# Patient Record
Sex: FEMALE | Race: WHITE | Employment: OTHER | URBAN - METROPOLITAN AREA
[De-identification: names, ages, dates, MRNs, and addresses within clinical notes are randomized per-mention and may not be internally consistent; named-entity substitution may affect disease eponyms.]

---

## 2017-02-11 ENCOUNTER — HOSPITAL ENCOUNTER (EMERGENCY)
Age: 52
Discharge: HOME | End: 2017-02-11
Attending: EMERGENCY MEDICINE
Payer: COMMERCIAL

## 2017-02-11 VITALS
BODY MASS INDEX: 23.49 KG/M2 | SYSTOLIC BLOOD PRESSURE: 122 MMHG | RESPIRATION RATE: 16 BRPM | OXYGEN SATURATION: 100 % | HEART RATE: 80 BPM | DIASTOLIC BLOOD PRESSURE: 91 MMHG | WEIGHT: 150 LBS | TEMPERATURE: 97 F

## 2017-02-11 DIAGNOSIS — M62.830 BACK MUSCLE SPASM: Primary | ICD-10-CM

## 2017-02-11 PROCEDURE — 96372 THER/PROPH/DIAG INJ SC/IM: CPT

## 2017-02-11 PROCEDURE — 99283 EMERGENCY DEPT VISIT LOW MDM: CPT

## 2017-02-11 PROCEDURE — 6360000002 HC RX W HCPCS: Performed by: EMERGENCY MEDICINE

## 2017-02-11 PROCEDURE — 6370000000 HC RX 637 (ALT 250 FOR IP): Performed by: EMERGENCY MEDICINE

## 2017-02-11 RX ORDER — KETOROLAC TROMETHAMINE 30 MG/ML
30 INJECTION, SOLUTION INTRAMUSCULAR; INTRAVENOUS ONCE
Status: COMPLETED | OUTPATIENT
Start: 2017-02-11 | End: 2017-02-11

## 2017-02-11 RX ORDER — IBUPROFEN 800 MG/1
800 TABLET ORAL EVERY 8 HOURS PRN
Qty: 30 TABLET | Refills: 0 | Status: SHIPPED | OUTPATIENT
Start: 2017-02-11 | End: 2017-09-03

## 2017-02-11 RX ORDER — CYCLOBENZAPRINE HCL 10 MG
10 TABLET ORAL 3 TIMES DAILY PRN
Qty: 30 TABLET | Refills: 0 | Status: SHIPPED | OUTPATIENT
Start: 2017-02-11 | End: 2017-02-21

## 2017-02-11 RX ORDER — OXYCODONE HYDROCHLORIDE AND ACETAMINOPHEN 5; 325 MG/1; MG/1
1 TABLET ORAL ONCE
Status: COMPLETED | OUTPATIENT
Start: 2017-02-11 | End: 2017-02-11

## 2017-02-11 RX ORDER — ORPHENADRINE CITRATE 30 MG/ML
60 INJECTION INTRAMUSCULAR; INTRAVENOUS ONCE
Status: COMPLETED | OUTPATIENT
Start: 2017-02-11 | End: 2017-02-11

## 2017-02-11 RX ADMIN — OXYCODONE HYDROCHLORIDE AND ACETAMINOPHEN 1 TABLET: 5; 325 TABLET ORAL at 12:12

## 2017-02-11 RX ADMIN — ORPHENADRINE CITRATE 60 MG: 30 INJECTION INTRAMUSCULAR; INTRAVENOUS at 12:12

## 2017-02-11 RX ADMIN — KETOROLAC TROMETHAMINE 30 MG: 30 INJECTION, SOLUTION INTRAMUSCULAR at 12:13

## 2017-02-11 ASSESSMENT — ENCOUNTER SYMPTOMS
SHORTNESS OF BREATH: 0
ABDOMINAL PAIN: 0
BACK PAIN: 1
CONSTIPATION: 0
COUGH: 0
DIARRHEA: 0
NAUSEA: 0
VOMITING: 0
RHINORRHEA: 0

## 2017-02-11 ASSESSMENT — PAIN SCALES - GENERAL
PAINLEVEL_OUTOF10: 8
PAINLEVEL_OUTOF10: 10

## 2017-02-11 ASSESSMENT — PAIN DESCRIPTION - PAIN TYPE: TYPE: CHRONIC PAIN

## 2017-02-11 ASSESSMENT — PAIN DESCRIPTION - ORIENTATION: ORIENTATION: RIGHT;LOWER

## 2017-02-11 ASSESSMENT — PAIN DESCRIPTION - LOCATION: LOCATION: BACK

## 2017-09-03 ENCOUNTER — HOSPITAL ENCOUNTER (EMERGENCY)
Age: 52
Discharge: HOME | End: 2017-09-03
Attending: EMERGENCY MEDICINE
Payer: COMMERCIAL

## 2017-09-03 VITALS
OXYGEN SATURATION: 100 % | SYSTOLIC BLOOD PRESSURE: 127 MMHG | RESPIRATION RATE: 16 BRPM | TEMPERATURE: 98.8 F | DIASTOLIC BLOOD PRESSURE: 83 MMHG | HEART RATE: 93 BPM

## 2017-09-03 DIAGNOSIS — L03.111 CELLULITIS OF RIGHT AXILLA: Primary | ICD-10-CM

## 2017-09-03 PROCEDURE — 99282 EMERGENCY DEPT VISIT SF MDM: CPT

## 2017-09-03 RX ORDER — DOXYCYCLINE 100 MG/1
100 TABLET ORAL 2 TIMES DAILY
Qty: 20 TABLET | Refills: 0 | Status: SHIPPED | OUTPATIENT
Start: 2017-09-03 | End: 2017-09-13

## 2017-09-03 RX ORDER — IBUPROFEN 800 MG/1
800 TABLET ORAL EVERY 8 HOURS PRN
Qty: 20 TABLET | Refills: 0 | Status: ON HOLD | OUTPATIENT
Start: 2017-09-03 | End: 2017-11-08 | Stop reason: CLARIF

## 2017-09-03 ASSESSMENT — PAIN SCALES - GENERAL: PAINLEVEL_OUTOF10: 9

## 2017-09-03 ASSESSMENT — PAIN DESCRIPTION - DESCRIPTORS: DESCRIPTORS: ACHING

## 2017-09-03 ASSESSMENT — ENCOUNTER SYMPTOMS
COUGH: 0
COLOR CHANGE: 0
SHORTNESS OF BREATH: 0
ABDOMINAL PAIN: 0
VOMITING: 0
NAUSEA: 0
DIARRHEA: 0
BACK PAIN: 0

## 2017-09-03 ASSESSMENT — PAIN DESCRIPTION - ONSET: ONSET: ON-GOING

## 2017-09-03 ASSESSMENT — PAIN DESCRIPTION - FREQUENCY: FREQUENCY: CONTINUOUS

## 2017-09-03 ASSESSMENT — PAIN DESCRIPTION - PAIN TYPE: TYPE: ACUTE PAIN

## 2017-09-05 ENCOUNTER — HOSPITAL ENCOUNTER (EMERGENCY)
Age: 52
Discharge: HOME | End: 2017-09-05
Attending: EMERGENCY MEDICINE
Payer: COMMERCIAL

## 2017-09-05 VITALS
HEART RATE: 90 BPM | OXYGEN SATURATION: 99 % | TEMPERATURE: 98.1 F | HEIGHT: 67 IN | BODY MASS INDEX: 25.11 KG/M2 | WEIGHT: 160 LBS | RESPIRATION RATE: 16 BRPM | SYSTOLIC BLOOD PRESSURE: 137 MMHG | DIASTOLIC BLOOD PRESSURE: 98 MMHG

## 2017-09-05 DIAGNOSIS — L02.91 ABSCESS: Primary | ICD-10-CM

## 2017-09-05 PROCEDURE — 86403 PARTICLE AGGLUT ANTBDY SCRN: CPT

## 2017-09-05 PROCEDURE — 10060 I&D ABSCESS SIMPLE/SINGLE: CPT

## 2017-09-05 PROCEDURE — 87070 CULTURE OTHR SPECIMN AEROBIC: CPT

## 2017-09-05 PROCEDURE — 6370000000 HC RX 637 (ALT 250 FOR IP): Performed by: PHYSICIAN ASSISTANT

## 2017-09-05 PROCEDURE — 87205 SMEAR GRAM STAIN: CPT

## 2017-09-05 PROCEDURE — 99284 EMERGENCY DEPT VISIT MOD MDM: CPT

## 2017-09-05 PROCEDURE — 87186 SC STD MICRODIL/AGAR DIL: CPT

## 2017-09-05 PROCEDURE — 87077 CULTURE AEROBIC IDENTIFY: CPT

## 2017-09-05 RX ORDER — OXYCODONE HYDROCHLORIDE AND ACETAMINOPHEN 5; 325 MG/1; MG/1
1 TABLET ORAL ONCE
Status: COMPLETED | OUTPATIENT
Start: 2017-09-05 | End: 2017-09-05

## 2017-09-05 RX ORDER — OXYCODONE HYDROCHLORIDE AND ACETAMINOPHEN 5; 325 MG/1; MG/1
1-2 TABLET ORAL EVERY 8 HOURS PRN
Qty: 12 TABLET | Refills: 0 | Status: SHIPPED | OUTPATIENT
Start: 2017-09-05 | End: 2017-09-20

## 2017-09-05 RX ADMIN — OXYCODONE HYDROCHLORIDE AND ACETAMINOPHEN 1 TABLET: 5; 325 TABLET ORAL at 14:04

## 2017-09-05 ASSESSMENT — ENCOUNTER SYMPTOMS
ABDOMINAL PAIN: 0
WHEEZING: 0
VOMITING: 0
NAUSEA: 0
COUGH: 0
COLOR CHANGE: 1

## 2017-09-05 ASSESSMENT — PAIN SCALES - GENERAL: PAINLEVEL_OUTOF10: 6

## 2017-09-07 LAB
CULTURE: ABNORMAL
CULTURE: ABNORMAL
DIRECT EXAM: ABNORMAL
DIRECT EXAM: ABNORMAL
Lab: ABNORMAL
ORGANISM: ABNORMAL
SPECIMEN DESCRIPTION: ABNORMAL
STATUS: ABNORMAL

## 2017-09-20 ENCOUNTER — HOSPITAL ENCOUNTER (EMERGENCY)
Age: 52
Discharge: HOME | End: 2017-09-20
Attending: EMERGENCY MEDICINE
Payer: COMMERCIAL

## 2017-09-20 VITALS
OXYGEN SATURATION: 99 % | HEART RATE: 95 BPM | SYSTOLIC BLOOD PRESSURE: 130 MMHG | DIASTOLIC BLOOD PRESSURE: 85 MMHG | TEMPERATURE: 98 F | WEIGHT: 160 LBS | HEIGHT: 67 IN | RESPIRATION RATE: 16 BRPM | BODY MASS INDEX: 25.11 KG/M2

## 2017-09-20 DIAGNOSIS — L73.2 HIDRADENITIS SUPPURATIVA OF LEFT AXILLA: Primary | ICD-10-CM

## 2017-09-20 PROCEDURE — 2500000003 HC RX 250 WO HCPCS: Performed by: EMERGENCY MEDICINE

## 2017-09-20 PROCEDURE — 6370000000 HC RX 637 (ALT 250 FOR IP): Performed by: EMERGENCY MEDICINE

## 2017-09-20 PROCEDURE — 96372 THER/PROPH/DIAG INJ SC/IM: CPT

## 2017-09-20 PROCEDURE — 90715 TDAP VACCINE 7 YRS/> IM: CPT | Performed by: EMERGENCY MEDICINE

## 2017-09-20 PROCEDURE — 10060 I&D ABSCESS SIMPLE/SINGLE: CPT

## 2017-09-20 PROCEDURE — 6360000002 HC RX W HCPCS: Performed by: EMERGENCY MEDICINE

## 2017-09-20 PROCEDURE — 90471 IMMUNIZATION ADMIN: CPT | Performed by: EMERGENCY MEDICINE

## 2017-09-20 PROCEDURE — 99282 EMERGENCY DEPT VISIT SF MDM: CPT

## 2017-09-20 RX ORDER — CHLORHEXIDINE GLUCONATE 4 G/100ML
SOLUTION TOPICAL
Qty: 473 ML | Refills: 1 | Status: SHIPPED | OUTPATIENT
Start: 2017-09-20 | End: 2017-10-04

## 2017-09-20 RX ORDER — MINOCYCLINE HYDROCHLORIDE 50 MG/1
100 CAPSULE ORAL 2 TIMES DAILY
Status: DISCONTINUED | OUTPATIENT
Start: 2017-09-20 | End: 2017-09-20 | Stop reason: HOSPADM

## 2017-09-20 RX ORDER — HYDROCODONE BITARTRATE AND ACETAMINOPHEN 5; 325 MG/1; MG/1
1 TABLET ORAL EVERY 6 HOURS PRN
Qty: 9 TABLET | Refills: 0 | Status: SHIPPED | OUTPATIENT
Start: 2017-09-20 | End: 2017-09-27

## 2017-09-20 RX ORDER — MINOCYCLINE HYDROCHLORIDE 100 MG/1
100 CAPSULE ORAL 2 TIMES DAILY
Qty: 14 CAPSULE | Refills: 0 | Status: SHIPPED | OUTPATIENT
Start: 2017-09-20 | End: 2017-09-27

## 2017-09-20 RX ORDER — LIDOCAINE HYDROCHLORIDE 10 MG/ML
20 INJECTION, SOLUTION INFILTRATION; PERINEURAL ONCE
Status: COMPLETED | OUTPATIENT
Start: 2017-09-20 | End: 2017-09-20

## 2017-09-20 RX ORDER — MORPHINE SULFATE 4 MG/ML
4 INJECTION, SOLUTION INTRAMUSCULAR; INTRAVENOUS ONCE
Status: COMPLETED | OUTPATIENT
Start: 2017-09-20 | End: 2017-09-20

## 2017-09-20 RX ADMIN — LIDOCAINE HYDROCHLORIDE 20 ML: 10 INJECTION, SOLUTION INFILTRATION; PERINEURAL at 10:27

## 2017-09-20 RX ADMIN — MINOCYCLINE HYDROCHLORIDE 100 MG: 50 CAPSULE ORAL at 10:33

## 2017-09-20 RX ADMIN — TETANUS TOXOID, REDUCED DIPHTHERIA TOXOID AND ACELLULAR PERTUSSIS VACCINE, ADSORBED 0.5 ML: 5; 2.5; 8; 8; 2.5 SUSPENSION INTRAMUSCULAR at 10:27

## 2017-09-20 RX ADMIN — MORPHINE SULFATE 4 MG: 4 INJECTION, SOLUTION INTRAMUSCULAR; INTRAVENOUS at 10:28

## 2017-09-20 ASSESSMENT — PAIN DESCRIPTION - ORIENTATION: ORIENTATION: LEFT

## 2017-09-20 ASSESSMENT — PAIN DESCRIPTION - DESCRIPTORS: DESCRIPTORS: ACHING;BURNING

## 2017-09-20 ASSESSMENT — ENCOUNTER SYMPTOMS: ROS SKIN COMMENTS: ABSCESS

## 2017-09-20 ASSESSMENT — PAIN SCALES - GENERAL
PAINLEVEL_OUTOF10: 10
PAINLEVEL_OUTOF10: 5

## 2017-09-20 ASSESSMENT — PAIN DESCRIPTION - PAIN TYPE: TYPE: ACUTE PAIN

## 2017-09-23 ENCOUNTER — HOSPITAL ENCOUNTER (EMERGENCY)
Age: 52
Discharge: HOME | End: 2017-09-23
Attending: EMERGENCY MEDICINE
Payer: COMMERCIAL

## 2017-09-23 VITALS
TEMPERATURE: 97.7 F | WEIGHT: 160 LBS | HEART RATE: 83 BPM | DIASTOLIC BLOOD PRESSURE: 81 MMHG | RESPIRATION RATE: 15 BRPM | BODY MASS INDEX: 25.06 KG/M2 | SYSTOLIC BLOOD PRESSURE: 131 MMHG | OXYGEN SATURATION: 100 %

## 2017-09-23 DIAGNOSIS — Z51.89 ENCOUNTER FOR WOUND RE-CHECK: Primary | ICD-10-CM

## 2017-09-23 PROCEDURE — 99282 EMERGENCY DEPT VISIT SF MDM: CPT

## 2017-09-23 ASSESSMENT — PAIN SCALES - GENERAL: PAINLEVEL_OUTOF10: 5

## 2017-09-23 ASSESSMENT — ENCOUNTER SYMPTOMS
VOMITING: 0
ABDOMINAL PAIN: 0
NAUSEA: 0
SHORTNESS OF BREATH: 0

## 2017-09-23 ASSESSMENT — PAIN DESCRIPTION - ORIENTATION: ORIENTATION: LEFT

## 2017-11-08 ENCOUNTER — APPOINTMENT (OUTPATIENT)
Dept: GENERAL RADIOLOGY | Age: 52
DRG: 263 | End: 2017-11-08
Payer: COMMERCIAL

## 2017-11-08 ENCOUNTER — APPOINTMENT (OUTPATIENT)
Dept: NUCLEAR MEDICINE | Age: 52
DRG: 263 | End: 2017-11-08
Payer: COMMERCIAL

## 2017-11-08 ENCOUNTER — HOSPITAL ENCOUNTER (INPATIENT)
Age: 52
LOS: 3 days | Discharge: HOME | DRG: 263 | End: 2017-11-11
Attending: EMERGENCY MEDICINE | Admitting: INTERNAL MEDICINE
Payer: COMMERCIAL

## 2017-11-08 ENCOUNTER — APPOINTMENT (OUTPATIENT)
Dept: CT IMAGING | Age: 52
DRG: 263 | End: 2017-11-08
Payer: COMMERCIAL

## 2017-11-08 ENCOUNTER — APPOINTMENT (OUTPATIENT)
Dept: ULTRASOUND IMAGING | Age: 52
DRG: 263 | End: 2017-11-08
Payer: COMMERCIAL

## 2017-11-08 DIAGNOSIS — R07.9 CHEST PAIN, UNSPECIFIED TYPE: Primary | ICD-10-CM

## 2017-11-08 DIAGNOSIS — K80.20 CALCULUS OF GALLBLADDER WITHOUT CHOLECYSTITIS WITHOUT OBSTRUCTION: ICD-10-CM

## 2017-11-08 PROBLEM — R07.89 ATYPICAL CHEST PAIN: Status: ACTIVE | Noted: 2017-11-08

## 2017-11-08 PROBLEM — K81.1 CHRONIC CHOLECYSTITIS: Status: ACTIVE | Noted: 2017-11-08

## 2017-11-08 LAB
-: NORMAL
ABSOLUTE EOS #: 0.22 K/UL (ref 0–0.44)
ABSOLUTE IMMATURE GRANULOCYTE: <0.03 K/UL (ref 0–0.3)
ABSOLUTE LYMPH #: 2.58 K/UL (ref 1.1–3.7)
ABSOLUTE MONO #: 0.6 K/UL (ref 0.1–1.2)
ALBUMIN SERPL-MCNC: 4 G/DL (ref 3.5–5.2)
ALBUMIN/GLOBULIN RATIO: 1.4 (ref 1–2.5)
ALP BLD-CCNC: 88 U/L (ref 35–104)
ALT SERPL-CCNC: 15 U/L (ref 5–33)
AMORPHOUS: NORMAL
AMPHETAMINE SCREEN URINE: NEGATIVE
ANION GAP SERPL CALCULATED.3IONS-SCNC: 14 MMOL/L (ref 9–17)
AST SERPL-CCNC: 17 U/L
BACTERIA: NORMAL
BARBITURATE SCREEN URINE: NEGATIVE
BASOPHILS # BLD: 1 % (ref 0–2)
BASOPHILS ABSOLUTE: 0.05 K/UL (ref 0–0.2)
BENZODIAZEPINE SCREEN, URINE: NEGATIVE
BILIRUB SERPL-MCNC: <0.1 MG/DL (ref 0.3–1.2)
BILIRUBIN DIRECT: <0.08 MG/DL
BILIRUBIN URINE: NEGATIVE
BILIRUBIN, INDIRECT: ABNORMAL MG/DL (ref 0–1)
BUN BLDV-MCNC: 9 MG/DL (ref 6–20)
BUN/CREAT BLD: ABNORMAL (ref 9–20)
BUPRENORPHINE URINE: ABNORMAL
CALCIUM SERPL-MCNC: 9.1 MG/DL (ref 8.6–10.4)
CANNABINOID SCREEN URINE: POSITIVE
CASTS UA: NORMAL /LPF (ref 0–8)
CHLORIDE BLD-SCNC: 103 MMOL/L (ref 98–107)
CHOLESTEROL/HDL RATIO: 6
CHOLESTEROL: 265 MG/DL
CO2: 26 MMOL/L (ref 20–31)
COCAINE METABOLITE, URINE: NEGATIVE
COLOR: YELLOW
COMMENT UA: ABNORMAL
CREAT SERPL-MCNC: 0.6 MG/DL (ref 0.5–0.9)
CRYSTALS, UA: NORMAL /HPF
D-DIMER QUANTITATIVE: 1.17 MG/L FEU
DIFFERENTIAL TYPE: ABNORMAL
EOSINOPHILS RELATIVE PERCENT: 3 % (ref 1–4)
EPITHELIAL CELLS UA: NORMAL /HPF (ref 0–5)
GFR AFRICAN AMERICAN: >60 ML/MIN
GFR NON-AFRICAN AMERICAN: >60 ML/MIN
GFR SERPL CREATININE-BSD FRML MDRD: ABNORMAL ML/MIN/{1.73_M2}
GFR SERPL CREATININE-BSD FRML MDRD: ABNORMAL ML/MIN/{1.73_M2}
GLOBULIN: ABNORMAL G/DL (ref 1.5–3.8)
GLUCOSE BLD-MCNC: 90 MG/DL (ref 70–99)
GLUCOSE URINE: NEGATIVE
HCT VFR BLD CALC: 42.1 % (ref 36.3–47.1)
HDLC SERPL-MCNC: 44 MG/DL
HEMOGLOBIN: 13.4 G/DL (ref 11.9–15.1)
IMMATURE GRANULOCYTES: 0 %
KETONES, URINE: NEGATIVE
LDL CHOLESTEROL: 198 MG/DL (ref 0–130)
LEUKOCYTE ESTERASE, URINE: NEGATIVE
LIPASE: 18 U/L (ref 13–60)
LYMPHOCYTES # BLD: 32 % (ref 24–43)
MCH RBC QN AUTO: 28.7 PG (ref 25.2–33.5)
MCHC RBC AUTO-ENTMCNC: 31.8 G/DL (ref 28.4–34.8)
MCV RBC AUTO: 90.1 FL (ref 82.6–102.9)
MDMA URINE: ABNORMAL
METHADONE SCREEN, URINE: NEGATIVE
METHAMPHETAMINE, URINE: ABNORMAL
MONOCYTES # BLD: 8 % (ref 3–12)
MUCUS: NORMAL
NITRITE, URINE: NEGATIVE
OPIATES, URINE: POSITIVE
OTHER OBSERVATIONS UA: NORMAL
OXYCODONE SCREEN URINE: NEGATIVE
PDW BLD-RTO: 15.9 % (ref 11.8–14.4)
PH UA: 6 (ref 5–8)
PHENCYCLIDINE, URINE: NEGATIVE
PLATELET # BLD: 299 K/UL (ref 138–453)
PLATELET ESTIMATE: ABNORMAL
PMV BLD AUTO: 8.8 FL (ref 8.1–13.5)
POC TROPONIN I: 0 NG/ML (ref 0–0.1)
POC TROPONIN I: 0.01 NG/ML (ref 0–0.1)
POC TROPONIN I: 0.01 NG/ML (ref 0–0.1)
POC TROPONIN INTERP: NORMAL
POTASSIUM SERPL-SCNC: 3.5 MMOL/L (ref 3.7–5.3)
PROPOXYPHENE, URINE: ABNORMAL
PROTEIN UA: NEGATIVE
RBC # BLD: 4.67 M/UL (ref 3.95–5.11)
RBC # BLD: ABNORMAL 10*6/UL
RBC UA: NORMAL /HPF (ref 0–4)
RENAL EPITHELIAL, UA: NORMAL /HPF
SEG NEUTROPHILS: 57 % (ref 36–65)
SEGMENTED NEUTROPHILS ABSOLUTE COUNT: 4.54 K/UL (ref 1.5–8.1)
SODIUM BLD-SCNC: 143 MMOL/L (ref 135–144)
SPECIFIC GRAVITY UA: 1.02 (ref 1–1.03)
TEST INFORMATION: ABNORMAL
TOTAL PROTEIN: 6.8 G/DL (ref 6.4–8.3)
TRICHOMONAS: NORMAL
TRICYCLIC ANTIDEPRESSANTS, UR: ABNORMAL
TRIGL SERPL-MCNC: 115 MG/DL
TROPONIN INTERP: NORMAL
TROPONIN INTERP: NORMAL
TROPONIN T: <0.03 NG/ML
TROPONIN T: <0.03 NG/ML
TURBIDITY: CLEAR
URINE HGB: ABNORMAL
UROBILINOGEN, URINE: NORMAL
VLDLC SERPL CALC-MCNC: ABNORMAL MG/DL (ref 1–30)
WBC # BLD: 8 K/UL (ref 3.5–11.3)
WBC # BLD: ABNORMAL 10*3/UL
WBC UA: NORMAL /HPF (ref 0–5)
YEAST: NORMAL

## 2017-11-08 PROCEDURE — 71020 XR CHEST STANDARD TWO VW: CPT

## 2017-11-08 PROCEDURE — A9537 TC99M MEBROFENIN: HCPCS | Performed by: SURGERY

## 2017-11-08 PROCEDURE — 83690 ASSAY OF LIPASE: CPT

## 2017-11-08 PROCEDURE — 85025 COMPLETE CBC W/AUTO DIFF WBC: CPT

## 2017-11-08 PROCEDURE — 80061 LIPID PANEL: CPT

## 2017-11-08 PROCEDURE — 36415 COLL VENOUS BLD VENIPUNCTURE: CPT

## 2017-11-08 PROCEDURE — 76705 ECHO EXAM OF ABDOMEN: CPT

## 2017-11-08 PROCEDURE — 99223 1ST HOSP IP/OBS HIGH 75: CPT | Performed by: INTERNAL MEDICINE

## 2017-11-08 PROCEDURE — 81001 URINALYSIS AUTO W/SCOPE: CPT

## 2017-11-08 PROCEDURE — 6360000002 HC RX W HCPCS: Performed by: INTERNAL MEDICINE

## 2017-11-08 PROCEDURE — 80076 HEPATIC FUNCTION PANEL: CPT

## 2017-11-08 PROCEDURE — 6360000004 HC RX CONTRAST MEDICATION: Performed by: EMERGENCY MEDICINE

## 2017-11-08 PROCEDURE — 6370000000 HC RX 637 (ALT 250 FOR IP): Performed by: INTERNAL MEDICINE

## 2017-11-08 PROCEDURE — 85379 FIBRIN DEGRADATION QUANT: CPT

## 2017-11-08 PROCEDURE — 71260 CT THORAX DX C+: CPT

## 2017-11-08 PROCEDURE — 78227 HEPATOBIL SYST IMAGE W/DRUG: CPT

## 2017-11-08 PROCEDURE — 80048 BASIC METABOLIC PNL TOTAL CA: CPT

## 2017-11-08 PROCEDURE — 6370000000 HC RX 637 (ALT 250 FOR IP): Performed by: EMERGENCY MEDICINE

## 2017-11-08 PROCEDURE — 6360000002 HC RX W HCPCS: Performed by: SURGERY

## 2017-11-08 PROCEDURE — 2580000003 HC RX 258: Performed by: INTERNAL MEDICINE

## 2017-11-08 PROCEDURE — 3430000000 HC RX DIAGNOSTIC RADIOPHARMACEUTICAL: Performed by: SURGERY

## 2017-11-08 PROCEDURE — 2580000003 HC RX 258: Performed by: SURGERY

## 2017-11-08 PROCEDURE — 84484 ASSAY OF TROPONIN QUANT: CPT

## 2017-11-08 PROCEDURE — 80307 DRUG TEST PRSMV CHEM ANLYZR: CPT

## 2017-11-08 PROCEDURE — 93005 ELECTROCARDIOGRAM TRACING: CPT

## 2017-11-08 PROCEDURE — 99285 EMERGENCY DEPT VISIT HI MDM: CPT

## 2017-11-08 PROCEDURE — 99222 1ST HOSP IP/OBS MODERATE 55: CPT | Performed by: SURGERY

## 2017-11-08 PROCEDURE — 1200000000 HC SEMI PRIVATE

## 2017-11-08 PROCEDURE — 94640 AIRWAY INHALATION TREATMENT: CPT

## 2017-11-08 PROCEDURE — 6370000000 HC RX 637 (ALT 250 FOR IP): Performed by: STUDENT IN AN ORGANIZED HEALTH CARE EDUCATION/TRAINING PROGRAM

## 2017-11-08 RX ORDER — ALBUTEROL SULFATE 90 UG/1
2 AEROSOL, METERED RESPIRATORY (INHALATION) 4 TIMES DAILY
Status: DISCONTINUED | OUTPATIENT
Start: 2017-11-08 | End: 2017-11-08

## 2017-11-08 RX ORDER — CHOLECALCIFEROL (VITAMIN D3) 125 MCG
1000 CAPSULE ORAL DAILY
Status: DISCONTINUED | OUTPATIENT
Start: 2017-11-08 | End: 2017-11-11 | Stop reason: HOSPADM

## 2017-11-08 RX ORDER — ATORVASTATIN CALCIUM 40 MG/1
40 TABLET, FILM COATED ORAL NIGHTLY
Status: DISCONTINUED | OUTPATIENT
Start: 2017-11-08 | End: 2017-11-11 | Stop reason: HOSPADM

## 2017-11-08 RX ORDER — PAROXETINE 30 MG/1
30 TABLET, FILM COATED ORAL EVERY MORNING
COMMUNITY
End: 2018-01-30 | Stop reason: SINTOL

## 2017-11-08 RX ORDER — SODIUM CHLORIDE 0.9 % (FLUSH) 0.9 %
10 SYRINGE (ML) INJECTION EVERY 12 HOURS SCHEDULED
Status: DISCONTINUED | OUTPATIENT
Start: 2017-11-08 | End: 2017-11-11 | Stop reason: HOSPADM

## 2017-11-08 RX ORDER — ASPIRIN 81 MG/1
81 TABLET ORAL DAILY
Status: DISCONTINUED | OUTPATIENT
Start: 2017-11-08 | End: 2017-11-11 | Stop reason: HOSPADM

## 2017-11-08 RX ORDER — ACETAMINOPHEN 325 MG/1
650 TABLET ORAL EVERY 4 HOURS PRN
Status: DISCONTINUED | OUTPATIENT
Start: 2017-11-08 | End: 2017-11-11 | Stop reason: HOSPADM

## 2017-11-08 RX ORDER — M-VIT,TX,IRON,MINS/CALC/FOLIC 27MG-0.4MG
1 TABLET ORAL DAILY
Status: DISCONTINUED | OUTPATIENT
Start: 2017-11-08 | End: 2017-11-11 | Stop reason: HOSPADM

## 2017-11-08 RX ORDER — ALBUTEROL SULFATE 2.5 MG/3ML
2.5 SOLUTION RESPIRATORY (INHALATION)
Status: DISCONTINUED | OUTPATIENT
Start: 2017-11-08 | End: 2017-11-09

## 2017-11-08 RX ORDER — ONDANSETRON 2 MG/ML
4 INJECTION INTRAMUSCULAR; INTRAVENOUS EVERY 6 HOURS PRN
Status: DISCONTINUED | OUTPATIENT
Start: 2017-11-08 | End: 2017-11-08

## 2017-11-08 RX ORDER — SODIUM CHLORIDE 0.9 % (FLUSH) 0.9 %
10 SYRINGE (ML) INJECTION PRN
Status: DISCONTINUED | OUTPATIENT
Start: 2017-11-08 | End: 2017-11-08

## 2017-11-08 RX ORDER — SODIUM CHLORIDE 0.9 % (FLUSH) 0.9 %
10 SYRINGE (ML) INJECTION PRN
Status: DISCONTINUED | OUTPATIENT
Start: 2017-11-08 | End: 2017-11-11 | Stop reason: HOSPADM

## 2017-11-08 RX ORDER — NICOTINE 21 MG/24HR
1 PATCH, TRANSDERMAL 24 HOURS TRANSDERMAL DAILY
Status: DISCONTINUED | OUTPATIENT
Start: 2017-11-08 | End: 2017-11-11 | Stop reason: HOSPADM

## 2017-11-08 RX ORDER — SODIUM CHLORIDE 9 MG/ML
INJECTION, SOLUTION INTRAVENOUS CONTINUOUS
Status: DISCONTINUED | OUTPATIENT
Start: 2017-11-08 | End: 2017-11-11 | Stop reason: HOSPADM

## 2017-11-08 RX ORDER — CYCLOBENZAPRINE HCL 10 MG
10 TABLET ORAL 3 TIMES DAILY PRN
Status: DISCONTINUED | OUTPATIENT
Start: 2017-11-08 | End: 2017-11-11 | Stop reason: HOSPADM

## 2017-11-08 RX ORDER — MORPHINE SULFATE 4 MG/ML
3 INJECTION, SOLUTION INTRAMUSCULAR; INTRAVENOUS ONCE
Status: COMPLETED | OUTPATIENT
Start: 2017-11-08 | End: 2017-11-08

## 2017-11-08 RX ORDER — SODIUM CHLORIDE 0.9 % (FLUSH) 0.9 %
10 SYRINGE (ML) INJECTION 2 TIMES DAILY
Status: DISCONTINUED | OUTPATIENT
Start: 2017-11-08 | End: 2017-11-08

## 2017-11-08 RX ORDER — SODIUM CHLORIDE 0.9 % (FLUSH) 0.9 %
10 SYRINGE (ML) INJECTION EVERY 12 HOURS SCHEDULED
Status: DISCONTINUED | OUTPATIENT
Start: 2017-11-08 | End: 2017-11-08

## 2017-11-08 RX ORDER — ACETAMINOPHEN 325 MG/1
650 TABLET ORAL EVERY 6 HOURS PRN
Status: DISCONTINUED | OUTPATIENT
Start: 2017-11-08 | End: 2017-11-08

## 2017-11-08 RX ORDER — MORPHINE SULFATE 10 MG/ML
3 INJECTION, SOLUTION INTRAMUSCULAR; INTRAVENOUS ONCE
Status: DISCONTINUED | OUTPATIENT
Start: 2017-11-08 | End: 2017-11-08

## 2017-11-08 RX ORDER — CYCLOBENZAPRINE HCL 10 MG
10 TABLET ORAL DAILY
COMMUNITY
End: 2018-01-30

## 2017-11-08 RX ORDER — IBUPROFEN 800 MG/1
800 TABLET ORAL EVERY 8 HOURS PRN
Status: DISCONTINUED | OUTPATIENT
Start: 2017-11-08 | End: 2017-11-08

## 2017-11-08 RX ORDER — POTASSIUM CHLORIDE 20MEQ/15ML
40 LIQUID (ML) ORAL PRN
Status: DISCONTINUED | OUTPATIENT
Start: 2017-11-08 | End: 2017-11-11 | Stop reason: HOSPADM

## 2017-11-08 RX ORDER — GABAPENTIN 100 MG/1
100 CAPSULE ORAL 2 TIMES DAILY
Status: DISCONTINUED | OUTPATIENT
Start: 2017-11-08 | End: 2017-11-11 | Stop reason: HOSPADM

## 2017-11-08 RX ORDER — MORPHINE SULFATE 2 MG/ML
1 INJECTION, SOLUTION INTRAMUSCULAR; INTRAVENOUS
Status: DISCONTINUED | OUTPATIENT
Start: 2017-11-08 | End: 2017-11-10

## 2017-11-08 RX ORDER — POTASSIUM CHLORIDE 7.45 MG/ML
10 INJECTION INTRAVENOUS PRN
Status: DISCONTINUED | OUTPATIENT
Start: 2017-11-08 | End: 2017-11-11 | Stop reason: HOSPADM

## 2017-11-08 RX ORDER — FENTANYL CITRATE 50 UG/ML
50 INJECTION, SOLUTION INTRAMUSCULAR; INTRAVENOUS
Status: DISCONTINUED | OUTPATIENT
Start: 2017-11-08 | End: 2017-11-08

## 2017-11-08 RX ORDER — LEVETIRACETAM 500 MG/1
500 TABLET ORAL 2 TIMES DAILY
Status: DISCONTINUED | OUTPATIENT
Start: 2017-11-08 | End: 2017-11-11 | Stop reason: HOSPADM

## 2017-11-08 RX ORDER — ONDANSETRON 2 MG/ML
4 INJECTION INTRAMUSCULAR; INTRAVENOUS EVERY 6 HOURS PRN
Status: DISCONTINUED | OUTPATIENT
Start: 2017-11-08 | End: 2017-11-11 | Stop reason: HOSPADM

## 2017-11-08 RX ORDER — ACETAMINOPHEN 325 MG/1
650 TABLET ORAL ONCE
Status: COMPLETED | OUTPATIENT
Start: 2017-11-08 | End: 2017-11-08

## 2017-11-08 RX ORDER — FLUTICASONE FUROATE AND VILANTEROL 100; 25 UG/1; UG/1
POWDER RESPIRATORY (INHALATION) 2 TIMES DAILY
COMMUNITY
End: 2018-04-25 | Stop reason: SDUPTHER

## 2017-11-08 RX ORDER — LEVETIRACETAM 500 MG/1
500 TABLET ORAL DAILY
COMMUNITY
End: 2018-06-26 | Stop reason: SDUPTHER

## 2017-11-08 RX ORDER — PANTOPRAZOLE SODIUM 40 MG/1
40 TABLET, DELAYED RELEASE ORAL
Status: DISCONTINUED | OUTPATIENT
Start: 2017-11-09 | End: 2017-11-11 | Stop reason: HOSPADM

## 2017-11-08 RX ORDER — PAROXETINE HYDROCHLORIDE 20 MG/1
20 TABLET, FILM COATED ORAL DAILY
Status: DISCONTINUED | OUTPATIENT
Start: 2017-11-08 | End: 2017-11-08

## 2017-11-08 RX ORDER — POTASSIUM CHLORIDE 20 MEQ/1
40 TABLET, EXTENDED RELEASE ORAL PRN
Status: DISCONTINUED | OUTPATIENT
Start: 2017-11-08 | End: 2017-11-11 | Stop reason: HOSPADM

## 2017-11-08 RX ORDER — ACETAMINOPHEN 325 MG/1
650 TABLET ORAL EVERY 4 HOURS PRN
Status: DISCONTINUED | OUTPATIENT
Start: 2017-11-08 | End: 2017-11-08 | Stop reason: SDUPTHER

## 2017-11-08 RX ADMIN — ATORVASTATIN CALCIUM 40 MG: 40 TABLET, FILM COATED ORAL at 21:19

## 2017-11-08 RX ADMIN — CYCLOBENZAPRINE HYDROCHLORIDE 10 MG: 10 TABLET, FILM COATED ORAL at 20:46

## 2017-11-08 RX ADMIN — MULTIPLE VITAMINS W/ MINERALS TAB 1 TABLET: TAB at 16:11

## 2017-11-08 RX ADMIN — ENOXAPARIN SODIUM 40 MG: 40 INJECTION SUBCUTANEOUS at 16:12

## 2017-11-08 RX ADMIN — Medication 3 MILLICURIE: at 12:15

## 2017-11-08 RX ADMIN — ASPIRIN 81 MG: 81 TABLET, COATED ORAL at 16:11

## 2017-11-08 RX ADMIN — SODIUM CHLORIDE: 9 INJECTION, SOLUTION INTRAVENOUS at 16:00

## 2017-11-08 RX ADMIN — Medication 1000 MCG: at 16:11

## 2017-11-08 RX ADMIN — LEVETIRACETAM 500 MG: 500 TABLET, FILM COATED ORAL at 20:49

## 2017-11-08 RX ADMIN — VITAMIN D, TAB 1000IU (100/BT) 1000 UNITS: 25 TAB at 16:12

## 2017-11-08 RX ADMIN — METOPROLOL TARTRATE 25 MG: 25 TABLET ORAL at 20:49

## 2017-11-08 RX ADMIN — ACETAMINOPHEN 650 MG: 325 TABLET ORAL at 07:06

## 2017-11-08 RX ADMIN — PAROXETINE HYDROCHLORIDE HEMIHYDRATE 30 MG: 20 TABLET, FILM COATED ORAL at 21:19

## 2017-11-08 RX ADMIN — IOPAMIDOL 75 ML: 755 INJECTION, SOLUTION INTRAVENOUS at 07:36

## 2017-11-08 RX ADMIN — GABAPENTIN 100 MG: 100 CAPSULE ORAL at 20:46

## 2017-11-08 RX ADMIN — SODIUM CHLORIDE, PRESERVATIVE FREE 10 ML: 5 INJECTION INTRAVENOUS at 12:15

## 2017-11-08 RX ADMIN — MOMETASONE FUROATE AND FORMOTEROL FUMARATE DIHYDRATE 2 PUFF: 200; 5 AEROSOL RESPIRATORY (INHALATION) at 21:14

## 2017-11-08 RX ADMIN — ACETAMINOPHEN 650 MG: 325 TABLET ORAL at 20:02

## 2017-11-08 RX ADMIN — MORPHINE SULFATE 3 MG: 4 INJECTION INTRAVENOUS at 13:58

## 2017-11-08 ASSESSMENT — ENCOUNTER SYMPTOMS
DIARRHEA: 0
WHEEZING: 0
STRIDOR: 0
CHEST TIGHTNESS: 0
BLOOD IN STOOL: 0
CHOKING: 0
FACIAL SWELLING: 0
VOMITING: 1
TROUBLE SWALLOWING: 0
SHORTNESS OF BREATH: 1
ABDOMINAL PAIN: 0
PHOTOPHOBIA: 0
NAUSEA: 1
COLOR CHANGE: 0

## 2017-11-08 ASSESSMENT — PAIN DESCRIPTION - LOCATION
LOCATION: BACK
LOCATION: CHEST
LOCATION: CHEST

## 2017-11-08 ASSESSMENT — PAIN DESCRIPTION - FREQUENCY
FREQUENCY: CONTINUOUS

## 2017-11-08 ASSESSMENT — PAIN SCALES - GENERAL
PAINLEVEL_OUTOF10: 6
PAINLEVEL_OUTOF10: 5
PAINLEVEL_OUTOF10: 0
PAINLEVEL_OUTOF10: 6
PAINLEVEL_OUTOF10: 3
PAINLEVEL_OUTOF10: 1

## 2017-11-08 ASSESSMENT — PAIN DESCRIPTION - ORIENTATION
ORIENTATION: MID
ORIENTATION: RIGHT

## 2017-11-08 ASSESSMENT — PAIN DESCRIPTION - DESCRIPTORS
DESCRIPTORS: PRESSURE
DESCRIPTORS: PRESSURE
DESCRIPTORS: ACHING

## 2017-11-08 ASSESSMENT — PAIN DESCRIPTION - PAIN TYPE
TYPE: ACUTE PAIN
TYPE: CHRONIC PAIN
TYPE: ACUTE PAIN

## 2017-11-08 ASSESSMENT — PAIN DESCRIPTION - ONSET: ONSET: ON-GOING

## 2017-11-08 ASSESSMENT — PAIN DESCRIPTION - PROGRESSION: CLINICAL_PROGRESSION: NOT CHANGED

## 2017-11-08 ASSESSMENT — HEART SCORE: ECG: 0

## 2017-11-08 NOTE — ED NOTES
Report received from Deni Langley, [facility], [facility] / [facility].      Ziyad Muñiz RN  11/08/17 8365

## 2017-11-08 NOTE — H&P
Internal Medicine - History and Physical Examination    Patient's name:  Bebeto Queen  Medical Record Number: [id number]  Patient's account/billing number: [de-identified]  Patient's YOB: 1965  Age: 46 y.o. Date of Admission: 11/8/2017  6:19 AM  Primary Care Physician: No primary care provider on file. Code Status: No Order    Chief complaint: Chest pain, RUQ pain    HISTORY OF PRESENT ILLNESS:   History was obtained from chart review and the patient. Bebeto Queen is a 46 y.o. with PMH of reported CAD but no stenting, anxiety and depression. The first episode was 2 nights ago when she noticed a sudden pain in her RUQ that radiated to her chest, mid-sternal region, non-radiating, lasted half an hour and was associated with diaphoresis. There were no relieving or exacerbating factors. Resolved spontaneously in half an hour and patient went back to sleep. She felt okay the next day, she denies association with meals. She was able to tolerate regular diet yesterday. Upon waking this AM she had strong, mid-sternal chest pain, again non radiating and cramping in nature. She claims it was again associated with diaphoresis. CTA chest in ED was negative for PE but showed both marked distension of the gallbladder and CAD with atherosclerotic calcification of the aorta. US GB further showed     She claims she had a cath done a couple years ago which showed coronary artery disease and that she was put on ASA and BB at that time. S    She currently smokes 1PPD, denies alcohol or recreational drug use. She takes inhalers for COPD.     Past Medical History:        Diagnosis Date    Acute MI     Anxiety     Chronic back pain     COPD (chronic obstructive pulmonary disease) (HCC)     Depression     Heart disease     Hyperlipidemia     Hypertension     MRSA (methicillin resistant staph aureus) culture positive 09/05/2017    abscess       Past Surgical History:        Procedure Laterality Date    BACK file.  ETOH:   reports that she does not drink alcohol. DRUGS:  reports that she uses drugs, including Marijuana. OCCUPATION:      Family History:       Problem Relation Age of Onset    Other Mother     Heart Disease Father     High Blood Pressure Father     High Cholesterol Father          REVIEW OF SYSTEMS (ROS):    General ROS: negative for - chills, fatigue or fever  ENT ROS: negative  Respiratory ROS: no cough, shortness of breath, + intermittent wheezing  Cardiovascular ROS: + chest pain  Gastrointestinal ROS: + RUQ  abdominal pain, denies change in bowel habits, or black or bloody stools  Neurological ROS: no TIA or stroke symptoms  Endocrine ROS: negative  Genito-Urinary ROS: no dysuria, trouble voiding, or hematuria  Allergy and Immunology ROS: negative  Hematological and Lymphatic ROS: negative  Musculoskeletal ROS: negative  Dermatological ROS: negative      Physical Exam:    Vitals: /88   Pulse 79   Temp 98.1 °F (36.7 °C) (Oral)   Resp 19   Ht 5' 7\" (1.702 m)   Wt 160 lb (72.6 kg)   SpO2 99%   BMI 25.06 kg/m²     Last Body weight:   Wt Readings from Last 3 Encounters:   11/08/17 160 lb (72.6 kg)   09/23/17 160 lb (72.6 kg)   09/20/17 160 lb (72.6 kg)       Body Mass Index : Body mass index is 25.06 kg/m². PHYSICAL EXAMINATION :    General appearance - alert, well appearing, and in no distress and oriented to person, place, and time  Mental status - alert, oriented to person, place, and time  Head- atraumatic, normocephalic  Eyes - pupils equal, extraocular eye movements intact, sclera anicteric  Ears - hearing grossly normal bilaterally  Chest - clear to auscultation, no wheezes, rales or rhonchi, symmetric air entry. Chest pain not reproducible.   Heart - normal rate, regular rhythm, normal S1, S2, no murmurs  Abdomen - soft, +BS, tender to deep palpation RUQ/epigastric region, nondistended, no masses or organomegaly   Neurological - alert, oriented, normal speech, no focal findings or movement disorder noted, cranial nerves II-XII grossly intact  Extremities - no pedal edema, no clubbing or cyanosis,  Skin - normal coloration and turgor, no rashes, no suspicious skin lesions noted       Laboratory findings:-    CBC:   Recent Labs      11/08/17   0645   WBC  8.0   HGB  13.4   PLT  299     BMP:    Recent Labs      11/08/17   0645   NA  143   K  3.5*   CL  103   CO2  26   BUN  9   CREATININE  0.60   GLUCOSE  90     S. Calcium:  Recent Labs      11/08/17   0645   CALCIUM  9.1     S. Ionized Calcium:No results for input(s): IONCA in the last 72 hours. S. Magnesium:No results for input(s): MG in the last 72 hours. S. Phosphorus:No results for input(s): PHOS in the last 72 hours. S. Glucose:No results for input(s): POCGLU in the last 72 hours. Glycosylated hemoglobin A1C: No results for input(s): LABA1C in the last 72 hours. INR: No results for input(s): INR in the last 72 hours. Hepatic functions:   Recent Labs      11/08/17   0645   ALKPHOS  88   ALT  15   AST  17   PROT  6.8   BILITOT  <0.10*   BILIDIR  <0.08   LABALBU  4.0     Pancreatic functions:No results for input(s): LACTA, AMYLASE in the last 72 hours. S. Lactic Acid: No results for input(s): LACTA in the last 72 hours. Cardiac enzymes:  Recent Labs      11/08/17   0647  11/08/17   0703  11/08/17   0835   TROPONINI  0.01  0.00  0.01     BNP:No results for input(s): BNP in the last 72 hours. Lipid profile: No results for input(s): CHOL, TRIG, HDL, LDLCALC in the last 72 hours. Invalid input(s): LDL  Blood Gases: No results found for: PH, PCO2, PO2, HCO3, O2SAT  Thyroid functions:   Lab Results   Component Value Date    TSH 0.43 08/02/2013      CXR 11/8/2017:  Impression   No focal consolidation.  No appreciable change. CTA chest 11/8/2017:  Impression   1. Marked distention of the visualized portions of the gallbladder.  Further   evaluation with prompt gallbladder ultrasound recommended.    2. No clear evidence for

## 2017-11-08 NOTE — ED NOTES
Pt resting on cart, eyes closed. RR even and NL. NAD noted.  Will continue to monitor     Blake Rubin RN  11/08/17 7734

## 2017-11-08 NOTE — ED PROVIDER NOTES
[facility]     Emergency Department     Faculty Attestation    I performed a history and physical examination of the patient and discussed management with the resident. I reviewed the residents note and agree with the documented findings including all diagnostic interpretations and plan of care. Any areas of disagreement are noted on the chart. I was personally present for the key portions of any procedures. I have documented in the chart those procedures where I was not present during the key portions. I have reviewed the emergency nurses triage note. I agree with the chief complaint, past medical history, past surgical history, allergies, medications, social and family history as documented unless otherwise noted below. Documentation of the HPI, Physical Exam and Medical Decision Making performed by scribnichelle is based on my personal performance of the HPI, PE and MDM. For Physician Assistant/ Nurse Practitioner cases/documentation I have personally evaluated this patient and have completed at least one if not all key elements of the E/M (history, physical exam, and MDM). Additional findings are as noted. Primary Care Physician: No primary care provider on file. History: This is a 46 y.o. female who presents to the Emergency Department with complaint of right-sided chest pain. Woke her up from sleep. Some nausea and vomiting associated. No shortness of breath or diaphoresis. There is a history of coronary artery disease in 2013. She does not know the cardiologist who she saw and has not followed up with them. No obvious review of chart shows a cardiologist.    Physical:     height is 5' 7\" (1.702 m) and weight is 160 lb (72.6 kg). Her oral temperature is 98.1 °F (36.7 °C). Her blood pressure is 138/87 and her pulse is 73.  Her respiration is 15 and oxygen saturation is 99%.    46 y.o. female no acute distress, oropharynx clear and moist, cardiac time evaluating the patient. He reports that while would be useful given patient's symptomatology and appearance of her ultrasound he will lean towards removing gallbladder on this admission, likely plan for Friday all low given her coronary artery calcifications he would recommend a cardiology evaluation prior to surgery. 10:58 spoke with Dr. Anay Turk, cardiology fellow. He will review CT scan and follow with the patient. After discussion with internal medicine resident again given the CT findings as well as surgeries plan to remove gallbladder during this admission they accepted patient for admission. Orders changed to admission under Dr. Fredy Keen. Patient remains resting comfortably no acute distress.      Jessica Simeon MD  11/08/17 2478

## 2017-11-08 NOTE — CONSULTS
[facility] ED  [address] [address]  Dept: [phone]  Loc: [phone]    MINIMALLY INVASIVE SURGERY  CONSULT    11/8/2017    CC: Epigastric Pain    History:  46year old female who presents with epigastric and chest pain. She states this started two nights prior. She initially ignored the pain, however, when it worsened last night she presented to the ER. She admits to nausea. She states she has been having nausea with meals over the last few weeks. She denies melena, hematochezia, hematemesis. She states she has been constipated recently. She took aspirin for her abdominal pain, however, this did not improve her pain. She underwent CT chest here in the ER and this showed a dilated gallbladder. She then underwent ultrasound of her abdomen showing a distended gallbladder and mild wall thickening at 4 mm. She denies history of prior upper or lower endoscopy. She states she had a myocardial infarction in 2013 and underwent cardiac cath at that time. They did not place a stent and she did not follow up with cardiology afterwards.      Past Medical History:   Diagnosis Date    Acute MI     Anxiety     Chronic back pain     COPD (chronic obstructive pulmonary disease) (HCC)     Depression     Heart disease     Hyperlipidemia     Hypertension     MRSA (methicillin resistant staph aureus) culture positive 09/05/2017    abscess     Past Surgical History:   Procedure Laterality Date    BACK SURGERY      x2  fusion    TONSILLECTOMY      TUBAL LIGATION       Social History     Social History    Marital status:      Spouse name: N/A    Number of children: N/A    Years of education: N/A     Social History Main Topics    Smoking status: Current Some Day Smoker     Packs/day: 1.00     Types: Cigarettes    Smokeless tobacco: None    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 Current Outpatient Prescriptions on File Prior to Encounter   Medication Sig Dispense Refill    metoprolol tartrate (LOPRESSOR) 25 MG tablet TAKE ONE-HALF TABLET BY MOUTH TWICE A DAY 30 tablet 2    VENTOLIN  (90 BASE) MCG/ACT inhaler INHALE TWO PUFFS BY MOUTH EVERY 6 HOURS AS NEEDED 18 g 2    PARoxetine (PAXIL) 20 MG tablet TAKE ONE TABLET BY MOUTH EVERY MORNING 30 tablet 2    gabapentin (NEURONTIN) 100 MG capsule Take 1 capsule by mouth daily. (Patient taking differently:   Take 100 mg by mouth 2 times daily ) 90 capsule 3    Multiple Vitamins-Minerals (THERAPEUTIC MULTIVITAMIN-MINERALS) tablet Take 1 tablet by mouth daily.  vitamin B-12 (CYANOCOBALAMIN) 1000 MCG tablet Take 1,000 mcg by mouth daily.  ibuprofen (ADVIL;MOTRIN) 800 MG tablet Take 1 tablet by mouth every 8 hours as needed for Pain 20 tablet 0    Heating Pads (HEAT PAD MOIST/DRY MASSAGING) PADS 1 Device by Does not apply route every 6 hours as needed (for muscle spasm/pain, use for 20 min at a time max) 1 each 1    SYMBICORT 160-4.5 MCG/ACT AERO INHALE TWO PUFFS BY MOUTH TWICE A DAY 10.2 g 2    acetaminophen (APAP EXTRA STRENGTH) 500 MG tablet Take 2 tablets by mouth every 6 hours as needed for Pain. 120 tablet 3    aspirin EC 81 MG EC tablet Take 1 tablet by mouth daily. 30 tablet 3       REVIEW OF SYSTEMS  Do you or have you had any of the following?   Cardiovascular YES NO Respiratory YES NO   High Blood Pressure   []   [x] COPD   []   [x]   Heart Attack   []   [x]      Congestive Heart Failure   []   [x] Obstructive Sleep Apnea   []   [x]   Coronary Artery Disease   [x]   [] Asthma   []   [x]              Gastrointestinal YES NO      Gastric Problems   [x]   []        Colorectal problems   []   [x]   Hematological YES NO Ulcer disease   []   [x]   Bleeding Tendencies   []   [x] Liver disease   []   [x]      Gallstones   [x]   []   Anemia   []   [x] Refulx or Heartburn   []   [x]   Blood Clots   []   [x]      High Cholesterol   []   [x] Muscoloskeletal YES NO   High Triglycerides   []   [x] Joint Limitations   [x]   []      Muscle Weakness   []   [x]   Eyes, Ears, Nose, Throat YES NO      Cataracts   []   [x] Arthritis   [x]   []   Glasses   []   [x]      Blurred Vision   []   [x] Cancer   []   [x]   Hearing Aids   []   [x] Type:     Ringing in Ears   []   [x]      Difficulty Swallowing   []   [x] Encodrine YES NO      Diabetes   []   [x]   Neurological YES NO Thyroid   []   [x]   Stroke   []   [x]      Seizure   [x]   [] Psychiatric Disorder YES NO      Depression   [x]   [x]              Anxiety disorder   []   [x]           Genitourinary/Gyn YES NO Skin Intact   [x]   []   Urinary Infection   []   [x]      Stones   []   [x]      Kidney Disease   []   [x]      Incontinent   []   [x]                                  PREVIOUS ANESTHESIA  Has any family member had a problem with anesthesia in the past?  [x] No   [] Yes  If yes, describe:  Have you had a problem with anesthesia in the past?                 [x] No   [] Yes  If yes, describe:    Physical Exam:  BP (!) 126/102   Pulse 75   Temp 98.1 °F (36.7 °C) (Oral)   Resp 12   Ht 5' 7\" (1.702 m)   Wt 160 lb (72.6 kg)   SpO2 97%   BMI 25.06 kg/m²     Constitutional:  Vital signs are normal. The patient appears well-developed and well-nourished. HEENT:   Head: Normocephalic. Atraumatic  Eyes: pupils are equal and reactive. No scleral icterus is present. Neck: No mass and no thyromegaly present.    Cardiovascular: Normal rate, regular rhythm, S1 normal and S2 normal.  Radial pulses present   Pulmonary/Chest: Effort normal and breath sounds normal. No retractions  Abdominal: Soft. Normal appearance. There is no organomegaly. Mild right upper quadrant tenderness. There is no rigidity, no rebound, no guarding and no Apodaca's sign. Musculoskeletal:        Right lower leg: Normal. No tenderness and no edema. Left lower leg: Normal. No tenderness and no edema. Lymphadenopathy:     No cervical adenopathy, No Exrtemity Adenopathy. Neurological: The patient is alert and oriented. Moving all 4 extremities, sensation grossly intact bilateral  Skin: Skin is warm, dry and intact. No rash  Psychiatric: The patient has a normal mood and affect. Speech is normal and behavior is normal. Judgment and thought content normal. Cognition and memory are normal.     Radiology:    [address] [[id number]] Collected: 11/08/17 0900   Updated: 11/08/17 0911    Narrative:     EXAMINATION:  RIGHT UPPER QUADRANT ULTRASOUND    11/8/2017 8:42 am    COMPARISON:  Pulmonary embolism CT from 11/08/2017    HISTORY:  ORDERING SYSTEM PROVIDED HISTORY: distension seen on PE study. 70-year-old female with gallbladder distension noted on pulmonary embolus  study    FINDINGS:  LIVER:  The liver demonstrates normal echogenicity without evidence of  intrahepatic biliary ductal dilatation. BILIARY SYSTEM:  Sludge is seen within the gallbladder.  Gallbladder is  distended.  No definite shadowing gallstones identified in the gallbladder. Gallbladder wall thickness is mildly enlarged measuring up to 4 mm.  The  sonographic Apodaca sign is negative. Common bile duct is within normal limits measuring up to 5 mm. RIGHT KIDNEY: Partial visualization of the right kidney demonstrates no gross  right-sided hydronephrosis. PANCREAS:  Visualized portions of the pancreas are unremarkable. OTHER: No evidence of right upper quadrant ascites.    Impression:     1. Gallbladder sludge, mild gallbladder wall thickening, with gallbladder  distension.  If there is any clinical concern regarding acute cholecystitis,  a nuclear medicine hepatobiliary scan would be a more sensitive exam.  2. No definite gallstones identified. 3. Common bile duct normal in caliber measuring up to 5 mm.      LABS:  Hepatic Function Panel [[id number]] (Abnormal) Collected: 11/08/17 0645   Updated: 11/08/17 0847     Alb 4.0 g/dL    Alkaline Phosphatase 88 U/L    ALT 15 U/L    AST 17 U/L    Total Bilirubin <0.10 (L) mg/dL    Bilirubin, Direct <0.08 mg/dL    Bilirubin, Indirect CANNOT BE CALCULATED mg/dL    Total Protein 6.8 g/dL    Globulin NOT REPORTED g/dL    Albumin/Globulin Ratio 1.4     CBC Auto Differential [[id number]] (Abnormal) Collected: 11/08/17 0645   Updated: 11/08/17 0707    Specimen Type: Blood    Specimen Source: Blood     WBC 8.0 k/uL    RBC 4.67 m/uL    Hemoglobin 13.4 g/dL    Hematocrit 42.1 %    MCV 90.1 fL    MCH 28.7 pg    MCHC 31.8 g/dL    RDW 15.9 (H) %    Platelets 582 k/uL    MPV 8.8 fL    Differential Type NOT REPORTED    WBC Morphology NOT REPORTED    RBC Morphology ANISOCYTOSIS PRESENT    Platelet Estimate NOT REPORTED    Seg Neutrophils 57 %    Lymphocytes 32 %    Monocytes 8 %    Eosinophils % 3 %    Basophils 1 %    Immature Granulocytes 0 %    Segs Absolute 4.54 k/uL    Absolute Lymph # 2.58 k/uL    Absolute Mono # 0.60 k/uL    Absolute Eos # 0.22 k/uL    Basophils # 0.05 k/uL    Absolute Immature Granulocyte <0.03 k/uL       ASSESSMENT:  Patient Active Problem List   Diagnosis    Heart disease    Chronic back pain    Depression    Hyperlipidemia    CAD, multiple vessel    Prediabetes    Asthma    Smoking    Need for vaccination    Syncope    Leg pain    Left hip pain     Epigastric pain with gallbladder sludge and mild gallbladder wall thickening  History of Cardiac catheterization and long standing nicotine abuse    PLAN:  Check HIDA scan to rule out acute cholecystitis  Recommend

## 2017-11-08 NOTE — ED NOTES
Dr. Jesusita Cole at bedside to update pt. Pt resting on cart, NAD noted. RR even and NL.  Will continue to monitor     Jose Miguel Avalos RN  11/08/17 0844

## 2017-11-08 NOTE — ED NOTES
Pt transported to [address] [facility] [facility] by [address], RN  11/08/17 9560

## 2017-11-08 NOTE — ED NOTES
Pt returned from CT. Pt placed back on cardiac monitor, bp cuff, and pulse ox.      Silvia Logan RN  11/08/17 0482

## 2017-11-08 NOTE — PROGRESS NOTES
SHORT CALL NOTE    Patient seen and examined at bedside. Patient came in with RUQ and substernal chest pain, patient in no acute distress as of now. Gallbladder US showed gallbladder sludge and gallbladder wall thickening. Surgery on board for possible cholecystectomy, will need cardio clearance. Cardio following. BP (!) 140/90   Pulse 71   Temp 97.6 °F (36.4 °C) (Oral)   Resp 16   Ht 5' 7\" (1.702 m)   Wt 160 lb (72.6 kg)   SpO2 99%   BMI 25.06 kg/m²     Physical Exam   Constitutional: She is oriented to person, place, and time. She appears well-developed and well-nourished. No distress. HENT:   Head: Normocephalic and atraumatic. Eyes: EOM are normal. Pupils are equal, round, and reactive to light. Cardiovascular: Normal rate, regular rhythm and normal heart sounds. Exam reveals no gallop and no friction rub. No murmur heard. Pulmonary/Chest: Effort normal and breath sounds normal. No respiratory distress. She has no wheezes. She has no rales. Abdominal: Soft. Bowel sounds are normal. She exhibits no distension and no mass. There is tenderness. There is no rebound and no guarding. RUQ tender to palpation. Musculoskeletal: She exhibits no edema. Neurological: She is alert and oriented to person, place, and time.      ASSESSMENT/PLAN    Chronic cholecystitis   General surgery consulted from ED  HIDA completed  Possible surgery later this week per Dr. Maggie Rizzo  Cardiology clearance requested by Gen Surg  Diet per gen surg plans    Atypical chest pain  Cardiology consulted from ED for clearance in light of hx CAD  Cardio review CT and old records prior to plan  For now will continue BB, ASA and start statin  Lipid panel  EKG no acute changes in ED  POC trops negative, follow trop I  Review previous records     COPD, stable  Continue bronchodialtors  Albuterol PRN  Supplemental O2 as needed  Smoking cessation  Nicotine patch     Seizure history  Continue keppra home dose--request to RN to be confirmed with pharmacy prior to addition to home med list     Depression & Anxiety  Continue home medications: paxil 30mg daily     DVT prophylaxis with lovenox daily     DC planning--PT/OT/JO Loya MD, PGY-1  Internal Medicine  [address]  11/8/17  4:53 PM

## 2017-11-08 NOTE — Clinical Note
Patient Class: Observation [104]   REQUIRED: Diagnosis: Chest pain [891348]   Estimated Length of Stay: Estimated stay of more than 2 midnights   Future Attending Provider: Kt Denise [[id number]]   Telemetry Bed Required?: Yes

## 2017-11-08 NOTE — ED PROVIDER NOTES
acetaminophen (APAP EXTRA STRENGTH) 500 MG tablet Take 2 tablets by mouth every 6 hours as needed for Pain. 3/15/13   Regi Duran MD   aspirin EC 81 MG EC tablet Take 1 tablet by mouth daily. 3/15/13   Regi Duran MD       REVIEW OF SYSTEMS    (2-9 systems for level 4, 10 or more for level 5)      Review of Systems   Constitutional: Negative for activity change, appetite change, chills, diaphoresis and fever. HENT: Negative for facial swelling, nosebleeds and trouble swallowing. Eyes: Negative for photophobia and visual disturbance. Respiratory: Positive for shortness of breath. Negative for choking, chest tightness, wheezing and stridor. Cardiovascular: Positive for chest pain. Negative for leg swelling. Gastrointestinal: Positive for nausea and vomiting. Negative for abdominal pain, blood in stool and diarrhea. Genitourinary: Negative for decreased urine volume, difficulty urinating, dysuria, enuresis and hematuria. Musculoskeletal: Negative for joint swelling and neck pain. Skin: Negative for color change, pallor and rash. Neurological: Negative for dizziness, syncope, facial asymmetry, speech difficulty, light-headedness and numbness. Psychiatric/Behavioral: Negative for agitation, behavioral problems, confusion and decreased concentration. PHYSICAL EXAM   (up to 7 for level 4, 8 or more for level 5)      INITIAL VITALS:   /87   Pulse 73   Temp 98.1 °F (36.7 °C) (Oral)   Resp 15   Ht 5' 7\" (1.702 m)   Wt 160 lb (72.6 kg)   SpO2 99%   BMI 25.06 kg/m²     Physical Exam   Constitutional: She is oriented to person, place, and time. She appears well-developed and well-nourished. HENT:   Head: Normocephalic and atraumatic. Eyes: Pupils are equal, round, and reactive to light. Right eye exhibits no discharge. Left eye exhibits no discharge. Neck: Normal range of motion. Cardiovascular: Normal rate, regular rhythm and normal heart sounds.   Exam reveals no gallop and no friction rub. No murmur heard. Pulmonary/Chest: Effort normal and breath sounds normal. No respiratory distress. She has no wheezes. She has no rales. She exhibits no tenderness. Abdominal: Soft. Bowel sounds are normal. She exhibits no distension and no mass. There is tenderness (patient has some mild right upper quadrant tenderness). There is no rebound and no guarding. Musculoskeletal: Normal range of motion. She exhibits no edema, tenderness or deformity. Neurological: She is alert and oriented to person, place, and time. Skin: Skin is warm, dry and intact. No rash noted. She is not diaphoretic. No erythema. No pallor. Psychiatric: She has a normal mood and affect.  Her speech is normal and behavior is normal. Judgment and thought content normal. Cognition and memory are normal.       DIFFERENTIAL  DIAGNOSIS     PLAN (LABS / IMAGING / EKG):  Orders Placed This Encounter   Procedures    XR CHEST STANDARD (2 VW)    CT Chest Pulmonary Embolism W Contrast    US GALLBLADDER RUQ    CBC Auto Differential    Basic Metabolic Panel    D-Dimer, Quantitative    HEPATIC FUNCTION PANEL    POCT troponin    POCT troponin    POCT troponin    EKG 12 Lead       MEDICATIONS ORDERED:  Orders Placed This Encounter   Medications    acetaminophen (TYLENOL) tablet 650 mg    iopamidol (ISOVUE-370) 76 % injection 75 mL       DDX: Emergent: ACS/NSTEMI/STEMI/angina, arrhythmia, trauma, aortic dissection,  PE, PNA, pneumothroax, esophageal rupture, tamponade, Cocaine use  Nonemergent: pneumonia, pericarditis, GERD, MSK, Endocarditis, anxiety     Evaluate for: diaphoresis, present chest pain, tachypnea, BP both arms, heart sounds, JVD, tender chest wall, wheezing      DIAGNOSTIC RESULTS / EMERGENCY DEPARTMENT COURSE / MDM     LABS:  Results for orders placed or performed during the hospital encounter of 11/08/17   CBC Auto Differential   Result Value Ref Range    WBC 8.0 3.5 - 11.3 k/uL    RBC 4.67 3.95 - 5.11 but occasionally words are mis-transcribed.)        Khalida Tristan DO  11/08/17 8764

## 2017-11-08 NOTE — CARE COORDINATION
Case Management Initial Discharge Plan  Lucita Pintosteve,         Readmission Risk              Readmission Risk:        22.5       Age 72 or Greater:  0    Admitted from SNF or Requires Paid or Family Care:  0    Currently has CHF,COPD,ARF,CRI,or is on dialysis:  4    Takes more than 5 Prescription Medications:  4    Takes Digoxin,Insulin,Anticoagulants,Narcotics or ASA/Plavix:  [address] in Past 12 Months:  10    On Disability:  0    Patient Considers own Health:  2.5            Met with:patient to discuss discharge plans. Information verified: address, contacts, phone number, , insurance Yes  PCP: No primary care provider on file. Date of last visit: Dr Efra Valdes at [facility], last appointment was in April. Insurance Provider: [facility]    Discharge Planning  Current Residence:  Private Residence (15 steps to enter 2 story home, bath and bedroom on the 2nd floor.)  Living Arrangements:  Spouse/Significant Other, Children   Home has 2 stories/15 steps to enter the home, bath and bedroom on the 2nd floor. Support Systems:  Spouse/Significant Other, Children  Current Services PTA:  Durable Medical Equipment Supplier: has a nebulizer, but medications. Patient able to perform ADL's:Independent  DME used to aid ambulation prior to admission: independent    Potential Assistance Needed:  N/A    Pharmacy: [facility]. Potential Assistance Purchasing Medications:  No  Does patient want to participate in local refill/ meds to beds program?  N/A    Patient agreeable to home care: No  [place] of choice provided:  n/a      Type of Home Care Services:  None  Patient expects to be discharged to:  return to home, no skilled needs identified.     Prior SNF/Rehab Placement and Facility:   Agreeable to SNF/Rehab: No  [place] of choice provided: no   Evaluation: no    Expected Discharge date:  11/10/17  Follow Up Appointment: Best Day/ Time:      Transportation provider: self/medical cab  Transportation arrangements needed for discharge: unsure of transportation needs at present time. Discharge Plan: return to home, no skilled needs identified.         Electronically signed by Clau Carmen RN on 11/8/17 at 10:24 AM

## 2017-11-08 NOTE — ED NOTES
Pt states she suffers from anxiety and states that she has been under a lot of stress lately as she has just moved. Pt has hx of an MI several years ago.         Divya Navarro RN  11/08/17 1311

## 2017-11-08 NOTE — PLAN OF CARE
use for 20 min at a time max) 2/11/17   Evalina Face, DO   .   Recent Surgical History: None = 0     Assessment patient alert and cooperative denies any respiratory distress, in room air    RR 14        · Bronchodilator assessment at level  1  · Hyperinflation assessment at level   · Secretion Management assessment at level    ·   · []    Bronchodilator Assessment  BRONCHODILATOR ASSESSMENT SCORE  Score 0 1 2 3 4 5   Breath Sounds   []  Patient Baseline [x]  No Wheeze good aeration []  Faint, scattered wheezing, good aeration []  Expiratory Wheezing and or moderately diminished []  Insp/Exp wheeze and/or very diminished []  Insp/Exp and/ or marked distress   Respiratory Rate   []  Patient Baseline [x]  Less than 20 [x]  Less than 20 []  20-25 []  Greater than 25 []  Greater than 25   Peak flow % of Pred or PB [x]  NA   []  Greater than 90%  []  81-90% []  71-80% []  Less than or equal to 70%  or unable to perform []  Unable due to Respiratory Distress   Dyspnea re []  Patient Baseline [x]  No SOB [x]  No SOB []  SOB on exertion []  SOB min activity []  At rest/acute   e FEV% Predicted       [x]  NA []  Above 69%  []  Unable []  Above 60-69%  []  Unable []  Above 50-59%  []  Unable []  Above 35-49%  []  Unable []  Less than 35%  []  Unable                 []  Hyperinflation Assessment  Score 1 2 3   CXR and Breath Sounds   []  Clear []  No atelectasis  Basilar aeration []  Atelectasis or absent basilar breath sounds   Incentive Spirometry Volume  (Per IBW)   []  Greater than or equal to 15ml/Kg []  less than 15ml/Kg []  less than 15ml/Kg   Surgery within last 2 weeks []  None or general   []  Abdominal or thoracic surgery  []  Abdominal or thoracic   Chronic Pulmonary Historyre []  No []  Yes []  Yes     []  Secretion Management Assessment  Score 1 2 3   Bilateral Breath Sounds   []  Occasional Rhonchi []  Scattered Rhonchi []  Course Rhonchi and/or poor aeration   Sputum    []  Small amount of thin secretions []  Moderate amount of viscous secretions []  Copius, Viscious Yellow/ Secretions   CXR as reported by physician []  clear  []  Unavailable []  Infiltrates and/or consolidation  []  Unavailable []  Mucus Plugging and or lobar consolidation  []  Unavailable   Cough []  Strong, productive cough []  Weak productive cough []  No cough or weak non-productive cough   Yelena Music  4:36 PM                            FEMALE                                  MALE                            FEV1 Predicted Normal Values                        FEV1 Predicted Normal Values          Age                                     Height in Feet and Inches       Age                                     Height in Feet and Inches       4' 11\" 5' 1\" 5' 3\" 5' 5\" 5' 7\" 5' 9\" 5' 11\" 6' 1\"  4' 11\" 5' 1\" 5' 3\" 5' 5\" 5' 7\" 5' 9\" 5' 11\" 6' 1\"   42 - 45 2.49 2.66 2.84 3.03 3.22 3.42 3.62 3.83 42 - 45 2.82 3.03 3.26 3.49 3.72 3.96 4.22 4.47   46 - 49 2.40 2.57 2.76 2.94 3.14 3.33 3.54 3.75 46 - 49 2.70 2.92 3.14 3.37 3.61 3.85 4.10 4.36   50 - 53 2.31 2.48 2.66 2.85 3.04 3.24 3.45 3.66 50 - 53 2.58 2.80 3.02 3.25 3.49 3.73 3.98 4.24   54 - 57 2.21 2.38 2.57 2.75 2.95 3.14 3.35 3.56 54 - 57 2.46 2.67 2.89 3.12 3.36 3.60 3.85 4.11   58 - 61 2.10 2.28 2.46 2.65 2.84 3.04 3.24 3.45 58 - 61 2.32 2.54 2.76 2.99 3.23 3.47 3.72 3.98   62 - 65 1.99 2.17 2.35 2.54 2.73 2.93 3.13 3.34 62 - 65 2.19 2.40 2.62 2.85 3.09 3.33 3.58 3.84   66 - 69 1.88 2.05 2.23 2.42 2.61 2.81 3.02 3.23 66 - 69 2.04 2.26 2.48 2.71 2.95 3.19 3.44 3.70   70+ 1.82 1.99 2.17 2.36 2.55 2.75 2.95 3.16 70+ 1.97 2.19 2.41 2.64 2.87 3.12 3.37 3.62             Predicted Peak Expiratory Flow Rate                                       Height (in)  Female       Height (in) Male           Age 64 62 64 63 59 77 76 79 Age            91 [phone] [phone]  47 79 44 58 63 70 72 74 76   25 337 352 366 381 396 411 426 441 25 447 476 505 [phone]   30 329 344 [phone]

## 2017-11-09 ENCOUNTER — ANESTHESIA EVENT (OUTPATIENT)
Dept: OPERATING ROOM | Age: 52
DRG: 263 | End: 2017-11-09
Payer: COMMERCIAL

## 2017-11-09 LAB
ALBUMIN SERPL-MCNC: 3.8 G/DL (ref 3.5–5.2)
ALBUMIN/GLOBULIN RATIO: 1.6 (ref 1–2.5)
ALP BLD-CCNC: 80 U/L (ref 35–104)
ALT SERPL-CCNC: 15 U/L (ref 5–33)
ANION GAP SERPL CALCULATED.3IONS-SCNC: 10 MMOL/L (ref 9–17)
AST SERPL-CCNC: 17 U/L
BILIRUB SERPL-MCNC: 0.21 MG/DL (ref 0.3–1.2)
BILIRUBIN DIRECT: <0.08 MG/DL
BILIRUBIN, INDIRECT: ABNORMAL MG/DL (ref 0–1)
BUN BLDV-MCNC: 7 MG/DL (ref 6–20)
BUN/CREAT BLD: ABNORMAL (ref 9–20)
CALCIUM SERPL-MCNC: 8.9 MG/DL (ref 8.6–10.4)
CHLORIDE BLD-SCNC: 102 MMOL/L (ref 98–107)
CO2: 25 MMOL/L (ref 20–31)
CREAT SERPL-MCNC: 0.48 MG/DL (ref 0.5–0.9)
EKG ATRIAL RATE: 62 BPM
EKG ATRIAL RATE: 76 BPM
EKG P AXIS: 55 DEGREES
EKG P AXIS: 64 DEGREES
EKG P-R INTERVAL: 136 MS
EKG P-R INTERVAL: 144 MS
EKG Q-T INTERVAL: 370 MS
EKG Q-T INTERVAL: 398 MS
EKG QRS DURATION: 84 MS
EKG QRS DURATION: 92 MS
EKG QTC CALCULATION (BAZETT): 403 MS
EKG QTC CALCULATION (BAZETT): 416 MS
EKG R AXIS: 34 DEGREES
EKG R AXIS: 47 DEGREES
EKG T AXIS: 34 DEGREES
EKG T AXIS: 38 DEGREES
EKG VENTRICULAR RATE: 62 BPM
EKG VENTRICULAR RATE: 76 BPM
GFR AFRICAN AMERICAN: >60 ML/MIN
GFR NON-AFRICAN AMERICAN: >60 ML/MIN
GFR SERPL CREATININE-BSD FRML MDRD: ABNORMAL ML/MIN/{1.73_M2}
GFR SERPL CREATININE-BSD FRML MDRD: ABNORMAL ML/MIN/{1.73_M2}
GLOBULIN: ABNORMAL G/DL (ref 1.5–3.8)
GLUCOSE BLD-MCNC: 84 MG/DL (ref 70–99)
HCT VFR BLD CALC: 40.4 % (ref 36.3–47.1)
HEMOGLOBIN: 12.9 G/DL (ref 11.9–15.1)
MCH RBC QN AUTO: 28.5 PG (ref 25.2–33.5)
MCHC RBC AUTO-ENTMCNC: 31.9 G/DL (ref 28.4–34.8)
MCV RBC AUTO: 89.4 FL (ref 82.6–102.9)
PDW BLD-RTO: 15.7 % (ref 11.8–14.4)
PLATELET # BLD: 276 K/UL (ref 138–453)
PMV BLD AUTO: 8.6 FL (ref 8.1–13.5)
POTASSIUM SERPL-SCNC: 3.9 MMOL/L (ref 3.7–5.3)
RBC # BLD: 4.52 M/UL (ref 3.95–5.11)
SODIUM BLD-SCNC: 137 MMOL/L (ref 135–144)
TOTAL PROTEIN: 6.2 G/DL (ref 6.4–8.3)
WBC # BLD: 6.4 K/UL (ref 3.5–11.3)

## 2017-11-09 PROCEDURE — G8987 SELF CARE CURRENT STATUS: HCPCS

## 2017-11-09 PROCEDURE — 6370000000 HC RX 637 (ALT 250 FOR IP): Performed by: INTERNAL MEDICINE

## 2017-11-09 PROCEDURE — 94640 AIRWAY INHALATION TREATMENT: CPT

## 2017-11-09 PROCEDURE — G8988 SELF CARE GOAL STATUS: HCPCS

## 2017-11-09 PROCEDURE — 1200000000 HC SEMI PRIVATE

## 2017-11-09 PROCEDURE — 80048 BASIC METABOLIC PNL TOTAL CA: CPT

## 2017-11-09 PROCEDURE — 97535 SELF CARE MNGMENT TRAINING: CPT

## 2017-11-09 PROCEDURE — 97162 PT EVAL MOD COMPLEX 30 MIN: CPT

## 2017-11-09 PROCEDURE — 85027 COMPLETE CBC AUTOMATED: CPT

## 2017-11-09 PROCEDURE — 94762 N-INVAS EAR/PLS OXIMTRY CONT: CPT

## 2017-11-09 PROCEDURE — 6360000002 HC RX W HCPCS: Performed by: INTERNAL MEDICINE

## 2017-11-09 PROCEDURE — G8979 MOBILITY GOAL STATUS: HCPCS

## 2017-11-09 PROCEDURE — 80076 HEPATIC FUNCTION PANEL: CPT

## 2017-11-09 PROCEDURE — 6370000000 HC RX 637 (ALT 250 FOR IP): Performed by: EMERGENCY MEDICINE

## 2017-11-09 PROCEDURE — 36415 COLL VENOUS BLD VENIPUNCTURE: CPT

## 2017-11-09 PROCEDURE — 97530 THERAPEUTIC ACTIVITIES: CPT

## 2017-11-09 PROCEDURE — 2580000003 HC RX 258: Performed by: INTERNAL MEDICINE

## 2017-11-09 PROCEDURE — G8989 SELF CARE D/C STATUS: HCPCS

## 2017-11-09 PROCEDURE — G8980 MOBILITY D/C STATUS: HCPCS

## 2017-11-09 PROCEDURE — 6370000000 HC RX 637 (ALT 250 FOR IP): Performed by: SURGERY

## 2017-11-09 PROCEDURE — G8978 MOBILITY CURRENT STATUS: HCPCS

## 2017-11-09 PROCEDURE — 99233 SBSQ HOSP IP/OBS HIGH 50: CPT | Performed by: INTERNAL MEDICINE

## 2017-11-09 PROCEDURE — 93005 ELECTROCARDIOGRAM TRACING: CPT

## 2017-11-09 PROCEDURE — 97165 OT EVAL LOW COMPLEX 30 MIN: CPT

## 2017-11-09 PROCEDURE — 6370000000 HC RX 637 (ALT 250 FOR IP): Performed by: STUDENT IN AN ORGANIZED HEALTH CARE EDUCATION/TRAINING PROGRAM

## 2017-11-09 RX ORDER — HYDROCODONE BITARTRATE AND ACETAMINOPHEN 5; 325 MG/1; MG/1
2 TABLET ORAL EVERY 4 HOURS PRN
Status: DISCONTINUED | OUTPATIENT
Start: 2017-11-09 | End: 2017-11-11 | Stop reason: HOSPADM

## 2017-11-09 RX ORDER — HEPARIN SODIUM 5000 [USP'U]/ML
5000 INJECTION, SOLUTION INTRAVENOUS; SUBCUTANEOUS EVERY 8 HOURS SCHEDULED
Status: DISCONTINUED | OUTPATIENT
Start: 2017-11-10 | End: 2017-11-11 | Stop reason: HOSPADM

## 2017-11-09 RX ORDER — HYDROCODONE BITARTRATE AND ACETAMINOPHEN 5; 325 MG/1; MG/1
1 TABLET ORAL EVERY 4 HOURS PRN
Status: DISCONTINUED | OUTPATIENT
Start: 2017-11-09 | End: 2017-11-11 | Stop reason: HOSPADM

## 2017-11-09 RX ORDER — ALBUTEROL SULFATE 2.5 MG/3ML
2.5 SOLUTION RESPIRATORY (INHALATION) EVERY 6 HOURS PRN
Status: DISCONTINUED | OUTPATIENT
Start: 2017-11-09 | End: 2017-11-11 | Stop reason: HOSPADM

## 2017-11-09 RX ADMIN — CYCLOBENZAPRINE HYDROCHLORIDE 10 MG: 10 TABLET, FILM COATED ORAL at 05:14

## 2017-11-09 RX ADMIN — Medication 10 ML: at 20:32

## 2017-11-09 RX ADMIN — LEVETIRACETAM 500 MG: 500 TABLET, FILM COATED ORAL at 20:31

## 2017-11-09 RX ADMIN — MULTIPLE VITAMINS W/ MINERALS TAB 1 TABLET: TAB at 09:26

## 2017-11-09 RX ADMIN — LEVETIRACETAM 500 MG: 500 TABLET, FILM COATED ORAL at 09:22

## 2017-11-09 RX ADMIN — VITAMIN D, TAB 1000IU (100/BT) 1000 UNITS: 25 TAB at 09:26

## 2017-11-09 RX ADMIN — ASPIRIN 81 MG: 81 TABLET, COATED ORAL at 09:20

## 2017-11-09 RX ADMIN — PAROXETINE HYDROCHLORIDE HEMIHYDRATE 30 MG: 20 TABLET, FILM COATED ORAL at 20:31

## 2017-11-09 RX ADMIN — METOPROLOL TARTRATE 25 MG: 25 TABLET ORAL at 20:32

## 2017-11-09 RX ADMIN — MOMETASONE FUROATE AND FORMOTEROL FUMARATE DIHYDRATE 2 PUFF: 200; 5 AEROSOL RESPIRATORY (INHALATION) at 08:06

## 2017-11-09 RX ADMIN — Medication 1000 MCG: at 09:26

## 2017-11-09 RX ADMIN — MORPHINE SULFATE 1 MG: 2 INJECTION, SOLUTION INTRAMUSCULAR; INTRAVENOUS at 12:58

## 2017-11-09 RX ADMIN — ACETAMINOPHEN 650 MG: 325 TABLET ORAL at 05:14

## 2017-11-09 RX ADMIN — SODIUM CHLORIDE: 9 INJECTION, SOLUTION INTRAVENOUS at 20:32

## 2017-11-09 RX ADMIN — MORPHINE SULFATE 1 MG: 2 INJECTION, SOLUTION INTRAMUSCULAR; INTRAVENOUS at 18:54

## 2017-11-09 RX ADMIN — METOPROLOL TARTRATE 25 MG: 25 TABLET ORAL at 09:23

## 2017-11-09 RX ADMIN — MORPHINE SULFATE 1 MG: 2 INJECTION, SOLUTION INTRAMUSCULAR; INTRAVENOUS at 22:49

## 2017-11-09 RX ADMIN — MOMETASONE FUROATE AND FORMOTEROL FUMARATE DIHYDRATE 2 PUFF: 200; 5 AEROSOL RESPIRATORY (INHALATION) at 21:10

## 2017-11-09 RX ADMIN — ATORVASTATIN CALCIUM 40 MG: 40 TABLET, FILM COATED ORAL at 20:31

## 2017-11-09 RX ADMIN — ACETAMINOPHEN 650 MG: 325 TABLET ORAL at 09:22

## 2017-11-09 RX ADMIN — GABAPENTIN 100 MG: 100 CAPSULE ORAL at 20:31

## 2017-11-09 RX ADMIN — HYDROCODONE BITARTRATE AND ACETAMINOPHEN 2 TABLET: 5; 325 TABLET ORAL at 15:11

## 2017-11-09 RX ADMIN — GABAPENTIN 100 MG: 100 CAPSULE ORAL at 09:22

## 2017-11-09 RX ADMIN — HYDROCODONE BITARTRATE AND ACETAMINOPHEN 2 TABLET: 5; 325 TABLET ORAL at 20:32

## 2017-11-09 RX ADMIN — CYCLOBENZAPRINE HYDROCHLORIDE 10 MG: 10 TABLET, FILM COATED ORAL at 15:12

## 2017-11-09 RX ADMIN — ENOXAPARIN SODIUM 40 MG: 40 INJECTION SUBCUTANEOUS at 09:20

## 2017-11-09 RX ADMIN — MORPHINE SULFATE 1 MG: 2 INJECTION, SOLUTION INTRAMUSCULAR; INTRAVENOUS at 08:19

## 2017-11-09 RX ADMIN — Medication 10 ML: at 22:50

## 2017-11-09 ASSESSMENT — PAIN DESCRIPTION - FREQUENCY: FREQUENCY: CONTINUOUS

## 2017-11-09 ASSESSMENT — PAIN SCALES - GENERAL
PAINLEVEL_OUTOF10: 7
PAINLEVEL_OUTOF10: 9
PAINLEVEL_OUTOF10: 1
PAINLEVEL_OUTOF10: 8
PAINLEVEL_OUTOF10: 9
PAINLEVEL_OUTOF10: 10
PAINLEVEL_OUTOF10: 3
PAINLEVEL_OUTOF10: 3
PAINLEVEL_OUTOF10: 8
PAINLEVEL_OUTOF10: 9
PAINLEVEL_OUTOF10: 8
PAINLEVEL_OUTOF10: 3
PAINLEVEL_OUTOF10: 8
PAINLEVEL_OUTOF10: 7
PAINLEVEL_OUTOF10: 10

## 2017-11-09 ASSESSMENT — PAIN DESCRIPTION - ORIENTATION
ORIENTATION: MID;LOWER
ORIENTATION: MID;LOWER

## 2017-11-09 ASSESSMENT — PAIN DESCRIPTION - PAIN TYPE
TYPE: ACUTE PAIN
TYPE: CHRONIC PAIN
TYPE: CHRONIC PAIN

## 2017-11-09 ASSESSMENT — LIFESTYLE VARIABLES: SMOKING_STATUS: 1

## 2017-11-09 ASSESSMENT — PAIN DESCRIPTION - LOCATION
LOCATION: BACK
LOCATION: ABDOMEN
LOCATION: BACK
LOCATION: BACK

## 2017-11-09 ASSESSMENT — PAIN DESCRIPTION - DESCRIPTORS
DESCRIPTORS: ACHING;DISCOMFORT
DESCRIPTORS: CONSTANT;DISCOMFORT

## 2017-11-09 ASSESSMENT — PAIN DESCRIPTION - PROGRESSION: CLINICAL_PROGRESSION: NOT CHANGED

## 2017-11-09 ASSESSMENT — PAIN DESCRIPTION - ONSET: ONSET: ON-GOING

## 2017-11-09 NOTE — CONSULTS
patient prior to surgery. Past Medical History:   has a past medical history of Acute MI; Anxiety; Chronic back pain; COPD (chronic obstructive pulmonary disease) ([address] Depression; Heart disease; Hyperlipidemia; Hypertension; and MRSA (methicillin resistant staph aureus) culture positive. Past Surgical History:   has a past surgical history that includes Tubal ligation; Tonsillectomy; and back surgery. Home Medications:    Prior to Admission medications    Medication Sig Start Date End Date Taking? Authorizing Provider   Cholecalciferol (VITAMIN D PO) Take by mouth   Yes Historical Provider, MD   fluticasone-vilanterol (BREO ELLIPTA) 100-25 MCG/INH AEPB inhaler Inhale into the lungs 2 times daily   Yes Historical Provider, MD   PARoxetine (PAXIL) 30 MG tablet Take 30 mg by mouth every morning   Yes Historical Provider, MD   cyclobenzaprine (FLEXERIL) 10 MG tablet Take 10 mg by mouth 3 times daily as needed    Yes Historical Provider, MD   levETIRAcetam (KEPPRA) 500 MG tablet Take 500 mg by mouth 2 times daily   Yes Historical Provider, MD   metoprolol tartrate (LOPRESSOR) 25 MG tablet TAKE ONE-HALF TABLET BY MOUTH TWICE A DAY 11/7/17  Yes Franco Armstrong MD   VENTOLIN  (90 BASE) MCG/ACT inhaler INHALE TWO PUFFS BY MOUTH EVERY 6 HOURS AS NEEDED 8/11/16  Yes Kaitlin Mosquera MD   gabapentin (NEURONTIN) 100 MG capsule Take 1 capsule by mouth daily. Patient taking differently:   Take 100 mg by mouth 2 times daily  12/26/14  Yes Abdelrahman Anaya MD   Multiple Vitamins-Minerals (THERAPEUTIC MULTIVITAMIN-MINERALS) tablet Take 1 tablet by mouth daily. Yes Historical Provider, MD   vitamin B-12 (CYANOCOBALAMIN) 1000 MCG tablet Take 1,000 mcg by mouth daily. Yes Historical Provider, MD   acetaminophen (APAP EXTRA STRENGTH) 500 MG tablet Take 2 tablets by mouth every 6 hours as needed for Pain. 3/15/13  Yes Lilian Gonzalez MD   aspirin EC 81 MG EC tablet Take 1 tablet by mouth daily.  3/15/13 (PAXIL) tablet 30 mg, 30 mg, Oral, Nightly    Allergies:  Sulfa antibiotics    Social History:   reports that she has been smoking Cigarettes. She has been smoking about 1.00 pack per day. She does not have any smokeless tobacco history on file. She reports that she uses drugs, including Marijuana. She reports that she does not drink alcohol. Family History: family history includes Heart Disease in her father; High Blood Pressure in her father; High Cholesterol in her father; Other in her mother. No h/o sudden cardiac death. No for premature CAD    REVIEW OF SYSTEMS:    · Constitutional: there has been no unanticipated weight loss. There's been No change in energy level, No change in activity level. · Eyes: No visual changes or diplopia. No scleral icterus. · ENT: No Headaches  · Cardiovascular: Abdominal pain that radiates to the chest during admission, no complaints at this time  · Respiratory: No previous pulmonary problems, No cough  · Gastrointestinal: No abdominal pain. No change in bowel or bladder habits. · Genitourinary: No dysuria, trouble voiding, or hematuria. · Musculoskeletal:  No gait disturbance, No weakness or joint complaints. · Integumentary: No rash or pruritis. · Neurological: No headache, diplopia, change in muscle strength, numbness or tingling. No change in gait, balance, coordination, mood, affect, memory, mentation, behavior. · Psychiatric: No anxiety, or depression. · Endocrine: No temperature intolerance. No excessive thirst, fluid intake, or urination. No tremor. · Hematologic/Lymphatic: No abnormal bruising or bleeding, blood clots or swollen lymph nodes. · Allergic/Immunologic: No nasal congestion or hives.       PHYSICAL EXAM:      /82   Pulse 95   Temp 98.5 °F (36.9 °C) (Oral)   Resp 14   Ht 5' 7\" (1.702 m)   Wt 160 lb (72.6 kg)   SpO2 99%   BMI 25.06 kg/m²    Constitutional and General Appearance: alert, cooperative, no distress and appears stated IMPRESSION:    Patient Active Problem List   Diagnosis    Heart disease    Chronic back pain    Depression    Hyperlipidemia    CAD, multiple vessel    Prediabetes    Asthma    Smoking    Need for vaccination    Syncope    Leg pain    Left hip pain    Chronic cholecystitis    Chest pain       RECOMMENDATIONS:  1. Medical management and supportive care per primary  2. Continue ASA, statin, BB  3. Given risk factors, will schedule for stress testing outpatient, clear for surgery from cardiac perspective    Will discuss with rounding attending  for final recommendations.     [address]hand[address] Student

## 2017-11-09 NOTE — PROGRESS NOTES
Accessibility: Accessible  Receives Help From: Family, Friend(s)  ADL Assistance: Independent  Homemaking Assistance: Independent  Homemaking Responsibilities: Yes  Ambulation Assistance: Independent  Transfer Assistance: Independent  Active : Yes  Mode of Transportation: Car  Occupation: Full time employment  Type of occupation: Was working full time at [employer] as a  prior to back sx; pt plans to return to working full time as able  Leisure & Hobbies: Spending time with her dog  Objective          AROM RLE (degrees)  RLE AROM: WNL  AROM LLE (degrees)  LLE AROM : WNL  AROM RUE (degrees)  RUE AROM : WNL  AROM LUE (degrees)  LUE AROM : WNL  Strength RLE  Strength RLE: WFL  Strength LLE  Strength LLE: WFL  Strength RUE  Strength RUE: WFL  Strength LUE  Strength LUE: WFL     Sensation  Overall Sensation Status: WFL  Bed mobility  Rolling to Left: Independent;Stand by assistance  Rolling to Right: Independent;Stand by assistance  Supine to Sit: Independent;Stand by assistance  Sit to Supine: Independent;Stand by assistance  Scooting: Stand by assistance  Transfers  Sit to Stand: Stand by assistance  Stand to sit: Stand by assistance  Ambulation  Ambulation?: Yes  Ambulation 1  Surface: level tile  Device: No Device  Assistance: Stand by assistance  Distance: amb 350 ft without a device x SBA     Balance  Posture: Good  Sitting - Static: Good  Sitting - Dynamic: Good  Standing - Static: Fair  Standing - Dynamic: Fair        Assessment   Assessment: The pt ambulated safely without a device   Prognosis: Good  Decision Making: Medium Complexity  Patient Education: PT POC, Goals  REQUIRES PT FOLLOW UP: No  Activity Tolerance  Activity Tolerance: Patient Tolerated treatment well     Discharge Recommendations:  Home independently      Plan   Plan  Times per week: SUHAS  PT    G-Code  PT G-Codes  Functional Assessment Tool Used: Laminesas Tool  Score: 28=0.00%  Functional Limitation: Mobility: Walking and moving

## 2017-11-09 NOTE — FLOWSHEET NOTE
Visited and prayed with patient at her bedside. Patient's significant other was present and participated in the prayer. Patient said she would under surgery the next day. Patient was a little bit nervious but seemed to remain positive.  offered support and reassured patient that she was in good hands. Both patient and her significant other were appreciative of the pastoral support they received. Patient said she was raised Oriental orthodox and a member of [address] [place] [address] of [address] [place] [address]. Patient received sacrament of anointing of the sick. Follow up visits recommended for more spiritual and emotional support. 11/09/17 1421   Encounter Summary   Services provided to: Patient;Significant other   Support System Family members;Significant other   Place of [address] [place] [address]   Continue Visiting (11/09/2017)   Complexity of Encounter Moderate   Length of Encounter 30 minutes   Spiritual Assessment Completed Yes   Routine   Type Initial   Spiritual/Muslim   Type Spiritual support   Assessment Calm; Approachable; Hopeful   Intervention Active listening;Nurtured hope;Prayer; Anointing   Outcome Expressed gratitude   Sacraments   Sacrament of Sick-Anointing Anointed

## 2017-11-09 NOTE — ANESTHESIA PRE PROCEDURE
Claribel Wallace MD        enoxaparin (LOVENOX) injection 40 mg  40 mg Subcutaneous Daily Claribel Wallace MD   40 mg at 11/09/17 0920    0.9 % sodium chloride infusion   Intravenous Continuous Colon Richardamy Mejias  mL/hr at 11/08/17 1600      morphine injection 1 mg  1 mg Intravenous Q3H PRN Claribel Wallace MD   1 mg at 11/09/17 1258    pantoprazole (PROTONIX) tablet 40 mg  40 mg Oral QAM AC Romana Dragojevic, MD        albuterol (PROVENTIL) nebulizer solution 2.5 mg  2.5 mg Nebulization As Directed RT PRN Claribel Wallace MD        atorvastatin (LIPITOR) tablet 40 mg  40 mg Oral Nightly Claribel Wallace MD   40 mg at 11/08/17 2119    potassium chloride (KLOR-CON M) extended release tablet 40 mEq  40 mEq Oral PRN Claribel Wallace MD        Or    potassium chloride 20 MEQ/15ML (10%) oral solution 40 mEq  40 mEq Oral PRN Claribel Wallace MD        Or    potassium chloride 10 mEq/100 mL IVPB (Peripheral Line)  10 mEq Intravenous PRN Claribel Wallace MD        PARoxetine (PAXIL) tablet 30 mg  30 mg Oral Nightly Bijan Neth, DPM   30 mg at 11/08/17 2119       Allergies:     Allergies   Allergen Reactions    Sulfa Antibiotics        Problem List:    Patient Active Problem List   Diagnosis Code    Heart disease I51.9    Chronic back pain M54.9, G89.29    Depression F32.9    Hyperlipidemia E78.5    CAD, multiple vessel I25.10    Prediabetes R73.03    Asthma J45.909    Smoking F17.200    Need for vaccination Z23    Syncope R55    Leg pain M79.606    Left hip pain M25.552    Chronic cholecystitis K81.1    Chest pain R07.9       Past Medical History:        Diagnosis Date    Acute MI     Anxiety     Chronic back pain     [address] (chronic obstructive pulmonary disease) [address] [address] [facility]     Depression     Heart disease     Hyperlipidemia     Hypertension     MRSA (methicillin resistant staph aureus) culture positive 09/05/2017    abscess       Past Surgical History:        Procedure Laterality Date    BACK SURGERY      x2  fusion    TONSILLECTOMY      TUBAL LIGATION         Social History:    Social History   Substance Use Topics    Smoking status: Current Some Day Smoker     Packs/day: 1.00     Types: Cigarettes    Smokeless tobacco: Not on file    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                                Ready to quit: Not Answered  Counseling given: Not Answered      Vital Signs (Current):   Vitals:    11/08/17 2000 11/09/17 0645 11/09/17 0800 11/09/17 0806   BP: (!) 158/90 133/82     Pulse: 93 95 72    Resp: 17 14     Temp: 36.6 °C (97.9 °F) 36.9 °C (98.5 °F)     TempSrc: Oral Oral     SpO2: 98% 99%  99%   Weight:       Height:                                                  BP Readings from Last 3 Encounters:   11/09/17 133/82   09/23/17 131/81   09/20/17 130/85       NPO Status:                                                                                 BMI:   Wt Readings from Last 3 Encounters:   11/08/17 160 lb (72.6 kg)   09/23/17 160 lb (72.6 kg)   09/20/17 160 lb (72.6 kg)     Body mass index is 25.06 kg/m². CBC:   Lab Results   Component Value Date    WBC 6.4 11/09/2017    RBC 4.52 11/09/2017    HGB 12.9 11/09/2017    HCT 40.4 11/09/2017    MCV 89.4 11/09/2017    RDW 15.7 11/09/2017     11/09/2017       CMP:   Lab Results   Component Value Date     11/09/2017    K 3.9 11/09/2017     11/09/2017    CO2 25 11/09/2017    BUN 7 11/09/2017    CREATININE 0.48 11/09/2017    GFRAA >60 11/09/2017    LABGLOM >60 11/09/2017    GLUCOSE 84 11/09/2017    PROT 6.2 11/09/2017    CALCIUM 8.9 11/09/2017    BILITOT 0.21 11/09/2017    ALKPHOS 80 11/09/2017    AST 17 11/09/2017    ALT 15 11/09/2017       POC Tests: No results for input(s): POCGLU, POCNA, POCK, POCCL, POCBUN, POCHEMO, POCHCT in the last 72 hours.     Coags:   Lab Results   Component Value Date    PROTIME 10.4 08/02/2013    INR 1.0 08/02/2013    APTT 30.1 08/02/2013       HCG (If Applicable):   Lab Results   Component Value Date    PREGTESTUR NEGATIVE 07/03/2012        ABGs: No results found for: PHART, PO2ART, TPY5CCJ, XHK5LCL, BEART, P9PFXFPS     Type & Screen (If Applicable):  No results found for: LABABO, [address]    Anesthesia Evaluation  Patient summary reviewed and Nursing notes reviewed  Airway: Mallampati: I        Dental:      Comment: Multiple loose teeth    Pulmonary: breath sounds clear to auscultation  (+) COPD:  asthma:                            Cardiovascular:    (+) hypertension:, past MI: > 6 months, CAD (Cath in 2013, showed CAD. No stents placed):,         Rhythm: regular  Rate: normal                    Neuro/Psych:   {neg ROS     (-) seizures and CVA           GI/Hepatic/Renal: neg ROS            Endo/Other: negative ROS                    Abdominal:         (-) obese     Vascular:                                    Anesthesia Plan      general     ASA 3       Induction: intravenous. MIPS: Postoperative opioids intended and Prophylactic antiemetics administered. Anesthetic plan and risks discussed with patient.                     Luh Sher CRNA   11/9/2017

## 2017-11-09 NOTE — CONSULTS
20 MEQ/15ML (10%) oral solution 40 mEq, 40 mEq, Oral, PRN **OR** potassium chloride 10 mEq/100 mL IVPB (Peripheral Line), 10 mEq, Intravenous, PRN  PARoxetine (PAXIL) tablet 30 mg, 30 mg, Oral, Nightly    Allergies:  Sulfa antibiotics    Social History:   reports that she has been smoking Cigarettes. She has been smoking about 1.00 pack per day. She does not have any smokeless tobacco history on file. She reports that she uses drugs, including Marijuana. She reports that she does not drink alcohol. Family History: family history includes Heart Disease in her father; High Blood Pressure in her father; High Cholesterol in her father; Other in her mother. No h/o sudden cardiac death for premature CAD    REVIEW OF SYSTEMS:    · Constitutional: there has been no unanticipated weight loss. There's been No change in energy level, No change in activity level. · Eyes: No visual changes or diplopia. No scleral icterus. · ENT: No Headaches  · Cardiovascular: No cardiac history  · Respiratory: No previous pulmonary problems, No cough  · Gastrointestinal: No abdominal pain. No change in bowel or bladder habits. · Genitourinary: No dysuria, trouble voiding, or hematuria. · Musculoskeletal:  No gait disturbance, No weakness or joint complaints. · Integumentary: No rash or pruritis. · Neurological: No headache, diplopia, change in muscle strength, numbness or tingling. No change in gait, balance, coordination, mood, affect, memory, mentation, behavior. · Psychiatric: No anxiety, or depression. · Endocrine: No temperature intolerance. No excessive thirst, fluid intake, or urination. No tremor. · Hematologic/Lymphatic: No abnormal bruising or bleeding, blood clots or swollen lymph nodes. · Allergic/Immunologic: No nasal congestion or hives.       PHYSICAL EXAM:      /82   Pulse 72   Temp 98.5 °F (36.9 °C) (Oral)   Resp 14   Ht 5' 7\" (1.702 m)   Wt 160 lb (72.6 kg)   SpO2 99%   BMI 25.06 kg/m² Constitutional and General Appearance: alert, cooperative, no distress and appears stated age  [de-identified]: PERRL, no cervical lymphadenopathy. No masses palpable. Normal oral mucosa  Respiratory:  · Normal excursion and expansion without use of accessory muscles  · Resp Auscultation: Good respiratory effort. No for increased work of breathing. On auscultation: clear to auscultation bilaterally  Cardiovascular:  · The apical impulse is not displaced  · Heart tones are crisp and normal. regular S1 and S2.  · Jugular venous pulsation Normal  · The carotid upstroke is normal in amplitude and contour without delay or bruit  · Peripheral pulses are symmetrical and full   Abdomen:   · No masses or tenderness  · Bowel sounds present  Extremities:  ·  No Cyanosis or Clubbing  ·  Lower extremity edema: No  ·  Skin: Warm and dry  Neurological:  · Alert and oriented. · Moves all extremities well  · No abnormalities of mood, affect, memory, mentation, or behavior are noted    DATA:    Diagnostics:    EKG: normal EKG, normal sinus rhythm, unchanged from previous tracings. ECHO: not obtained. Stress Test: not obtained. Cardiac Angiography: not obtained. Labs:     CBC:   Recent Labs      11/08/17   0645  11/09/17 0433   WBC  8.0  6.4   HGB  13.4  12.9   HCT  42.1  40.4   PLT  299  276     BMP:   Recent Labs      11/08/17   0645  11/09/17   0433   NA  143  137   K  3.5*  3.9   CO2  26  25   BUN  9  7   CREATININE  0.60  0.48*   LABGLOM  >60  >60   GLUCOSE  90  84     BNP: No results for input(s): BNP in the last 72 hours. PT/INR: No results for input(s): PROTIME, INR in the last 72 hours. APTT:No results for input(s): APTT in the last 72 hours.   CARDIAC ENZYMES:  Recent Labs      11/08/17   0647  11/08/17   0703  11/08/17   0835   TROPONINI  0.01  0.00  0.01     FASTING LIPID PANEL:  Lab Results   Component Value Date    HDL 44 11/08/2017    TRIG 115 11/08/2017     LIVER PROFILE:  Recent Labs      11/08/17   0645 11/09/17   0433   AST  17  17   ALT  15  15   LABALBU  4.0  3.8       IMPRESSION:    Cholecystitis  Atypical chest pain  Anxiety  Depression  COPD      RECOMMENDATIONS:  1. RCRI score 0.9%  2. Continue on Asa, Lopressor, and Lipitor  3. Will discuss with attending for surgical clearance      Discussed with patient and Nurse.     Electronically signed by Kian Pompa MD on 11/9/2017 at 10:01 [address] [place] [address] [facility]      [phone]

## 2017-11-09 NOTE — PROGRESS NOTES
Cardiology's note reviewed. Patient's scheduled for robotic assisted laparoscopic cholecystectomy, possible open at 4:45 PM on 11/10. This procedure, including risks, benefits, alternatives and complications have been fully reviewed with patient and informed consent has been obtained. All questions were answered appropriately. CLD. NPO at MN  Please hold lovenox on 11/10 for surgery. Ok for heparin TID.     Elio Tello DO, PGY-4  Pager#: [phone]  11:18 AM 11/9/2017

## 2017-11-09 NOTE — PROGRESS NOTES
In preparation for surgery, patient instructed on use of incentive spirometer. Patient verbalizes understanding of indication for incentive spirometry and is able to demonstrate use. Patient displaced 2000 ml of air. DANIELLE Hooper.

## 2017-11-09 NOTE — PROGRESS NOTES
Occupational Therapy   Occupational Therapy Initial Assessment  Date: 2017   Patient Name: Edinson Jose  MRN: [id number]     : 1965    Patient Diagnosis(es): The primary encounter diagnosis was Chest pain, unspecified type. A diagnosis of Calculus of gallbladder without cholecystitis without obstruction was also pertinent to this visit. has a past medical history of Acute MI; Anxiety; Chronic back pain; COPD (chronic obstructive pulmonary disease) ([address] Depression; Heart disease; Hyperlipidemia; Hypertension; and MRSA (methicillin resistant staph aureus) culture positive. has a past surgical history that includes Tubal ligation; Tonsillectomy; and back surgery. Restrictions  Restrictions/Precautions  Restrictions/Precautions: Contact Precautions  Required Braces or Orthoses?: No  Position Activity Restriction  Other position/activity restrictions: Up as tolerated. Pt reports recent back sx, states use of lumbar corset for comfort as needed. Subjective   General  Chart Reviewed: No  Patient assessed for rehabilitation services?: Yes  Family / Caregiver Present: No  General Comment  Comments: RN ok'd for therapy this AM. Pt emotional upon therapist arrival with complaint of pain, hunger from NPO status, and frustration of waiting on MD's to round. RN notified and administerd pain meds and paged MD's. Pt provided emotional support and agreeable to participate in session and pleasant/cooperative throughout.   Pain Assessment  Patient Currently in Pain: Yes  Pain Assessment: 0-10  Pain Level: 3  Pain Type: Chronic pain  Pain Location: Back  Pain Orientation: Mid, Lower  Pain Descriptors: Aching, Discomfort  Pain Intervention(s): Medication (see eMar)  Response to Pain Intervention: Patient Satisfied     Social/Functional History  Social/Functional History  Lives With: Significant other (and adult son)  Type of Home: House (second level duplex)  Home Layout: One level  Home Access: Stairs to

## 2017-11-09 NOTE — PLAN OF CARE
Problem: Pain:  Goal: Pain level will decrease  Pain level will decrease to a 1 on a scale of 0-10 per pt. goal   Outcome: Met This Shift    Goal: Control of acute pain  Control of acute pain   Outcome: Met This Shift    Goal: Control of chronic pain  Control of chronic pain   Outcome: Met This Shift

## 2017-11-09 NOTE — PROGRESS NOTES
[address]    PROGRESS NOTE            Date:   11/9/2017  Patient name:  Diane Ferris  Date of admission:  11/8/2017  6:19 AM  MRN:   [id number]  YOB: 1965    CHIEF COMPLAINT     History Obtained From:  Patient and chart review. HISTORY OF PRESENT ILLNESS      46 y.o. with PMH of reported CAD with previous cath, but no stenting, anxiety, depression, COPD. The first episode was 2 nights ago when she noticed a sudden pain in her RUQ that radiated to her chest, mid-sternal region, non-radiating, lasted half an hour and was associated with diaphoresis. There were no relieving or exacerbating factors. Resolved spontaneously in half an hour and patient went back to sleep. She felt okay the next day, she denies association with meals. She was able to tolerate regular diet yesterday. Upon waking this AM she had strong, mid-sternal chest pain, again non radiating and cramping in nature. She claims it was again associated with diaphoresis.     CTA chest in ED was negative for PE but showed both marked distension of the gallbladder and CAD with atherosclerotic calcification of the aorta. US GB further showed Gallbladder sludge, mild gallbladder wall thickening, with gallbladder     She currently smokes 1PPD, denies alcohol or recreational drug use. INTERVAL HISTORY  -patient seen and examined at bedside  -no acute events overnight  -patient has no new complaints this morning, denies abdominal pain, chest pain  -vitals stable  -cardio cleared for cholecystectomy on Friday 11/10    PAST MEDICAL HISTORY       has a past medical history of Acute MI; Anxiety; Chronic back pain; COPD (chronic obstructive pulmonary disease) ([address] Depression; Heart disease; Hyperlipidemia; Hypertension; and MRSA (methicillin resistant staph aureus) culture positive.     PAST SURGICAL HISTORY       has a past surgical history that includes Tubal ligation; Tonsillectomy; and back surgery. HOME MEDICATIONS        Prior to Admission medications    Medication Sig Start Date End Date Taking? Authorizing Provider   Cholecalciferol (VITAMIN D PO) Take by mouth   Yes Historical Provider, MD   fluticasone-vilanterol (BREO ELLIPTA) 100-25 MCG/INH AEPB inhaler Inhale into the lungs 2 times daily   Yes Historical Provider, MD   PARoxetine (PAXIL) 30 MG tablet Take 30 mg by mouth every morning   Yes Historical Provider, MD   cyclobenzaprine (FLEXERIL) 10 MG tablet Take 10 mg by mouth 3 times daily as needed    Yes Historical Provider, MD   levETIRAcetam (KEPPRA) 500 MG tablet Take 500 mg by mouth 2 times daily   Yes Historical Provider, MD   metoprolol tartrate (LOPRESSOR) 25 MG tablet TAKE ONE-HALF TABLET BY MOUTH TWICE A DAY 11/7/17  Yes Roxie Michaels MD   VENTOLIN  (90 BASE) MCG/ACT inhaler INHALE TWO PUFFS BY MOUTH EVERY 6 HOURS AS NEEDED 8/11/16  Yes Perez Mittal MD   gabapentin (NEURONTIN) 100 MG capsule Take 1 capsule by mouth daily. Patient taking differently:   Take 100 mg by mouth 2 times daily  12/26/14  Yes Belén Dubon MD   Multiple Vitamins-Minerals (THERAPEUTIC MULTIVITAMIN-MINERALS) tablet Take 1 tablet by mouth daily. Yes Historical Provider, MD   vitamin B-12 (CYANOCOBALAMIN) 1000 MCG tablet Take 1,000 mcg by mouth daily. Yes Historical Provider, MD   acetaminophen (APAP EXTRA STRENGTH) 500 MG tablet Take 2 tablets by mouth every 6 hours as needed for Pain. 3/15/13  Yes Marlene Thompson MD   aspirin EC 81 MG EC tablet Take 1 tablet by mouth daily. 3/15/13  Yes Marlene Thompson MD   Heating Pads (HEAT PAD MOIST/DRY MASSAGING) PADS 1 Device by Does not apply route every 6 hours as needed (for muscle spasm/pain, use for 20 min at a time max) 2/11/17   Hannah Hines DO       ALLERGIES      Sulfa antibiotics    SOCIAL HISTORY       reports that she has been smoking Cigarettes.   She has been smoking about 1.00 pack per day. She does not have any smokeless tobacco history on file. She reports that she uses drugs, including Marijuana. She reports that she does not drink alcohol. FAMILY HISTORY      family history includes Heart Disease in her father; High Blood Pressure in her father; High Cholesterol in her father; Other in her mother. REVIEW OF SYSTEMS      · Constitutional: Negative for weight loss  · Eyes: Negative for visual changes, diplopia, scleral icterus. · ENT: Negative for Headaches, hearing loss, vertigo, mouth sores, sore throat. · Cardiovascular: Negative for lightheadedness/orthostatic symptoms ,chest pain, dyspnea on exertion, palpitations or loss of consciousness. · Respiratory: Negative for cough or wheezing, sputum production, hemoptysis, pleuritic pain. · Gastrointestinal: Negative for nausea/vomiting, change in bowel habits, abdominal pain, dysphagia/appetite loss, hematemesis, blood in stools. · Genitourinary:Negative for change in bladder habits, dysuria, trouble voiding, hematuria. · Musculoskeletal: Negative for gait disturbance, weakness, joint complaints. · Integumentary: Negative for rash, pruritis. · Neurological: Negative for headache, dizziness, change in muscle strength, numbness/tingling, change in gait, balance, coordination,   · Psychiatric: negative for change in mood, affect, memory, mentation, behavior. · Endocrine: negative for temperature intolerance, excessive thirst, fluid intake, or urination, tremor. · Hematologic/Lymphatic: negative for abnormal bruising or bleeding, blood clots, swollen lymph nodes. · Allergic/Immunologic: negative for nasal congestion, pruritis, hives. PHYSICAL EXAM      /82   Pulse 95   Temp 98.5 °F (36.9 °C) (Oral)   Resp 14   Ht 5' 7\" (1.702 m)   Wt 160 lb (72.6 kg)   SpO2 99%   BMI 25.06 kg/m²      · General appearance: well nourished  · HEENT: Head: Normocephalic, no lesions, without obvious abnormality.  Eye: no icterus, no redness, pupils equal, round and reactive to light. · Lungs: clear to auscultation bilaterally  · Heart: regular rate and rhythm, S1, S2 normal, no murmur, click, rub or gallop  · Abdomen: soft, non-tender; bowel sounds normal; no masses,  no organomegaly  · Extremities: extremities normal, atraumatic, no cyanosis or edema  · Neurological: Gait normal. Reflexes normal and symmetric. Sensation grossly normal  · Skin - no rash, no lump   · Psych-normal affect   · Lymphatic system-no lymphadenopathy no splenomegaly     DIAGNOSTICS      Laboratory Testing:  CBC:   Recent Labs      11/09/17   0433   WBC  6.4   HGB  12.9   PLT  276     BMP:    Recent Labs      11/08/17   0645  11/09/17   0433   NA  143  137   K  3.5*  3.9   CL  103  102   CO2  26  25   BUN  9  7   CREATININE  0.60  0.48*   GLUCOSE  90  84     S. Calcium:  Recent Labs      11/09/17 0433   CALCIUM  8.9     S. Ionized Calcium:No results for input(s): IONCA in the last 72 hours. S. Magnesium:No results for input(s): MG in the last 72 hours. S. Phosphorus:No results for input(s): PHOS in the last 72 hours. S. Glucose:No results for input(s): POCGLU in the last 72 hours. Glycosylated hemoglobin A1C: No results for input(s): LABA1C in the last 72 hours. INR: No results for input(s): INR in the last 72 hours. Hepatic functions:   Recent Labs      11/08/17   0645   ALKPHOS  88   ALT  15   AST  17   PROT  6.8   BILITOT  <0.10*   BILIDIR  <0.08   LABALBU  4.0     Pancreatic functions:No results for input(s): LACTA, AMYLASE in the last 72 hours. S. Lactic Acid: No results for input(s): LACTA in the last 72 hours. Cardiac enzymes:  Recent Labs      11/08/17   0647  11/08/17   0703  11/08/17   0835   TROPONINI  0.01  0.00  0.01     BNP:No results for input(s): BNP in the last 72 hours.   Lipid profile:   Recent Labs      11/08/17   1620   CHOL  265*   TRIG  115   HDL  44     Blood Gases: No results found for: PH, PCO2, PO2, HCO3, O2SAT  Thyroid functions:

## 2017-11-09 NOTE — PROGRESS NOTES
vaccination    Syncope    Leg pain    Left hip pain    Chronic cholecystitis    Chest pain       1. 46 y.o. Female with epigastric pain. HIDA scan suggestive of chronic cholecystitis      Plan  1. CLD as tolerated. NPO at [facility]. 2. Cardiology to see patient today for risk stratification. 3. LFT this AM.  4. Once risk stratification done per Cardiology, will proceed with robotic assisted laparoscopic cholecystectomy, possible open.       Electronically signed by Jennyfer Hylton DO  on 11/9/2017 at 6:12 AM

## 2017-11-10 ENCOUNTER — ANESTHESIA (OUTPATIENT)
Dept: OPERATING ROOM | Age: 52
DRG: 263 | End: 2017-11-10
Payer: COMMERCIAL

## 2017-11-10 VITALS — TEMPERATURE: 72.8 F | SYSTOLIC BLOOD PRESSURE: 147 MMHG | DIASTOLIC BLOOD PRESSURE: 78 MMHG | OXYGEN SATURATION: 100 %

## 2017-11-10 LAB
ABSOLUTE EOS #: 0.16 K/UL (ref 0–0.44)
ABSOLUTE IMMATURE GRANULOCYTE: 0.03 K/UL (ref 0–0.3)
ABSOLUTE LYMPH #: 2.65 K/UL (ref 1.1–3.7)
ABSOLUTE MONO #: 0.53 K/UL (ref 0.1–1.2)
ALBUMIN SERPL-MCNC: 3.7 G/DL (ref 3.5–5.2)
ALBUMIN/GLOBULIN RATIO: 1.5 (ref 1–2.5)
ALP BLD-CCNC: 81 U/L (ref 35–104)
ALT SERPL-CCNC: 14 U/L (ref 5–33)
ANION GAP SERPL CALCULATED.3IONS-SCNC: 12 MMOL/L (ref 9–17)
AST SERPL-CCNC: 16 U/L
BASOPHILS # BLD: 1 % (ref 0–2)
BASOPHILS ABSOLUTE: 0.05 K/UL (ref 0–0.2)
BILIRUB SERPL-MCNC: 0.15 MG/DL (ref 0.3–1.2)
BILIRUBIN DIRECT: <0.08 MG/DL
BILIRUBIN, INDIRECT: ABNORMAL MG/DL (ref 0–1)
BUN BLDV-MCNC: 11 MG/DL (ref 6–20)
BUN/CREAT BLD: ABNORMAL (ref 9–20)
CALCIUM SERPL-MCNC: 8.8 MG/DL (ref 8.6–10.4)
CHLORIDE BLD-SCNC: 104 MMOL/L (ref 98–107)
CO2: 23 MMOL/L (ref 20–31)
CREAT SERPL-MCNC: 0.42 MG/DL (ref 0.5–0.9)
DIFFERENTIAL TYPE: ABNORMAL
EKG ATRIAL RATE: 77 BPM
EKG P AXIS: 41 DEGREES
EKG P-R INTERVAL: 132 MS
EKG Q-T INTERVAL: 376 MS
EKG QRS DURATION: 90 MS
EKG QTC CALCULATION (BAZETT): 425 MS
EKG R AXIS: 38 DEGREES
EKG T AXIS: 33 DEGREES
EKG VENTRICULAR RATE: 77 BPM
EOSINOPHILS RELATIVE PERCENT: 2 % (ref 1–4)
GFR AFRICAN AMERICAN: >60 ML/MIN
GFR NON-AFRICAN AMERICAN: >60 ML/MIN
GFR SERPL CREATININE-BSD FRML MDRD: ABNORMAL ML/MIN/{1.73_M2}
GFR SERPL CREATININE-BSD FRML MDRD: ABNORMAL ML/MIN/{1.73_M2}
GLOBULIN: ABNORMAL G/DL (ref 1.5–3.8)
GLUCOSE BLD-MCNC: 101 MG/DL (ref 70–99)
HCT VFR BLD CALC: 39.4 % (ref 36.3–47.1)
HEMOGLOBIN: 12.5 G/DL (ref 11.9–15.1)
IMMATURE GRANULOCYTES: 0 %
LYMPHOCYTES # BLD: 31 % (ref 24–43)
MCH RBC QN AUTO: 28.4 PG (ref 25.2–33.5)
MCHC RBC AUTO-ENTMCNC: 31.7 G/DL (ref 28.4–34.8)
MCV RBC AUTO: 89.5 FL (ref 82.6–102.9)
MONOCYTES # BLD: 6 % (ref 3–12)
PDW BLD-RTO: 15.8 % (ref 11.8–14.4)
PLATELET # BLD: 272 K/UL (ref 138–453)
PLATELET ESTIMATE: ABNORMAL
PMV BLD AUTO: 8.4 FL (ref 8.1–13.5)
POTASSIUM SERPL-SCNC: 4.1 MMOL/L (ref 3.7–5.3)
RBC # BLD: 4.4 M/UL (ref 3.95–5.11)
RBC # BLD: ABNORMAL 10*6/UL
SEG NEUTROPHILS: 60 % (ref 36–65)
SEGMENTED NEUTROPHILS ABSOLUTE COUNT: 5.14 K/UL (ref 1.5–8.1)
SODIUM BLD-SCNC: 139 MMOL/L (ref 135–144)
TOTAL PROTEIN: 6.1 G/DL (ref 6.4–8.3)
WBC # BLD: 8.6 K/UL (ref 3.5–11.3)
WBC # BLD: ABNORMAL 10*3/UL

## 2017-11-10 PROCEDURE — 8E0W4CZ ROBOTIC ASSISTED PROCEDURE OF TRUNK REGION, PERCUTANEOUS ENDOSCOPIC APPROACH: ICD-10-PCS | Performed by: SURGERY

## 2017-11-10 PROCEDURE — 6370000000 HC RX 637 (ALT 250 FOR IP): Performed by: EMERGENCY MEDICINE

## 2017-11-10 PROCEDURE — S2900 ROBOTIC SURGICAL SYSTEM: HCPCS | Performed by: SURGERY

## 2017-11-10 PROCEDURE — 2500000003 HC RX 250 WO HCPCS: Performed by: NURSE ANESTHETIST, CERTIFIED REGISTERED

## 2017-11-10 PROCEDURE — 6360000002 HC RX W HCPCS: Performed by: ANESTHESIOLOGY

## 2017-11-10 PROCEDURE — 6360000002 HC RX W HCPCS: Performed by: NURSE ANESTHETIST, CERTIFIED REGISTERED

## 2017-11-10 PROCEDURE — 85025 COMPLETE CBC W/AUTO DIFF WBC: CPT

## 2017-11-10 PROCEDURE — 36415 COLL VENOUS BLD VENIPUNCTURE: CPT

## 2017-11-10 PROCEDURE — 7100000000 HC PACU RECOVERY - FIRST 15 MIN: Performed by: SURGERY

## 2017-11-10 PROCEDURE — 80076 HEPATIC FUNCTION PANEL: CPT

## 2017-11-10 PROCEDURE — 7100000001 HC PACU RECOVERY - ADDTL 15 MIN: Performed by: SURGERY

## 2017-11-10 PROCEDURE — 6360000002 HC RX W HCPCS: Performed by: SURGERY

## 2017-11-10 PROCEDURE — 94762 N-INVAS EAR/PLS OXIMTRY CONT: CPT

## 2017-11-10 PROCEDURE — 1200000000 HC SEMI PRIVATE

## 2017-11-10 PROCEDURE — 6370000000 HC RX 637 (ALT 250 FOR IP): Performed by: SURGERY

## 2017-11-10 PROCEDURE — 3700000000 HC ANESTHESIA ATTENDED CARE: Performed by: SURGERY

## 2017-11-10 PROCEDURE — 3700000001 HC ADD 15 MINUTES (ANESTHESIA): Performed by: SURGERY

## 2017-11-10 PROCEDURE — 47562 LAPAROSCOPIC CHOLECYSTECTOMY: CPT | Performed by: SURGERY

## 2017-11-10 PROCEDURE — 2780000010 HC IMPLANT OTHER: Performed by: SURGERY

## 2017-11-10 PROCEDURE — 80048 BASIC METABOLIC PNL TOTAL CA: CPT

## 2017-11-10 PROCEDURE — C1729 CATH, DRAINAGE: HCPCS | Performed by: SURGERY

## 2017-11-10 PROCEDURE — 2580000003 HC RX 258: Performed by: SURGERY

## 2017-11-10 PROCEDURE — 2580000003 HC RX 258: Performed by: INTERNAL MEDICINE

## 2017-11-10 PROCEDURE — 6360000002 HC RX W HCPCS: Performed by: INTERNAL MEDICINE

## 2017-11-10 PROCEDURE — 2500000003 HC RX 250 WO HCPCS: Performed by: SURGERY

## 2017-11-10 PROCEDURE — 3600000009 HC SURGERY ROBOT BASE: Performed by: SURGERY

## 2017-11-10 PROCEDURE — 3600000019 HC SURGERY ROBOT ADDTL 15MIN: Performed by: SURGERY

## 2017-11-10 PROCEDURE — 99233 SBSQ HOSP IP/OBS HIGH 50: CPT | Performed by: INTERNAL MEDICINE

## 2017-11-10 PROCEDURE — 88304 TISSUE EXAM BY PATHOLOGIST: CPT

## 2017-11-10 PROCEDURE — 0FT44ZZ RESECTION OF GALLBLADDER, PERCUTANEOUS ENDOSCOPIC APPROACH: ICD-10-PCS | Performed by: SURGERY

## 2017-11-10 PROCEDURE — 94640 AIRWAY INHALATION TREATMENT: CPT

## 2017-11-10 PROCEDURE — 6360000002 HC RX W HCPCS: Performed by: STUDENT IN AN ORGANIZED HEALTH CARE EDUCATION/TRAINING PROGRAM

## 2017-11-10 PROCEDURE — 6360000002 HC RX W HCPCS

## 2017-11-10 PROCEDURE — 6370000000 HC RX 637 (ALT 250 FOR IP): Performed by: INTERNAL MEDICINE

## 2017-11-10 PROCEDURE — 6370000000 HC RX 637 (ALT 250 FOR IP): Performed by: STUDENT IN AN ORGANIZED HEALTH CARE EDUCATION/TRAINING PROGRAM

## 2017-11-10 PROCEDURE — 2580000003 HC RX 258: Performed by: NURSE ANESTHETIST, CERTIFIED REGISTERED

## 2017-11-10 PROCEDURE — A6403 STERILE GAUZE>16 <= 48 SQ IN: HCPCS | Performed by: SURGERY

## 2017-11-10 PROCEDURE — C1760 CLOSURE DEV, VASC: HCPCS | Performed by: SURGERY

## 2017-11-10 RX ORDER — LIDOCAINE HYDROCHLORIDE 10 MG/ML
INJECTION, SOLUTION EPIDURAL; INFILTRATION; INTRACAUDAL; PERINEURAL PRN
Status: DISCONTINUED | OUTPATIENT
Start: 2017-11-10 | End: 2017-11-10 | Stop reason: SDUPTHER

## 2017-11-10 RX ORDER — MAGNESIUM HYDROXIDE 1200 MG/15ML
LIQUID ORAL CONTINUOUS PRN
Status: DISCONTINUED | OUTPATIENT
Start: 2017-11-10 | End: 2017-11-10 | Stop reason: HOSPADM

## 2017-11-10 RX ORDER — INDOCYANINE GREEN AND WATER 25 MG
5 KIT INJECTION
Status: COMPLETED | OUTPATIENT
Start: 2017-11-10 | End: 2017-11-10

## 2017-11-10 RX ORDER — FENTANYL CITRATE 50 UG/ML
25 INJECTION, SOLUTION INTRAMUSCULAR; INTRAVENOUS EVERY 5 MIN PRN
Status: DISCONTINUED | OUTPATIENT
Start: 2017-11-10 | End: 2017-11-10 | Stop reason: HOSPADM

## 2017-11-10 RX ORDER — FENTANYL CITRATE 50 UG/ML
INJECTION, SOLUTION INTRAMUSCULAR; INTRAVENOUS PRN
Status: DISCONTINUED | OUTPATIENT
Start: 2017-11-10 | End: 2017-11-10 | Stop reason: SDUPTHER

## 2017-11-10 RX ORDER — SODIUM CHLORIDE 0.9 % (FLUSH) 0.9 %
10 SYRINGE (ML) INJECTION PRN
Status: DISCONTINUED | OUTPATIENT
Start: 2017-11-10 | End: 2017-11-10 | Stop reason: HOSPADM

## 2017-11-10 RX ORDER — MORPHINE SULFATE 2 MG/ML
2 INJECTION, SOLUTION INTRAMUSCULAR; INTRAVENOUS
Status: DISCONTINUED | OUTPATIENT
Start: 2017-11-10 | End: 2017-11-11 | Stop reason: HOSPADM

## 2017-11-10 RX ORDER — MORPHINE SULFATE 4 MG/ML
4 INJECTION, SOLUTION INTRAMUSCULAR; INTRAVENOUS
Status: DISCONTINUED | OUTPATIENT
Start: 2017-11-10 | End: 2017-11-11 | Stop reason: HOSPADM

## 2017-11-10 RX ORDER — PROPOFOL 10 MG/ML
INJECTION, EMULSION INTRAVENOUS PRN
Status: DISCONTINUED | OUTPATIENT
Start: 2017-11-10 | End: 2017-11-10 | Stop reason: SDUPTHER

## 2017-11-10 RX ORDER — BUPIVACAINE HYDROCHLORIDE 5 MG/ML
INJECTION, SOLUTION EPIDURAL; INTRACAUDAL PRN
Status: DISCONTINUED | OUTPATIENT
Start: 2017-11-10 | End: 2017-11-10 | Stop reason: HOSPADM

## 2017-11-10 RX ORDER — ONDANSETRON 4 MG/1
4 TABLET, FILM COATED ORAL EVERY 8 HOURS PRN
Qty: 20 TABLET | Refills: 0 | Status: SHIPPED | OUTPATIENT
Start: 2017-11-10 | End: 2018-01-30 | Stop reason: ALTCHOICE

## 2017-11-10 RX ORDER — HYDROCODONE BITARTRATE AND ACETAMINOPHEN 5; 325 MG/1; MG/1
1 TABLET ORAL EVERY 6 HOURS PRN
Qty: 40 TABLET | Refills: 0 | Status: SHIPPED | OUTPATIENT
Start: 2017-11-10 | End: 2017-11-17

## 2017-11-10 RX ORDER — MORPHINE SULFATE 2 MG/ML
2 INJECTION, SOLUTION INTRAMUSCULAR; INTRAVENOUS EVERY 5 MIN PRN
Status: DISCONTINUED | OUTPATIENT
Start: 2017-11-10 | End: 2017-11-10 | Stop reason: HOSPADM

## 2017-11-10 RX ORDER — GLYCOPYRROLATE 0.2 MG/ML
INJECTION INTRAMUSCULAR; INTRAVENOUS PRN
Status: DISCONTINUED | OUTPATIENT
Start: 2017-11-10 | End: 2017-11-10 | Stop reason: SDUPTHER

## 2017-11-10 RX ORDER — SODIUM CHLORIDE, SODIUM LACTATE, POTASSIUM CHLORIDE, CALCIUM CHLORIDE 600; 310; 30; 20 MG/100ML; MG/100ML; MG/100ML; MG/100ML
INJECTION, SOLUTION INTRAVENOUS CONTINUOUS
Status: DISCONTINUED | OUTPATIENT
Start: 2017-11-10 | End: 2017-11-10

## 2017-11-10 RX ORDER — ROCURONIUM BROMIDE 10 MG/ML
INJECTION, SOLUTION INTRAVENOUS PRN
Status: DISCONTINUED | OUTPATIENT
Start: 2017-11-10 | End: 2017-11-10 | Stop reason: SDUPTHER

## 2017-11-10 RX ORDER — SODIUM CHLORIDE 0.9 % (FLUSH) 0.9 %
10 SYRINGE (ML) INJECTION EVERY 12 HOURS SCHEDULED
Status: DISCONTINUED | OUTPATIENT
Start: 2017-11-10 | End: 2017-11-10 | Stop reason: HOSPADM

## 2017-11-10 RX ORDER — DOCUSATE SODIUM 100 MG/1
100 CAPSULE, LIQUID FILLED ORAL 2 TIMES DAILY PRN
Qty: 20 CAPSULE | Refills: 0 | Status: SHIPPED | OUTPATIENT
Start: 2017-11-10 | End: 2018-01-30

## 2017-11-10 RX ORDER — SODIUM CHLORIDE, SODIUM LACTATE, POTASSIUM CHLORIDE, CALCIUM CHLORIDE 600; 310; 30; 20 MG/100ML; MG/100ML; MG/100ML; MG/100ML
INJECTION, SOLUTION INTRAVENOUS CONTINUOUS PRN
Status: DISCONTINUED | OUTPATIENT
Start: 2017-11-10 | End: 2017-11-10 | Stop reason: SDUPTHER

## 2017-11-10 RX ORDER — DEXAMETHASONE SODIUM PHOSPHATE 10 MG/ML
INJECTION INTRAMUSCULAR; INTRAVENOUS PRN
Status: DISCONTINUED | OUTPATIENT
Start: 2017-11-10 | End: 2017-11-10 | Stop reason: SDUPTHER

## 2017-11-10 RX ORDER — ONDANSETRON 2 MG/ML
INJECTION INTRAMUSCULAR; INTRAVENOUS PRN
Status: DISCONTINUED | OUTPATIENT
Start: 2017-11-10 | End: 2017-11-10 | Stop reason: SDUPTHER

## 2017-11-10 RX ORDER — MIDAZOLAM HYDROCHLORIDE 1 MG/ML
INJECTION INTRAMUSCULAR; INTRAVENOUS PRN
Status: DISCONTINUED | OUTPATIENT
Start: 2017-11-10 | End: 2017-11-10 | Stop reason: SDUPTHER

## 2017-11-10 RX ORDER — DIPHENHYDRAMINE HYDROCHLORIDE 50 MG/ML
INJECTION INTRAMUSCULAR; INTRAVENOUS PRN
Status: DISCONTINUED | OUTPATIENT
Start: 2017-11-10 | End: 2017-11-10 | Stop reason: SDUPTHER

## 2017-11-10 RX ADMIN — ONDANSETRON 4 MG: 2 INJECTION, SOLUTION INTRAMUSCULAR; INTRAVENOUS at 19:09

## 2017-11-10 RX ADMIN — MOMETASONE FUROATE AND FORMOTEROL FUMARATE DIHYDRATE 2 PUFF: 200; 5 AEROSOL RESPIRATORY (INHALATION) at 08:18

## 2017-11-10 RX ADMIN — MORPHINE SULFATE 2 MG: 2 INJECTION, SOLUTION INTRAMUSCULAR; INTRAVENOUS at 19:30

## 2017-11-10 RX ADMIN — ATORVASTATIN CALCIUM 40 MG: 40 TABLET, FILM COATED ORAL at 21:16

## 2017-11-10 RX ADMIN — NEOSTIGMINE METHYLSULFATE 3 MG: 1 INJECTION, SOLUTION INTRAMUSCULAR; INTRAVENOUS; SUBCUTANEOUS at 19:11

## 2017-11-10 RX ADMIN — MORPHINE SULFATE 4 MG: 4 INJECTION INTRAVENOUS at 20:43

## 2017-11-10 RX ADMIN — Medication 2 G: at 23:14

## 2017-11-10 RX ADMIN — ROCURONIUM BROMIDE 50 MG: 10 INJECTION INTRAVENOUS at 17:36

## 2017-11-10 RX ADMIN — HEPARIN SODIUM 5000 UNITS: 5000 INJECTION, SOLUTION INTRAVENOUS; SUBCUTANEOUS at 21:17

## 2017-11-10 RX ADMIN — HYDROCODONE BITARTRATE AND ACETAMINOPHEN 2 TABLET: 5; 325 TABLET ORAL at 05:43

## 2017-11-10 RX ADMIN — HYDROCODONE BITARTRATE AND ACETAMINOPHEN 2 TABLET: 5; 325 TABLET ORAL at 09:50

## 2017-11-10 RX ADMIN — INDOCYANINE GREEN AND WATER 5 MG: KIT at 16:58

## 2017-11-10 RX ADMIN — PROPOFOL 150 MG: 10 INJECTION, EMULSION INTRAVENOUS at 17:36

## 2017-11-10 RX ADMIN — LEVETIRACETAM 500 MG: 500 TABLET, FILM COATED ORAL at 09:49

## 2017-11-10 RX ADMIN — MORPHINE SULFATE 2 MG: 2 INJECTION, SOLUTION INTRAMUSCULAR; INTRAVENOUS at 19:35

## 2017-11-10 RX ADMIN — SODIUM CHLORIDE, POTASSIUM CHLORIDE, SODIUM LACTATE AND CALCIUM CHLORIDE: 600; 310; 30; 20 INJECTION, SOLUTION INTRAVENOUS at 17:26

## 2017-11-10 RX ADMIN — SODIUM CHLORIDE: 9 INJECTION, SOLUTION INTRAVENOUS at 05:44

## 2017-11-10 RX ADMIN — MIDAZOLAM HYDROCHLORIDE 2 MG: 1 INJECTION, SOLUTION INTRAMUSCULAR; INTRAVENOUS at 17:31

## 2017-11-10 RX ADMIN — DIPHENHYDRAMINE HYDROCHLORIDE 12.5 MG: 50 INJECTION INTRAMUSCULAR; INTRAVENOUS at 17:43

## 2017-11-10 RX ADMIN — FENTANYL CITRATE 50 MCG: 50 INJECTION INTRAMUSCULAR; INTRAVENOUS at 17:55

## 2017-11-10 RX ADMIN — MULTIPLE VITAMINS W/ MINERALS TAB 1 TABLET: TAB at 09:49

## 2017-11-10 RX ADMIN — Medication 1000 MCG: at 09:49

## 2017-11-10 RX ADMIN — METOPROLOL TARTRATE 25 MG: 25 TABLET ORAL at 09:49

## 2017-11-10 RX ADMIN — HYDROMORPHONE HYDROCHLORIDE 1 MG: 1 INJECTION, SOLUTION INTRAMUSCULAR; INTRAVENOUS; SUBCUTANEOUS at 23:07

## 2017-11-10 RX ADMIN — LEVETIRACETAM 500 MG: 500 TABLET, FILM COATED ORAL at 21:16

## 2017-11-10 RX ADMIN — CYCLOBENZAPRINE HYDROCHLORIDE 10 MG: 10 TABLET, FILM COATED ORAL at 23:07

## 2017-11-10 RX ADMIN — GLYCOPYRROLATE 0.4 MG: 0.2 INJECTION, SOLUTION INTRAMUSCULAR; INTRAVENOUS at 19:11

## 2017-11-10 RX ADMIN — GABAPENTIN 100 MG: 100 CAPSULE ORAL at 21:16

## 2017-11-10 RX ADMIN — MORPHINE SULFATE 1 MG: 2 INJECTION, SOLUTION INTRAMUSCULAR; INTRAVENOUS at 14:59

## 2017-11-10 RX ADMIN — LIDOCAINE HYDROCHLORIDE 50 MG: 10 INJECTION, SOLUTION EPIDURAL; INFILTRATION; INTRACAUDAL; PERINEURAL at 17:36

## 2017-11-10 RX ADMIN — GABAPENTIN 100 MG: 100 CAPSULE ORAL at 09:49

## 2017-11-10 RX ADMIN — VITAMIN D, TAB 1000IU (100/BT) 1000 UNITS: 25 TAB at 09:49

## 2017-11-10 RX ADMIN — DEXAMETHASONE SODIUM PHOSPHATE 10 MG: 10 INJECTION INTRAMUSCULAR; INTRAVENOUS at 17:43

## 2017-11-10 RX ADMIN — METOPROLOL TARTRATE 25 MG: 25 TABLET ORAL at 21:16

## 2017-11-10 RX ADMIN — MORPHINE SULFATE 1 MG: 2 INJECTION, SOLUTION INTRAMUSCULAR; INTRAVENOUS at 12:12

## 2017-11-10 RX ADMIN — PAROXETINE HYDROCHLORIDE HEMIHYDRATE 30 MG: 20 TABLET, FILM COATED ORAL at 21:16

## 2017-11-10 RX ADMIN — HYDROCODONE BITARTRATE AND ACETAMINOPHEN 2 TABLET: 5; 325 TABLET ORAL at 21:17

## 2017-11-10 RX ADMIN — FENTANYL CITRATE 100 MCG: 50 INJECTION INTRAMUSCULAR; INTRAVENOUS at 17:36

## 2017-11-10 ASSESSMENT — PAIN - FUNCTIONAL ASSESSMENT: PAIN_FUNCTIONAL_ASSESSMENT: 0-10

## 2017-11-10 ASSESSMENT — PAIN DESCRIPTION - PROGRESSION
CLINICAL_PROGRESSION: NOT CHANGED
CLINICAL_PROGRESSION: GRADUALLY IMPROVING
CLINICAL_PROGRESSION: NOT CHANGED

## 2017-11-10 ASSESSMENT — PAIN SCALES - GENERAL
PAINLEVEL_OUTOF10: 8
PAINLEVEL_OUTOF10: 8
PAINLEVEL_OUTOF10: 9
PAINLEVEL_OUTOF10: 9
PAINLEVEL_OUTOF10: 7
PAINLEVEL_OUTOF10: 7
PAINLEVEL_OUTOF10: 10
PAINLEVEL_OUTOF10: 7
PAINLEVEL_OUTOF10: 10
PAINLEVEL_OUTOF10: 10
PAINLEVEL_OUTOF10: 0
PAINLEVEL_OUTOF10: 2
PAINLEVEL_OUTOF10: 6
PAINLEVEL_OUTOF10: 9
PAINLEVEL_OUTOF10: 4

## 2017-11-10 ASSESSMENT — PAIN DESCRIPTION - LOCATION
LOCATION: ABDOMEN
LOCATION: BACK

## 2017-11-10 ASSESSMENT — PAIN DESCRIPTION - ORIENTATION: ORIENTATION: LOWER

## 2017-11-10 ASSESSMENT — PAIN DESCRIPTION - DESCRIPTORS
DESCRIPTORS: DISCOMFORT
DESCRIPTORS: CRAMPING

## 2017-11-10 ASSESSMENT — PAIN DESCRIPTION - PAIN TYPE: TYPE: ACUTE PAIN

## 2017-11-10 ASSESSMENT — PAIN DESCRIPTION - ONSET
ONSET: ON-GOING
ONSET: AWAKENED FROM SLEEP

## 2017-11-10 ASSESSMENT — PAIN DESCRIPTION - FREQUENCY: FREQUENCY: INTERMITTENT

## 2017-11-10 NOTE — PROGRESS NOTES
Tonsillectomy; and back surgery. HOME MEDICATIONS        Prior to Admission medications    Medication Sig Start Date End Date Taking? Authorizing Provider   Cholecalciferol (VITAMIN D PO) Take by mouth   Yes Historical Provider, MD   fluticasone-vilanterol (BREO ELLIPTA) 100-25 MCG/INH AEPB inhaler Inhale into the lungs 2 times daily   Yes Historical Provider, MD   PARoxetine (PAXIL) 30 MG tablet Take 30 mg by mouth every morning   Yes Historical Provider, MD   cyclobenzaprine (FLEXERIL) 10 MG tablet Take 10 mg by mouth 3 times daily as needed    Yes Historical Provider, MD   levETIRAcetam (KEPPRA) 500 MG tablet Take 500 mg by mouth 2 times daily   Yes Historical Provider, MD   metoprolol tartrate (LOPRESSOR) 25 MG tablet TAKE ONE-HALF TABLET BY MOUTH TWICE A DAY 11/7/17  Yes Delilah Benz MD   VENTOLIN  (90 BASE) MCG/ACT inhaler INHALE TWO PUFFS BY MOUTH EVERY 6 HOURS AS NEEDED 8/11/16  Yes Derek Argueta MD   gabapentin (NEURONTIN) 100 MG capsule Take 1 capsule by mouth daily. Patient taking differently:   Take 100 mg by mouth 2 times daily  12/26/14  Yes Fernando Amin MD   Multiple Vitamins-Minerals (THERAPEUTIC MULTIVITAMIN-MINERALS) tablet Take 1 tablet by mouth daily. Yes Historical Provider, MD   vitamin B-12 (CYANOCOBALAMIN) 1000 MCG tablet Take 1,000 mcg by mouth daily. Yes Historical Provider, MD   acetaminophen (APAP EXTRA STRENGTH) 500 MG tablet Take 2 tablets by mouth every 6 hours as needed for Pain. 3/15/13  Yes Ryan Barbour MD   aspirin EC 81 MG EC tablet Take 1 tablet by mouth daily. 3/15/13  Yes Ryan Barbour MD   Heating Pads (HEAT PAD MOIST/DRY MASSAGING) PADS 1 Device by Does not apply route every 6 hours as needed (for muscle spasm/pain, use for 20 min at a time max) 2/11/17   Jose Leonardo DO       ALLERGIES      Sulfa antibiotics    SOCIAL HISTORY       reports that she has been smoking Cigarettes. She has been smoking about 1.00 pack per day.  She does equal, round and reactive to light. · Lungs: clear to auscultation bilaterally  · Heart: regular rate and rhythm, S1, S2 normal, no murmur, click, rub or gallop  · Abdomen: soft, non-tender; bowel sounds normal; no masses,  no organomegaly  · Extremities: extremities normal, atraumatic, no cyanosis or edema  · Neurological: Gait normal. Reflexes normal and symmetric. Sensation grossly normal  · Skin - no rash, no lump   · Psych-normal affect   · Lymphatic system-no lymphadenopathy no splenomegaly     DIAGNOSTICS      Laboratory Testing:  CBC:   Recent Labs      11/10/17   0526   WBC  8.6   HGB  12.5   PLT  272     BMP:    Recent Labs      11/08/17   0645  11/09/17   0433  11/10/17   0526   NA  143  137  139   K  3.5*  3.9  4.1   CL  103  102  104   CO2  26  25  23   BUN  9  7  11   CREATININE  0.60  0.48*  0.42*   GLUCOSE  90  84  101*     S. Calcium:  Recent Labs      11/10/17   0526   CALCIUM  8.8     S. Ionized Calcium:No results for input(s): IONCA in the last 72 hours. S. Magnesium:No results for input(s): MG in the last 72 hours. S. Phosphorus:No results for input(s): PHOS in the last 72 hours. S. Glucose:No results for input(s): POCGLU in the last 72 hours. Glycosylated hemoglobin A1C: No results for input(s): LABA1C in the last 72 hours. INR: No results for input(s): INR in the last 72 hours. Hepatic functions:   Recent Labs      11/10/17   0526   ALKPHOS  81   ALT  14   AST  16   PROT  6.1*   BILITOT  0.15*   BILIDIR  <0.08   LABALBU  3.7     Pancreatic functions:No results for input(s): LACTA, AMYLASE in the last 72 hours. S. Lactic Acid: No results for input(s): LACTA in the last 72 hours. Cardiac enzymes:  Recent Labs      11/08/17   0647  11/08/17   0703  11/08/17   0835   TROPONINI  0.01  0.00  0.01     BNP:No results for input(s): BNP in the last 72 hours.   Lipid profile:   Recent Labs      11/08/17   1620   CHOL  265*   TRIG  115   HDL  44     Blood Gases: No results found for: PH, PCO2, PO2, HCO3, O2SAT  Thyroid functions:   Lab Results   Component Value Date    TSH 0.43 08/02/2013        Imaging/Diagonstics:      CXR 11/8/2017:  Impression   No focal consolidation.  No appreciable change.      CTA chest 11/8/2017:  Impression   1. Marked distention of the visualized portions of the gallbladder.  Further   evaluation with prompt gallbladder ultrasound recommended. 2. No clear evidence for pulmonary embolus. 3. Coronary artery disease.  Atherosclerotic calcification of the aorta. 4. 3 mm pulmonary nodules in the left upper lobe and right major fissure,   stable dating back to 09/21/2010 09/21/2012, likely benign given long-term   stability.  Fleischner society follow-up guidelines provided below. 5. Mild dependent atelectasis throughout both lungs.       RECOMMENDATIONS:   Fleischner Society guidelines for follow-up and management of incidentally   detected pulmonary nodules:      US RUQ 11/8/2017:  Impression   1. Gallbladder sludge, mild gallbladder wall thickening, with gallbladder   distension.  If there is any clinical concern regarding acute cholecystitis,   a nuclear medicine hepatobiliary scan would be a more sensitive exam.   2. No definite gallstones identified. 3. Common bile duct normal in caliber measuring up to 5 mm.      TTE 2/14/13: EF 55-60%.      CATH 2/14/13: Min CAD. EF 60%.        ASSESSMENT     Patient Active Problem List   Diagnosis    Heart disease    Chronic back pain    Depression    Hyperlipidemia    CAD, multiple vessel    Prediabetes    Asthma    Smoking    Need for vaccination    Syncope    Leg pain    Left hip pain    Chronic cholecystitis    Chest pain       PLAN      Chronic cholecystitis   Cardiology cleared pt  Robotic assisted laparoscopic cholecystectomy, possible open today   NPO   IVF    Atypical chest pain  Cardiology consulted   For now will continue BB, ASA and start statin  Lipid panel shows elevated cholesterol and LDL  EKG no acute changes in ED  Trops neg x 2     COPD, stable  Continue bronchodialtors  Albuterol PRN  Supplemental O2 as needed  Smoking cessation  Nicotine patch     Seizure history  Continue keppra home dose     Depression & Anxiety  Continue home medications: paxil 30mg nightly      DVT prophylaxis with lovenox daily, hold today for surgery     DC planning--PT/OT/SW      Ernesto Pelayo MD, PGY-1  Internal Medicine  [address]  11/10/17  7:58 AM    I have discussed the care of Mendy Marsh , including pertinent history and exam findings,    today with the resident. I have seen and examined the patient and the key elements of all parts of the encounter have been performed by me . I agree with the assessment, plan and orders as documented by the resident.           Electronically signed by Shabbir Harmon MD

## 2017-11-10 NOTE — PROGRESS NOTES
Surgery Progress Note            PATIENT NAME: Addie Zuñiga     TODAY'S DATE: 11/10/2017, 6:20 AM      SUBJECTIVE:    Pt seen and examined at bedside. Denies any abdominal pain. Tolerated CLD yesterday. NPO since MN. Denies N/V/F/C. No other complaints noted. OBJECTIVE:   VITALS:  BP (!) 155/88   Pulse 68   Temp 98.6 °F (37 °C) (Oral)   Resp 16   Ht 5' 7\" (1.702 m)   Wt 160 lb (72.6 kg)   SpO2 98%   BMI 25.06 kg/m²      INTAKE/OUTPUT:      Intake/Output Summary (Last 24 hours) at 11/10/17 0620  Last data filed at 11/10/17 0546   Gross per 24 hour   Intake          3911.91 ml   Output              850 ml   Net          3061.91 ml                     CONSTITUTIONAL:  NAD, A&O x 3  LUNGS:  CTA b/l  CARDIOVASCULAR:  S1S2  ABDOMEN: Soft, nondistended, No TTP, no rebound tenderness      Data:  CBC:   Lab Results   Component Value Date    WBC 8.6 11/10/2017    RBC 4.40 11/10/2017    HGB 12.5 11/10/2017    HCT 39.4 11/10/2017    MCV 89.5 11/10/2017    MCH 28.4 11/10/2017    MCHC 31.7 11/10/2017    RDW 15.8 11/10/2017     11/10/2017    MPV 8.4 11/10/2017     BMP:    Lab Results   Component Value Date     11/10/2017    K 4.1 11/10/2017     11/10/2017    CO2 23 11/10/2017    BUN 11 11/10/2017    LABALBU 3.7 11/10/2017    CREATININE 0.42 11/10/2017    CALCIUM 8.8 11/10/2017    GFRAA >60 11/10/2017    LABGLOM >60 11/10/2017    GLUCOSE 101 11/10/2017     Hepatic Function Panel:    Lab Results   Component Value Date    ALKPHOS 81 11/10/2017    ALT 14 11/10/2017    AST 16 11/10/2017    PROT 6.1 11/10/2017    BILITOT 0.15 11/10/2017    BILIDIR <0.08 11/10/2017    IBILI CANNOT BE CALCULATED 11/10/2017    LABALBU 3.7 11/10/2017       Radiology Review:    HIDA scan:  Delayed radiotracer uptake within gallbladder 15 minutes following the  administration of morphine.  Primary differential consideration includes  chronic cholecystitis.       ASSESSMENT   Patient Active Problem List   Diagnosis    Heart disease    Chronic back pain    Depression    Hyperlipidemia    CAD, multiple vessel    Prediabetes    Asthma    Smoking    Need for vaccination    Syncope    Leg pain    Left hip pain    Chronic cholecystitis    Chest pain       1. 46 y.o. Female with epigastric pain. HIDA scan suggestive of chronic cholecystitis      Plan  1. NPO.  2. IVF  3. Ok for heparin TID for DVT prophylaxis. 4. Will proceed with robotic assisted laparoscopic cholecystectomy, possible open. This procedure, including risks, benefits, alternatives and complications have been fully reviewed with patient and informed consent has been obtained. All questions were answered appropriately.     Electronically signed by Lyn Hoskins DO  on 11/10/2017 at 6:20 AM

## 2017-11-10 NOTE — PROGRESS NOTES
thin secretions []  Moderate amount of viscous secretions []  Copius, Viscious Yellow/ Secretions   CXR as reported by physician []  clear  []  Unavailable []  Infiltrates and/or consolidation  []  Unavailable []  Mucus Plugging and or lobar consolidation  []  Unavailable   Cough []  Strong, productive cough []  Weak productive cough []  No cough or weak non-productive cough   Alexander Baron  10:29 AM                            FEMALE                                  MALE                            FEV1 Predicted Normal Values                        FEV1 Predicted Normal Values          Age                                     Height in Feet and Inches       Age                                     Height in Feet and Inches       4' 11\" 5' 1\" 5' 3\" 5' 5\" 5' 7\" 5' 9\" 5' 11\" 6' 1\"  4' 11\" 5' 1\" 5' 3\" 5' 5\" 5' 7\" 5' 9\" 5' 11\" 6' 1\"   42 - 45 2.49 2.66 2.84 3.03 3.22 3.42 3.62 3.83 42 - 45 2.82 3.03 3.26 3.49 3.72 3.96 4.22 4.47   46 - 49 2.40 2.57 2.76 2.94 3.14 3.33 3.54 3.75 46 - 49 2.70 2.92 3.14 3.37 3.61 3.85 4.10 4.36   50 - 53 2.31 2.48 2.66 2.85 3.04 3.24 3.45 3.66 50 - 53 2.58 2.80 3.02 3.25 3.49 3.73 3.98 4.24   54 - 57 2.21 2.38 2.57 2.75 2.95 3.14 3.35 3.56 54 - 57 2.46 2.67 2.89 3.12 3.36 3.60 3.85 4.11   58 - 61 2.10 2.28 2.46 2.65 2.84 3.04 3.24 3.45 58 - 61 2.32 2.54 2.76 2.99 3.23 3.47 3.72 3.98   62 - 65 1.99 2.17 2.35 2.54 2.73 2.93 3.13 3.34 62 - 65 2.19 2.40 2.62 2.85 3.09 3.33 3.58 3.84   66 - 69 1.88 2.05 2.23 2.42 2.61 2.81 3.02 3.23 66 - 69 2.04 2.26 2.48 2.71 2.95 3.19 3.44 3.70   70+ 1.82 1.99 2.17 2.36 2.55 2.75 2.95 3.16 70+ 1.97 2.19 2.41 2.64 2.87 3.12 3.37 3.62             Predicted Peak Expiratory Flow Rate                                       Height (in)  Female       Height (in) Male           Age 64 62 64 63 57 71 78 74 Age            10 271 850 666 509 016 660 206 202  63 25 45 46 09 70 72 74 76   25 337 352 366 381 396 411 426 441 25 447 476 505 533 562 591 619 647 677   30 [phone] [phone]   50 337 885 317 579 765 228 666 864 96 951 915 051 174 493 409 082 549 214   70 078 555 329 646 060 322 550 616 33 618 412 449 770 324 552 281 184 086   25 056 805 821 925 832 406 470 608 63 581 378 211 744 388 216 185 954 972   65 277 292 306 321 336 351 366 381 65 363 392 421 449 478 507 535 564 594   70 269 284 299 314 329 344 359 374 70 353 382 410 439 468 496 525 554 583   75 261 274 289 305 319 334 348 364 75 344 372 400 429 458 487 515 544 573   80 253 266 282 296 312 327 342 356 80 330 029 707 217 697 638 356 078 905

## 2017-11-11 VITALS
WEIGHT: 160 LBS | BODY MASS INDEX: 25.11 KG/M2 | HEART RATE: 86 BPM | HEIGHT: 67 IN | SYSTOLIC BLOOD PRESSURE: 136 MMHG | RESPIRATION RATE: 18 BRPM | TEMPERATURE: 98.9 F | OXYGEN SATURATION: 96 % | DIASTOLIC BLOOD PRESSURE: 78 MMHG

## 2017-11-11 LAB
ABSOLUTE EOS #: <0.03 K/UL (ref 0–0.44)
ABSOLUTE IMMATURE GRANULOCYTE: 0.04 K/UL (ref 0–0.3)
ABSOLUTE LYMPH #: 1.16 K/UL (ref 1.1–3.7)
ABSOLUTE MONO #: 0.15 K/UL (ref 0.1–1.2)
ALBUMIN SERPL-MCNC: 4 G/DL (ref 3.5–5.2)
ALBUMIN/GLOBULIN RATIO: 1.5 (ref 1–2.5)
ALP BLD-CCNC: 86 U/L (ref 35–104)
ALT SERPL-CCNC: 34 U/L (ref 5–33)
ANION GAP SERPL CALCULATED.3IONS-SCNC: 11 MMOL/L (ref 9–17)
AST SERPL-CCNC: 48 U/L
BASOPHILS # BLD: 0 % (ref 0–2)
BASOPHILS ABSOLUTE: <0.03 K/UL (ref 0–0.2)
BILIRUB SERPL-MCNC: 0.26 MG/DL (ref 0.3–1.2)
BILIRUBIN DIRECT: 0.11 MG/DL
BILIRUBIN, INDIRECT: 0.15 MG/DL (ref 0–1)
BUN BLDV-MCNC: 11 MG/DL (ref 6–20)
BUN/CREAT BLD: ABNORMAL (ref 9–20)
CALCIUM SERPL-MCNC: 9.2 MG/DL (ref 8.6–10.4)
CHLORIDE BLD-SCNC: 99 MMOL/L (ref 98–107)
CO2: 27 MMOL/L (ref 20–31)
CREAT SERPL-MCNC: 0.51 MG/DL (ref 0.5–0.9)
DIFFERENTIAL TYPE: ABNORMAL
EOSINOPHILS RELATIVE PERCENT: 0 % (ref 1–4)
GFR AFRICAN AMERICAN: >60 ML/MIN
GFR NON-AFRICAN AMERICAN: >60 ML/MIN
GFR SERPL CREATININE-BSD FRML MDRD: ABNORMAL ML/MIN/{1.73_M2}
GFR SERPL CREATININE-BSD FRML MDRD: ABNORMAL ML/MIN/{1.73_M2}
GLOBULIN: ABNORMAL G/DL (ref 1.5–3.8)
GLUCOSE BLD-MCNC: 136 MG/DL (ref 70–99)
HCT VFR BLD CALC: 40.9 % (ref 36.3–47.1)
HEMOGLOBIN: 13 G/DL (ref 11.9–15.1)
IMMATURE GRANULOCYTES: 0 %
LYMPHOCYTES # BLD: 12 % (ref 24–43)
MCH RBC QN AUTO: 28.7 PG (ref 25.2–33.5)
MCHC RBC AUTO-ENTMCNC: 31.8 G/DL (ref 28.4–34.8)
MCV RBC AUTO: 90.3 FL (ref 82.6–102.9)
MONOCYTES # BLD: 2 % (ref 3–12)
PDW BLD-RTO: 15.9 % (ref 11.8–14.4)
PLATELET # BLD: 269 K/UL (ref 138–453)
PLATELET ESTIMATE: ABNORMAL
PMV BLD AUTO: 8.8 FL (ref 8.1–13.5)
POTASSIUM SERPL-SCNC: 4.5 MMOL/L (ref 3.7–5.3)
RBC # BLD: 4.53 M/UL (ref 3.95–5.11)
RBC # BLD: ABNORMAL 10*6/UL
SEG NEUTROPHILS: 86 % (ref 36–65)
SEGMENTED NEUTROPHILS ABSOLUTE COUNT: 8.05 K/UL (ref 1.5–8.1)
SODIUM BLD-SCNC: 137 MMOL/L (ref 135–144)
TOTAL PROTEIN: 6.7 G/DL (ref 6.4–8.3)
WBC # BLD: 9.4 K/UL (ref 3.5–11.3)
WBC # BLD: ABNORMAL 10*3/UL

## 2017-11-11 PROCEDURE — 80076 HEPATIC FUNCTION PANEL: CPT

## 2017-11-11 PROCEDURE — 6360000002 HC RX W HCPCS: Performed by: SURGERY

## 2017-11-11 PROCEDURE — 6370000000 HC RX 637 (ALT 250 FOR IP): Performed by: INTERNAL MEDICINE

## 2017-11-11 PROCEDURE — 94640 AIRWAY INHALATION TREATMENT: CPT

## 2017-11-11 PROCEDURE — 6370000000 HC RX 637 (ALT 250 FOR IP): Performed by: SURGERY

## 2017-11-11 PROCEDURE — 99238 HOSP IP/OBS DSCHRG MGMT 30/<: CPT | Performed by: INTERNAL MEDICINE

## 2017-11-11 PROCEDURE — 6370000000 HC RX 637 (ALT 250 FOR IP): Performed by: EMERGENCY MEDICINE

## 2017-11-11 PROCEDURE — 36415 COLL VENOUS BLD VENIPUNCTURE: CPT

## 2017-11-11 PROCEDURE — 80048 BASIC METABOLIC PNL TOTAL CA: CPT

## 2017-11-11 PROCEDURE — 85025 COMPLETE CBC W/AUTO DIFF WBC: CPT

## 2017-11-11 PROCEDURE — 94762 N-INVAS EAR/PLS OXIMTRY CONT: CPT

## 2017-11-11 RX ORDER — ATORVASTATIN CALCIUM 40 MG/1
40 TABLET, FILM COATED ORAL NIGHTLY
Qty: 30 TABLET | Refills: 3 | Status: SHIPPED | OUTPATIENT
Start: 2017-11-11 | End: 2018-01-30

## 2017-11-11 RX ADMIN — PANTOPRAZOLE SODIUM 40 MG: 40 TABLET, DELAYED RELEASE ORAL at 05:47

## 2017-11-11 RX ADMIN — HYDROCODONE BITARTRATE AND ACETAMINOPHEN 2 TABLET: 5; 325 TABLET ORAL at 05:47

## 2017-11-11 RX ADMIN — GABAPENTIN 100 MG: 100 CAPSULE ORAL at 09:57

## 2017-11-11 RX ADMIN — VITAMIN D, TAB 1000IU (100/BT) 1000 UNITS: 25 TAB at 09:57

## 2017-11-11 RX ADMIN — LEVETIRACETAM 500 MG: 500 TABLET, FILM COATED ORAL at 09:57

## 2017-11-11 RX ADMIN — MORPHINE SULFATE 4 MG: 4 INJECTION INTRAVENOUS at 02:40

## 2017-11-11 RX ADMIN — Medication 1000 MCG: at 09:57

## 2017-11-11 RX ADMIN — MORPHINE SULFATE 4 MG: 4 INJECTION INTRAVENOUS at 04:39

## 2017-11-11 RX ADMIN — MULTIPLE VITAMINS W/ MINERALS TAB 1 TABLET: TAB at 09:57

## 2017-11-11 RX ADMIN — ASPIRIN 81 MG: 81 TABLET, COATED ORAL at 09:57

## 2017-11-11 RX ADMIN — HYDROCODONE BITARTRATE AND ACETAMINOPHEN 2 TABLET: 5; 325 TABLET ORAL at 09:57

## 2017-11-11 RX ADMIN — MOMETASONE FUROATE AND FORMOTEROL FUMARATE DIHYDRATE 2 PUFF: 200; 5 AEROSOL RESPIRATORY (INHALATION) at 10:26

## 2017-11-11 RX ADMIN — HEPARIN SODIUM 5000 UNITS: 5000 INJECTION, SOLUTION INTRAVENOUS; SUBCUTANEOUS at 05:47

## 2017-11-11 RX ADMIN — METOPROLOL TARTRATE 25 MG: 25 TABLET ORAL at 09:57

## 2017-11-11 RX ADMIN — HYDROCODONE BITARTRATE AND ACETAMINOPHEN 2 TABLET: 5; 325 TABLET ORAL at 01:30

## 2017-11-11 RX ADMIN — Medication 2 G: at 09:11

## 2017-11-11 ASSESSMENT — PAIN SCALES - GENERAL
PAINLEVEL_OUTOF10: 7
PAINLEVEL_OUTOF10: 4
PAINLEVEL_OUTOF10: 7
PAINLEVEL_OUTOF10: 5
PAINLEVEL_OUTOF10: 4
PAINLEVEL_OUTOF10: 7

## 2017-11-11 NOTE — PROGRESS NOTES
[address]    PROGRESS NOTE            Date:   11/11/2017  Patient name:  Mario Infante  Date of admission:  11/8/2017  6:19 AM  MRN:   [id number]  YOB: 1965    CHIEF COMPLAINT     History Obtained From:  Patient and chart review. HISTORY OF PRESENT ILLNESS      46 y.o. with PMH of reported CAD with previous cath, but no stenting, anxiety, depression, COPD. The first episode was 2 nights ago when she noticed a sudden pain in her RUQ that radiated to her chest, mid-sternal region, non-radiating, lasted half an hour and was associated with diaphoresis. There were no relieving or exacerbating factors. Resolved spontaneously in half an hour and patient went back to sleep. She felt okay the next day, she denies association with meals. She was able to tolerate regular diet yesterday. Upon waking this AM she had strong, mid-sternal chest pain, again non radiating and cramping in nature. She claims it was again associated with diaphoresis.     CTA chest in ED was negative for PE but showed both marked distension of the gallbladder and CAD with atherosclerotic calcification of the aorta. US GB further showed Gallbladder sludge, mild gallbladder wall thickening, with gallbladder     She currently smokes 1PPD, denies alcohol or recreational drug use. INTERVAL HISTORY  -patient seen and examined at bedside  -POD 1  -no acute events overnight  -patient has no new complaints this morning, slight abdominal tenderness near incisions  -vitals stable  -as per surgery, advance diet as tolerated  -OK to DC today if ok with surgery     PAST MEDICAL HISTORY       has a past medical history of Acute MI; Anxiety; Chronic back pain; COPD (chronic obstructive pulmonary disease) ([address] Depression; Heart disease; Hyperlipidemia; Hypertension; and MRSA (methicillin resistant staph aureus) culture positive.     PAST SURGICAL HISTORY       has a every 6 hours as needed for Pain. 3/15/13  Yes Carlota Ang MD   aspirin EC 81 MG EC tablet Take 1 tablet by mouth daily. 3/15/13  Yes Carlota Ang MD   Heating Pads (HEAT PAD MOIST/DRY MASSAGING) PADS 1 Device by Does not apply route every 6 hours as needed (for muscle spasm/pain, use for 20 min at a time max) 2/11/17   Jewel Members, DO       ALLERGIES      Sulfa antibiotics    SOCIAL HISTORY       reports that she has been smoking Cigarettes. She has been smoking about 1.00 pack per day. She does not have any smokeless tobacco history on file. She reports that she uses drugs, including Marijuana. She reports that she does not drink alcohol. FAMILY HISTORY      family history includes Heart Disease in her father; High Blood Pressure in her father; High Cholesterol in her father; Other in her mother. REVIEW OF SYSTEMS      · Constitutional: Negative for weight loss  · Eyes: Negative for visual changes, diplopia, scleral icterus. · ENT: Negative for Headaches, hearing loss, vertigo, mouth sores, sore throat. · Cardiovascular: Negative for lightheadedness/orthostatic symptoms ,chest pain, dyspnea on exertion, palpitations or loss of consciousness. · Respiratory: Negative for cough or wheezing, sputum production, hemoptysis, pleuritic pain. · Gastrointestinal: Negative for nausea/vomiting, change in bowel habits, abdominal pain, dysphagia/appetite loss, hematemesis, blood in stools. · Genitourinary:Negative for change in bladder habits, dysuria, trouble voiding, hematuria. · Musculoskeletal: Negative for gait disturbance, weakness, joint complaints. · Integumentary: Negative for rash, pruritis. · Neurological: Negative for headache, dizziness, change in muscle strength, numbness/tingling, change in gait, balance, coordination,   · Psychiatric: negative for change in mood, affect, memory, mentation, behavior.   · Endocrine: negative for temperature intolerance, excessive thirst, fluid intake, or urination, tremor. · Hematologic/Lymphatic: negative for abnormal bruising or bleeding, blood clots, swollen lymph nodes. · Allergic/Immunologic: negative for nasal congestion, pruritis, hives. PHYSICAL EXAM      /89   Pulse 81   Temp 97.8 °F (36.6 °C) (Oral)   Resp 13   Ht 5' 7\" (1.702 m)   Wt 160 lb (72.6 kg)   SpO2 97%   BMI 25.06 kg/m²      · General appearance: well nourished  · HEENT: Head: Normocephalic, no lesions, without obvious abnormality. Eye: no icterus, no redness, pupils equal, round and reactive to light. · Lungs: clear to auscultation bilaterally  · Heart: regular rate and rhythm, S1, S2 normal, no murmur, click, rub or gallop  · Abdomen: soft, non-tender; bowel sounds normal; no masses,  no organomegaly  · Extremities: extremities normal, atraumatic, no cyanosis or edema  · Neurological: Gait normal. Reflexes normal and symmetric. Sensation grossly normal  · Skin - no rash, no lump   · Psych-normal affect   · Lymphatic system-no lymphadenopathy no splenomegaly     DIAGNOSTICS      Laboratory Testing:  CBC:   Recent Labs      11/11/17 0441   WBC  9.4   HGB  13.0   PLT  269     BMP:    Recent Labs      11/09/17   0433  11/10/17   0526  11/11/17 0441   NA  137  139  137   K  3.9  4.1  4.5   CL  102  104  99   CO2  25  23  27   BUN  7  11  11   CREATININE  0.48*  0.42*  0.51   GLUCOSE  84  101*  136*     S. Calcium:  Recent Labs      11/11/17 0441   CALCIUM  9.2     S. Ionized Calcium:No results for input(s): IONCA in the last 72 hours. S. Magnesium:No results for input(s): MG in the last 72 hours. S. Phosphorus:No results for input(s): PHOS in the last 72 hours. S. Glucose:No results for input(s): POCGLU in the last 72 hours. Glycosylated hemoglobin A1C: No results for input(s): LABA1C in the last 72 hours. INR: No results for input(s): INR in the last 72 hours.   Hepatic functions:   Recent Labs      11/11/17 0441   ALKPHOS  86   ALT  34*   AST  48*   PROT  6.7 BILITOT  0.26*   BILIDIR  0.11   LABALBU  4.0     Pancreatic functions:No results for input(s): LACTA, AMYLASE in the last 72 hours. S. Lactic Acid: No results for input(s): LACTA in the last 72 hours. Cardiac enzymes:  Recent Labs      11/08/17   0647  11/08/17   0703  11/08/17   0835   TROPONINI  0.01  0.00  0.01     BNP:No results for input(s): BNP in the last 72 hours. Lipid profile:   Recent Labs      11/08/17   1620   CHOL  265*   TRIG  115   HDL  44     Blood Gases: No results found for: PH, PCO2, PO2, HCO3, O2SAT  Thyroid functions:   Lab Results   Component Value Date    TSH 0.43 08/02/2013        Imaging/Diagonstics:      CXR 11/8/2017:  Impression   No focal consolidation.  No appreciable change.      CTA chest 11/8/2017:  Impression   1. Marked distention of the visualized portions of the gallbladder.  Further   evaluation with prompt gallbladder ultrasound recommended. 2. No clear evidence for pulmonary embolus. 3. Coronary artery disease.  Atherosclerotic calcification of the aorta. 4. 3 mm pulmonary nodules in the left upper lobe and right major fissure,   stable dating back to 09/21/2010 09/21/2012, likely benign given long-term   stability.  Fleischner society follow-up guidelines provided below. 5. Mild dependent atelectasis throughout both lungs.       RECOMMENDATIONS:   Fleischner Society guidelines for follow-up and management of incidentally   detected pulmonary nodules:      US RUQ 11/8/2017:  Impression   1. Gallbladder sludge, mild gallbladder wall thickening, with gallbladder   distension.  If there is any clinical concern regarding acute cholecystitis,   a nuclear medicine hepatobiliary scan would be a more sensitive exam.   2. No definite gallstones identified. 3. Common bile duct normal in caliber measuring up to 5 mm.      TTE 2/14/13: EF 55-60%.      CATH 2/14/13: Min CAD. EF 60%.        ASSESSMENT     Principal Problem:    Chronic cholecystitis  Active Problems:

## 2017-11-11 NOTE — PLAN OF CARE
Problem: Pain:  Goal: Pain level will decrease  Pain level will decrease to a 1 on a scale of 0-10 per pt. goal   Outcome: Ongoing  Pain level assessment complete. Pt educated on pain scale and control interventions. PRN pain medication given per pt request. Pt VU to call out c new onset of pain or unrelieved pain. Will continue to monitor.

## 2017-11-11 NOTE — BRIEF OP NOTE
Brief Postoperative Note  ______________________________________________________________    Patient: Neftali Tina  YOB: 1965  MRN: [id number]  Date of Procedure: 11/10/2017    Pre-Op Diagnosis: CHOLECYSTITIS     Post-Op Diagnosis: Same, acute on chronic cholecystitis       Procedure(s):  XI ROBOTIC CHOLECYSTECTOMY LAPAROSCOPIC    Anesthesia: General    Surgeon(s):  Tamy Can DO     Assistant:  Shilo Nuñez, PGY-4    Staff:  [employer]: Kadi Sparks     Estimated Blood Loss: 25 (units unknown)    Complications: None    Specimens:   ID Type Source Tests Collected by Time Destination   A : GALLBLADDER Tissue Gallbladder SURGICAL PATHOLOGY Tamy Can DO 11/10/2017 1800        Implants:  * No implants in log *      Drains:  MANUEL drain    Findings: same as post op    Shilo Nuñez DO  Date: 11/10/2017  Time: 7:19 PM

## 2017-11-11 NOTE — PLAN OF CARE
Problem: Pain:  Goal: Pain level will decrease  Pain level will decrease to a 1 on a scale of 0-10 per pt. goal   Outcome: Ongoing    Goal: Control of acute pain  Control of acute pain   Outcome: Ongoing    Goal: Control of chronic pain  Control of chronic pain   Outcome: Ongoing

## 2017-11-11 NOTE — ANESTHESIA POSTPROCEDURE EVALUATION
Department of Anesthesiology  Postprocedure Note    Patient: Carmelita Paula  MRN: [id number]  [place]: 1965  Date of evaluation: 11/10/2017  Time:  7:52 PM     Procedure Summary     Date:  11/10/17 Room / Location:  [address] OR    Anesthesia Start:  1726 Anesthesia Stop:  1931    Procedure:  XI ROBOTIC CHOLECYSTECTOMY LAPAROSCOPIC (N/A ) Diagnosis:  (CHOLECYSTITIS )    Surgeon:  Myrna Steward DO Responsible Provider:  Chelsey Crenshaw MD    Anesthesia Type:  general ASA Status:  3          Anesthesia Type: general    [place] Phase I: [place] Score: 8    [place] Phase II:      Last vitals: Reviewed and per EMR flowsheets.        Anesthesia Post Evaluation    Patient location during evaluation: PACU  Patient participation: complete - patient participated  Level of consciousness: awake and alert  Pain score: 2  Airway patency: patent  Nausea & Vomiting: no nausea and no vomiting  Complications: no  Cardiovascular status: hemodynamically stable  Respiratory status: acceptable  Hydration status: euvolemic

## 2017-11-13 NOTE — DISCHARGE SUMMARY
[facility].    DISCHARGE SUMMARY     Patient ID: Yvette Rock  :  1965   MRN: [id number]     ACCOUNT:  [de-identified]   Patient's PCP: No primary care provider on file. Admit Date: 2017   Discharge Date: 2017  Length of Stay: 3  Code Status:  Prior  Admitting Physician: Garth Ornelas MD  Discharge Physician: Errol Rangel MD     Active Discharge Diagnoses:     Primary Problem  Chronic cholecystitis      Hospital Problems  Active Hospital Problems    Diagnosis Date Noted    Calculus of gallbladder without cholecystitis without obstruction [K80.20]     Chronic cholecystitis [K81.1] 2017    Chest pain [R07.9] 2017    Smoking [F17.200] 10/22/2013    CAD, multiple vessel [I25.10] 03/15/2013    Depression [F32.9] 03/15/2013    Chronic back pain [M54.9, G89.29]        Admission Condition:  fair     Discharged Condition: fair    Hospital Stay:     Hospital Course:    46 y. o. with PMH of reported CAD with previous cath, without stenting, anxiety, depression, COPD. The first episode was 2 nights ago when she noticed a sudden pain in her RUQ that radiated to her chest, mid-sternal region, non-radiating, lasted half an hour and was associated with diaphoresis. There were no relieving or exacerbating factors. Resolved spontaneously in half an hour and patient went back to sleep. She felt okay the next day, she denies association with meals. She was able to tolerate regular diet yesterday. Upon waking this AM she had strong, mid-sternal chest pain, non radiating and cramping in nature. She claims it was again associated with diaphoresis.     CTA chest in ED was negative for PE but showed both marked distension of the gallbladder and CAD with atherosclerotic calcification of the aorta. US GB further showed Gallbladder sludge, mild gallbladder wall thickening, with gallbladder.  She currently smokes 1PPD, denies alcohol or recreational drug use. During the course of her stay, patient had elective cholecystectomy and was tolerating diet post op day 1 and was discharged.      Significant therapeutic interventions:   Cholecystectomy     Significant Diagnostic Studies:   Labs / Micro:  Recent Results (from the past 168 hour(s))   EKG 12 Lead    Collection Time: 11/08/17  6:22 AM   Result Value Ref Range    Ventricular Rate 77 BPM    Atrial Rate 77 BPM    P-R Interval 132 ms    QRS Duration 90 ms    Q-T Interval 376 ms    QTc Calculation (Bazett) 425 ms    P Axis 41 degrees    R Axis 38 degrees    T Axis 33 degrees   CBC Auto Differential    Collection Time: 11/08/17  6:45 AM   Result Value Ref Range    WBC 8.0 3.5 - 11.3 k/uL    RBC 4.67 3.95 - 5.11 m/uL    Hemoglobin 13.4 11.9 - 15.1 g/dL    Hematocrit 42.1 36.3 - 47.1 %    MCV 90.1 82.6 - 102.9 fL    MCH 28.7 25.2 - 33.5 pg    MCHC 31.8 28.4 - 34.8 g/dL    RDW 15.9 (H) 11.8 - 14.4 %    Platelets 150 309 - 414 k/uL    MPV 8.8 8.1 - 13.5 fL    Differential Type NOT REPORTED     WBC Morphology NOT REPORTED     RBC Morphology ANISOCYTOSIS PRESENT     Platelet Estimate NOT REPORTED     Seg Neutrophils 57 36 - 65 %    Lymphocytes 32 24 - 43 %    Monocytes 8 3 - 12 %    Eosinophils % 3 1 - 4 %    Basophils 1 0 - 2 %    Immature Granulocytes 0 0 %    Segs Absolute 4.54 1.50 - 8.10 k/uL    Absolute Lymph # 2.58 1.10 - 3.70 k/uL    Absolute Mono # 0.60 0.10 - 1.20 k/uL    Absolute Eos # 0.22 0.00 - 0.44 k/uL    Basophils # 0.05 0.00 - 0.20 k/uL    Absolute Immature Granulocyte <0.03 0.00 - 0.30 k/uL   Basic Metabolic Panel    Collection Time: 11/08/17  6:45 AM   Result Value Ref Range    Glucose 90 70 - 99 mg/dL    BUN 9 6 - 20 mg/dL    CREATININE 0.60 0.50 - 0.90 mg/dL    Bun/Cre Ratio NOT REPORTED 9 - 20    Calcium 9.1 8.6 - 10.4 mg/dL    Sodium 143 135 - 144 mmol/L    Potassium 3.5 (L) 3.7 - 5.3 mmol/L    Chloride 103 98 - 107 mmol/L    CO2 26 20 - 31 mmol/L    Anion Gap 14 9 - 17 mmol/L    GFR Non-African American >60 >60 mL/min    GFR African American >60 >60 mL/min    GFR Comment          GFR Staging NOT REPORTED    D-Dimer, Quantitative    Collection Time: 11/08/17  6:45 AM   Result Value Ref Range    D-Dimer, Quant 1.17 mg/L FEU   Hepatic Function Panel    Collection Time: 11/08/17  6:45 AM   Result Value Ref Range    Alb 4.0 3.5 - 5.2 g/dL    Alkaline Phosphatase 88 35 - 104 U/L    ALT 15 5 - 33 U/L    AST 17 <32 U/L    Total Bilirubin <0.10 (L) 0.3 - 1.2 mg/dL    Bilirubin, Direct <0.08 <0.31 mg/dL    Bilirubin, Indirect CANNOT BE CALCULATED 0.00 - 1.00 mg/dL    Total Protein 6.8 6.4 - 8.3 g/dL    Globulin NOT REPORTED 1.5 - 3.8 g/dL    Albumin/Globulin Ratio 1.4 1.0 - 2.5   POCT troponin    Collection Time: 11/08/17  6:47 AM   Result Value Ref Range    POC Troponin I 0.01 0.00 - 0.10 ng/mL    POC Troponin Interp       The Troponin-I (POC) results cannot be compared to the Troponin-T results. POCT troponin    Collection Time: 11/08/17  7:03 AM   Result Value Ref Range    POC Troponin I 0.00 0.00 - 0.10 ng/mL    POC Troponin Interp       The Troponin-I (POC) results cannot be compared to the Troponin-T results. POCT troponin    Collection Time: 11/08/17  8:35 AM   Result Value Ref Range    POC Troponin I 0.01 0.00 - 0.10 ng/mL    POC Troponin Interp       The Troponin-I (POC) results cannot be compared to the Troponin-T results.    EKG 12 lead    Collection Time: 11/08/17  4:17 PM   Result Value Ref Range    Ventricular Rate 76 BPM    Atrial Rate 76 BPM    P-R Interval 136 ms    QRS Duration 84 ms    Q-T Interval 370 ms    QTc Calculation (Bazett) 416 ms    P Axis 64 degrees    R Axis 34 degrees    T Axis 38 degrees   Troponin    Collection Time: 11/08/17  4:20 PM   Result Value Ref Range    Troponin T <0.03 <0.03 ng/mL    Troponin Interp         LIPASE    Collection Time: 11/08/17  4:20 PM   Result Value Ref Range    Lipase 18 13 - 60 U/L   Lipid Panel    Collection Time: 11/08/17  4:20 PM   Result Value Ref Range    Cholesterol 265 (H) <200 mg/dL    HDL 44 >40 mg/dL    LDL Cholesterol 198 (H) 0 - 130 mg/dL    Chol/HDL Ratio 6.0 (H) <5    Triglycerides 115 <150 mg/dL    VLDL NOT REPORTED 1 - 30 mg/dL   DRUG SCREEN MULTI URINE    Collection Time: 11/08/17  4:21 PM   Result Value Ref Range    Amphetamine Screen, Ur NEGATIVE NEG    Barbiturate Screen, Ur NEGATIVE NEG    Benzodiazepine Screen, Urine NEGATIVE NEG    Cocaine Metabolite, Urine NEGATIVE NEG    Methadone Screen, Urine NEGATIVE NEG    Opiates, Urine POSITIVE (A) NEG    Phencyclidine, Urine NEGATIVE NEG    Propoxyphene, Urine NOT REPORTED NEG    Cannabinoid Scrn, Ur POSITIVE (A) NEG    Oxycodone Screen, Ur NEGATIVE NEG    Methamphetamine, Urine NOT REPORTED NEG    Tricyclic Antidepressants, Ur NOT REPORTED NEG    MDMA URINE NOT REPORTED NEG    Buprenorphine Urine NOT REPORTED NEG    Test Information       Assay provides medical screening only.   The absence of expected drug(s) and/or   UA W/REFLEX CULTURE    Collection Time: 11/08/17  4:21 PM   Result Value Ref Range    Color, UA YELLOW YEL    Turbidity UA CLEAR CLEAR    Glucose, Ur NEGATIVE NEG    Bilirubin Urine NEGATIVE NEG    Ketones, Urine NEGATIVE NEG    Specific [place], UA 1.021 1.005 - 1.030    Urine Hgb SMALL (A) NEG    pH, UA 6.0 5.0 - 8.0    Protein, UA NEGATIVE NEG    Urobilinogen, Urine Normal NORM    Nitrite, Urine NEGATIVE NEG    Leukocyte Esterase, Urine NEGATIVE NEG    Urinalysis Comments NOT REPORTED    Microscopic Urinalysis    Collection Time: 11/08/17  4:21 PM   Result Value Ref Range    -          WBC, UA 2 TO 5 0 - 5 /HPF    RBC, UA 0 TO 2 0 - 4 /HPF    Casts UA 0 TO 2 HYALINE 0 - 8 /LPF    Crystals UA NOT REPORTED NONE /HPF    Epithelial Cells UA 0 TO 2 0 - 5 /HPF    Renal Epithelial, Urine NOT REPORTED 0 /HPF    Bacteria, UA NOT REPORTED NONE    Mucus, UA NOT REPORTED NONE    Trichomonas, UA NOT REPORTED NONE    Amorphous, UA NOT REPORTED NONE    Other - 11.3 k/uL    RBC 4.40 3.95 - 5.11 m/uL    Hemoglobin 12.5 11.9 - 15.1 g/dL    Hematocrit 39.4 36.3 - 47.1 %    MCV 89.5 82.6 - 102.9 fL    MCH 28.4 25.2 - 33.5 pg    MCHC 31.7 28.4 - 34.8 g/dL    RDW 15.8 (H) 11.8 - 14.4 %    Platelets 546 080 - 131 k/uL    MPV 8.4 8.1 - 13.5 fL    Differential Type NOT REPORTED     WBC Morphology NOT REPORTED     RBC Morphology ANISOCYTOSIS PRESENT     Platelet Estimate NOT REPORTED     Seg Neutrophils 60 36 - 65 %    Lymphocytes 31 24 - 43 %    Monocytes 6 3 - 12 %    Eosinophils % 2 1 - 4 %    Basophils 1 0 - 2 %    Immature Granulocytes 0 0 %    Segs Absolute 5.14 1.50 - 8.10 k/uL    Absolute Lymph # 2.65 1.10 - 3.70 k/uL    Absolute Mono # 0.53 0.10 - 1.20 k/uL    Absolute Eos # 0.16 0.00 - 0.44 k/uL    Basophils # 0.05 0.00 - 0.20 k/uL    Absolute Immature Granulocyte 0.03 0.00 - 0.30 k/uL   BASIC METABOLIC PANEL    Collection Time: 11/10/17  5:26 AM   Result Value Ref Range    Glucose 101 (H) 70 - 99 mg/dL    BUN 11 6 - 20 mg/dL    CREATININE 0.42 (L) 0.50 - 0.90 mg/dL    Bun/Cre Ratio NOT REPORTED 9 - 20    Calcium 8.8 8.6 - 10.4 mg/dL    Sodium 139 135 - 144 mmol/L    Potassium 4.1 3.7 - 5.3 mmol/L    Chloride 104 98 - 107 mmol/L    CO2 23 20 - 31 mmol/L    Anion Gap 12 9 - 17 mmol/L    GFR Non-African American >60 >60 mL/min    GFR African American >60 >60 mL/min    GFR Comment          GFR Staging NOT REPORTED    HEPATIC FUNCTION PANEL    Collection Time: 11/10/17  5:26 AM   Result Value Ref Range    Alb 3.7 3.5 - 5.2 g/dL    Alkaline Phosphatase 81 35 - 104 U/L    ALT 14 5 - 33 U/L    AST 16 <32 U/L    Total Bilirubin 0.15 (L) 0.3 - 1.2 mg/dL    Bilirubin, Direct <0.08 <0.31 mg/dL    Bilirubin, Indirect CANNOT BE CALCULATED 0.00 - 1.00 mg/dL    Total Protein 6.1 (L) 6.4 - 8.3 g/dL    Globulin NOT REPORTED 1.5 - 3.8 g/dL    Albumin/Globulin Ratio 1.5 1.0 - 2.5   HEPATIC FUNCTION PANEL    Collection Time: 11/11/17  4:41 AM   Result Value Ref Range    Alb 4.0 3.5 - 5.2 stable dating back to 09/21/2010 09/21/2012, likely benign given long-term stability. Fleischner society follow-up guidelines provided below. 5. Mild dependent atelectasis throughout both lungs. RECOMMENDATIONS: Fleischner Society guidelines for follow-up and management of incidentally detected pulmonary nodules: Multiple Solid Nodules: Nodule size less than 6 mm In a low-risk patient, no routine follow-up. In a high-risk patient, optional CT at 12 months. - Low risk patients include individuals with minimal or absent history of smoking and other known risk factors. - High risk patients include individuals with a history or smoking or known risk factors. Radiology 2017 http://pubs. rsna.org/doi/full/10.1148/radiol. 8028447361     Nm Hepatobiliary Scan W Pharmacological Intervention    Result Date: 11/8/2017  EXAMINATION: NUCLEAR MEDICINE HEPATOBILIARY SCINTIGRAPHY (HIDA SCAN). 11/8/2017 2:38 pm TECHNIQUE: Approximately 3 [id number] Mebrofenin (Choletec) was administered IV. Then, dynamic images of the abdomen were obtained in the anterior projection for 60 mins. Lateral projections were also obtained through 60 minutes. 3 mg of morphine was administered with subsequent imaging of the abdomen in the [place] projection for 35 minutes following the administration of morphine. COMPARISON: Gallbladder ultrasound from 11/08/2017, CT abdomen pelvis from 11/08/2017 HISTORY: ORDERING SYSTEM PROVIDED HISTORY: CHOLECYSTITIS TECHNOLOGIST PROVIDED HISTORY: Ordering Physician Provided Reason for Exam: Distended GB on CT, concern about acute cholecystitis. 80-year-old female with concern about acute cholecystitis and distended gallbladder on CT FINDINGS: Prompt, homogenous uptake by the liver is noted with normal appearance of radiotracer excretion into the biliary system. Clearance of bloodpool activity appears appropriate. There is radiotracer activity throughout the small bowel. Gallbladder is not visualized at 60 minutes.

## 2017-11-14 ENCOUNTER — TELEPHONE (OUTPATIENT)
Dept: BARIATRICS/WEIGHT MGMT | Age: 52
End: 2017-11-14

## 2017-11-14 LAB — SURGICAL PATHOLOGY REPORT: NORMAL

## 2017-11-14 NOTE — PROGRESS NOTES
for vaccination    Syncope    Leg pain    Left hip pain    Chronic cholecystitis    Chest pain    Calculus of gallbladder without cholecystitis without obstruction   POD 1 robot assist lap shelli     PLAN     1. Low fat diet, pain control with oral medications, advise DC IVF, encourage ambulation  2. OK for DC from GS standpoint, pain medications on pt's chart. Follow up with Dr. Luh Harris in one week to eval for drain removal, pt to record output daily until seen in clinic. 3. Supportive care per primary           Electronically signed by Sal Landaverde DO  on 11/11/2017 at 7:20 AM      I have discussed the care of the patient, including pertinent history and exam findings, with the resident. I have seen and examined the patient and the key elements of all parts of the encounter have been performed by me. I agree with the assessment, plan and orders as documented by the resident. Doing well, tolerating diet.   Labs stable    Will D/C Home

## 2017-11-14 NOTE — TELEPHONE ENCOUNTER
Patient doing better, had a bowel movement, decided not to come into ER. Pain now only at [place] site when getting up from sitting. No fevers/chills, tolerating diet. Can we move her appointment up to this week so she can have her drain removed.     MANUEL is clear yellow color

## 2017-11-17 ENCOUNTER — OFFICE VISIT (OUTPATIENT)
Dept: BARIATRICS/WEIGHT MGMT | Age: 52
End: 2017-11-17

## 2017-11-17 VITALS
SYSTOLIC BLOOD PRESSURE: 110 MMHG | BODY MASS INDEX: 24.43 KG/M2 | RESPIRATION RATE: 20 BRPM | HEART RATE: 76 BPM | WEIGHT: 156 LBS | DIASTOLIC BLOOD PRESSURE: 76 MMHG | TEMPERATURE: 98.3 F

## 2017-11-17 DIAGNOSIS — Z90.49 S/P LAPAROSCOPIC CHOLECYSTECTOMY: Primary | ICD-10-CM

## 2017-11-17 PROCEDURE — 99024 POSTOP FOLLOW-UP VISIT: CPT | Performed by: SURGERY

## 2017-11-17 RX ORDER — HYDROCODONE BITARTRATE AND ACETAMINOPHEN 5; 325 MG/1; MG/1
2 TABLET ORAL EVERY 6 HOURS PRN
Qty: 20 TABLET | Refills: 0 | Status: SHIPPED | OUTPATIENT
Start: 2017-11-17 | End: 2017-11-22

## 2017-11-17 RX ORDER — DOXYCYCLINE HYCLATE 100 MG
100 TABLET ORAL 2 TIMES DAILY
Qty: 20 TABLET | Refills: 0 | Status: SHIPPED | OUTPATIENT
Start: 2017-11-17 | End: 2017-11-27

## 2017-11-17 RX ORDER — AMOXICILLIN AND CLAVULANATE POTASSIUM 875; 125 MG/1; MG/1
1 TABLET, FILM COATED ORAL 2 TIMES DAILY
Qty: 14 TABLET | Refills: 0 | Status: SHIPPED | OUTPATIENT
Start: 2017-11-17 | End: 2017-11-27

## 2017-11-17 NOTE — PROGRESS NOTES
Insomnia   [] Being treated for depression  [] Other:    Skin Negative for: [] Wound:   [] Open   [] Draining   [] Incisional     [] Rash   [] Hair Loss  [] Other:     Positive for: [x] Wound:   [] Open   [x] Draining    [] Incisional  [x] Rash   [] Hair Loss  [] Other:      /76 (Site: Left Arm, Position: Sitting, Cuff Size: Medium Adult)   Pulse 76   Temp 98.3 °F (36.8 °C) (Oral)   Resp 20   Wt 156 lb (70.8 kg)   BMI 24.43 kg/m²      Physical Exam:    Constitutional:  Vital signs are normal. The patient appears well-developed and well-nourished. HEENT:   Head: Normocephalic. Atraumatic  Eyes: pupils are equal and reactive. No scleral icterus is present. Neck: No mass and no thyromegaly present. Cardiovascular: Normal rate, regular rhythm, S1 normal and S2 normal.  Radial pulses present   Pulmonary/Chest: Effort normal and breath sounds normal. No retractions  Abdominal: Soft. Normal appearance. There is no organomegaly. No tenderness. There is no rigidity, no rebound, no guarding and no Apodaca's sign. Musculoskeletal:        Right lower leg: Normal. No tenderness and no edema. Left lower leg: Normal. No tenderness and no edema. Neurological: The patient is alert and oriented. Moving all 4 extremities, sensation grossly intact bilateral  Skin: Skin is warm, dry and intact. Right abdomen lateral drain removed in entirety, serosang. There is erythema and induration at the drain site, this is about 2 cm in radius around the drain site. It is very tender on palpation only at this site on the abdomen. Psychiatric: The patient has a normal mood and affect. Speech is normal and behavior is normal. Judgment and thought content normal. Cognition and memory are normal.     Assessment:  1 week post cholecystectomy  Pathology reviewed with patient    Plan:  Augmentin and Doxycycline  Regular diet  Follow up for recheck on Monday  Call back if any worsening of wound or further drainage.   Also instructed to call back if the pain at the drain site is not improving.

## 2017-11-20 ENCOUNTER — OFFICE VISIT (OUTPATIENT)
Dept: BARIATRICS/WEIGHT MGMT | Age: 52
End: 2017-11-20

## 2017-11-20 VITALS
RESPIRATION RATE: 20 BRPM | DIASTOLIC BLOOD PRESSURE: 72 MMHG | BODY MASS INDEX: 24.59 KG/M2 | HEART RATE: 74 BPM | WEIGHT: 157 LBS | SYSTOLIC BLOOD PRESSURE: 126 MMHG

## 2017-11-20 DIAGNOSIS — Z90.49 S/P LAPAROSCOPIC CHOLECYSTECTOMY: Primary | ICD-10-CM

## 2017-11-20 PROCEDURE — 99024 POSTOP FOLLOW-UP VISIT: CPT | Performed by: SURGERY

## 2017-11-22 ENCOUNTER — TELEPHONE (OUTPATIENT)
Dept: BARIATRICS/WEIGHT MGMT | Age: 52
End: 2017-11-22

## 2017-11-22 RX ORDER — HYDROCODONE BITARTRATE AND ACETAMINOPHEN 5; 325 MG/1; MG/1
1 TABLET ORAL EVERY 6 HOURS PRN
Qty: 10 TABLET | Refills: 0 | Status: SHIPPED | OUTPATIENT
Start: 2017-11-22 | End: 2018-01-30

## 2017-11-22 RX ORDER — HYDROCODONE BITARTRATE AND ACETAMINOPHEN 5; 325 MG/1; MG/1
1 TABLET ORAL EVERY 6 HOURS PRN
Qty: 10 TABLET | Refills: 0 | OUTPATIENT
Start: 2017-11-22 | End: 2017-11-22

## 2017-11-22 NOTE — TELEPHONE ENCOUNTER
Verified ID against drivers license, [facility]  staff served as witness

## 2017-11-22 NOTE — TELEPHONE ENCOUNTER
Patient called stating she was in for post op appt on 11/20/17 and stated she needs refill on pain meds. She is going out of town early this afternoon.

## 2017-11-26 NOTE — PROGRESS NOTES
CC: post cholecystectomy    11/20/2017    History:  46year old female status post cholecystectomy. She returns today and states her pain has returned. She states the majority of the pain is at her drain site in the right upper quadrant. She is having bowel movements, denies fevers/chills, and is tolerating diet.    The pain is improving and the redness at the incision has improved    /72 (Site: Left Arm, Position: Sitting, Cuff Size: Medium Adult)   Pulse 74   Resp 20   Wt 157 lb (71.2 kg)   BMI 24.59 kg/m²     Review of Systems:    General  Negative for: [] Weight Change   [x] Fatigue   [x] Fevers & Chills    [] Appetite change [] Other:    Positive for: [] Weight Change   [] Fatigue   [] Fevers & Chills    [] Appetite change [] Other:   Cardiac  Negative for: [x] Chest Pain   [x] Difficulty Breathing   [] Leg Cramps [x] Edema of Lower Extremeties    [] Left   [] Right      Positive for: [] Chest Pain   [] Difficulty Breathing   [] Leg Cramps [] Edema of Lower Extremeties    [] Left   [] Right   Pulmonary  Negative for: [x] Shortness of Breath [] Wheeze [x] Cough  [] Calf Pain     Positive for: [] Shortness of Breath [] Wheeze [] Cough  [] Calf Pain   Gastro-Intestinal Negative for: [x] Heartburn   [x] Reflux   [x] Dysphagia   [x] Melena   [x] BRBPR  [x] Vomiting   [] Abdominal Pain   [x] Diarrhea  [x] Hernia    [x] Constipation  [] Other:     Positive for: [] Heartburn   [] Reflux   [] Dysphagia   [] Melena   [] BRBPR  [] Vomiting   [x] Abdominal Pain   [] Diarrhea  [] Hernia    [] Constipation  [] Other:    Muskuloskeletal Negative for: [] Joint pain   [] Back pain   [] Knee Pain   [] Muscle weakness   [x] Edema [] Other:     Positive for: [] Joint pain   [] Back pain   [] Knee Pain   [] Muscle weakness  [] Edema [] Other:    Neurologic Negative for: [] Syncope   [] Insomnia   [] Being treated for depression  [] Other:     Positive for: [] Syncope   [] Insomnia   [] Being treated for depression [] Other:    Skin Negative for: [] Wound:   [] Open   [] Draining   [] Incisional     [x] Rash   [] Hair Loss  [] Other:     Positive for: [x] Wound:   [] Open   [x] Draining    [] Incisional  [] Rash   [] Hair Loss  [] Other:      /72 (Site: Left Arm, Position: Sitting, Cuff Size: Medium Adult)   Pulse 74   Resp 20   Wt 157 lb (71.2 kg)   BMI 24.59 kg/m²      Physical Exam:    Constitutional:  Vital signs are normal. The patient appears well-developed and well-nourished. HEENT:   Head: Normocephalic. Atraumatic  Eyes: pupils are equal and reactive. No scleral icterus is present. Neck: No mass and no thyromegaly present. Cardiovascular: Normal rate, regular rhythm, S1 normal and S2 normal.  Radial pulses present   Pulmonary/Chest: Effort normal and breath sounds normal. No retractions  Abdominal: Soft. Normal appearance. There is no organomegaly. No tenderness. There is no rigidity, no rebound, no guarding and no Apodaca's sign. Musculoskeletal:        Right lower leg: Normal. No tenderness and no edema. Left lower leg: Normal. No tenderness and no edema. Neurological: The patient is alert and oriented. Moving all 4 extremities, sensation grossly intact bilateral  Skin: Skin is warm, dry and intact. Right abdomen lateral drain site less tender, less induration, much improvement  Psychiatric: The patient has a normal mood and affect. Speech is normal and behavior is normal. Judgment and thought content normal. Cognition and memory are normal.     Assessment:  2 week post cholecystectomy  Pathology reviewed with patient    Plan:  Augmentin and Doxycycline  Regular diet  Follow up in 1 month  Rx for Percocet for acute pain related to the port site. She is aware this will be her last Rx.   OARRs reviewed

## 2018-01-30 ENCOUNTER — OFFICE VISIT (OUTPATIENT)
Dept: INTERNAL MEDICINE | Age: 53
End: 2018-01-30
Payer: COMMERCIAL

## 2018-01-30 VITALS
HEIGHT: 67 IN | BODY MASS INDEX: 25.74 KG/M2 | SYSTOLIC BLOOD PRESSURE: 123 MMHG | OXYGEN SATURATION: 99 % | WEIGHT: 164 LBS | HEART RATE: 87 BPM | DIASTOLIC BLOOD PRESSURE: 85 MMHG

## 2018-01-30 DIAGNOSIS — M54.40 CHRONIC MIDLINE LOW BACK PAIN WITH SCIATICA, SCIATICA LATERALITY UNSPECIFIED: ICD-10-CM

## 2018-01-30 DIAGNOSIS — E78.00 PURE HYPERCHOLESTEROLEMIA: ICD-10-CM

## 2018-01-30 DIAGNOSIS — F32.A DEPRESSION, UNSPECIFIED DEPRESSION TYPE: Primary | ICD-10-CM

## 2018-01-30 DIAGNOSIS — R07.9 CHEST PAIN, UNSPECIFIED TYPE: ICD-10-CM

## 2018-01-30 DIAGNOSIS — Z23 NEED FOR PROPHYLACTIC VACCINATION AGAINST STREPTOCOCCUS PNEUMONIAE (PNEUMOCOCCUS): ICD-10-CM

## 2018-01-30 DIAGNOSIS — Z12.11 SCREENING FOR COLON CANCER: ICD-10-CM

## 2018-01-30 DIAGNOSIS — I25.10 CAD, MULTIPLE VESSEL: ICD-10-CM

## 2018-01-30 DIAGNOSIS — Z23 NEED FOR PROPHYLACTIC VACCINATION AGAINST STREPTOCOCCUS PNEUMONIAE (PNEUMOCOCCUS) AND INFLUENZA: ICD-10-CM

## 2018-01-30 DIAGNOSIS — Z12.31 ENCOUNTER FOR SCREENING MAMMOGRAM FOR BREAST CANCER: ICD-10-CM

## 2018-01-30 DIAGNOSIS — J44.9 CHRONIC OBSTRUCTIVE PULMONARY DISEASE, UNSPECIFIED COPD TYPE (HCC): ICD-10-CM

## 2018-01-30 DIAGNOSIS — Z23 NEED FOR PROPHYLACTIC VACCINATION AND INOCULATION AGAINST INFLUENZA: ICD-10-CM

## 2018-01-30 DIAGNOSIS — R73.03 PREDIABETES: ICD-10-CM

## 2018-01-30 DIAGNOSIS — G89.29 CHRONIC MIDLINE LOW BACK PAIN WITH SCIATICA, SCIATICA LATERALITY UNSPECIFIED: ICD-10-CM

## 2018-01-30 LAB — HBA1C MFR BLD: 5.3 %

## 2018-01-30 PROCEDURE — G8419 CALC BMI OUT NRM PARAM NOF/U: HCPCS | Performed by: FAMILY MEDICINE

## 2018-01-30 PROCEDURE — 4004F PT TOBACCO SCREEN RCVD TLK: CPT | Performed by: FAMILY MEDICINE

## 2018-01-30 PROCEDURE — 90471 IMMUNIZATION ADMIN: CPT | Performed by: FAMILY MEDICINE

## 2018-01-30 PROCEDURE — 3014F SCREEN MAMMO DOC REV: CPT | Performed by: FAMILY MEDICINE

## 2018-01-30 PROCEDURE — 3023F SPIROM DOC REV: CPT | Performed by: FAMILY MEDICINE

## 2018-01-30 PROCEDURE — 90472 IMMUNIZATION ADMIN EACH ADD: CPT | Performed by: FAMILY MEDICINE

## 2018-01-30 PROCEDURE — 99215 OFFICE O/P EST HI 40 MIN: CPT | Performed by: FAMILY MEDICINE

## 2018-01-30 PROCEDURE — 90670 PCV13 VACCINE IM: CPT | Performed by: FAMILY MEDICINE

## 2018-01-30 PROCEDURE — G8484 FLU IMMUNIZE NO ADMIN: HCPCS | Performed by: FAMILY MEDICINE

## 2018-01-30 PROCEDURE — G8599 NO ASA/ANTIPLAT THER USE RNG: HCPCS | Performed by: FAMILY MEDICINE

## 2018-01-30 PROCEDURE — G8427 DOCREV CUR MEDS BY ELIG CLIN: HCPCS | Performed by: FAMILY MEDICINE

## 2018-01-30 PROCEDURE — 3017F COLORECTAL CA SCREEN DOC REV: CPT | Performed by: FAMILY MEDICINE

## 2018-01-30 PROCEDURE — 83036 HEMOGLOBIN GLYCOSYLATED A1C: CPT | Performed by: FAMILY MEDICINE

## 2018-01-30 PROCEDURE — G8926 SPIRO NO PERF OR DOC: HCPCS | Performed by: FAMILY MEDICINE

## 2018-01-30 PROCEDURE — 90662 IIV NO PRSV INCREASED AG IM: CPT | Performed by: FAMILY MEDICINE

## 2018-01-30 RX ORDER — ATORVASTATIN CALCIUM 40 MG/1
40 TABLET, FILM COATED ORAL DAILY
Qty: 30 TABLET | Refills: 3 | Status: SHIPPED | OUTPATIENT
Start: 2018-01-30 | End: 2018-08-02 | Stop reason: SDUPTHER

## 2018-01-30 RX ORDER — CITALOPRAM 20 MG/1
20 TABLET ORAL DAILY
Qty: 30 TABLET | Refills: 3 | Status: SHIPPED | OUTPATIENT
Start: 2018-01-30 | End: 2018-04-25 | Stop reason: DRUGHIGH

## 2018-01-30 RX ORDER — GABAPENTIN 300 MG/1
300 CAPSULE ORAL 2 TIMES DAILY
Qty: 60 CAPSULE | Refills: 3 | Status: SHIPPED | OUTPATIENT
Start: 2018-01-30 | End: 2018-04-25 | Stop reason: SDUPTHER

## 2018-01-30 RX ORDER — NITROGLYCERIN 0.4 MG/1
0.4 TABLET SUBLINGUAL EVERY 5 MIN PRN
Qty: 25 TABLET | Refills: 3 | Status: SHIPPED | OUTPATIENT
Start: 2018-01-30

## 2018-01-30 ASSESSMENT — PATIENT HEALTH QUESTIONNAIRE - PHQ9
6. FEELING BAD ABOUT YOURSELF - OR THAT YOU ARE A FAILURE OR HAVE LET YOURSELF OR YOUR FAMILY DOWN: 3
1. LITTLE INTEREST OR PLEASURE IN DOING THINGS: 2
7. TROUBLE CONCENTRATING ON THINGS, SUCH AS READING THE NEWSPAPER OR WATCHING TELEVISION: 1
SUM OF ALL RESPONSES TO PHQ9 QUESTIONS 1 & 2: 2
9. THOUGHTS THAT YOU WOULD BE BETTER OFF DEAD, OR OF HURTING YOURSELF: 3
3. TROUBLE FALLING OR STAYING ASLEEP: 3
10. IF YOU CHECKED OFF ANY PROBLEMS, HOW DIFFICULT HAVE THESE PROBLEMS MADE IT FOR YOU TO DO YOUR WORK, TAKE CARE OF THINGS AT HOME, OR GET ALONG WITH OTHER PEOPLE: 3
8. MOVING OR SPEAKING SO SLOWLY THAT OTHER PEOPLE COULD HAVE NOTICED. OR THE OPPOSITE, BEING SO FIGETY OR RESTLESS THAT YOU HAVE BEEN MOVING AROUND A LOT MORE THAN USUAL: 2
SUM OF ALL RESPONSES TO PHQ QUESTIONS 1-9: 19
4. FEELING TIRED OR HAVING LITTLE ENERGY: 3
5. POOR APPETITE OR OVEREATING: 2

## 2018-01-30 ASSESSMENT — ENCOUNTER SYMPTOMS
CONSTIPATION: 0
NAUSEA: 1
BLOOD IN STOOL: 0
WHEEZING: 0
VOMITING: 0
COUGH: 0
SORE THROAT: 0
SHORTNESS OF BREATH: 0
RHINORRHEA: 0
DIARRHEA: 0
BACK PAIN: 1

## 2018-01-30 NOTE — PATIENT INSTRUCTIONS
good idea to know your test results and keep a list of the medicines you take. How can you care for yourself at home? · Ask your family, friends, and coworkers for support. You have a better chance of quitting if you have help and support. · Join a support group, such as Nicotine Anonymous, for people who are trying to quit smoking. · Consider signing up for a smoking cessation program, such as the American Lung Association's Freedom from Smoking program.  · Set a quit date. Pick your date carefully so that it is not right in the middle of a big deadline or stressful time. Once you quit, do not even take a puff. Get rid of all ashtrays and lighters after your last cigarette. Clean your house and your clothes so that they do not smell of smoke. · Learn how to be a nonsmoker. Think about ways you can avoid those things that make you reach for a cigarette. ¨ Avoid situations that put you at greatest risk for smoking. For some people, it is hard to have a drink with friends without smoking. For others, they might skip a coffee break with coworkers who smoke. ¨ Change your daily routine. Take a different route to work or eat a meal in a different place. · Cut down on stress. Calm yourself or release tension by doing an activity you enjoy, such as reading a book, taking a hot bath, or gardening. · Talk to your doctor or pharmacist about nicotine replacement therapy, which replaces the nicotine in your body. You still get nicotine but you do not use tobacco. Nicotine replacement products help you slowly reduce the amount of nicotine you need. These products come in several forms, many of them available over-the-counter:  ¨ Nicotine patches  ¨ Nicotine gum and lozenges  ¨ Nicotine inhaler  · Ask your doctor about bupropion (Wellbutrin) or varenicline (Chantix), which are prescription medicines. They do not contain nicotine. They help you by reducing withdrawal symptoms, such as stress and anxiety.   · Some people find hypnosis, acupuncture, and massage helpful for ending the smoking habit. · Eat a healthy diet and get regular exercise. Having healthy habits will help your body move past its craving for nicotine. · Be prepared to keep trying. Most people are not successful the first few times they try to quit. Do not get mad at yourself if you smoke again. Make a list of things you learned and think about when you want to try again, such as next week, next month, or next year. Where can you learn more? Go to https://FrogmetricsolimpiaShopsy.KidBook. org and sign in to your Gonway account. Enter A242 in the Syn[employer]ia Systems box to learn more about \"Stopping Smoking: Care Instructions. \"     If you do not have an account, please click on the \"Sign Up Now\" link. Current as of: March 20, 2017  Content Version: 11.5  © 7255-8465 [employer] Care instructions adapted under license by [facility] If you have questions about a medical condition or this instruction, always ask your healthcare professional. [place] [address] any warranty or liability for your use of this information. Patient Education        Pneumococcal Polysaccharide Vaccine: Care Instructions  Your Care Instructions    The pneumococcal polysaccharide vaccine (PPSV) can prevent some of the serious complications of pneumonia. This includes infection in the bloodstream (bacteremia) or throughout the body (septicemia). PPSV is recommended for people ages 72 years and older. People ages 3 to 59 who have a long-term illness should also get the vaccine. This includes people with diabetes, heart disease, liver disease, or lung disease. PPSV can also help people who have a weakened immune system. This includes cancer patients and people who don't have a spleen. The immune system helps your body fight infection and other illnesses. PPSV is given as a shot. It's usually given in the arm. Healthy older adults get the shot once. list of the medicines you take. Who should get the flu vaccine? Everyone age 7 months or older should get a flu vaccine each year. It lowers the chance of getting and spreading the flu. The vaccine is very important for people who are at high risk for getting other health problems from the flu. This includes:  · Anyone 48years of age or older. · People who live in a long-term care center, such as a nursing home. · All children 6 months through 25years of age. · Adults and children 6 months and older who have long-term heart or lung problems, such as asthma. · Adults and children 6 months and older who needed medical care or were in a hospital during the past year because of diabetes, chronic kidney disease, or a weak immune system (including HIV or AIDS). · Women who will be pregnant during the flu season. · People who have any condition that can make it hard to breathe or swallow (such as a brain injury or muscle disorders). · People who can give the flu to others who are at high risk for problems from the flu. This includes all health care workers and close contacts of people age 72 or older. Who should not get the flu vaccine? The person who gives the vaccine may tell you not to get it if you:  · Have a severe allergy to eggs or any part of the vaccine. · Have had a severe reaction to a flu vaccine in the past.  · Have had Guillain-Barré syndrome (GBS). · Are sick with a fever. (Get the vaccine when symptoms are gone.)  How can you care for yourself at home? · If you or your child has a sore arm or a slight fever after the shot, take an over-the-counter pain medicine, such as acetaminophen (Tylenol) or ibuprofen (Advil, Motrin). Read and follow all instructions on the label. Do not give aspirin to anyone younger than 20. It has been linked to Reye syndrome, a serious illness. · Do not take two or more pain medicines at the same time unless the doctor told you to.  Many pain medicines have acetaminophen, which is Tylenol. Too much acetaminophen (Tylenol) can be harmful. When should you call for help? Call 911 anytime you think you may need emergency care. For example, call if after getting the flu vaccine:  ? · You have symptoms of a severe reaction to the flu vaccine. Symptoms of a severe reaction may include:  ¨ Severe difficulty breathing. ¨ Sudden raised, red areas (hives) all over your body. ¨ Severe lightheadedness. ?Call your doctor now or seek immediate medical care if after getting the flu vaccine:  ? · You think you are having a reaction to the flu vaccine, such as a new fever. ? Watch closely for changes in your health, and be sure to contact your doctor if you have any problems. Where can you learn more? Go to https://Cardium Therapeutics.ActuatedMedical. org and sign in to your Inbox account. Enter E463 in the [employer] box to learn more about \"Influenza (Flu) Vaccine: Care Instructions. \"     If you do not have an account, please click on the \"Sign Up Now\" link. Current as of: Nathalie 10, 2017  Content Version: 11.5  © 2661-6756 Healthwise, Incorporated. Care instructions adapted under license by [facility] ([facility]). If you have questions about a medical condition or this instruction, always ask your healthcare professional. [address] any warranty or liability for your use of this information.

## 2018-01-30 NOTE — PROGRESS NOTES
Procedures    CONCETTA DIGITAL SCREEN W OR WO CAD BILATERAL     Standing Status:   Future     Standing Expiration Date:   3/30/2019    INFLUENZA, HIGH DOSE, 65 YRS +, IM, PF, PREFILL SYR, 0.5ML (FLUZONE HD)    Pneumococcal conjugate vaccine 13-valent IM (PREVNAR 13)    [facility]     Referral Priority:   Routine     Referral Type:   Consult for Advice and Opinion     Referral Reason:   Specialty Services Required     Number of Visits Requested:   1    [facility]     Referral Priority:   Routine     Referral Type:   Eval and Treat     Referral Reason:   Specialty Services Required     Requested Specialty:   Physical Therapy     Number of Visits Requested:   1    POCT FECAL IMMUNOCHEMICAL TEST (FIT)     Standing Status:   Future     Standing Expiration Date:   1/30/2019    POCT glycosylated hemoglobin (Hb A1C)    ECHO Pharmacological Stress Test     Standing Status:   Future     Standing Expiration Date:   3/31/2018     Order Specific Question:   Reason for exam:     Answer:   chest pain    FULL PFT STUDY WITH PRE AND POST     Standing Status:   Future     Standing Expiration Date:   1/30/2019    OH IMMUNIZ ADMIN,1 SINGLE/COMB VAC/TOXOID    OH IMMUNIZ,ADMIN,EACH ADDL

## 2018-02-06 ENCOUNTER — HOSPITAL ENCOUNTER (OUTPATIENT)
Dept: PULMONOLOGY | Age: 53
Discharge: HOME | End: 2018-02-06
Payer: COMMERCIAL

## 2018-02-06 ENCOUNTER — HOSPITAL ENCOUNTER (OUTPATIENT)
Dept: MAMMOGRAPHY | Age: 53
Discharge: HOME | End: 2018-02-08
Payer: COMMERCIAL

## 2018-02-06 DIAGNOSIS — J44.9 CHRONIC OBSTRUCTIVE PULMONARY DISEASE, UNSPECIFIED COPD TYPE (HCC): ICD-10-CM

## 2018-02-06 DIAGNOSIS — Z12.31 ENCOUNTER FOR SCREENING MAMMOGRAM FOR BREAST CANCER: ICD-10-CM

## 2018-02-06 PROCEDURE — 94640 AIRWAY INHALATION TREATMENT: CPT

## 2018-02-06 PROCEDURE — 94760 N-INVAS EAR/PLS OXIMETRY 1: CPT

## 2018-02-06 PROCEDURE — 94060 EVALUATION OF WHEEZING: CPT

## 2018-02-06 PROCEDURE — 94726 PLETHYSMOGRAPHY LUNG VOLUMES: CPT

## 2018-02-06 PROCEDURE — 94729 DIFFUSING CAPACITY: CPT

## 2018-02-06 PROCEDURE — 77067 SCR MAMMO BI INCL CAD: CPT

## 2018-02-06 PROCEDURE — 94664 DEMO&/EVAL PT USE INHALER: CPT

## 2018-02-06 PROCEDURE — 88738 HGB QUANT TRANSCUTANEOUS: CPT

## 2018-02-07 ENCOUNTER — HOSPITAL ENCOUNTER (OUTPATIENT)
Dept: PHYSICAL THERAPY | Age: 53
Setting detail: THERAPIES SERIES
Discharge: HOME | End: 2018-02-07

## 2018-02-07 NOTE — FLOWSHEET NOTE
[] Lexus Echols        Outpatient Physical                Therapy       [address]       Phone: [phone]       Fax: [phone] [] [facility]       Promotion at [address] [place] [address]       Phone: [phone]       Fax: [phone] [] Geoff Zhaoisha Cameron      for Health Promotion     [address] [facility]      Phone: [phone]      Fax:  [phone]     Physical Therapy Cancel/No Show note    Date: 2018  Patient: Derek Rome  : 1965  MRN: [id number]    Cancels/No Shows to date:     For today's appointment patient:  [x]  Cancelled  []  Rescheduled appointment  []  No-show     Reason given by patient:  []  Patient ill  []  Conflicting appointment  []  No transportation    []  Conflict with work  []  No reason given  [x]  Weather related  []  Other:      Comments: Snowing outside with significant accumulation.         []  Next appointment was confirmed    Electronically signed by: Miriam Frias PT

## 2018-02-12 NOTE — PROCEDURES
[facility]                                PULMONARY FUNCTION    PATIENT NAME: Adria Oppenheim                      :        1965  MED REC NO:   [id number]                             ROOM:  ACCOUNT NO:   [de-identified]                           ADMIT DATE: 2018  PROVIDER:     Gregg Henson    DATE OF PROCEDURE:  2018    The patient's flow-volume shows a small airway obstructive defect. FEV1 is  83% predicted. FVC 89% predicted. There was 0% and 1% bronchodilator  change respectively. FEV1/FVC ratio was 74. YXE18-13 was 65% predicted. No bronchodilator change. Lung volume shows air trapping. %  predicted, % predicted. Airway resistance is increased. Diffusion  capacity is 75% predicted, corrected for alveolar volume is normal.    FINAL IMPRESSION:  The study shows moderate small airway dysfunction with  air trapping. Clinical correlation is advised.         Chang Monroe    D: 2018 20:02:35       T: 2018 3:20:18     [email or web address]  Job#: [id number]     Doc#: [id number]    CC:

## 2018-02-13 ENCOUNTER — HOSPITAL ENCOUNTER (OUTPATIENT)
Dept: PAIN MANAGEMENT | Age: 53
Discharge: HOME | End: 2018-02-13
Payer: COMMERCIAL

## 2018-02-13 ENCOUNTER — HOSPITAL ENCOUNTER (OUTPATIENT)
Dept: PHYSICAL THERAPY | Age: 53
Setting detail: THERAPIES SERIES
Discharge: HOME | End: 2018-02-13
Payer: COMMERCIAL

## 2018-02-13 VITALS
HEART RATE: 69 BPM | WEIGHT: 160 LBS | SYSTOLIC BLOOD PRESSURE: 120 MMHG | TEMPERATURE: 98.5 F | RESPIRATION RATE: 16 BRPM | BODY MASS INDEX: 25.06 KG/M2 | DIASTOLIC BLOOD PRESSURE: 81 MMHG

## 2018-02-13 DIAGNOSIS — Z98.1 HISTORY OF LUMBAR FUSION: Chronic | ICD-10-CM

## 2018-02-13 DIAGNOSIS — M96.1 FAILED BACK SYNDROME: Primary | Chronic | ICD-10-CM

## 2018-02-13 DIAGNOSIS — F12.90 MARIJUANA USE: ICD-10-CM

## 2018-02-13 PROCEDURE — 97110 THERAPEUTIC EXERCISES: CPT

## 2018-02-13 PROCEDURE — 99204 OFFICE O/P NEW MOD 45 MIN: CPT

## 2018-02-13 PROCEDURE — 99214 OFFICE O/P EST MOD 30 MIN: CPT | Performed by: ANESTHESIOLOGY

## 2018-02-13 PROCEDURE — 97161 PT EVAL LOW COMPLEX 20 MIN: CPT

## 2018-02-13 RX ORDER — BACLOFEN 10 MG/1
10 TABLET ORAL 3 TIMES DAILY
Qty: 90 TABLET | Refills: 0 | Status: SHIPPED | OUTPATIENT
Start: 2018-02-13 | End: 2018-05-15 | Stop reason: SDUPTHER

## 2018-02-13 RX ORDER — ALBUTEROL SULFATE 90 UG/1
2 AEROSOL, METERED RESPIRATORY (INHALATION) EVERY 4 HOURS PRN
Qty: 18 G | Refills: 5 | Status: SHIPPED | OUTPATIENT
Start: 2018-02-13 | End: 2018-04-25 | Stop reason: SDUPTHER

## 2018-02-13 RX ORDER — VITAMIN E 268 MG
400 CAPSULE ORAL DAILY
COMMUNITY
End: 2019-03-06 | Stop reason: ALTCHOICE

## 2018-02-13 RX ORDER — MULTIVITAMIN WITH IRON
100 TABLET ORAL DAILY
COMMUNITY

## 2018-02-13 RX ORDER — OMEGA-3 FATTY ACIDS CAP DELAYED RELEASE 1000 MG 1000 MG
3000 CAPSULE DELAYED RELEASE ORAL
COMMUNITY
End: 2019-03-06 | Stop reason: ALTCHOICE

## 2018-02-13 RX ORDER — CHOLECALCIFEROL (VITAMIN D3) 1250 MCG
CAPSULE ORAL
COMMUNITY
End: 2019-03-06 | Stop reason: ALTCHOICE

## 2018-02-13 RX ORDER — MAGNESIUM GLUCONATE 27 MG(500)
400 TABLET ORAL EVERY EVENING
COMMUNITY

## 2018-02-13 RX ORDER — BACLOFEN 10 MG/1
10 TABLET ORAL 3 TIMES DAILY
COMMUNITY
End: 2018-02-13 | Stop reason: SDUPTHER

## 2018-02-13 ASSESSMENT — PAIN DESCRIPTION - ORIENTATION: ORIENTATION: LOWER;MID;UPPER

## 2018-02-13 ASSESSMENT — PAIN SCALES - GENERAL: PAINLEVEL_OUTOF10: 8

## 2018-02-13 ASSESSMENT — PAIN DESCRIPTION - FREQUENCY: FREQUENCY: CONTINUOUS

## 2018-02-13 ASSESSMENT — ENCOUNTER SYMPTOMS
BLURRED VISION: 0
BACK PAIN: 1
NAUSEA: 1
DOUBLE VISION: 0
COUGH: 1

## 2018-02-13 ASSESSMENT — PAIN DESCRIPTION - DESCRIPTORS: DESCRIPTORS: CONSTANT;STABBING

## 2018-02-13 ASSESSMENT — PAIN DESCRIPTION - ONSET: ONSET: ON-GOING

## 2018-02-13 ASSESSMENT — PAIN DESCRIPTION - LOCATION: LOCATION: BACK;NECK

## 2018-02-13 ASSESSMENT — PAIN DESCRIPTION - PROGRESSION: CLINICAL_PROGRESSION: GRADUALLY WORSENING

## 2018-02-13 ASSESSMENT — PAIN DESCRIPTION - PAIN TYPE: TYPE: CHRONIC PAIN

## 2018-02-13 NOTE — PROGRESS NOTES
Psychiatric/Behavioral: Positive for depression. Objective:  General Appearance:  Comfortable, well-appearing and in no acute distress. Vital signs: (most recent): Blood pressure 120/81, pulse 69, temperature 98.5 °F (36.9 °C), temperature source Oral, resp. rate 16, weight 160 lb (72.6 kg), not currently breastfeeding. Vital signs are normal.  No fever. HEENT: Normal HEENT exam.    Lungs:  Normal effort. Breath sounds clear to auscultation. Heart: Normal rate. Abdomen: Abdomen is soft. Extremities: Normal range of motion. Neurological: Patient is alert and oriented to person, place and time. Normal strength. Patient has normal reflexes, normal muscle tone and normal coordination. Pupils:  Pupils are equal, round, and reactive to light. Skin:  Warm, dry and pale. No rash, ecchymosis, cyanosis or ulceration. Musculoskeletal exam: Active range of motion in lumbar spine is markedly limited and associated with pain, gait is antalgic, straight leg raise positive on left side, pain aggravated with left lumbar spine flexion and forward bending. .  Neurological exam reveals alert, oriented, normal speech, no focal findings or movement disorder noted, neck supple without rigidity, cranial nerves II through XII intact, motor and sensory grossly normal bilaterally. Assessment & Plan        This is a 59-year-old woman with almost 2 decades long history of back and leg pain. She had total 3 lumbar spine surgery the first in 2000, second was in 2007 and most recent lumbar spine surgery was in July 2017 at [facility] the first surgery was a laminectomy the second was a L5-S1 fusion and the third one was extension of the fusion and removal of the previous hardware. After the second a spinal surgery she had significant improvement in her symptoms lasting for more than 5 years. She was seen around 2015 for recurrence of her back and left leg pain in pain clinic.       Patient

## 2018-02-13 NOTE — CONSULTS
[] No  Action:  Symptoms:  [] Improving [] Worsening [x] Same  Better:  [] AM    [] PM    [] Sit    [] Rise/Sit    []Stand    [] Walk    [] Lying    [] Other:  Worse: [] AM    [] PM    [] Sit    [] Rise/Sit    [x]Stand    [x] Walk    [] Lying    [x] Bend                             [] Valsalva    [] Other:  Sleep: [] OK    [x] Disturbed    Objective:      STRENGTH  STRENGTH  ROM    Left Right  Left Right Cervical    C5 Shld Abd   L1-2 Hip Flex 4/5 4+/5 Flexion    Shld Flexion   Hip Abd 4/5 4+/5 Extension    Shld IR   L3-4 Knee Ext 4/5 5/5 Rotation L R   Shld ER   L4 Ankle DF 4+/5 5/5 Sidebend L R   C6 Elb Flex   L5 EHL   Retraction    C7 Elb Ext   S1 Plant.  Flex   Lumbar    C8 EPL   Abdominals 3/5  Flexion 75%   T1 Fing Abd   Erector Spinae 3/5  Extension 50%         Rotation L full R full         Sidebend L 50% R 75%         UE/LE                                                                TESTS (+/-) LEFT RIGHT Not Tested   SLR [x] sit [x] supine +,+ -,- []   Hamstring (SLR) + - []   SKTC - - []   DKTC - - []   Slump/Dural   []   SI JT - - []   TR - - []   Joint Mobility   []       OBSERVATION No Deficit Deficit Not Tested Comments   Posture       Forward Head [] [x] [] Mild   Rounded Shoulders [] [x] [] MIld   Kyphosis [x] [] []    Lordosis [x] [] []    Lateral Shift [x] [] []    Scoliosis [x] [] []    Iliac Crest [x] [] []    PSIS [x] [] []    ASIS [x] [] []    Genu Valgus [x] [] []    Genu Varus [x] [] []    Genu Recurvatum [x] [] []    Pronation [x] [] []    Supination [x] [] []    Leg Length Discrp [x] [] []    Slumped Sitting [] [x] [] Mild   Palpation [x] [] []    Sensation [x] [] []    Edema [x] [] []    Neurological [] [x] [] Left LE numbness/tingling       FUNCTION Normal Difficult Unable   Sitting [x] [] []   Standing [] [x] []   Ambulation [] [x] []   Groom/Dress [x] [] []   Lift/Carry [] [x] []   Stairs [x] [] []   Bending [] [x] []   OH reach [] [x] []   Sit to Stand [x] [] [] Comments:    Assessment: Low back pain and mobility deficits due to multiple \"failed\" lumbar surgeries and underlying hip and core weakness. Problems:    [x] ? Back Pain: 10/10 Low back   [] ? Cervical Pain:    [x] ? ROM: decreased lumbar side bending and flexion    [x] ? Strength: 3/5 core and lumbar extensors, 4-/5 left hip   [x] ? Function: Oswestry 82% impaired   [] Postural Deviations   [] Gait Deviations  [] Other:      STG: (to be met in 8 treatments)  1. ? Pain: 6/10 low back pain with bending. 2. ? Strength: 4+/5 left hip and 4/5 left  3. ? Function: Oswestry 62% impaired to improve ADLs. 4. Independent with Home Exercise Programs    LTG: (to be met in 12 treatments)  1. Patient will be able to ambulate > 1 hr.   2. Patient will be able vacuum and complete other heavy housework. Patient goals: Reduce pain, improve mobilty, RTW    Rehab Potential:  [x] Good  [] Fair  [] Poor   Suggested Professional Referral:  [x] No  [] Yes:  Barriers to Goal Achievement[de-identified]  [x] No  [] Yes:  Domestic Concerns:  [x] No  [] Yes:    Pt. Education:  [x] Plans/Goals, Risks/Benefits discussed  [x] Home exercise program  Supine and prone DLS  Method of Education: [x] Verbal  [x] Demo  [x] Written  Comprehension of Education:  [x] Verbalizes understanding. [x] Demonstrates understanding. [x] Needs Review. [] Demonstrates/verbalizes understanding of HEP/Ed previously given.     Treatment Plan:  [x] Therapeutic Exercise    [] Modalities:  [x] Therapeutic Activity     [] Ultrasound  [x] Electrical Stimulation  [x] Gait Training     []Massage       [] Lumbar/Cervical Traction  [x] Neuromuscular Re-education [x] Cold/hotpack [] Iontophoresis: 4 mg/mL  [x] Instruction in HEP             Dexamethasone Sodium  [x] Manual Therapy             Phosphate 40-80 mAmin  [] Aquatic Therapy   [x] Dry Needling [] Other:    []  Medication allergies reviewed for use of    Dexamethasone Sodium Phosphate 4mg/ml     with iontophoresis treatments. Pt is not allergic. Frequency:  2 x/week for 12 visits    Todays Treatment:  Modalities:   Precautions: Lumbar fusion   Exercises:  Exercise Reps/ Time Weight/ Level Comments   Supine march 10x     LTR 10x     SKTC 3x 20\"     Glute sets 10x     RYNE 2min     Press ups 10x     Other:    Specific Instructions for next treatment: Core and hip strengthening, lifting training, modalities as needed, no mechanical traction. Evaluation Complexity:  History (Personal factors, comorbidities) [x] 0 [] 1-2 [] 3+   Exam (limitations, restrictions) [] 1-2 [x] 3 [] 4+   Clinical presentation (progression) [x] Stable [] Evolving  [] Unstable   Decision Making [x] Low [] Moderate [] High    [x] Low Complexity [] Moderate Complexity [] High Complexity       Treatment Charges: Mins Units   [x] Evaluation       [x]  Low       []  Moderate       []  High 25 1   []  Modalities     [x]  Ther Exercise 15 1   []  Manual Therapy     []  Ther Activities     []  Aquatics     []  Vasocompression     []  Other       TOTAL TREATMENT TIME: 40    Time in: 1000    Time out: 805 Edin Cortés    Electronically signed by: Frank Dacosta

## 2018-02-15 ENCOUNTER — HOSPITAL ENCOUNTER (OUTPATIENT)
Dept: PHYSICAL THERAPY | Age: 53
Setting detail: THERAPIES SERIES
Discharge: HOME | End: 2018-02-15
Payer: COMMERCIAL

## 2018-02-20 ENCOUNTER — HOSPITAL ENCOUNTER (OUTPATIENT)
Dept: GENERAL RADIOLOGY | Age: 53
Discharge: HOME | End: 2018-02-22
Payer: COMMERCIAL

## 2018-02-20 ENCOUNTER — HOSPITAL ENCOUNTER (OUTPATIENT)
Dept: PHYSICAL THERAPY | Age: 53
Setting detail: THERAPIES SERIES
Discharge: HOME | End: 2018-02-20
Payer: COMMERCIAL

## 2018-02-20 ENCOUNTER — HOSPITAL ENCOUNTER (OUTPATIENT)
Age: 53
Discharge: HOME | End: 2018-02-22
Payer: COMMERCIAL

## 2018-02-20 DIAGNOSIS — Z98.1 HISTORY OF LUMBAR FUSION: ICD-10-CM

## 2018-02-20 DIAGNOSIS — Z12.11 SCREENING FOR COLON CANCER: ICD-10-CM

## 2018-02-20 LAB
CONTROL: NORMAL
HEMOCCULT STL QL: NEGATIVE

## 2018-02-20 PROCEDURE — 97110 THERAPEUTIC EXERCISES: CPT

## 2018-02-20 PROCEDURE — 82274 ASSAY TEST FOR BLOOD FECAL: CPT | Performed by: FAMILY MEDICINE

## 2018-02-20 PROCEDURE — 72110 X-RAY EXAM L-2 SPINE 4/>VWS: CPT

## 2018-02-20 NOTE — FLOWSHEET NOTE
[x] [facility]       Outpatient Physical        Therapy       [address]       Phone: [phone]       Fax: [phone] [] [facility] for Health Promotion at [address] [facility]       Phone: [phone]       Fax: [phone] [] Geoff Shin for Health Promotion  [address] [facility]   Phone: [phone]   Fax:  [phone]     Physical Therapy Daily Treatment Note    Date:  2018  Patient Name:  Sary Ferguson    :  1965  MRN: [id number]  Physician: Carlos Schwartz MD                Insurance: [facility] 30 visits  Medical Diagnosis: Chronic Midline low back pain with sciatica M54.40    Rehab Codes: M54.5, R26.2  DOS: 2017                                 Next 's appt. :   Visit# / total visits:    Cancels/No Shows: 1/0    Subjective:    Pain:  [x] Yes  [] No Location: low back Pain Rating: (0-10 scale) 5/10  Pain altered Tx:  [] No  [] Yes  Action:  Comments: Patient states she was too ill to attend last appointment. Objective:  Modalities:   Precautions:  Exercises:  Exercise Reps/ Time Weight/ Level Comments   SCIFIT 5 min L 3          Standing      4 way hip 10x     Mini Squats  10x     HS curls 10x     Marching 10x ea     Calf raises 10x     Tband Row 10x     Tband Ext  10x           Seated      HS stretch 3x20\"           Supine      SKTC 3x20\"     LTR  10x5\"     March 10x      Ab set/ crunch 10x           Prone      RYNE 2 min     Glute sets 10x 3\"     Hip extension 10x  Difficulty on left   HS curls 10x           Other:    Specific Instructions for next treatment:Core and hip strengthening, lifting training, modalities as needed, no mechanical traction. Treatment Charges: Mins Units   []  Modalities     [x]  Ther Exercise 45 3   []  Manual Therapy     []  Ther Activities     []  Aquatics     []  Vasocompression     []  Other     Total Treatment time 45 3       Assessment: [x] Progressing toward goals. Fair tolerance to all exercise. Left hip extensor weakness compared to Right.    [] No change. [] Other:    STG: (to be met in 8 treatments)  1. ? Pain: 6/10 low back pain with bending. 2. ? Strength: 4+/5 left hip and 4/5 left  3. ? Function: Oswestry 62% impaired to improve ADLs. 4. Independent with Home Exercise Programs     LTG: (to be met in 12 treatments)  1. Patient will be able to ambulate > 1 hr.   2. Patient will be able vacuum and complete other heavy housework. Pt. Education:  [x] Yes  [] No  [x] Reviewed Prior HEP/Ed  Method of Education: [x] Verbal  [x] Demo  [x] Written  Comprehension of Education:  [x] Verbalizes understanding. [x] Demonstrates understanding. [x] Needs review. [] Demonstrates/verbalizes HEP/Ed previously given. Plan: [x] Continue per plan of care. [] Other:      Time In:1305            Time Out: 1350     Electronically signed by:  Carlos Larson.  Omari Wolfe

## 2018-02-22 ENCOUNTER — HOSPITAL ENCOUNTER (OUTPATIENT)
Dept: PHYSICAL THERAPY | Age: 53
Setting detail: THERAPIES SERIES
Discharge: HOME | End: 2018-02-22
Payer: COMMERCIAL

## 2018-02-22 NOTE — FLOWSHEET NOTE
[x] [facility]        Outpatient Physical                Therapy       [address]       Phone: [phone]       Fax: [phone] [] [facility] at [address]       Phone: [phone]       Fax: [phone] [] Caro. [address] [place] [address]      Phone: [phone]      Fax:  [phone]     Physical Therapy Cancel/No Show note    Date: 2018  Patient: Brittani Piedra  : 1965  MRN: [id number]    Cancels/No Shows to date:     For today's appointment patient:  [x]  Cancelled  []  Rescheduled appointment  []  No-show     Reason given by patient:  []  Patient ill  []  Conflicting appointment  []  No transportation    []  Conflict with work  []  No reason given  []  Weather related  []  Other:      Comments:   Patient \" can't walk\" per message. [x]  Next appointment was confirmed    Electronically signed by: Nelli Epstein

## 2018-02-27 ENCOUNTER — HOSPITAL ENCOUNTER (OUTPATIENT)
Dept: PHYSICAL THERAPY | Age: 53
Setting detail: THERAPIES SERIES
Discharge: HOME | End: 2018-02-27
Payer: COMMERCIAL

## 2018-02-27 NOTE — FLOWSHEET NOTE
[x] Chelsi Alexander        Outpatient Physical                Therapy       [address]       Phone: [phone]       Fax: [phone] [] [facility] at [address] [facility]       Phone: [phone]       Fax: [phone] [] Caro. [address] [place] [address]      Phone: [phone]      Fax:  [phone]     Physical Therapy Cancel/No Show note    Date: 2018  Patient: Mitra Dacosta  : 1965  MRN: [id number]    Cancels/No Shows to date:     For today's appointment patient:  []  Cancelled  []  Rescheduled appointment  [x]  No-show     Reason given by patient:  []  Patient ill  []  Conflicting appointment   []  No transportation    []  Conflict with work  []  No reason given  []  Weather related  []  Other:      Comments: Will contact. []  Next appointment was confirmed    Electronically signed by: Antonio John.  Margarito Palomino

## 2018-03-01 ENCOUNTER — HOSPITAL ENCOUNTER (OUTPATIENT)
Dept: PHYSICAL THERAPY | Age: 53
Setting detail: THERAPIES SERIES
Discharge: HOME | End: 2018-03-01
Payer: COMMERCIAL

## 2018-03-02 ENCOUNTER — HOSPITAL ENCOUNTER (OUTPATIENT)
Dept: CT IMAGING | Age: 53
Discharge: HOME | End: 2018-03-04
Payer: COMMERCIAL

## 2018-03-02 ENCOUNTER — HOSPITAL ENCOUNTER (OUTPATIENT)
Dept: INTERVENTIONAL RADIOLOGY/VASCULAR | Age: 53
Discharge: HOME | End: 2018-03-04
Payer: COMMERCIAL

## 2018-03-02 VITALS
DIASTOLIC BLOOD PRESSURE: 78 MMHG | BODY MASS INDEX: 25.11 KG/M2 | HEIGHT: 67 IN | HEART RATE: 62 BPM | WEIGHT: 160 LBS | SYSTOLIC BLOOD PRESSURE: 125 MMHG | RESPIRATION RATE: 18 BRPM | TEMPERATURE: 97.9 F | OXYGEN SATURATION: 100 %

## 2018-03-02 DIAGNOSIS — Z98.1 STATUS POST LUMBAR SPINAL FUSION: ICD-10-CM

## 2018-03-02 DIAGNOSIS — Z98.1 HISTORY OF LUMBAR FUSION: Chronic | ICD-10-CM

## 2018-03-02 LAB
INR BLD: 0.9
PARTIAL THROMBOPLASTIN TIME: 18.3 SEC (ref 20.5–30.5)
PLATELET # BLD: 300 K/UL (ref 138–453)
PROTHROMBIN TIME: 9.8 SEC (ref 9–12)

## 2018-03-02 PROCEDURE — 6370000000 HC RX 637 (ALT 250 FOR IP): Performed by: RADIOLOGY

## 2018-03-02 PROCEDURE — 6360000004 HC RX CONTRAST MEDICATION: Performed by: RADIOLOGY

## 2018-03-02 PROCEDURE — 85610 PROTHROMBIN TIME: CPT

## 2018-03-02 PROCEDURE — 85049 AUTOMATED PLATELET COUNT: CPT

## 2018-03-02 PROCEDURE — 72132 CT LUMBAR SPINE W/DYE: CPT

## 2018-03-02 PROCEDURE — 7100000010 HC PHASE II RECOVERY - FIRST 15 MIN

## 2018-03-02 PROCEDURE — 85730 THROMBOPLASTIN TIME PARTIAL: CPT

## 2018-03-02 PROCEDURE — 72129 CT CHEST SPINE W/DYE: CPT

## 2018-03-02 PROCEDURE — 7100000011 HC PHASE II RECOVERY - ADDTL 15 MIN

## 2018-03-02 PROCEDURE — 72270 MYELOGPHY 2/> SPINE REGIONS: CPT | Performed by: RADIOLOGY

## 2018-03-02 RX ORDER — ACETAMINOPHEN 325 MG/1
650 TABLET ORAL EVERY 4 HOURS PRN
Status: DISCONTINUED | OUTPATIENT
Start: 2018-03-02 | End: 2018-03-05 | Stop reason: HOSPADM

## 2018-03-02 RX ADMIN — IOPAMIDOL 10 ML: 408 INJECTION, SOLUTION INTRATHECAL at 11:10

## 2018-03-02 RX ADMIN — IOPAMIDOL 5 ML: 612 INJECTION, SOLUTION INTRATHECAL at 11:10

## 2018-03-02 RX ADMIN — ACETAMINOPHEN 650 MG: 325 TABLET ORAL at 11:42

## 2018-03-02 ASSESSMENT — PAIN DESCRIPTION - PROGRESSION: CLINICAL_PROGRESSION: NOT CHANGED

## 2018-03-02 ASSESSMENT — PAIN SCALES - GENERAL
PAINLEVEL_OUTOF10: 10
PAINLEVEL_OUTOF10: 10
PAINLEVEL_OUTOF10: 5

## 2018-03-02 ASSESSMENT — PAIN DESCRIPTION - LOCATION
LOCATION: BACK
LOCATION: BACK

## 2018-03-02 ASSESSMENT — PAIN - FUNCTIONAL ASSESSMENT: PAIN_FUNCTIONAL_ASSESSMENT: 0-10

## 2018-03-02 ASSESSMENT — PAIN DESCRIPTION - DESCRIPTORS
DESCRIPTORS: PRESSURE;PENETRATING
DESCRIPTORS: RADIATING

## 2018-03-02 ASSESSMENT — PAIN DESCRIPTION - FREQUENCY: FREQUENCY: CONTINUOUS

## 2018-03-02 ASSESSMENT — PAIN DESCRIPTION - ORIENTATION: ORIENTATION: LOWER;MID

## 2018-03-02 ASSESSMENT — PAIN DESCRIPTION - PAIN TYPE
TYPE: CHRONIC PAIN
TYPE: SURGICAL PAIN

## 2018-03-02 NOTE — H&P
Narrative    No narrative on file                 Hobbies:  Read , the bull dog     OBJECTIVE:   VITALS:  height is 5' 7\" (1.702 m) and weight is 160 lb (72.6 kg). Her temperature is 97.9 °F (36.6 °C). Her blood pressure is 121/68 and her pulse is 62. Her respiration is 16. CONSTITUTIONAL: Alert and oriented times 3, no acute distress and cooperative to examination. friendly and pleasant     SKIN: rash No    HEENT: Head is normocephalic, atraumatic. EOMI, PERRLA    Oral air way :slightly narrow Yes, many missing teeth    NECK: neck supple, no lymphadenopathy noted, trachea midline and straight       2+ carotid, no bruit    LUNGS: Chest expands equally bilaterally upon respiration, no accessory muscle used. Ausculation reveals no adventitious breath sounds. CARDIOVASCULAR: \"Heart sounds are normal.  Regular rate and rhythm without murmur,    ABDOMEN: Bowel sounds are present in all four quadrants      GENATALIA:Deferred. NEUROLOGIC: \"CN II-XII are grossly intact. EXTREMITIES: Pitting edema:  No,  Varicose veins: No     Dorsal pedal/posterior tibial pulses palpable: No         Strength:  Normal       Patient Active Problem List   Diagnosis    Heart disease    Chronic back pain    Depression    Hyperlipidemia    CAD, multiple vessel    Asthma    Smoking    Need for vaccination    Syncope    Leg pain    Left hip pain    Chronic cholecystitis    Chest pain    Calculus of gallbladder without cholecystitis without obstruction    History of lumbar fusion    Failed back syndrome    Marijuana use               IMPRR ESSIONS:   1.  chronic lumbar pain   2.  does not have any pertinent problems on file.     Mikey [facility]  Electronically signed 3/2/2018 at 10:09 AM       Scheduled for: myelogram  Lumbar and thoracic

## 2018-03-05 ENCOUNTER — HOSPITAL ENCOUNTER (OUTPATIENT)
Dept: PHYSICAL THERAPY | Age: 53
Setting detail: THERAPIES SERIES
Discharge: HOME | End: 2018-03-05
Payer: COMMERCIAL

## 2018-03-08 ENCOUNTER — HOSPITAL ENCOUNTER (OUTPATIENT)
Dept: PHYSICAL THERAPY | Age: 53
Setting detail: THERAPIES SERIES
Discharge: HOME | End: 2018-03-08
Payer: COMMERCIAL

## 2018-03-08 NOTE — FLOWSHEET NOTE
[] Antonio Fess        Outpatient Physical                Therapy       [address]       Phone: [phone]       Fax: [phone] [] [facility]       Promotion at [address] [place] [address]       Phone: [phone]       Fax: [phone] [] [facility]. [facility] Promotion     [address] [place] [address]      Phone: [phone]      Fax:  [phone]     Physical Therapy Cancel/No Show note    Date: 3/8/2018  Patient: Rodolfo Valencia  : 1965  MRN: [id number]    Cancels/No Shows to date:     For today's appointment patient:  []  Cancelled  []  Rescheduled appointment  [x]  No-show     Reason given by patient:  []  Patient ill  []  Conflicting appointment  []  No transportation    []  Conflict with work  []  No reason given  []  Weather related  []  Other:      Comments: Will discharge per attendance policy in one week if no patient contact is made. []  Next appointment was confirmed    Electronically signed by: Aleksandra Temple.  Farzaneh Rai

## 2018-03-27 NOTE — DISCHARGE SUMMARY
[x] [facility]        Outpatient Physical                Therapy       [address]       Phone: [phone]       Fax: [phone] [] [facility] at [address] [facility]       Phone: [phone]       Fax: [phone] [] [place]     [address] [facility]     Phone: [phone]     Fax:  [phone]     Physical Therapy Discharge Note    Date: 3/27/2018      Patient: Edi Ramirez  : 1965  MRN: [id number]    Physician: Dorcas Leigh MD                [facility]  Medical Diagnosis: Chronic Midline low back pain with sciatica M54.40    Rehab Codes: M54.5, R26.2  DOS: 2017                                 Next 's appt. :   Visit# / total visits:                     Cancels/No Shows:       Subjective:  Refer to initial evaluation. Objective:  Refer to initial evaluation. Assessment:  Refer to initial evaluation. Treatment to Date:  [] Therapeutic Exercise    [] Modalities:  [x] Therapeutic Activity     [] Ultrasound  [] Electrical Stimulation  [] Gait Training     [] Massage       [] Lumbar/Cervical Traction  [] Neuromuscular Re-education [] Cold/hotpack [] Iontophoresis: 4 mg/mL  [x] Instruction in Home Exercise Program                     Dexamethasone Sodium  [] Manual Therapy             Phosphate 40-80 mAmin  [] Aquatic Therapy                   [] Vasocompression/    [] Other:             Game Ready    Discharge Status:     [] Pt to continue exercise/home instructions independently. [] Therapy interrupted due to:    [x] Pt has 2 or more no shows/cancels, is discontinued per our policy. [] Other:         Electronically signed by Blossom Gipson PT on 3/27/2018 at 2:59 PM      If you have any questions or concerns, please don't hesitate to call.   Thank you for your referral.

## 2018-04-05 ENCOUNTER — TELEPHONE (OUTPATIENT)
Dept: PAIN MANAGEMENT | Age: 53
End: 2018-04-05

## 2018-04-17 ENCOUNTER — TELEPHONE (OUTPATIENT)
Dept: PAIN MANAGEMENT | Age: 53
End: 2018-04-17

## 2018-04-24 ENCOUNTER — HOSPITAL ENCOUNTER (OUTPATIENT)
Dept: PAIN MANAGEMENT | Age: 53
Discharge: HOME | End: 2018-04-24
Payer: COMMERCIAL

## 2018-04-24 VITALS
HEIGHT: 67 IN | TEMPERATURE: 98 F | DIASTOLIC BLOOD PRESSURE: 88 MMHG | WEIGHT: 160 LBS | BODY MASS INDEX: 25.11 KG/M2 | SYSTOLIC BLOOD PRESSURE: 141 MMHG | HEART RATE: 76 BPM | RESPIRATION RATE: 14 BRPM

## 2018-04-24 DIAGNOSIS — F12.90 MARIJUANA USE: Chronic | ICD-10-CM

## 2018-04-24 DIAGNOSIS — M96.1 FAILED BACK SYNDROME: Primary | Chronic | ICD-10-CM

## 2018-04-24 PROCEDURE — 99214 OFFICE O/P EST MOD 30 MIN: CPT | Performed by: ANESTHESIOLOGY

## 2018-04-24 PROCEDURE — 99214 OFFICE O/P EST MOD 30 MIN: CPT

## 2018-04-24 ASSESSMENT — PAIN SCALES - GENERAL: PAINLEVEL_OUTOF10: 8

## 2018-04-24 ASSESSMENT — PAIN DESCRIPTION - PROGRESSION: CLINICAL_PROGRESSION: GRADUALLY WORSENING

## 2018-04-24 ASSESSMENT — PAIN DESCRIPTION - LOCATION: LOCATION: BACK;LEG;FOOT

## 2018-04-24 ASSESSMENT — ENCOUNTER SYMPTOMS: HEARTBURN: 0

## 2018-04-24 ASSESSMENT — PAIN DESCRIPTION - PAIN TYPE: TYPE: CHRONIC PAIN

## 2018-04-24 ASSESSMENT — PAIN DESCRIPTION - FREQUENCY: FREQUENCY: CONTINUOUS

## 2018-04-24 ASSESSMENT — PAIN DESCRIPTION - ORIENTATION: ORIENTATION: LEFT;RIGHT

## 2018-04-25 ENCOUNTER — OFFICE VISIT (OUTPATIENT)
Dept: INTERNAL MEDICINE | Age: 53
End: 2018-04-25
Payer: COMMERCIAL

## 2018-04-25 VITALS
WEIGHT: 161 LBS | HEART RATE: 84 BPM | DIASTOLIC BLOOD PRESSURE: 84 MMHG | SYSTOLIC BLOOD PRESSURE: 129 MMHG | OXYGEN SATURATION: 96 % | HEIGHT: 67 IN | BODY MASS INDEX: 25.27 KG/M2

## 2018-04-25 DIAGNOSIS — F17.200 SMOKING: ICD-10-CM

## 2018-04-25 DIAGNOSIS — Z23 NEED FOR PROPHYLACTIC VACCINATION AND INOCULATION AGAINST VARICELLA: ICD-10-CM

## 2018-04-25 DIAGNOSIS — M54.40 CHRONIC MIDLINE LOW BACK PAIN WITH SCIATICA, SCIATICA LATERALITY UNSPECIFIED: ICD-10-CM

## 2018-04-25 DIAGNOSIS — F32.A DEPRESSION, UNSPECIFIED DEPRESSION TYPE: Primary | ICD-10-CM

## 2018-04-25 DIAGNOSIS — Z23 NEED FOR PROPHYLACTIC VACCINATION AGAINST STREPTOCOCCUS PNEUMONIAE (PNEUMOCOCCUS) AND INFLUENZA: ICD-10-CM

## 2018-04-25 DIAGNOSIS — G89.29 CHRONIC MIDLINE LOW BACK PAIN WITH SCIATICA, SCIATICA LATERALITY UNSPECIFIED: ICD-10-CM

## 2018-04-25 PROCEDURE — 4004F PT TOBACCO SCREEN RCVD TLK: CPT | Performed by: FAMILY MEDICINE

## 2018-04-25 PROCEDURE — 99214 OFFICE O/P EST MOD 30 MIN: CPT | Performed by: FAMILY MEDICINE

## 2018-04-25 PROCEDURE — G8427 DOCREV CUR MEDS BY ELIG CLIN: HCPCS | Performed by: FAMILY MEDICINE

## 2018-04-25 PROCEDURE — 3017F COLORECTAL CA SCREEN DOC REV: CPT | Performed by: FAMILY MEDICINE

## 2018-04-25 PROCEDURE — G8419 CALC BMI OUT NRM PARAM NOF/U: HCPCS | Performed by: FAMILY MEDICINE

## 2018-04-25 PROCEDURE — 90471 IMMUNIZATION ADMIN: CPT | Performed by: FAMILY MEDICINE

## 2018-04-25 PROCEDURE — 90732 PPSV23 VACC 2 YRS+ SUBQ/IM: CPT | Performed by: FAMILY MEDICINE

## 2018-04-25 PROCEDURE — G8599 NO ASA/ANTIPLAT THER USE RNG: HCPCS | Performed by: FAMILY MEDICINE

## 2018-04-25 RX ORDER — FLUTICASONE FUROATE AND VILANTEROL 100; 25 UG/1; UG/1
2 POWDER RESPIRATORY (INHALATION) DAILY
Qty: 1 EACH | Refills: 3 | Status: SHIPPED | OUTPATIENT
Start: 2018-04-25 | End: 2018-09-24 | Stop reason: SDUPTHER

## 2018-04-25 RX ORDER — ALBUTEROL SULFATE 90 UG/1
2 AEROSOL, METERED RESPIRATORY (INHALATION) EVERY 4 HOURS PRN
Qty: 18 G | Refills: 5 | Status: SHIPPED | OUTPATIENT
Start: 2018-04-25 | End: 2018-12-07 | Stop reason: SDUPTHER

## 2018-04-25 RX ORDER — GABAPENTIN 300 MG/1
300 CAPSULE ORAL NIGHTLY
Qty: 30 CAPSULE | Refills: 3
Start: 2018-04-25 | End: 2018-08-09 | Stop reason: SDUPTHER

## 2018-04-25 RX ORDER — CITALOPRAM 40 MG/1
40 TABLET ORAL DAILY
Qty: 30 TABLET | Refills: 3 | Status: SHIPPED | OUTPATIENT
Start: 2018-04-25 | End: 2018-09-24 | Stop reason: SDUPTHER

## 2018-04-25 RX ORDER — GABAPENTIN 100 MG/1
100 CAPSULE ORAL DAILY
Qty: 30 CAPSULE | Refills: 3 | Status: SHIPPED | OUTPATIENT
Start: 2018-04-25 | End: 2018-10-02 | Stop reason: SDUPTHER

## 2018-04-25 ASSESSMENT — ENCOUNTER SYMPTOMS
WHEEZING: 0
SHORTNESS OF BREATH: 0
COUGH: 0
BACK PAIN: 1
VOMITING: 0
NAUSEA: 0

## 2018-05-15 RX ORDER — BACLOFEN 10 MG/1
10 TABLET ORAL 3 TIMES DAILY
Qty: 90 TABLET | Refills: 0 | Status: SHIPPED | OUTPATIENT
Start: 2018-05-15 | End: 2018-07-06 | Stop reason: SDUPTHER

## 2018-06-26 RX ORDER — LEVETIRACETAM 500 MG/1
500 TABLET ORAL DAILY
Qty: 90 TABLET | Refills: 0 | Status: SHIPPED | OUTPATIENT
Start: 2018-06-26 | End: 2018-10-11 | Stop reason: SDUPTHER

## 2018-07-09 RX ORDER — BACLOFEN 10 MG/1
TABLET ORAL
Qty: 90 TABLET | Refills: 0 | Status: SHIPPED | OUTPATIENT
Start: 2018-07-09 | End: 2018-10-11 | Stop reason: SDUPTHER

## 2018-08-02 DIAGNOSIS — E78.00 PURE HYPERCHOLESTEROLEMIA: ICD-10-CM

## 2018-08-03 RX ORDER — ATORVASTATIN CALCIUM 40 MG/1
40 TABLET, FILM COATED ORAL DAILY
Qty: 30 TABLET | Refills: 3 | Status: SHIPPED | OUTPATIENT
Start: 2018-08-03 | End: 2019-01-13 | Stop reason: SDUPTHER

## 2018-08-09 DIAGNOSIS — G89.29 CHRONIC MIDLINE LOW BACK PAIN WITH SCIATICA, SCIATICA LATERALITY UNSPECIFIED: ICD-10-CM

## 2018-08-09 DIAGNOSIS — M54.40 CHRONIC MIDLINE LOW BACK PAIN WITH SCIATICA, SCIATICA LATERALITY UNSPECIFIED: ICD-10-CM

## 2018-08-09 RX ORDER — GABAPENTIN 300 MG/1
300 CAPSULE ORAL NIGHTLY
Qty: 30 CAPSULE | Refills: 3 | Status: SHIPPED | OUTPATIENT
Start: 2018-08-09 | End: 2018-09-19 | Stop reason: SDUPTHER

## 2018-09-19 DIAGNOSIS — M54.40 CHRONIC MIDLINE LOW BACK PAIN WITH SCIATICA, SCIATICA LATERALITY UNSPECIFIED: ICD-10-CM

## 2018-09-19 DIAGNOSIS — G89.29 CHRONIC MIDLINE LOW BACK PAIN WITH SCIATICA, SCIATICA LATERALITY UNSPECIFIED: ICD-10-CM

## 2018-09-19 NOTE — TELEPHONE ENCOUNTER
Next Visit Date:  No future appointments.     Health Maintenance   Topic Date Due    Cervical cancer screen  08/08/1986    Shingles Vaccine (1 of 2 - 2 Dose Series) 08/08/2015    Flu vaccine (1) 09/01/2018    Hepatitis C screen  01/19/2019 (Originally 1965)    HIV screen  01/19/2019 (Originally 8/8/1980)    Colon Cancer Screen FIT/FOBT  02/20/2019    Breast cancer screen  02/06/2020    Diabetes screen  01/30/2021    Lipid screen  11/08/2022    DTaP/Tdap/Td vaccine (2 - Td) 09/20/2027    Pneumococcal med risk  Completed       Hemoglobin A1C (%)   Date Value   01/30/2018 5.3   08/02/2013 5.3   02/14/2013 6.0             ( goal A1C is < 7)   No results found for: LABMICR  LDL Cholesterol (mg/dL)   Date Value   11/08/2017 198 (H)   08/03/2013 149 (H)       (goal LDL is <100)   AST (U/L)   Date Value   11/11/2017 48 (H)     ALT (U/L)   Date Value   11/11/2017 34 (H)     BUN (mg/dL)   Date Value   11/11/2017 11     BP Readings from Last 3 Encounters:   04/25/18 129/84   04/24/18 (!) 141/88   03/02/18 125/78          (goal 120/80)    All Future Testing planned in [facility]  Lab Frequency Next Occurrence   XR LUMBAR SPINE FLEXION AND EXTENSION ONLY Once 02/13/2018               Patient Active Problem List:     Heart disease     Chronic back pain     Depression     Hyperlipidemia     CAD, multiple vessel     Asthma     Smoking     Need for vaccination     Syncope     Leg pain     Left hip pain     Chronic cholecystitis     Chest pain     Calculus of gallbladder without cholecystitis without obstruction     History of lumbar fusion     Failed back syndrome     Marijuana use

## 2018-09-20 RX ORDER — GABAPENTIN 300 MG/1
300 CAPSULE ORAL NIGHTLY
Qty: 30 CAPSULE | Refills: 3 | Status: SHIPPED | OUTPATIENT
Start: 2018-09-20 | End: 2019-03-06 | Stop reason: SDUPTHER

## 2018-09-24 ENCOUNTER — TELEPHONE (OUTPATIENT)
Dept: INTERNAL MEDICINE | Age: 53
End: 2018-09-24

## 2018-09-24 NOTE — TELEPHONE ENCOUNTER
Last visit:  Last Med refill:    Next Visit Date:  Future Appointments  Date Time Provider Kaycee Lis   10/2/2018 9:20 AM Fernie Brewster MD [facility] [address] Maintenance   Topic Date Due    Cervical cancer screen  08/08/1986    Shingles Vaccine (1 of 2 - 2 Dose Series) 08/08/2015    Flu vaccine (1) 09/01/2018    Hepatitis C screen  01/19/2019 (Originally 1965)    HIV screen  01/19/2019 (Originally 8/8/1980)    Colon Cancer Screen FIT/FOBT  02/20/2019    Breast cancer screen  02/06/2020    Diabetes screen  01/30/2021    Lipid screen  11/08/2022    DTaP/Tdap/Td vaccine (2 - Td) 09/20/2027    Pneumococcal med risk  Completed       Hemoglobin A1C (%)   Date Value   01/30/2018 5.3   08/02/2013 5.3   02/14/2013 6.0             ( goal A1C is < 7)   No results found for: LABMICR  LDL Cholesterol (mg/dL)   Date Value   11/08/2017 198 (H)   08/03/2013 149 (H)       (goal LDL is <100)   AST (U/L)   Date Value   11/11/2017 48 (H)     ALT (U/L)   Date Value   11/11/2017 34 (H)     BUN (mg/dL)   Date Value   11/11/2017 11     BP Readings from Last 3 Encounters:   04/25/18 129/84   04/24/18 (!) 141/88   03/02/18 125/78          (goal 120/80)    All Future Testing planned in [facility]  Lab Frequency Next Occurrence   XR LUMBAR SPINE FLEXION AND EXTENSION ONLY Once 02/13/2018               Patient Active Problem List:     Heart disease     Chronic back pain     Depression     Hyperlipidemia     CAD, multiple vessel     Asthma     Smoking     Need for vaccination     Syncope     Leg pain     Left hip pain     Chronic cholecystitis     Chest pain     Calculus of gallbladder without cholecystitis without obstruction     History of lumbar fusion     Failed back syndrome     Marijuana use

## 2018-09-25 RX ORDER — CITALOPRAM 40 MG/1
40 TABLET ORAL DAILY
Qty: 30 TABLET | Refills: 3 | Status: SHIPPED | OUTPATIENT
Start: 2018-09-25 | End: 2018-09-25 | Stop reason: SDUPTHER

## 2018-09-25 RX ORDER — FLUTICASONE FUROATE AND VILANTEROL 100; 25 UG/1; UG/1
2 POWDER RESPIRATORY (INHALATION) DAILY
Qty: 1 EACH | Refills: 3 | Status: SHIPPED | OUTPATIENT
Start: 2018-09-25 | End: 2019-03-06 | Stop reason: SDUPTHER

## 2018-09-25 NOTE — TELEPHONE ENCOUNTER
Health Maintenance   Topic Date Due    Cervical cancer screen  08/08/1986    Shingles Vaccine (1 of 2 - 2 Dose Series) 08/08/2015    Flu vaccine (1) 09/01/2018    Hepatitis C screen  01/19/2019 (Originally 1965)    HIV screen  01/19/2019 (Originally 8/8/1980)    Colon Cancer Screen FIT/FOBT  02/20/2019    Breast cancer screen  02/06/2020    Diabetes screen  01/30/2021    Lipid screen  11/08/2022    DTaP/Tdap/Td vaccine (2 - Td) 09/20/2027    Pneumococcal med risk  Completed             (applicable per patient's age: Cancer Screenings, Depression Screening, Fall Risk Screening, Immunizations)    Hemoglobin A1C (%)   Date Value   01/30/2018 5.3   08/02/2013 5.3   02/14/2013 6.0     LDL Cholesterol (mg/dL)   Date Value   11/08/2017 198 (H)     AST (U/L)   Date Value   11/11/2017 48 (H)     ALT (U/L)   Date Value   11/11/2017 34 (H)     BUN (mg/dL)   Date Value   11/11/2017 11      (goal A1C is < 7)   (goal LDL is <100) need 30-50% reduction from baseline     BP Readings from Last 3 Encounters:   04/25/18 129/84   04/24/18 (!) 141/88   03/02/18 125/78    (goal /80)      All Future Testing planned in [facility]:  Lab Frequency Next Occurrence   XR LUMBAR SPINE FLEXION AND EXTENSION ONLY Once 02/13/2018       Next Visit Date:  Future Appointments  Date Time Provider Kaycee Hays   10/2/2018 9:20 AM Veronica Sanchez MD [id number]            Patient Active Problem List:     Heart disease     Chronic back pain     Depression     Hyperlipidemia     CAD, multiple vessel     Asthma     Smoking     Need for vaccination     Syncope     Leg pain     Left hip pain     Chronic cholecystitis     Chest pain     Calculus of gallbladder without cholecystitis without obstruction     History of lumbar fusion     Failed back syndrome     Marijuana use

## 2018-09-26 RX ORDER — CITALOPRAM 40 MG/1
40 TABLET ORAL DAILY
Qty: 30 TABLET | Refills: 3 | Status: SHIPPED | OUTPATIENT
Start: 2018-09-26 | End: 2019-03-06 | Stop reason: ALTCHOICE

## 2018-10-02 ENCOUNTER — OFFICE VISIT (OUTPATIENT)
Dept: INTERNAL MEDICINE | Age: 53
End: 2018-10-02
Payer: COMMERCIAL

## 2018-10-02 VITALS
RESPIRATION RATE: 12 BRPM | OXYGEN SATURATION: 98 % | SYSTOLIC BLOOD PRESSURE: 118 MMHG | WEIGHT: 166.2 LBS | HEIGHT: 67 IN | HEART RATE: 74 BPM | DIASTOLIC BLOOD PRESSURE: 83 MMHG | BODY MASS INDEX: 26.09 KG/M2

## 2018-10-02 DIAGNOSIS — G89.29 CHRONIC MIDLINE LOW BACK PAIN WITH SCIATICA, SCIATICA LATERALITY UNSPECIFIED: ICD-10-CM

## 2018-10-02 DIAGNOSIS — M96.1 FAILED BACK SYNDROME: Chronic | ICD-10-CM

## 2018-10-02 DIAGNOSIS — F33.1 MODERATE EPISODE OF RECURRENT MAJOR DEPRESSIVE DISORDER (HCC): Primary | ICD-10-CM

## 2018-10-02 DIAGNOSIS — M54.40 CHRONIC MIDLINE LOW BACK PAIN WITH SCIATICA, SCIATICA LATERALITY UNSPECIFIED: ICD-10-CM

## 2018-10-02 DIAGNOSIS — R10.12 LUQ ABDOMINAL PAIN: ICD-10-CM

## 2018-10-02 DIAGNOSIS — Z23 NEED FOR PROPHYLACTIC VACCINATION AND INOCULATION AGAINST INFLUENZA: ICD-10-CM

## 2018-10-02 PROCEDURE — 4004F PT TOBACCO SCREEN RCVD TLK: CPT | Performed by: INTERNAL MEDICINE

## 2018-10-02 PROCEDURE — 99214 OFFICE O/P EST MOD 30 MIN: CPT | Performed by: INTERNAL MEDICINE

## 2018-10-02 PROCEDURE — G8419 CALC BMI OUT NRM PARAM NOF/U: HCPCS | Performed by: INTERNAL MEDICINE

## 2018-10-02 PROCEDURE — G8482 FLU IMMUNIZE ORDER/ADMIN: HCPCS | Performed by: INTERNAL MEDICINE

## 2018-10-02 PROCEDURE — 90471 IMMUNIZATION ADMIN: CPT | Performed by: INTERNAL MEDICINE

## 2018-10-02 PROCEDURE — G8599 NO ASA/ANTIPLAT THER USE RNG: HCPCS | Performed by: INTERNAL MEDICINE

## 2018-10-02 PROCEDURE — 90686 IIV4 VACC NO PRSV 0.5 ML IM: CPT | Performed by: INTERNAL MEDICINE

## 2018-10-02 PROCEDURE — G8427 DOCREV CUR MEDS BY ELIG CLIN: HCPCS | Performed by: INTERNAL MEDICINE

## 2018-10-02 PROCEDURE — 3017F COLORECTAL CA SCREEN DOC REV: CPT | Performed by: INTERNAL MEDICINE

## 2018-10-02 RX ORDER — GABAPENTIN 100 MG/1
100 CAPSULE ORAL DAILY
Qty: 30 CAPSULE | Refills: 3 | Status: SHIPPED | OUTPATIENT
Start: 2018-10-02 | End: 2019-03-06 | Stop reason: SDUPTHER

## 2018-10-02 RX ORDER — VENLAFAXINE HYDROCHLORIDE 75 MG/1
75 CAPSULE, EXTENDED RELEASE ORAL DAILY
Qty: 30 CAPSULE | Refills: 3 | Status: SHIPPED | OUTPATIENT
Start: 2018-10-02 | End: 2019-03-06 | Stop reason: ALTCHOICE

## 2018-10-02 RX ORDER — OMEPRAZOLE 20 MG/1
20 CAPSULE, DELAYED RELEASE ORAL DAILY
Qty: 30 CAPSULE | Refills: 3 | Status: SHIPPED | OUTPATIENT
Start: 2018-10-02 | End: 2019-03-06 | Stop reason: ALTCHOICE

## 2018-10-02 ASSESSMENT — ENCOUNTER SYMPTOMS
BACK PAIN: 1
VOMITING: 1
RESPIRATORY NEGATIVE: 1
ABDOMINAL PAIN: 1
NAUSEA: 1
CONSTIPATION: 1
ALLERGIC/IMMUNOLOGIC NEGATIVE: 1
EYES NEGATIVE: 1

## 2018-10-02 NOTE — PROGRESS NOTES
[facility]ICINE [facility] [place] [id number]  [address]  [place] [address]  Dept: [phone]  Dept Fax: [phone]    Kay Maradiaga is a 48 y.o. female who presents today for   Chief Complaint   Patient presents with    Back Pain    Discuss Medications     Celexa is not helping would like to discuss taking something different     Insect Bite     PT was stung 2 days ago on neck     and follow up of chronic medical problems:   Patient Active Problem List   Diagnosis    Heart disease    Chronic back pain    Depression    Hyperlipidemia    CAD, multiple vessel    Asthma    Smoking    Need for vaccination    Syncope    Leg pain    Left hip pain    Chronic cholecystitis    Chest pain    Calculus of gallbladder without cholecystitis without obstruction    History of lumbar fusion    Failed back syndrome    Marijuana use    Abnormal weight loss    Allergic rhinitis    Anxiety state    Chronic midline low back pain with sciatica    Moderate episode of recurrent major depressive disorder ([address]   .     Past Medical History:   Diagnosis Date    Acute MI ([address]     Anxiety     Chronic back pain     COPD (chronic obstructive pulmonary disease) (HCC)     Depression     Heart disease     Hyperlipidemia     Hypertension     MRSA (methicillin resistant staph aureus) culture positive 09/05/2017    abscess       Past Surgical History:   Procedure Laterality Date    BACK SURGERY      diskectomy 2000, fusion 2007, fusion 7/2017    CHOLECYSTECTOMY, LAPAROSCOPIC  11/10/2017    robotic assisted    CHOLECYSTECTOMY, LAPAROSCOPIC N/A 11/10/2017    XI ROBOTIC CHOLECYSTECTOMY LAPAROSCOPIC performed by Navdeep Gómez DO at [address]         Family History   Problem Relation Age of Onset    Other Mother     Heart Disease Father     High Blood Pressure Father     High Cholesterol Father     Other Brother        Social History sounds are normal. There is tenderness in the left upper quadrant. There is no rigidity, no rebound, no guarding, no tenderness at McBurney's point and negative Apodaca's sign. Musculoskeletal: She exhibits no edema. Neurological: She is alert and oriented to person, place, and time. Skin: Skin is warm and dry. Psychiatric: She has a normal mood and affect. Her behavior is normal.   Vitals reviewed. Visit Information    Have you changed or started any medications since your last visit including any over-the-counter medicines, vitamins, or herbal medicines? no   Are you having any side effects from any of your medications? -  no  Have you stopped taking any of your medications? Is so, why? -  no    Have you seen any other physician or provider since your last visit? No  Have you had any other diagnostic tests since your last visit? No  Have you been seen in the emergency room and/or had an admission to a hospital since we last saw you? No  Have you had your routine dental cleaning in the past 6 months? yes -     Have you activated your [employer] account? If not, what are your barriers?  Yes     Patient Care Team:  Andrew Kyle MD as PCP - General (Internal Medicine)    Medical History Review  Past Medical, Family, and Social History reviewed and does contribute to the patient presenting condition    Health Maintenance   Topic Date Due    Cervical cancer screen  10/16/2018 (Originally 8/8/1986)    Shingles Vaccine (1 of 2 - 2 Dose Series) 10/16/2018 (Originally 8/8/2015)    Hepatitis C screen  01/19/2019 (Originally 1965)    HIV screen  01/19/2019 (Originally 8/8/1980)    Colon Cancer Screen FIT/FOBT  02/20/2019    Breast cancer screen  02/06/2020    Diabetes screen  01/30/2021    Lipid screen  11/08/2022    DTaP/Tdap/Td vaccine (2 - Td) 09/20/2027    Flu vaccine  Completed    Pneumococcal med risk  Completed

## 2018-10-11 RX ORDER — BACLOFEN 10 MG/1
TABLET ORAL
Qty: 90 TABLET | Refills: 0 | Status: SHIPPED | OUTPATIENT
Start: 2018-10-11 | End: 2019-03-06 | Stop reason: ALTCHOICE

## 2018-10-11 RX ORDER — LEVETIRACETAM 500 MG/1
TABLET ORAL
Qty: 30 TABLET | Refills: 0 | Status: SHIPPED | OUTPATIENT
Start: 2018-10-11 | End: 2018-11-14 | Stop reason: SDUPTHER

## 2018-11-14 NOTE — TELEPHONE ENCOUNTER
E-scribe request for levETIRAcetam 500 MG TABLET. Please review and e-scribe if applicable.      Last Visit Date:  10/2/18  Next Visit Date:  2/4/18    Hemoglobin A1C (%)   Date Value   01/30/2018 5.3   08/02/2013 5.3   02/14/2013 6.0             ( goal A1C is < 7)   No results found for: LABMICR  LDL Cholesterol (mg/dL)   Date Value   11/08/2017 198 (H)       (goal LDL is <100)   AST (U/L)   Date Value   11/11/2017 48 (H)     ALT (U/L)   Date Value   11/11/2017 34 (H)     BUN (mg/dL)   Date Value   11/11/2017 11     BP Readings from Last 3 Encounters:   10/02/18 118/83   04/25/18 129/84   04/24/18 (!) 141/88          (goal 120/80)        Patient Active Problem List:     Heart disease     Chronic back pain     Depression     Hyperlipidemia     CAD, multiple vessel     Asthma     Smoking     Need for vaccination     Syncope     Leg pain     Left hip pain     Chronic cholecystitis     Chest pain     Calculus of gallbladder without cholecystitis without obstruction     History of lumbar fusion     Failed back syndrome     Marijuana use     Abnormal weight loss     Allergic rhinitis     Anxiety state     Chronic midline low back pain with sciatica     Moderate episode of recurrent major depressive disorder ([address]      ----JF

## 2018-11-15 RX ORDER — LEVETIRACETAM 500 MG/1
TABLET ORAL
Qty: 30 TABLET | Refills: 0 | Status: SHIPPED | OUTPATIENT
Start: 2018-11-15 | End: 2018-12-24 | Stop reason: SDUPTHER

## 2018-12-07 NOTE — TELEPHONE ENCOUNTER
Last visit:10/02/2018  Last Med refill:10/26/2018    Next Visit Date:  Future Appointments  Date Time Provider Kaycee Lis   2/4/2019 10:00 AM Kristen Hurtado  [facility] Maintenance   Topic Date Due    Cervical cancer screen  08/08/1986    Shingles Vaccine (1 of 2 - 2 Dose Series) 08/08/2015    Hepatitis C screen  01/19/2019 (Originally 1965)    HIV screen  01/19/2019 (Originally 8/8/1980)    Colon Cancer Screen FIT/FOBT  02/20/2019    Breast cancer screen  02/06/2020    Diabetes screen  01/30/2021    Lipid screen  11/08/2022    DTaP/Tdap/Td vaccine (2 - Td) 09/20/2027    Flu vaccine  Completed    Pneumococcal med risk  Completed       Hemoglobin A1C (%)   Date Value   01/30/2018 5.3   08/02/2013 5.3   02/14/2013 6.0             ( goal A1C is < 7)   No results found for: LABMICR  LDL Cholesterol (mg/dL)   Date Value   11/08/2017 198 (H)   08/03/2013 149 (H)       (goal LDL is <100)   AST (U/L)   Date Value   11/11/2017 48 (H)     ALT (U/L)   Date Value   11/11/2017 34 (H)     BUN (mg/dL)   Date Value   11/11/2017 11     BP Readings from Last 3 Encounters:   10/02/18 118/83   04/25/18 129/84   04/24/18 (!) 141/88          (goal 120/80)    All Future Testing planned in [facility]  Lab Frequency Next Occurrence   XR LUMBAR SPINE FLEXION AND EXTENSION ONLY Once 02/13/2018   FL UGI W AIR CONTRAST Once 09/02/2019               Patient Active Problem List:     Heart disease     Chronic back pain     Depression     Hyperlipidemia     CAD, multiple vessel     Asthma     Smoking     Need for vaccination     Syncope     Leg pain     Left hip pain     Chronic cholecystitis     Chest pain     Calculus of gallbladder without cholecystitis without obstruction     History of lumbar fusion     Failed back syndrome     Marijuana use     Abnormal weight loss     Allergic rhinitis     Anxiety state     Chronic midline low back pain with sciatica     Moderate episode of recurrent major

## 2018-12-10 RX ORDER — ALBUTEROL SULFATE 90 UG/1
2 AEROSOL, METERED RESPIRATORY (INHALATION) EVERY 4 HOURS PRN
Qty: 18 G | Refills: 5 | Status: SHIPPED | OUTPATIENT
Start: 2018-12-10 | End: 2019-03-06 | Stop reason: SDUPTHER

## 2018-12-24 RX ORDER — LEVETIRACETAM 500 MG/1
TABLET ORAL
Qty: 30 TABLET | Refills: 0 | Status: SHIPPED | OUTPATIENT
Start: 2018-12-24 | End: 2019-01-28 | Stop reason: SDUPTHER

## 2018-12-24 NOTE — TELEPHONE ENCOUNTER
Health Maintenance   Topic Date Due    Cervical cancer screen  08/08/1986    Shingles Vaccine (1 of 2 - 2 Dose Series) 08/08/2015    Hepatitis C screen  01/19/2019 (Originally 1965)    HIV screen  01/19/2019 (Originally 8/8/1980)    Colon Cancer Screen FIT/FOBT  02/20/2019    Breast cancer screen  02/06/2020    Diabetes screen  01/30/2021    Lipid screen  11/08/2022    DTaP/Tdap/Td vaccine (2 - Td) 09/20/2027    Flu vaccine  Completed    Pneumococcal med risk  Completed             (applicable per patient's age: Cancer Screenings, Depression Screening, Fall Risk Screening, Immunizations)    Hemoglobin A1C (%)   Date Value   01/30/2018 5.3   08/02/2013 5.3   02/14/2013 6.0     LDL Cholesterol (mg/dL)   Date Value   11/08/2017 198 (H)     AST (U/L)   Date Value   11/11/2017 48 (H)     ALT (U/L)   Date Value   11/11/2017 34 (H)     BUN (mg/dL)   Date Value   11/11/2017 11      (goal A1C is < 7)   (goal LDL is <100) need 30-50% reduction from baseline     BP Readings from Last 3 Encounters:   10/02/18 118/83   04/25/18 129/84   04/24/18 (!) 141/88    (goal /80)      All Future Testing planned in [facility]:  Lab Frequency Next Occurrence   XR LUMBAR SPINE FLEXION AND EXTENSION ONLY Once 02/13/2018   FL UGI W AIR CONTRAST Once 09/02/2019       Next Visit Date:  Future Appointments  Date Time Provider Kaycee Hays   2/4/2019 10:00 AM Annamaria Alvarado  [address]            Patient Active Problem List:     Heart disease     Chronic back pain     Depression     Hyperlipidemia     CAD, multiple vessel     Asthma     Smoking     Need for vaccination     Syncope     Leg pain     Left hip pain     Chronic cholecystitis     Chest pain     Calculus of gallbladder without cholecystitis without obstruction     History of lumbar fusion     Failed back syndrome     Marijuana use     Abnormal weight loss     Allergic rhinitis     Anxiety state     Chronic midline low back pain with sciatica

## 2018-12-27 ENCOUNTER — TELEPHONE (OUTPATIENT)
Dept: PRIMARY CARE CLINIC | Age: 53
End: 2018-12-27

## 2019-01-13 DIAGNOSIS — E78.00 PURE HYPERCHOLESTEROLEMIA: ICD-10-CM

## 2019-01-14 RX ORDER — ATORVASTATIN CALCIUM 40 MG/1
40 TABLET, FILM COATED ORAL DAILY
Qty: 30 TABLET | Refills: 3 | Status: SHIPPED | OUTPATIENT
Start: 2019-01-14 | End: 2019-03-06 | Stop reason: SDUPTHER

## 2019-01-29 RX ORDER — LEVETIRACETAM 500 MG/1
TABLET ORAL
Qty: 30 TABLET | Refills: 0 | Status: SHIPPED | OUTPATIENT
Start: 2019-01-29 | End: 2019-03-06 | Stop reason: SDUPTHER

## 2019-02-05 ENCOUNTER — TELEPHONE (OUTPATIENT)
Dept: PRIMARY CARE CLINIC | Age: 54
End: 2019-02-05

## 2019-03-03 RX ORDER — VENLAFAXINE HYDROCHLORIDE 75 MG/1
CAPSULE, EXTENDED RELEASE ORAL
Qty: 30 CAPSULE | Refills: 2 | OUTPATIENT
Start: 2019-03-03

## 2019-03-03 RX ORDER — LEVETIRACETAM 500 MG/1
TABLET ORAL
Qty: 30 TABLET | Refills: 0 | OUTPATIENT
Start: 2019-03-03

## 2019-03-06 ENCOUNTER — HOSPITAL ENCOUNTER (OUTPATIENT)
Age: 54
Setting detail: SPECIMEN
Discharge: HOME | End: 2019-03-06
Payer: COMMERCIAL

## 2019-03-06 ENCOUNTER — OFFICE VISIT (OUTPATIENT)
Dept: PRIMARY CARE CLINIC | Age: 54
End: 2019-03-06
Payer: COMMERCIAL

## 2019-03-06 VITALS
HEIGHT: 67 IN | WEIGHT: 159 LBS | OXYGEN SATURATION: 95 % | SYSTOLIC BLOOD PRESSURE: 124 MMHG | HEART RATE: 84 BPM | BODY MASS INDEX: 24.96 KG/M2 | DIASTOLIC BLOOD PRESSURE: 84 MMHG | TEMPERATURE: 98.1 F

## 2019-03-06 DIAGNOSIS — Z23 NEED FOR PROPHYLACTIC VACCINATION AND INOCULATION AGAINST VARICELLA: Primary | ICD-10-CM

## 2019-03-06 DIAGNOSIS — Z11.59 NEED FOR HEPATITIS C SCREENING TEST: ICD-10-CM

## 2019-03-06 DIAGNOSIS — R39.9 UTI SYMPTOMS: ICD-10-CM

## 2019-03-06 DIAGNOSIS — F32.89 OTHER DEPRESSION: ICD-10-CM

## 2019-03-06 DIAGNOSIS — G89.29 CHRONIC MIDLINE LOW BACK PAIN WITH SCIATICA, SCIATICA LATERALITY UNSPECIFIED: ICD-10-CM

## 2019-03-06 DIAGNOSIS — E78.00 PURE HYPERCHOLESTEROLEMIA: ICD-10-CM

## 2019-03-06 DIAGNOSIS — I25.10 CAD, MULTIPLE VESSEL: ICD-10-CM

## 2019-03-06 DIAGNOSIS — M54.40 CHRONIC MIDLINE LOW BACK PAIN WITH SCIATICA, SCIATICA LATERALITY UNSPECIFIED: ICD-10-CM

## 2019-03-06 LAB
BILIRUBIN, POC: NEGATIVE
BLOOD URINE, POC: ABNORMAL
CLARITY, POC: ABNORMAL
COLOR, POC: YELLOW
GLUCOSE URINE, POC: NEGATIVE
KETONES, POC: NEGATIVE
LEUKOCYTE EST, POC: ABNORMAL
NITRITE, POC: NEGATIVE
PH, POC: 6
PROTEIN, POC: ABNORMAL
SPECIFIC GRAVITY, POC: 1.02
UROBILINOGEN, POC: NEGATIVE

## 2019-03-06 PROCEDURE — 3017F COLORECTAL CA SCREEN DOC REV: CPT | Performed by: NURSE PRACTITIONER

## 2019-03-06 PROCEDURE — 4004F PT TOBACCO SCREEN RCVD TLK: CPT | Performed by: NURSE PRACTITIONER

## 2019-03-06 PROCEDURE — G8482 FLU IMMUNIZE ORDER/ADMIN: HCPCS | Performed by: NURSE PRACTITIONER

## 2019-03-06 PROCEDURE — G8598 ASA/ANTIPLAT THER USED: HCPCS | Performed by: NURSE PRACTITIONER

## 2019-03-06 PROCEDURE — 99214 OFFICE O/P EST MOD 30 MIN: CPT | Performed by: NURSE PRACTITIONER

## 2019-03-06 PROCEDURE — G8420 CALC BMI NORM PARAMETERS: HCPCS | Performed by: NURSE PRACTITIONER

## 2019-03-06 PROCEDURE — 81003 URINALYSIS AUTO W/O SCOPE: CPT | Performed by: NURSE PRACTITIONER

## 2019-03-06 PROCEDURE — G8427 DOCREV CUR MEDS BY ELIG CLIN: HCPCS | Performed by: NURSE PRACTITIONER

## 2019-03-06 RX ORDER — BUPROPION HYDROCHLORIDE 150 MG/1
150 TABLET, EXTENDED RELEASE ORAL 2 TIMES DAILY
Qty: 180 TABLET | Refills: 1 | Status: SHIPPED | OUTPATIENT
Start: 2019-03-06 | End: 2019-09-17 | Stop reason: SINTOL

## 2019-03-06 RX ORDER — LEVETIRACETAM 500 MG/1
TABLET ORAL
Qty: 30 TABLET | Refills: 5 | Status: SHIPPED | OUTPATIENT
Start: 2019-03-06 | End: 2019-10-17 | Stop reason: SDUPTHER

## 2019-03-06 RX ORDER — ALBUTEROL SULFATE 90 UG/1
2 AEROSOL, METERED RESPIRATORY (INHALATION) EVERY 4 HOURS PRN
Qty: 18 G | Refills: 5 | Status: SHIPPED | OUTPATIENT
Start: 2019-03-06 | End: 2020-02-21

## 2019-03-06 RX ORDER — BACLOFEN 10 MG/1
TABLET ORAL
Qty: 90 TABLET | Refills: 0 | Status: SHIPPED | OUTPATIENT
Start: 2019-03-06 | End: 2019-04-13 | Stop reason: SDUPTHER

## 2019-03-06 RX ORDER — NITROFURANTOIN 25; 75 MG/1; MG/1
100 CAPSULE ORAL 2 TIMES DAILY
Qty: 10 CAPSULE | Refills: 0 | Status: SHIPPED | OUTPATIENT
Start: 2019-03-06 | End: 2019-03-11

## 2019-03-06 RX ORDER — ATORVASTATIN CALCIUM 40 MG/1
40 TABLET, FILM COATED ORAL DAILY
Qty: 30 TABLET | Refills: 3 | Status: SHIPPED | OUTPATIENT
Start: 2019-03-06 | End: 2019-11-15 | Stop reason: SDUPTHER

## 2019-03-06 RX ORDER — IBUPROFEN 800 MG
1 TABLET ORAL DAILY
COMMUNITY

## 2019-03-06 RX ORDER — ASPIRIN 81 MG/1
81 TABLET ORAL DAILY
Qty: 30 TABLET | Refills: 3 | Status: SHIPPED | OUTPATIENT
Start: 2019-03-06

## 2019-03-06 RX ORDER — NAPROXEN SODIUM 220 MG
TABLET ORAL
Status: ON HOLD | COMMUNITY
End: 2019-05-24 | Stop reason: HOSPADM

## 2019-03-06 RX ORDER — GABAPENTIN 100 MG/1
100 CAPSULE ORAL DAILY
Qty: 30 CAPSULE | Refills: 3 | Status: SHIPPED | OUTPATIENT
Start: 2019-03-06 | End: 2020-04-16

## 2019-03-06 RX ORDER — FLUTICASONE FUROATE AND VILANTEROL 100; 25 UG/1; UG/1
2 POWDER RESPIRATORY (INHALATION) DAILY
Qty: 1 EACH | Refills: 3 | Status: SHIPPED | OUTPATIENT
Start: 2019-03-06 | End: 2019-08-20 | Stop reason: SDUPTHER

## 2019-03-06 RX ORDER — GABAPENTIN 300 MG/1
300 CAPSULE ORAL NIGHTLY
Qty: 30 CAPSULE | Refills: 3 | Status: ON HOLD | OUTPATIENT
Start: 2019-03-06 | End: 2019-05-24 | Stop reason: HOSPADM

## 2019-03-06 ASSESSMENT — ENCOUNTER SYMPTOMS
WHEEZING: 0
VISUAL CHANGE: 0
BLURRED VISION: 0
NAUSEA: 1
VOMITING: 0
BACK PAIN: 1
SHORTNESS OF BREATH: 0

## 2019-03-08 LAB
CULTURE: ABNORMAL
Lab: ABNORMAL
SPECIMEN DESCRIPTION: ABNORMAL

## 2019-03-27 ENCOUNTER — HOSPITAL ENCOUNTER (EMERGENCY)
Age: 54
Discharge: HOME | End: 2019-03-27
Attending: EMERGENCY MEDICINE
Payer: COMMERCIAL

## 2019-03-27 ENCOUNTER — APPOINTMENT (OUTPATIENT)
Dept: CT IMAGING | Age: 54
End: 2019-03-27
Payer: COMMERCIAL

## 2019-03-27 VITALS
RESPIRATION RATE: 20 BRPM | DIASTOLIC BLOOD PRESSURE: 69 MMHG | WEIGHT: 159 LBS | OXYGEN SATURATION: 95 % | SYSTOLIC BLOOD PRESSURE: 111 MMHG | BODY MASS INDEX: 24.96 KG/M2 | TEMPERATURE: 98 F | HEIGHT: 67 IN | HEART RATE: 81 BPM

## 2019-03-27 DIAGNOSIS — M87.00 AVASCULAR NECROSIS (HCC): ICD-10-CM

## 2019-03-27 DIAGNOSIS — R10.9 RIGHT FLANK PAIN: Primary | ICD-10-CM

## 2019-03-27 DIAGNOSIS — R31.21 ASYMPTOMATIC MICROSCOPIC HEMATURIA: ICD-10-CM

## 2019-03-27 LAB
-: ABNORMAL
ABSOLUTE EOS #: 0.2 K/UL (ref 0–0.44)
ABSOLUTE IMMATURE GRANULOCYTE: <0.03 K/UL (ref 0–0.3)
ABSOLUTE LYMPH #: 2.75 K/UL (ref 1.1–3.7)
ABSOLUTE MONO #: 0.44 K/UL (ref 0.1–1.2)
AMORPHOUS: ABNORMAL
ANION GAP SERPL CALCULATED.3IONS-SCNC: 12 MMOL/L (ref 9–17)
BACTERIA: ABNORMAL
BASOPHILS # BLD: 1 % (ref 0–2)
BASOPHILS ABSOLUTE: 0.05 K/UL (ref 0–0.2)
BILIRUBIN URINE: NEGATIVE
BUN BLDV-MCNC: 14 MG/DL (ref 6–20)
BUN/CREAT BLD: ABNORMAL (ref 9–20)
CALCIUM SERPL-MCNC: 9.4 MG/DL (ref 8.6–10.4)
CASTS UA: ABNORMAL /LPF (ref 0–8)
CHLORIDE BLD-SCNC: 103 MMOL/L (ref 98–107)
CO2: 24 MMOL/L (ref 20–31)
COLOR: YELLOW
CREAT SERPL-MCNC: 0.56 MG/DL (ref 0.5–0.9)
CRYSTALS, UA: ABNORMAL /HPF
DIFFERENTIAL TYPE: NORMAL
EOSINOPHILS RELATIVE PERCENT: 3 % (ref 1–4)
EPITHELIAL CELLS UA: ABNORMAL /HPF (ref 0–5)
GFR AFRICAN AMERICAN: >60 ML/MIN
GFR NON-AFRICAN AMERICAN: >60 ML/MIN
GFR SERPL CREATININE-BSD FRML MDRD: ABNORMAL ML/MIN/{1.73_M2}
GFR SERPL CREATININE-BSD FRML MDRD: ABNORMAL ML/MIN/{1.73_M2}
GLUCOSE BLD-MCNC: 120 MG/DL (ref 70–99)
GLUCOSE URINE: NEGATIVE
HCG(URINE) PREGNANCY TEST: NEGATIVE
HCT VFR BLD CALC: 42.8 % (ref 36.3–47.1)
HEMOGLOBIN: 13.8 G/DL (ref 11.9–15.1)
IMMATURE GRANULOCYTES: 0 %
KETONES, URINE: NEGATIVE
LEUKOCYTE ESTERASE, URINE: NEGATIVE
LYMPHOCYTES # BLD: 35 % (ref 24–43)
MCH RBC QN AUTO: 31.5 PG (ref 25.2–33.5)
MCHC RBC AUTO-ENTMCNC: 32.2 G/DL (ref 28.4–34.8)
MCV RBC AUTO: 97.7 FL (ref 82.6–102.9)
MONOCYTES # BLD: 6 % (ref 3–12)
MUCUS: ABNORMAL
NITRITE, URINE: NEGATIVE
NRBC AUTOMATED: 0 PER 100 WBC
OTHER OBSERVATIONS UA: ABNORMAL
PDW BLD-RTO: 14.3 % (ref 11.8–14.4)
PH UA: 7 (ref 5–8)
PLATELET # BLD: 292 K/UL (ref 138–453)
PLATELET ESTIMATE: NORMAL
PMV BLD AUTO: 8.7 FL (ref 8.1–13.5)
POTASSIUM SERPL-SCNC: 4.1 MMOL/L (ref 3.7–5.3)
PROTEIN UA: NEGATIVE
RBC # BLD: 4.38 M/UL (ref 3.95–5.11)
RBC # BLD: NORMAL 10*6/UL
RBC UA: ABNORMAL /HPF (ref 0–4)
RENAL EPITHELIAL, UA: ABNORMAL /HPF
SEG NEUTROPHILS: 55 % (ref 36–65)
SEGMENTED NEUTROPHILS ABSOLUTE COUNT: 4.38 K/UL (ref 1.5–8.1)
SODIUM BLD-SCNC: 139 MMOL/L (ref 135–144)
SPECIFIC GRAVITY UA: 1.01 (ref 1–1.03)
TRICHOMONAS: ABNORMAL
TURBIDITY: ABNORMAL
URINE HGB: ABNORMAL
UROBILINOGEN, URINE: NORMAL
WBC # BLD: 7.8 K/UL (ref 3.5–11.3)
WBC # BLD: NORMAL 10*3/UL
WBC UA: ABNORMAL /HPF (ref 0–5)
YEAST: ABNORMAL

## 2019-03-27 PROCEDURE — 85025 COMPLETE CBC W/AUTO DIFF WBC: CPT

## 2019-03-27 PROCEDURE — 80048 BASIC METABOLIC PNL TOTAL CA: CPT

## 2019-03-27 PROCEDURE — 96372 THER/PROPH/DIAG INJ SC/IM: CPT

## 2019-03-27 PROCEDURE — 74176 CT ABD & PELVIS W/O CONTRAST: CPT

## 2019-03-27 PROCEDURE — 84703 CHORIONIC GONADOTROPIN ASSAY: CPT

## 2019-03-27 PROCEDURE — 6360000002 HC RX W HCPCS: Performed by: STUDENT IN AN ORGANIZED HEALTH CARE EDUCATION/TRAINING PROGRAM

## 2019-03-27 PROCEDURE — 81001 URINALYSIS AUTO W/SCOPE: CPT

## 2019-03-27 PROCEDURE — 6370000000 HC RX 637 (ALT 250 FOR IP): Performed by: STUDENT IN AN ORGANIZED HEALTH CARE EDUCATION/TRAINING PROGRAM

## 2019-03-27 PROCEDURE — 96374 THER/PROPH/DIAG INJ IV PUSH: CPT

## 2019-03-27 PROCEDURE — 99284 EMERGENCY DEPT VISIT MOD MDM: CPT

## 2019-03-27 PROCEDURE — 87086 URINE CULTURE/COLONY COUNT: CPT

## 2019-03-27 RX ORDER — LIDOCAINE 50 MG/G
1 PATCH TOPICAL DAILY
Qty: 5 PATCH | Refills: 0 | Status: SHIPPED | OUTPATIENT
Start: 2019-03-27 | End: 2019-04-01

## 2019-03-27 RX ORDER — ACETAMINOPHEN 325 MG/1
325 TABLET ORAL EVERY 8 HOURS PRN
Qty: 30 TABLET | Refills: 3 | Status: SHIPPED | OUTPATIENT
Start: 2019-03-27 | End: 2020-06-15

## 2019-03-27 RX ORDER — KETOROLAC TROMETHAMINE 30 MG/ML
30 INJECTION, SOLUTION INTRAMUSCULAR; INTRAVENOUS ONCE
Status: COMPLETED | OUTPATIENT
Start: 2019-03-27 | End: 2019-03-27

## 2019-03-27 RX ORDER — LIDOCAINE 4 G/G
1 PATCH TOPICAL ONCE
Status: DISCONTINUED | OUTPATIENT
Start: 2019-03-27 | End: 2019-03-27 | Stop reason: HOSPADM

## 2019-03-27 RX ORDER — MORPHINE SULFATE 4 MG/ML
2 INJECTION, SOLUTION INTRAMUSCULAR; INTRAVENOUS ONCE
Status: COMPLETED | OUTPATIENT
Start: 2019-03-27 | End: 2019-03-27

## 2019-03-27 RX ADMIN — MORPHINE SULFATE 2 MG: 4 INJECTION INTRAVENOUS at 14:34

## 2019-03-27 RX ADMIN — KETOROLAC TROMETHAMINE 30 MG: 30 INJECTION, SOLUTION INTRAMUSCULAR; INTRAVENOUS at 13:31

## 2019-03-27 ASSESSMENT — ENCOUNTER SYMPTOMS
VOMITING: 0
CONSTIPATION: 1
BACK PAIN: 0
SORE THROAT: 0
NAUSEA: 1
RHINORRHEA: 0
COUGH: 0
EYE REDNESS: 0
SHORTNESS OF BREATH: 0
ABDOMINAL PAIN: 0

## 2019-03-27 ASSESSMENT — PAIN DESCRIPTION - PROGRESSION: CLINICAL_PROGRESSION: GRADUALLY WORSENING

## 2019-03-27 ASSESSMENT — PAIN SCALES - GENERAL
PAINLEVEL_OUTOF10: 10
PAINLEVEL_OUTOF10: 10
PAINLEVEL_OUTOF10: 8

## 2019-03-27 ASSESSMENT — PAIN DESCRIPTION - PAIN TYPE: TYPE: ACUTE PAIN

## 2019-03-27 ASSESSMENT — PAIN DESCRIPTION - ORIENTATION: ORIENTATION: RIGHT

## 2019-03-27 ASSESSMENT — PAIN DESCRIPTION - LOCATION: LOCATION: FLANK

## 2019-03-27 ASSESSMENT — PAIN DESCRIPTION - FREQUENCY: FREQUENCY: CONTINUOUS

## 2019-03-28 LAB
CULTURE: NORMAL
Lab: NORMAL
SPECIMEN DESCRIPTION: NORMAL

## 2019-04-09 DIAGNOSIS — M25.551 HIP PAIN, RIGHT: Primary | ICD-10-CM

## 2019-04-10 ENCOUNTER — TELEPHONE (OUTPATIENT)
Dept: ORTHOPEDIC SURGERY | Age: 54
End: 2019-04-10

## 2019-04-10 ENCOUNTER — OFFICE VISIT (OUTPATIENT)
Dept: ORTHOPEDIC SURGERY | Age: 54
End: 2019-04-10
Payer: COMMERCIAL

## 2019-04-10 VITALS — HEIGHT: 67 IN | BODY MASS INDEX: 24.95 KG/M2 | WEIGHT: 158.95 LBS

## 2019-04-10 DIAGNOSIS — M48.061 SPINAL STENOSIS OF LUMBAR REGION, UNSPECIFIED WHETHER NEUROGENIC CLAUDICATION PRESENT: Primary | ICD-10-CM

## 2019-04-10 PROCEDURE — 99203 OFFICE O/P NEW LOW 30 MIN: CPT | Performed by: ORTHOPAEDIC SURGERY

## 2019-04-10 PROCEDURE — G8598 ASA/ANTIPLAT THER USED: HCPCS | Performed by: ORTHOPAEDIC SURGERY

## 2019-04-10 PROCEDURE — G8427 DOCREV CUR MEDS BY ELIG CLIN: HCPCS | Performed by: ORTHOPAEDIC SURGERY

## 2019-04-10 PROCEDURE — 3017F COLORECTAL CA SCREEN DOC REV: CPT | Performed by: ORTHOPAEDIC SURGERY

## 2019-04-10 PROCEDURE — 4004F PT TOBACCO SCREEN RCVD TLK: CPT | Performed by: ORTHOPAEDIC SURGERY

## 2019-04-10 PROCEDURE — G8420 CALC BMI NORM PARAMETERS: HCPCS | Performed by: ORTHOPAEDIC SURGERY

## 2019-04-10 NOTE — TELEPHONE ENCOUNTER
Patient was just seen today and got a referral to [facility]. The patient called in asking to get a referral to another pain management. Called patient back to offer her a new place and got her voicemail.

## 2019-04-12 NOTE — PROGRESS NOTES
[facility]  [facility]  [address] [place], [place] [address] [address] Tammy Swan [address]  Dept: [phone]    Ambulatory Orthopedic Clinic    CHIEF COMPLAINT:    Chief Complaint   Patient presents with    Hip Pain     right        HISTORY OF PRESENT ILLNESS:      The patient is a 48 y.o. female who is being seen for consult and evaluation for incidental R hip sclerosis found on CT scan while patient was in ED. 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 At this point the patient denies any hip pain or additional hip symptoms. Denies any catching or locking of the hip. The patient's only complaint is low back pain which is chronic. The patient has a history of spinal fusion which was performed by Dr. Shaji Arias at [facility]. She has noted chronic lumbar spine pain since then and when asked where she points to her L5/S1 region. Notes occasional numbness/tingling in bilateral feet.      Past Medical History:    Past Medical History:   Diagnosis Date    Acute MI ([address]     Anxiety     Chronic back pain     COPD (chronic obstructive pulmonary disease) (HCC)     Depression     Heart disease     Hyperlipidemia     Hypertension     MRSA (methicillin resistant staph aureus) culture positive 09/05/2017    abscess       Past Surgical History:    Past Surgical History:   Procedure Laterality Date    BACK SURGERY      diskectomy 2000, fusion 2007, fusion 7/2017    CHOLECYSTECTOMY, LAPAROSCOPIC  11/10/2017    robotic assisted    CHOLECYSTECTOMY, LAPAROSCOPIC N/A 11/10/2017    XI ROBOTIC CHOLECYSTECTOMY LAPAROSCOPIC performed by Dash Carlson DO at [address]         Current Medications:   Current Outpatient Medications   Medication Sig Dispense Refill    acetaminophen (TYLENOL) 325 MG tablet Take 1 tablet by mouth every 8 hours as needed for Pain 30 tablet 3    naproxen sodium (ALEVE) 220 MG tablet Aleve 220 mg tablet   Take 1 tablet every 12 hours by oral route as needed.  Cholecalciferol (VITAMIN D3) 400 units CAPS Take 1 tablet by mouth daily      buPROPion (WELLBUTRIN SR) 150 MG extended release tablet Take 1 tablet by mouth 2 times daily Take once a day for 3 days, then increase to 1 tablet two times a day 180 tablet 1    aspirin EC 81 MG EC tablet Take 1 tablet by mouth daily 30 tablet 3    baclofen (LIORESAL) 10 MG tablet TAKE ONE TABLET BY MOUTH THREE TIMES A DAY 90 tablet 0    levETIRAcetam (KEPPRA) 500 MG tablet TAKE ONE TABLET BY MOUTH DAILY 30 tablet 5    gabapentin (NEURONTIN) 100 MG capsule Take 1 capsule by mouth daily. In morning 30 capsule 3    gabapentin (NEURONTIN) 300 MG capsule Take 1 capsule by mouth nightly. 30 capsule 3    atorvastatin (LIPITOR) 40 MG tablet Take 1 tablet by mouth daily At bedtime 30 tablet 3    fluticasone-vilanterol (BREO ELLIPTA) 100-25 MCG/INH AEPB inhaler Inhale 2 puffs into the lungs daily 1 each 3    albuterol sulfate HFA (VENTOLIN HFA) 108 (90 Base) MCG/ACT inhaler Inhale 2 puffs into the lungs every 4 hours as needed for Wheezing 18 g 5    magnesium gluconate (MAGONATE) 500 MG tablet Take 500 mg by mouth 2 times daily      vitamin B-6 (PYRIDOXINE) 100 MG tablet Take 100 mg by mouth daily      nitroGLYCERIN (NITROSTAT) 0.4 MG SL tablet Place 1 tablet under the tongue every 5 minutes as needed for Chest pain up to max of 3 total doses. If no relief after 1 dose, call 911. 25 tablet 3    Multiple Vitamins-Minerals (THERAPEUTIC MULTIVITAMIN-MINERALS) tablet Take 1 tablet by mouth daily.  vitamin B-12 (CYANOCOBALAMIN) 1000 MCG tablet Take 1,000 mcg by mouth daily. No current facility-administered medications for this visit.         Allergies:    Sulfa antibiotics    Social History:   Social History     Socioeconomic History    Marital status:      Spouse name: Not on file    Number of children: Not on file    Years of education: Not on file    Highest education level: Not on file Occupational History    Not on file   Social Needs    Financial resource strain: Not on file    Food insecurity:     Worry: Not on file     Inability: Not on file    Transportation needs:     Medical: Not on file     Non-medical: Not on file   Tobacco Use    Smoking status: Current Some Day Smoker     Packs/day: 1.00     Types: Cigarettes    Smokeless tobacco: Never Used   Substance and Sexual Activity    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    Drug use: Yes     Types: Marijuana     Comment: daily - \"a couple bowls\"    Sexual activity: Not on file   Lifestyle    Physical activity:     Days per week: Not on file     Minutes per session: Not on file    Stress: Not on file   Relationships    Social connections:     Talks on phone: Not on file     Gets together: Not on file     Attends Moravian service: Not on file     Active member of club or organization: Not on file     Attends meetings of clubs or organizations: Not on file     Relationship status: Not on file    Intimate partner violence:     Fear of current or ex partner: Not on file     Emotionally abused: Not on file     Physically abused: Not on file     Forced sexual activity: Not on file   Other Topics Concern    Not on file   Social History Narrative    Not on file       Family History:  Family History   Problem Relation Age of Onset    Other Mother     Heart Disease Father     High Blood Pressure Father     High Cholesterol Father     Other Brother        REVIEW OF SYSTEMS:  Constitutional: Negative for fever and chills. Respiratory: Negative for cough, shortness of breath and wheezing. Cardiovascular: Negative for chest pain and palpitations.    GI: Denies any nausea, vomiting, abdominal pain   Musculoskeletal: Positive for back pain    PHYSICAL EXAM:  Ht 5' 7.01\" (1.702 m)   Wt 158 lb 15.2 oz (72.1 kg)   BMI 24.89 kg/m²     Gen: AAOx3, NAD, appears stated age    CV: no dependent edema, distal pulses 2+    Chest:

## 2019-04-13 NOTE — TELEPHONE ENCOUNTER
Health Maintenance   Topic Date Due    Hepatitis C screen  1965    HIV screen  08/08/1980    Cervical cancer screen  08/08/1986    Shingles Vaccine (1 of 2) 08/08/2015    Colon Cancer Screen FIT/FOBT  02/20/2019    Breast cancer screen  02/06/2020    Lipid screen  11/08/2022    DTaP/Tdap/Td vaccine (2 - Td) 09/20/2027    Flu vaccine  Completed    Pneumococcal 0-64 years Vaccine  Completed             (applicable per patient's age: Cancer Screenings, Depression Screening, Fall Risk Screening, Immunizations)    Hemoglobin A1C (%)   Date Value   01/30/2018 5.3   08/02/2013 5.3   02/14/2013 6.0     LDL Cholesterol (mg/dL)   Date Value   11/08/2017 198 (H)     AST (U/L)   Date Value   11/11/2017 48 (H)     ALT (U/L)   Date Value   11/11/2017 34 (H)     BUN (mg/dL)   Date Value   03/27/2019 14      (goal A1C is < 7)   (goal LDL is <100) need 30-50% reduction from baseline     BP Readings from Last 3 Encounters:   03/27/19 111/69   03/06/19 124/84   10/02/18 118/83    (goal /80)      All Future Testing planned in [facility]:  Lab Frequency Next Occurrence   FL UGI W AIR CONTRAST Once 09/02/2019   Hepatitis C Antibody Once 06/14/2019   Comprehensive Metabolic Panel, Fasting Once 06/14/2019   Lipid, Fasting Once 06/14/2019       Next Visit Date:  Future Appointments   Date Time Provider Kaycee Hays   4/17/2019 10:15 AM THOR Lechuga -  [facility]            Patient Active Problem List:     Heart disease     Chronic back pain     Depression     Hyperlipidemia     CAD, multiple vessel     Asthma     Smoking     Need for vaccination     Syncope     Leg pain     Left hip pain     Chronic cholecystitis     Chest pain     Calculus of gallbladder without cholecystitis without obstruction     History of lumbar fusion     Failed back syndrome     Marijuana use     Abnormal weight loss     Allergic rhinitis     Anxiety state     Chronic midline low back pain with sciatica     Moderate episode of recurrent major depressive disorder ([facility]

## 2019-04-15 RX ORDER — BACLOFEN 10 MG/1
TABLET ORAL
Qty: 90 TABLET | Refills: 0 | Status: SHIPPED | OUTPATIENT
Start: 2019-04-15 | End: 2019-09-03 | Stop reason: SDUPTHER

## 2019-05-23 ENCOUNTER — HOSPITAL ENCOUNTER (OUTPATIENT)
Age: 54
Setting detail: OBSERVATION
Discharge: HOME | End: 2019-05-24
Attending: EMERGENCY MEDICINE | Admitting: EMERGENCY MEDICINE
Payer: COMMERCIAL

## 2019-05-23 ENCOUNTER — APPOINTMENT (OUTPATIENT)
Dept: CT IMAGING | Age: 54
End: 2019-05-23
Payer: COMMERCIAL

## 2019-05-23 ENCOUNTER — APPOINTMENT (OUTPATIENT)
Dept: GENERAL RADIOLOGY | Age: 54
End: 2019-05-23
Payer: COMMERCIAL

## 2019-05-23 DIAGNOSIS — R07.9 CHEST PAIN, UNSPECIFIED TYPE: Primary | ICD-10-CM

## 2019-05-23 LAB
ANION GAP SERPL CALCULATED.3IONS-SCNC: 13 MMOL/L (ref 9–17)
BUN BLDV-MCNC: 12 MG/DL (ref 6–20)
BUN/CREAT BLD: ABNORMAL (ref 9–20)
CALCIUM SERPL-MCNC: 9 MG/DL (ref 8.6–10.4)
CHLORIDE BLD-SCNC: 103 MMOL/L (ref 98–107)
CO2: 26 MMOL/L (ref 20–31)
CREAT SERPL-MCNC: 0.48 MG/DL (ref 0.5–0.9)
D-DIMER QUANTITATIVE: 0.56 MG/L FEU
GFR AFRICAN AMERICAN: >60 ML/MIN
GFR NON-AFRICAN AMERICAN: >60 ML/MIN
GFR SERPL CREATININE-BSD FRML MDRD: ABNORMAL ML/MIN/{1.73_M2}
GFR SERPL CREATININE-BSD FRML MDRD: ABNORMAL ML/MIN/{1.73_M2}
GLUCOSE BLD-MCNC: 113 MG/DL (ref 70–99)
HCT VFR BLD CALC: 42.2 % (ref 36.3–47.1)
HEMOGLOBIN: 13.7 G/DL (ref 11.9–15.1)
MCH RBC QN AUTO: 31.1 PG (ref 25.2–33.5)
MCHC RBC AUTO-ENTMCNC: 32.5 G/DL (ref 28.4–34.8)
MCV RBC AUTO: 95.9 FL (ref 82.6–102.9)
NRBC AUTOMATED: 0 PER 100 WBC
PDW BLD-RTO: 13.6 % (ref 11.8–14.4)
PLATELET # BLD: 236 K/UL (ref 138–453)
PMV BLD AUTO: 8.6 FL (ref 8.1–13.5)
POTASSIUM SERPL-SCNC: 4.2 MMOL/L (ref 3.7–5.3)
RBC # BLD: 4.4 M/UL (ref 3.95–5.11)
SODIUM BLD-SCNC: 142 MMOL/L (ref 135–144)
TROPONIN INTERP: NORMAL
TROPONIN INTERP: NORMAL
TROPONIN T: NORMAL NG/ML
TROPONIN T: NORMAL NG/ML
TROPONIN, HIGH SENSITIVITY: <6 NG/L (ref 0–14)
TROPONIN, HIGH SENSITIVITY: <6 NG/L (ref 0–14)
WBC # BLD: 6.4 K/UL (ref 3.5–11.3)

## 2019-05-23 PROCEDURE — 85027 COMPLETE CBC AUTOMATED: CPT

## 2019-05-23 PROCEDURE — 93005 ELECTROCARDIOGRAM TRACING: CPT

## 2019-05-23 PROCEDURE — 6360000002 HC RX W HCPCS: Performed by: EMERGENCY MEDICINE

## 2019-05-23 PROCEDURE — 6370000000 HC RX 637 (ALT 250 FOR IP): Performed by: EMERGENCY MEDICINE

## 2019-05-23 PROCEDURE — 93005 ELECTROCARDIOGRAM TRACING: CPT | Performed by: STUDENT IN AN ORGANIZED HEALTH CARE EDUCATION/TRAINING PROGRAM

## 2019-05-23 PROCEDURE — 6360000004 HC RX CONTRAST MEDICATION: Performed by: STUDENT IN AN ORGANIZED HEALTH CARE EDUCATION/TRAINING PROGRAM

## 2019-05-23 PROCEDURE — 99285 EMERGENCY DEPT VISIT HI MDM: CPT

## 2019-05-23 PROCEDURE — G0378 HOSPITAL OBSERVATION PER HR: HCPCS

## 2019-05-23 PROCEDURE — 96374 THER/PROPH/DIAG INJ IV PUSH: CPT

## 2019-05-23 PROCEDURE — 6360000002 HC RX W HCPCS

## 2019-05-23 PROCEDURE — 80048 BASIC METABOLIC PNL TOTAL CA: CPT

## 2019-05-23 PROCEDURE — 84484 ASSAY OF TROPONIN QUANT: CPT

## 2019-05-23 PROCEDURE — 96375 TX/PRO/DX INJ NEW DRUG ADDON: CPT

## 2019-05-23 PROCEDURE — 6360000002 HC RX W HCPCS: Performed by: STUDENT IN AN ORGANIZED HEALTH CARE EDUCATION/TRAINING PROGRAM

## 2019-05-23 PROCEDURE — 71260 CT THORAX DX C+: CPT

## 2019-05-23 PROCEDURE — 71046 X-RAY EXAM CHEST 2 VIEWS: CPT

## 2019-05-23 PROCEDURE — 96372 THER/PROPH/DIAG INJ SC/IM: CPT

## 2019-05-23 PROCEDURE — 96376 TX/PRO/DX INJ SAME DRUG ADON: CPT

## 2019-05-23 PROCEDURE — 85379 FIBRIN DEGRADATION QUANT: CPT

## 2019-05-23 RX ORDER — BUPROPION HYDROCHLORIDE 150 MG/1
150 TABLET, EXTENDED RELEASE ORAL 2 TIMES DAILY
Status: DISCONTINUED | OUTPATIENT
Start: 2019-05-23 | End: 2019-05-24 | Stop reason: HOSPADM

## 2019-05-23 RX ORDER — UREA 10 %
500 LOTION (ML) TOPICAL 2 TIMES DAILY
Status: DISCONTINUED | OUTPATIENT
Start: 2019-05-23 | End: 2019-05-24 | Stop reason: HOSPADM

## 2019-05-23 RX ORDER — ATORVASTATIN CALCIUM 40 MG/1
40 TABLET, FILM COATED ORAL NIGHTLY
Status: DISCONTINUED | OUTPATIENT
Start: 2019-05-23 | End: 2019-05-24 | Stop reason: HOSPADM

## 2019-05-23 RX ORDER — ONDANSETRON 2 MG/ML
INJECTION INTRAMUSCULAR; INTRAVENOUS
Status: COMPLETED
Start: 2019-05-23 | End: 2019-05-23

## 2019-05-23 RX ORDER — GABAPENTIN 100 MG/1
100 CAPSULE ORAL DAILY
Status: DISCONTINUED | OUTPATIENT
Start: 2019-05-23 | End: 2019-05-24 | Stop reason: HOSPADM

## 2019-05-23 RX ORDER — NITROGLYCERIN 0.4 MG/1
0.4 TABLET SUBLINGUAL EVERY 5 MIN PRN
Status: DISCONTINUED | OUTPATIENT
Start: 2019-05-23 | End: 2019-05-24 | Stop reason: HOSPADM

## 2019-05-23 RX ORDER — M-VIT,TX,IRON,MINS/CALC/FOLIC 27MG-0.4MG
1 TABLET ORAL DAILY
Status: DISCONTINUED | OUTPATIENT
Start: 2019-05-23 | End: 2019-05-24 | Stop reason: HOSPADM

## 2019-05-23 RX ORDER — CHOLECALCIFEROL (VITAMIN D3) 125 MCG
1000 CAPSULE ORAL DAILY
Status: DISCONTINUED | OUTPATIENT
Start: 2019-05-23 | End: 2019-05-24 | Stop reason: HOSPADM

## 2019-05-23 RX ORDER — GABAPENTIN 300 MG/1
300 CAPSULE ORAL NIGHTLY
Status: DISCONTINUED | OUTPATIENT
Start: 2019-05-23 | End: 2019-05-24 | Stop reason: HOSPADM

## 2019-05-23 RX ORDER — LEVETIRACETAM 500 MG/1
500 TABLET ORAL 2 TIMES DAILY
Status: DISCONTINUED | OUTPATIENT
Start: 2019-05-23 | End: 2019-05-24 | Stop reason: HOSPADM

## 2019-05-23 RX ORDER — ONDANSETRON 2 MG/ML
4 INJECTION INTRAMUSCULAR; INTRAVENOUS EVERY 8 HOURS PRN
Status: DISCONTINUED | OUTPATIENT
Start: 2019-05-23 | End: 2019-05-24 | Stop reason: HOSPADM

## 2019-05-23 RX ORDER — SODIUM CHLORIDE 0.9 % (FLUSH) 0.9 %
10 SYRINGE (ML) INJECTION EVERY 12 HOURS SCHEDULED
Status: DISCONTINUED | OUTPATIENT
Start: 2019-05-23 | End: 2019-05-24 | Stop reason: HOSPADM

## 2019-05-23 RX ORDER — ASPIRIN 81 MG/1
81 TABLET ORAL DAILY
Status: DISCONTINUED | OUTPATIENT
Start: 2019-05-23 | End: 2019-05-24 | Stop reason: HOSPADM

## 2019-05-23 RX ORDER — KETOROLAC TROMETHAMINE 30 MG/ML
15 INJECTION, SOLUTION INTRAMUSCULAR; INTRAVENOUS EVERY 6 HOURS PRN
Status: DISCONTINUED | OUTPATIENT
Start: 2019-05-23 | End: 2019-05-24 | Stop reason: HOSPADM

## 2019-05-23 RX ORDER — LANOLIN ALCOHOL/MO/W.PET/CERES
100 CREAM (GRAM) TOPICAL DAILY
Status: DISCONTINUED | OUTPATIENT
Start: 2019-05-23 | End: 2019-05-24 | Stop reason: HOSPADM

## 2019-05-23 RX ORDER — SODIUM CHLORIDE 0.9 % (FLUSH) 0.9 %
10 SYRINGE (ML) INJECTION PRN
Status: DISCONTINUED | OUTPATIENT
Start: 2019-05-23 | End: 2019-05-24 | Stop reason: HOSPADM

## 2019-05-23 RX ORDER — ACETAMINOPHEN 325 MG/1
650 TABLET ORAL EVERY 4 HOURS PRN
Status: DISCONTINUED | OUTPATIENT
Start: 2019-05-23 | End: 2019-05-24 | Stop reason: HOSPADM

## 2019-05-23 RX ORDER — ALBUTEROL SULFATE 90 UG/1
2 AEROSOL, METERED RESPIRATORY (INHALATION) EVERY 4 HOURS PRN
Status: DISCONTINUED | OUTPATIENT
Start: 2019-05-23 | End: 2019-05-24 | Stop reason: HOSPADM

## 2019-05-23 RX ORDER — MORPHINE SULFATE 2 MG/ML
2 INJECTION, SOLUTION INTRAMUSCULAR; INTRAVENOUS
Status: DISCONTINUED | OUTPATIENT
Start: 2019-05-23 | End: 2019-05-24 | Stop reason: HOSPADM

## 2019-05-23 RX ORDER — MORPHINE SULFATE 4 MG/ML
2 INJECTION, SOLUTION INTRAMUSCULAR; INTRAVENOUS ONCE
Status: COMPLETED | OUTPATIENT
Start: 2019-05-23 | End: 2019-05-23

## 2019-05-23 RX ADMIN — ONDANSETRON 4 MG: 2 INJECTION INTRAMUSCULAR; INTRAVENOUS at 20:10

## 2019-05-23 RX ADMIN — MORPHINE SULFATE 2 MG: 2 INJECTION, SOLUTION INTRAMUSCULAR; INTRAVENOUS at 19:58

## 2019-05-23 RX ADMIN — IOHEXOL 75 ML: 350 INJECTION, SOLUTION INTRAVENOUS at 16:48

## 2019-05-23 RX ADMIN — ENOXAPARIN SODIUM 40 MG: 40 INJECTION SUBCUTANEOUS at 20:38

## 2019-05-23 RX ADMIN — ONDANSETRON HYDROCHLORIDE: 2 INJECTION, SOLUTION INTRAMUSCULAR; INTRAVENOUS at 16:36

## 2019-05-23 RX ADMIN — DESMOPRESSIN ACETATE 40 MG: 0.2 TABLET ORAL at 20:38

## 2019-05-23 RX ADMIN — Medication 500 MG: at 20:40

## 2019-05-23 RX ADMIN — LEVETIRACETAM 500 MG: 500 TABLET, FILM COATED ORAL at 20:38

## 2019-05-23 RX ADMIN — MORPHINE SULFATE 2 MG: 4 INJECTION INTRAVENOUS at 15:41

## 2019-05-23 RX ADMIN — BUPROPION HYDROCHLORIDE 150 MG: 150 TABLET, EXTENDED RELEASE ORAL at 20:39

## 2019-05-23 ASSESSMENT — PAIN SCALES - GENERAL
PAINLEVEL_OUTOF10: 8
PAINLEVEL_OUTOF10: 3
PAINLEVEL_OUTOF10: 7
PAINLEVEL_OUTOF10: 10
PAINLEVEL_OUTOF10: 10

## 2019-05-23 ASSESSMENT — PAIN - FUNCTIONAL ASSESSMENT: PAIN_FUNCTIONAL_ASSESSMENT: PREVENTS OR INTERFERES SOME ACTIVE ACTIVITIES AND ADLS

## 2019-05-23 ASSESSMENT — PAIN DESCRIPTION - DIRECTION: RADIATING_TOWARDS: SHOULDER

## 2019-05-23 ASSESSMENT — PAIN DESCRIPTION - FREQUENCY: FREQUENCY: INTERMITTENT

## 2019-05-23 ASSESSMENT — PAIN DESCRIPTION - ORIENTATION: ORIENTATION: LEFT

## 2019-05-23 ASSESSMENT — PAIN DESCRIPTION - DESCRIPTORS: DESCRIPTORS: SHARP

## 2019-05-23 ASSESSMENT — PAIN DESCRIPTION - PROGRESSION: CLINICAL_PROGRESSION: NOT CHANGED

## 2019-05-23 ASSESSMENT — HEART SCORE
ECG: 0
ECG: 0

## 2019-05-23 ASSESSMENT — PAIN DESCRIPTION - ONSET: ONSET: ON-GOING

## 2019-05-23 ASSESSMENT — PAIN DESCRIPTION - LOCATION: LOCATION: CHEST

## 2019-05-23 ASSESSMENT — PAIN DESCRIPTION - PAIN TYPE: TYPE: ACUTE PAIN

## 2019-05-23 NOTE — ED NOTES
Bed: 37  Expected date: 5/23/19  Expected time: 2:38 PM  Means of arrival: [facility]  Comments:  LS1   CP with cough and inspiration  ASA JIMENEZ Thibodeaux, RN  05/23/19 [id number]

## 2019-05-23 NOTE — ED PROVIDER NOTES
normal sinus      [facility] 3C MED SURG  Emergency Department Encounter  EmergencyMedicine Resident     Pt Karan Maria  MRN: [id number]  [place] 1965  Date of evaluation: 5/23/19  PCP:  THOR [id number]       Chief Complaint   Patient presents with    Chest Pain     Started 1hr ago. Worse with inspiration or movement.  Neck Pain       HISTORY OF PRESENT ILLNESS  (Location/Symptom, Timing/Onset, Context/Setting, Quality, Duration, Modifying Factors, Severity.)      Rolanda Gomez is a 48 y.o. female who presents with 1 hour sharp chest pain central location with sharp radiation to left scapula. Began at rest, was 8/10, worse with sitting up or any movement. Did not get diaphoretic or nauseous. No radiation to arms or jaw. No tearing pain to center of back. No numbness or weakness in extremities or below diaphram.     No hemoptysis no estrogen no swollen leg, no dvt hx, no recent surgery no recent travel. Hx COPD, Chronic back pain, HTN. FH early MI    PAST MEDICAL / SURGICAL / SOCIAL / FAMILY HISTORY      has a past medical history of Acute MI ([address] Anxiety, Chronic back pain, COPD (chronic obstructive pulmonary disease) ([facility] Depression, Heart disease, Hyperlipidemia, Hypertension, and MRSA (methicillin resistant staph aureus) culture positive. has a past surgical history that includes Tubal ligation; Tonsillectomy; Cholecystectomy, laparoscopic (11/10/2017); Cholecystectomy, laparoscopic (N/A, 11/10/2017); and back surgery.     Social History     Socioeconomic History    Marital status:      Spouse name: Not on file    Number of children: Not on file    Years of education: Not on file    Highest education level: Not on file   Occupational History    Not on file   Social Needs    Financial resource strain: Not on file    Food insecurity:     Worry: Not on file     Inability: Not on file    Transportation needs:     Medical: Not on file     Non-medical: Not on file   Tobacco Use    Smoking status: Current Some Day Smoker     Packs/day: 1.00     Types: Cigarettes    Smokeless tobacco: Never Used   Substance and Sexual Activity    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    Drug use: Yes     Types: Marijuana     Comment: daily - \"a couple bowls\"    Sexual activity: Not on file   Lifestyle    Physical activity:     Days per week: Not on file     Minutes per session: Not on file    Stress: Not on file   Relationships    Social connections:     Talks on phone: Not on file     Gets together: Not on file     Attends Quaker service: Not on file     Active member of club or organization: Not on file     Attends meetings of clubs or organizations: Not on file     Relationship status: Not on file    Intimate partner violence:     Fear of current or ex partner: Not on file     Emotionally abused: Not on file     Physically abused: Not on file     Forced sexual activity: Not on file   Other Topics Concern    Not on file   Social History Narrative    Not on file       Family History   Problem Relation Age of Onset    Other Mother     Heart Disease Father     High Blood Pressure Father     High Cholesterol Father     Other Brother        Allergies:  Sulfa antibiotics    Home Medications:  Prior to Admission medications    Medication Sig Start Date End Date Taking? Authorizing Provider   acetaminophen (TYLENOL) 325 MG tablet Take 1 tablet by mouth every 8 hours as needed for Pain 3/27/19  Yes Evaristo Rivero DO   naproxen sodium (ALEVE) 220 MG tablet Aleve 220 mg tablet   Take 1 tablet every 12 hours by oral route as needed.    Yes Historical Provider, MD   aspirin EC 81 MG EC tablet Take 1 tablet by mouth daily 3/6/19  Yes THOR Moseley CNP   baclofen (LIORESAL) 10 MG tablet TAKE ONE TABLET BY MOUTH THREE TIMES A DAY 4/15/19   THOR Moseley CNP   Cholecalciferol (VITAMIN D3) 400 units CAPS Take 1 tablet by mouth daily    Historical Provider, MD   buPROPion [facility] SR) 150 MG extended release tablet Take 1 tablet by mouth 2 times daily Take once a day for 3 days, then increase to 1 tablet two times a day 3/6/19   THOR Waed CNP   levETIRAcetam (KEPPRA) 500 MG tablet TAKE ONE TABLET BY MOUTH DAILY 3/6/19   THOR Wade CNP   gabapentin (NEURONTIN) 100 MG capsule Take 1 capsule by mouth daily. In morning 3/6/19 3/5/20  THOR Wade CNP   gabapentin (NEURONTIN) 300 MG capsule Take 1 capsule by mouth nightly. 3/6/19 4/4/20  THOR Wade CNP   atorvastatin (LIPITOR) 40 MG tablet Take 1 tablet by mouth daily At bedtime 3/6/19   THOR Wade CNP   fluticasone-vilanterol (BREO ELLIPTA) 100-25 MCG/INH AEPB inhaler Inhale 2 puffs into the lungs daily 3/6/19   THOR Wade CNP   albuterol sulfate HFA (VENTOLIN HFA) 108 (90 Base) MCG/ACT inhaler Inhale 2 puffs into the lungs every 4 hours as needed for Wheezing 3/6/19   THOR Wade CNP   magnesium gluconate (MAGONATE) 500 MG tablet Take 500 mg by mouth 2 times daily    Historical Provider, MD   vitamin B-6 (PYRIDOXINE) 100 MG tablet Take 100 mg by mouth daily    Historical Provider, MD   nitroGLYCERIN (NITROSTAT) 0.4 MG SL tablet Place 1 tablet under the tongue every 5 minutes as needed for Chest pain up to max of 3 total doses. If no relief after 1 dose, call 911. 1/30/18   Rupa Carreon MD   Multiple Vitamins-Minerals (THERAPEUTIC MULTIVITAMIN-MINERALS) tablet Take 1 tablet by mouth daily. Historical Provider, MD   vitamin B-12 (CYANOCOBALAMIN) 1000 MCG tablet Take 1,000 mcg by mouth daily.     Historical Provider, MD       REVIEW OF SYSTEMS    (2-9 systems for level 4, 10 or more for level 5)      General ROS - No fevers, No chills, no gradual weight loss, no night sweats  Ophthalmic ROS - No discharge, No changes in vision  ENT ROS -  No sore throat, No rhinorrhea,   Respiratory ROS - no shortness of breath, no cough, no  wheezing  Cardiovascular ROS - positive chest pain, no dyspnea on exertion  Gastrointestinal ROS - No abdominal pain, no nausea, no vomiting, no change in bowel habits, no black or bloody stools  Genito-Urinary ROS - No dysuria, trouble voiding, or hematuria  Musculoskeletal ROS - positive myalgias, Npositive o arthalgias  Neurological ROS - No headache, no dizziness/lightheadedness, No focal weakness, no loss of sensation  Dermatological ROS - No lesions, No rash         PHYSICAL EXAM   (up to 7 for level 4, 8 or more for level 5)      INITIAL VITALS:   /77   Pulse 76   Temp 97.5 °F (36.4 °C) (Oral)   Resp 18   Ht 5' 7\" (1.702 m)   Wt 160 lb (72.6 kg)   SpO2 96%   BMI 25.06 kg/m²     General Appearance: Well-appearing, in no acute distress  HEENT: Head: normocephalic/atraumatic eyes: PERRLA, EOMT, conjunctiva not injected, sclerae nonicteric ears: External canals patent nose: Nares patent, no rhinorrhea, throat:mucous membranes moist, oropharynx clear     Neck: Trachea midline, no JVD. Lungs: No evidence of increased work of breathing. CTA B/L, no wheezes/rhonchi     Cardiovascular: RRR, no murmur, 2+ peripheral pulses bilaterally. Cap refill less than 2 seconds. No lower extremity edema noted    Abdomen: Soft, nontender. No guarding or rebound tenderness. Neurologic: MIDDLETON  to person, place, time, and event. No sensation deficits. Moving all extremities    Extremities: Skin warm, dry and intact.       DIFFERENTIAL  DIAGNOSIS     PLAN (LABS / IMAGING / EKG):  Orders Placed This Encounter   Procedures    XR CHEST STANDARD (2 VW)    CT CHEST PULMONARY EMBOLISM W CONTRAST    Troponin    CBC    BASIC METABOLIC PANEL    D-Dimer, Quantitative    Troponin    Troponin    Diet NPO, After Midnight    Vital signs per unit routine    Notify physician    Notify physician    Up as tolerated    Telemetry Monitoring    Full Code    Inpatient consult to Cardiology    EKG 12 Lead  EKG 12 Lead    PATIENT STATUS (FROM ED OR OR/PROCEDURAL) Observation       MEDICATIONS ORDERED:  Orders Placed This Encounter   Medications    morphine injection 2 mg    ondansetron (ZOFRAN) 4 MG/2ML injection     [place]: cabinet override    iohexol (OMNIPAQUE 350) solution 75 mL    therapeutic multivitamin-minerals 1 tablet    vitamin B-12 (CYANOCOBALAMIN) tablet 1,000 mcg    nitroGLYCERIN (NITROSTAT) SL tablet 0.4 mg    Magnesium Gluconate tablet 500 mg    vitamin B-6 (PYRIDOXINE) tablet 100 mg    buPROPion [facility] SR) extended release tablet 150 mg    aspirin EC tablet 81 mg    levETIRAcetam (KEPPRA) tablet 500 mg    gabapentin (NEURONTIN) capsule 100 mg    gabapentin (NEURONTIN) capsule 300 mg    atorvastatin (LIPITOR) tablet 40 mg    mometasone-formoterol (DULERA) 200-5 MCG/ACT inhaler 2 puff    albuterol sulfate  (90 Base) MCG/ACT inhaler 2 puff    sodium chloride flush 0.9 % injection 10 mL    sodium chloride flush 0.9 % injection 10 mL    acetaminophen (TYLENOL) tablet 650 mg    enoxaparin (LOVENOX) injection 40 mg    morphine (PF) injection 2 mg    ondansetron (ZOFRAN) injection 4 mg    ketorolac (TORADOL) injection 15 mg       DDX: ACS: unstable angina, NSTEMI, pericarditis, PE low likely crow, doubt dissection, Pneumothorax, MSK pain    DIAGNOSTIC RESULTS / EMERGENCY DEPARTMENT COURSE / MDM     LABS:  Results for orders placed or performed during the hospital encounter of 05/23/19   Troponin   Result Value Ref Range    Troponin, High Sensitivity <6 0 - 14 ng/L    Troponin T NOT REPORTED <0.03 ng/mL    Troponin Interp NOT REPORTED    CBC   Result Value Ref Range    WBC 6.4 3.5 - 11.3 k/uL    RBC 4.40 3.95 - 5.11 m/uL    Hemoglobin 13.7 11.9 - 15.1 g/dL    Hematocrit 42.2 36.3 - 47.1 %    MCV 95.9 82.6 - 102.9 fL    MCH 31.1 25.2 - 33.5 pg    MCHC 32.5 28.4 - 34.8 g/dL    RDW 13.6 11.8 - 14.4 %    Platelets 163 406 - 741 k/uL    MPV 8.6 8.1 - 13.5 fL    NRBC Automated 0.0 0.0 per 100 WBC   BASIC METABOLIC PANEL   Result Value Ref Range    Glucose 113 (H) 70 - 99 mg/dL    BUN 12 6 - 20 mg/dL    CREATININE 0.48 (L) 0.50 - 0.90 mg/dL    Bun/Cre Ratio NOT REPORTED 9 - 20    Calcium 9.0 8.6 - 10.4 mg/dL    Sodium 142 135 - 144 mmol/L    Potassium 4.2 3.7 - 5.3 mmol/L    Chloride 103 98 - 107 mmol/L    CO2 26 20 - 31 mmol/L    Anion Gap 13 9 - 17 mmol/L    GFR Non-African American >60 >60 mL/min    GFR African American >60 >60 mL/min    GFR Comment          GFR Staging NOT REPORTED    D-Dimer, Quantitative   Result Value Ref Range    D-Dimer, Quant 0.56 mg/L FEU   Troponin   Result Value Ref Range    Troponin, High Sensitivity <6 0 - 14 ng/L    Troponin T NOT REPORTED <0.03 ng/mL    Troponin Interp NOT REPORTED            RADIOLOGY:  No results found. EKG  EKG Interpretation    Interpreted by emergency department physician    Rhythm: normal sinus   Rate: normal  Axis: normal  Ectopy: none  Conduction: normal  ST Segments: no acute change  T Waves: no acute change  Q Waves: none    Clinical Impression: no acute changes    Bud [place]    All EKG's are interpreted by the Emergency Department Physician who either signs or Co-signs this chart in the absence of a cardiologist.    EMERGENCY DEPARTMENT COURSE/IMPRESSION:  ED Course as of May 24 0045   Thu May 23, 2019   1532 1 Hour Chest pain with left shoulder pain, worse with movement, appears to be atypical chest pain, reproducible, however pt has multiple risk factors hx CAD    ACS workup, D dimer as pt low risk PE but does not PERC out, low concern dissection. Likely observation vs med admit pending workup    [EF]   1554 Troponin, High Sensitivity: <6 [EF]   1559 CARDIAC ULTRASOUND:  A limited, bedside ultrasound of the heart was performed. The medical necessity was to evaluate for a pericardial effusion. The structures studied were the heart and pericardium.     FINDINGS:  Pericardial effusion was not identified. The study was technically adequate    No RV dialation, adequate squeeze, no obvious wall motion akinesis      [EF]   1603 XR CHEST STANDARD (2 VW) [EF]   1611 HS 4, will trend 1 hr and 3 hr delta trop, admit to ETU for cards consult, pt is ammenable    [EF]   6891 Will CTA chest r/o PE dimer elevated    [EF]   1701 Delta <6   Troponin, High Sensitivity: <6 [EF]   7811 PE negative, admit to obs   CT CHEST PULMONARY EMBOLISM W CONTRAST [EF]      ED Course User Index  [EF] Leeann Mancilla DO     Heart Score    Heart Score for chest pain patients  History: Moderately Suspicious  ECG: Normal  Patient Age: > 39 and < 65 years  *Risk factors for Atherosclerotic disease: Cigarette smoking, Hypertension, Positive family History, Coronary Artery Disease  Risk Factors: > 3 Risk factors or history of atherosclerotic disease*  Troponin: < 1X normal limit  Heart Score Total: 4    Score 0 - 3 = 2.5%  MACE over next 6 wks = Discharge home  Score 4 - 6 = 20.3%  MACE over next 6 wks = Obs admit  Score 7 - 10 = 72.7%  MACE over next 6 wks = Early invasive Rx    PROCEDURES:  None    CONSULTS:  IP CONSULT TO CARDIOLOGY    CRITICAL CARE:  None    FINAL IMPRESSION      1. Chest pain, unspecified type          DISPOSITION / PLAN     DISPOSITION        PATIENT REFERRED TO:  THOR Lr - CNP  [address]  [address] [facility]            DISCHARGE MEDICATIONS:  Current Discharge Medication List          DO Shelly Severino D.O.   Emergency Medicine Resident    (Please note that portions of this note were completed with a voice recognition program.  Efforts were made to edit the dictations but occasionally words aremis-transcribed.)       Reagan Viramontes DO  Resident  05/24/19 [id number]

## 2019-05-23 NOTE — ED PROVIDER NOTES
personally evaluated and examined the patient in conjunction with the APC and agree with the treatment plan and disposition of the patient as recorded by the APC.     Irene Fermin MD  Attending Emergency  Physician        Tamara Meyers MD  05/23/19 0920

## 2019-05-23 NOTE — ED TRIAGE NOTES
BIBA. Pt states she started having CP one hour ago. Pt was taking a nap, went to the bathroom, and then the pain started. Pt states pain is now in the neck/shoulder on L side. Describes pain as sharp. Pain is worse with movement. Hx of MI in 2013.

## 2019-05-24 ENCOUNTER — APPOINTMENT (OUTPATIENT)
Dept: NUCLEAR MEDICINE | Age: 54
End: 2019-05-24
Payer: COMMERCIAL

## 2019-05-24 VITALS
SYSTOLIC BLOOD PRESSURE: 133 MMHG | RESPIRATION RATE: 18 BRPM | TEMPERATURE: 97.2 F | DIASTOLIC BLOOD PRESSURE: 82 MMHG | WEIGHT: 160 LBS | BODY MASS INDEX: 25.11 KG/M2 | HEART RATE: 73 BPM | HEIGHT: 67 IN | OXYGEN SATURATION: 95 %

## 2019-05-24 LAB
HCG QUALITATIVE: NEGATIVE
LV EF: 59 %
LVEF MODALITY: NORMAL
TROPONIN INTERP: NORMAL
TROPONIN T: NORMAL NG/ML
TROPONIN, HIGH SENSITIVITY: <6 NG/L (ref 0–14)

## 2019-05-24 PROCEDURE — 94640 AIRWAY INHALATION TREATMENT: CPT

## 2019-05-24 PROCEDURE — 78452 HT MUSCLE IMAGE SPECT MULT: CPT

## 2019-05-24 PROCEDURE — 6360000002 HC RX W HCPCS: Performed by: EMERGENCY MEDICINE

## 2019-05-24 PROCEDURE — 93017 CV STRESS TEST TRACING ONLY: CPT

## 2019-05-24 PROCEDURE — 6360000002 HC RX W HCPCS: Performed by: INTERNAL MEDICINE

## 2019-05-24 PROCEDURE — A9500 TC99M SESTAMIBI: HCPCS | Performed by: INTERNAL MEDICINE

## 2019-05-24 PROCEDURE — 3430000000 HC RX DIAGNOSTIC RADIOPHARMACEUTICAL: Performed by: INTERNAL MEDICINE

## 2019-05-24 PROCEDURE — 2580000003 HC RX 258: Performed by: INTERNAL MEDICINE

## 2019-05-24 PROCEDURE — 84484 ASSAY OF TROPONIN QUANT: CPT

## 2019-05-24 PROCEDURE — G0378 HOSPITAL OBSERVATION PER HR: HCPCS

## 2019-05-24 PROCEDURE — 36415 COLL VENOUS BLD VENIPUNCTURE: CPT

## 2019-05-24 PROCEDURE — 6370000000 HC RX 637 (ALT 250 FOR IP): Performed by: EMERGENCY MEDICINE

## 2019-05-24 PROCEDURE — 84703 CHORIONIC GONADOTROPIN ASSAY: CPT

## 2019-05-24 PROCEDURE — 96376 TX/PRO/DX INJ SAME DRUG ADON: CPT

## 2019-05-24 PROCEDURE — 2580000003 HC RX 258: Performed by: EMERGENCY MEDICINE

## 2019-05-24 PROCEDURE — 96375 TX/PRO/DX INJ NEW DRUG ADDON: CPT

## 2019-05-24 PROCEDURE — 93005 ELECTROCARDIOGRAM TRACING: CPT | Performed by: EMERGENCY MEDICINE

## 2019-05-24 RX ORDER — ATROPINE SULFATE 0.1 MG/ML
0.5 INJECTION INTRAVENOUS EVERY 5 MIN PRN
Status: DISCONTINUED | OUTPATIENT
Start: 2019-05-24 | End: 2019-05-24

## 2019-05-24 RX ORDER — ACETAMINOPHEN 325 MG/1
650 TABLET ORAL EVERY 4 HOURS PRN
Qty: 30 TABLET | Refills: 1 | Status: SHIPPED | OUTPATIENT
Start: 2019-05-24 | End: 2019-09-17 | Stop reason: SDUPTHER

## 2019-05-24 RX ORDER — SODIUM CHLORIDE 0.9 % (FLUSH) 0.9 %
10 SYRINGE (ML) INJECTION PRN
Status: DISCONTINUED | OUTPATIENT
Start: 2019-05-24 | End: 2019-05-24

## 2019-05-24 RX ORDER — AMINOPHYLLINE DIHYDRATE 25 MG/ML
50 INJECTION, SOLUTION INTRAVENOUS PRN
Status: DISCONTINUED | OUTPATIENT
Start: 2019-05-24 | End: 2019-05-24

## 2019-05-24 RX ORDER — SODIUM CHLORIDE 0.9 % (FLUSH) 0.9 %
10 SYRINGE (ML) INJECTION PRN
Status: DISCONTINUED | OUTPATIENT
Start: 2019-05-24 | End: 2019-05-24 | Stop reason: HOSPADM

## 2019-05-24 RX ORDER — NITROGLYCERIN 0.4 MG/1
0.4 TABLET SUBLINGUAL EVERY 5 MIN PRN
Status: DISCONTINUED | OUTPATIENT
Start: 2019-05-24 | End: 2019-05-24

## 2019-05-24 RX ORDER — ALBUTEROL SULFATE 90 UG/1
2 AEROSOL, METERED RESPIRATORY (INHALATION) PRN
Status: DISCONTINUED | OUTPATIENT
Start: 2019-05-24 | End: 2019-05-24

## 2019-05-24 RX ORDER — SODIUM CHLORIDE 9 MG/ML
500 INJECTION, SOLUTION INTRAVENOUS CONTINUOUS PRN
Status: DISCONTINUED | OUTPATIENT
Start: 2019-05-24 | End: 2019-05-24

## 2019-05-24 RX ORDER — METOPROLOL TARTRATE 5 MG/5ML
5 INJECTION INTRAVENOUS EVERY 5 MIN PRN
Status: ACTIVE | OUTPATIENT
Start: 2019-05-24 | End: 2019-05-24

## 2019-05-24 RX ADMIN — PYRIDOXINE HCL TAB 50 MG 100 MG: 50 TAB at 13:24

## 2019-05-24 RX ADMIN — KETOROLAC TROMETHAMINE 15 MG: 30 INJECTION, SOLUTION INTRAMUSCULAR; INTRAVENOUS at 07:39

## 2019-05-24 RX ADMIN — Medication 10 ML: at 10:39

## 2019-05-24 RX ADMIN — ACETAMINOPHEN 650 MG: 325 TABLET ORAL at 01:29

## 2019-05-24 RX ADMIN — SODIUM CHLORIDE, PRESERVATIVE FREE 10 ML: 5 INJECTION INTRAVENOUS at 12:45

## 2019-05-24 RX ADMIN — SODIUM CHLORIDE, PRESERVATIVE FREE 10 ML: 5 INJECTION INTRAVENOUS at 11:00

## 2019-05-24 RX ADMIN — REGADENOSON 0.4 MG: 0.08 INJECTION, SOLUTION INTRAVENOUS at 10:58

## 2019-05-24 RX ADMIN — ACETAMINOPHEN 650 MG: 325 TABLET ORAL at 10:13

## 2019-05-24 RX ADMIN — ASPIRIN 81 MG: 81 TABLET ORAL at 13:22

## 2019-05-24 RX ADMIN — MULTIPLE VITAMINS W/ MINERALS TAB 1 TABLET: TAB at 13:23

## 2019-05-24 RX ADMIN — Medication 10 ML: at 10:58

## 2019-05-24 RX ADMIN — SODIUM CHLORIDE, PRESERVATIVE FREE 10 ML: 5 INJECTION INTRAVENOUS at 09:17

## 2019-05-24 RX ADMIN — LEVETIRACETAM 500 MG: 500 TABLET, FILM COATED ORAL at 13:24

## 2019-05-24 RX ADMIN — Medication 500 MG: at 13:24

## 2019-05-24 RX ADMIN — TETRAKIS(2-METHOXYISOBUTYLISOCYANIDE)COPPER(I) TETRAFLUOROBORATE 45 MILLICURIE: 1 INJECTION, POWDER, LYOPHILIZED, FOR SOLUTION INTRAVENOUS at 12:45

## 2019-05-24 RX ADMIN — Medication 10 ML: at 10:49

## 2019-05-24 RX ADMIN — Medication 1000 MCG: at 13:23

## 2019-05-24 RX ADMIN — MOMETASONE FUROATE AND FORMOTEROL FUMARATE DIHYDRATE 2 PUFF: 200; 5 AEROSOL RESPIRATORY (INHALATION) at 07:55

## 2019-05-24 RX ADMIN — GABAPENTIN 100 MG: 100 CAPSULE ORAL at 13:22

## 2019-05-24 RX ADMIN — KETOROLAC TROMETHAMINE 15 MG: 30 INJECTION, SOLUTION INTRAMUSCULAR; INTRAVENOUS at 13:32

## 2019-05-24 RX ADMIN — TETRAKIS(2-METHOXYISOBUTYLISOCYANIDE)COPPER(I) TETRAFLUOROBORATE 13.2 MILLICURIE: 1 INJECTION, POWDER, LYOPHILIZED, FOR SOLUTION INTRAVENOUS at 12:48

## 2019-05-24 RX ADMIN — BUPROPION HYDROCHLORIDE 150 MG: 150 TABLET, EXTENDED RELEASE ORAL at 13:22

## 2019-05-24 ASSESSMENT — PAIN SCALES - GENERAL
PAINLEVEL_OUTOF10: 6
PAINLEVEL_OUTOF10: 8
PAINLEVEL_OUTOF10: 6
PAINLEVEL_OUTOF10: 10
PAINLEVEL_OUTOF10: 6
PAINLEVEL_OUTOF10: 3

## 2019-05-24 NOTE — PROGRESS NOTES
Pt d/c'd home. Instructions and prescription given. Pt signed D/C form, IV removed, tele monitor removed and returned to nursing station. Pt left unit with family and CM.

## 2019-05-24 NOTE — CONSULTS
[facility] Cardiology    Consult                Today's Date: 5/24/2019  Patient Name: Alvin Altamirano  Date of admission: 5/23/2019  3:06 PM  Patient's age: 48 y.o., 1965  Admission Dx: Chest pain [R07.9]    Reason for Consult:  Cardiac evaluation    Requesting Physician: Lance Sequeira MD    CHIEF COMPLAINT:  Chest pain     History Obtained From:  patient, electronic medical record    HISTORY OF PRESENT ILLNESS:      The patient is a 48 y.o.  female who is admitted to the hospital for Chest Pain  Alvinmax Altamirano complains of chest pain. Onset was 1 day ago. Symptoms have improved since that time. The patient's pain began when she was using the restroom and was present at rest. The patient describes the pain as sharp and radiates to the left arm and upper back. Patient states the pain was a 8/10 in intensity, but today has decreased to a 6/10. Associated symptoms are: exertional chest pressure/discomfort. Aggravating factors are: walking. Pain was alleviated by toradol in emergency room. Patient has nitro at home but did not take it. Patient's cardiac risk factors are: dyslipidemia and smoking/ tobacco exposure. Patient's risk factors for DVT/PE: none. Previous cardiac testing: electrocardiogram (ECG).       Patient EKG and troponin is unremarkable, EKG showed NSR with no ST T changes. Past Medical History:   has a past medical history of Acute MI ([address] Anxiety, Chronic back pain, COPD (chronic obstructive pulmonary disease) ([facility] [address] [facility] Depression, Heart disease, Hyperlipidemia, Hypertension, and MRSA (methicillin resistant staph aureus) culture positive. Past Surgical History:   has a past surgical history that includes Tubal ligation; Tonsillectomy; Cholecystectomy, laparoscopic (11/10/2017); Cholecystectomy, laparoscopic (N/A, 11/10/2017); and back surgery. Home Medications:    Prior to Admission medications    Medication Sig Start Date End Date Taking?  Authorizing Provider acetaminophen (TYLENOL) 325 MG tablet Take 1 tablet by mouth every 8 hours as needed for Pain 3/27/19  Yes Evaristo Rivero DO   naproxen sodium (ALEVE) 220 MG tablet Aleve 220 mg tablet   Take 1 tablet every 12 hours by oral route as needed. Yes Historical Provider, MD   aspirin EC 81 MG EC tablet Take 1 tablet by mouth daily 3/6/19  Yes THOR Moseley CNP   baclofen (LIORESAL) 10 MG tablet TAKE ONE TABLET BY MOUTH THREE TIMES A DAY 4/15/19   THOR Moseley CNP   Cholecalciferol (VITAMIN D3) 400 units CAPS Take 1 tablet by mouth daily    Historical Provider, MD   buPROPion [facility]) 150 MG extended release tablet Take 1 tablet by mouth 2 times daily Take once a day for 3 days, then increase to 1 tablet two times a day 3/6/19   THOR Moseley CNP   levETIRAcetam (KEPPRA) 500 MG tablet TAKE ONE TABLET BY MOUTH DAILY 3/6/19   THOR Moseley CNP   gabapentin (NEURONTIN) 100 MG capsule Take 1 capsule by mouth daily. In morning 3/6/19 3/5/20  THOR Moseley CNP   gabapentin (NEURONTIN) 300 MG capsule Take 1 capsule by mouth nightly. 3/6/19 4/4/20  THOR Moseley CNP   atorvastatin (LIPITOR) 40 MG tablet Take 1 tablet by mouth daily At bedtime 3/6/19   THOR Moseley CNP   fluticasone-vilanterol (BREO ELLIPTA) 100-25 MCG/INH AEPB inhaler Inhale 2 puffs into the lungs daily 3/6/19   THOR Moseley CNP   albuterol sulfate HFA (VENTOLIN HFA) 108 (90 Base) MCG/ACT inhaler Inhale 2 puffs into the lungs every 4 hours as needed for Wheezing 3/6/19   THOR Moseley CNP   magnesium gluconate (MAGONATE) 500 MG tablet Take 500 mg by mouth 2 times daily    Historical Provider, MD   vitamin B-6 (PYRIDOXINE) 100 MG tablet Take 100 mg by mouth daily    Historical Provider, MD   nitroGLYCERIN (NITROSTAT) 0.4 MG SL tablet Place 1 tablet under the tongue every 5 minutes as needed for Chest pain up to max of 3 total doses.  If no relief after 1 dose, call 911. 1/30/18   Neo Mcallister MD   Multiple Vitamins-Minerals (THERAPEUTIC MULTIVITAMIN-MINERALS) tablet Take 1 tablet by mouth daily. Historical Provider, MD   vitamin B-12 (CYANOCOBALAMIN) 1000 MCG tablet Take 1,000 mcg by mouth daily.     Historical Provider, MD      Current Facility-Administered Medications: sodium chloride flush 0.9 % injection 10 mL, 10 mL, Intravenous, PRN  0.9 % sodium chloride infusion, 500 mL, Intravenous, Continuous PRN  albuterol sulfate  (90 Base) MCG/ACT inhaler 2 puff, 2 puff, Inhalation, PRN  atropine injection 0.5 mg, 0.5 mg, Intravenous, Q5 Min PRN  nitroGLYCERIN (NITROSTAT) SL tablet 0.4 mg, 0.4 mg, Sublingual, Q5 Min PRN  metoprolol (LOPRESSOR) injection 5 mg, 5 mg, Intravenous, Q5 Min PRN  aminophylline injection 50 mg, 50 mg, Intravenous, PRN  therapeutic multivitamin-minerals 1 tablet, 1 tablet, Oral, Daily  vitamin B-12 (CYANOCOBALAMIN) tablet 1,000 mcg, 1,000 mcg, Oral, Daily  nitroGLYCERIN (NITROSTAT) SL tablet 0.4 mg, 0.4 mg, Sublingual, Q5 Min PRN  Magnesium Gluconate tablet 500 mg, 500 mg, Oral, BID  vitamin B-6 (PYRIDOXINE) tablet 100 mg, 100 mg, Oral, Daily  buPROPion (WELLBUTRIN SR) extended release tablet 150 mg, 150 mg, Oral, BID  aspirin EC tablet 81 mg, 81 mg, Oral, Daily  levETIRAcetam (KEPPRA) tablet 500 mg, 500 mg, Oral, BID  gabapentin (NEURONTIN) capsule 100 mg, 100 mg, Oral, Daily  gabapentin (NEURONTIN) capsule 300 mg, 300 mg, Oral, Nightly  atorvastatin (LIPITOR) tablet 40 mg, 40 mg, Oral, Nightly  mometasone-formoterol (DULERA) 200-5 MCG/ACT inhaler 2 puff, 2 puff, Inhalation, BID  albuterol sulfate  (90 Base) MCG/ACT inhaler 2 puff, 2 puff, Inhalation, Q4H PRN  sodium chloride flush 0.9 % injection 10 mL, 10 mL, Intravenous, 2 times per day  sodium chloride flush 0.9 % injection 10 mL, 10 mL, Intravenous, PRN  acetaminophen (TYLENOL) tablet 650 mg, 650 mg, Oral, Q4H PRN  enoxaparin (LOVENOX) injection 40 mg, 40 mg, Subcutaneous, Daily  morphine (PF) injection 2 mg, 2 mg, Intravenous, Q2H PRN  ondansetron (ZOFRAN) injection 4 mg, 4 mg, Intravenous, Q8H PRN  ketorolac (TORADOL) injection 15 mg, 15 mg, Intravenous, Q6H PRN    Allergies:  Sulfa antibiotics    Social History:   reports that she has been smoking cigarettes. She has been smoking about 1.00 pack per day. She has never used smokeless tobacco. She reports that she has current or past drug history. Drug: Marijuana. She reports that she does not drink alcohol. Family History: family history includes Heart Disease in her father; High Blood Pressure in her father; High Cholesterol in her father; Other in her brother and mother. No h/o sudden cardiac death. Yes for premature CAD    REVIEW OF SYSTEMS:    · Constitutional: there has been no unanticipated weight loss. There's been No change in energy level, No change in activity level. · Eyes: No visual changes or diplopia. No scleral icterus. · ENT: No Headaches  · Cardiovascular: NSTEMI in 2013, hyperlipidemia, hypertension, hpi as above  · Respiratory :History of COPD, No cough  · Gastrointestinal: No abdominal pain. No change in bowel or bladder habits. · Genitourinary: No dysuria, trouble voiding, or hematuria. · Musculoskeletal:  No gait disturbance, No weakness or joint complaints. Admits to chronic back pain  · Integumentary: No rash or pruritis. · Neurological: No headache, diplopia, change in muscle strength, numbness or tingling. No change in gait, balance, coordination, mood, affect, memory, mentation, behavior. · Psychiatric: No anxiety, or depression. · Endocrine: No temperature intolerance. No excessive thirst, fluid intake, or urination. No tremor. · Hematologic/Lymphatic: No abnormal bruising or bleeding, blood clots or swollen lymph nodes. · Allergic/Immunologic: No nasal congestion or hives.       PHYSICAL EXAM:      /82   Pulse 73   Temp 97.2 °F (36.2 °C) (Oral)   Resp 18   Ht 5' 7\" (1.702 m) Wt 160 lb (72.6 kg)   SpO2 95%   BMI 25.06 kg/m²    Constitutional and General Appearance: alert, cooperative, no distress and appears stated age  [de-identified]: PERRL, no cervical lymphadenopathy. No masses palpable. Normal oral mucosa  Respiratory:  · Normal excursion and expansion without use of accessory muscles  · Resp Auscultation: Good respiratory effort. No for increased work of breathing. On auscultation: clear to auscultation bilaterally  Cardiovascular:  · The apical impulse is not displaced  · Heart tones are crisp and normal. regular S1 and S2.  · Jugular venous pulsation Normal  · The carotid upstroke is normal in amplitude and contour without delay or bruit  · Peripheral pulses are symmetrical and full   Abdomen:   · No masses or tenderness  · Bowel sounds present  Extremities:  ·  No Cyanosis or Clubbing  ·  Lower extremity edema: No  ·  Skin: Warm and dry  Neurological:  · Alert and oriented. · Moves all extremities well  · No abnormalities of mood, affect, memory, mentation, or behavior are noted    DATA:    Diagnostics:      EKG:   normal EKG, normal sinus rhythm    ECHO:   not obtained. Stress Test:   ordered, but not yet obtained. Cardiac Angiography:   not obtained. Labs:     CBC:   Recent Labs     05/23/19  1526   WBC 6.4   HGB 13.7   HCT 42.2        BMP:   Recent Labs     05/23/19  1526      K 4.2   CO2 26   BUN 12   CREATININE 0.48*   LABGLOM >60   GLUCOSE 113*     BNP: No results for input(s): BNP in the last 72 hours. PT/INR: No results for input(s): PROTIME, INR in the last 72 hours. APTT:No results for input(s): APTT in the last 72 hours. CARDIAC ENZYMES:No results for input(s): CKTOTAL, CKMB, CKMBINDEX, TROPONINI in the last 72 hours. FASTING LIPID PANEL:  Lab Results   Component Value Date    HDL 44 11/08/2017    TRIG 115 11/08/2017     LIVER PROFILE:No results for input(s): AST, ALT, LABALBU in the last 72 hours. IMPRESSION:    1.  Atypical chest pain - but given her risk factors of smoking and HPL, will risk stratify patient with a stress test  2. Smoking history      RECOMMENDATIONS:  1. Lexiscan stress test, if negative, patient can follow up as an outpatient with PCP  2. Medical Management per primary team  3. Avoid NSAID      Thank you for allowing me to participate in the care of this patient, please do not hesitate to call if you have any questions.     Joanie Taylor MD MSc. [facility]  [phone]

## 2019-05-24 NOTE — H&P
[facility]  CDU / OBSERVATION eNCOUnter  Resident Note     Pt Name: Kwasi Martini  MRN: [id number]  [place] 1965  Date of evaluation: 5/24/19  Patient's PCP is :  THOR Lujan CNP    CHIEF COMPLAINT       Chief Complaint   Patient presents with    Chest Pain     Started 1hr ago. Worse with inspiration or movement.  Neck Pain         HISTORY OF PRESENT ILLNESS    Kwasi Martini is a 48 y.o. female who presents with sharp anterior generalized chest pain that radiates into the left scapula. This began at rest but worsens with any upper trunk movement. Patient has not had previous cardiac workup  Patient denies fever, chills, shortness of breath, nausea, abdominal pain. Location/Symptom: general anterior chest  Timing/Onset: yesterday  Provocation: unclear  Quality: sharp  Radiation: into left scapula  Severity: moderate  Timing/Duration: constant  Modifying Factors: movement    REVIEW OF SYSTEMS       General ROS - No fevers, No malaise   Ophthalmic ROS - No discharge, No changes in vision  ENT ROS -  No sore throat, No rhinorrhea,   Respiratory ROS - no shortness of breath, no cough, no  wheezing  Cardiovascular ROS -  chest pain, no dyspnea on exertion  Gastrointestinal ROS - No abdominal pain, no nausea or vomiting, no change in bowel habits, no black or bloody stools  Genito-Urinary ROS - No dysuria, trouble voiding, or hematuria  Musculoskeletal ROS - No myalgias, No arthalgias  Neurological ROS - No headache, no dizziness/lightheadedness, No focal weakness, no loss of sensation  Dermatological ROS - No lesions, No rash     (PQRS) Advance directives on face sheet per hospital policy.  No change unless specifically mentioned in chart    PAST MEDICAL HISTORY    has a past medical history of Acute MI ([address] Anxiety, Chronic back pain, COPD (chronic obstructive pulmonary disease) ([address] Depression, Heart disease, Hyperlipidemia, Hypertension, and MRSA (methicillin resistant staph aureus) culture positive. I have reviewed the past medical history with the patient and it is pertinent to this complaint. SURGICAL HISTORY      has a past surgical history that includes Tubal ligation; Tonsillectomy; Cholecystectomy, laparoscopic (11/10/2017); Cholecystectomy, laparoscopic (N/A, 11/10/2017); and back surgery. I have reviewed and agree with Surgical History entered and it is pertinent to this complaint. CURRENT MEDICATIONS       sodium chloride flush 0.9 % injection 10 mL PRN   sodium chloride flush 0.9 % injection 10 mL PRN   therapeutic multivitamin-minerals 1 tablet Daily   vitamin B-12 (CYANOCOBALAMIN) tablet 1,000 mcg Daily   nitroGLYCERIN (NITROSTAT) SL tablet 0.4 mg Q5 Min PRN   Magnesium Gluconate tablet 500 mg BID   vitamin B-6 (PYRIDOXINE) tablet 100 mg Daily   buPROPion (WELLBUTRIN SR) extended release tablet 150 mg BID   aspirin EC tablet 81 mg Daily   levETIRAcetam (KEPPRA) tablet 500 mg BID   gabapentin (NEURONTIN) capsule 100 mg Daily   gabapentin (NEURONTIN) capsule 300 mg Nightly   atorvastatin (LIPITOR) tablet 40 mg Nightly   mometasone-formoterol (DULERA) 200-5 MCG/ACT inhaler 2 puff BID   albuterol sulfate  (90 Base) MCG/ACT inhaler 2 puff Q4H PRN   sodium chloride flush 0.9 % injection 10 mL 2 times per day   sodium chloride flush 0.9 % injection 10 mL PRN   acetaminophen (TYLENOL) tablet 650 mg Q4H PRN   enoxaparin (LOVENOX) injection 40 mg Daily   morphine (PF) injection 2 mg Q2H PRN   ondansetron (ZOFRAN) injection 4 mg Q8H PRN   ketorolac (TORADOL) injection 15 mg Q6H PRN       All medication charted and reviewed. ALLERGIES     is allergic to sulfa antibiotics. FAMILY HISTORY     indicated that her mother is alive. She indicated that her father is . She indicated that her brother is alive. family history includes Heart Disease in her father; High Blood Pressure in her father; High Cholesterol in her father;  Other in her brother and mother. The patient denies any pertinent family history. I have reviewed and agree with the family history entered. I have reviewed the Family History and it is not significant to the case    SOCIAL HISTORY      reports that she has been smoking cigarettes. She has been smoking about 1.00 pack per day. She has never used smokeless tobacco. She reports that she has current or past drug history. Drug: Marijuana. She reports that she does not drink alcohol. I have reviewed and agree with all Social.  There are no concerns for substance abuse/use. PHYSICAL EXAM     INITIAL VITALS:  height is 5' 7\" (1.702 m) and weight is 160 lb (72.6 kg). Her oral temperature is 97.2 °F (36.2 °C). Her blood pressure is 133/82 and her pulse is 73. Her respiration is 18 and oxygen saturation is 95%.       CONSTITUTIONAL: AOx4, no apparent distress, appears stated age    HEAD: normocephalic, atraumatic   EYES: PERRLA, EOMI    ENT: moist mucous membranes, uvula midline   NECK: supple, symmetric   BACK: symmetric   LUNGS: clear to auscultation bilaterally   CARDIOVASCULAR: regular rate and rhythm, no murmurs, rubs or gallops   ABDOMEN: soft, non-tender, non-distended with normal active bowel sounds   NEUROLOGIC:  MAEx4, no focal sensory or motor deficits   MUSCULOSKELETAL: no clubbing, cyanosis or edema   SKIN: no rash or wounds       DIFFERENTIAL DIAGNOSIS/MDM:   Chest Pain:  DDX: Emergent: ACS/NSTEMI/STEMI/angina, arrhythmia, trauma, aortic dissection,  PE, PNA, pneumothroax, esophageal rupture, tamponade, Cocaine use  Nonemergent: pneumonia, pericarditis, GERD, MSK, Endocarditis, anxiety  Evaluated for: diaphoresis, present chest pain, tachypnea, BP both arms, heart sounds, JVD, tender chest wall, wheezing    Vaginal Discharge:  DDX: vaginal d/c + suprapubic pain: pregnancy, vaginitis, UTI, trichomonas, chlamydia/gonorrhea, candidiasis , physiologic discharge         DIAGNOSTIC RESULTS     EKG: All EKG's are interpreted by the Observation Physician who either signs or Co-signs this chart in the absence of a cardiologist.  EKG Interpretation    Interpreted by me    Rhythm: normal sinus   Rate: normal  Axis: normal  Ectopy: none  Conduction: normal  ST Segments: no acute change  T Waves: no acute change  Q Waves: none    Clinical Impression: no acute changes and normal EKG    [facility] DiGioia, DO        RADIOLOGY:   I directly visualized the following  images and reviewed the radiologist interpretations:    Xr Chest Standard (2 Vw)    Result Date: 5/23/2019  EXAMINATION: TWO XRAY VIEWS OF THE CHEST 5/23/2019 3:27 pm COMPARISON: Chest radiograph performed 11/08/2017. HISTORY: ORDERING SYSTEM PROVIDED HISTORY: r/o PNA PNX, widened mediastinum TECHNOLOGIST PROVIDED HISTORY: r/o PNA PNX, widened mediastinum Ordering Physician Provided Reason for Exam: cp Acuity: Unknown Type of Exam: Unknown FINDINGS: There is no acute consolidation or effusion. There is no pneumothorax. The mediastinal structures are unremarkable. The upper abdomen is unremarkable. The extrathoracic soft tissues are unremarkable. There is no acute osseous abnormality. No acute cardiopulmonary process. Ct Chest Pulmonary Embolism W Contrast    Result Date: 5/23/2019  EXAMINATION: CTA OF THE CHEST 5/23/2019 4:44 pm TECHNIQUE: CTA of the chest was performed after the administration of intravenous contrast.  Multiplanar reformatted images are provided for review. MIP images are provided for review. Dose modulation, iterative reconstruction, and/or weight based adjustment of the mA/kV was utilized to reduce the radiation dose to as low as reasonably achievable. COMPARISON: 11/08/2017 HISTORY: ORDERING SYSTEM PROVIDED HISTORY: SHORTNESS OF BREATH FINDINGS: Pulmonary Arteries: Pulmonary arteries are adequately opacified for evaluation. No evidence of intraluminal filling defect to suggest pulmonary embolism. Main pulmonary artery is normal in caliber.  Mediastinum: Normal cardiac size. Aorta enhances normally and is normal in caliber. The main pulmonary arteries are grossly normal.  No significant mediastinal, hilar or axillary lymphadenopathy. Thyroid gland shows no significant abnormalities. Esophagus shows no significant abnormalities. Lungs/pleura: There are no significant nodules or masses. 3 mm left lower lobe fissural nodule along the major fissure. Micronodule in the apicoposterior left upper lobe on series 3, image 97 is unchanged. No focal consolidation. There is no pleural effusion or pneumothorax. Normal tracheobronchial tree. Upper Abdomen: Limited images of the upper abdomen are unremarkable. Soft Tissues/Bones: No acute bone or soft tissue abnormality. 1. No evidence for acute pulmonary embolism. 2. No evidence for pneumonia. Nm Cardiac Stress Test Nuclear Imaging    Result Date: 5/24/2019  EXAMINATION: MYOCARDIAL PERFUSION IMAGING 5/24/2019 2:25 pm TECHNIQUE: Rest dose:  45 mCi Tc-99m sestamibi intravenously Stress dose:  13.2 mCi Tc-99m sestamibi intravenously Under cardiology supervision, 0.4 mg Lexiscan was infused intravenously prior to injection of the stress dose. SPECT imaging was acquired following injection of the sestamibi. ECG gating was obtained following the stress acquisition. COMPARISON: None Available. HISTORY: ORDERING SYSTEM PROVIDED HISTORY: Chest pain TECHNOLOGIST PROVIDED HISTORY: Ordering Physician Provided Reason for Exam: Chest pain Procedure Type->Rx Ordering Physician Provided Reason for Exam: Chest pain, SOB, nausea or vomiting Additional signs and symptoms: high cholesterol, lung disease FINDINGS: Perfusion: Image quality is good with no significant attenuation artifact. There are no fixed or reversible perfusion defects. Summed stress score:  3 Summed rest score:  0 Summed reversibility score:  3 Scores are visually adjusted for potential artifact.  TID score:  1.12 (threshold value of 1.39 is used for [place] APRN - CNP  [address]  [address] [facility] [address]            --  Lisa Priest DO   Emergency Medicine Resident     This dictation was generated by voice recognition computer software. Although all attempts are made to edit the dictation for accuracy, there may be errors in the transcription that are not intended.

## 2019-05-24 NOTE — CARE COORDINATION
Case Management Initial Discharge Plan  Gladys Segura,             Met with:patient to discuss discharge plans. Information verified: address, contacts, phone number, , insurance Yes  PCP: THOR Hazel CNP  Date of last visit: 2 months     Insurance Provider: traci     Discharge Planning    Living Arrangements:  Spouse/Significant Other   Support Systems:  Spouse/Significant Other    Home has 2 stories  2stairs to climb to get into front door, flight stairs to climb to reach second floor  Location of bedroom/bathroom in home up    Patient able to perform ADL's:Independent    Current Services (outpatient & in home) one  DME equipment: none  DME provider: none    Pharmacy: [facility]    Potential Assistance Purchasing Medications:  No  Does patient want to participate in local refill/ meds to beds program?  No    Potential Assistance Needed:  N/A    Patient agreeable to home care: No  [place] of choice provided:  n/a    Prior SNF/Rehab Placement and Facility: none  Agreeable to SNF/Rehab: No  [place] of choice provided: n/a   Evaluation: n/a    Expected Discharge date:  19  Patient expects to be discharged to:  home  Follow Up Appointment: Best Day/ Time:      Transportation provider: family   Transportation arrangements needed for discharge: No    Readmission Risk              Risk of Unplanned Readmission:        11             Does patient have a readmission risk score greater than 14?: No  If yes, follow-up appointment must be made within 7 days of discharge. Discharge Plan: Plan to discharge to home independently; has established pcp care.            Electronically signed by Yelena Fischer RN on 19 at 9:00 AM

## 2019-05-24 NOTE — PROGRESS NOTES
[address] [facility]  CDU / OBSERVATION eNCOUnter  Attending NOte       I performed a history and physical examination of the patient and discussed management with the resident. I reviewed the residents note and agree with the documented findings and plan of care. Any areas of disagreement are noted on the chart. I was personally present for the key portions of any procedures. I have documented in the chart those procedures where I was not present during the key portions. I have reviewed the nurses notes. I agree with the chief complaint, past medical history, past surgical history, allergies, medications, social and family history as documented unless otherwise noted below. The Family history, social history, and ROS are effectively unchanged since admission unless noted elsewhere in the chart. Patient with somewhat pleuritic nature to her chest pain. Patient does have cardiac risk factors however and was seen by cardiology and a stress test was ordered. Patient describes pain that is reproducible and pleuritic and associated with movement. The patient did not have associated with diaphoresis or nausea. Stress testing to determine disposition.     Denisha Alba MD  Attending Emergency  Physician

## 2019-05-24 NOTE — PROGRESS NOTES
BRONCHOSPASM/BRONCHOCONSTRICTION     [x]         IMPROVE AERATION/BREATH SOUNDS  [x]   ADMINISTER BRONCHODILATOR THERAPY AS APPROPRIATE  [x]   ASSESS BREATH SOUNDS  []   IMPLEMENT AEROSOL/MDI PROTOCOL  []   PATIENT EDUCATION AS NEEDED

## 2019-05-25 LAB
EKG ATRIAL RATE: 69 BPM
EKG ATRIAL RATE: 77 BPM
EKG P AXIS: 67 DEGREES
EKG P AXIS: 72 DEGREES
EKG P-R INTERVAL: 144 MS
EKG P-R INTERVAL: 146 MS
EKG Q-T INTERVAL: 360 MS
EKG Q-T INTERVAL: 382 MS
EKG QRS DURATION: 86 MS
EKG QRS DURATION: 90 MS
EKG QTC CALCULATION (BAZETT): 407 MS
EKG QTC CALCULATION (BAZETT): 409 MS
EKG R AXIS: 59 DEGREES
EKG R AXIS: 61 DEGREES
EKG T AXIS: 59 DEGREES
EKG T AXIS: 59 DEGREES
EKG VENTRICULAR RATE: 69 BPM
EKG VENTRICULAR RATE: 77 BPM

## 2019-05-25 PROCEDURE — 93010 ELECTROCARDIOGRAM REPORT: CPT | Performed by: INTERNAL MEDICINE

## 2019-05-26 NOTE — DISCHARGE SUMMARY
CDU Discharge Summary        Patient:  Jesse Mera  YOB: 1965    MRN: [id number]   Acct: [de-identified]    Primary Care Physician: THOR Bliss CNP    Admit date:  5/23/2019  3:06 PM  Discharge date: 5/24/2019  4:30 PM     Discharge Diagnoses:     Acute sharp generalized anterior chest pain to acute costochondritis  Improved with IV Toradol    Follow-up:  Call today/tomorrow for a follow up appointment with THOR Bliss CNP , or return to the Emergency Room with worsening symptoms    Stressed to patient the importance of following up with primary care doctor for further workup/management of symptoms. Pt verbalizes understanding and agrees with plan. Discharge Medications:  Changes to medications          [facility] Medication Instructions [id number]    Printed on:05/26/19 3405   Medication Information                      acetaminophen (TYLENOL) 325 MG tablet  Take 1 tablet by mouth every 8 hours as needed for Pain             acetaminophen (TYLENOL) 325 MG tablet  Take 2 tablets by mouth every 4 hours as needed for Pain             albuterol sulfate HFA (VENTOLIN HFA) 108 (90 Base) MCG/ACT inhaler  Inhale 2 puffs into the lungs every 4 hours as needed for Wheezing             aspirin EC 81 MG EC tablet  Take 1 tablet by mouth daily             atorvastatin (LIPITOR) 40 MG tablet  Take 1 tablet by mouth daily At bedtime             baclofen (LIORESAL) 10 MG tablet  TAKE ONE TABLET BY MOUTH THREE TIMES A DAY             buPROPion (WELLBUTRIN SR) 150 MG extended release tablet  Take 1 tablet by mouth 2 times daily Take once a day for 3 days, then increase to 1 tablet two times a day             Cholecalciferol (VITAMIN D3) 400 units CAPS  Take 1 tablet by mouth daily             fluticasone-vilanterol (BREO ELLIPTA) 100-25 MCG/INH AEPB inhaler  Inhale 2 puffs into the lungs daily             gabapentin (NEURONTIN) 100 MG capsule  Take 1 capsule by mouth daily.  In morning             levETIRAcetam (KEPPRA) 500 MG tablet  TAKE ONE TABLET BY MOUTH DAILY             magnesium gluconate (MAGONATE) 500 MG tablet  Take 500 mg by mouth 2 times daily             Multiple Vitamins-Minerals (THERAPEUTIC MULTIVITAMIN-MINERALS) tablet  Take 1 tablet by mouth daily. nitroGLYCERIN (NITROSTAT) 0.4 MG SL tablet  Place 1 tablet under the tongue every 5 minutes as needed for Chest pain up to max of 3 total doses. If no relief after 1 dose, call 911. vitamin B-12 (CYANOCOBALAMIN) 1000 MCG tablet  Take 1,000 mcg by mouth daily.              vitamin B-6 (PYRIDOXINE) 100 MG tablet  Take 100 mg by mouth daily                 Diet:  No diet orders on file , Advance as tolerated     Activity:  As tolerated    Consultants: IP CONSULT TO CARDIOLOGY    Procedures:  Not indicated     Diagnostic Test:   Results for orders placed or performed during the hospital encounter of 05/23/19   Troponin   Result Value Ref Range    Troponin, High Sensitivity <6 0 - 14 ng/L    Troponin T NOT REPORTED <0.03 ng/mL    Troponin Interp NOT REPORTED    CBC   Result Value Ref Range    WBC 6.4 3.5 - 11.3 k/uL    RBC 4.40 3.95 - 5.11 m/uL    Hemoglobin 13.7 11.9 - 15.1 g/dL    Hematocrit 42.2 36.3 - 47.1 %    MCV 95.9 82.6 - 102.9 fL    MCH 31.1 25.2 - 33.5 pg    MCHC 32.5 28.4 - 34.8 g/dL    RDW 13.6 11.8 - 14.4 %    Platelets 385 078 - 347 k/uL    MPV 8.6 8.1 - 13.5 fL    NRBC Automated 0.0 0.0 per 100 WBC   BASIC METABOLIC PANEL   Result Value Ref Range    Glucose 113 (H) 70 - 99 mg/dL    BUN 12 6 - 20 mg/dL    CREATININE 0.48 (L) 0.50 - 0.90 mg/dL    Bun/Cre Ratio NOT REPORTED 9 - 20    Calcium 9.0 8.6 - 10.4 mg/dL    Sodium 142 135 - 144 mmol/L    Potassium 4.2 3.7 - 5.3 mmol/L    Chloride 103 98 - 107 mmol/L    CO2 26 20 - 31 mmol/L    Anion Gap 13 9 - 17 mmol/L    GFR Non-African American >60 >60 mL/min    GFR African American >60 >60 mL/min    GFR Comment          GFR Staging NOT REPORTED Type->Rx Ordering Physician Provided Reason for Exam: Chest pain, SOB, nausea or vomiting Additional signs and symptoms: high cholesterol, lung disease FINDINGS: Perfusion: Image quality is good with no significant attenuation artifact. There are no fixed or reversible perfusion defects. Summed stress score:  3 Summed rest score:  0 Summed reversibility score:  3 Scores are visually adjusted for potential artifact. TID score:  1.12 (threshold value of 1.39 is used for Lexiscan stress with Tc-99m) Function: End diastolic volume:  88NN Left ventricular ejection fraction:  59% Wall motion abnormalities:  None. Perfusion:  Normal exam Function:  Normal exam Risk stratification:  Low Note on Risk Stratification: The above risk stratification is based on myocardial perfusion findings. Final risk assessment may be adjusted based on other clinical and noninvasive test findings. Risk stratification criteria are adapted from \"Noninvasive Risk Stratification\" criteria from Rosa Esquivel. al, ACC/AATS/AHA/ASE/ASNC/SCAI/SCCT/STS 2017 Appropriate Use Criteria For Coronary Revascularization in Patients With Stable Ischemic Heart Disease [facility] Volume 69, Issue 17, May 2017 High risk (>3% annual death or MI) 1. Severe resting LV dysfunction (LVEF >35%) not readily explained by non coronary causes 2. Resting perfusion abnormalities greater than 10% of the myocardium in patients without prior history or evidence of MI 3. Stress-induced perfusion abnormalities encumbering greater than or equal to 10% myocardium or stress segmental scores indicating multiple vascular territories with abnormalities 4. Stress-induced LV dilatation (TID ratio greater than 1.19 for exercise and greater than 1.39 for regadenoson) Intermediate risk (1% to 3% annual death or MI) 1. Mild/moderate resting LV dysfunction (LVEF 35% to 49%) not readily explained by non coronary causes.  2. Resting perfusion abnormalities in 5%-9.9% of the myocardium in patients without a history or prior evidence of MI 3. Stress-induced perfusion abnormality encumbering 5%-9.9% of the myocardium or stress segmental scores indicating 1 vascular territory with abnormalities but without LV dilation 4. Small wall motion abnormality involving 1-2 segments and only 1 coronary bed. Low Risk (Less than 1% annual death or MI) 1. Normal or small myocardial perfusion defect at rest or with stress encumbering less than 5% of the myocardium. Physical Exam:    General appearance - NAD, AOx 3   Lungs -CTAB, no R/R/R, TTP anterior chest  Heart - RRR, no M/R/G  Abdomen - Soft, NT/ND  Neurological:  MAEx4, No focal motor deficit, sensory loss  Extremities - Cap refil <2 sec in all ext., no edema  Skin -warm, dry      Hospital Course:  Clinical course has improved, labs and imaging reviewed. Kwasi Martini originally presented to the hospital on 5/23/2019  3:06 PM. with   Acute sharp anterior generalized chest pain. At that time it was determined that She required further observation and evaluation by cardiology, stress testing. She was admitted and labs and imaging were followed daily. Imaging results as above. She is medically stable to be discharged. Disposition: Home    Patient stated that they will not drive themselves home from the hospital if they have gotten pain killers/ narcotics earlier that day and that they will arrange for transportation on their own or work with the  for a ride. Patient counseled NOT to drive while under the influence of narcotics/ pain killers. Condition: Good    Patient stable and ready for discharge home. I have discussed plan of care with patient and they are in understanding. They were instructed to read discharge paperwork. All of their questions and concerns were addressed. Time Spent: 0 day      --  Aditya Faust DO  Emergency Medicine Resident Physician    This dictation was generated by voice recognition computer software. Although all attempts are made to edit the dictation for accuracy, there may be errors in the transcription that are not intended.

## 2019-08-20 DIAGNOSIS — J45.909 ASTHMA, UNSPECIFIED ASTHMA SEVERITY, UNSPECIFIED WHETHER COMPLICATED, UNSPECIFIED WHETHER PERSISTENT: Primary | ICD-10-CM

## 2019-08-21 RX ORDER — FLUTICASONE FUROATE AND VILANTEROL TRIFENATATE 100; 25 UG/1; UG/1
POWDER RESPIRATORY (INHALATION)
Qty: 60 EACH | Refills: 0 | Status: SHIPPED | OUTPATIENT
Start: 2019-08-21 | End: 2019-09-26 | Stop reason: SDUPTHER

## 2019-09-04 RX ORDER — BACLOFEN 10 MG/1
10 TABLET ORAL 3 TIMES DAILY PRN
Qty: 90 TABLET | Refills: 0 | Status: SHIPPED | OUTPATIENT
Start: 2019-09-04 | End: 2019-10-17 | Stop reason: SDUPTHER

## 2019-09-17 ENCOUNTER — OFFICE VISIT (OUTPATIENT)
Dept: PRIMARY CARE CLINIC | Age: 54
End: 2019-09-17
Payer: COMMERCIAL

## 2019-09-17 VITALS
SYSTOLIC BLOOD PRESSURE: 107 MMHG | WEIGHT: 151 LBS | OXYGEN SATURATION: 98 % | BODY MASS INDEX: 23.65 KG/M2 | HEART RATE: 81 BPM | DIASTOLIC BLOOD PRESSURE: 80 MMHG | TEMPERATURE: 98.4 F

## 2019-09-17 DIAGNOSIS — J45.909 ASTHMA, UNSPECIFIED ASTHMA SEVERITY, UNSPECIFIED WHETHER COMPLICATED, UNSPECIFIED WHETHER PERSISTENT: Primary | ICD-10-CM

## 2019-09-17 DIAGNOSIS — F33.1 MODERATE EPISODE OF RECURRENT MAJOR DEPRESSIVE DISORDER (HCC): ICD-10-CM

## 2019-09-17 DIAGNOSIS — R56.9 SEIZURE (HCC): ICD-10-CM

## 2019-09-17 DIAGNOSIS — Z23 NEED FOR INFLUENZA VACCINATION: ICD-10-CM

## 2019-09-17 PROCEDURE — 4004F PT TOBACCO SCREEN RCVD TLK: CPT | Performed by: NURSE PRACTITIONER

## 2019-09-17 PROCEDURE — G8427 DOCREV CUR MEDS BY ELIG CLIN: HCPCS | Performed by: NURSE PRACTITIONER

## 2019-09-17 PROCEDURE — G8598 ASA/ANTIPLAT THER USED: HCPCS | Performed by: NURSE PRACTITIONER

## 2019-09-17 PROCEDURE — 90471 IMMUNIZATION ADMIN: CPT | Performed by: NURSE PRACTITIONER

## 2019-09-17 PROCEDURE — 3017F COLORECTAL CA SCREEN DOC REV: CPT | Performed by: NURSE PRACTITIONER

## 2019-09-17 PROCEDURE — 99214 OFFICE O/P EST MOD 30 MIN: CPT | Performed by: NURSE PRACTITIONER

## 2019-09-17 PROCEDURE — G8420 CALC BMI NORM PARAMETERS: HCPCS | Performed by: NURSE PRACTITIONER

## 2019-09-17 PROCEDURE — 90686 IIV4 VACC NO PRSV 0.5 ML IM: CPT | Performed by: NURSE PRACTITIONER

## 2019-09-17 RX ORDER — PREDNISONE 20 MG/1
20 TABLET ORAL DAILY
Qty: 5 TABLET | Refills: 0 | Status: SHIPPED | OUTPATIENT
Start: 2019-09-17 | End: 2019-09-22

## 2019-09-17 RX ORDER — ESCITALOPRAM OXALATE 10 MG/1
10 TABLET ORAL DAILY
Qty: 30 TABLET | Refills: 3 | Status: SHIPPED | OUTPATIENT
Start: 2019-09-17 | End: 2020-01-28

## 2019-09-17 ASSESSMENT — ENCOUNTER SYMPTOMS
RHINORRHEA: 1
SHORTNESS OF BREATH: 1
WHEEZING: 1
COUGH: 1
VISUAL CHANGE: 0
BLURRED VISION: 0
BACK PAIN: 1

## 2019-09-17 NOTE — PROGRESS NOTES
so). Her past medical history is significant for asthma. .    Review of Systems   Constitutional: Negative for fever. HENT: Positive for rhinorrhea and sneezing. Eyes: Negative for blurred vision. Respiratory: Positive for cough, shortness of breath and wheezing. Musculoskeletal: Positive for back pain. Neurological: Positive for headaches. Negative for tingling, weakness and numbness. Prior to Visit Medications    Medication Sig Taking?  Authorizing Provider   escitalopram (LEXAPRO) 10 MG tablet Take 1 tablet by mouth daily 1/2 A PILL ONCE  DAY FOR THE FIRST WEEK Yes Eriberto Fast, APRN - RAMONITA   predniSONE (DELTASONE) 20 MG tablet Take 1 tablet by mouth daily for 5 days Yes Eriberto Fast, APRN - CNP   baclofen (LIORESAL) 10 MG tablet Take 1 tablet by mouth 3 times daily as needed (muscle pain) Yes Eriberto Underwood, APRN - RAMONITA   BREO ELLIPTA 100-25 MCG/INH AEPB inhaler INHALE 2 PUFFS BY MOUTH INTO THE LUNGS DAILY Yes Edmundo Garcia APRN - CNP   acetaminophen (TYLENOL) 325 MG tablet Take 1 tablet by mouth every 8 hours as needed for Pain Yes Tina Kam,    Cholecalciferol (VITAMIN D3) 400 units CAPS Take 1 tablet by mouth daily Yes Historical Provider, MD   aspirin EC 81 MG EC tablet Take 1 tablet by mouth daily Yes Eriberto Underwood, APRN - RAMONITA   levETIRAcetam (KEPPRA) 500 MG tablet TAKE ONE TABLET BY MOUTH DAILY Yes Eriberto Underwood, APRN - CNP   atorvastatin (LIPITOR) 40 MG tablet Take 1 tablet by mouth daily At bedtime Yes Eriberto Fast, APRN - CNP   albuterol sulfate HFA (VENTOLIN HFA) 108 (90 Base) MCG/ACT inhaler Inhale 2 puffs into the lungs every 4 hours as needed for Wheezing Yes Eriberto Fast, APRN - CNP   magnesium gluconate (MAGONATE) 500 MG tablet Take 500 mg by mouth 2 times daily Yes Historical Provider, MD   vitamin B-6 (PYRIDOXINE) 100 MG tablet Take 100 mg by mouth daily Yes Historical Provider, MD   Multiple Vitamins-Minerals (THERAPEUTIC MULTIVITAMIN-MINERALS) adherence    · Discussed use, benefit, and side effects of prescribed medications. Barriers to  medication compliance addressed. All patient questions answered. Pt  verbalized understanding of all instructions given. · Patient given educational materials - see patient instructions      · Patient advised to contact scheduling offices for any referrals or imaging orders  placed today if they have not been contacted in 48 hours. Return in about 3 months (around 12/17/2019) for copd, depression. An electronic signature was used to authenticate this note. --THOR Giang CNP on 9/17/2019 at 10:20 AM  Visit Information    Have you changed or started any medications since your last visit including any over-the-counter medicines, vitamins, or herbal medicines? no   Are you having any side effects from any of your medications? -  no  Have you stopped taking any of your medications? Is so, why? -  no    Have you seen any other physician or provider since your last visit? No  Have you had any other diagnostic tests since your last visit? No  Have you been seen in the emergency room and/or had an admission to a hospital since we last saw you? No  Have you had your routine dental cleaning in the past 6 months? no    Have you activated your [employer] account? If not, what are your barriers?  Yes     Patient Care Team:  THOR Giang CNP as PCP - General (Family Medicine)  THOR [facility]    Medical History Review  Past Medical, Family, and Social History reviewed and does not contribute to the patient presenting condition    Health Maintenance   Topic Date Due    Hepatitis C screen  1965    Cervical cancer screen  08/08/1986    Colon Cancer Screen FIT/FOBT  02/20/2019    Flu vaccine (1) 09/17/2020 (Originally 9/1/2019)    Shingles Vaccine (1 of 2) 09/17/2020 (Originally 8/8/2015)    HIV screen  09/17/2020 (Originally 8/8/1980)    Breast

## 2019-09-26 DIAGNOSIS — J45.909 ASTHMA, UNSPECIFIED ASTHMA SEVERITY, UNSPECIFIED WHETHER COMPLICATED, UNSPECIFIED WHETHER PERSISTENT: ICD-10-CM

## 2019-09-27 RX ORDER — FLUTICASONE FUROATE AND VILANTEROL TRIFENATATE 100; 25 UG/1; UG/1
POWDER RESPIRATORY (INHALATION)
Qty: 1 EACH | Refills: 2 | Status: SHIPPED | OUTPATIENT
Start: 2019-09-27 | End: 2020-01-10 | Stop reason: SDUPTHER

## 2019-09-30 ENCOUNTER — TELEPHONE (OUTPATIENT)
Dept: NEUROSURGERY | Age: 54
End: 2019-09-30

## 2019-10-18 RX ORDER — BACLOFEN 10 MG/1
TABLET ORAL
Qty: 90 TABLET | Refills: 0 | Status: SHIPPED | OUTPATIENT
Start: 2019-10-18 | End: 2020-03-19

## 2019-10-18 RX ORDER — LEVETIRACETAM 500 MG/1
TABLET ORAL
Qty: 30 TABLET | Refills: 4 | Status: SHIPPED | OUTPATIENT
Start: 2019-10-18 | End: 2020-04-14 | Stop reason: SDUPTHER

## 2019-11-15 ENCOUNTER — CARE COORDINATION (OUTPATIENT)
Dept: CARE COORDINATION | Age: 54
End: 2019-11-15

## 2019-11-15 DIAGNOSIS — E78.00 PURE HYPERCHOLESTEROLEMIA: ICD-10-CM

## 2019-11-15 RX ORDER — ATORVASTATIN CALCIUM 40 MG/1
TABLET, FILM COATED ORAL
Qty: 30 TABLET | Refills: 2 | Status: SHIPPED | OUTPATIENT
Start: 2019-11-15 | End: 2020-02-14

## 2019-11-20 ENCOUNTER — APPOINTMENT (OUTPATIENT)
Dept: CT IMAGING | Age: 54
End: 2019-11-20
Payer: COMMERCIAL

## 2019-11-20 ENCOUNTER — HOSPITAL ENCOUNTER (EMERGENCY)
Age: 54
Discharge: HOME | End: 2019-11-21
Attending: EMERGENCY MEDICINE
Payer: COMMERCIAL

## 2019-11-20 ENCOUNTER — APPOINTMENT (OUTPATIENT)
Dept: GENERAL RADIOLOGY | Age: 54
End: 2019-11-20
Payer: COMMERCIAL

## 2019-11-20 DIAGNOSIS — R10.9 FLANK PAIN: Primary | ICD-10-CM

## 2019-11-20 LAB
-: NORMAL
ABSOLUTE EOS #: 0.21 K/UL (ref 0–0.44)
ABSOLUTE IMMATURE GRANULOCYTE: <0.03 K/UL (ref 0–0.3)
ABSOLUTE LYMPH #: 3.88 K/UL (ref 1.1–3.7)
ABSOLUTE MONO #: 0.77 K/UL (ref 0.1–1.2)
AMORPHOUS: NORMAL
ANION GAP SERPL CALCULATED.3IONS-SCNC: 11 MMOL/L (ref 9–17)
BACTERIA: NORMAL
BASOPHILS # BLD: 1 % (ref 0–2)
BASOPHILS ABSOLUTE: 0.06 K/UL (ref 0–0.2)
BILIRUBIN URINE: NEGATIVE
BUN BLDV-MCNC: 14 MG/DL (ref 6–20)
BUN/CREAT BLD: NORMAL (ref 9–20)
CALCIUM SERPL-MCNC: 9.5 MG/DL (ref 8.6–10.4)
CASTS UA: NORMAL /LPF (ref 0–8)
CHLORIDE BLD-SCNC: 102 MMOL/L (ref 98–107)
CO2: 26 MMOL/L (ref 20–31)
COLOR: YELLOW
COMMENT UA: ABNORMAL
CREAT SERPL-MCNC: 0.56 MG/DL (ref 0.5–0.9)
CRYSTALS, UA: NORMAL /HPF
DIFFERENTIAL TYPE: ABNORMAL
EOSINOPHILS RELATIVE PERCENT: 3 % (ref 1–4)
EPITHELIAL CELLS UA: NORMAL /HPF (ref 0–5)
GFR AFRICAN AMERICAN: >60 ML/MIN
GFR NON-AFRICAN AMERICAN: >60 ML/MIN
GFR SERPL CREATININE-BSD FRML MDRD: NORMAL ML/MIN/{1.73_M2}
GFR SERPL CREATININE-BSD FRML MDRD: NORMAL ML/MIN/{1.73_M2}
GLUCOSE BLD-MCNC: 82 MG/DL (ref 70–99)
GLUCOSE URINE: NEGATIVE
HCT VFR BLD CALC: 41.1 % (ref 36.3–47.1)
HEMOGLOBIN: 13.6 G/DL (ref 11.9–15.1)
IMMATURE GRANULOCYTES: 0 %
KETONES, URINE: NEGATIVE
LEUKOCYTE ESTERASE, URINE: ABNORMAL
LYMPHOCYTES # BLD: 47 % (ref 24–43)
MCH RBC QN AUTO: 32.2 PG (ref 25.2–33.5)
MCHC RBC AUTO-ENTMCNC: 33.1 G/DL (ref 28.4–34.8)
MCV RBC AUTO: 97.2 FL (ref 82.6–102.9)
MONOCYTES # BLD: 9 % (ref 3–12)
MUCUS: NORMAL
NITRITE, URINE: NEGATIVE
NRBC AUTOMATED: 0 PER 100 WBC
OTHER OBSERVATIONS UA: NORMAL
PDW BLD-RTO: 14.1 % (ref 11.8–14.4)
PH UA: 7 (ref 5–8)
PLATELET # BLD: 250 K/UL (ref 138–453)
PLATELET ESTIMATE: ABNORMAL
PMV BLD AUTO: 8.7 FL (ref 8.1–13.5)
POTASSIUM SERPL-SCNC: 3.9 MMOL/L (ref 3.7–5.3)
PROTEIN UA: NEGATIVE
RBC # BLD: 4.23 M/UL (ref 3.95–5.11)
RBC # BLD: ABNORMAL 10*6/UL
RBC UA: NORMAL /HPF (ref 0–4)
RENAL EPITHELIAL, UA: NORMAL /HPF
SEG NEUTROPHILS: 40 % (ref 36–65)
SEGMENTED NEUTROPHILS ABSOLUTE COUNT: 3.23 K/UL (ref 1.5–8.1)
SODIUM BLD-SCNC: 139 MMOL/L (ref 135–144)
SPECIFIC GRAVITY UA: 1 (ref 1–1.03)
TRICHOMONAS: NORMAL
TROPONIN INTERP: NORMAL
TROPONIN INTERP: NORMAL
TROPONIN T: NORMAL NG/ML
TROPONIN T: NORMAL NG/ML
TROPONIN, HIGH SENSITIVITY: <6 NG/L (ref 0–14)
TROPONIN, HIGH SENSITIVITY: <6 NG/L (ref 0–14)
TURBIDITY: CLEAR
URINE HGB: ABNORMAL
UROBILINOGEN, URINE: NORMAL
WBC # BLD: 8.2 K/UL (ref 3.5–11.3)
WBC # BLD: ABNORMAL 10*3/UL
WBC UA: NORMAL /HPF (ref 0–5)
YEAST: NORMAL

## 2019-11-20 PROCEDURE — 6360000002 HC RX W HCPCS: Performed by: STUDENT IN AN ORGANIZED HEALTH CARE EDUCATION/TRAINING PROGRAM

## 2019-11-20 PROCEDURE — 86403 PARTICLE AGGLUT ANTBDY SCRN: CPT

## 2019-11-20 PROCEDURE — 71046 X-RAY EXAM CHEST 2 VIEWS: CPT

## 2019-11-20 PROCEDURE — 81001 URINALYSIS AUTO W/SCOPE: CPT

## 2019-11-20 PROCEDURE — 80048 BASIC METABOLIC PNL TOTAL CA: CPT

## 2019-11-20 PROCEDURE — 84484 ASSAY OF TROPONIN QUANT: CPT

## 2019-11-20 PROCEDURE — 87186 SC STD MICRODIL/AGAR DIL: CPT

## 2019-11-20 PROCEDURE — 87086 URINE CULTURE/COLONY COUNT: CPT

## 2019-11-20 PROCEDURE — 85025 COMPLETE CBC W/AUTO DIFF WBC: CPT

## 2019-11-20 PROCEDURE — 6370000000 HC RX 637 (ALT 250 FOR IP): Performed by: STUDENT IN AN ORGANIZED HEALTH CARE EDUCATION/TRAINING PROGRAM

## 2019-11-20 PROCEDURE — 96375 TX/PRO/DX INJ NEW DRUG ADDON: CPT

## 2019-11-20 PROCEDURE — 74176 CT ABD & PELVIS W/O CONTRAST: CPT

## 2019-11-20 PROCEDURE — 93005 ELECTROCARDIOGRAM TRACING: CPT | Performed by: STUDENT IN AN ORGANIZED HEALTH CARE EDUCATION/TRAINING PROGRAM

## 2019-11-20 PROCEDURE — 99284 EMERGENCY DEPT VISIT MOD MDM: CPT

## 2019-11-20 PROCEDURE — 96374 THER/PROPH/DIAG INJ IV PUSH: CPT

## 2019-11-20 PROCEDURE — 87077 CULTURE AEROBIC IDENTIFY: CPT

## 2019-11-20 RX ORDER — MORPHINE SULFATE 4 MG/ML
4 INJECTION, SOLUTION INTRAMUSCULAR; INTRAVENOUS ONCE
Status: COMPLETED | OUTPATIENT
Start: 2019-11-20 | End: 2019-11-20

## 2019-11-20 RX ORDER — LIDOCAINE 50 MG/G
1 PATCH TOPICAL DAILY
Qty: 15 PATCH | Refills: 0 | Status: SHIPPED | OUTPATIENT
Start: 2019-11-20 | End: 2020-06-15

## 2019-11-20 RX ORDER — KETOROLAC TROMETHAMINE 15 MG/ML
15 INJECTION, SOLUTION INTRAMUSCULAR; INTRAVENOUS ONCE
Status: COMPLETED | OUTPATIENT
Start: 2019-11-20 | End: 2019-11-20

## 2019-11-20 RX ORDER — CEPHALEXIN 250 MG/1
500 CAPSULE ORAL ONCE
Status: COMPLETED | OUTPATIENT
Start: 2019-11-21 | End: 2019-11-20

## 2019-11-20 RX ORDER — LIDOCAINE 4 G/G
1 PATCH TOPICAL ONCE
Status: DISCONTINUED | OUTPATIENT
Start: 2019-11-21 | End: 2019-11-21 | Stop reason: HOSPADM

## 2019-11-20 RX ORDER — CEPHALEXIN 500 MG/1
500 CAPSULE ORAL 2 TIMES DAILY
Qty: 6 CAPSULE | Refills: 0 | Status: SHIPPED | OUTPATIENT
Start: 2019-11-20 | End: 2019-11-23

## 2019-11-20 RX ADMIN — MORPHINE SULFATE 4 MG: 4 INJECTION INTRAVENOUS at 22:26

## 2019-11-20 RX ADMIN — CEPHALEXIN 500 MG: 250 CAPSULE ORAL at 23:56

## 2019-11-20 RX ADMIN — KETOROLAC TROMETHAMINE 15 MG: 15 INJECTION, SOLUTION INTRAMUSCULAR; INTRAVENOUS at 21:52

## 2019-11-20 ASSESSMENT — PAIN DESCRIPTION - LOCATION: LOCATION: RIB CAGE

## 2019-11-20 ASSESSMENT — PAIN SCALES - GENERAL
PAINLEVEL_OUTOF10: 8
PAINLEVEL_OUTOF10: 3
PAINLEVEL_OUTOF10: 10
PAINLEVEL_OUTOF10: 8

## 2019-11-20 ASSESSMENT — ENCOUNTER SYMPTOMS
ABDOMINAL PAIN: 0
COUGH: 1
DIARRHEA: 0
SHORTNESS OF BREATH: 1
VOMITING: 0

## 2019-11-20 ASSESSMENT — PAIN DESCRIPTION - PAIN TYPE: TYPE: ACUTE PAIN

## 2019-11-20 ASSESSMENT — PAIN DESCRIPTION - DESCRIPTORS: DESCRIPTORS: SHARP

## 2019-11-20 ASSESSMENT — PAIN DESCRIPTION - ORIENTATION: ORIENTATION: LEFT

## 2019-11-21 VITALS
DIASTOLIC BLOOD PRESSURE: 92 MMHG | TEMPERATURE: 97.9 F | HEART RATE: 89 BPM | BODY MASS INDEX: 23.54 KG/M2 | OXYGEN SATURATION: 100 % | SYSTOLIC BLOOD PRESSURE: 148 MMHG | RESPIRATION RATE: 18 BRPM | WEIGHT: 150 LBS | HEIGHT: 67 IN

## 2019-11-21 LAB
EKG ATRIAL RATE: 70 BPM
EKG P AXIS: 56 DEGREES
EKG P-R INTERVAL: 134 MS
EKG Q-T INTERVAL: 364 MS
EKG QRS DURATION: 90 MS
EKG QTC CALCULATION (BAZETT): 393 MS
EKG R AXIS: 54 DEGREES
EKG T AXIS: 53 DEGREES
EKG VENTRICULAR RATE: 70 BPM

## 2019-11-21 PROCEDURE — 93010 ELECTROCARDIOGRAM REPORT: CPT | Performed by: INTERNAL MEDICINE

## 2019-11-21 PROCEDURE — 6370000000 HC RX 637 (ALT 250 FOR IP): Performed by: STUDENT IN AN ORGANIZED HEALTH CARE EDUCATION/TRAINING PROGRAM

## 2019-11-21 ASSESSMENT — ENCOUNTER SYMPTOMS: BACK PAIN: 0

## 2019-11-23 LAB
CULTURE: ABNORMAL
CULTURE: ABNORMAL
Lab: ABNORMAL
SPECIMEN DESCRIPTION: ABNORMAL

## 2019-11-25 ENCOUNTER — CARE COORDINATION (OUTPATIENT)
Dept: CARE COORDINATION | Age: 54
End: 2019-11-25

## 2019-11-26 ENCOUNTER — TELEPHONE (OUTPATIENT)
Dept: PRIMARY CARE CLINIC | Age: 54
End: 2019-11-26

## 2019-12-04 ENCOUNTER — TELEPHONE (OUTPATIENT)
Dept: NEUROSURGERY | Age: 54
End: 2019-12-04

## 2020-01-09 ENCOUNTER — HOSPITAL ENCOUNTER (EMERGENCY)
Age: 55
Discharge: HOME | End: 2020-01-09
Attending: EMERGENCY MEDICINE
Payer: MEDICARE

## 2020-01-09 VITALS — OXYGEN SATURATION: 96 % | DIASTOLIC BLOOD PRESSURE: 86 MMHG | SYSTOLIC BLOOD PRESSURE: 127 MMHG

## 2020-01-09 LAB
ABSOLUTE EOS #: 0.04 K/UL (ref 0–0.44)
ABSOLUTE IMMATURE GRANULOCYTE: 0.03 K/UL (ref 0–0.3)
ABSOLUTE LYMPH #: 2.75 K/UL (ref 1.1–3.7)
ABSOLUTE MONO #: 0.56 K/UL (ref 0.1–1.2)
ACETAMINOPHEN LEVEL: <5 UG/ML (ref 10–30)
ALBUMIN SERPL-MCNC: 4.4 G/DL (ref 3.5–5.2)
ALBUMIN/GLOBULIN RATIO: 1.7 (ref 1–2.5)
ALP BLD-CCNC: 70 U/L (ref 35–104)
ALT SERPL-CCNC: 19 U/L (ref 5–33)
ANION GAP SERPL CALCULATED.3IONS-SCNC: 23 MMOL/L (ref 9–17)
AST SERPL-CCNC: 19 U/L
BASOPHILS # BLD: 1 % (ref 0–2)
BASOPHILS ABSOLUTE: 0.05 K/UL (ref 0–0.2)
BILIRUB SERPL-MCNC: 0.26 MG/DL (ref 0.3–1.2)
BUN BLDV-MCNC: 10 MG/DL (ref 6–20)
BUN/CREAT BLD: ABNORMAL (ref 9–20)
CALCIUM IONIZED: 1.11 MMOL/L (ref 1.13–1.33)
CALCIUM SERPL-MCNC: 9.5 MG/DL (ref 8.6–10.4)
CHLORIDE BLD-SCNC: 101 MMOL/L (ref 98–107)
CO2: 16 MMOL/L (ref 20–31)
CREAT SERPL-MCNC: 0.61 MG/DL (ref 0.5–0.9)
DIFFERENTIAL TYPE: ABNORMAL
EOSINOPHILS RELATIVE PERCENT: 0 % (ref 1–4)
ETHANOL PERCENT: <0.01 %
ETHANOL: <10 MG/DL
GFR AFRICAN AMERICAN: >60 ML/MIN
GFR NON-AFRICAN AMERICAN: >60 ML/MIN
GFR SERPL CREATININE-BSD FRML MDRD: ABNORMAL ML/MIN/{1.73_M2}
GFR SERPL CREATININE-BSD FRML MDRD: ABNORMAL ML/MIN/{1.73_M2}
GLUCOSE BLD-MCNC: 124 MG/DL (ref 70–99)
HCT VFR BLD CALC: 41.7 % (ref 36.3–47.1)
HEMOGLOBIN: 13.6 G/DL (ref 11.9–15.1)
IMMATURE GRANULOCYTES: 0 %
KEPPRA: <2 UG/ML
LYMPHOCYTES # BLD: 27 % (ref 24–43)
MCH RBC QN AUTO: 32.2 PG (ref 25.2–33.5)
MCHC RBC AUTO-ENTMCNC: 32.6 G/DL (ref 28.4–34.8)
MCV RBC AUTO: 98.6 FL (ref 82.6–102.9)
MONOCYTES # BLD: 6 % (ref 3–12)
NRBC AUTOMATED: 0 PER 100 WBC
PDW BLD-RTO: 13.6 % (ref 11.8–14.4)
PLATELET # BLD: 270 K/UL (ref 138–453)
PLATELET ESTIMATE: ABNORMAL
PMV BLD AUTO: 8.9 FL (ref 8.1–13.5)
POTASSIUM SERPL-SCNC: 3.6 MMOL/L (ref 3.7–5.3)
RBC # BLD: 4.23 M/UL (ref 3.95–5.11)
RBC # BLD: ABNORMAL 10*6/UL
SALICYLATE LEVEL: <1 MG/DL (ref 3–10)
SEG NEUTROPHILS: 66 % (ref 36–65)
SEGMENTED NEUTROPHILS ABSOLUTE COUNT: 6.71 K/UL (ref 1.5–8.1)
SODIUM BLD-SCNC: 140 MMOL/L (ref 135–144)
TOTAL PROTEIN: 7 G/DL (ref 6.4–8.3)
TOXIC TRICYCLIC SC,BLOOD: NEGATIVE
WBC # BLD: 10.1 K/UL (ref 3.5–11.3)
WBC # BLD: ABNORMAL 10*3/UL

## 2020-01-09 PROCEDURE — 96375 TX/PRO/DX INJ NEW DRUG ADDON: CPT

## 2020-01-09 PROCEDURE — 6360000002 HC RX W HCPCS: Performed by: EMERGENCY MEDICINE

## 2020-01-09 PROCEDURE — 80307 DRUG TEST PRSMV CHEM ANLYZR: CPT

## 2020-01-09 PROCEDURE — 82330 ASSAY OF CALCIUM: CPT

## 2020-01-09 PROCEDURE — 80177 DRUG SCRN QUAN LEVETIRACETAM: CPT

## 2020-01-09 PROCEDURE — 96365 THER/PROPH/DIAG IV INF INIT: CPT

## 2020-01-09 PROCEDURE — 85025 COMPLETE CBC W/AUTO DIFF WBC: CPT

## 2020-01-09 PROCEDURE — 80053 COMPREHEN METABOLIC PANEL: CPT

## 2020-01-09 PROCEDURE — 99284 EMERGENCY DEPT VISIT MOD MDM: CPT

## 2020-01-09 PROCEDURE — G0480 DRUG TEST DEF 1-7 CLASSES: HCPCS

## 2020-01-09 PROCEDURE — 2580000003 HC RX 258: Performed by: EMERGENCY MEDICINE

## 2020-01-09 RX ORDER — ONDANSETRON 2 MG/ML
4 INJECTION INTRAMUSCULAR; INTRAVENOUS ONCE
Status: COMPLETED | OUTPATIENT
Start: 2020-01-09 | End: 2020-01-09

## 2020-01-09 RX ORDER — 0.9 % SODIUM CHLORIDE 0.9 %
1000 INTRAVENOUS SOLUTION INTRAVENOUS ONCE
Status: COMPLETED | OUTPATIENT
Start: 2020-01-09 | End: 2020-01-09

## 2020-01-09 RX ORDER — LORAZEPAM 2 MG/ML
1 INJECTION INTRAMUSCULAR ONCE
Status: COMPLETED | OUTPATIENT
Start: 2020-01-09 | End: 2020-01-09

## 2020-01-09 RX ORDER — LEVETIRACETAM 10 MG/ML
1000 INJECTION INTRAVASCULAR ONCE
Status: COMPLETED | OUTPATIENT
Start: 2020-01-09 | End: 2020-01-09

## 2020-01-09 RX ORDER — ONDANSETRON 4 MG/1
4 TABLET, ORALLY DISINTEGRATING ORAL EVERY 8 HOURS PRN
Qty: 20 TABLET | Refills: 0 | Status: SHIPPED | OUTPATIENT
Start: 2020-01-09 | End: 2021-07-09

## 2020-01-09 RX ADMIN — LEVETIRACETAM 1000 MG: 10 INJECTION INTRAVENOUS at 21:16

## 2020-01-09 RX ADMIN — SODIUM CHLORIDE 1000 ML: 9 INJECTION, SOLUTION INTRAVENOUS at 18:46

## 2020-01-09 RX ADMIN — ONDANSETRON 4 MG: 2 INJECTION INTRAMUSCULAR; INTRAVENOUS at 18:47

## 2020-01-09 RX ADMIN — LORAZEPAM 1 MG: 2 INJECTION INTRAMUSCULAR; INTRAVENOUS at 18:47

## 2020-01-09 ASSESSMENT — ENCOUNTER SYMPTOMS
PHOTOPHOBIA: 0
NAUSEA: 1
DIARRHEA: 0
VOMITING: 1
CONSTIPATION: 0
COLOR CHANGE: 0
TROUBLE SWALLOWING: 0
ABDOMINAL PAIN: 1
COUGH: 0
SHORTNESS OF BREATH: 0

## 2020-01-09 ASSESSMENT — PAIN DESCRIPTION - LOCATION: LOCATION: HEAD

## 2020-01-09 ASSESSMENT — PAIN DESCRIPTION - DESCRIPTORS: DESCRIPTORS: CONSTANT

## 2020-01-09 ASSESSMENT — PAIN SCALES - WONG BAKER: WONGBAKER_NUMERICALRESPONSE: 2

## 2020-01-09 ASSESSMENT — PAIN DESCRIPTION - FREQUENCY: FREQUENCY: CONTINUOUS

## 2020-01-09 ASSESSMENT — PAIN DESCRIPTION - PAIN TYPE: TYPE: ACUTE PAIN

## 2020-01-09 NOTE — ED PROVIDER NOTES
[facility] [facility]     Emergency Department     Faculty Attestation        I performed a history and physical examination of the patient and discussed management with the resident. I reviewed the residents note and agree with the documented findings and plan of care. Any areas of disagreement are noted on the chart. I was personally present for the key portions of any procedures. I have documented in the chart those procedures where I was not present during the key portions. I have reviewed the emergency nurses triage note. I agree with the chief complaint, past medical history, past surgical history, allergies, medications, social and family history as documented unless otherwise noted below. For mid-level providers such as nurse practitioners as well as physicians assistants:    I have personally seen and evaluated the patient. I find the patient's history and physical exam are consistent with NP/PA documentation. I agree with the care provided, treatment rendered, disposition, & follow-up plan. Additional findings are as noted. Vital Signs: There were no vitals taken for this visit. PCP:  THOR Cat - CNP    Pertinent Comments:           Critical Care  None         Note, if the patient's blood pressure was elevated, and they have no history of hypertension, they were informed of the following: The patient may have Pre-hypertension/Hypertension: The patient has been informed that they may have pre-hypertension or Hypertension based on a blood pressure reading in the emergency department. I recommend that the patient call the primary care provider listed on their discharge instructions or a physician of their choice this week to arrange follow up for further evaluation of possible pre-hypertension or Hypertension.   (Please note that portions of this note were completed with a voice recognition program.  Efforts were made to edit the

## 2020-01-09 NOTE — ED PROVIDER NOTES
[address]  ED  Emergency Department Encounter  EmergencyMedicine Resident     Pt Narciso Ramirez  MRN: [id number]  [place] 1965  Date of evaluation: 1/9/20  PCP:  THOR Lopez CNP    CHIEF COMPLAINT       Chief Complaint   Patient presents with    Nausea     with emesis    Seizures     had witnessed SZ per family today x once; last SZ 4 yrs ago; did not take her medications last night or today       HISTORY OF PRESENT ILLNESS  (Location/Symptom, Timing/Onset, Context/Setting, Quality, Duration, Modifying Factors, Severity.)      Sheila Jackson is a 47 y.o. female who presents with reported nausea, seizures. Significant other at bedside states clonic movement of upper extremities with decreased responsiveness lasting a few minutes. Patient confused for approximately 15 minutes following episode. Hx of Seizure disorder with last reported seizure 4 years prior. On outpatient [facility], accompanied by significant other who states she may have missed multiple medication doses of this due to nausea. States nausea started the morning prior to arrival.  No reported fever, chills, shortness of breath, abdominal pain the evening prior. Denies sick contacts. Significant other states nausea and abdominal pain is similar to prior episodes of seizure disorder. Reports no diarrhea, constipation, dysuria, urinary frequency. No chest pain, SOB. PAST MEDICAL / SURGICAL / SOCIAL / FAMILY HISTORY      has a past medical history of Acute MI ([address] Anxiety, Chronic back pain, COPD (chronic obstructive pulmonary disease) ([address] Depression, Heart disease, Hyperlipidemia, Hypertension, and MRSA (methicillin resistant staph aureus) culture positive. has a past surgical history that includes Tubal ligation; Tonsillectomy; Cholecystectomy, laparoscopic (11/10/2017); Cholecystectomy, laparoscopic (N/A, 11/10/2017); and back surgery.     Social History     Socioeconomic History    Marital daily.    Historical Provider, MD   vitamin B-12 (CYANOCOBALAMIN) 1000 MCG tablet Take 1,000 mcg by mouth daily. Historical Provider, MD       REVIEW OF SYSTEMS    (2-9 systems for level 4, 10 or more for level 5)      Review of Systems   Constitutional: Negative for chills and fever. HENT: Negative for nosebleeds and trouble swallowing. Eyes: Negative for photophobia and visual disturbance. Respiratory: Negative for cough and shortness of breath. Cardiovascular: Negative for chest pain and leg swelling. Gastrointestinal: Positive for abdominal pain, nausea and vomiting. Negative for constipation and diarrhea. Endocrine: Negative for polyuria. Genitourinary: Negative for difficulty urinating, frequency, vaginal bleeding and vaginal discharge. Musculoskeletal: Negative for neck pain and neck stiffness. Skin: Negative for color change, rash and wound. Neurological: Positive for seizures and headaches. Negative for dizziness, weakness and numbness. PHYSICAL EXAM   (up to 7 for level 4, 8 or more for level 5)      INITIAL VITALS:   /86   SpO2 96%     Physical Exam  Constitutional:       General: She is not in acute distress. Appearance: She is well-developed. She is not diaphoretic. Comments: Uncomfortable appearing due to nausea   HENT:      Head: Normocephalic and atraumatic. Right Ear: External ear normal.      Left Ear: External ear normal.      Nose: Nose normal.   Eyes:      General:         Right eye: No discharge. Left eye: No discharge. Conjunctiva/sclera: Conjunctivae normal.   Neck:      Musculoskeletal: Normal range of motion and neck supple. Vascular: No JVD. Cardiovascular:      Rate and Rhythm: Normal rate and regular rhythm. Heart sounds: Normal heart sounds. No murmur. No friction rub. No gallop. Pulmonary:      Effort: Pulmonary effort is normal. No respiratory distress. Breath sounds: Normal breath sounds. No stridor. k/uL    MPV 8.9 8.1 - 13.5 fL    NRBC Automated 0.0 0.0 per 100 WBC    Differential Type NOT REPORTED     Seg Neutrophils 66 (H) 36 - 65 %    Lymphocytes 27 24 - 43 %    Monocytes 6 3 - 12 %    Eosinophils % 0 (L) 1 - 4 %    Basophils 1 0 - 2 %    Immature Granulocytes 0 0 %    Segs Absolute 6.71 1.50 - 8.10 k/uL    Absolute Lymph # 2.75 1.10 - 3.70 k/uL    Absolute Mono # 0.56 0.10 - 1.20 k/uL    Absolute Eos # 0.04 0.00 - 0.44 k/uL    Basophils Absolute 0.05 0.00 - 0.20 k/uL    Absolute Immature Granulocyte 0.03 0.00 - 0.30 k/uL    WBC Morphology NOT REPORTED     RBC Morphology NOT REPORTED     Platelet Estimate NOT REPORTED    TOX SCR, BLD, ED   Result Value Ref Range    Ethanol <10 <10 mg/dL    Ethanol percent <6.330 <9.075 %    Salicylate Lvl <1 (L) 3 - 10 mg/dL    Acetaminophen Level <5 (L) 10 - 30 ug/mL    Toxic Tricyclic Sc,Blood NEGATIVE NEGATIVE   Levetiracetam Level   Result Value Ref Range    Levetiracetam Lvl <2 ug/mL   Comprehensive Metabolic Panel   Result Value Ref Range    Glucose 124 (H) 70 - 99 mg/dL    BUN 10 6 - 20 mg/dL    CREATININE 0.61 0.50 - 0.90 mg/dL    Bun/Cre Ratio NOT REPORTED 9 - 20    Calcium 9.5 8.6 - 10.4 mg/dL    Sodium 140 135 - 144 mmol/L    Potassium 3.6 (L) 3.7 - 5.3 mmol/L    Chloride 101 98 - 107 mmol/L    CO2 16 (L) 20 - 31 mmol/L    Anion Gap 23 (H) 9 - 17 mmol/L    Alkaline Phosphatase 70 35 - 104 U/L    ALT 19 5 - 33 U/L    AST 19 <32 U/L    Total Bilirubin 0.26 (L) 0.3 - 1.2 mg/dL    Total Protein 7.0 6.4 - 8.3 g/dL    Alb 4.4 3.5 - 5.2 g/dL    Albumin/Globulin Ratio 1.7 1.0 - 2.5    GFR Non-African American >60 >60 mL/min    GFR African American >60 >60 mL/min    GFR Comment          GFR Staging NOT REPORTED        IMPRESSION: 59-year-old female with history of seizure disorder on outpatient Keppra presents with episode of witnessed seizure occurring prior to arrival.  Notes missing 2 doses of Keppra, last seizure disorder 4 years prior to arrival.  Reporting nausea,

## 2020-01-09 NOTE — ED NOTES
Bed: 34  Expected date:   Expected time:   Means of arrival:   Comments:  [address] RN  01/09/20 1800

## 2020-01-09 NOTE — ED TRIAGE NOTES
Pt reported she has not taken her medications last night or today because she was nauseated  And emesis \"all day\". Pt STS she has a headache. Pt is awake and alert upon arrival to room but still unable to recall events of sz not does she know her medications. No other concerns voiced. Significant other is with pt.

## 2020-01-10 ENCOUNTER — CARE COORDINATION (OUTPATIENT)
Dept: CARE COORDINATION | Age: 55
End: 2020-01-10

## 2020-01-10 RX ORDER — FLUTICASONE FUROATE AND VILANTEROL 100; 25 UG/1; UG/1
POWDER RESPIRATORY (INHALATION)
Qty: 1 EACH | Refills: 2 | Status: SHIPPED | OUTPATIENT
Start: 2020-01-10 | End: 2020-01-15 | Stop reason: CLARIF

## 2020-01-10 NOTE — ED NOTES
Patient requesting transportation home. SW contacted [facility]. ETA unknown.       Faye Thomas, [facility]  01/09/20 2183

## 2020-01-15 ENCOUNTER — HOSPITAL ENCOUNTER (OUTPATIENT)
Dept: GENERAL RADIOLOGY | Age: 55
Discharge: HOME | End: 2020-01-17
Payer: MEDICARE

## 2020-01-15 ENCOUNTER — HOSPITAL ENCOUNTER (OUTPATIENT)
Age: 55
Discharge: HOME | End: 2020-01-17
Payer: MEDICARE

## 2020-01-15 ENCOUNTER — OFFICE VISIT (OUTPATIENT)
Dept: PRIMARY CARE CLINIC | Age: 55
End: 2020-01-15
Payer: MEDICARE

## 2020-01-15 VITALS
BODY MASS INDEX: 23.02 KG/M2 | TEMPERATURE: 98.1 F | SYSTOLIC BLOOD PRESSURE: 133 MMHG | WEIGHT: 147 LBS | OXYGEN SATURATION: 96 % | DIASTOLIC BLOOD PRESSURE: 84 MMHG | HEART RATE: 79 BPM

## 2020-01-15 PROCEDURE — G8482 FLU IMMUNIZE ORDER/ADMIN: HCPCS | Performed by: NURSE PRACTITIONER

## 2020-01-15 PROCEDURE — 99214 OFFICE O/P EST MOD 30 MIN: CPT | Performed by: NURSE PRACTITIONER

## 2020-01-15 PROCEDURE — G8427 DOCREV CUR MEDS BY ELIG CLIN: HCPCS | Performed by: NURSE PRACTITIONER

## 2020-01-15 PROCEDURE — 3017F COLORECTAL CA SCREEN DOC REV: CPT | Performed by: NURSE PRACTITIONER

## 2020-01-15 PROCEDURE — 4004F PT TOBACCO SCREEN RCVD TLK: CPT | Performed by: NURSE PRACTITIONER

## 2020-01-15 PROCEDURE — G8420 CALC BMI NORM PARAMETERS: HCPCS | Performed by: NURSE PRACTITIONER

## 2020-01-15 PROCEDURE — 1111F DSCHRG MED/CURRENT MED MERGE: CPT | Performed by: NURSE PRACTITIONER

## 2020-01-15 PROCEDURE — 3023F SPIROM DOC REV: CPT | Performed by: NURSE PRACTITIONER

## 2020-01-15 PROCEDURE — G8926 SPIRO NO PERF OR DOC: HCPCS | Performed by: NURSE PRACTITIONER

## 2020-01-15 PROCEDURE — 71046 X-RAY EXAM CHEST 2 VIEWS: CPT

## 2020-01-15 RX ORDER — AZITHROMYCIN 250 MG/1
250 TABLET, FILM COATED ORAL SEE ADMIN INSTRUCTIONS
Qty: 6 TABLET | Refills: 0 | Status: SHIPPED | OUTPATIENT
Start: 2020-01-15 | End: 2020-01-20

## 2020-01-15 ASSESSMENT — ENCOUNTER SYMPTOMS
VOMITING: 0
SHORTNESS OF BREATH: 1
WHEEZING: 0
RHINORRHEA: 1
CHEST TIGHTNESS: 1
BACK PAIN: 1
NAUSEA: 1
ABDOMINAL PAIN: 0
COUGH: 1

## 2020-01-15 NOTE — PROGRESS NOTES
atorvastatin (LIPITOR) 40 MG tablet  TAKE ONE TABLET BY MOUTH EVERY NIGHT AT BEDTIME             azithromycin (ZITHROMAX) 250 MG tablet  Take 1 tablet by mouth See Admin Instructions for 5 days 500mg on day 1 followed by 250mg on days 2 - 5             baclofen (LIORESAL) 10 MG tablet  TAKE ONE TABLET BY MOUTH THREE TIMES A DAY AS NEEDED FOR MUSCLE PAIN             Cholecalciferol (VITAMIN D3) 400 units CAPS  Take 1 tablet by mouth daily             escitalopram (LEXAPRO) 10 MG tablet  Take 1 tablet by mouth daily 1/2 A PILL ONCE  DAY FOR THE FIRST WEEK             gabapentin (NEURONTIN) 100 MG capsule  Take 1 capsule by mouth daily. In morning             Handicap Placard MISC  by Does not apply route Exp 1/2024             levETIRAcetam (KEPPRA) 500 MG tablet  TAKE ONE TABLET BY MOUTH DAILY             lidocaine (LIDODERM) 5 %  Place 1 patch onto the skin daily 12 hours on, 12 hours off.             magnesium gluconate (MAGONATE) 500 MG tablet  Take 500 mg by mouth 2 times daily             Multiple Vitamins-Minerals (THERAPEUTIC MULTIVITAMIN-MINERALS) tablet  Take 1 tablet by mouth daily. nitroGLYCERIN (NITROSTAT) 0.4 MG SL tablet  Place 1 tablet under the tongue every 5 minutes as needed for Chest pain up to max of 3 total doses. If no relief after 1 dose, call 911. ondansetron (ZOFRAN ODT) 4 MG disintegrating tablet  Take 1 tablet by mouth every 8 hours as needed for Nausea             Tiotropium Bromide-Olodaterol 2.5-2.5 MCG/ACT AERS  Inhale 2 puffs into the lungs daily             vitamin B-12 (CYANOCOBALAMIN) 1000 MCG tablet  Take 1,000 mcg by mouth daily.              vitamin B-6 (PYRIDOXINE) 100 MG tablet  Take 100 mg by mouth daily                   Medications marked \"taking\" at this time  Outpatient Medications Marked as Taking for the 1/15/20 encounter (Office Visit) with Magdalena Calvert, APRN - CNP   Medication Sig Dispense Refill    Tiotropium Bromide-Olodaterol 2.5-2.5 MCG/ACT AERS Inhale 2 puffs into the lungs daily 1 Inhaler 2    azithromycin (ZITHROMAX) 250 MG tablet Take 1 tablet by mouth See Admin Instructions for 5 days 500mg on day 1 followed by 250mg on days 2 - 5 6 tablet 0    Handicap Placard MISC by Does not apply route Exp 1/2024 1 each 0    atorvastatin (LIPITOR) 40 MG tablet TAKE ONE TABLET BY MOUTH EVERY NIGHT AT BEDTIME 30 tablet 2    levETIRAcetam (KEPPRA) 500 MG tablet TAKE ONE TABLET BY MOUTH DAILY 30 tablet 4    baclofen (LIORESAL) 10 MG tablet TAKE ONE TABLET BY MOUTH THREE TIMES A DAY AS NEEDED FOR MUSCLE PAIN 90 tablet 0    escitalopram (LEXAPRO) 10 MG tablet Take 1 tablet by mouth daily 1/2 A PILL ONCE  DAY FOR THE FIRST WEEK 30 tablet 3    acetaminophen (TYLENOL) 325 MG tablet Take 1 tablet by mouth every 8 hours as needed for Pain 30 tablet 3    Cholecalciferol (VITAMIN D3) 400 units CAPS Take 1 tablet by mouth daily      albuterol sulfate HFA (VENTOLIN HFA) 108 (90 Base) MCG/ACT inhaler Inhale 2 puffs into the lungs every 4 hours as needed for Wheezing 18 g 5    magnesium gluconate (MAGONATE) 500 MG tablet Take 500 mg by mouth 2 times daily      vitamin B-6 (PYRIDOXINE) 100 MG tablet Take 100 mg by mouth daily      Multiple Vitamins-Minerals (THERAPEUTIC MULTIVITAMIN-MINERALS) tablet Take 1 tablet by mouth daily.  vitamin B-12 (CYANOCOBALAMIN) 1000 MCG tablet Take 1,000 mcg by mouth daily. Medications patient taking as of now reconciled against medications ordered at time of hospital discharge: Yes    Chief Complaint   Patient presents with   [facility] Luna ED Follow-up     pt would like to discuss Lexepro    Fatigue    Shortness of Breath       Elsy Peoples was in the ER on the 9th after a seizure, has missed multiple doses of Keppra d/t nasuea. She has not been feeling well since the New year  Nausea, no vomiting.  Feels very weak   A lot of mucous production and phlegm, tan colored  More SOB lately as well, knows she needs to quit and Rhythm: Normal rate and regular rhythm. Pulmonary:      Effort: Pulmonary effort is normal. No respiratory distress. Breath sounds: Normal breath sounds. No wheezing. Abdominal:      Palpations: Abdomen is soft. Tenderness: There is no tenderness. Skin:     General: Skin is warm and dry. Neurological:      Mental Status: She is alert and oriented to person, place, and time. Coordination: Coordination normal.   Psychiatric:         Behavior: Behavior normal. Behavior is cooperative. Thought Content: Thought content normal.         Judgment: Judgment normal.             Assessment/Plan:  1. Seizure ([address]  Likely breakthrough with lack of Keppra doses, she is taking it daily and has Neruology f/u in 1 month  - IA DISCHARGE MEDS RECONCILED W/ CURRENT OUTPATIENT MED LIST    2. Nausea in adult  Weight stable, no abdominal pain. Concern for pneumonia versus COPD exacerbation given other reported symptoms ove the last 2 weeks  - IA DISCHARGE MEDS RECONCILED W/ CURRENT OUTPATIENT MED LIST    3. Chronic obstructive pulmonary disease, unspecified COPD type ([facility] [address]  With weakness, SOB and worsening cough. Switch to Laba/Lama. Z-pack for possible exacerbation versus atypical penumonia  - Tiotropium Bromide-Olodaterol 2.5-2.5 MCG/ACT AERS; Inhale 2 puffs into the lungs daily  Dispense: 1 Inhaler; Refill: 2  - XR CHEST STANDARD (2 VW); Future    4.  Moderate episode of recurrent major depressive disorder [facility])  Agreed to psychiatry referral        Medical Decision Making: moderate complexity

## 2020-01-15 NOTE — PROGRESS NOTES
No follow-ups on file. An electronic signature was used to authenticate this note. --Minda Mahmood MA on 1/15/2020 at 9:47 AM  Visit Information    Have you changed or started any medications since your last visit including any over-the-counter medicines, vitamins, or herbal medicines? Yes,  Already listed on med list   Are you having any side effects from any of your medications? -  yes - pt states that she is unsure if she is having any side effects. Have you stopped taking any of your medications? Is so, why? -  no    Have you seen any other physician or provider since your last visit? No  Have you had any other diagnostic tests since your last visit? Yes - Records Requested  Have you been seen in the emergency room and/or had an admission to a hospital since we last saw you? Yes - Records Requested  Have you had your routine dental cleaning in the past 6 months? no    Have you activated your [employer] account? If not, what are your barriers?  Yes     Patient Care Team:  THOR Parikh CNP as PCP - General (Family Medicine)  THOR Parkih CNP as PCP - [facility] Provider  Nikhil Fuentes as Ambulatory Care Manager    Medical History Review  Past Medical, Family, and Social History reviewed and does not contribute to the patient presenting condition    Health Maintenance   Topic Date Due    Hepatitis C screen  1965    Cervical cancer screen  08/08/1986    Lipid screen  11/08/2018    Colon Cancer Screen FIT/FOBT  02/20/2019    Annual Wellness Visit (AWV)  01/09/2020    Breast cancer screen  02/06/2020    Shingles Vaccine (1 of 2) 09/17/2020 (Originally 8/8/2015)    HIV screen  09/17/2020 (Originally 8/8/1980)    DTaP/Tdap/Td vaccine (2 - Td) 09/20/2027    Flu vaccine  Completed    Pneumococcal 0-64 years Vaccine  Completed

## 2020-01-28 ENCOUNTER — OFFICE VISIT (OUTPATIENT)
Dept: PSYCHIATRY | Age: 55
End: 2020-01-28
Payer: MEDICARE

## 2020-01-28 VITALS
WEIGHT: 145.3 LBS | SYSTOLIC BLOOD PRESSURE: 122 MMHG | HEART RATE: 89 BPM | DIASTOLIC BLOOD PRESSURE: 76 MMHG | OXYGEN SATURATION: 97 % | BODY MASS INDEX: 22.81 KG/M2 | HEIGHT: 67 IN

## 2020-01-28 PROCEDURE — G8482 FLU IMMUNIZE ORDER/ADMIN: HCPCS | Performed by: PSYCHIATRY & NEUROLOGY

## 2020-01-28 PROCEDURE — 4004F PT TOBACCO SCREEN RCVD TLK: CPT | Performed by: PSYCHIATRY & NEUROLOGY

## 2020-01-28 PROCEDURE — G8420 CALC BMI NORM PARAMETERS: HCPCS | Performed by: PSYCHIATRY & NEUROLOGY

## 2020-01-28 PROCEDURE — 99205 OFFICE O/P NEW HI 60 MIN: CPT | Performed by: PSYCHIATRY & NEUROLOGY

## 2020-01-28 PROCEDURE — G8427 DOCREV CUR MEDS BY ELIG CLIN: HCPCS | Performed by: PSYCHIATRY & NEUROLOGY

## 2020-01-28 PROCEDURE — 3017F COLORECTAL CA SCREEN DOC REV: CPT | Performed by: PSYCHIATRY & NEUROLOGY

## 2020-01-28 RX ORDER — ESCITALOPRAM OXALATE 10 MG/1
15 TABLET ORAL DAILY
Qty: 45 TABLET | Refills: 1 | Status: SHIPPED | OUTPATIENT
Start: 2020-01-28 | End: 2020-04-09

## 2020-01-28 NOTE — PROGRESS NOTES
Systems:  Constitutional: chronic generalized pain    HENT: Negative. Eyes: Negative. Respiratory: Negative. Cardiovascular: Negative. Gastrointestinal: Negative. Genitourinary: Negative . Musculoskeletal: Negative. Skin: Negative. Neurological: Negative. Endo/Heme/Allergies: Negative.      Labs:  Lab Results   Component Value Date    LABA1C 5.3 01/30/2018     Lab Results   Component Value Date     08/02/2013      Lab Results   Component Value Date    WBC 10.1 01/09/2020    HGB 13.6 01/09/2020    HCT 41.7 01/09/2020    MCV 98.6 01/09/2020     01/09/2020      Lab Results   Component Value Date     01/09/2020    K 3.6 01/09/2020     01/09/2020    CO2 16 01/09/2020    BUN 10 01/09/2020    CREATININE 0.61 01/09/2020    GLUCOSE 124 01/09/2020    CALCIUM 9.5 01/09/2020       Lab Results   Component Value Date    TSH 0.43 08/02/2013      Lab Results   Component Value Date    CHOL 265 (H) 11/08/2017    CHOL 217 (H) 08/03/2013    CHOL 247 (H) 02/14/2013     Lab Results   Component Value Date    TRIG 115 11/08/2017    TRIG 54 08/03/2013    TRIG 136 02/14/2013     Lab Results   Component Value Date    HDL 44 11/08/2017    HDL 57 08/03/2013    HDL 51 02/14/2013     Lab Results   Component Value Date    LDLCHOLESTEROL 198 (H) 11/08/2017    LDLCHOLESTEROL 149 (H) 08/03/2013    LDLCHOLESTEROL 169 (H) 02/14/2013     Lab Results   Component Value Date    VLDL NOT REPORTED 11/08/2017    VLDL NOT REPORTED 08/03/2013    VLDL NOT REPORTED 02/14/2013     Lab Results   Component Value Date    CHOLHDLRATIO 6.0 (H) 11/08/2017    CHOLHDLRATIO 3.8 08/03/2013    CHOLHDLRATIO 4.8 02/14/2013      Lab Results   Component Value Date     01/09/2020    BUN 10 01/09/2020    CREATININE 0.61 01/09/2020    TSH 0.43 08/02/2013    WBC 10.1 01/09/2020      No results found for: PHENYTOIN, PHENOBARB, VALPROATE, CBMZ     Vitals:  /76 (01/28/20 0958)    Temp      Pulse 89 (01/28/20 0958)   Resp      SpO2 0.4 MG SL tablet Place 1 tablet under the tongue every 5 minutes as needed for Chest pain up to max of 3 total doses. If no relief after 1 dose, call 911. (Patient not taking: Reported on 9/17/2019) 25 tablet 3     No current facility-administered medications for this visit. PDMP Monitoring:    Last PDMP Sanjuana Anderson as Reviewed [facility]):  Review User Review Instant Review Result          Last Controlled Substance Monitoring Documentation      Office Visit from 3/6/2019 in [address] [facility] [facility]  The Prescription Monitoring Report for this patient was reviewed today. filed at 03/06/2019 1213        Urine Drug Screenings (1 yr)     DRUG SCREEN MULTI URINE  Collected: 11/8/2017  4:21 PM (Final result)    Complete Results          Urine Drug Screen  Collected: 8/2/2013  4:00 PM (Final result)    Complete Results              Medication Contract and Consent for Opioid Use Documents Filed      No documents found                Mental Status Exam:  Patient is alert and oriented to time , place , person and self. She  is cooperative with fair eye contact and fair grooming . Appears of stated age . Mood is 'down '  . Affect is congruent . Thought process is linear and goal directed . Thought content is negative for  Suicidal or homicidal thoughts . Denies any hallucinations or delusions . Insight is fair . Judgement is fair. Memory : intact . Attention : intact  . Intellect :  average per vocabulary and fund of knowledge . Psychomotor activity : normal.    No abnormal movements noted . Speech is regular rate and rhythm. Impression:   MDD recurrent mild   KEL   THC use disorder   Stimulant use disorder - sustained complete remission   Alcohol use disorder - sustained complete remission . PLAN:  Increase Lexapro 15 mg PO QD . Psycho-education conducted. Supportive Therapy conducted. Educated about the importance of staying away from illicit drugs and alcohol . Zaina Wallace

## 2020-02-03 ENCOUNTER — TELEPHONE (OUTPATIENT)
Dept: PRIMARY CARE CLINIC | Age: 55
End: 2020-02-03

## 2020-02-12 ENCOUNTER — CARE COORDINATION (OUTPATIENT)
Dept: CARE COORDINATION | Age: 55
End: 2020-02-12

## 2020-02-12 ENCOUNTER — HOSPITAL ENCOUNTER (EMERGENCY)
Age: 55
Discharge: HOME | End: 2020-02-12
Attending: EMERGENCY MEDICINE
Payer: MEDICARE

## 2020-02-12 ENCOUNTER — APPOINTMENT (OUTPATIENT)
Dept: GENERAL RADIOLOGY | Age: 55
End: 2020-02-12
Payer: MEDICARE

## 2020-02-12 VITALS
BODY MASS INDEX: 22.76 KG/M2 | HEIGHT: 67 IN | SYSTOLIC BLOOD PRESSURE: 120 MMHG | WEIGHT: 145 LBS | RESPIRATION RATE: 17 BRPM | TEMPERATURE: 98.1 F | DIASTOLIC BLOOD PRESSURE: 76 MMHG | HEART RATE: 95 BPM | OXYGEN SATURATION: 96 %

## 2020-02-12 LAB
ABSOLUTE EOS #: 0.34 K/UL (ref 0–0.44)
ABSOLUTE IMMATURE GRANULOCYTE: <0.03 K/UL (ref 0–0.3)
ABSOLUTE LYMPH #: 3.25 K/UL (ref 1.1–3.7)
ABSOLUTE MONO #: 0.38 K/UL (ref 0.1–1.2)
ANION GAP SERPL CALCULATED.3IONS-SCNC: 17 MMOL/L (ref 9–17)
BASOPHILS # BLD: 1 % (ref 0–2)
BASOPHILS ABSOLUTE: 0.05 K/UL (ref 0–0.2)
BUN BLDV-MCNC: 10 MG/DL (ref 6–20)
BUN/CREAT BLD: NORMAL (ref 9–20)
CALCIUM SERPL-MCNC: 9.4 MG/DL (ref 8.6–10.4)
CHLORIDE BLD-SCNC: 104 MMOL/L (ref 98–107)
CO2: 21 MMOL/L (ref 20–31)
CREAT SERPL-MCNC: 0.58 MG/DL (ref 0.5–0.9)
DIFFERENTIAL TYPE: ABNORMAL
EOSINOPHILS RELATIVE PERCENT: 5 % (ref 1–4)
GFR AFRICAN AMERICAN: >60 ML/MIN
GFR NON-AFRICAN AMERICAN: >60 ML/MIN
GFR SERPL CREATININE-BSD FRML MDRD: NORMAL ML/MIN/{1.73_M2}
GFR SERPL CREATININE-BSD FRML MDRD: NORMAL ML/MIN/{1.73_M2}
GLUCOSE BLD-MCNC: 88 MG/DL (ref 70–99)
HCT VFR BLD CALC: 42.3 % (ref 36.3–47.1)
HEMOGLOBIN: 14.1 G/DL (ref 11.9–15.1)
IMMATURE GRANULOCYTES: 0 %
LYMPHOCYTES # BLD: 45 % (ref 24–43)
MCH RBC QN AUTO: 32 PG (ref 25.2–33.5)
MCHC RBC AUTO-ENTMCNC: 33.3 G/DL (ref 28.4–34.8)
MCV RBC AUTO: 95.9 FL (ref 82.6–102.9)
MONOCYTES # BLD: 5 % (ref 3–12)
NRBC AUTOMATED: 0 PER 100 WBC
PDW BLD-RTO: 13.4 % (ref 11.8–14.4)
PLATELET # BLD: 242 K/UL (ref 138–453)
PLATELET ESTIMATE: ABNORMAL
PMV BLD AUTO: 9 FL (ref 8.1–13.5)
POTASSIUM SERPL-SCNC: 4.1 MMOL/L (ref 3.7–5.3)
RBC # BLD: 4.41 M/UL (ref 3.95–5.11)
RBC # BLD: ABNORMAL 10*6/UL
SEG NEUTROPHILS: 44 % (ref 36–65)
SEGMENTED NEUTROPHILS ABSOLUTE COUNT: 3.11 K/UL (ref 1.5–8.1)
SODIUM BLD-SCNC: 142 MMOL/L (ref 135–144)
TROPONIN INTERP: NORMAL
TROPONIN INTERP: NORMAL
TROPONIN T: NORMAL NG/ML
TROPONIN T: NORMAL NG/ML
TROPONIN, HIGH SENSITIVITY: 11 NG/L (ref 0–14)
TROPONIN, HIGH SENSITIVITY: 11 NG/L (ref 0–14)
WBC # BLD: 7.1 K/UL (ref 3.5–11.3)
WBC # BLD: ABNORMAL 10*3/UL

## 2020-02-12 PROCEDURE — 96374 THER/PROPH/DIAG INJ IV PUSH: CPT

## 2020-02-12 PROCEDURE — 71046 X-RAY EXAM CHEST 2 VIEWS: CPT

## 2020-02-12 PROCEDURE — 85025 COMPLETE CBC W/AUTO DIFF WBC: CPT

## 2020-02-12 PROCEDURE — 6360000002 HC RX W HCPCS: Performed by: STUDENT IN AN ORGANIZED HEALTH CARE EDUCATION/TRAINING PROGRAM

## 2020-02-12 PROCEDURE — 84484 ASSAY OF TROPONIN QUANT: CPT

## 2020-02-12 PROCEDURE — 80048 BASIC METABOLIC PNL TOTAL CA: CPT

## 2020-02-12 PROCEDURE — 93005 ELECTROCARDIOGRAM TRACING: CPT | Performed by: STUDENT IN AN ORGANIZED HEALTH CARE EDUCATION/TRAINING PROGRAM

## 2020-02-12 PROCEDURE — 99284 EMERGENCY DEPT VISIT MOD MDM: CPT

## 2020-02-12 PROCEDURE — 94640 AIRWAY INHALATION TREATMENT: CPT

## 2020-02-12 PROCEDURE — 6360000002 HC RX W HCPCS: Performed by: EMERGENCY MEDICINE

## 2020-02-12 RX ORDER — ALBUTEROL SULFATE 90 UG/1
2 AEROSOL, METERED RESPIRATORY (INHALATION)
Status: DISCONTINUED | OUTPATIENT
Start: 2020-02-12 | End: 2020-02-12

## 2020-02-12 RX ORDER — PREDNISONE 50 MG/1
50 TABLET ORAL DAILY
Qty: 4 TABLET | Refills: 0 | Status: SHIPPED | OUTPATIENT
Start: 2020-02-12 | End: 2020-02-28 | Stop reason: ALTCHOICE

## 2020-02-12 RX ORDER — ALBUTEROL SULFATE 2.5 MG/3ML
5 SOLUTION RESPIRATORY (INHALATION)
Status: DISCONTINUED | OUTPATIENT
Start: 2020-02-12 | End: 2020-02-12 | Stop reason: HOSPADM

## 2020-02-12 RX ORDER — ALBUTEROL SULFATE 90 UG/1
2 AEROSOL, METERED RESPIRATORY (INHALATION)
Status: DISCONTINUED | OUTPATIENT
Start: 2020-02-12 | End: 2020-02-12 | Stop reason: HOSPADM

## 2020-02-12 RX ORDER — IPRATROPIUM BROMIDE AND ALBUTEROL SULFATE 2.5; .5 MG/3ML; MG/3ML
1 SOLUTION RESPIRATORY (INHALATION)
Status: DISCONTINUED | OUTPATIENT
Start: 2020-02-12 | End: 2020-02-12

## 2020-02-12 RX ORDER — METHYLPREDNISOLONE SODIUM SUCCINATE 125 MG/2ML
125 INJECTION, POWDER, LYOPHILIZED, FOR SOLUTION INTRAMUSCULAR; INTRAVENOUS ONCE
Status: COMPLETED | OUTPATIENT
Start: 2020-02-12 | End: 2020-02-12

## 2020-02-12 RX ADMIN — IPRATROPIUM BROMIDE 0.5 MG: 0.5 SOLUTION RESPIRATORY (INHALATION) at 14:49

## 2020-02-12 RX ADMIN — ALBUTEROL SULFATE 5 MG: 5 SOLUTION RESPIRATORY (INHALATION) at 14:49

## 2020-02-12 RX ADMIN — METHYLPREDNISOLONE SODIUM SUCCINATE 125 MG: 125 INJECTION, POWDER, FOR SOLUTION INTRAMUSCULAR; INTRAVENOUS at 15:46

## 2020-02-12 ASSESSMENT — ENCOUNTER SYMPTOMS
ABDOMINAL PAIN: 0
DIARRHEA: 0
SHORTNESS OF BREATH: 1
VOMITING: 0
COUGH: 1
NAUSEA: 0
CONSTIPATION: 0
WHEEZING: 0

## 2020-02-12 NOTE — ED PROVIDER NOTES
(PROVENTIL) nebulizer solution 5 mg    DISCONTD: albuterol sulfate  (90 Base) MCG/ACT inhaler 2 puff    DISCONTD: albuterol sulfate  (90 Base) MCG/ACT inhaler 2 puff    DISCONTD: ipratropium (ATROVENT HFA) 17 MCG/ACT inhaler 2 puff    DISCONTD: ipratropium (ATROVENT) 0.02 % nebulizer solution 0.5 mg    methylPREDNISolone sodium (SOLU-MEDROL) injection 125 mg    predniSONE (DELTASONE) 50 MG tablet     Sig: Take 1 tablet by mouth daily     Dispense:  4 tablet     Refill:  0     DIAGNOSTIC RESULTS / DEPARTMENT COURSE / MDM     LABS:  Results for orders placed or performed during the hospital encounter of 02/12/20   CBC Auto Differential   Result Value Ref Range    WBC 7.1 3.5 - 11.3 k/uL    RBC 4.41 3.95 - 5.11 m/uL    Hemoglobin 14.1 11.9 - 15.1 g/dL    Hematocrit 42.3 36.3 - 47.1 %    MCV 95.9 82.6 - 102.9 fL    MCH 32.0 25.2 - 33.5 pg    MCHC 33.3 28.4 - 34.8 g/dL    RDW 13.4 11.8 - 14.4 %    Platelets 790 731 - 076 k/uL    MPV 9.0 8.1 - 13.5 fL    NRBC Automated 0.0 0.0 per 100 WBC    Differential Type NOT REPORTED     Seg Neutrophils 44 36 - 65 %    Lymphocytes 45 (H) 24 - 43 %    Monocytes 5 3 - 12 %    Eosinophils % 5 (H) 1 - 4 %    Basophils 1 0 - 2 %    Immature Granulocytes 0 0 %    Segs Absolute 3.11 1.50 - 8.10 k/uL    Absolute Lymph # 3.25 1.10 - 3.70 k/uL    Absolute Mono # 0.38 0.10 - 1.20 k/uL    Absolute Eos # 0.34 0.00 - 0.44 k/uL    Basophils Absolute 0.05 0.00 - 0.20 k/uL    Absolute Immature Granulocyte <0.03 0.00 - 0.30 k/uL    WBC Morphology NOT REPORTED     RBC Morphology NOT REPORTED     Platelet Estimate NOT REPORTED    Basic Metabolic Panel   Result Value Ref Range    Glucose 88 70 - 99 mg/dL    BUN 10 6 - 20 mg/dL    CREATININE 0.58 0.50 - 0.90 mg/dL    Bun/Cre Ratio NOT REPORTED 9 - 20    Calcium 9.4 8.6 - 10.4 mg/dL    Sodium 142 135 - 144 mmol/L    Potassium 4.1 3.7 - 5.3 mmol/L    Chloride 104 98 - 107 mmol/L    CO2 21 20 - 31 mmol/L    Anion Gap 17 9 - 17 mmol/L GFR Non-African American >60 >60 mL/min    GFR African American >60 >60 mL/min    GFR Comment          GFR Staging NOT REPORTED    Troponin   Result Value Ref Range    Troponin, High Sensitivity 11 0 - 14 ng/L    Troponin T NOT REPORTED <0.03 ng/mL    Troponin Interp NOT REPORTED    Troponin   Result Value Ref Range    Troponin, High Sensitivity 11 0 - 14 ng/L    Troponin T NOT REPORTED <0.03 ng/mL    Troponin Interp NOT REPORTED        IMPRESSION: Is a 63-year-old female presents with likely COPD exacerbation versus pneumonia versus ACS. Will obtain CBC BMP EKG troponin chest x-ray give breathing treatment steroids and reevaluate patient. Patient requesting discharge. Patient was given written and verbalinstructions prior to discharge. Patient understood and agreed. The patient had no further questions. RADIOLOGY:  Xr Chest Standard (2 Vw)    Result Date: 2/12/2020  EXAMINATION: TWO XRAY VIEWS OF THE CHEST 2/12/2020 2:30 pm COMPARISON: 01/15/2020 HISTORY: ORDERING SYSTEM PROVIDED HISTORY: Cough, Congestion x 5 days. hx Copd TECHNOLOGIST PROVIDED HISTORY: Cough, Congestion x 5 days. hx Copd Acuity: Unknown Type of Exam: Initial FINDINGS: Cardiomediastinal silhouette and pulmonary vasculature are within normal limits. No focal airspace consolidation, pneumothorax, or pleural effusion. Lungs are mildly hyperinflated. No free air beneath the diaphragm. No acute osseous abnormality. No acute intrathoracic process. Xr Chest Standard (2 Vw)    Result Date: 1/15/2020  EXAMINATION: TWO XRAY VIEWS OF THE CHEST 1/15/2020 11:09 am COMPARISON: 11/20/2019 HISTORY: ORDERING SYSTEM PROVIDED HISTORY: Chronic obstructive pulmonary disease, unspecified COPD type ([facility] [address] TECHNOLOGIST PROVIDED HISTORY: SOB FINDINGS: Frontal and lateral views of the chest are submitted for review. The cardiac silhouette is normal in size.   Lung parenchyma is clear without focal airspace consolidation, sizeable pleural effusion, or pneumothorax. Trachea is midline. Osseous structures and soft tissues are grossly intact. Multilevel mild degenerative disease of the thoracic spine. No acute cardiopulmonary pathology. EKG  EKG Interpretation    Interpreted by me    Rhythm: normal sinus   Rate: normal  Axis: normal  Ectopy: none  Conduction: normal  ST Segments: no acute change  T Waves: no acute change  Q Waves: none    Clinical Impression: no acute changes and normal EKG    All EKG's are interpreted by the Emergency Department Physician who either signs or Co-signsthis chart in the absence of a cardiologist.    EMERGENCY DEPARTMENT COURSE:  ED Course as of Feb 12 2008 Wed Feb 12, 2020   1511 Troponin, High Sensitivity: 11 [BL]      ED Course User Index  [BL] Garth Dove DO   Patient reevaluated noted significant improvement of symptoms. Evaluation after breathing treatment showed increased air movement. Will discharge home with prednisone burst.    PROCEDURES:  None    CONSULTS:  None      FINAL IMPRESSION      1.  Acute bronchitis, unspecified organism          DISPOSITION / PLAN     DISPOSITION Decision To Discharge    PATIENT REFERRED TO:  THOR Corbin - CNP  [address]  [address]  [phone]    Call in 1 day      [facility]  [facility]  [phone]    As needed      DISCHARGE MEDICATIONS:  Discharge Medication List as of 2/12/2020  3:57 PM      START taking these medications    Details   predniSONE (DELTASONE) 50 MG tablet Take 1 tablet by mouth daily, Disp-4 tablet, R-0Print             Garth Dove DO  Emergency Medicine Resident    (Please note thatportions of this note were completed with a voice recognition program.  Efforts were made to edit the dictations but occasionally words are mis-transcribed.)       Garth Dove DO  Resident  02/12/20 2009

## 2020-02-12 NOTE — ED PROVIDER NOTES
[facility] [facility]     Emergency Department     Faculty Attestation        I performed a history and physical examination of the patient and discussed management with the resident. I reviewed the residents note and agree with the documented findings and plan of care. Any areas of disagreement are noted on the chart. I was personally present for the key portions of any procedures. I have documented in the chart those procedures where I was not present during the key portions. I have reviewed the emergency nurses triage note. I agree with the chief complaint, past medical history, past surgical history, allergies, medications, social and family history as documented unless otherwise noted below. For mid-level providers such as nurse practitioners as well as physicians assistants:    I have personally seen and evaluated the patient. I find the patient's history and physical exam are consistent with NP/PA documentation. I agree with the care provided, treatment rendered, disposition, & follow-up plan. Additional findings are as noted. Vital Signs: /80   Pulse 94   Resp 19   Ht 5' 7\" (1.702 m)   Wt 145 lb (65.8 kg)   SpO2 95%   BMI 22.71 kg/m²   PCP:  Genet Kearney APRN - CNP    Pertinent Comments:           Critical Care  None         Note, if the patient's blood pressure was elevated, and they have no history of hypertension, they were informed of the following: The patient may have Pre-hypertension/Hypertension: The patient has been informed that they may have pre-hypertension or Hypertension based on a blood pressure reading in the emergency department. I recommend that the patient call the primary care provider listed on their discharge instructions or a physician of their choice this week to arrange follow up for further evaluation of possible pre-hypertension or Hypertension.   (Please note that portions of this note were completed with a voice recognition program.  Efforts were made to edit the dictations but occasionally words are mis-transcribed. )    Evelyn Ibrahim MD  Attending Emergency Medicine Physician              Romaine Gilmore MD  02/12/20 1257

## 2020-02-14 LAB
EKG ATRIAL RATE: 121 BPM
EKG P AXIS: 54 DEGREES
EKG P-R INTERVAL: 118 MS
EKG Q-T INTERVAL: 312 MS
EKG QRS DURATION: 74 MS
EKG QTC CALCULATION (BAZETT): 443 MS
EKG R AXIS: 60 DEGREES
EKG T AXIS: 52 DEGREES
EKG VENTRICULAR RATE: 121 BPM

## 2020-02-14 PROCEDURE — 93010 ELECTROCARDIOGRAM REPORT: CPT | Performed by: INTERNAL MEDICINE

## 2020-02-14 RX ORDER — ATORVASTATIN CALCIUM 40 MG/1
TABLET, FILM COATED ORAL
Qty: 30 TABLET | Refills: 1 | Status: SHIPPED | OUTPATIENT
Start: 2020-02-14 | End: 2020-03-14 | Stop reason: SDUPTHER

## 2020-02-17 RX ORDER — ATORVASTATIN CALCIUM 40 MG/1
TABLET, FILM COATED ORAL
Qty: 90 TABLET | Refills: 0 | OUTPATIENT
Start: 2020-02-17

## 2020-02-17 NOTE — TELEPHONE ENCOUNTER
Pt requesting 90 day rx.       Health Maintenance   Topic Date Due    Hepatitis C screen  1965    Cervical cancer screen  08/08/1986    Lipid screen  11/08/2018    Colon Cancer Screen FIT/FOBT  02/20/2019    Annual Wellness Visit (AWV)  01/09/2020    Breast cancer screen  02/06/2020    Shingles Vaccine (1 of 2) 09/17/2020 (Originally 8/8/2015)    HIV screen  09/17/2020 (Originally 8/8/1980)    DTaP/Tdap/Td vaccine (2 - Td) 09/20/2027    Flu vaccine  Completed    Pneumococcal 0-64 years Vaccine  Completed    Hepatitis A vaccine  Aged Out    Hepatitis B vaccine  Aged Out    Hib vaccine  Aged Out    Meningococcal (ACWY) vaccine  Aged Out             (applicable per patient's age: Cancer Screenings, Depression Screening, Fall Risk Screening, Immunizations)    Hemoglobin A1C (%)   Date Value   01/30/2018 5.3   08/02/2013 5.3   02/14/2013 6.0     LDL Cholesterol (mg/dL)   Date Value   11/08/2017 198 (H)     AST (U/L)   Date Value   01/09/2020 19     ALT (U/L)   Date Value   01/09/2020 19     BUN (mg/dL)   Date Value   02/12/2020 10      (goal A1C is < 7)   (goal LDL is <100) need 30-50% reduction from baseline     BP Readings from Last 3 Encounters:   02/12/20 120/76   01/28/20 122/76   01/15/20 133/84    (goal /80)      All Future Testing planned in [facility]:  Lab Frequency Next Occurrence   Hepatitis C Antibody Once 03/06/2020   Lipid, Fasting Once 03/06/2020       Next Visit Date:  Future Appointments   Date Time Provider Kaycee Hays   2/19/2020  9:45 AM THOR Crandall - CNP [facility]   2/25/2020  8:30 AM Rogelio Mcdermott MD [facility] [address]            Patient Active Problem List:     Heart disease     Chronic back pain     Depression     Hyperlipidemia     CAD, multiple vessel     Asthma     Smoking     Need for vaccination     Syncope     Leg pain     Left hip pain     Chronic cholecystitis     Chest pain     Calculus of gallbladder without cholecystitis without

## 2020-02-21 RX ORDER — ALBUTEROL SULFATE 90 UG/1
AEROSOL, METERED RESPIRATORY (INHALATION)
Qty: 18 G | Refills: 4 | Status: SHIPPED
Start: 2020-02-21 | End: 2020-04-17 | Stop reason: CLARIF

## 2020-02-21 NOTE — TELEPHONE ENCOUNTER
Health Maintenance   Topic Date Due    Hepatitis C screen  1965    Cervical cancer screen  08/08/1986    Lipid screen  11/08/2018    Colon Cancer Screen FIT/FOBT  02/20/2019    Annual Wellness Visit (AWV)  01/09/2020    Breast cancer screen  02/06/2020    Shingles Vaccine (1 of 2) 09/17/2020 (Originally 8/8/2015)    HIV screen  09/17/2020 (Originally 8/8/1980)    DTaP/Tdap/Td vaccine (2 - Td) 09/20/2027    Flu vaccine  Completed    Pneumococcal 0-64 years Vaccine  Completed    Hepatitis A vaccine  Aged Out    Hepatitis B vaccine  Aged Out    Hib vaccine  Aged Out    Meningococcal (ACWY) vaccine  Aged Out             (applicable per patient's age: Cancer Screenings, Depression Screening, Fall Risk Screening, Immunizations)    Hemoglobin A1C (%)   Date Value   01/30/2018 5.3   08/02/2013 5.3   02/14/2013 6.0     LDL Cholesterol (mg/dL)   Date Value   11/08/2017 198 (H)     AST (U/L)   Date Value   01/09/2020 19     ALT (U/L)   Date Value   01/09/2020 19     BUN (mg/dL)   Date Value   02/12/2020 10      (goal A1C is < 7)   (goal LDL is <100) need 30-50% reduction from baseline     BP Readings from Last 3 Encounters:   02/12/20 120/76   01/28/20 122/76   01/15/20 133/84    (goal /80)      All Future Testing planned in [facility]:  Lab Frequency Next Occurrence   Hepatitis C Antibody Once 03/06/2020   Lipid, Fasting Once 03/06/2020       Next Visit Date:  Future Appointments   Date Time Provider Kaycee Hays   2/25/2020  8:30 AM Nina Santana MD [facility] [address]            Patient Active Problem List:     Heart disease     Chronic back pain     Depression     Hyperlipidemia     CAD, multiple vessel     Asthma     Smoking     Need for vaccination     Syncope     Leg pain     Left hip pain     Chronic cholecystitis     Chest pain     Calculus of gallbladder without cholecystitis without obstruction     History of lumbar fusion     Failed back syndrome     Marijuana use     Abnormal weight loss     Allergic rhinitis     Anxiety state     Chronic midline low back pain with sciatica     Moderate episode of recurrent major depressive disorder (HCC)     Seizure ([facility] [address]

## 2020-02-28 ENCOUNTER — OFFICE VISIT (OUTPATIENT)
Dept: PRIMARY CARE CLINIC | Age: 55
End: 2020-02-28
Payer: MEDICARE

## 2020-02-28 ENCOUNTER — HOSPITAL ENCOUNTER (OUTPATIENT)
Dept: GENERAL RADIOLOGY | Age: 55
Discharge: HOME | End: 2020-03-01
Payer: MEDICARE

## 2020-02-28 ENCOUNTER — HOSPITAL ENCOUNTER (OUTPATIENT)
Age: 55
Discharge: HOME | End: 2020-03-01
Payer: MEDICARE

## 2020-02-28 VITALS
HEART RATE: 95 BPM | WEIGHT: 142.8 LBS | SYSTOLIC BLOOD PRESSURE: 107 MMHG | TEMPERATURE: 97.2 F | OXYGEN SATURATION: 97 % | DIASTOLIC BLOOD PRESSURE: 72 MMHG | BODY MASS INDEX: 22.37 KG/M2

## 2020-02-28 LAB
INFLUENZA A ANTIBODY: POSITIVE
INFLUENZA B ANTIBODY: NEGATIVE

## 2020-02-28 PROCEDURE — 3023F SPIROM DOC REV: CPT | Performed by: NURSE PRACTITIONER

## 2020-02-28 PROCEDURE — 71046 X-RAY EXAM CHEST 2 VIEWS: CPT

## 2020-02-28 PROCEDURE — G8482 FLU IMMUNIZE ORDER/ADMIN: HCPCS | Performed by: NURSE PRACTITIONER

## 2020-02-28 PROCEDURE — 87804 INFLUENZA ASSAY W/OPTIC: CPT | Performed by: NURSE PRACTITIONER

## 2020-02-28 PROCEDURE — 99213 OFFICE O/P EST LOW 20 MIN: CPT | Performed by: NURSE PRACTITIONER

## 2020-02-28 PROCEDURE — G8420 CALC BMI NORM PARAMETERS: HCPCS | Performed by: NURSE PRACTITIONER

## 2020-02-28 PROCEDURE — 4004F PT TOBACCO SCREEN RCVD TLK: CPT | Performed by: NURSE PRACTITIONER

## 2020-02-28 PROCEDURE — G8427 DOCREV CUR MEDS BY ELIG CLIN: HCPCS | Performed by: NURSE PRACTITIONER

## 2020-02-28 PROCEDURE — 3017F COLORECTAL CA SCREEN DOC REV: CPT | Performed by: NURSE PRACTITIONER

## 2020-02-28 PROCEDURE — G8926 SPIRO NO PERF OR DOC: HCPCS | Performed by: NURSE PRACTITIONER

## 2020-02-28 RX ORDER — PREDNISONE 20 MG/1
20 TABLET ORAL DAILY
Qty: 5 TABLET | Refills: 0 | Status: SHIPPED | OUTPATIENT
Start: 2020-02-28 | End: 2020-03-04

## 2020-02-28 SDOH — ECONOMIC STABILITY: FOOD INSECURITY: WITHIN THE PAST 12 MONTHS, THE FOOD YOU BOUGHT JUST DIDN'T LAST AND YOU DIDN'T HAVE MONEY TO GET MORE.: NEVER TRUE

## 2020-02-28 SDOH — ECONOMIC STABILITY: FOOD INSECURITY: WITHIN THE PAST 12 MONTHS, YOU WORRIED THAT YOUR FOOD WOULD RUN OUT BEFORE YOU GOT MONEY TO BUY MORE.: NEVER TRUE

## 2020-02-28 SDOH — ECONOMIC STABILITY: INCOME INSECURITY: HOW HARD IS IT FOR YOU TO PAY FOR THE VERY BASICS LIKE FOOD, HOUSING, MEDICAL CARE, AND HEATING?: NOT HARD AT ALL

## 2020-02-28 NOTE — PROGRESS NOTES
[facility]  [address] [facility] [address]  Dept: [phone]    Subjective:       Annamary Kocher is a 47 y.o. female who presents for evaluation of influenza like symptoms. Symptoms include chills, headache, myalgias, night sweats, productive cough, shortness of breath, sinus and nasal congestion, wheezing and fever and have been present for 1 week. She has tried to alleviate the symptoms with rest with minimal relief. High risk factors for influenza complications: COPD. Patient's medications, allergies, past medical, surgical, social and family histories were reviewed and updated as appropriate. Review of Systems  Pertinent items are noted in HPI. Objective:      General appearance: alert, appears stated age, cooperative, mild distress and ill appearing  Eyes: negative findings: lids and lashes normal, conjunctivae and sclerae normal and corneas clear  Throat: lips, mucosa, and tongue normal; teeth and gums normal  Lungs: wheezes bibasilar and posterior - bilateral  Heart: regular rate and rhythm and S1, S2 normal  Skin: mobility and turgor normal or clamy  Lymph nodes: Cervical adenopathy: normal      Assessment:      Influenza      Plan:      Supportive care with appropriate antipyretics and fluids. Educational material distributed and questions answered. CXR. Steroid burst given COPD and wheezing.  RTC if not improving over next 2-3 days

## 2020-02-28 NOTE — PROGRESS NOTES
Visit Information    Have you changed or started any medications since your last visit including any over-the-counter medicines, vitamins, or herbal medicines? no   Are you having any side effects from any of your medications? -  no  Have you stopped taking any of your medications? Is so, why? -  no    Have you seen any other physician or provider since your last visit? No  Have you had any other diagnostic tests since your last visit? No  Have you been seen in the emergency room and/or had an admission to a hospital since we last saw you? No  Have you had your routine dental cleaning in the past 6 months? No     Have you activated your joiz account? If not, what are your barriers?  Yes     Patient Care Team:  THOR Almaraz CNP as PCP - General (Family Medicine)  THOR Almaraz CNP as PCP - [facility] Provider  Khalif Rosas as Ambulatory Care Manager    Medical History Review  Past Medical, Family, and Social History reviewed and does not contribute to the patient presenting condition    Health Maintenance   Topic Date Due    Hepatitis C screen  1965    Cervical cancer screen  08/08/1986    Lipid screen  11/08/2018    Colon Cancer Screen FIT/FOBT  02/20/2019    Annual Wellness Visit (AWV)  01/09/2020    Breast cancer screen  02/06/2020    Shingles Vaccine (1 of 2) 09/17/2020 (Originally 8/8/2015)    HIV screen  09/17/2020 (Originally 8/8/1980)    DTaP/Tdap/Td vaccine (2 - Td) 09/20/2027    Flu vaccine  Completed    Pneumococcal 0-64 years Vaccine  Completed    Hepatitis A vaccine  Aged Out    Hepatitis B vaccine  Aged Out    Hib vaccine  Aged Out    Meningococcal (ACWY) vaccine  Aged Out

## 2020-03-15 RX ORDER — ATORVASTATIN CALCIUM 40 MG/1
40 TABLET, FILM COATED ORAL DAILY
Qty: 90 TABLET | Refills: 0 | Status: SHIPPED | OUTPATIENT
Start: 2020-03-15 | End: 2020-08-07

## 2020-03-19 ENCOUNTER — CARE COORDINATION (OUTPATIENT)
Dept: CARE COORDINATION | Age: 55
End: 2020-03-19

## 2020-03-19 RX ORDER — BACLOFEN 10 MG/1
TABLET ORAL
Qty: 90 TABLET | Refills: 0 | Status: SHIPPED | OUTPATIENT
Start: 2020-03-19 | End: 2020-04-16 | Stop reason: ALTCHOICE

## 2020-03-19 NOTE — TELEPHONE ENCOUNTER
Health Maintenance   Topic Date Due    Hepatitis C screen  1965    Lipid screen  11/08/2018    Colon Cancer Screen FIT/FOBT  02/20/2019    Annual Wellness Visit (AWV)  01/09/2020    Breast cancer screen  02/06/2020    Shingles Vaccine (1 of 2) 09/17/2020 (Originally 8/8/2015)    HIV screen  09/17/2020 (Originally 8/8/1980)    Cervical cancer screen  02/28/2021 (Originally 8/8/1986)    DTaP/Tdap/Td vaccine (2 - Td) 09/20/2027    Flu vaccine  Completed    Pneumococcal 0-64 years Vaccine  Completed    Hepatitis A vaccine  Aged Out    Hepatitis B vaccine  Aged Out    Hib vaccine  Aged Out    Meningococcal (ACWY) vaccine  Aged Out             (applicable per patient's age: Cancer Screenings, Depression Screening, Fall Risk Screening, Immunizations)    Hemoglobin A1C (%)   Date Value   01/30/2018 5.3   08/02/2013 5.3   02/14/2013 6.0     LDL Cholesterol (mg/dL)   Date Value   11/08/2017 198 (H)     AST (U/L)   Date Value   01/09/2020 19     ALT (U/L)   Date Value   01/09/2020 19     BUN (mg/dL)   Date Value   02/12/2020 10      (goal A1C is < 7)   (goal LDL is <100) need 30-50% reduction from baseline     BP Readings from Last 3 Encounters:   02/28/20 107/72   02/12/20 120/76   01/28/20 122/76    (goal /80)      All Future Testing planned in [facility]:  Lab Frequency Next Occurrence   CONCETTA DIGITAL SCREEN W OR WO CAD BILATERAL Once 05/15/2020       Next Visit Date:  No future appointments.          Patient Active Problem List:     Heart disease     Chronic back pain     Depression     Hyperlipidemia     CAD, multiple vessel     Asthma     Smoking     Need for vaccination     Syncope     Leg pain     Left hip pain     Chronic cholecystitis     Chest pain     Calculus of gallbladder without cholecystitis without obstruction     History of lumbar fusion     Failed back syndrome     Marijuana use     Abnormal weight loss     Allergic rhinitis     Anxiety state     Chronic midline low back pain with sciatica     Moderate episode of recurrent major depressive disorder ([facility] [address]     Seizure ([facility] [address]

## 2020-04-09 RX ORDER — ESCITALOPRAM OXALATE 10 MG/1
TABLET ORAL
Qty: 45 TABLET | Refills: 0 | Status: SHIPPED | OUTPATIENT
Start: 2020-04-09 | End: 2020-04-13

## 2020-04-13 RX ORDER — LEVETIRACETAM 500 MG/1
TABLET ORAL
Qty: 30 TABLET | Refills: 4 | OUTPATIENT
Start: 2020-04-13

## 2020-04-13 RX ORDER — ESCITALOPRAM OXALATE 10 MG/1
TABLET ORAL
Qty: 45 TABLET | Refills: 0 | Status: SHIPPED | OUTPATIENT
Start: 2020-04-13 | End: 2020-05-11

## 2020-04-14 RX ORDER — LEVETIRACETAM 500 MG/1
TABLET ORAL
Qty: 7 TABLET | Refills: 0 | OUTPATIENT
Start: 2020-04-14

## 2020-04-14 RX ORDER — LEVETIRACETAM 500 MG/1
TABLET ORAL
Qty: 7 TABLET | Refills: 0 | Status: SHIPPED | OUTPATIENT
Start: 2020-04-14 | End: 2020-04-16 | Stop reason: SDUPTHER

## 2020-04-14 NOTE — TELEPHONE ENCOUNTER
Patient would like a med refill on seizure medication and please call patient for   status of medication.

## 2020-04-15 NOTE — PROGRESS NOTES
[facility]  Offices: [address], [place], [address] [facility]  [address] [facility], [address], [place], [address] [facility]  [address] [place] [address] [address] [facility], [facility] [address]  Phone: [phone]  Fax: [phone]    MD Martine James MD Ahmed B. Waldon Hailstone, MD Sharia Hensen, MD Royce Eddy, MD Lajean Good, [employer]    TELEHEALTH VISIT        4/15/2020      HISTORY OF PRESENT ILLNESS:       I had the pleasure of seeing Ryder Schneider, who is here for evaluation of his seizure disorder. Patient is a 55-year-old woman with past medical history of coronary artery disease, hypertension, dyslipidemia, depression, chronic back pain status post lumbar fusion. The patient had her first known seizure in 2018. She was hospitalized at the [address] [place] [address] undergoing gallbladder surgery and had a generalized tonic-clonic seizure followed by 3 days of postictal confusion. The patient does not remember anything about her hospital stay. She knows that she was discharged home on Keppra. She was not seen by a neurology following her visit. She has maintained on Keppra 500 mg but has only taken it once daily since her discharge from the hospital.  On January 9, the patient had another generalized tonic-clonic seizure. She was at home with her boyfriend and he stated that she started smacking her lips. She started to feel nauseated, and had another witnessed generalized tonic-clonic seizure. She remembers nothing until she arrived at the hospital.  She was discharged home on Keppra 500 mg daily and her levetiracetam level when she was in the emergency department was less than 2. She has had no seizures since January 9. She does admit that when she was younger in her 25s and 35s, she would have staring spells that her employer would send her home when she was having them. She was never evaluated by neurology and was never diagnosed with seizures.   It was daily      magnesium gluconate (MAGONATE) 500 MG tablet Take 400 mg by mouth every evening       vitamin B-6 (PYRIDOXINE) 100 MG tablet Take 100 mg by mouth daily      Multiple Vitamins-Minerals (THERAPEUTIC MULTIVITAMIN-MINERALS) tablet Take 1 tablet by mouth daily.  vitamin B-12 (CYANOCOBALAMIN) 1000 MCG tablet Take 1,000 mcg by mouth daily.  ondansetron (ZOFRAN ODT) 4 MG disintegrating tablet Take 1 tablet by mouth every 8 hours as needed for Nausea (Patient not taking: Reported on 2/28/2020) 20 tablet 0    lidocaine (LIDODERM) 5 % Place 1 patch onto the skin daily 12 hours on, 12 hours off. (Patient not taking: Reported on 2/28/2020) 15 patch 0    aspirin EC 81 MG EC tablet Take 1 tablet by mouth daily (Patient not taking: Reported on 4/15/2020) 30 tablet 3    gabapentin (NEURONTIN) 100 MG capsule Take 1 capsule by mouth daily. In morning (Patient not taking: Reported on 2/28/2020) 30 capsule 3    nitroGLYCERIN (NITROSTAT) 0.4 MG SL tablet Place 1 tablet under the tongue every 5 minutes as needed for Chest pain up to max of 3 total doses. If no relief after 1 dose, call 911. (Patient not taking: Reported on 2/28/2020) 25 tablet 3     No current facility-administered medications for this visit.          ALLERGIES:     Allergies   Allergen Reactions    Sulfa Antibiotics                              REVIEW OF SYSTEMS      CONSTITUTIONAL Weight: absent, Appetite: present, Fatigue: present      HEENT Ears: normal, Visual disturbance: absent   RESPIRATORY Shortness of breath: present, Cough: present   CARDIOVASCULAR Chest pain: absent, Leg swelling :absent      GI Constipation: absent, Diarrhea: present, Swallowing change: absent       Urinary frequency: present, Urinary urgency: present,   MUSCULOSKELETAL Neck pain: present, Back pain: present, Stiffness: present, Muscle pain: present, Joint pain: present Restless legs: absent   DERMATOLOGIC Hair loss: absent, Skin changes: absent   NEUROLOGIC twice daily  c. In 4 to 6 weeks after increasing the dose of Keppra, the patient will obtain a levetiracetam level  d. Because of the repeat seizure in January, we will obtain an EEG  e. Patient was advised that she must refrain from driving until she is 6 months seizure-free. Her last seizure was on January 9, 2020. She did verbalize understanding. f. The patient will return in 6 weeks for reevaluation  2. Chronic tension type headaches  a. Amitriptyline 25 mg at bedtime daily and if no relief in 1 week, she will increase it to 50 mg at bedtime daily. 3. Chronic low back pain      Signed: Bart [address]    Please note that this chart was generated using voice recognition Dragon dictation software. Although every effort was made to ensure the accuracy of this automated transcription, some errors in transcription may have occurred.

## 2020-04-16 ENCOUNTER — TELEMEDICINE (OUTPATIENT)
Dept: NEUROLOGY | Age: 55
End: 2020-04-16
Payer: MEDICARE

## 2020-04-16 PROBLEM — G44.221 CHRONIC TENSION-TYPE HEADACHE, INTRACTABLE: Status: ACTIVE | Noted: 2020-04-16

## 2020-04-16 PROCEDURE — 99214 OFFICE O/P EST MOD 30 MIN: CPT | Performed by: NURSE PRACTITIONER

## 2020-04-16 PROCEDURE — 3017F COLORECTAL CA SCREEN DOC REV: CPT | Performed by: NURSE PRACTITIONER

## 2020-04-16 PROCEDURE — G8427 DOCREV CUR MEDS BY ELIG CLIN: HCPCS | Performed by: NURSE PRACTITIONER

## 2020-04-16 RX ORDER — AMITRIPTYLINE HYDROCHLORIDE 25 MG/1
50 TABLET, FILM COATED ORAL NIGHTLY
Qty: 60 TABLET | Refills: 5 | Status: SHIPPED | OUTPATIENT
Start: 2020-04-16 | End: 2020-10-09

## 2020-04-16 RX ORDER — LEVETIRACETAM 500 MG/1
500 TABLET ORAL 2 TIMES DAILY
Qty: 60 TABLET | Refills: 5 | OUTPATIENT
Start: 2020-04-16 | End: 2020-10-09

## 2020-04-16 RX ORDER — LEVETIRACETAM 500 MG/1
500 TABLET ORAL 2 TIMES DAILY
Qty: 7 TABLET | Refills: 5 | Status: SHIPPED
Start: 2020-04-16 | End: 2020-04-16 | Stop reason: CLARIF

## 2020-04-17 ENCOUNTER — TELEPHONE (OUTPATIENT)
Dept: PRIMARY CARE CLINIC | Age: 55
End: 2020-04-17

## 2020-04-17 RX ORDER — ALBUTEROL SULFATE 90 UG/1
2 AEROSOL, METERED RESPIRATORY (INHALATION) EVERY 6 HOURS PRN
Qty: 1 INHALER | Refills: 3 | Status: SHIPPED | OUTPATIENT
Start: 2020-04-17 | End: 2020-08-01 | Stop reason: SDUPTHER

## 2020-05-11 RX ORDER — ESCITALOPRAM OXALATE 10 MG/1
TABLET ORAL
Qty: 45 TABLET | Refills: 0 | Status: SHIPPED | OUTPATIENT
Start: 2020-05-11 | End: 2020-06-20 | Stop reason: SDUPTHER

## 2020-05-12 RX ORDER — TIOTROPIUM BROMIDE AND OLODATEROL 3.124; 2.736 UG/1; UG/1
SPRAY, METERED RESPIRATORY (INHALATION)
Qty: 3 INHALER | Refills: 1 | Status: SHIPPED | OUTPATIENT
Start: 2020-05-12 | End: 2020-11-09

## 2020-05-12 NOTE — TELEPHONE ENCOUNTER
Health Maintenance   Topic Date Due    Hepatitis C screen  1965    Lipid screen  11/08/2018    Colon Cancer Screen FIT/FOBT  02/20/2019    Annual Wellness Visit (AWV)  01/09/2020    Breast cancer screen  02/06/2020    Shingles Vaccine (1 of 2) 09/17/2020 (Originally 8/8/2015)    HIV screen  09/17/2020 (Originally 8/8/1980)    Cervical cancer screen  02/28/2021 (Originally 8/8/1986)    DTaP/Tdap/Td vaccine (2 - Td) 09/20/2027    Flu vaccine  Completed    Pneumococcal 0-64 years Vaccine  Completed    Hepatitis A vaccine  Aged Out    Hepatitis B vaccine  Aged Out    Hib vaccine  Aged Out    Meningococcal (ACWY) vaccine  Aged Out             (applicable per patient's age: Cancer Screenings, Depression Screening, Fall Risk Screening, Immunizations)    Hemoglobin A1C (%)   Date Value   01/30/2018 5.3   08/02/2013 5.3   02/14/2013 6.0     LDL Cholesterol (mg/dL)   Date Value   11/08/2017 198 (H)     AST (U/L)   Date Value   01/09/2020 19     ALT (U/L)   Date Value   01/09/2020 19     BUN (mg/dL)   Date Value   02/12/2020 10      (goal A1C is < 7)   (goal LDL is <100) need 30-50% reduction from baseline     BP Readings from Last 3 Encounters:   02/28/20 107/72   02/12/20 120/76   01/28/20 122/76    (goal /80)      All Future Testing planned in [facility]:  Lab Frequency Next Occurrence   CONCETTA DIGITAL SCREEN W OR WO CAD BILATERAL Once 05/15/2020   Levetiracetam Level Once 04/16/2020   EEG Once 04/30/2020       Next Visit Date:  Future Appointments   Date Time Provider Kaycee Hays   6/16/2020 10:00 AM THOR Barakat - CNP Neuro Spec [id number]            Patient Active Problem List:     Heart disease     Chronic back pain     Depression     Hyperlipidemia     CAD, multiple vessel     Asthma     Smoking     Need for vaccination     Syncope     Leg pain     Left hip pain     Chronic cholecystitis     Chest pain     Calculus of gallbladder without cholecystitis without obstruction     History

## 2020-05-14 ENCOUNTER — TELEPHONE (OUTPATIENT)
Dept: NEUROLOGY | Age: 55
End: 2020-05-14

## 2020-05-14 RX ORDER — BACLOFEN 10 MG/1
TABLET ORAL
Qty: 90 TABLET | Refills: 0 | OUTPATIENT
Start: 2020-05-14

## 2020-05-14 NOTE — TELEPHONE ENCOUNTER
AVS and orders were mailed to the patient and the mail was returned, [facility] for patient to callback with correct address. No response. I did LMOM for patient with number for centralized scheduling and follow up appt date and time also.   KS

## 2020-05-27 RX ORDER — BACLOFEN 10 MG/1
TABLET ORAL
Qty: 90 TABLET | Refills: 0 | OUTPATIENT
Start: 2020-05-27

## 2020-06-15 ENCOUNTER — APPOINTMENT (OUTPATIENT)
Dept: GENERAL RADIOLOGY | Age: 55
End: 2020-06-15
Payer: MEDICARE

## 2020-06-15 ENCOUNTER — HOSPITAL ENCOUNTER (EMERGENCY)
Age: 55
Discharge: HOME | End: 2020-06-15
Attending: EMERGENCY MEDICINE
Payer: MEDICARE

## 2020-06-15 VITALS
HEART RATE: 104 BPM | SYSTOLIC BLOOD PRESSURE: 142 MMHG | RESPIRATION RATE: 16 BRPM | OXYGEN SATURATION: 95 % | TEMPERATURE: 97.3 F | DIASTOLIC BLOOD PRESSURE: 93 MMHG

## 2020-06-15 PROCEDURE — 99283 EMERGENCY DEPT VISIT LOW MDM: CPT

## 2020-06-15 PROCEDURE — 6370000000 HC RX 637 (ALT 250 FOR IP): Performed by: STUDENT IN AN ORGANIZED HEALTH CARE EDUCATION/TRAINING PROGRAM

## 2020-06-15 PROCEDURE — 71046 X-RAY EXAM CHEST 2 VIEWS: CPT

## 2020-06-15 RX ORDER — IBUPROFEN 400 MG/1
400 TABLET ORAL EVERY 6 HOURS PRN
Qty: 30 TABLET | Refills: 0 | Status: SHIPPED | OUTPATIENT
Start: 2020-06-15 | End: 2021-09-15

## 2020-06-15 RX ORDER — LIDOCAINE 50 MG/G
1 PATCH TOPICAL DAILY
Qty: 10 PATCH | Refills: 0 | Status: SHIPPED | OUTPATIENT
Start: 2020-06-15 | End: 2020-06-25

## 2020-06-15 RX ORDER — CYCLOBENZAPRINE HCL 5 MG
5 TABLET ORAL 2 TIMES DAILY PRN
Qty: 10 TABLET | Refills: 0 | Status: SHIPPED | OUTPATIENT
Start: 2020-06-15 | End: 2020-06-25

## 2020-06-15 RX ORDER — ACETAMINOPHEN 325 MG/1
325 TABLET ORAL EVERY 6 HOURS PRN
Qty: 30 TABLET | Refills: 0 | Status: SHIPPED | OUTPATIENT
Start: 2020-06-15 | End: 2021-09-15 | Stop reason: DRUGHIGH

## 2020-06-15 RX ORDER — LIDOCAINE 4 G/G
1 PATCH TOPICAL ONCE
Status: DISCONTINUED | OUTPATIENT
Start: 2020-06-15 | End: 2020-06-15 | Stop reason: HOSPADM

## 2020-06-15 RX ORDER — CYCLOBENZAPRINE HCL 10 MG
5 TABLET ORAL ONCE
Status: COMPLETED | OUTPATIENT
Start: 2020-06-15 | End: 2020-06-15

## 2020-06-15 RX ADMIN — CYCLOBENZAPRINE 5 MG: 10 TABLET, FILM COATED ORAL at 07:34

## 2020-06-15 ASSESSMENT — PAIN SCALES - GENERAL: PAINLEVEL_OUTOF10: 9

## 2020-06-15 ASSESSMENT — ENCOUNTER SYMPTOMS
COUGH: 0
ABDOMINAL PAIN: 0
CONSTIPATION: 0
NAUSEA: 0
WHEEZING: 0
SHORTNESS OF BREATH: 1
DIARRHEA: 0
VOMITING: 0

## 2020-06-15 ASSESSMENT — PAIN DESCRIPTION - LOCATION: LOCATION: RIB CAGE

## 2020-06-15 ASSESSMENT — PAIN DESCRIPTION - PROGRESSION: CLINICAL_PROGRESSION: GRADUALLY WORSENING

## 2020-06-15 ASSESSMENT — PAIN DESCRIPTION - ORIENTATION: ORIENTATION: RIGHT

## 2020-06-15 ASSESSMENT — PAIN DESCRIPTION - DESCRIPTORS: DESCRIPTORS: ACHING

## 2020-06-15 ASSESSMENT — PAIN DESCRIPTION - ONSET: ONSET: SUDDEN

## 2020-06-15 ASSESSMENT — PAIN DESCRIPTION - PAIN TYPE: TYPE: ACUTE PAIN

## 2020-06-15 NOTE — ED PROVIDER NOTES
[facility]  Emergency Department Encounter  EmergencyMedicine Resident     Pt [place]  MRN: [id number]  [place] 1965  Date of evaluation: 6/15/20  PCP:  THOR Sanders CNP    CHIEF COMPLAINT       Chief Complaint   Patient presents with    Rib Pain     pt was leaning over chain link fence yesterday and slipped, hitting right side of ribs on fence. pt with ecchymosis noted to area. c/o worsening pain today. pt with worsening pain on inspiration. HISTORY OF PRESENT ILLNESS  (Location/Symptom, Timing/Onset, Context/Setting, Quality, Duration, Modifying Factors, Severity.)      Paula Hurst is a 47 y.o. female who presents with complaint of right-sided rib pain. Patient notes that she was leaning over a chain link fence trying to feed a dog when she hit the right side and felt a pull. Patient noted some bruising and worse pain today with deep inspiration. Patient denies any other concerns or issues at this time. PAST MEDICAL / SURGICAL / SOCIAL / FAMILY HISTORY      has a past medical history of Acute MI ([address] Anxiety, Chronic back pain, COPD (chronic obstructive pulmonary disease) ([facility] [address] Depression, Heart disease, Hyperlipidemia, Hypertension, and MRSA (methicillin resistant staph aureus) culture positive. has a past surgical history that includes Tubal ligation; Tonsillectomy; Cholecystectomy, laparoscopic (11/10/2017); Cholecystectomy, laparoscopic (N/A, 11/10/2017); and back surgery.     Social History     Socioeconomic History    Marital status:      Spouse name: Not on file    Number of children: Not on file    Years of education: Not on file    Highest education level: Not on file   Occupational History    Not on file   Social Needs    Financial resource strain: Not hard at all    Food insecurity     Worry: Never true     Inability: Never true   [employer] needs     Medical: Not on file     Non-medical: Not on file   Tobacco Use

## 2020-06-16 ENCOUNTER — CARE COORDINATION (OUTPATIENT)
Dept: CARE COORDINATION | Age: 55
End: 2020-06-16

## 2020-06-20 RX ORDER — ESCITALOPRAM OXALATE 10 MG/1
TABLET ORAL
Qty: 17 TABLET | Refills: 0 | Status: SHIPPED | OUTPATIENT
Start: 2020-06-20 | End: 2020-06-30

## 2020-06-30 RX ORDER — ESCITALOPRAM OXALATE 10 MG/1
TABLET ORAL
Qty: 17 TABLET | Refills: 0 | Status: SHIPPED | OUTPATIENT
Start: 2020-06-30 | End: 2020-08-08

## 2020-07-12 ENCOUNTER — APPOINTMENT (OUTPATIENT)
Dept: GENERAL RADIOLOGY | Age: 55
End: 2020-07-12
Payer: MEDICARE

## 2020-07-12 ENCOUNTER — APPOINTMENT (OUTPATIENT)
Dept: CT IMAGING | Age: 55
End: 2020-07-12
Payer: MEDICARE

## 2020-07-12 ENCOUNTER — HOSPITAL ENCOUNTER (EMERGENCY)
Age: 55
Discharge: HOME | End: 2020-07-12
Attending: EMERGENCY MEDICINE
Payer: MEDICARE

## 2020-07-12 VITALS
WEIGHT: 150 LBS | HEIGHT: 67 IN | RESPIRATION RATE: 17 BRPM | DIASTOLIC BLOOD PRESSURE: 77 MMHG | SYSTOLIC BLOOD PRESSURE: 131 MMHG | OXYGEN SATURATION: 93 % | HEART RATE: 93 BPM | TEMPERATURE: 97.8 F | BODY MASS INDEX: 23.54 KG/M2

## 2020-07-12 LAB
ABSOLUTE EOS #: 0.5 K/UL (ref 0–0.4)
ABSOLUTE IMMATURE GRANULOCYTE: ABNORMAL K/UL (ref 0–0.3)
ABSOLUTE LYMPH #: 4.2 K/UL (ref 1–4.8)
ABSOLUTE MONO #: 0.6 K/UL (ref 0.1–1.3)
ALBUMIN SERPL-MCNC: 4.2 G/DL (ref 3.5–5.2)
ALBUMIN/GLOBULIN RATIO: ABNORMAL (ref 1–2.5)
ALP BLD-CCNC: 85 U/L (ref 35–104)
ALT SERPL-CCNC: 16 U/L (ref 5–33)
ANION GAP SERPL CALCULATED.3IONS-SCNC: 12 MMOL/L (ref 9–17)
AST SERPL-CCNC: 18 U/L
BASOPHILS # BLD: 1 % (ref 0–2)
BASOPHILS ABSOLUTE: 0.1 K/UL (ref 0–0.2)
BILIRUB SERPL-MCNC: <0.15 MG/DL (ref 0.3–1.2)
BNP INTERPRETATION: NORMAL
BUN BLDV-MCNC: 15 MG/DL (ref 6–20)
BUN/CREAT BLD: ABNORMAL (ref 9–20)
CALCIUM SERPL-MCNC: 9.4 MG/DL (ref 8.6–10.4)
CHLORIDE BLD-SCNC: 103 MMOL/L (ref 98–107)
CO2: 25 MMOL/L (ref 20–31)
CREAT SERPL-MCNC: 0.78 MG/DL (ref 0.5–0.9)
D-DIMER QUANTITATIVE: 0.85 MG/L FEU (ref 0–0.59)
DIFFERENTIAL TYPE: ABNORMAL
EOSINOPHILS RELATIVE PERCENT: 5 % (ref 0–4)
GFR AFRICAN AMERICAN: >60 ML/MIN
GFR NON-AFRICAN AMERICAN: >60 ML/MIN
GFR SERPL CREATININE-BSD FRML MDRD: ABNORMAL ML/MIN/{1.73_M2}
GFR SERPL CREATININE-BSD FRML MDRD: ABNORMAL ML/MIN/{1.73_M2}
GLUCOSE BLD-MCNC: 92 MG/DL (ref 70–99)
HCT VFR BLD CALC: 41.2 % (ref 36–46)
HEMOGLOBIN: 14.1 G/DL (ref 12–16)
IMMATURE GRANULOCYTES: ABNORMAL %
LYMPHOCYTES # BLD: 40 % (ref 24–44)
MCH RBC QN AUTO: 32.8 PG (ref 26–34)
MCHC RBC AUTO-ENTMCNC: 34.3 G/DL (ref 31–37)
MCV RBC AUTO: 95.6 FL (ref 80–100)
MONOCYTES # BLD: 5 % (ref 1–7)
NRBC AUTOMATED: ABNORMAL PER 100 WBC
PDW BLD-RTO: 13.5 % (ref 11.5–14.9)
PLATELET # BLD: 265 K/UL (ref 150–450)
PLATELET ESTIMATE: ABNORMAL
PMV BLD AUTO: 6.3 FL (ref 6–12)
POTASSIUM SERPL-SCNC: 3.7 MMOL/L (ref 3.7–5.3)
PRO-BNP: <20 PG/ML
RBC # BLD: 4.31 M/UL (ref 4–5.2)
RBC # BLD: ABNORMAL 10*6/UL
SEG NEUTROPHILS: 49 % (ref 36–66)
SEGMENTED NEUTROPHILS ABSOLUTE COUNT: 5.3 K/UL (ref 1.3–9.1)
SODIUM BLD-SCNC: 140 MMOL/L (ref 135–144)
TOTAL PROTEIN: 7 G/DL (ref 6.4–8.3)
TROPONIN INTERP: NORMAL
TROPONIN INTERP: NORMAL
TROPONIN T: NORMAL NG/ML
TROPONIN T: NORMAL NG/ML
TROPONIN, HIGH SENSITIVITY: 6 NG/L (ref 0–14)
TROPONIN, HIGH SENSITIVITY: <6 NG/L (ref 0–14)
WBC # BLD: 10.7 K/UL (ref 3.5–11)
WBC # BLD: ABNORMAL 10*3/UL

## 2020-07-12 PROCEDURE — 6370000000 HC RX 637 (ALT 250 FOR IP)

## 2020-07-12 PROCEDURE — 6360000004 HC RX CONTRAST MEDICATION: Performed by: EMERGENCY MEDICINE

## 2020-07-12 PROCEDURE — 99285 EMERGENCY DEPT VISIT HI MDM: CPT

## 2020-07-12 PROCEDURE — 71260 CT THORAX DX C+: CPT

## 2020-07-12 PROCEDURE — 94640 AIRWAY INHALATION TREATMENT: CPT

## 2020-07-12 PROCEDURE — 2580000003 HC RX 258: Performed by: EMERGENCY MEDICINE

## 2020-07-12 PROCEDURE — 85379 FIBRIN DEGRADATION QUANT: CPT

## 2020-07-12 PROCEDURE — 96374 THER/PROPH/DIAG INJ IV PUSH: CPT

## 2020-07-12 PROCEDURE — 2700000000 HC OXYGEN THERAPY PER DAY

## 2020-07-12 PROCEDURE — 36415 COLL VENOUS BLD VENIPUNCTURE: CPT

## 2020-07-12 PROCEDURE — 85025 COMPLETE CBC W/AUTO DIFF WBC: CPT

## 2020-07-12 PROCEDURE — 83880 ASSAY OF NATRIURETIC PEPTIDE: CPT

## 2020-07-12 PROCEDURE — 94761 N-INVAS EAR/PLS OXIMETRY MLT: CPT

## 2020-07-12 PROCEDURE — 84484 ASSAY OF TROPONIN QUANT: CPT

## 2020-07-12 PROCEDURE — 80053 COMPREHEN METABOLIC PANEL: CPT

## 2020-07-12 PROCEDURE — 6360000002 HC RX W HCPCS: Performed by: EMERGENCY MEDICINE

## 2020-07-12 PROCEDURE — 93005 ELECTROCARDIOGRAM TRACING: CPT | Performed by: EMERGENCY MEDICINE

## 2020-07-12 PROCEDURE — 6370000000 HC RX 637 (ALT 250 FOR IP): Performed by: EMERGENCY MEDICINE

## 2020-07-12 RX ORDER — ALBUTEROL SULFATE 90 UG/1
2 AEROSOL, METERED RESPIRATORY (INHALATION) 4 TIMES DAILY
Status: DISCONTINUED | OUTPATIENT
Start: 2020-07-12 | End: 2020-07-12

## 2020-07-12 RX ORDER — PREDNISONE 20 MG/1
60 TABLET ORAL DAILY
Qty: 12 TABLET | Refills: 0 | Status: SHIPPED | OUTPATIENT
Start: 2020-07-12 | End: 2020-07-16

## 2020-07-12 RX ORDER — ALBUTEROL SULFATE 90 UG/1
6 AEROSOL, METERED RESPIRATORY (INHALATION)
Status: DISCONTINUED | OUTPATIENT
Start: 2020-07-12 | End: 2020-07-12 | Stop reason: HOSPADM

## 2020-07-12 RX ORDER — SODIUM CHLORIDE 0.9 % (FLUSH) 0.9 %
10 SYRINGE (ML) INJECTION PRN
Status: DISCONTINUED | OUTPATIENT
Start: 2020-07-12 | End: 2020-07-12 | Stop reason: HOSPADM

## 2020-07-12 RX ORDER — 0.9 % SODIUM CHLORIDE 0.9 %
80 INTRAVENOUS SOLUTION INTRAVENOUS ONCE
Status: COMPLETED | OUTPATIENT
Start: 2020-07-12 | End: 2020-07-12

## 2020-07-12 RX ORDER — METHYLPREDNISOLONE SODIUM SUCCINATE 125 MG/2ML
125 INJECTION, POWDER, LYOPHILIZED, FOR SOLUTION INTRAMUSCULAR; INTRAVENOUS ONCE
Status: COMPLETED | OUTPATIENT
Start: 2020-07-12 | End: 2020-07-12

## 2020-07-12 RX ORDER — ALBUTEROL SULFATE 90 UG/1
AEROSOL, METERED RESPIRATORY (INHALATION)
Status: COMPLETED
Start: 2020-07-12 | End: 2020-07-12

## 2020-07-12 RX ADMIN — ALBUTEROL SULFATE 6 PUFF: 90 AEROSOL, METERED RESPIRATORY (INHALATION) at 01:30

## 2020-07-12 RX ADMIN — METHYLPREDNISOLONE SODIUM SUCCINATE 125 MG: 125 INJECTION, POWDER, FOR SOLUTION INTRAMUSCULAR; INTRAVENOUS at 01:24

## 2020-07-12 RX ADMIN — Medication 10 ML: at 02:30

## 2020-07-12 RX ADMIN — SODIUM CHLORIDE 80 ML: 9 INJECTION, SOLUTION INTRAVENOUS at 02:30

## 2020-07-12 RX ADMIN — IOVERSOL 75 ML: 741 INJECTION INTRA-ARTERIAL; INTRAVENOUS at 02:30

## 2020-07-12 RX ADMIN — IPRATROPIUM BROMIDE 6 PUFF: 17 AEROSOL, METERED RESPIRATORY (INHALATION) at 02:05

## 2020-07-12 RX ADMIN — ALBUTEROL SULFATE 6 PUFF: 90 AEROSOL, METERED RESPIRATORY (INHALATION) at 01:35

## 2020-07-12 ASSESSMENT — PAIN DESCRIPTION - DESCRIPTORS: DESCRIPTORS: ACHING

## 2020-07-12 ASSESSMENT — PAIN DESCRIPTION - FREQUENCY: FREQUENCY: CONTINUOUS

## 2020-07-12 ASSESSMENT — PAIN SCALES - GENERAL: PAINLEVEL_OUTOF10: 5

## 2020-07-12 NOTE — ED NOTES
Pt and  provided with update.  Pt provided with water and warm blanket      Janet Blakely RN  07/12/20 0188

## 2020-07-12 NOTE — ED NOTES
Mode of arrival: walk in        Chief complaints: SOB        Scenario: Pt arrives to facility with SOB. Pt sat was 83% r/a. Pt put on 4L of O2 at this time. Pt anxious and states \" I can't breathe. IV started and labs drawn. Respiratory called at this time. C= \"Have you ever felt that you should Cut down on your drinking? \"  No  A= \"Have people Annoyed you by criticizing your drinking? \"  No  G= \"Have you ever felt bad or Guilty about your drinking? \"  No  E= \"Have you ever had a drink as an Eye-opener first thing in the morning to steady your nerves or to help a hangover? \"  No      Deferred []      Reason for deferring: N/A    *If yes to two or more: probable alcohol abuse. Lolita Sen RN  07/12/20 0246

## 2020-07-12 NOTE — ED PROVIDER NOTES
EMERGENCY DEPARTMENT ENCOUNTER    Pt Name: Fe Jasmine  MRN: [id number]  [place] 1965  Date of evaluation: 7/12/20  CHIEF COMPLAINT       Chief Complaint   Patient presents with    Shortness of Breath     HISTORY OF PRESENT ILLNESS   HPI      HISTORY OF PRESENT ILLNESS:  Past medical history of anxiety, COPD, CAD presents for chief complaint of shortness of breath. Patient woke up with shortness of breath this evening. Consistent with her COPD exacerbation. No lightheadedness or dizziness. No fevers or chills. Severity is moderate. No aggravating or relieving factors. Timing is 1 day. Course constant.   Context is COPD  -----------------------  -----------------------  REVIEW OF SYSTEMS  ED Caveat: [none]  Gen:  No fever, no chills  CV: No CP, no palpitations  Resp: +SOB, no respiratory distress  GI: No V/D, no abd pain  : No dysuria, no increased frequency  Skin: No rash, no purulent lesions  Eyes: No blurry vision, No double vision  MSK: No back pain, no joint pain  Neuro: No HA, no sensation changes  Psych: No SI/HI  -----------------------  -----------------------  ALLERGIES  -per nursing records, reviewed    PAST MEDICAL HISTORY  -See HPI    SOCIAL HISTORY  -No daily drinking, no IV drugs  -----------------------  -----------------------  PHYSICAL EXAM  Gen: Alert, mild respiratory distress  Skin: Warm, no rashes  Head: Normocephalic, atraumatic  Neck: No midline tenderness, no nuchal rigidity  Eye: EOMI, PERRLA, normal conjunctiva  ENT: Mucous membranes moist, no pharyngeal erythema  CV: Tachycardic, no rubs  Resp: Multiple accessory muscle use, diffuse wheezing  GI: Soft, non distended, no large abdominal masses, non tender  MSK: No midline back pain, no large joint effusions  Neuro: Alert and oriented, no focal neurological deficits observed  Psych: Cooperative, appropriate mood and affect  -----------------------  -----------------------  MEDICAL DECISION MAKING  Differential Diagnosis:  - Consideration is given for   bronchospasm, pulmonary edema, pneumonia, pneumothorax, pericardial effusion, PE, ACS,  -  #Impression/Plan:  - Clinically patient's presentation is most consistent with COPD exacerbation. Given patient's presentation will get laboratory testing imaging. Will attempt symptomatic treatment. Disposition will based off this. Cl  -  ##Reevaluation/Conversations on care:  - pt feeling better  -   -----------------------  CRITICAL CARE TIME  Total time: [37] minutes spent engaged in work directly related to patient care and/or available for direct patient care exclusive of procedures and exclusive of teaching time. Management: Bedside assessment, supervision of care, interpretation of results (chest x-ray, blood gases, EKG, blood pressure), case review with medical staff.  -----------------------  Florian Pearce MD, [facility]  Emergency Medicine Attending  Questions? Please contact my cell phone anytime. [phone]  *This charting supersedes any ED resident or staff charting and was written using speech recognition software    ## The patient was evaluated during the global COVID-19 pandemic, and that diagnosis was suspected/considered upon their initial presentation.     PASTMEDICAL HISTORY     Past Medical History:   Diagnosis Date    Acute MI ([address]     Anxiety     Chronic back pain     COPD (chronic obstructive pulmonary disease) (HCC)     Depression     Heart disease     Hyperlipidemia     Hypertension     MRSA (methicillin resistant staph aureus) culture positive 09/05/2017    abscess     SURGICAL HISTORY       Past Surgical History:   Procedure Laterality Date    BACK SURGERY      diskectomy 2000, fusion 2007, fusion 7/2017    CHOLECYSTECTOMY, LAPAROSCOPIC  11/10/2017    robotic assisted    CHOLECYSTECTOMY, LAPAROSCOPIC N/A 11/10/2017    XI ROBOTIC CHOLECYSTECTOMY LAPAROSCOPIC performed by Tawanda Ortez DO at [facility] MEDICATIONS       Discharge Medication List as of 7/12/2020  4:18 AM      CONTINUE these medications which have NOT CHANGED    Details   escitalopram (LEXAPRO) 10 MG tablet TAKE ONE AND ONE-HALF (1 & 1/2) TABLET BY MOUTH DAILY, Disp-17 tablet,R-0Normal      acetaminophen (TYLENOL) 325 MG tablet Take 1 tablet by mouth every 6 hours as needed for Pain, Disp-30 tablet, R-0Print      ibuprofen (IBU) 400 MG tablet Take 1 tablet by mouth every 6 hours as needed for Pain, Disp-30 tablet, R-0Print      baclofen (LIORESAL) 10 MG tablet Take 1 tablet by mouth every evening, Disp-90 tablet, R-0Normal      STIOLTO RESPIMAT 2.5-2.5 MCG/ACT AERS INHALE TWO INHALATION(S) BY MOUTH DAILY, Disp-3 Inhaler, R-1Normal      albuterol sulfate HFA (PROAIR HFA) 108 (90 Base) MCG/ACT inhaler Inhale 2 puffs into the lungs every 6 hours as needed for Wheezing, Disp-1 Inhaler, R-3Normal      amitriptyline (ELAVIL) 25 MG tablet Take 2 tablets by mouth nightly, Disp-60 tablet, R-5Normal      levETIRAcetam (KEPPRA) 500 MG tablet Take 1 tablet by mouth 2 times daily, Disp-60 tablet, R-5Phone In      atorvastatin (LIPITOR) 40 MG tablet Take 1 tablet by mouth daily, Disp-90 tablet, R-0Normal      [facility]2020, Disp-1 each, R-0, PrintExp 1/2024      ondansetron (ZOFRAN ODT) 4 MG disintegrating tablet Take 1 tablet by mouth every 8 hours as needed for Nausea, Disp-20 tablet, R-0Print      Cholecalciferol (VITAMIN D3) 400 units CAPS Take 1 tablet by mouth dailyHistorical Med      aspirin EC 81 MG EC tablet Take 1 tablet by mouth daily, Disp-30 tablet, R-3Normal      magnesium gluconate (MAGONATE) 500 MG tablet Take 400 mg by mouth every evening Historical Med      vitamin B-6 (PYRIDOXINE) 100 MG tablet Take 100 mg by mouth dailyHistorical Med      nitroGLYCERIN (NITROSTAT) 0.4 MG SL tablet Place 1 tablet under the tongue every 5 minutes as needed for Chest pain up to max of 3 total doses.  If no relief after 1 dose, call 911., Disp-25 tablet, R-3Normal      Multiple Vitamins-Minerals (THERAPEUTIC MULTIVITAMIN-MINERALS) tablet Take 1 tablet by mouth daily. vitamin B-12 (CYANOCOBALAMIN) 1000 MCG tablet Take 1,000 mcg by mouth daily. ALLERGIES     is allergic to sulfa antibiotics. FAMILY HISTORY     She indicated that her mother is alive. She indicated that her father is . She indicated that her brother is alive.      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    Drug use: Yes     Types: Marijuana     Comment: daily - \"a couple bowls\"     PHYSICAL EXAM     INITIAL VITALS: /77   Pulse 93   Temp 97.8 °F (36.6 °C) (Oral)   Resp 17   Ht 5' 7\" (1.702 m)   Wt 150 lb (68 kg)   SpO2 93%   BMI 23.49 kg/m²    Physical Exam    MEDICAL DECISION MAKING:            Labs Reviewed   COMPREHENSIVE METABOLIC PANEL W/ REFLEX TO MG FOR LOW K - Abnormal; Notable for the following components:       Result Value    Total Bilirubin <0.15 (*)     All other components within normal limits   CBC WITH AUTO DIFFERENTIAL - Abnormal; Notable for the following components:    Eosinophils % 5 (*)     Absolute Eos # 0.50 (*)     All other components within normal limits   D-DIMER, QUANTITATIVE - Abnormal; Notable for the following components:    D-Dimer, Quant 0.85 (*)     All other components within normal limits   BRAIN NATRIURETIC PEPTIDE   TROPONIN   TROPONIN     EMERGENCY DEPARTMENTCOURSE:         Vitals:    Vitals:    20 0300 20 0315 20 0418 20 0427   BP: 130/78 131/77     Pulse:  93     Resp:  16 17 17   Temp:       TempSrc:       SpO2: 96% 96% 93% 93%   Weight:       Height:           The patient was given the following medications while in the emergency department:  Orders Placed This Encounter Medications    DISCONTD: albuterol sulfate  (90 Base) MCG/ACT inhaler 2 puff    DISCONTD: ipratropium (ATROVENT HFA) 17 MCG/ACT inhaler 2 puff    methylPREDNISolone sodium (SOLU-MEDROL) injection 125 mg    albuterol sulfate  (90 Base) MCG/ACT inhaler     ALIE HARDIN: cabinet override    DISCONTD: albuterol sulfate  (90 Base) MCG/ACT inhaler 2 puff    DISCONTD: ipratropium (ATROVENT HFA) 17 MCG/ACT inhaler 6 puff    AND Linked Order Group     albuterol sulfate  (90 Base) MCG/ACT inhaler 6 puff     ipratropium (ATROVENT HFA) 17 MCG/ACT inhaler 6 puff    0.9 % sodium chloride bolus    ioversol (OPTIRAY) 74 % injection 75 mL    sodium chloride flush 0.9 % injection 10 mL    predniSONE (DELTASONE) 20 MG tablet     Sig: Take 3 tablets by mouth daily for 4 days     Dispense:  12 tablet     Refill:  0     CONSULTS:  None    FINAL IMPRESSION      1. COPD exacerbation (HCC)          DISPOSITION/PLAN   DISPOSITION        PATIENT REFERRED TO:  No follow-up provider specified.   DISCHARGE MEDICATIONS:  Discharge Medication List as of 7/12/2020  4:18 AM      START taking these medications    Details   predniSONE (DELTASONE) 20 MG tablet Take 3 tablets by mouth daily for 4 days, Disp-12 tablet,R-0Print           Hugo Salcedo MD  Attending Emergency Physician                    Amy Andrade MD  07/12/20 0018

## 2020-07-12 NOTE — ED NOTES
After application of O2 pt sat is 93%r/a. Pt does states she is a smoker.      Sabine Vivar RN  07/12/20 2178

## 2020-07-12 NOTE — PROGRESS NOTES
Pt tolerating NRB mask and Albuterol inhaler well. Pt coached on effective breathing. Pt more relaxed. Pt reports that she really wants to quit smoking. Pt was at a softball tournament for granddaughter yesterday and a graduation party after that. Pt is afrebrile at this time. Pt reports feeling hot and is attributing this to the side effects of the Albuterol due to the high dosage. Per Dr Alice Rodrigues, pt to get several puffs. Pt provided 6 puffs over the course of 30 mins to allow time for the airways to further relax. Pt took the Atrovent herself and demonstrated competence with the spacer. Pt on 2 LPM NC at this time and tolerating this well.

## 2020-07-13 ENCOUNTER — CARE COORDINATION (OUTPATIENT)
Dept: CARE COORDINATION | Age: 55
End: 2020-07-13

## 2020-07-13 LAB
EKG ATRIAL RATE: 92 BPM
EKG P AXIS: 44 DEGREES
EKG P-R INTERVAL: 132 MS
EKG Q-T INTERVAL: 368 MS
EKG QRS DURATION: 84 MS
EKG QTC CALCULATION (BAZETT): 455 MS
EKG R AXIS: 48 DEGREES
EKG T AXIS: 35 DEGREES
EKG VENTRICULAR RATE: 92 BPM

## 2020-07-13 PROCEDURE — 93010 ELECTROCARDIOGRAM REPORT: CPT | Performed by: INTERNAL MEDICINE

## 2020-07-21 NOTE — CARE COORDINATION
You Patient resolved from the Care Transitions episode on 7.14.20  CC not able to connect with the patient. Discussed COVID-19 related testing which was na at this time. Test results were na. Patient informed of results, if available? na    Patient/family has been provided the following resources and education related to COVID-19:                         Signs, symptoms and red flags related to COVID-19            [facility] exposure and quarantine guidelines            [employer] exposure contact - [phone]            Contact for their local Department of Health                 Patient currently reports that the following symptoms have improved:       No further outreach scheduled with this CTN/ACM. Episode of Care resolved. Patient has this CTN/ACM contact information if future needs arise.

## 2020-07-31 ENCOUNTER — TELEPHONE (OUTPATIENT)
Dept: PRIMARY CARE CLINIC | Age: 55
End: 2020-07-31

## 2020-08-01 ENCOUNTER — HOSPITAL ENCOUNTER (EMERGENCY)
Age: 55
Discharge: LEFT BEFORE DISCHARGE | End: 2020-08-01
Attending: EMERGENCY MEDICINE
Payer: MEDICARE

## 2020-08-01 ENCOUNTER — APPOINTMENT (OUTPATIENT)
Dept: GENERAL RADIOLOGY | Age: 55
End: 2020-08-01
Payer: MEDICARE

## 2020-08-01 VITALS
SYSTOLIC BLOOD PRESSURE: 122 MMHG | HEART RATE: 102 BPM | OXYGEN SATURATION: 92 % | RESPIRATION RATE: 14 BRPM | DIASTOLIC BLOOD PRESSURE: 89 MMHG | TEMPERATURE: 98 F

## 2020-08-01 LAB
ABSOLUTE EOS #: 0.66 K/UL (ref 0–0.44)
ABSOLUTE IMMATURE GRANULOCYTE: <0.03 K/UL (ref 0–0.3)
ABSOLUTE LYMPH #: 4.06 K/UL (ref 1.1–3.7)
ABSOLUTE MONO #: 0.6 K/UL (ref 0.1–1.2)
ALLEN TEST: ABNORMAL
ANION GAP SERPL CALCULATED.3IONS-SCNC: 12 MMOL/L (ref 9–17)
BASOPHILS # BLD: 1 % (ref 0–2)
BASOPHILS ABSOLUTE: 0.06 K/UL (ref 0–0.2)
BUN BLDV-MCNC: 14 MG/DL (ref 6–20)
BUN/CREAT BLD: ABNORMAL (ref 9–20)
CALCIUM SERPL-MCNC: 9.2 MG/DL (ref 8.6–10.4)
CARBOXYHEMOGLOBIN: 5.2 % (ref 0–5)
CHLORIDE BLD-SCNC: 103 MMOL/L (ref 98–107)
CO2: 24 MMOL/L (ref 20–31)
CREAT SERPL-MCNC: 0.58 MG/DL (ref 0.5–0.9)
DIFFERENTIAL TYPE: ABNORMAL
EKG ATRIAL RATE: 109 BPM
EKG P AXIS: 25 DEGREES
EKG P-R INTERVAL: 122 MS
EKG Q-T INTERVAL: 334 MS
EKG QRS DURATION: 82 MS
EKG QTC CALCULATION (BAZETT): 449 MS
EKG R AXIS: 59 DEGREES
EKG T AXIS: 42 DEGREES
EKG VENTRICULAR RATE: 109 BPM
EOSINOPHILS RELATIVE PERCENT: 7 % (ref 1–4)
FIO2: ABNORMAL
GFR AFRICAN AMERICAN: >60 ML/MIN
GFR NON-AFRICAN AMERICAN: >60 ML/MIN
GFR SERPL CREATININE-BSD FRML MDRD: ABNORMAL ML/MIN/{1.73_M2}
GFR SERPL CREATININE-BSD FRML MDRD: ABNORMAL ML/MIN/{1.73_M2}
GLUCOSE BLD-MCNC: 93 MG/DL (ref 70–99)
HCO3 VENOUS: 28.1 MMOL/L (ref 24–30)
HCT VFR BLD CALC: 42.6 % (ref 36.3–47.1)
HEMOGLOBIN: 13.8 G/DL (ref 11.9–15.1)
IMMATURE GRANULOCYTES: 0 %
LYMPHOCYTES # BLD: 46 % (ref 24–43)
MCH RBC QN AUTO: 31.8 PG (ref 25.2–33.5)
MCHC RBC AUTO-ENTMCNC: 32.4 G/DL (ref 28.4–34.8)
MCV RBC AUTO: 98.2 FL (ref 82.6–102.9)
METHEMOGLOBIN: ABNORMAL % (ref 0–1.5)
MODE: ABNORMAL
MONOCYTES # BLD: 7 % (ref 3–12)
NEGATIVE BASE EXCESS, VEN: ABNORMAL MMOL/L (ref 0–2)
NOTIFICATION TIME: ABNORMAL
NOTIFICATION: ABNORMAL
NRBC AUTOMATED: 0 PER 100 WBC
O2 DEVICE/FLOW/%: ABNORMAL
O2 SAT, VEN: 82.6 % (ref 60–85)
OXYHEMOGLOBIN: ABNORMAL % (ref 95–98)
PATIENT TEMP: 37
PCO2, VEN, TEMP ADJ: ABNORMAL MMHG (ref 39–55)
PCO2, VEN: 44.6 (ref 39–55)
PDW BLD-RTO: 13.9 % (ref 11.8–14.4)
PEEP/CPAP: ABNORMAL
PH VENOUS: 7.42 (ref 7.32–7.42)
PH, VEN, TEMP ADJ: ABNORMAL (ref 7.32–7.42)
PLATELET # BLD: 285 K/UL (ref 138–453)
PLATELET ESTIMATE: ABNORMAL
PMV BLD AUTO: 8.6 FL (ref 8.1–13.5)
PO2, VEN, TEMP ADJ: ABNORMAL MMHG (ref 30–50)
PO2, VEN: 45.9 (ref 30–50)
POSITIVE BASE EXCESS, VEN: 3.5 MMOL/L (ref 0–2)
POTASSIUM SERPL-SCNC: 3.6 MMOL/L (ref 3.7–5.3)
PSV: ABNORMAL
PT. POSITION: ABNORMAL
RBC # BLD: 4.34 M/UL (ref 3.95–5.11)
RBC # BLD: ABNORMAL 10*6/UL
RESPIRATORY RATE: ABNORMAL
SAMPLE SITE: ABNORMAL
SEG NEUTROPHILS: 39 % (ref 36–65)
SEGMENTED NEUTROPHILS ABSOLUTE COUNT: 3.51 K/UL (ref 1.5–8.1)
SET RATE: ABNORMAL
SODIUM BLD-SCNC: 139 MMOL/L (ref 135–144)
TEXT FOR RESPIRATORY: ABNORMAL
TOTAL HB: ABNORMAL G/DL (ref 12–16)
TOTAL RATE: ABNORMAL
TROPONIN INTERP: NORMAL
TROPONIN T: NORMAL NG/ML
TROPONIN, HIGH SENSITIVITY: 6 NG/L (ref 0–14)
VT: ABNORMAL
WBC # BLD: 8.9 K/UL (ref 3.5–11.3)
WBC # BLD: ABNORMAL 10*3/UL

## 2020-08-01 PROCEDURE — 84484 ASSAY OF TROPONIN QUANT: CPT

## 2020-08-01 PROCEDURE — 71045 X-RAY EXAM CHEST 1 VIEW: CPT

## 2020-08-01 PROCEDURE — 2700000000 HC OXYGEN THERAPY PER DAY

## 2020-08-01 PROCEDURE — 94640 AIRWAY INHALATION TREATMENT: CPT

## 2020-08-01 PROCEDURE — 2500000003 HC RX 250 WO HCPCS: Performed by: STUDENT IN AN ORGANIZED HEALTH CARE EDUCATION/TRAINING PROGRAM

## 2020-08-01 PROCEDURE — 85025 COMPLETE CBC W/AUTO DIFF WBC: CPT

## 2020-08-01 PROCEDURE — 96374 THER/PROPH/DIAG INJ IV PUSH: CPT

## 2020-08-01 PROCEDURE — 6370000000 HC RX 637 (ALT 250 FOR IP): Performed by: STUDENT IN AN ORGANIZED HEALTH CARE EDUCATION/TRAINING PROGRAM

## 2020-08-01 PROCEDURE — 93005 ELECTROCARDIOGRAM TRACING: CPT | Performed by: STUDENT IN AN ORGANIZED HEALTH CARE EDUCATION/TRAINING PROGRAM

## 2020-08-01 PROCEDURE — 80048 BASIC METABOLIC PNL TOTAL CA: CPT

## 2020-08-01 PROCEDURE — 36415 COLL VENOUS BLD VENIPUNCTURE: CPT

## 2020-08-01 PROCEDURE — 99285 EMERGENCY DEPT VISIT HI MDM: CPT

## 2020-08-01 PROCEDURE — 94761 N-INVAS EAR/PLS OXIMETRY MLT: CPT

## 2020-08-01 PROCEDURE — 6360000002 HC RX W HCPCS: Performed by: STUDENT IN AN ORGANIZED HEALTH CARE EDUCATION/TRAINING PROGRAM

## 2020-08-01 PROCEDURE — 82805 BLOOD GASES W/O2 SATURATION: CPT

## 2020-08-01 RX ORDER — MAGNESIUM SULFATE HEPTAHYDRATE 40 MG/ML
2 INJECTION, SOLUTION INTRAVENOUS ONCE
Status: COMPLETED | OUTPATIENT
Start: 2020-08-01 | End: 2020-08-01

## 2020-08-01 RX ORDER — ACETAMINOPHEN 500 MG
1000 TABLET ORAL ONCE
Status: COMPLETED | OUTPATIENT
Start: 2020-08-01 | End: 2020-08-01

## 2020-08-01 RX ORDER — ALBUTEROL SULFATE 90 UG/1
2 AEROSOL, METERED RESPIRATORY (INHALATION) EVERY 6 HOURS PRN
Qty: 1 INHALER | Refills: 0 | Status: SHIPPED | OUTPATIENT
Start: 2020-08-01 | End: 2020-09-10 | Stop reason: SDUPTHER

## 2020-08-01 RX ORDER — PREDNISONE 10 MG/1
TABLET ORAL
Qty: 20 TABLET | Refills: 0 | Status: SHIPPED | OUTPATIENT
Start: 2020-08-01 | End: 2020-08-11

## 2020-08-01 RX ADMIN — ALBUTEROL SULFATE 10 MG: 5 SOLUTION RESPIRATORY (INHALATION) at 03:09

## 2020-08-01 RX ADMIN — IPRATROPIUM BROMIDE 0.5 MG: 0.5 SOLUTION RESPIRATORY (INHALATION) at 03:09

## 2020-08-01 RX ADMIN — MAGNESIUM SULFATE HEPTAHYDRATE 2 G: 40 INJECTION, SOLUTION INTRAVENOUS at 03:34

## 2020-08-01 RX ADMIN — ACETAMINOPHEN 1000 MG: 500 TABLET ORAL at 03:45

## 2020-08-01 RX ADMIN — ALBUTEROL SULFATE 10 MG: 5 SOLUTION RESPIRATORY (INHALATION) at 04:07

## 2020-08-01 ASSESSMENT — ENCOUNTER SYMPTOMS
NAUSEA: 0
VOMITING: 0
COUGH: 1
RHINORRHEA: 0
SHORTNESS OF BREATH: 1
ABDOMINAL PAIN: 0

## 2020-08-01 ASSESSMENT — PAIN SCALES - GENERAL
PAINLEVEL_OUTOF10: 4
PAINLEVEL_OUTOF10: 6

## 2020-08-01 ASSESSMENT — PAIN DESCRIPTION - FREQUENCY: FREQUENCY: CONTINUOUS

## 2020-08-01 ASSESSMENT — PAIN DESCRIPTION - LOCATION: LOCATION: CHEST

## 2020-08-01 ASSESSMENT — PAIN DESCRIPTION - PAIN TYPE: TYPE: ACUTE PAIN

## 2020-08-01 ASSESSMENT — PAIN DESCRIPTION - DESCRIPTORS: DESCRIPTORS: ACHING

## 2020-08-01 NOTE — ED NOTES
Pt arrives to ED via [facility]. Pt complains of SOB. Pt does have COPD. Pt said all day yesterday she was haviving shortness of breath. Pt states she woke up struggling to get her breath. Pt was given 125 solumedrol in route. Pt tachypneic on assessment. Pt sats on RA 88. Pt placed on 4LNC with Sats 92. Pt placed on full monitor. Pt also complains of chest pains. Denies and radiating pain.      Melinda Jeffrey RN  08/01/20 2999

## 2020-08-01 NOTE — ED NOTES
Bed: 18  Expected date: 8/1/20  Expected time: 2:47 AM  Means of arrival: [facility]  Comments:  LS[facility]     Kim Szymanski RN  08/01/20 7040

## 2020-08-01 NOTE — ED PROVIDER NOTES
[facility]  Emergency Department Encounter  Emergency Medicine Resident     Pt Name: Ike Merlin  MRN: [id number]  [place] 1965  Date of evaluation: 8/1/20  PCP:  THOR Salgado [id number]       Chief Complaint   Patient presents with    Shortness of Breath       HISTORY OF PRESENT ILLNESS  (Location/Symptom, Timing/Onset, Context/Setting, Quality, Duration, Modifying Factors, Severity.)    Ike Merlin is a 47 y.o. female who presents with shortness of breath. Patient states that she has a history of COPD and feels like she is having a COPD attack. Patient states that she had some intermittent difficulty breathing throughout the day but nothing significant. Patient states that tonight however the shortness of breath worsened especially when she woke up and decided to call 911. While in route, patient received 5 mg of albuterol nebulizer, 125 mg IV Solu-Medrol. Patient states her breathing has somewhat improved since the breathing treatments but overall states she is having difficulty breathing. Denies any chest pain. Denies any body aches. Denies any fever or chills. Denies any loss of sense of taste or sense of smell. PPE Worn:  Gloves: Yes  Eye Protection: Goggles  Mask: N95  Gown: NO    PAST MEDICAL / SURGICAL / SOCIAL / FAMILY HISTORY    has a past medical history of Acute MI ([address] Anxiety, Chronic back pain, COPD (chronic obstructive pulmonary disease) ([address] Depression, Heart disease, Hyperlipidemia, Hypertension, and MRSA (methicillin resistant staph aureus) culture positive. has a past surgical history that includes Tubal ligation; Tonsillectomy; Cholecystectomy, laparoscopic (11/10/2017); Cholecystectomy, laparoscopic (N/A, 11/10/2017); and back surgery.     Social History     Socioeconomic History    Marital status:      Spouse name: Not on file    Number of children: Not on file    Years of education: Not on file    Highest education level: Not on file   Occupational History    Not on file   Social Needs    Financial resource strain: Not hard at all    Food insecurity     Worry: Never true     Inability: Never true   [employer] needs     Medical: Not on file     Non-medical: Not on file   Tobacco Use    Smoking status: Current Some Day Smoker     Packs/day: 1.00     Types: Cigarettes    Smokeless tobacco: Never Used   Substance and Sexual Activity    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    Drug use: Yes     Types: Marijuana     Comment: daily - \"a couple bowls\"    Sexual activity: Not on file   Lifestyle    Physical activity     Days per week: Not on file     Minutes per session: Not on file    Stress: Not on file   Relationships    Social connections     Talks on phone: Not on file     Gets together: Not on file     Attends Denominational service: Not on file     Active member of club or organization: Not on file     Attends meetings of clubs or organizations: Not on file     Relationship status: Not on file    Intimate partner violence     Fear of current or ex partner: Not on file     Emotionally abused: Not on file     Physically abused: Not on file     Forced sexual activity: Not on file   Other Topics Concern    Not on file   Social History Narrative    Not on file       Family History   Problem Relation Age of Onset    Other Mother     Heart Disease Father     High Blood Pressure Father     High Cholesterol Father     Other Brother        Allergies:    Sulfa antibiotics    Home Medications:  Prior to Admission medications    Medication Sig Start Date End Date Taking?  Authorizing Provider   predniSONE (DELTASONE) 10 MG tablet Take 4 tablets by mouth once daily for 5 days 8/1/20 8/11/20 Yes Billie Sen MD   ipratropium (ATROVENT HFA) 17 MCG/ACT inhaler Inhale 2 puffs into the lungs every 6 hours 8/1/20  Yes Billie Sen MD   albuterol sulfate HFA (PROAIR HFA) 108 (90 Base) MCG/ACT inhaler Inhale 2 puffs into the lungs every 6 hours as needed for Wheezing 8/1/20  Yes Sarah Beth Luna MD   escitalopram (LEXAPRO) 10 MG tablet TAKE ONE AND ONE-HALF (1 & 1/2) TABLET BY MOUTH DAILY 6/30/20   Suraj Garcia MD   acetaminophen (TYLENOL) 325 MG tablet Take 1 tablet by mouth every 6 hours as needed for Pain 6/15/20   Greer Abdalla DO   ibuprofen (IBU) 400 MG tablet Take 1 tablet by mouth every 6 hours as needed for Pain 6/15/20   Greer Abdalla DO   baclofen (LIORESAL) 10 MG tablet Take 1 tablet by mouth every evening 6/8/20 9/6/20  Maite Light APRN - RAMONITA   STIOLTO RESPIMAT 2.5-2.5 MCG/ACT AERS INHALE TWO INHALATION(S) BY MOUTH DAILY 5/12/20   Maite Light APRN - CNP   amitriptyline (ELAVIL) 25 MG tablet Take 2 tablets by mouth nightly 4/16/20   THOR Hardy - CNP   levETIRAcetam (KEPPRA) 500 MG tablet Take 1 tablet by mouth 2 times daily 4/16/20   Diann Szymanski APRN - CNP   atorvastatin (LIPITOR) 40 MG tablet Take 1 tablet by mouth daily 3/15/20   Maite Tuttle APRN - RAMONITA   Handicap Placard MISC by Does not apply route Exp 1/2024 1/20/20   Maite Tuttle APRN - CNP   ondansetron (ZOFRAN ODT) 4 MG disintegrating tablet Take 1 tablet by mouth every 8 hours as needed for Nausea  Patient not taking: Reported on 2/28/2020 1/9/20   Dena Torres MD   Cholecalciferol (VITAMIN D3) 400 units CAPS Take 1 tablet by mouth daily    Historical Provider, MD   aspirin EC 81 MG EC tablet Take 1 tablet by mouth daily  Patient not taking: Reported on 4/15/2020 3/6/19   THOR Ryan - CNP   magnesium gluconate (MAGONATE) 500 MG tablet Take 400 mg by mouth every evening     Historical Provider, MD   vitamin B-6 (PYRIDOXINE) 100 MG tablet Take 100 mg by mouth daily    Historical Provider, MD   nitroGLYCERIN (NITROSTAT) 0.4 MG SL tablet Place 1 tablet under the tongue every 5 minutes as needed for Chest pain up to max of 3 total doses. If no relief after 1 dose, call 911.   Patient not taking: Reported on 2/28/2020 1/30/18   Pily Cali MD   Multiple Vitamins-Minerals (THERAPEUTIC MULTIVITAMIN-MINERALS) tablet Take 1 tablet by mouth daily. Historical Provider, MD   vitamin B-12 (CYANOCOBALAMIN) 1000 MCG tablet Take 1,000 mcg by mouth daily. Historical Provider, MD       REVIEW OF SYSTEMS    (2-9 systems for level 4, 10 or more for level 5)    Review of Systems   Constitutional: Negative for chills, fatigue and fever. HENT: Negative for congestion and rhinorrhea. Respiratory: Positive for cough and shortness of breath. Cardiovascular: Negative for chest pain. Gastrointestinal: Negative for abdominal pain, nausea and vomiting. Musculoskeletal: Negative for myalgias. Neurological: Negative for headaches. All other systems reviewed and are negative. PHYSICAL EXAM   (up to 7 for level 4, 8 or more for level 5)    INITIAL VITALS:   ED Triage Vitals   BP Temp Temp Source Pulse Resp SpO2 Height Weight   08/01/20 0301 08/01/20 0257 08/01/20 0257 08/01/20 0257 08/01/20 0257 08/01/20 0257 -- --   (!) 135/97 98 °F (36.7 °C) Oral 111 24 (!) 88 %         Physical Exam  Vitals signs and nursing note reviewed. Constitutional:       General: She is not in acute distress. Appearance: She is well-developed. She is ill-appearing. Cardiovascular:      Rate and Rhythm: Regular rhythm. Tachycardia present. Pulses:           Radial pulses are 2+ on the right side and 2+ on the left side. Heart sounds: Normal heart sounds. No murmur. Pulmonary:      Effort: Tachypnea present. No respiratory distress. Breath sounds: Decreased air movement present. Wheezing and rhonchi present. No decreased breath sounds. Comments: Diffuse inspiratory and expiratory wheezing, diffuse rhonchi  Abdominal:      General: Bowel sounds are normal. There is no distension. Palpations: Abdomen is soft. Tenderness: There is no abdominal tenderness.    Musculoskeletal:      Right lower leg: No edema. Left lower leg: No edema. Skin:     General: Skin is warm and dry. Capillary Refill: Capillary refill takes less than 2 seconds. Neurological:      Mental Status: She is alert and oriented to person, place, and time. Psychiatric:         Behavior: Behavior is cooperative.          DIFFERENTIAL  DIAGNOSIS   PLAN (LABS / IMAGING / EKG):  Orders Placed This Encounter   Procedures    XR CHEST PORTABLE    CBC Auto Differential    BASIC METABOLIC PANEL    Troponin    Blood Gas, Venous    HHN Treatment    Initiate Oxygen Therapy Protocol    Pulse oximetry, continuous    EKG 12 Lead       MEDICATIONS ORDERED:  Orders Placed This Encounter   Medications    DISCONTD: magnesium sulfate 2 g in dextrose 5 % 100 mL IVPB    albuterol (PROVENTIL) nebulizer solution 10 mg    ipratropium (ATROVENT) 0.02 % nebulizer solution 0.5 mg    albuterol (PROVENTIL) nebulizer solution 5 mg    ipratropium (ATROVENT) 0.02 % nebulizer solution 0.5 mg    Magnesium Sulfate SOLN 2 g    acetaminophen (TYLENOL) tablet 1,000 mg    predniSONE (DELTASONE) 10 MG tablet     Sig: Take 4 tablets by mouth once daily for 5 days     Dispense:  20 tablet     Refill:  0    ipratropium (ATROVENT HFA) 17 MCG/ACT inhaler     Sig: Inhale 2 puffs into the lungs every 6 hours     Dispense:  1 Inhaler     Refill:  0    albuterol sulfate HFA (PROAIR HFA) 108 (90 Base) MCG/ACT inhaler     Sig: Inhale 2 puffs into the lungs every 6 hours as needed for Wheezing     Dispense:  1 Inhaler     Refill:  0         DIAGNOSTIC RESULTS / EMERGENCYDEPARTMENT COURSE / MDM   LABS:  Labs Reviewed   CBC WITH AUTO DIFFERENTIAL - Abnormal; Notable for the following components:       Result Value    Lymphocytes 46 (*)     Eosinophils % 7 (*)     Absolute Lymph # 4.06 (*)     Absolute Eos # 0.66 (*)     All other components within normal limits   BASIC METABOLIC PANEL - Abnormal; Notable for the following components:    Potassium 3.6 (*) All other components within normal limits   BLOOD GAS, VENOUS - Abnormal; Notable for the following components:    Positive Base Excess, Bogdan 3.5 (*)     Carboxyhemoglobin 5.2 (*)     All other components within normal limits   TROPONIN       RADIOLOGY:  Xr Chest Portable    Result Date: 8/1/2020  EXAMINATION: ONE XRAY VIEW OF THE CHEST 8/1/2020 3:21 am COMPARISON: None. HISTORY: ORDERING SYSTEM PROVIDED HISTORY: shortness of breath, poss COPD exacerbation TECHNOLOGIST PROVIDED HISTORY: -SIRD shortness of breath, poss COPD exacerbation Reason for Exam: portable upright/ c/o SOB, cp and cough hx: COPD Acuity: Chronic Type of Exam: Initial FINDINGS: The heart is normal in size and configuration. The mediastinal contours are within normal limits. The lungs are well aerated. The pleural surfaces are normal and no evidence of a pleural effusion is seen. Bones and soft tissues are unremarkable. Unremarkable single upright portable AP view of the chest.       EKG    EKG Interpretation    Interpreted by me    Rhythm: Sinus tachycardia  Rate: Tachycardic, 109  Axis: normal  Ectopy: none  Conduction: normal  ST Segments: no acute change  T Waves: no acute change  Q Waves: none    Clinical Impression: Sinus rhythm, rate of 109. No ST segment changes, no other arrhythmia, no ectopy. Normal axis with good R wave progression. Baseline artifact in limb leads. EKG is similar to EKG dated 7/12/2020. All EKG's are interpreted by the Emergency Department Physician whoeither signs or Co-signs this chart in the absence of a cardiologist.    Impression:  Patient presents with what she believes is ongoing COPD exacerbation. States she has had similar COPD exacerbation 2 weeks ago. States that otherwise she has been feeling well. States that she feels it has been easier for her to get triggered into a COPD exacerbation. Patient received 5 mg albuterol, 125 mg IV Solu-Medrol while in route.   Will give patient large albuterol nebulization treatment, with Atrovent, will also give patient 2 g IV magnesium rapid bolus. And get chest x-ray to assess for pneumonia. Patient is currently requiring oxygen, 4 L      EMERGENCY DEPARTMENT COURSE:   Patient states that she did improve after the initial treatments, and magnesium. Chest x-ray did not demonstrate any signs of pneumonia. Patient continued to require oxygen 4 L. Oxygen was eventually turned off and patient's oxygen saturation would vary between 88% and 94%. Oxygen saturation was closer to 88% when patient was asleep. I spoke with patient at length regarding admission however patient refused admission. I told patient that since she had not demonstrated significant enough improvement to be discharged and also was having oxygen saturations in the mid to high 80s that we strongly recommended admission however patient stated that she wanted to go home and stated that she felt well enough to go home. Patient states that she can get into see her primary care provider or the walk-in clinic within the next 24 to 48 hours and states that she would feel well enough going home as long as she had steroids and inhaler available. I again recommended to patient that she come in for admission but patient did not change her mind. We will plan for AMA discharge, patient will be given prescriptions for prednisone, albuterol inhaler, and also Atrovent inhaler. Patient given strict instructions that should her symptoms not improve or should they worsen she is to return to the emergency room immediately and patient voiced understanding of these instructions.     MDM  Number of Diagnoses or Management Options  COPD exacerbation ([facility] [address]): new, needed workup     Amount and/or Complexity of Data Reviewed  Clinical lab tests: ordered and reviewed  Tests in the radiology section of CPT®: ordered and reviewed  Review and summarize past medical records: yes  Discuss the patient with other providers: yes  Independent visualization of images, tracings, or specimens: yes    Risk of Complications, Morbidity, and/or Mortality  Presenting problems: moderate  Diagnostic procedures: low  Management options: low    Patient Progress  Patient progress: improved      PROCEDURES:  none    CONSULTS:  None    CRITICAL CARE:  Please see attending note    FINAL IMPRESSION     1. COPD exacerbation ([address]          DISPOSITION / PLAN   DISPOSITION Riner 08/01/2020 05:27:47 AM      Evaluation and treatment course in the ED, and plan of care upon discharge was discussed in length with the patient. Patient had no further questions prior to being discharged and was instructed to return to the ED for new or worsening symptoms. Any changes to existing medications or new prescriptions were reviewed with patient and they expressed understanding of how to correctly take their medications and the possible side effects.     PATIENT REFERRED TO:  Santos Andino, THOR - CNP  [address] [address]    Go in 2 days      [facility] ED  [address] [address]  [phone]  Go to   As needed      DISCHARGE MEDICATIONS:  Discharge Medication List as of 8/1/2020  5:31 AM      START taking these medications    Details   predniSONE (DELTASONE) 10 MG tablet Take 4 tablets by mouth once daily for 5 days, Disp-20 tablet,R-0Print      ipratropium (ATROVENT HFA) 17 MCG/ACT inhaler Inhale 2 puffs into the lungs every 6 hours, Disp-1 Hardik Wright DO  Emergency Medicine Resident Physician, PGY-2    (Please note that portions of this note were completed with a voice recognition program.  Efforts were made to edit the dictations but occasionally words are mis-transcribed.)          Shellie Ang MD  08/01/20 1718

## 2020-08-01 NOTE — ED PROVIDER NOTES
[facility]     Emergency Department     Faculty Attestation    I performed a history and physical examination of the patient and discussed management with the resident. I reviewed the residents note and agree with the documented findings and plan of care. Any areas of disagreement are noted on the chart. I was personally present for the key portions of any procedures. I have documented in the chart those procedures where I was not present during the key portions. I have reviewed the emergency nurses triage note. I agree with the chief complaint, past medical history, past surgical history, allergies, medications, social and family history as documented unless otherwise noted below. For Physician Assistant/ Nurse Practitioner cases/documentation I have personally evaluated this patient and have completed at least one if not all key elements of the E/M (history, physical exam, and MDM). Additional findings are as noted.       Primary Care Physician:  THOR Carmen - CNP    CHIEF COMPLAINT       Chief Complaint   Patient presents with    Shortness of Breath       RECENT VITALS:   Temp: 98 °F (36.7 °C),  Pulse: 111, Resp: 16, BP: (!) 135/97    LABS:  Labs Reviewed   CBC WITH AUTO DIFFERENTIAL - Abnormal; Notable for the following components:       Result Value    Lymphocytes 46 (*)     Eosinophils % 7 (*)     Absolute Lymph # 4.06 (*)     Absolute Eos # 0.66 (*)     All other components within normal limits   BASIC METABOLIC PANEL - Abnormal; Notable for the following components:    Potassium 3.6 (*)     All other components within normal limits   BLOOD GAS, VENOUS - Abnormal; Notable for the following components:    Positive Base Excess, Bogdan 3.5 (*)     Carboxyhemoglobin 5.2 (*)     All other components within normal limits   TROPONIN       Radiology  XR CHEST PORTABLE   Final Result   Unremarkable single upright portable AP view of the chest.               Attending Physician Additional  Notes    The patient is a 59-year-old female with history of COPD who presents for evaluation of shortness of breath. She states that she began having a COPD exacerbation on 7/12/2020 and never feels like she completely recovered. The patient states that tonight her shortness of breath has become worse. She called EMS and was given albuterol and Solu-Medrol with some improvement. Upon arrival the patient received magnesium and albuterol with further improvement. She has been noted to be hypoxic while in the emergency department and was placed on 4 L. She was eventually able to be weaned off. She denies any associated chest pain, fever, chills, headache, vision changes, neck pain, back pain, abdominal pain, urinary/bowel symptoms, focal weakness, numbness, tingling, recent travel, recent surgery, hemoptysis, estrogen use, calf tenderness, calf swelling or history of blood clots. Vital signs show slight tachycardia. She has been maintaining around 92 to 94% on room air. CBC, BMP, troponin and VBG are grossly unremarkable. Chest x-ray shows no acute process. I recommended that the patient be admitted secondary to her episodes of hypoxia and worsening shortness of breath. She refuses admission. The patient has decided to leave against medical advice, because she wants to go home. She is clinically sober, free from distracting injury,appears to have intact insight and judgement, has normal mental status and in my opinion has adequate capacity to make medical decisions. The patient refuses hospital admission or any further medical treatment and wants to be discharged. The risks have been explained to the patient, including worsening illness, chronic pain, permanent disability, and death. The benefits of admission have also been explained, including the availability and proximity of nurses, physicians, monitoring, diagnostic testing, and treatment.       The patient was able to understand and state the risks and benefits of hospital admission. This was witnessed by Dr. Marii Casas and myself. She had the opportunity to ask questions about her medical condition. The patient was treated to the extent that she would allow, and knows that she may return for care at any time.       EKG 2:59 AM sinus tachycardia, rate 109 bpm, normal axis, normal intervals, no ST elevation or depression, T wave flattening in V2, good R wave progression, Q wave in aVR, similar to EKG on 7/12/2020        Gaudencio Matos DO  Attending Emergency Physician            Gaudencio Matos DO  08/01/20 0361

## 2020-08-03 NOTE — TELEPHONE ENCOUNTER
Pt reported to ER the following day, treated
gallbladder without cholecystitis without obstruction     History of lumbar fusion     Failed back syndrome     Marijuana use     Abnormal weight loss     Allergic rhinitis     Anxiety state     Chronic midline low back pain with sciatica     Moderate episode of recurrent major depressive disorder (HCC)     Seizure (HCC)     Chronic tension-type headache, intractable

## 2020-08-05 ENCOUNTER — TELEPHONE (OUTPATIENT)
Dept: PRIMARY CARE CLINIC | Age: 55
End: 2020-08-05

## 2020-08-05 NOTE — TELEPHONE ENCOUNTER
Patient called, stated she was seen in er a couple days ago and they told her she needs to go see a pulmonologist so patient would like a referral to someone at [facility]. Please advise      Health Maintenance   Topic Date Due    Hepatitis C screen  1965    Lipid screen  11/08/2018    Colon Cancer Screen FIT/FOBT  02/20/2019    Annual Wellness Visit (AWV)  01/09/2020    Breast cancer screen  02/06/2020    Shingles Vaccine (1 of 2) 09/17/2020 (Originally 8/8/2015)    HIV screen  09/17/2020 (Originally 8/8/1980)    Cervical cancer screen  02/28/2021 (Originally 8/8/1986)    Flu vaccine (1) 09/01/2020    DTaP/Tdap/Td vaccine (2 - Td) 09/20/2027    Pneumococcal 0-64 years Vaccine  Completed    Hepatitis A vaccine  Aged Out    Hepatitis B vaccine  Aged Out    Hib vaccine  Aged Out    Meningococcal (ACWY) vaccine  Aged Out             (applicable per patient's age: Cancer Screenings, Depression Screening, Fall Risk Screening, Immunizations)    Hemoglobin A1C (%)   Date Value   01/30/2018 5.3   08/02/2013 5.3   02/14/2013 6.0     LDL Cholesterol (mg/dL)   Date Value   11/08/2017 198 (H)     AST (U/L)   Date Value   07/12/2020 18     ALT (U/L)   Date Value   07/12/2020 16     BUN (mg/dL)   Date Value   08/01/2020 14      (goal A1C is < 7)   (goal LDL is <100) need 30-50% reduction from baseline     BP Readings from Last 3 Encounters:   08/01/20 122/89   07/12/20 131/77   06/15/20 (!) 142/93    (goal /80)      All Future Testing planned in [facility]:  Lab Frequency Next Occurrence   CONCETTA DIGITAL SCREEN W OR WO CAD BILATERAL Once 05/15/2020   Levetiracetam Level Once 09/14/2020   EEG Once 04/30/2020       Next Visit Date:  No future appointments.          Patient Active Problem List:     Heart disease     Chronic back pain     Depression     Hyperlipidemia     CAD, multiple vessel     Asthma     Smoking     Need for vaccination     Syncope     Leg pain     Left hip pain     Chronic cholecystitis     Chest

## 2020-08-06 NOTE — TELEPHONE ENCOUNTER
Please have her schedule emergency room follow-up and we can review her current inhalers and medications. If there are no changes I can make, I will happily refer her on to pulmonology for further treatment and evaluation. She is due for a follow-up either way.   We can do a virtual visit if she would be more comfortable with that option

## 2020-08-07 ENCOUNTER — VIRTUAL VISIT (OUTPATIENT)
Dept: PRIMARY CARE CLINIC | Age: 55
End: 2020-08-07
Payer: MEDICARE

## 2020-08-07 PROCEDURE — 99442 PR PHYS/QHP TELEPHONE EVALUATION 11-20 MIN: CPT | Performed by: NURSE PRACTITIONER

## 2020-08-07 ASSESSMENT — ENCOUNTER SYMPTOMS
WHEEZING: 0
SINUS PRESSURE: 0
CHEST TIGHTNESS: 0
TROUBLE SWALLOWING: 0
CHOKING: 0
ABDOMINAL PAIN: 0
SHORTNESS OF BREATH: 1

## 2020-08-07 NOTE — PROGRESS NOTES
Janie Wilson is a 47 y.o. female evaluated virtual visit via telephone on 2020. Consent:  She and/or health care decision maker is aware that that she may receive a bill for this telephone service, depending on her insurance coverage, and has provided verbal consent to proceed: Yes      Documentation:  I communicated with the patient and/or health care decision maker about COPD. Details of this discussion including any medical advice provided below      I affirm this is a Patient Initiated Episode with an Established Patient who has not had a related appointment within my department in the past 7 days or scheduled within the next 24 hours. Total Time: minutes: 11-20 minutes    Note: not billable if this call serves to triage the patient into an appointment for the relevant concern      Viviana Closs                  2020    TELEHEALTH EVALUATION -- Audio/Visual ([facility])    Patient and physician are located in their individual homes    HPI:    Janie Wilson (:  1965) has requested an audio only/video evaluation for the following concern(s):      In ER on 20 for COPD exacerbation. They gave her steroids and also a new inhaler and advised her to see a pulmonologist  She just finished the steroids yesterday. Dona Bidding denies any know triggers, is not sure why she had trouble with her breathing  XR was done, no pneumonia    She is using her Stioloto daily  Dona Bidding finds she is still using the albuterol daily for a few months daily  She feels more fatigued in general    Her birthday is tomorrow, plans on starting the nicotine patches tomorrow      Review of Systems   Constitutional: Positive for fatigue. Negative for activity change, chills, fever and unexpected weight change. HENT: Negative for sinus pressure, sneezing and trouble swallowing. Respiratory: Positive for shortness of breath. Negative for choking, chest tightness and wheezing. Cardiovascular: Negative for chest pain and palpitations. Gastrointestinal: Negative for abdominal pain. Prior to Visit Medications    Medication Sig Taking? Authorizing Provider   ipratropium (ATROVENT HFA) 17 MCG/ACT inhaler Inhale 2 puffs into the lungs every 6 hours Yes Jeremiah Rankin MD   albuterol sulfate HFA (PROAIR HFA) 108 (90 Base) MCG/ACT inhaler Inhale 2 puffs into the lungs every 6 hours as needed for Wheezing Yes Jeremiah Rankin MD   escitalopram (LEXAPRO) 10 MG tablet TAKE ONE AND ONE-HALF (1 & 1/2) TABLET BY MOUTH DAILY Yes Calli Amezcua MD   acetaminophen (TYLENOL) 325 MG tablet Take 1 tablet by mouth every 6 hours as needed for Pain Yes ePpe Martinez DO   baclofen (LIORESAL) 10 MG tablet Take 1 tablet by mouth every evening Yes THOR Lora CNP   STIOLTO RESPIMAT 2.5-2.5 MCG/ACT AERS INHALE TWO INHALATION(S) BY MOUTH DAILY Yes THOR Lora CNP   amitriptyline (ELAVIL) 25 MG tablet Take 2 tablets by mouth nightly Yes THOR Hamm CNP   levETIRAcetam (KEPPRA) 500 MG tablet Take 1 tablet by mouth 2 times daily Yes THOR Hamm CNP   atorvastatin (LIPITOR) 40 MG tablet Take 1 tablet by mouth daily Yes THOR Lora CNP   Handicap Placard MISC by Does not apply route Exp 1/2024 Yes THOR Lora CNP   Cholecalciferol (VITAMIN D3) 400 units CAPS Take 1 tablet by mouth daily Yes Historical Provider, MD   aspirin EC 81 MG EC tablet Take 1 tablet by mouth daily Yes THOR Lora CNP   magnesium gluconate (MAGONATE) 500 MG tablet Take 400 mg by mouth every evening  Yes Historical Provider, MD   Multiple Vitamins-Minerals (THERAPEUTIC MULTIVITAMIN-MINERALS) tablet Take 1 tablet by mouth daily. Yes Historical Provider, MD   vitamin B-12 (CYANOCOBALAMIN) 1000 MCG tablet Take 1,000 mcg by mouth daily.  Yes Historical Provider, MD   predniSONE (DELTASONE) 10 MG tablet Take 4 tablets by mouth once daily for 5 days  Patient not taking: Reported on 8/7/2020  Sarah Beth Luna MD   ibuprofen (IBU) 400 MG tablet Take 1 tablet by mouth every 6 hours as needed for Pain  Patient not taking: Reported on 8/7/2020  Greer Abdalla DO   ondansetron (ZOFRAN ODT) 4 MG disintegrating tablet Take 1 tablet by mouth every 8 hours as needed for Nausea  Patient not taking: Reported on 2/28/2020  Dena Torres MD   vitamin B-6 (PYRIDOXINE) 100 MG tablet Take 100 mg by mouth daily  Historical Provider, MD   nitroGLYCERIN (NITROSTAT) 0.4 MG SL tablet Place 1 tablet under the tongue every 5 minutes as needed for Chest pain up to max of 3 total doses. If no relief after 1 dose, call 911.   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    Drug use: Yes     Types: Marijuana     Comment: daily - \"a couple bowls\"        Past Medical History:   Diagnosis Date    [facility] ([facility]     Anxiety     Chronic back pain     COPD [facility]chronic obstructive pulmonary disease) ([facility]     Depression     Heart disease     Hyperlipidemia     Hypertension     MRSA (methicillin resistant staph aureus) culture positive 09/05/2017    abscess        Allergies   Allergen Reactions    Sulfa Antibiotics    ,   Past Medical History:   Diagnosis Date    Acute MI ([facility]     Anxiety     Chronic back pain     COPD (chronic obstructive pulmonary disease) (HCC)     Depression     Heart disease     Hyperlipidemia     Hypertension     MRSA (methicillin resistant staph aureus) culture positive 09/05/2017    abscess   ,   Past Surgical History:   Procedure Laterality Date    BACK SURGERY      diskectomy 2000, fusion 2007, fusion 7/2017    CHOLECYSTECTOMY, LAPAROSCOPIC  11/10/2017    robotic assisted    CHOLECYSTECTOMY, age 54 is not likely to increase cancer risk from radiation exposure. Patients with comorbidities resulting in life expectancy of < 10 years, or that would preclude treatment of an abnormality identified on CT, should not be screened due to lack of benefit.     To obtain maximal benefit from this screening, smoking cessation and long-term abstinence from smoking is critical

## 2020-08-08 RX ORDER — ESCITALOPRAM OXALATE 10 MG/1
TABLET ORAL
Qty: 17 TABLET | Refills: 0 | Status: SHIPPED | OUTPATIENT
Start: 2020-08-08 | End: 2020-08-10

## 2020-08-10 ENCOUNTER — TELEPHONE (OUTPATIENT)
Dept: PRIMARY CARE CLINIC | Age: 55
End: 2020-08-10

## 2020-08-10 RX ORDER — ESCITALOPRAM OXALATE 10 MG/1
TABLET ORAL
Qty: 17 TABLET | Refills: 0 | Status: SHIPPED | OUTPATIENT
Start: 2020-08-10 | End: 2020-09-08

## 2020-08-10 NOTE — TELEPHONE ENCOUNTER
Please let Emiliolucía Dago know that she must wait 1 more year to complete the CT scan to screen for lung cancer. The cancer center send a fax stating patients must not have had a lung or chest CT scan in the last year.

## 2020-08-20 ENCOUNTER — CARE COORDINATION (OUTPATIENT)
Dept: CARE COORDINATION | Age: 55
End: 2020-08-20

## 2020-08-20 NOTE — CARE COORDINATION
CC has not been able to connect with the patient. She does not return VM's. Plan:  Remove from care coordination panel. CC will be available to assist Martha Mcgarrys in the future should she have a need and wish to engage with CC.

## 2020-08-26 ENCOUNTER — TELEPHONE (OUTPATIENT)
Dept: PRIMARY CARE CLINIC | Age: 55
End: 2020-08-26

## 2020-08-26 ENCOUNTER — HOSPITAL ENCOUNTER (EMERGENCY)
Age: 55
Discharge: HOME | End: 2020-08-26
Attending: EMERGENCY MEDICINE
Payer: MEDICARE

## 2020-08-26 ENCOUNTER — APPOINTMENT (OUTPATIENT)
Dept: GENERAL RADIOLOGY | Age: 55
End: 2020-08-26
Payer: MEDICARE

## 2020-08-26 VITALS
SYSTOLIC BLOOD PRESSURE: 146 MMHG | HEART RATE: 110 BPM | OXYGEN SATURATION: 93 % | DIASTOLIC BLOOD PRESSURE: 99 MMHG | RESPIRATION RATE: 17 BRPM | TEMPERATURE: 97.8 F

## 2020-08-26 LAB
ANION GAP SERPL CALCULATED.3IONS-SCNC: 12 MMOL/L (ref 9–17)
BUN BLDV-MCNC: 10 MG/DL (ref 6–20)
BUN/CREAT BLD: ABNORMAL (ref 9–20)
CALCIUM SERPL-MCNC: 9.2 MG/DL (ref 8.6–10.4)
CHLORIDE BLD-SCNC: 108 MMOL/L (ref 98–107)
CO2: 25 MMOL/L (ref 20–31)
CREAT SERPL-MCNC: 0.54 MG/DL (ref 0.5–0.9)
GFR AFRICAN AMERICAN: >60 ML/MIN
GFR NON-AFRICAN AMERICAN: >60 ML/MIN
GFR SERPL CREATININE-BSD FRML MDRD: ABNORMAL ML/MIN/{1.73_M2}
GFR SERPL CREATININE-BSD FRML MDRD: ABNORMAL ML/MIN/{1.73_M2}
GLUCOSE BLD-MCNC: 115 MG/DL (ref 70–99)
HCT VFR BLD CALC: 42.1 % (ref 36.3–47.1)
HEMOGLOBIN: 13.9 G/DL (ref 11.9–15.1)
MCH RBC QN AUTO: 32.4 PG (ref 25.2–33.5)
MCHC RBC AUTO-ENTMCNC: 33 G/DL (ref 28.4–34.8)
MCV RBC AUTO: 98.1 FL (ref 82.6–102.9)
NRBC AUTOMATED: 0 PER 100 WBC
PDW BLD-RTO: 13.9 % (ref 11.8–14.4)
PLATELET # BLD: 230 K/UL (ref 138–453)
PMV BLD AUTO: 8.9 FL (ref 8.1–13.5)
POTASSIUM SERPL-SCNC: 3.8 MMOL/L (ref 3.7–5.3)
RBC # BLD: 4.29 M/UL (ref 3.95–5.11)
SODIUM BLD-SCNC: 145 MMOL/L (ref 135–144)
TROPONIN INTERP: NORMAL
TROPONIN INTERP: NORMAL
TROPONIN T: NORMAL NG/ML
TROPONIN T: NORMAL NG/ML
TROPONIN, HIGH SENSITIVITY: <6 NG/L (ref 0–14)
TROPONIN, HIGH SENSITIVITY: <6 NG/L (ref 0–14)
WBC # BLD: 10.6 K/UL (ref 3.5–11.3)

## 2020-08-26 PROCEDURE — 94640 AIRWAY INHALATION TREATMENT: CPT

## 2020-08-26 PROCEDURE — 6360000002 HC RX W HCPCS: Performed by: STUDENT IN AN ORGANIZED HEALTH CARE EDUCATION/TRAINING PROGRAM

## 2020-08-26 PROCEDURE — 2700000000 HC OXYGEN THERAPY PER DAY

## 2020-08-26 PROCEDURE — 93005 ELECTROCARDIOGRAM TRACING: CPT | Performed by: EMERGENCY MEDICINE

## 2020-08-26 PROCEDURE — 85027 COMPLETE CBC AUTOMATED: CPT

## 2020-08-26 PROCEDURE — 84484 ASSAY OF TROPONIN QUANT: CPT

## 2020-08-26 PROCEDURE — 99285 EMERGENCY DEPT VISIT HI MDM: CPT

## 2020-08-26 PROCEDURE — 96365 THER/PROPH/DIAG IV INF INIT: CPT

## 2020-08-26 PROCEDURE — 71045 X-RAY EXAM CHEST 1 VIEW: CPT

## 2020-08-26 PROCEDURE — 80048 BASIC METABOLIC PNL TOTAL CA: CPT

## 2020-08-26 PROCEDURE — 6370000000 HC RX 637 (ALT 250 FOR IP): Performed by: STUDENT IN AN ORGANIZED HEALTH CARE EDUCATION/TRAINING PROGRAM

## 2020-08-26 RX ORDER — ALBUTEROL SULFATE 2.5 MG/3ML
2.5 SOLUTION RESPIRATORY (INHALATION) EVERY 6 HOURS PRN
Qty: 120 ML | Refills: 3 | Status: SHIPPED | OUTPATIENT
Start: 2020-08-26 | End: 2020-11-24

## 2020-08-26 RX ORDER — NEBULIZER ACCESSORIES
1 KIT MISCELLANEOUS DAILY PRN
Qty: 1 KIT | Refills: 0 | Status: SHIPPED | OUTPATIENT
Start: 2020-08-26 | End: 2020-09-04 | Stop reason: SDUPTHER

## 2020-08-26 RX ORDER — PREDNISONE 10 MG/1
TABLET ORAL
Qty: 20 TABLET | Refills: 0 | Status: SHIPPED | OUTPATIENT
Start: 2020-08-26 | End: 2020-09-05

## 2020-08-26 RX ORDER — ALBUTEROL SULFATE 2.5 MG/3ML
15 SOLUTION RESPIRATORY (INHALATION)
Status: DISCONTINUED | OUTPATIENT
Start: 2020-08-26 | End: 2020-08-26

## 2020-08-26 RX ORDER — ACETAMINOPHEN 500 MG
1000 TABLET ORAL ONCE
Status: COMPLETED | OUTPATIENT
Start: 2020-08-26 | End: 2020-08-26

## 2020-08-26 RX ORDER — MAGNESIUM SULFATE 1 G/100ML
1 INJECTION INTRAVENOUS ONCE
Status: COMPLETED | OUTPATIENT
Start: 2020-08-26 | End: 2020-08-26

## 2020-08-26 RX ORDER — ALBUTEROL SULFATE 2.5 MG/3ML
2.5 SOLUTION RESPIRATORY (INHALATION)
Status: DISCONTINUED | OUTPATIENT
Start: 2020-08-26 | End: 2020-08-26

## 2020-08-26 RX ADMIN — IPRATROPIUM BROMIDE 0.5 MG: 0.5 SOLUTION RESPIRATORY (INHALATION) at 05:48

## 2020-08-26 RX ADMIN — ALBUTEROL SULFATE 7.5 MG: 5 SOLUTION RESPIRATORY (INHALATION) at 05:48

## 2020-08-26 RX ADMIN — MAGNESIUM SULFATE HEPTAHYDRATE 1 G: 1 INJECTION, SOLUTION INTRAVENOUS at 05:45

## 2020-08-26 RX ADMIN — ACETAMINOPHEN 1000 MG: 500 TABLET ORAL at 07:48

## 2020-08-26 ASSESSMENT — ENCOUNTER SYMPTOMS
EYE PAIN: 0
COUGH: 1
ABDOMINAL DISTENTION: 0
SHORTNESS OF BREATH: 1
SINUS PAIN: 0
SORE THROAT: 0
NAUSEA: 0
SINUS PRESSURE: 0
CONSTIPATION: 0
DIARRHEA: 0
WHEEZING: 1
ABDOMINAL PAIN: 0
CHEST TIGHTNESS: 1
EYE ITCHING: 0

## 2020-08-26 ASSESSMENT — PAIN SCALES - GENERAL
PAINLEVEL_OUTOF10: 3
PAINLEVEL_OUTOF10: 6

## 2020-08-26 ASSESSMENT — PAIN DESCRIPTION - DESCRIPTORS: DESCRIPTORS: ACHING

## 2020-08-26 ASSESSMENT — PAIN DESCRIPTION - LOCATION: LOCATION: CHEST

## 2020-08-26 ASSESSMENT — PAIN DESCRIPTION - ORIENTATION: ORIENTATION: MID

## 2020-08-26 ASSESSMENT — PAIN DESCRIPTION - PAIN TYPE: TYPE: ACUTE PAIN

## 2020-08-26 NOTE — TELEPHONE ENCOUNTER
Patient called, lm, stated she is having a hard time breathing, she is also wheezing. Patient requesting a steriod. Before I could call patient back she went to er.

## 2020-08-26 NOTE — ED PROVIDER NOTES
[facility]     Emergency Department     Faculty Attestation    I performed a history and physical examination of the patient and discussed management with the resident. I have reviewed and agree with the residents findings including all diagnostic interpretations, and treatment plans as written. Any areas of disagreement are noted on the chart. I was personally present for the key portions of any procedures. I have documented in the chart those procedures where I was not present during the key portions. I have reviewed the emergency nurses triage note. I agree with the chief complaint, past medical history, past surgical history, allergies, medications, social and family history as documented unless otherwise noted below. Documentation of the HPI, Physical Exam and Medical Decision Making performed by adriaibnichelle is based on my personal performance of the HPI, PE and MDM. For Physician Assistant/ Nurse Practitioner cases/documentation I have personally evaluated this patient and have completed at least one if not all key elements of the E/M (history, physical exam, and MDM). Additional findings are as noted. 53 yo F c/o sob, copd, no fever, no syncope, no vomit,   Received combivent & solumedrol per ems,   pe sat 90% on ra, gcs 15, audible wheeze, mild moderate respiratory distress, abdomen non tender, no distension, no rigidity, no calf swelling, no calf tenderness,     S/s improved, trop stable, cxr stable, we stressed smoking cessation, & prompt pcp follow up. Vss, will dc home after 2nd trop    EKG Interpretation    Interpreted by me  Sinus tachycardia, hr 110, no ischemia, normal axis, qtc 468,     CRITICAL CARE: There was a high probability of clinically significant/life threatening deterioration in this patient's condition which required my urgent intervention. Total critical care time was 10 minutes.   This excludes any time for separately reportable

## 2020-08-26 NOTE — ED PROVIDER NOTES
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    Drug use: Yes     Types: Marijuana     Comment: daily - \"a couple bowls\"    Sexual activity: Not on file   Lifestyle    Physical activity     Days per week: Not on file     Minutes per session: Not on file    Stress: Not on file   Relationships    Social connections     Talks on phone: Not on file     Gets together: Not on file     Attends Yazdanism service: Not on file     Active member of club or organization: Not on file     Attends meetings of clubs or organizations: Not on file     Relationship status: Not on file    Intimate partner violence     Fear of current or ex partner: Not on file     Emotionally abused: Not on file     Physically abused: Not on file     Forced sexual activity: Not on file   Other Topics Concern    Not on file   Social History Narrative    Not on file       Family History   Problem Relation Age of Onset    Other Mother     Heart Disease Father     High Blood Pressure Father     High Cholesterol Father     Other Brother        Allergies:  Sulfa antibiotics    Home Medications:  Prior to Admission medications    Medication Sig Start Date End Date Taking?  Authorizing Provider   escitalopram (LEXAPRO) 10 MG tablet TAKE 1 AND 1/2 TABLET BY MOUTH DAILY 8/10/20   Deep Newell MD   atorvastatin (LIPITOR) 40 MG tablet Take 1 tablet by mouth daily 8/7/20 11/5/20  Dionicio Oliveira, APRN - CNP   ipratropium (ATROVENT HFA) 17 MCG/ACT inhaler Inhale 2 puffs into the lungs every 6 hours 8/1/20   Jennifer Sultana MD   albuterol sulfate HFA (PROAIR HFA) 108 (90 Base) MCG/ACT inhaler Inhale 2 puffs into the lungs every 6 hours as needed for Wheezing 8/1/20   Jennifer Sultana MD systems for level 4, 10 or more for level 5)      Review of Systems   Constitutional: Negative for activity change, chills and fever. HENT: Negative for congestion, sinus pressure, sinus pain and sore throat. Eyes: Negative for pain and itching. Respiratory: Positive for cough, chest tightness, shortness of breath and wheezing. Cardiovascular: Negative for chest pain. Gastrointestinal: Negative for abdominal distention, abdominal pain, constipation, diarrhea and nausea. Endocrine: Negative for polyuria. Genitourinary: Negative for dysuria and frequency. Musculoskeletal: Negative for arthralgias. Skin: Negative for rash. Neurological: Negative for light-headedness and headaches.        PHYSICAL EXAM   (up to 7 for level 4, 8 or more for level 5)      INITIAL VITALS:   BP (!) 146/99   Pulse 110   Temp 97.8 °F (36.6 °C) (Oral)   Resp 17   SpO2 93%     Physical Exam   GEN: alert, oriented, calm, cooperative, in no acute distress, answering questions appropriately  HEENT: no scleral icterus or conjunctival injection, trachea midline  CV: regular, tachycardic to 109, no murmurs on auscultation, no peripheral edema, radial pulses intact  RESP: diminished breath sounds bilaterally, diffuse inspiratory/expiratory wheezes, mildly increased work of breathing, NC in place  ABD: soft, non-tender, nondistended   MSK: moves all extremities spontaneously  DERM: no bruises, rash or hematoma noted     DIFFERENTIAL  DIAGNOSIS     PLAN (LABS / IMAGING / EKG):  Orders Placed This Encounter   Procedures    XR CHEST PORTABLE    CBC    Troponin    BASIC METABOLIC PANEL    Troponin    Initiate ED RT Aerosol protocol    EKG 12 Lead       MEDICATIONS ORDERED:  Orders Placed This Encounter   Medications    magnesium sulfate 1 g in dextrose 5% 100 mL IVPB    DISCONTD: albuterol (PROVENTIL) nebulizer solution 2.5 mg    DISCONTD: ipratropium (ATROVENT) 0.02 % nebulizer solution 0.25 mg    DISCONTD: albuterol (PROVENTIL) nebulizer solution 15 mg    DISCONTD: ipratropium (ATROVENT) 0.02 % nebulizer solution 0.25 mg    albuterol (PROVENTIL) nebulizer solution 5 mg    ipratropium (ATROVENT) 0.02 % nebulizer solution 0.5 mg       DDX: Includes but not limited to ACS, COPD exacerbation, pneumonia, pleural effusion, pneumothorax, angioedema, PE, anemia, CO poisoning,      DIAGNOSTIC RESULTS / EMERGENCY DEPARTMENT COURSE / MDM   LAB RESULTS:  Results for orders placed or performed during the hospital encounter of 08/26/20   CBC   Result Value Ref Range    WBC 10.6 3.5 - 11.3 k/uL    RBC 4.29 3.95 - 5.11 m/uL    Hemoglobin 13.9 11.9 - 15.1 g/dL    Hematocrit 42.1 36.3 - 47.1 %    MCV 98.1 82.6 - 102.9 fL    MCH 32.4 25.2 - 33.5 pg    MCHC 33.0 28.4 - 34.8 g/dL    RDW 13.9 11.8 - 14.4 %    Platelets 154 974 - 899 k/uL    MPV 8.9 8.1 - 13.5 fL    NRBC Automated 0.0 0.0 per 100 WBC   BASIC METABOLIC PANEL   Result Value Ref Range    Glucose 115 (H) 70 - 99 mg/dL    BUN 10 6 - 20 mg/dL    CREATININE 0.54 0.50 - 0.90 mg/dL    Bun/Cre Ratio NOT REPORTED 9 - 20    Calcium 9.2 8.6 - 10.4 mg/dL    Sodium 145 (H) 135 - 144 mmol/L    Potassium 3.8 3.7 - 5.3 mmol/L    Chloride 108 (H) 98 - 107 mmol/L    CO2 25 20 - 31 mmol/L    Anion Gap 12 9 - 17 mmol/L    GFR Non-African American >60 >60 mL/min    GFR African American >60 >60 mL/min    GFR Comment          GFR Staging NOT REPORTED    Troponin   Result Value Ref Range    Troponin, High Sensitivity <6 0 - 14 ng/L    Troponin T NOT REPORTED <0.03 ng/mL    Troponin Interp NOT REPORTED        IMPRESSION: Gutierrez Mitchell is a 54 y.o. woman presenting with SOB w/history of several recent COPD exacerbations. Labs were unremarkable, troponin normal, EKG NSR, CXR ordered and pending at this time. Second troponin pending. She has been referred to a pulmonologist but is unable to see them until October. In the meantime she has good follow up with her PCP.  Following completion of her workup she believes that it would be her preference to go home. She states that she is feeling much better with a breathing treatment and a dose of steroids. RADIOLOGY:  CXR 8/26/2020  IMPRESSION  Lungs clear.  Stable. EKG  Reviewed, NSR no ST elevations     All EKG's are interpreted by the Emergency Department Physician who either signs or Co-signs this chart in the absence of a cardiologist.    EMERGENCY DEPARTMENT COURSE:  Patient seen and evaluated, tachycardic, hypoxic with increased work of breathing on presentation. Significant improvement with nebulizers, solumedrol, combivent. Cardiac workup negative, no concern for ACS. CXR negative for pneumonia. Per patient preference dc with steroids, states she has appropriate inhalers and breathing treatments at home. PROCEDURES:  None     CONSULTS:  None    CRITICAL CARE:  Please see attending note    FINAL IMPRESSION      1. COPD exacerbation ([facility] [address]          DISPOSITION / PLAN     DISPOSITION    DC Pending orders 08/26/20 7:47 AM     PATIENT REFERRED TO:  No follow-up provider specified.     DISCHARGE MEDICATIONS:  New Prescriptions    No medications on file       Starr Hartman DO  Emergency Medicine Resident    (Please note that portions of thisnote were completed with a voice recognition program.  Efforts were made to edit the dictations but occasionally words are mis-transcribed.)      Starr Hartman DO  Resident  08/26/20 [id number]

## 2020-08-26 NOTE — TELEPHONE ENCOUNTER
Nebulizer equipment and supplies ordered. Albuterol solution for the machine was sent to her local pharmacy.

## 2020-08-26 NOTE — ED NOTES
Pt presents to ED w/ c/o COPD exacerbation/SOB. Pt states she has hx of COPD, has been SOB for past few days w/ increasing severity until she needed to call 911 today. Pt reports cough, states it is chronic. Pt reports CP, states it is soreness r/t coughing. Pt states her SpO2 sats at home this AM were in the low 80s, normally range in the 90s. Upon arrival, pt in tripod position, audible wheezing, pt dyspneic at rest, speaking in abbreviated sentences. RR even, labored. Pt A&Ox4. Pt received albuterol, combivent, solumedrol PTA from EMS. Pt placed on monitor, EKG, labs drawn. Will continue to monitor.      Mervat Isabel RN  08/26/20 [id number]

## 2020-08-27 ENCOUNTER — CARE COORDINATION (OUTPATIENT)
Dept: CARE COORDINATION | Age: 55
End: 2020-08-27

## 2020-08-27 LAB
EKG ATRIAL RATE: 110 BPM
EKG P AXIS: 62 DEGREES
EKG P-R INTERVAL: 130 MS
EKG Q-T INTERVAL: 346 MS
EKG QRS DURATION: 86 MS
EKG QTC CALCULATION (BAZETT): 468 MS
EKG R AXIS: 57 DEGREES
EKG T AXIS: 55 DEGREES
EKG VENTRICULAR RATE: 110 BPM

## 2020-08-27 PROCEDURE — 93010 ELECTROCARDIOGRAM REPORT: CPT | Performed by: INTERNAL MEDICINE

## 2020-08-27 NOTE — CARE COORDINATION
Patient contacted regarding recent discharge and COVID-19 risk. Discussed COVID-19 related testing which was not done at this time. Test results were not done. Patient informed of results, if available? [facility] Transition Nurse/ Ambulatory Care Manager contacted the patient by telephone to perform post discharge assessment. Verified name and  with patient as identifiers. Patient has following risk factors of: COPD. CTN/ACM reviewed discharge instructions, medical action plan and red flags related to discharge diagnosis. Reviewed and educated them on any new and changed medications related to discharge diagnosis. Advised obtaining a 90-day supply of all daily and as-needed medications. Education provided regarding infection prevention, and signs and symptoms of COVID-19 and when to seek medical attention with patient who verbalized understanding. Discussed exposure protocols and quarantine from [address] you at higher risk for severe illness  and given an opportunity for questions and concerns. The patient agrees to contact the COVID-19 hotline [phone] or PCP office for questions related to their healthcare. CTN/ACM provided contact information for future reference. From CDC: Are you at higher risk for severe illness?  Wash your hands often.  Avoid close contact (6 feet, which is about two arm lengths) with people who are sick.  Put distance between yourself and other people if COVID-19 is spreading in your community.  Clean and disinfect frequently touched surfaces.  Avoid all cruise travel and non-essential air travel.  Call your healthcare professional if you have concerns about COVID-19 and your underlying condition or if you are sick.     For more information on steps you can take to protect yourself, see CDC's How to Protect Yourself      Spoke to patient, symptoms improving  No new or worsening symptoms   Prescriptions filled and taking as directed   Advanced care planning reviewed   Encouraged following up with providers    Future Appointments   Date Time Provider Kaycee Mastisti   10/16/2020  1:30 PM Marshall Peng MD Resp Spec [facility]   11/13/2020 11:30 AM Conception THOR Coffey CNP [facility]       You Patient resolved from the Care Transitions episode on 8/27/20  Discussed COVID-19 related testing which was not done at this time. Test results were not done. Patient informed of results, if available? NA     Patient/family has been provided the following resources and education related to COVID-19:                         Signs, symptoms and red flags related to COVID-19            CDC exposure and quarantine guidelines            [facility] exposure contact - [phone]            Contact for their local Department of Health                 Patient currently reports that the following symptoms have improved:  no new/worsening symptoms     No further outreach scheduled with this CTN/ACM. Episode of Care resolved. Patient has this CTN/ACM contact information if future needs arise.

## 2020-08-27 NOTE — CARE COORDINATION
Attempted outreach for ED follow up/ COVID protocol.  Left VM message with return contact information

## 2020-09-04 RX ORDER — NEBULIZER ACCESSORIES
1 KIT MISCELLANEOUS DAILY PRN
Qty: 1 KIT | Refills: 0 | Status: SHIPPED | OUTPATIENT
Start: 2020-09-04

## 2020-09-08 RX ORDER — ESCITALOPRAM OXALATE 10 MG/1
TABLET ORAL
Qty: 17 TABLET | Refills: 0 | Status: SHIPPED | OUTPATIENT
Start: 2020-09-08 | End: 2020-09-30

## 2020-09-10 ENCOUNTER — TELEPHONE (OUTPATIENT)
Dept: PRIMARY CARE CLINIC | Age: 55
End: 2020-09-10

## 2020-09-10 RX ORDER — ALBUTEROL SULFATE 90 UG/1
2 AEROSOL, METERED RESPIRATORY (INHALATION) EVERY 6 HOURS PRN
Qty: 1 INHALER | Refills: 5 | Status: SHIPPED | OUTPATIENT
Start: 2020-09-10 | End: 2020-12-21

## 2020-09-10 NOTE — TELEPHONE ENCOUNTER
Unfortunately Marce Easley was treated with oral steroids  2 weeks ago. She still having persistent symptoms requiring steroids she needs to be seen, please offer my same-day slot.   Juanita Burger also has an opening this afternoon if she cannot come in my morning slot

## 2020-09-30 ENCOUNTER — TELEPHONE (OUTPATIENT)
Dept: PRIMARY CARE CLINIC | Age: 55
End: 2020-09-30

## 2020-09-30 NOTE — TELEPHONE ENCOUNTER
Patient insurance requires patient tries Combivent Respimat, Spiriva, Incruse Ellipta, Spiriva Respimat or ProAir before covering Atrovent.  Please order and send to pharmacy

## 2020-10-01 NOTE — TELEPHONE ENCOUNTER
Please let nAh Cisneros know I sent in a new inhaler called Spiriva, her insurance requires this before the Atrovent

## 2020-10-09 ENCOUNTER — TELEPHONE (OUTPATIENT)
Dept: NEUROLOGY | Age: 55
End: 2020-10-09

## 2020-10-09 NOTE — TELEPHONE ENCOUNTER
Delroy Urbano  missed an appt in June with Mckenzie Caba CNP . Please call her and get her scheduled in 2-3 months.  Thank you

## 2020-10-09 NOTE — TELEPHONE ENCOUNTER
Pharmacy requesting refill of levetiracetam and Amitriptyline      Medication active on med list yes      Date of last fill: 4/16/2020  with 5 refills verified on 10/9/2020  verified by KEVIN GUY      Date of last appointment 4/16/2020    Next Visit Date:  Visit date not found  Pt did not show in June for follow up.

## 2020-10-11 RX ORDER — AMITRIPTYLINE HYDROCHLORIDE 25 MG/1
TABLET, FILM COATED ORAL
Qty: 60 TABLET | Refills: 0 | Status: SHIPPED | OUTPATIENT
Start: 2020-10-11 | End: 2020-11-11

## 2020-10-11 RX ORDER — LEVETIRACETAM 500 MG/1
TABLET ORAL
Qty: 60 TABLET | Refills: 5 | Status: SHIPPED | OUTPATIENT
Start: 2020-10-11 | End: 2020-12-02 | Stop reason: ALTCHOICE

## 2020-10-16 ENCOUNTER — OFFICE VISIT (OUTPATIENT)
Dept: PULMONOLOGY | Age: 55
End: 2020-10-16
Payer: MEDICARE

## 2020-10-16 VITALS
HEART RATE: 100 BPM | DIASTOLIC BLOOD PRESSURE: 97 MMHG | BODY MASS INDEX: 24.45 KG/M2 | OXYGEN SATURATION: 98 % | TEMPERATURE: 98.8 F | SYSTOLIC BLOOD PRESSURE: 133 MMHG | RESPIRATION RATE: 18 BRPM | WEIGHT: 155.8 LBS | HEIGHT: 67 IN

## 2020-10-16 PROCEDURE — 3017F COLORECTAL CA SCREEN DOC REV: CPT | Performed by: INTERNAL MEDICINE

## 2020-10-16 PROCEDURE — 4004F PT TOBACCO SCREEN RCVD TLK: CPT | Performed by: INTERNAL MEDICINE

## 2020-10-16 PROCEDURE — 99204 OFFICE O/P NEW MOD 45 MIN: CPT | Performed by: INTERNAL MEDICINE

## 2020-10-16 PROCEDURE — 3023F SPIROM DOC REV: CPT | Performed by: INTERNAL MEDICINE

## 2020-10-16 PROCEDURE — G8926 SPIRO NO PERF OR DOC: HCPCS | Performed by: INTERNAL MEDICINE

## 2020-10-16 PROCEDURE — G8420 CALC BMI NORM PARAMETERS: HCPCS | Performed by: INTERNAL MEDICINE

## 2020-10-16 PROCEDURE — G8482 FLU IMMUNIZE ORDER/ADMIN: HCPCS | Performed by: INTERNAL MEDICINE

## 2020-10-16 PROCEDURE — G8427 DOCREV CUR MEDS BY ELIG CLIN: HCPCS | Performed by: INTERNAL MEDICINE

## 2020-10-16 PROCEDURE — G0008 ADMIN INFLUENZA VIRUS VAC: HCPCS | Performed by: INTERNAL MEDICINE

## 2020-10-16 PROCEDURE — 90686 IIV4 VACC NO PRSV 0.5 ML IM: CPT | Performed by: INTERNAL MEDICINE

## 2020-10-16 RX ORDER — BUDESONIDE AND FORMOTEROL FUMARATE DIHYDRATE 160; 4.5 UG/1; UG/1
2 AEROSOL RESPIRATORY (INHALATION) 2 TIMES DAILY
Qty: 1 INHALER | Refills: 11 | Status: SHIPPED | OUTPATIENT
Start: 2020-10-16 | End: 2021-07-09 | Stop reason: SDUPTHER

## 2020-10-16 RX ORDER — BACLOFEN 10 MG/1
TABLET ORAL
COMMUNITY
Start: 2020-10-15 | End: 2020-11-12

## 2020-10-16 ASSESSMENT — SLEEP AND FATIGUE QUESTIONNAIRES
HOW LIKELY ARE YOU TO NOD OFF OR FALL ASLEEP WHILE WATCHING TV: 0
HOW LIKELY ARE YOU TO NOD OFF OR FALL ASLEEP WHILE SITTING INACTIVE IN A PUBLIC PLACE: 0
HOW LIKELY ARE YOU TO NOD OFF OR FALL ASLEEP WHILE LYING DOWN TO REST IN THE AFTERNOON WHEN CIRCUMSTANCES PERMIT: 3
HOW LIKELY ARE YOU TO NOD OFF OR FALL ASLEEP WHILE SITTING AND READING: 0
HOW LIKELY ARE YOU TO NOD OFF OR FALL ASLEEP WHILE SITTING QUIETLY AFTER LUNCH WITHOUT ALCOHOL: 0
HOW LIKELY ARE YOU TO NOD OFF OR FALL ASLEEP WHILE SITTING AND TALKING TO SOMEONE: 0
HOW LIKELY ARE YOU TO NOD OFF OR FALL ASLEEP IN A CAR, WHILE STOPPED FOR A FEW MINUTES IN TRAFFIC: 0
HOW LIKELY ARE YOU TO NOD OFF OR FALL ASLEEP WHEN YOU ARE A PASSENGER IN A CAR FOR AN HOUR WITHOUT A BREAK: 0
ESS TOTAL SCORE: 3

## 2020-10-16 NOTE — PROGRESS NOTES
OUTPATIENT PULMONARY CONSULT NOTE      Patient:  Eddie Damico  MRN: [id number]    Consulting Physician: THOR James CNP  Reason for Consult: COPD/worsening dyspnea  Primacy Care Physician: THOR James CNP    HISTORY OF PRESENT ILLNESS:   The patient is a 54 y.o. female   She is referred here for evaluation of worsening dyspnea and history of COPD. According to patient she had a history of asthma which was diagnosed when she was about 27years of age. She has a long history of smoking she started smoking when she was 13years of age she had history of smoking 1.5 pack/day and now she smoke little less than a pack a day. She was diagnosed with COPD about 5 years ago. For last 5 years or so she has worsening shortness of breath which was gradually worsening but until about a month ago, for last 1 month or so she has been having increased shortness of breath. Activities she used to do make her short of breath she gets short of breath on regular activities sometime pain. According to patient she is able to walk half block to sometimes 1 block but sometimes she has to stop occasionally she has to take albuterol inhaler when she has to walk. She gets much more short of breath when she has to use the stairs or a pill task or if he has to do work fast.  She has chronic cough cough is mostly dry sometimes productive of sputum which is brownish to clear in color. She denies any change in the color of the sputum or increase in sputum or volume of the sputum. She has wheezing almost daily and persistent without much change. She does have nocturnal awakening with cough and wheezing sometimes and sometimes she has to take albuterol inhaler at night. For the last 1 month or so she has been taking albuterol aerosol 4 times a day but she still does not feel relief in her symptoms of shortness of breath cough and wheezing.   For last 2 weeks or so she also complains of chest pain/chest tightness which have been for short time for few minutes go away itself sometimes typing it rest another time when she is doing something there is no radiation of chest pain she denies dizziness syncope or passing out chest tightness does not happen daily and sometimes she does not have it for few days but never more than few minutes does not last long. She denies any fever chills night sweats. She claims her appetite has not been as good but she does not have any weight loss. She was seen in the emergency room in July for COPD exacerbation apparently she had a CTA chest done which was negative for pulmonary embolism and on review of the CT scan she has a groundglass opacity/infiltrate seen in right middle lobe (not mentioned by radiology)  She had a pulmonary function test done in February 2018 which showed FEV1 of 83% residual volume was 160% total lung capacity was 119% consistent with hyperinflation and there is mild reduction diffusion capacity 75%. She had a chest x-ray done on 08/26/2020 which was suggestive of hyperinflation but no consolidation or infiltrate    History of smoking 1 to 1.5 pack/day for almost 40 years. 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         Past Medical History:        Diagnosis Date    Acute MI ([address]     Anxiety     Chronic back pain     COPD (chronic obstructive pulmonary disease) (HCC)     Depression     Heart disease     Hyperlipidemia     Hypertension     MRSA (methicillin resistant staph aureus) culture positive 09/05/2017    abscess       Past Surgical History:        Procedure Laterality Date    BACK SURGERY      diskectomy 2000, fusion 2007, fusion 7/2017    CHOLECYSTECTOMY, LAPAROSCOPIC  11/10/2017    robotic assisted    CHOLECYSTECTOMY, LAPAROSCOPIC N/A 11/10/2017    XI ROBOTIC CHOLECYSTECTOMY LAPAROSCOPIC performed by Maritza Hector DO at [place] [address] Allergies: Allergies   Allergen Reactions    Sulfa Antibiotics          Home Meds:   Outpatient Encounter Medications as of 10/16/2020   Medication Sig Dispense Refill    baclofen (LIORESAL) 10 MG tablet       budesonide-formoterol (SYMBICORT) 160-4.5 MCG/ACT AERO Inhale 2 puffs into the lungs 2 times daily 1 Inhaler 11    tiotropium (SPIRIVA RESPIMAT) 2.5 MCG/ACT AERS inhaler Inhale 2 puffs into the lungs daily 1 Inhaler 11    amitriptyline (ELAVIL) 25 MG tablet TAKE TWO TABLETS BY MOUTH ONCE NIGHTLY 60 tablet 0    levETIRAcetam (KEPPRA) 500 MG tablet TAKE ONE TABLET BY MOUTH TWICE A DAY 60 tablet 5    escitalopram (LEXAPRO) 10 MG tablet Take 1.5 tablets by mouth daily 45 tablet 1    albuterol sulfate HFA (PROAIR HFA) 108 (90 Base) MCG/ACT inhaler Inhale 2 puffs into the lungs every 6 hours as needed for Wheezing 1 Inhaler 5    Nebulizers (NEBULIZER COMPRESSOR) MISC Daily as needed 1 each 0    Respiratory Therapy Supplies (NEBULIZER/TUBING/MOUTHPIECE) KIT 1 kit by Does not apply route daily as needed (SOB or wheezing) 1 kit 0    albuterol (PROVENTIL) (2.5 MG/3ML) 0.083% nebulizer solution Take 3 mLs by nebulization every 6 hours as needed for Wheezing 120 mL 3    atorvastatin (LIPITOR) 40 MG tablet Take 1 tablet by mouth daily 90 tablet 1    acetaminophen (TYLENOL) 325 MG tablet Take 1 tablet by mouth every 6 hours as needed for Pain 30 tablet 0    ibuprofen (IBU) 400 MG tablet Take 1 tablet by mouth every 6 hours as needed for Pain 30 tablet 0    Handicap Placard MISC by Does not apply route Exp 1/2024 1 each 0    Cholecalciferol (VITAMIN D3) 400 units CAPS Take 1 tablet by mouth daily      aspirin EC 81 MG EC tablet Take 1 tablet by mouth daily 30 tablet 3    magnesium gluconate (MAGONATE) 500 MG tablet Take 400 mg by mouth every evening       Multiple Vitamins-Minerals (THERAPEUTIC MULTIVITAMIN-MINERALS) tablet Take 1 tablet by mouth daily.       vitamin B-12 (CYANOCOBALAMIN) 1000 MCG tablet Take 1,000 mcg by mouth daily.  [DISCONTINUED] tiotropium (SPIRIVA RESPIMAT) 2.5 MCG/ACT AERS inhaler Inhale 2 puffs into the lungs daily 1 Inhaler 1    STIOLTO RESPIMAT 2.5-2.5 MCG/ACT AERS INHALE TWO INHALATION(S) BY MOUTH DAILY (Patient not taking: Reported on 10/16/2020) 3 Inhaler 1    ondansetron (ZOFRAN ODT) 4 MG disintegrating tablet Take 1 tablet by mouth every 8 hours as needed for Nausea (Patient not taking: Reported on 10/16/2020) 20 tablet 0    vitamin B-6 (PYRIDOXINE) 100 MG tablet Take 100 mg by mouth daily      nitroGLYCERIN (NITROSTAT) 0.4 MG SL tablet Place 1 tablet under the tongue every 5 minutes as needed for Chest pain up to max of 3 total doses. If no relief after 1 dose, call 911. (Patient not taking: Reported on 10/16/2020) 25 tablet 3     No facility-administered encounter medications on file as of 10/16/2020. Social History:   TOBACCO:   reports that she has been smoking cigarettes. She has been smoking about 1.00 pack per day. She has never used smokeless tobacco.  ETOH:   reports no history of alcohol use.   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    Family History:       Problem Relation Age of Onset    Other Mother     Heart Disease Father     High Blood Pressure Father     High Cholesterol Father     Other Brother        Immunizations:    Immunization History   Administered Date(s) Administered    Influenza 10/22/2013    Influenza A ([id number]) Vaccine PF IM 11/13/2009    Influenza Vaccine, unspecified formulation 10/03/2012, 10/22/2013    Influenza Virus Vaccine 10/03/2012    Influenza, High Dose (Fluzone 65 yrs and older) 01/30/2018    Influenza, Quadv, IM, PF (6 mo and older Fluzone, Flulaval, Fluarix, and 3 yrs and older Afluria) 10/02/2018, 09/17/2019    Pneumococcal Conjugate 13-valent ([id number]) 01/30/2018    Pneumococcal Polysaccharide ([id number]) 10/03/2012, 04/25/2018    Tdap (Boostrix, Adacel) 09/20/2017 respiratory distress  Mental status - alert, oriented to person, place, and time  Eyes - pupils equal and reactive, extraocular eye movements intact, sclera anicteric  Ears - right ear normal, left ear normal  Nose - normal and patent, no erythema, discharge or polyps  Mouth - mucous membranes moist, pharynx normal without lesions  Neck - supple, no significant adenopathy  Chest - no tachypnea, retractions or cyanosis, bilateral symmetrical chest movement, normal resonance on percussion, air entry is bilaterally present but distant. She has prolonged expiration and mild expiratory wheeze.   No crackles or wheezing  Heart - normal rate, regular rhythm, normal S1, S2, no murmurs, rubs, clicks or gallops  Abdomen - soft, nontender, nondistended, no masses or organomegaly  Neurological - alert, oriented, normal speech, no focal findings or movement disorder noted}  Extremities - peripheral pulses normal, no pedal edema, no clubbing or cyanosis  Skin - normal coloration and turgor, no rashes, no suspicious skin lesions noted       LABS:    CBC:   WBC   Date Value Ref Range Status   08/26/2020 10.6 3.5 - 11.3 k/uL Final   08/01/2020 8.9 3.5 - 11.3 k/uL Final   07/12/2020 10.7 3.5 - 11.0 k/uL Final     Hemoglobin   Date Value Ref Range Status   08/26/2020 13.9 11.9 - 15.1 g/dL Final   08/01/2020 13.8 11.9 - 15.1 g/dL Final   07/12/2020 14.1 12.0 - 16.0 g/dL Final     Platelets   Date Value Ref Range Status   08/26/2020 230 138 - 453 k/uL Final   08/01/2020 285 138 - 453 k/uL Final   07/12/2020 265 150 - 450 k/uL Final     BMP:   Sodium   Date Value Ref Range Status   08/26/2020 145 (H) 135 - 144 mmol/L Final   08/01/2020 139 135 - 144 mmol/L Final   07/12/2020 140 135 - 144 mmol/L Final     Potassium   Date Value Ref Range Status   08/26/2020 3.8 3.7 - 5.3 mmol/L Final   08/01/2020 3.6 (L) 3.7 - 5.3 mmol/L Final   07/12/2020 3.7 3.7 - 5.3 mmol/L Final     Chloride   Date Value Ref Range Status   08/26/2020 108 (H) 98 - 107 mmol/L Final   08/01/2020 103 98 - 107 mmol/L Final   07/12/2020 103 98 - 107 mmol/L Final     CO2   Date Value Ref Range Status   08/26/2020 25 20 - 31 mmol/L Final   08/01/2020 24 20 - 31 mmol/L Final   07/12/2020 25 20 - 31 mmol/L Final     BUN   Date Value Ref Range Status   08/26/2020 10 6 - 20 mg/dL Final   08/01/2020 14 6 - 20 mg/dL Final   07/12/2020 15 6 - 20 mg/dL Final     CREATININE   Date Value Ref Range Status   08/26/2020 0.54 0.50 - 0.90 mg/dL Final   08/01/2020 0.58 0.50 - 0.90 mg/dL Final   07/12/2020 0.78 0.50 - 0.90 mg/dL Final     Glucose   Date Value Ref Range Status   08/26/2020 115 (H) 70 - 99 mg/dL Final   08/01/2020 93 70 - 99 mg/dL Final   07/12/2020 92 70 - 99 mg/dL Final     Hepatic:   AST   Date Value Ref Range Status   07/12/2020 18 <32 U/L Final   01/09/2020 19 <32 U/L Final   11/11/2017 48 (H) <32 U/L Final     ALT   Date Value Ref Range Status   07/12/2020 16 5 - 33 U/L Final   01/09/2020 19 5 - 33 U/L Final   11/11/2017 34 (H) 5 - 33 U/L Final     Total Bilirubin   Date Value Ref Range Status   07/12/2020 <0.15 (L) 0.3 - 1.2 mg/dL Final   01/09/2020 0.26 (L) 0.3 - 1.2 mg/dL Final   11/11/2017 0.26 (L) 0.3 - 1.2 mg/dL Final     Alkaline Phosphatase   Date Value Ref Range Status   07/12/2020 85 35 - 104 U/L Final   01/09/2020 70 35 - 104 U/L Final   11/11/2017 86 35 - 104 U/L Final     Amylase: No results found for: AMYLASE  Lipase:   Lipase   Date Value Ref Range Status   11/08/2017 18 13 - 60 U/L Final     Comment:     [facility] [address] [facility], [address] [place] [address] [phone]     CARDIAC ENZYMES:   Total CK   Date Value Ref Range Status   02/14/2013 115 26 - 140 U/L Final   02/14/2013 124 26 - 140 U/L Final   02/13/2013 148 (H) 26 - 140 U/L Final     CK-MB   Date Value Ref Range Status   02/14/2013 6.4 (H) 0 - 5 ng/mL Final   02/14/2013 9.8 (H) 0 - 5 ng/mL Final   02/13/2013 11.7 (H) 0 - 5 ng/mL Final     POC Troponin I   Date Value Ref Range Status   11/08/2017 0.01 0.00 - 0.10 ng/mL Final   11/08/2017 0.00 0.00 - 0.10 ng/mL Final   11/08/2017 0.01 0.00 - 0.10 ng/mL Final     BNP:   BNP   Date Value Ref Range Status   08/02/2013 6 <100 pg/mL Final     Comment:           100 pg/mL is a suggested decision/cutoff point for aid in the diagnosis of   congestive heart failure. Gender and age differences with BNP may exist.  Performed at [address], [address] [facility]   [phone]     Lipids:   Cholesterol   Date Value Ref Range Status   11/08/2017 265 (H) <200 mg/dL Final     Comment:        Cholesterol Guidelines:      <200  Desirable   200-240  Borderline      >240  Undesirable          HDL   Date Value Ref Range Status   11/08/2017 44 >40 mg/dL Final     Comment:        HDL Guidelines:    <40     Undesirable   40-59    Borderline    >59     Desirable            INR:   INR   Date Value Ref Range Status   03/02/2018 0.9  Final     Comment:           Therapeutic Range: Moderate Anticoagulant Intensity:     INR = 2.0-3.0   High Anticoagulant Intensity:     INR = 2.5-3.5        No clot found in specimen, results questionable. [facility] [id number] [place] [address] [phone]     08/02/2013 1.0  Final     Comment:           Non-therapeutic Range:     INR = 0.9-1.2  Therapeutic Range: Moderate Anticoagulant Intensity:     INR = 2.0-3.0   High Anticoagulant Intensity:     INR = 2.5-3.5        Performed at [address] [place] [address], [address] [facility]   [phone]   02/13/2013 1.0  Final     Comment:           Therapeutic Range: Moderate Anticoagulant Intensity:     INR = 2.0-3.0   High Anticoagulant Intensity:     INR = 2.5-3.5        [address] [phone]     Thyroid:   TSH   Date Value Ref Range Status   08/02/2013 0.43 0.35 - 5.50 mIU/L Final     Comment:     Performed at [address], [address] [facility]   [phone]     Urinalysis: Bacteria, UA   Date Value Ref Range Status   11/20/2019 NOT REPORTED None Final     Blood, UA POC   Date Value Ref Range Status   03/06/2019 large  Final     Clarity, UA   Date Value Ref Range Status   03/06/2019 cloudy  Final     Color, UA   Date Value Ref Range Status   11/20/2019 YELLOW YELLOW Final     pH, UA   Date Value Ref Range Status   11/20/2019 7.0 5.0 - 8.0 Final     Protein, UA   Date Value Ref Range Status   11/20/2019 NEGATIVE NEGATIVE Final     RBC, UA   Date Value Ref Range Status   11/20/2019 2 TO 5 0 - 4 /HPF Final     Comment:     Reference range defined for non-centrifuged specimen. Specific Gravity, UA   Date Value Ref Range Status   11/20/2019 1.004 (L) 1.005 - 1.030 Final     Bilirubin, UA   Date Value Ref Range Status   03/06/2019 negative  Final     Bilirubin Urine   Date Value Ref Range Status   11/20/2019 NEGATIVE NEGATIVE Final     Nitrite, Urine   Date Value Ref Range Status   11/20/2019 NEGATIVE NEGATIVE Final     WBC, UA   Date Value Ref Range Status   11/20/2019 5 TO 10 0 - 5 /HPF Final     Leukocyte Esterase, Urine   Date Value Ref Range Status   11/20/2019 SMALL (A) NEGATIVE Final     Glucose, Ur   Date Value Ref Range Status   11/20/2019 NEGATIVE NEGATIVE Final     Cultures:-  -----------------------------------------------------------------    ABGs: No results found for: PHART, PO2ART, LBC0PLQ    Pulmonary Functions Testing Results:    No results found for: FEV1, FVC, VHG3QCS, TLC, DLCO    CXR  08/26/2020  Mild hyperinflation no infiltrate no consolidation      CT Scans  CT chest PE protocol.  07/12/2020. No pulmonary embolism seen. There is a area in right middle lobe opacity/groundglass infiltrate/atelectasis    ECHO:     Assessment and Plan       ICD-10-CM    1. Chronic obstructive pulmonary disease, unspecified COPD type ([facility]  J44.9    2. History of smoking at least 1 pack per day for at least 30 years  Z87.891    3.  Moderate persistent asthma without complication J45.40    4. Right middle lobe pulmonary infiltrate  R91.8 CT CHEST WO CONTRAST   5. Chest pain, unspecified type  R07.9        Assessment:    She has long history of COPD unfortunately she continues smoke, she had likely underlying asthmatic bronchitis and then she most likely have some fixed obstruction over asthmatic bronchitis because of his smoking, she does have chronic dyspnea but for last 1 month her dyspnea has been worse without any signs of exacerbation or change in the sputum or cough she also complained of for last 2 weeks chest discomfort which is nonspecific because of her history of smoking she is a risk for coronary artery disease and I would recommend ischemia work-up/cardiology evaluation as she does have COPD but worsening in last 1 month is concerning. Her pulmonary function test in 2018 actually shows almost normal FEV1 did show hyperinflation and mild reduction diffusion capacity but her symptoms have been getting worse for years, she will need ICS/LABA combination along with LAMA and will see the response. Once her symptoms gradually getting better on Symbicort we will get pulmonary function test.  CT chest in July 2020 shows an area of hazy groundglass opacity/infiltrate in right middle lobe and she need follow-up and will order CT scan tests she had CT scan done 3 months ago we will get a CT chest without contrast for follow-up. I have discussed with her that if CT scan of the chest shows resolution of right middle lobe infiltrate she will need yearly screening CT scan. Plan and recommendation:    Symbicort 160/4.52 puff twice daily. Technique of using inhaler discussed with patient  Advised to rinse her mouth after using Symbicort. Continue with Spiriva Respimat 2 puff once daily and advised to use 2 puffs once daily not 2 puff twice daily which she is doing at this time. Advised patient to stop Atrovent while she is using Spiriva.   Albuterol aerosol or inhaler to use every 4-6 hours as needed only. Recommend cardiology evaluation. Refills provided for all medications    Vaccinations recommended and flu vaccine today  Up to date with vaccinations from pulm perspective   Maintain an active lifestyle   Smoking cessation discussed with her today and counseling provided  We will get pulmonary function test when she is evaluated next visit  CXR reviewed  CT chest reviewed    Follow-up in office in 6 weeks. Questions answered pertaining to diagnosis and management explained importance of compliance with therapy       It was my pleasure to evaluate Danny Patricio today. Please call with questions. Yue Ha MD             10/16/2020, 2:41 PM    Please note that this chart was generated using voice recognition Dragon dictation software. Although every effort was made to ensure the accuracy of this automated transcription, some errors in transcription may have occurred.

## 2020-10-21 ENCOUNTER — HOSPITAL ENCOUNTER (OUTPATIENT)
Dept: CT IMAGING | Age: 55
Discharge: HOME | End: 2020-10-23
Payer: MEDICARE

## 2020-10-21 ENCOUNTER — HOSPITAL ENCOUNTER (OUTPATIENT)
Age: 55
Discharge: HOME | End: 2020-10-21
Payer: MEDICARE

## 2020-10-21 LAB — KEPPRA: 7 UG/ML

## 2020-10-21 PROCEDURE — 71250 CT THORAX DX C-: CPT

## 2020-10-21 PROCEDURE — 80177 DRUG SCRN QUAN LEVETIRACETAM: CPT

## 2020-10-23 ENCOUNTER — APPOINTMENT (OUTPATIENT)
Dept: GENERAL RADIOLOGY | Age: 55
End: 2020-10-23
Payer: MEDICARE

## 2020-10-23 ENCOUNTER — HOSPITAL ENCOUNTER (EMERGENCY)
Age: 55
Discharge: HOME | End: 2020-10-23
Attending: EMERGENCY MEDICINE
Payer: MEDICARE

## 2020-10-23 VITALS
HEART RATE: 120 BPM | WEIGHT: 155 LBS | DIASTOLIC BLOOD PRESSURE: 95 MMHG | BODY MASS INDEX: 24.28 KG/M2 | OXYGEN SATURATION: 93 % | SYSTOLIC BLOOD PRESSURE: 148 MMHG | TEMPERATURE: 97.9 F | RESPIRATION RATE: 26 BRPM

## 2020-10-23 LAB
ABSOLUTE EOS #: 0.9 K/UL (ref 0–0.44)
ABSOLUTE IMMATURE GRANULOCYTE: <0.03 K/UL (ref 0–0.3)
ABSOLUTE LYMPH #: 2.96 K/UL (ref 1.1–3.7)
ABSOLUTE MONO #: 0.55 K/UL (ref 0.1–1.2)
ANION GAP SERPL CALCULATED.3IONS-SCNC: 12 MMOL/L (ref 9–17)
BASOPHILS # BLD: 1 % (ref 0–2)
BASOPHILS ABSOLUTE: 0.09 K/UL (ref 0–0.2)
BUN BLDV-MCNC: 9 MG/DL (ref 6–20)
BUN/CREAT BLD: ABNORMAL (ref 9–20)
CALCIUM SERPL-MCNC: 9.6 MG/DL (ref 8.6–10.4)
CHLORIDE BLD-SCNC: 104 MMOL/L (ref 98–107)
CO2: 25 MMOL/L (ref 20–31)
CREAT SERPL-MCNC: 0.59 MG/DL (ref 0.5–0.9)
DIFFERENTIAL TYPE: ABNORMAL
EOSINOPHILS RELATIVE PERCENT: 8 % (ref 1–4)
GFR AFRICAN AMERICAN: >60 ML/MIN
GFR NON-AFRICAN AMERICAN: >60 ML/MIN
GFR SERPL CREATININE-BSD FRML MDRD: ABNORMAL ML/MIN/{1.73_M2}
GFR SERPL CREATININE-BSD FRML MDRD: ABNORMAL ML/MIN/{1.73_M2}
GLUCOSE BLD-MCNC: 123 MG/DL (ref 70–99)
HCT VFR BLD CALC: 45.4 % (ref 36.3–47.1)
HEMOGLOBIN: 15 G/DL (ref 11.9–15.1)
IMMATURE GRANULOCYTES: 0 %
LYMPHOCYTES # BLD: 25 % (ref 24–43)
MCH RBC QN AUTO: 32.1 PG (ref 25.2–33.5)
MCHC RBC AUTO-ENTMCNC: 33 G/DL (ref 28.4–34.8)
MCV RBC AUTO: 97 FL (ref 82.6–102.9)
MONOCYTES # BLD: 5 % (ref 3–12)
NRBC AUTOMATED: 0 PER 100 WBC
PDW BLD-RTO: 13.3 % (ref 11.8–14.4)
PLATELET # BLD: 275 K/UL (ref 138–453)
PLATELET ESTIMATE: ABNORMAL
PMV BLD AUTO: 8.5 FL (ref 8.1–13.5)
POTASSIUM SERPL-SCNC: 4.4 MMOL/L (ref 3.7–5.3)
RBC # BLD: 4.68 M/UL (ref 3.95–5.11)
RBC # BLD: ABNORMAL 10*6/UL
SARS-COV-2, RAPID: NOT DETECTED
SARS-COV-2: NORMAL
SARS-COV-2: NORMAL
SEG NEUTROPHILS: 61 % (ref 36–65)
SEGMENTED NEUTROPHILS ABSOLUTE COUNT: 7.12 K/UL (ref 1.5–8.1)
SODIUM BLD-SCNC: 141 MMOL/L (ref 135–144)
SOURCE: NORMAL
TROPONIN INTERP: NORMAL
TROPONIN T: NORMAL NG/ML
TROPONIN, HIGH SENSITIVITY: <6 NG/L (ref 0–14)
WBC # BLD: 11.6 K/UL (ref 3.5–11.3)
WBC # BLD: ABNORMAL 10*3/UL

## 2020-10-23 PROCEDURE — U0002 COVID-19 LAB TEST NON-CDC: HCPCS

## 2020-10-23 PROCEDURE — 71045 X-RAY EXAM CHEST 1 VIEW: CPT

## 2020-10-23 PROCEDURE — 80048 BASIC METABOLIC PNL TOTAL CA: CPT

## 2020-10-23 PROCEDURE — 6370000000 HC RX 637 (ALT 250 FOR IP): Performed by: STUDENT IN AN ORGANIZED HEALTH CARE EDUCATION/TRAINING PROGRAM

## 2020-10-23 PROCEDURE — 84484 ASSAY OF TROPONIN QUANT: CPT

## 2020-10-23 PROCEDURE — 93005 ELECTROCARDIOGRAM TRACING: CPT | Performed by: STUDENT IN AN ORGANIZED HEALTH CARE EDUCATION/TRAINING PROGRAM

## 2020-10-23 PROCEDURE — 85025 COMPLETE CBC W/AUTO DIFF WBC: CPT

## 2020-10-23 PROCEDURE — 6360000002 HC RX W HCPCS: Performed by: STUDENT IN AN ORGANIZED HEALTH CARE EDUCATION/TRAINING PROGRAM

## 2020-10-23 PROCEDURE — 99285 EMERGENCY DEPT VISIT HI MDM: CPT

## 2020-10-23 PROCEDURE — 94640 AIRWAY INHALATION TREATMENT: CPT

## 2020-10-23 RX ORDER — ALBUTEROL SULFATE 2.5 MG/3ML
5 SOLUTION RESPIRATORY (INHALATION) EVERY 6 HOURS PRN
Status: DISCONTINUED | OUTPATIENT
Start: 2020-10-23 | End: 2020-10-23 | Stop reason: HOSPADM

## 2020-10-23 RX ORDER — PREDNISONE 20 MG/1
60 TABLET ORAL ONCE
Status: COMPLETED | OUTPATIENT
Start: 2020-10-23 | End: 2020-10-23

## 2020-10-23 RX ORDER — PREDNISONE 10 MG/1
TABLET ORAL
Qty: 20 TABLET | Refills: 0 | Status: SHIPPED | OUTPATIENT
Start: 2020-10-23 | End: 2020-11-02

## 2020-10-23 RX ADMIN — PREDNISONE 60 MG: 20 TABLET ORAL at 08:56

## 2020-10-23 RX ADMIN — ALBUTEROL SULFATE 5 MG: 2.5 SOLUTION RESPIRATORY (INHALATION) at 08:46

## 2020-10-23 ASSESSMENT — ENCOUNTER SYMPTOMS
RHINORRHEA: 0
SORE THROAT: 1
ABDOMINAL PAIN: 0
NAUSEA: 0
SHORTNESS OF BREATH: 1
WHEEZING: 1
VOMITING: 0

## 2020-10-23 NOTE — ED PROVIDER NOTES
[facility] ED  Emergency Department Encounter  EmergencyMedicine Resident     Pt Laurita Mckeon  MRN: [id number]  [place] 1965  Date of evaluation: 10/23/20  PCP:  THOR Gross [id number]       Chief Complaint   Patient presents with    Shortness of Breath       HISTORY OF PRESENT ILLNESS  (Location/Symptom, Timing/Onset, Context/Setting, Quality, Duration, Modifying Factors, Severity.)      Wellington Camara is a 54 y.o. female who presents with shortness of breath and wheezing patient states that it began and has been progressively worsening over the last evening patient states that she has been using her nebulizer overnight with minimal improvement last used approximately 30 minutes ago patient states that she has had any fevers but has had some pharyngitis no rhinorrhea no headache she does endorse some chest pain as well denies any abdominal pain or nausea or vomiting patient states that she has had a loss of taste over this time period as well no loss of smell. Patient is a daily smoker does not use home oxygen. PAST MEDICAL / SURGICAL / SOCIAL / FAMILY HISTORY      has a past medical history of Acute MI ([address] Anxiety, Chronic back pain, COPD (chronic obstructive pulmonary disease) ([address] Depression, Heart disease, Hyperlipidemia, Hypertension, and MRSA (methicillin resistant staph aureus) culture positive. has a past surgical history that includes Tubal ligation; Tonsillectomy; Cholecystectomy, laparoscopic (11/10/2017); Cholecystectomy, laparoscopic (N/A, 11/10/2017); and back surgery.       Social History     Socioeconomic History    Marital status:      Spouse name: Not on file    Number of children: Not on file    Years of education: Not on file    Highest education level: Not on file   Occupational History    Not on file   Social Needs    Financial resource strain: Not hard at all    Food insecurity     Worry: Never true Inability: Never true    Transportation needs     Medical: Not on file     Non-medical: Not on file   Tobacco Use    Smoking status: Current Some Day Smoker     Packs/day: 1.00     Types: Cigarettes    Smokeless tobacco: Never Used    Tobacco comment: Attempting to quit with patch now 10/16/20 currently smoking no longer using patch   Substance and Sexual Activity    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    Drug use: Yes     Types: Marijuana     Comment: daily - \"a couple bowls\"    Sexual activity: Not on file   Lifestyle    Physical activity     Days per week: Not on file     Minutes per session: Not on file    Stress: Not on file   Relationships    Social connections     Talks on phone: Not on file     Gets together: Not on file     Attends Jehovah's witness service: Not on file     Active member of club or organization: Not on file     Attends meetings of clubs or organizations: Not on file     Relationship status: Not on file    Intimate partner violence     Fear of current or ex partner: Not on file     Emotionally abused: Not on file     Physically abused: Not on file     Forced sexual activity: Not on file   Other Topics Concern    Not on file   Social History Narrative    Not on file       Family History   Problem Relation Age of Onset    Other Mother     Heart Disease Father     High Blood Pressure Father     High Cholesterol Father     Other Brother        Allergies:  Sulfa antibiotics    Home Medications:  Prior to Admission medications    Medication Sig Start Date End Date Taking?  Authorizing Provider   baclofen (LIORESAL) 10 MG tablet  10/15/20   Historical Provider, MD   budesonide-formoterol (SYMBICORT) 160-4.5 MCG/ACT AERO Inhale 2 puffs into the lungs 2 times daily 10/16/20   Bree Tolliver MD   tiotropium (SPIRIVA RESPIMAT) 2.5 MCG/ACT AERS inhaler Inhale 2 puffs into the lungs daily 10/16/20   Bree Tolliver MD   amitriptyline (ELAVIL) 25 MG tablet TAKE TWO TABLETS BY MOUTH ONCE NIGHTLY 10/11/20   THOR Stephens CNP   levETIRAcetam (KEPPRA) 500 MG tablet TAKE ONE TABLET BY MOUTH TWICE A DAY 10/11/20   THOR Stephens CNP   escitalopram (LEXAPRO) 10 MG tablet Take 1.5 tablets by mouth daily 9/30/20   THOR Andrews NP   albuterol sulfate HFA (PROAIR HFA) 108 (90 Base) MCG/ACT inhaler Inhale 2 puffs into the lungs every 6 hours as needed for Wheezing 9/10/20   THOR Spain CNP   Nebulizers ([address]) MISC Daily as needed 9/4/20   THOR Spain CNP   Respiratory Therapy Supplies (NEBULIZER/TUBING/MOUTHPIECE) KIT 1 kit by Does not apply route daily as needed (SOB or wheezing) 9/4/20   THOR Spain CNP   albuterol (PROVENTIL) (2.5 MG/3ML) 0.083% nebulizer solution Take 3 mLs by nebulization every 6 hours as needed for Wheezing 8/26/20   THOR Spain CNP   atorvastatin (LIPITOR) 40 MG tablet Take 1 tablet by mouth daily 8/7/20 11/5/20  THOR Spain CNP   acetaminophen (TYLENOL) 325 MG tablet Take 1 tablet by mouth every 6 hours as needed for Pain 6/15/20   Sina Tripathi DO   ibuprofen (IBU) 400 MG tablet Take 1 tablet by mouth every 6 hours as needed for Pain 6/15/20   Sina Tripathi DO   [address] 2.5-2.5 MCG/ACT AERS INHALE TWO INHALATION(S) BY MOUTH DAILY  Patient not taking: Reported on 10/16/2020 5/12/20   THOR [place] CNP   Handicap Placard MISC by Does not apply route Exp 1/2024 1/20/20   THOR [place] CNP   ondansetron (ZOFRAN ODT) 4 MG disintegrating tablet Take 1 tablet by mouth every 8 hours as needed for Nausea  Patient not taking: Reported on 10/16/2020 1/9/20   Alissa Banks MD   Cholecalciferol (VITAMIN D3) 400 units CAPS Take 1 tablet by mouth daily    Historical Provider, MD   aspirin EC 81 MG EC tablet Take 1 tablet by mouth daily 3/6/19   Shayna Tom APRN - CNP   magnesium gluconate (MAGONATE) 500 MG tablet Take 400 mg by mouth every evening Historical Provider, MD   vitamin B-6 (PYRIDOXINE) 100 MG tablet Take 100 mg by mouth daily    Historical Provider, MD   nitroGLYCERIN (NITROSTAT) 0.4 MG SL tablet Place 1 tablet under the tongue every 5 minutes as needed for Chest pain up to max of 3 total doses. If no relief after 1 dose, call 911. Patient not taking: Reported on 10/16/2020 1/30/18   Miguel Tavares MD   Multiple Vitamins-Minerals (THERAPEUTIC MULTIVITAMIN-MINERALS) tablet Take 1 tablet by mouth daily. Historical Provider, MD   vitamin B-12 (CYANOCOBALAMIN) 1000 MCG tablet Take 1,000 mcg by mouth daily. Historical Provider, MD       REVIEW OF SYSTEMS    (2-9 systems for level 4, 10 or more for level 5)      Review of Systems   Constitutional: Positive for activity change, appetite change and fatigue. Negative for fever. HENT: Positive for sore throat. Negative for rhinorrhea. Respiratory: Positive for shortness of breath and wheezing. Cardiovascular: Positive for chest pain. Gastrointestinal: Negative for abdominal pain, nausea and vomiting. Genitourinary: Negative for difficulty urinating. Musculoskeletal: Negative for gait problem. Skin: Negative for pallor. Neurological: Negative for headaches. Psychiatric/Behavioral: Negative for agitation. PHYSICAL EXAM   (up to 7 for level 4, 8 or more for level 5)      INITIAL VITALS:   BP (!) 148/95   Pulse 120   Temp 97.9 °F (36.6 °C) (Oral)   Resp 26   Wt 155 lb (70.3 kg)   SpO2 93%   BMI 24.28 kg/m²     Physical Exam  Vitals signs and nursing note reviewed. Constitutional:       Appearance: She is ill-appearing. She is not toxic-appearing or diaphoretic. HENT:      Head: Normocephalic and atraumatic. Right Ear: External ear normal.      Left Ear: External ear normal.      Nose: Nose normal.      Mouth/Throat:      Mouth: Mucous membranes are moist.   Eyes:      General:         Right eye: No discharge. Left eye: No discharge.       Extraocular Monocytes 5 3 - 12 %    Eosinophils % 8 (H) 1 - 4 %    Basophils 1 0 - 2 %    Immature Granulocytes 0 0 %    Segs Absolute 7.12 1.50 - 8.10 k/uL    Absolute Lymph # 2.96 1.10 - 3.70 k/uL    Absolute Mono # 0.55 0.10 - 1.20 k/uL    Absolute Eos # 0.90 (H) 0.00 - 0.44 k/uL    Basophils Absolute 0.09 0.00 - 0.20 k/uL    Absolute Immature Granulocyte <0.03 0.00 - 0.30 k/uL    WBC Morphology NOT REPORTED     RBC Morphology NOT REPORTED     Platelet Estimate NOT REPORTED    BASIC METABOLIC PANEL   Result Value Ref Range    Glucose 123 (H) 70 - 99 mg/dL    BUN 9 6 - 20 mg/dL    CREATININE 0.59 0.50 - 0.90 mg/dL    Bun/Cre Ratio NOT REPORTED 9 - 20    Calcium 9.6 8.6 - 10.4 mg/dL    Sodium 141 135 - 144 mmol/L    Potassium 4.4 3.7 - 5.3 mmol/L    Chloride 104 98 - 107 mmol/L    CO2 25 20 - 31 mmol/L    Anion Gap 12 9 - 17 mmol/L    GFR Non-African American >60 >60 mL/min    GFR African American >60 >60 mL/min    GFR Comment          GFR Staging NOT REPORTED    Troponin   Result Value Ref Range    Troponin, High Sensitivity <6 0 - 14 ng/L    Troponin T NOT REPORTED <0.03 ng/mL    Troponin Interp NOT REPORTED    COVID-19    Specimen: Other   Result Value Ref Range    SARS-CoV-2          SARS-CoV-2, Rapid Not Detected Not Detected    Source . NASOPHARYNGEAL SWAB     SARS-CoV-2             IMPRESSION: 26-year-old female with acute difficulty breathing she is speaking with labored breathing using mild accessory muscles some tripoding. Also complaining of chest pain shortness of breath has been unrelieved by home nebulizers patient is a daily smoker has been afebrile does have any loss of taste concerning for possible Covid 19 infection. Plan will be to obtain labs troponin EKG chest x-ray will provide steroids 60 mg p.o. prednisone and reevaluate RT ED protocol initiated.     RADIOLOGY:  Ct Chest Wo Contrast    Result Date: 10/21/2020  EXAMINATION: CT OF THE CHEST WITHOUT CONTRAST 10/21/2020 3:37 pm TECHNIQUE: CT of the chest was performed without the administration of intravenous contrast. Multiplanar reformatted images are provided for review. Dose modulation, iterative reconstruction, and/or weight based adjustment of the mA/kV was utilized to reduce the radiation dose to as low as reasonably achievable. COMPARISON: CT chest dated 07/12/2020 and 09/30/2016 and 11/08/2017 HISTORY: ORDERING SYSTEM PROVIDED HISTORY: Right middle lobe pulmonary infiltrate TECHNOLOGIST PROVIDED HISTORY: pulmonary infiltrate Is the patient pregnant?->No Reason for Exam: pulmonary infiltrate right middle lobe pulmonary infiltrate FINDINGS: Mediastinum: Heart is normal in size. There is no pericardial effusion. Thoracic aorta and pulmonary arteries are normal in caliber. There is mild to moderate coronary artery calcification. No mediastinal or hilar lymphadenopathy. No axillary lymphadenopathy. Lungs/pleura: There is a 0.5 cm nodule in the right upper lobe, image 25, which is unchanged. There is interval resolution of a tiny ground-glass opacity seen in the inferior right upper lobe/superior right middle lobe. There is minimal patchy ground-glass opacity in the dependent lung bases bilaterally, likely related to atelectasis. No airspace consolidation, pneumothorax, or pleural effusion. Tracheobronchial tree is patent. Upper Abdomen: Gallbladder is surgically absent. Limited visualized upper abdominal structures are otherwise unremarkable. Soft Tissues/Bones: There is no acute or suspicious osseous abnormality. Visualized superficial soft tissues are within normal limits. 1.  No acute intrathoracic process. Tiny ground-glass density previously seen in the right middle lobe has resolved. Minimal ground-glass density in the dependent lung bases is likely related to atelectasis. 2.  Unchanged 0.5 cm nodule in the right upper lobe, stable since at least 11/08/2017 and presumed benign.      Xr Chest Portable    Result Date: 10/23/2020  EXAMINATION: ONE XRAY VIEW OF THE CHEST 10/23/2020 9:14 am COMPARISON: 08/26/2020 HISTORY: Reason for Exam: sob FINDINGS: The lungs are without acute focal process. No effusion or pneumothorax. The cardiomediastinal silhouette is normal.  The osseous structures are intact without acute process. Lumbar spine fusion hardware. Negative portable chest.       EKG  Gilles tachycardia at the rate of 117 normal axis normal intervals  no acute ST-T wave changes normal R wave progression appropriate precordial T wave balance    All EKG's are interpreted by the Emergency Department Physician who either signs or Co-signs this chart in the absence of a cardiologist.    EMERGENCY DEPARTMENT COURSE:  ED Course as of Oct 23 0956   Fri Oct 23, 2020   0841 Seen and evaluated upon arrival. HR 90's. When patient arriving to the room 4 L nasal cannula applied due to difficulty breathing. With improvement in patient's symptoms satting 94% on 4 L.    [BG]   0921 X ray reviewed similar to prior awaiting radiology interpretation    [BG]   0941 Resulted labs reviewed awaiting covid testing     [BG]   8423 All quesitons address including followup and return precautions    [BG]      ED Course User Index  [BG] Lafondemerson Bio, DO         PROCEDURES:  none    CONSULTS:  None    CRITICAL CARE:  Please see attending note    FINAL IMPRESSION      1. Shortness of breath    2. COPD exacerbation ([address]          DISPOSITION / PLAN     DISPOSITION  dc home with pcp, pulm and card followup vss at time of dc she is speaking in full sentences RR 18. Mildly tachy to low 100's after albuterol.        PATIENT REFERRED TO:  Simran Muhammad, APRN - CNP  [address]  [phone]    Call today      [facility]  [address] [place] [address]  [phone]  Go to   As needed, If symptoms worsen      DISCHARGE MEDICATIONS:  New Prescriptions    PREDNISONE (DELTASONE) 10 MG TABLET    Take 4 tablets by mouth once daily for 5 days       Fuentes Morales DO  Emergency Medicine Resident    (Please note that portions of thisnote were completed with a voice recognition program.  Efforts were made to edit the dictations but occasionally words are mis-transcribed.)       Fuentes Morales DO  Resident  10/23/20 [facility],   Resident  10/23/20 1111

## 2020-10-23 NOTE — ED NOTES
Pt presented with SOB and high RR. Oxygen is being given via NC at 2 L. Pt denies any pain. After oxygen and sitting, patient is slightly more comfortable.  Will continue to monitor      Rosamaria Tejada  10/23/20 [id number]

## 2020-10-23 NOTE — ED PROVIDER NOTES
[address]  ED  eMERGENCY dEPARTMENT eNCOUnter   Attending Attestation     Pt Name: Herber Morrison  MRN: [id number]  [place] 1965  Date of evaluation: 10/23/20       Herber Morrison is a 54 y.o. female who presents with Shortness of Breath      History: Pt presents with Shortness of breath. Pt has COPD. Pt has no other complaints. Pt follows with pulmonary. Pt states she is supposed to  maintenance inhalers from the pharmacy. Pt has just been using albuterol and she states she has been using it more frequently. Exam: HR mildly elevated at 105 while I was in the room. Pt has mild wheeze over left lung after treatments. Abdomen is soft and non tender. Pt is in no distress. Pt is speaking in full sentences. Pt much improved from arrival. Pt states she feels improved. Will  her meds from pharmacy. Will follow with Pulmonology. Will start on steroid. HR came down significantly and pt not requiring oxygen. Plan for discharge . Pt agrees and will return if worse. I performed a history and physical examination of the patient and discussed management with the resident. I reviewed the residents note and agree with the documented findings and plan of care. Any areas of disagreement are noted on the chart. I was personally present for the key portions of any procedures. I have documented in the chart those procedures where I was not present during the key portions. I have personally reviewed all images and agree with the resident's interpretation. I have reviewed the emergency nurses triage note. I agree with the chief complaint, past medical history, past surgical history, allergies, medications, social and family history as documented unless otherwise noted below. Documentation of the HPI, Physical Exam and Medical Decision Making performed by medical students or scribes is based on my personal performance of the HPI, PE and MDM.  For Phys Assistant/ Nurse Practitioner

## 2020-10-24 LAB
EKG ATRIAL RATE: 117 BPM
EKG P AXIS: 50 DEGREES
EKG P-R INTERVAL: 122 MS
EKG Q-T INTERVAL: 328 MS
EKG QRS DURATION: 86 MS
EKG QTC CALCULATION (BAZETT): 457 MS
EKG R AXIS: 62 DEGREES
EKG T AXIS: 57 DEGREES
EKG VENTRICULAR RATE: 117 BPM

## 2020-10-24 PROCEDURE — 93010 ELECTROCARDIOGRAM REPORT: CPT | Performed by: INTERNAL MEDICINE

## 2020-11-09 RX ORDER — TIOTROPIUM BROMIDE AND OLODATEROL 3.124; 2.736 UG/1; UG/1
SPRAY, METERED RESPIRATORY (INHALATION)
Qty: 3 INHALER | Refills: 0 | Status: ON HOLD
Start: 2020-11-09 | End: 2021-06-15 | Stop reason: HOSPADM

## 2020-11-09 NOTE — TELEPHONE ENCOUNTER
Health Maintenance   Topic Date Due    Hepatitis C screen  1965    HIV screen  08/08/1980    Shingles Vaccine (1 of 2) 08/08/2015    Colon Cancer Screen FIT/FOBT  02/20/2019    Annual Wellness Visit (AWV)  01/09/2020    Breast cancer screen  02/06/2020    Cervical cancer screen  02/28/2021 (Originally 8/8/1986)    Lipid screen  11/08/2022    DTaP/Tdap/Td vaccine (2 - Td) 09/20/2027    Flu vaccine  Completed    Pneumococcal 0-64 years Vaccine  Completed    Hepatitis A vaccine  Aged Out    Hepatitis B vaccine  Aged Out    Hib vaccine  Aged Out    Meningococcal (ACWY) vaccine  Aged Out             (applicable per patient's age: Cancer Screenings, Depression Screening, Fall Risk Screening, Immunizations)    Hemoglobin A1C (%)   Date Value   01/30/2018 5.3   08/02/2013 5.3   02/14/2013 6.0     LDL Cholesterol (mg/dL)   Date Value   11/08/2017 198 (H)     AST (U/L)   Date Value   07/12/2020 18     ALT (U/L)   Date Value   07/12/2020 16     BUN (mg/dL)   Date Value   10/23/2020 9      (goal A1C is < 7)   (goal LDL is <100) need 30-50% reduction from baseline     BP Readings from Last 3 Encounters:   10/23/20 (!) 148/95   10/16/20 (!) 133/97   08/26/20 (!) 146/99    (goal /80)      All Future Testing planned in [facility]:  Lab Frequency Next Occurrence   CONCETTA DIGITAL SCREEN W OR WO CAD BILATERAL Once 05/15/2020   EEG Once 04/30/2020       Next Visit Date:  Future Appointments   Date Time Provider Kaycee Hays   11/13/2020 11:30 AM THOR Wilcox CNP [facility]   12/2/2020  9:20 AM THOR Leyva CNP Neuro Spec [facility]   12/2/2020  3:00 PM Sil Saleem MD Resp Spec [facility]            Patient Active Problem List:     Heart disease     Chronic back pain     Depression     Hyperlipidemia     CAD, multiple vessel     Asthma     Smoking     Need for vaccination     Syncope     Leg pain     Left hip pain     Chronic cholecystitis     Chest pain     Calculus of gallbladder without cholecystitis without obstruction     History of lumbar fusion     Failed back syndrome     Marijuana use     Abnormal weight loss     Allergic rhinitis     Anxiety state     Chronic midline low back pain with sciatica     Moderate episode of recurrent major depressive disorder (HCC)     Seizure (HCC)     Chronic tension-type headache, intractable

## 2020-11-11 RX ORDER — AMITRIPTYLINE HYDROCHLORIDE 25 MG/1
TABLET, FILM COATED ORAL
Qty: 60 TABLET | Refills: 0 | Status: SHIPPED | OUTPATIENT
Start: 2020-11-11 | End: 2020-12-02 | Stop reason: SDUPTHER

## 2020-11-11 NOTE — TELEPHONE ENCOUNTER
Pharmacy requesting refill of Amitriptyline.       Medication active on med list yes      Date of last fill: 10/11/20 for #60 NR  verified on 11/11/2020    verified by Luna Greer LPN      Date of last appointment 4/16/2020    Next Visit Date:  12/2/2020

## 2020-11-24 RX ORDER — ALBUTEROL SULFATE 2.5 MG/3ML
SOLUTION RESPIRATORY (INHALATION)
Qty: 120 EACH | Refills: 2 | Status: SHIPPED | OUTPATIENT
Start: 2020-11-24 | End: 2021-04-12 | Stop reason: SDUPTHER

## 2020-11-24 RX ORDER — ALBUTEROL SULFATE 2.5 MG/3ML
SOLUTION RESPIRATORY (INHALATION)
Qty: 120 EACH | Refills: 2 | OUTPATIENT
Start: 2020-11-24

## 2020-11-24 NOTE — TELEPHONE ENCOUNTER
Health Maintenance   Topic Date Due    Hepatitis C screen  1965    HIV screen  08/08/1980    Shingles Vaccine (1 of 2) 08/08/2015    Colon Cancer Screen FIT/FOBT  02/20/2019    Annual Wellness Visit (AWV)  01/09/2020    Breast cancer screen  02/06/2020    Cervical cancer screen  02/28/2021 (Originally 8/8/1986)    Lipid screen  11/08/2022    DTaP/Tdap/Td vaccine (2 - Td) 09/20/2027    Flu vaccine  Completed    Pneumococcal 0-64 years Vaccine  Completed    Hepatitis A vaccine  Aged Out    Hepatitis B vaccine  Aged Out    Hib vaccine  Aged Out    Meningococcal (ACWY) vaccine  Aged Out             (applicable per patient's age: Cancer Screenings, Depression Screening, Fall Risk Screening, Immunizations)    Hemoglobin A1C (%)   Date Value   01/30/2018 5.3   08/02/2013 5.3   02/14/2013 6.0     LDL Cholesterol (mg/dL)   Date Value   11/08/2017 198 (H)     AST (U/L)   Date Value   07/12/2020 18     ALT (U/L)   Date Value   07/12/2020 16     BUN (mg/dL)   Date Value   10/23/2020 9      (goal A1C is < 7)   (goal LDL is <100) need 30-50% reduction from baseline     BP Readings from Last 3 Encounters:   10/23/20 (!) 148/95   10/16/20 (!) 133/97   08/26/20 (!) 146/99    (goal /80)      All Future Testing planned in [facility]:  Lab Frequency Next Occurrence   CONCETTA DIGITAL SCREEN W OR WO CAD BILATERAL Once 05/15/2020   EEG Once 04/30/2020       Next Visit Date:  Future Appointments   Date Time Provider Kaycee Hays   12/2/2020  9:20 AM [place] Pod, APRN - CNP Neuro Spec [id number]   12/2/2020  3:00 PM Julio Poole MD Resp Spec [facility]            Patient Active Problem List:     Heart disease     Chronic back pain     Depression     Hyperlipidemia     CAD, multiple vessel     Asthma     Smoking     Need for vaccination     Syncope     Leg pain     Left hip pain     Chronic cholecystitis     Chest pain     Calculus of gallbladder without cholecystitis without obstruction     History of lumbar fusion     Failed back syndrome     Marijuana use     Abnormal weight loss     Allergic rhinitis     Anxiety state     Chronic midline low back pain with sciatica     Moderate episode of recurrent major depressive disorder (HCC)     Seizure (HCC)     Chronic tension-type headache, intractable

## 2020-12-02 ENCOUNTER — TELEMEDICINE (OUTPATIENT)
Dept: NEUROLOGY | Age: 55
End: 2020-12-02
Payer: MEDICARE

## 2020-12-02 PROCEDURE — 3017F COLORECTAL CA SCREEN DOC REV: CPT | Performed by: NURSE PRACTITIONER

## 2020-12-02 PROCEDURE — G8427 DOCREV CUR MEDS BY ELIG CLIN: HCPCS | Performed by: NURSE PRACTITIONER

## 2020-12-02 PROCEDURE — 99214 OFFICE O/P EST MOD 30 MIN: CPT | Performed by: NURSE PRACTITIONER

## 2020-12-02 RX ORDER — LEVETIRACETAM 750 MG/1
750 TABLET ORAL 2 TIMES DAILY
Qty: 60 TABLET | Refills: 3 | Status: SHIPPED | OUTPATIENT
Start: 2020-12-02 | End: 2021-03-30

## 2020-12-02 RX ORDER — AMITRIPTYLINE HYDROCHLORIDE 25 MG/1
75 TABLET, FILM COATED ORAL NIGHTLY
Qty: 90 TABLET | Refills: 5 | Status: SHIPPED | OUTPATIENT
Start: 2020-12-02 | End: 2022-01-20 | Stop reason: ALTCHOICE

## 2020-12-02 NOTE — PROGRESS NOTES
[facility]  Offices: [address], [place], [address] [facility]  [address] [facility], [address] [place], [place], [address]  Phone: [phone]  Fax: [phone]    MD Alize Del Valle MD Ahmed B. Lehman Sox, MD Cherry Bari, MD Hilma Manson, MD Judene Blade, RAMONITA    TELEHEALTH VISIT        12/2/2020      HISTORY OF PRESENT ILLNESS:       I had the pleasure of seeing Gilbert Singh, who is here for evaluation of a seizure disorder. Patient is a 54year old woman who was last seen on April 16, 2020. She has a history of coronary artery disease, hypertension, dyslipidemia, depression, chronic back pain status post lumbar fusion. The patient had her first known seizure in 2018. She was hospitalized at the [address] [place] [address] undergoing gallbladder surgery and had a generalized tonic-clonic seizure followed by 3 days of postictal confusion. The patient did not remember anything about her hospital stay but knew that she was discharged home on Keppra. She was not seen by a neurology following her visit. She has maintained on Keppra 500 mg but had only taken it once daily after her discharge from the hospital. On January 9, the patient had another generalized tonic-clonic seizure. She was at home with her boyfriend and he stated that she started smacking her lips. She started to feel nauseated, and had another witnessed generalized tonic-clonic seizure. She remembered nothing until she arrived at the hospital. She was discharged home on Keppra 500 mg daily and her levetiracetam level when she was in the emergency department was less than 2. She did not have another seizure prior to her last visit. She did admit that when she was younger in her 25s and 35s, she would have staring spells that her employer would send her home when she was having them.  She was never evaluated by neurology and was never diagnosed with seizures. t was thought to have been associated with a syncopal episode. An EEG was ordered at the last visit, but has not been completed by the patient.      The patient also complained severe chronic low back pain and had a previous lumbar fusion. She had been seen by pain management in the past. She had been taking gabapentin, but was no longer taking it. She also had taken baclofen in the past, but was also no longer taking that.     The patient was also suffering from chronic daily tension type headaches. The headaches were described as a pressure in her forehead and temple area with no associated photophobia, phonophobia, nausea, or vomiting. The headaches were typically a 6-8/10 in intensity. She had never taken anything to get rid of her headaches. Today, the patient reports via Telehealth. She reports that she has been seizure free since the last visit. She has only been taking Keppra once daily at bedtime and had a recent Levetiracetam level at 7. She has started driving again. She has been forgetting words and what she was doing and feels as if her mind is racing. She feels this may be due to the amitriptyline or experiencing seizure activity. She also reports muscle weakness in her arms bilaterally and numbness in all of her fingers. She does have neck and back pain. She continues to have headaches often which will mainly be present when she wakes up. They are localized in her forehead. She does take 50 mg Amitriptyline daily. She feels this could be due to stress and is not concerned about them. She does sleep well and feels well rested in the morning, but it takes a long time for her to fall asleep.     Prior testing reviewed:    Levetiracetam 10/21/20 - 7    PAST MEDICAL HISTORY:         Diagnosis Date    Acute MI ([facility] [address]     Anxiety     Chronic back pain     COPD (chronic obstructive pulmonary disease) (HCC)     Depression     Heart disease     Hyperlipidemia     Hypertension     MRSA (methicillin resistant staph aureus) culture positive 09/05/2017    abscess        PAST SURGICAL HISTORY:         Procedure Laterality Date    BACK SURGERY      diskectomy 2000, fusion 2007, fusion 7/2017    CHOLECYSTECTOMY, LAPAROSCOPIC  11/10/2017    robotic assisted    CHOLECYSTECTOMY, LAPAROSCOPIC N/A 11/10/2017    XI ROBOTIC CHOLECYSTECTOMY LAPAROSCOPIC performed by Chandler Terrazas DO at [address]          SOCIAL HISTORY:     Social History     Socioeconomic History    Marital status:      Spouse name: Not on file    Number of children: Not on file    Years of education: Not on file    Highest education level: Not on file   Occupational History    Not on file   Social Needs    Financial resource strain: Not hard at all   [employer] insecurity     Worry: Never true     Inability: Never true   Revver needs     Medical: Not on file     Non-medical: Not on file   Tobacco Use    Smoking status: Current Some Day Smoker     Packs/day: 1.00     Types: Cigarettes    Smokeless tobacco: Never Used    Tobacco comment: Attempting to quit with patch now 10/16/20 currently smoking no longer using patch   Substance and Sexual Activity    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    Drug use: Yes     Types: Marijuana     Comment: daily - \"a couple bowls\"    Sexual activity: Not on file   Lifestyle    Physical activity     Days per week: Not on file     Minutes per session: Not on file    Stress: Not on file   Relationships    Social connections     Talks on phone: Not on file     Gets together: Not on file     Attends Yazidi service: Not on file     Active member of club or organization: Not on file     Attends meetings of clubs or organizations: Not on file     Relationship status: Not on file    Intimate partner violence     Fear of current or ex partner: Not on file     Emotionally abused: Not on file     Physically abused: Not on file     Forced Take 100 mg by mouth daily      Multiple Vitamins-Minerals (THERAPEUTIC MULTIVITAMIN-MINERALS) tablet Take 1 tablet by mouth daily.  vitamin B-12 (CYANOCOBALAMIN) 1000 MCG tablet Take 1,000 mcg by mouth daily.  STIOLTO RESPIMAT 2.5-2.5 MCG/ACT AERS INHALE TWO INHALATION(S) BY MOUTH DAILY (Patient not taking: Reported on 11/30/2020) 3 Inhaler 0    budesonide-formoterol (SYMBICORT) 160-4.5 MCG/ACT AERO Inhale 2 puffs into the lungs 2 times daily (Patient not taking: Reported on 11/30/2020) 1 Inhaler 11    ondansetron (ZOFRAN ODT) 4 MG disintegrating tablet Take 1 tablet by mouth every 8 hours as needed for Nausea (Patient not taking: Reported on 10/16/2020) 20 tablet 0    nitroGLYCERIN (NITROSTAT) 0.4 MG SL tablet Place 1 tablet under the tongue every 5 minutes as needed for Chest pain up to max of 3 total doses. If no relief after 1 dose, call 911. (Patient not taking: Reported on 10/16/2020) 25 tablet 3     No current facility-administered medications for this visit. ALLERGIES:     Allergies   Allergen Reactions    Sulfa Antibiotics                              REVIEW OF SYSTEMS                   All items selected indicate a positive finding. Those items not selected are negative.   Constitutional [] Weight loss/gain   [] Fatigue  [] Fever/Chills   HEENT [] Hearing Loss  [] Visual Disturbance  [] Tinnitus  [] Eye pain   Respiratory [] Shortness of Breath  [] Cough  [] Snoring   Cardiovascular [] Chest Pain  [] Palpitations  [] Lightheaded   GI [] Constipation  [] Diarrhea  [] Swallowing change  [] Nausea/vomiting    [] Urinary Frequency  [] Urinary Urgency   Musculoskeletal [x] Neck pain  [x] Back pain  [] Muscle pain  [] Restless legs   Dermatologic [] Skin changes   Neurologic [x] Memory loss/confusion  [x] Seizures  [] Trouble walking or imbalance  [] Dizziness  [] Sleep disturbance  [x] Weakness  [x] Numbness  [] Tremors  [] Speech Difficulty  [x] Headaches  [] Light Sensitivity  [] Sound Sensitivity   Endocrinology []Excessive thirst  []Excessive hunger   Psychiatric [] Anxiety/Depression  [] Hallucination   Allergy/immunology []Hives/environmental allergies   Hematologic/lymph [] Abnormal bleeding  [] Abnormal bruising          PHYSICAL EXAMINATION:                                         .                                                                                                    General Appearance:  Alert, cooperative, no signs of distress, appears stated age   Head:  Normocephalic, no signs of trauma   Eyes:  Conjunctiva/corneas clear;  eyelids intact   Ears:  Normal external ear and canals   Nose: Nares normal, no drainage    Throat: Lips and tongue normal; teeth normal;  gums normal   Extremities: Extremities normal, no cyanosis, no edema   Skin: Skin color, texture normal, no rashes, no lesions                                     NEUROLOGIC EXAMINATION      Mental status    Alert and oriented x 3; able to follow commands, speech and language intact; no hallucinations or delusions  Fund of information appropriate for level of education    Cranial nerves    II - grossly intact  III, IV, VI - extra-ocular muscles full: no nystagmus, no ptosis   V - normal facial sensation                                                               VII - normal facial symmetry                                                             VIII - intact hearing                                                                             IX, X - symmetrical palate                                                                  XI - symmetrical shoulder shrug                                                       XII - tongue midline without atrophy      Motor function  Normal muscle bulk. Strength at least 5/5 on all 4 extremities, no pronator drift      Sensory function Unable to test      Cerebellar Intact fine motor movement. No involuntary movements or tremors.  No ataxia or dysmetria on finger to nose or heel to shin testing      Reflex function Unable to test      Gait                   normal base and arm swing              ASSESSMENT AND PLAN:     This is a telehealth visit that was performed with the originating site at Patient Location: home and Provider Location of [place], [place]. Verbal consent to participate in video visit was obtained. Pursuant to the emergency declaration under the [facility], [facility] waiver authority and the [employer] and [employer] Act, this Virtual Visit was conducted, with patient's consent, to reduce the patient's risk of exposure to COVID-19 and provide continuity of care for an established/new patient. Services were provided through a video synchronous discussion virtually to substitute for in-person clinic visit. I discussed with the patient the nature of our telehealth visits via interactive/real-time audio/video that:  - I would evaluate the patient and recommend diagnostics and treatments based on my assessment  - Our sessions are not being recorded and that personal health information is protected  - Our team would provide follow up care in person if/when the patient needs it. In summary, your patient, Mitch Cai exhibits the following, with associated plan:    1. Seizure disorder, which was diagnosed in 2018 at the [facility]. Patient has been on a subtherapeutic dosage of Keppra since her discharge from the hospital.  She has been only taking 1000 mg once daily. Patient had a recent breakthrough seizure on January 9, 2020.  a. Increase Keppra to 750 mg twice daily  b. Because patient is 6 months seizure-free, she is able to start driving again. c. The patient will return in 2 months for reevaluation   2. Chronic tension type headaches  a. Increase Amitriptyline to 75 mg at bedtime daily.   3. Chronic low back pain      Signed: THOR Matute-RAMONITA    [facility] [address]    Please note that this chart was generated using voice recognition Dragon dictation software. Although every effort was made to ensure the accuracy of this automated transcription, some errors in transcription may have occurred. Provider Attestation: The documentation recorded by the scribe accurately reflects the service I personally performed and the decisions made by myself. Portions of this note were transcribed by a scribe. I personally performed the history, physical exam, and medical decision-making and confirm the accuracy of the information in the transcribed note. Scribe Attestation:   By signing my name below, Tony Lala, attest that this documentation has been prepared under the direction and in the presence of Clemencia Tripathi CNP.

## 2020-12-11 ENCOUNTER — HOSPITAL ENCOUNTER (EMERGENCY)
Age: 55
Discharge: HOME | End: 2020-12-11
Attending: EMERGENCY MEDICINE
Payer: MEDICARE

## 2020-12-11 ENCOUNTER — APPOINTMENT (OUTPATIENT)
Dept: GENERAL RADIOLOGY | Age: 55
End: 2020-12-11
Payer: MEDICARE

## 2020-12-11 VITALS
OXYGEN SATURATION: 97 % | DIASTOLIC BLOOD PRESSURE: 95 MMHG | RESPIRATION RATE: 18 BRPM | HEART RATE: 104 BPM | SYSTOLIC BLOOD PRESSURE: 137 MMHG | TEMPERATURE: 97.3 F

## 2020-12-11 PROCEDURE — 6370000000 HC RX 637 (ALT 250 FOR IP): Performed by: EMERGENCY MEDICINE

## 2020-12-11 PROCEDURE — 73030 X-RAY EXAM OF SHOULDER: CPT

## 2020-12-11 PROCEDURE — 6360000002 HC RX W HCPCS: Performed by: EMERGENCY MEDICINE

## 2020-12-11 PROCEDURE — 96372 THER/PROPH/DIAG INJ SC/IM: CPT

## 2020-12-11 PROCEDURE — 93005 ELECTROCARDIOGRAM TRACING: CPT | Performed by: EMERGENCY MEDICINE

## 2020-12-11 PROCEDURE — 99284 EMERGENCY DEPT VISIT MOD MDM: CPT

## 2020-12-11 RX ORDER — OXYCODONE HYDROCHLORIDE 5 MG/1
5 TABLET ORAL ONCE
Status: COMPLETED | OUTPATIENT
Start: 2020-12-11 | End: 2020-12-11

## 2020-12-11 RX ORDER — KETOROLAC TROMETHAMINE 15 MG/ML
15 INJECTION, SOLUTION INTRAMUSCULAR; INTRAVENOUS ONCE
Status: COMPLETED | OUTPATIENT
Start: 2020-12-11 | End: 2020-12-11

## 2020-12-11 RX ADMIN — KETOROLAC TROMETHAMINE 15 MG: 15 INJECTION, SOLUTION INTRAMUSCULAR; INTRAVENOUS at 10:51

## 2020-12-11 RX ADMIN — OXYCODONE HYDROCHLORIDE 5 MG: 5 TABLET ORAL at 11:30

## 2020-12-11 ASSESSMENT — PAIN DESCRIPTION - LOCATION: LOCATION: ARM;SHOULDER

## 2020-12-11 ASSESSMENT — PAIN SCALES - GENERAL
PAINLEVEL_OUTOF10: 10

## 2020-12-11 ASSESSMENT — ENCOUNTER SYMPTOMS
SHORTNESS OF BREATH: 0
VOMITING: 0
CONSTIPATION: 0
NAUSEA: 0
SORE THROAT: 0
ABDOMINAL PAIN: 0
DIARRHEA: 0

## 2020-12-11 ASSESSMENT — PAIN DESCRIPTION - FREQUENCY: FREQUENCY: CONTINUOUS

## 2020-12-11 ASSESSMENT — PAIN DESCRIPTION - ORIENTATION: ORIENTATION: RIGHT

## 2020-12-11 ASSESSMENT — PAIN DESCRIPTION - DESCRIPTORS: DESCRIPTORS: ACHING

## 2020-12-11 ASSESSMENT — PAIN DESCRIPTION - PAIN TYPE: TYPE: ACUTE PAIN

## 2020-12-11 NOTE — ED PROVIDER NOTES
[facility]     Emergency Department     Faculty Attestation    I performed a history and physical examination of the patient and discussed management with the resident. I have reviewed and agree with the residents findings including all diagnostic interpretations, and treatment plans as written at the time of my review. Any areas of disagreement are noted on the chart. I was personally present for the key portions of any procedures. I have documented in the chart those procedures where I was not present during the key portions. For Physician Assistant/ Nurse Practitioner cases/documentation I have personally evaluated this patient and have completed at least one if not all key elements of the E/M (history, physical exam, and MDM). Additional findings are as noted. This patient was evaluated in the Emergency Department for symptoms described in the history of present illness. The patient was evaluated in the context of the global COVID-19 pandemic, which necessitated consideration that the patient might be at risk for infection with the SARS-CoV-2 virus that causes COVID-19. Institutional protocols and algorithms that pertain to the evaluation of patients at risk for COVID-19 are in a state of rapid change based on information released by regulatory bodies including the CDC and federal and state organizations. These policies and algorithms were followed during the patient's care in the ED. Primary Care Physician: THOR Cao CNP    History: This is a 54 y.o. female who presents to the Emergency Department with complaint of arm pain. The patient presents emerged for complaint of right arm pain that it started when she woke up yesterday. Throughout the day the pain increased in intensity. She denies any chest pain. Says she has some shortness of breath but that is her baseline shortness of breath secondary to her COPD.   The pain is worse with any type of movement of the arm. She denies any nausea vomiting. She denies any numbness, tingling or weakness. Patient taken Motrin Aleve and Tylenol without any improvement of her symptoms. She denies any recent long travel or pain or swelling her legs. Physical:   oral temperature is 97.3 °F (36.3 °C). Her blood pressure is 137/95 (abnormal) and her pulse is 104. Her respiration is 18 and oxygen saturation is 97%. There is tenderness to palpation along the lateral aspect of the right shoulder. Axillary nerve is intact. She has full range of motion however is limited secondary to pain. There is no tenderness over the elbow or the wrist.  Radial pulses 2/4. Impression: Right shoulder pain    Plan: Analgesia, x-ray, EKG      EKG Interpretation    Interpreted by me    Rhythm: normal sinus   Rate: normal  Axis: normal  Ectopy: none  Conduction: normal  ST Segments: no acute change  T Waves: no acute change  Q Waves: none    Clinical Impression: no acute changes and normal EKG    (Please note that portions of this note were completed with a voice recognition program.  Efforts were made to edit the dictations but occasionally words are mis-transcribed.)    Danyelle Hodges MD, [address] [facility]  Attending Emergency Medicine Physician        Millicent Dos Santos MD  12/11/20 [id number]

## 2020-12-11 NOTE — ED PROVIDER NOTES
[address]  ED  Emergency Department Encounter  EmergencyMedicine Resident     Pt Laurita Mckeon  MRN: [id number]  Izabellagfwellington 1965  Date of evaluation: 12/11/20  PCP:  THOR Gross 1592       Chief Complaint   Patient presents with    Arm Pain     right shoulder/arm pain since yesterday, denies trauma        HISTORY OF PRESENT ILLNESS  (Location/Symptom, Timing/Onset, Context/Setting, Quality, Duration, Modifying Factors, Severity.)      Wellington Camara is a 54 y.o. female who presents to the emergency department with a 1 day history of severe right-sided shoulder pain worse with abduction. Patient states that she was playing with her grandson on Wednesday and likes to lift him up, although she did not notice any pain at the time. The day after she noticed worsening pain in the right shoulder isolated to the right posterior shoulder and worsening with movement of the shoulder. No numbness or tingling that is new for the right upper extremity and no pain anywhere else. Denies other history of trauma. She does smoke and has some baseline shortness of breath but otherwise denies any other symptoms other than significant pain to the right shoulder. She tried Tylenol and Motrin at home and requests a \"pain shot\" for pain. PAST MEDICAL / SURGICAL / SOCIAL / FAMILY HISTORY      has a past medical history of Acute MI ([address] Anxiety, Chronic back pain, COPD (chronic obstructive pulmonary disease) ([address] Depression, Heart disease, Hyperlipidemia, Hypertension, and MRSA (methicillin resistant staph aureus) culture positive. has a past surgical history that includes Tubal ligation; Tonsillectomy; Cholecystectomy, laparoscopic (11/10/2017); Cholecystectomy, laparoscopic (N/A, 11/10/2017); and back surgery.       Social History     Socioeconomic History    Marital status:      Spouse name: Not on file    Number of children: Not on file    Years of education: Not on file    Highest education level: Not on file   Occupational History    Not on file   Social Needs    Financial resource strain: Not hard at all    Food insecurity     Worry: Never true     Inability: Never true   [employer] needs     Medical: Not on file     Non-medical: Not on file   Tobacco Use    Smoking status: Current Some Day Smoker     Packs/day: 1.00     Types: Cigarettes    Smokeless tobacco: Never Used    Tobacco comment: Attempting to quit with patch now 10/16/20 currently smoking no longer using patch   Substance and Sexual Activity    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    Drug use: Yes     Types: Marijuana     Comment: daily - \"a couple bowls\"    Sexual activity: Not on file   Lifestyle    Physical activity     Days per week: Not on file     Minutes per session: Not on file    Stress: Not on file   Relationships    Social connections     Talks on phone: Not on file     Gets together: Not on file     Attends Voodoo service: Not on file     Active member of club or organization: Not on file     Attends meetings of clubs or organizations: Not on file     Relationship status: Not on file    Intimate partner violence     Fear of current or ex partner: Not on file     Emotionally abused: Not on file     Physically abused: Not on file     Forced sexual activity: Not on file   Other Topics Concern    Not on file   Social History Narrative    Not on file       Family History   Problem Relation Age of Onset    Other Mother     Heart Disease Father     High Blood Pressure Father     High Cholesterol Father     Other Brother        Allergies:  Sulfa antibiotics    Home Medications:  Prior to Admission medications    Medication Sig Start Date End Date Taking?  Authorizing Provider   levETIRAcetam (KEPPRA) 750 MG tablet Take 1 tablet by mouth 2 times daily 12/2/20   THOR Harrell - CNP   amitriptyline (ELAVIL) 25 MG tablet Take 3 tablets by mouth nightly 12/2/20 1/1/21  THOR Knight CNP   albuterol (PROVENTIL) (2.5 MG/3ML) 0.083% nebulizer solution INHALE 3 MLS BY NEBULIZATION EVERY 6 HOURS AS NEEDED FOR WHEEZING 11/24/20   THOR Martinez CNP   baclofen (LIORESAL) 10 MG tablet Take 1 tablet by mouth nightly as needed (pain) 11/12/20   THOR Martinez CNP   STIOLTO RESPIMAT 2.5-2.5 MCG/ACT AERS INHALE TWO INHALATION(S) BY MOUTH DAILY  Patient not taking: Reported on 11/30/2020 11/9/20   THOR Martinez CNP   budesonide-formoterol [facility]) 160-4.5 MCG/ACT AERO Inhale 2 puffs into the lungs 2 times daily  Patient not taking: Reported on 11/30/2020 10/16/20   Javier Fragoso MD   tiotropium (SPIRIVA RESPIMAT) 2.5 MCG/ACT AERS inhaler Inhale 2 puffs into the lungs daily 10/16/20   Javier Fragoso MD   escitalopram (LEXAPRO) 10 MG tablet Take 1.5 tablets by mouth daily 9/30/20   Annamary Dance, APRN - NP   albuterol sulfate HFA (PROAIR HFA) 108 (90 Base) MCG/ACT inhaler Inhale 2 puffs into the lungs every 6 hours as needed for Wheezing 9/10/20   THOR Martinez CNP   Nebulizers ([address]) MISC Daily as needed 9/4/20   THOR Martinez CNP   Respiratory Therapy Supplies (NEBULIZER/TUBING/MOUTHPIECE) KIT 1 kit by Does not apply route daily as needed (SOB or wheezing) 9/4/20   THOR Martinez CNP   atorvastatin (LIPITOR) 40 MG tablet Take 1 tablet by mouth daily 8/7/20 11/30/20  THOR Martinez CNP   acetaminophen (TYLENOL) 325 MG tablet Take 1 tablet by mouth every 6 hours as needed for Pain 6/15/20   Fleet Rasp, DO   ibuprofen (IBU) 400 MG tablet Take 1 tablet by mouth every 6 hours as needed for Pain 6/15/20   Fleet Rasp, DO   Handicap Placard MISC by Does not apply route Exp 1/2024 1/20/20   THOR Martinez - CNP   ondansetron (ZOFRAN ODT) 4 MG disintegrating tablet Take 1 tablet by mouth every 8 hours as needed for Nausea  Patient not taking: Reported on 10/16/2020  oxyCODONE (ROXICODONE) immediate release tablet 5 mg       DDX: Kale David is a 54 y.o. female who presents to the emergency department with severe right shoulder pain. Differential diagnosis includes rotator cuff tear, rotator cuff injury, shoulder dislocation, bony fracture, cartilaginous injury, ACS    DIAGNOSTIC RESULTS / EMERGENCY DEPARTMENT COURSE / MDM   LAB RESULTS:  No results found for this visit on 12/11/20. IMPRESSION: Kale David is a 54 y.o. female who presents to the emergency department with severe right shoulder pain. On examination she is afebrile, vital signs demonstrate mild tachycardia and examination significant for an uncomfortable appearing woman of stated age with significant tenderness to palpation of the posterior right deltoid and positive empty can test.  Will obtain x-ray, provide pain medication. Counseled patient that if she continues to smoke this will be a delayed course of healing and the patient will require orthopedic surgery follow-up regardless. Patient verbalizes understanding and agreement with plan. Given comorbid conditions will also obtain EKG    RADIOLOGY:  No results found. EKG  EKG Interpretation    Interpreted by emergency department physician    Rhythm: normal sinus   Rate: normal  Axis: normal  Ectopy: none  Conduction: normal  ST Segments: no acute change  T Waves: no acute change  Q Waves: none    Clinical Impression: no acute changes and normal EKG    Gaurang Rosario MD    All EKG's are interpreted by the Emergency Department Physician who either signs or co-signs this chart in the absence of a cardiologist.    EMERGENCY DEPARTMENT COURSE:  ED Course as of Dec 11 1129   Fri Dec 11, 2020   1042 X-ray negative. Shoulder wrapped and iced. Patient reports improvement in pain. Will provide orthopedic surgery follow-up. If there is no acute finding on EKG, plan for discharge. [TS]   8611 Patient on negative x-ray and EKG.   Patient states that the Toradol did not help. We will try additional pain medicine    [TS]      ED Course User Index  [TS] Abdoul Mejias MD       PROCEDURES:  None    CONSULTS:  None    CRITICAL CARE:  Please see attending note. FINAL IMPRESSION      1. Acute pain of right shoulder          DISPOSITION / PLAN     DISPOSITION        PATIENT REFERRED TO:  Kaycee ACEVES CHIP  [address] [address]  [address] [facility] [facility] [place] [address]  [phone]    Call   For followup    [facility] ED  [address] [place] [address]  [phone]  Go to   As needed, If symptoms worsen      DISCHARGE MEDICATIONS:  New Prescriptions    No medications on file       Abdoul Mejias MD  Emergency Medicine Resident    This patient was evaluated in the Emergency Department for symptoms described in the history of present illness. He/she was evaluated in the context of the global COVID-19 pandemic, which necessitated consideration that the patient might be at risk for infection with the SARS-CoV-2 virus that causes COVID-19. Institutional protocols and algorithms that pertain to the evaluation of patients at risk for COVID-19 are in a state of rapid change based on information released by regulatory bodies including the CDC and federal and state organizations. These policies and algorithms were followed during the patient's care in the ED.     (Please note that portions of thisnote were completed with a voice recognition program.  Efforts were made to edit the dictations but occasionally words are mis-transcribed.)       bAdoul Mejias MD  Resident  12/11/20 Heladioisatejose ramon Quiroz MD  Resident  12/11/20 9462

## 2020-12-11 NOTE — ED NOTES
Pt presented to ed via self c/o right arm/shoulder pain. Pt is A&Ox4 with even non labored breaths. Pt ambulated to room with steady gait. Pt denies any trauma. Pt has painful rom. Pain has increased over the last day. Pt took motrin and tylenol with little relief today.       Dylan Andrew RN  12/11/20 1048

## 2020-12-12 LAB
EKG ATRIAL RATE: 90 BPM
EKG P AXIS: 30 DEGREES
EKG P-R INTERVAL: 140 MS
EKG Q-T INTERVAL: 358 MS
EKG QRS DURATION: 90 MS
EKG QTC CALCULATION (BAZETT): 437 MS
EKG R AXIS: 40 DEGREES
EKG T AXIS: 47 DEGREES
EKG VENTRICULAR RATE: 90 BPM

## 2020-12-16 ENCOUNTER — OFFICE VISIT (OUTPATIENT)
Dept: ORTHOPEDIC SURGERY | Age: 55
End: 2020-12-16
Payer: MEDICARE

## 2020-12-16 VITALS — BODY MASS INDEX: 24.33 KG/M2 | TEMPERATURE: 97.9 F | WEIGHT: 155 LBS | HEIGHT: 67 IN

## 2020-12-16 PROCEDURE — 3017F COLORECTAL CA SCREEN DOC REV: CPT | Performed by: ORTHOPAEDIC SURGERY

## 2020-12-16 PROCEDURE — G8427 DOCREV CUR MEDS BY ELIG CLIN: HCPCS | Performed by: ORTHOPAEDIC SURGERY

## 2020-12-16 PROCEDURE — 4004F PT TOBACCO SCREEN RCVD TLK: CPT | Performed by: ORTHOPAEDIC SURGERY

## 2020-12-16 PROCEDURE — G8420 CALC BMI NORM PARAMETERS: HCPCS | Performed by: ORTHOPAEDIC SURGERY

## 2020-12-16 PROCEDURE — 99203 OFFICE O/P NEW LOW 30 MIN: CPT | Performed by: ORTHOPAEDIC SURGERY

## 2020-12-16 PROCEDURE — G8482 FLU IMMUNIZE ORDER/ADMIN: HCPCS | Performed by: ORTHOPAEDIC SURGERY

## 2020-12-16 NOTE — LETTER
12/16/2020    Eulogio Lentz, APRN - CNP  [address]  [facility],  [address] [place] [address]    RE: Herber Morrison    Dear Dr. Heart ,    Thank you for allowing me to participate in the care of Ms. Jac Rea. I had the opportunity to evaluate the patient on 12/16/2020. Attached you will find my evaluation and recommendations. Thanks again for the confidence you have expressed in me by allowing my participation in the care of your patient. I will keep you apprised of further developments in the patients treatment course as it progresses. If I can be of further assistance in any fashion, please feel free to contact me at your convenience.     Sincerely,        Mario Bower  Shoulder and Elbow Surgery

## 2020-12-16 NOTE — PROGRESS NOTES
Orthopedic Shoulder Encounter Note     Chief complaint: Right shoulder pain    HPI: Lili Arita is a 54 y.o. right-hand dominant female who presents for evaluation of her right shoulder which is been hurting for a week now. She cannot recall any specific injury. She states that she had her  3year-old grandson for a day and the next day woke up and could not move her arm and had significant pain. The next day she went to the emergency department as a result of persistent symptoms. She had an injection which did help with her pain but it wore off that evening. Her pain has since gotten better and so has her ability to move this arm. She states initially her pain was localized to the lateral shoulder but more recently it is mostly superior. It is constant for certainly worse with movement and it does bother her at night. She denies any stiffness but does report some weakness. Her pain does not radiate and it is not associated with any numbness or tingling and with any neck pain. Previous treatment:    NSAIDs: Motrin    Physical Therapy:  No    Injections: None    Surgeries: None    Review of Systems:     Constitution: no fever or chills   Pain level: 7/10  Musculoskeletal: As noted in the HPI   Neurologic: no neurologic symptoms    Past Medical History  Toribio Del Castillo  has a past medical history of Acute MI ([facility] [address] Anxiety, Chronic back pain, COPD (chronic obstructive pulmonary disease) ([facility] [address] [facility] Depression, Heart disease, Hyperlipidemia, Hypertension, and MRSA (methicillin resistant staph aureus) culture positive. Past Surgical History  Toribio Del Castillo  has a past surgical history that includes Tubal ligation; Tonsillectomy; Cholecystectomy, laparoscopic (11/10/2017); Cholecystectomy, laparoscopic (N/A, 11/10/2017); and back surgery.     Current Medications  Current Outpatient Medications   Medication Sig Dispense Refill    levETIRAcetam (KEPPRA) 750 MG tablet Take 1 tablet by mouth 2 times daily 60 tablet 3    amitriptyline (ELAVIL) 25 MG tablet Take 3 tablets by mouth nightly 90 tablet 5    albuterol (PROVENTIL) (2.5 MG/3ML) 0.083% nebulizer solution INHALE 3 MLS BY NEBULIZATION EVERY 6 HOURS AS NEEDED FOR WHEEZING 120 each 2    baclofen (LIORESAL) 10 MG tablet Take 1 tablet by mouth nightly as needed (pain) 30 tablet 2    STIOLTO RESPIMAT 2.5-2.5 MCG/ACT AERS INHALE TWO INHALATION(S) BY MOUTH DAILY (Patient not taking: Reported on 11/30/2020) 3 Inhaler 0    budesonide-formoterol (SYMBICORT) 160-4.5 MCG/ACT AERO Inhale 2 puffs into the lungs 2 times daily (Patient not taking: Reported on 11/30/2020) 1 Inhaler 11    tiotropium (SPIRIVA RESPIMAT) 2.5 MCG/ACT AERS inhaler Inhale 2 puffs into the lungs daily 1 Inhaler 11    escitalopram (LEXAPRO) 10 MG tablet Take 1.5 tablets by mouth daily 45 tablet 1    albuterol sulfate HFA (PROAIR HFA) 108 (90 Base) MCG/ACT inhaler Inhale 2 puffs into the lungs every 6 hours as needed for Wheezing 1 Inhaler 5    Nebulizers (NEBULIZER COMPRESSOR) MISC Daily as needed 1 each 0    Respiratory Therapy Supplies (NEBULIZER/TUBING/MOUTHPIECE) KIT 1 kit by Does not apply route daily as needed (SOB or wheezing) 1 kit 0    atorvastatin (LIPITOR) 40 MG tablet Take 1 tablet by mouth daily 90 tablet 1    acetaminophen (TYLENOL) 325 MG tablet Take 1 tablet by mouth every 6 hours as needed for Pain 30 tablet 0    ibuprofen (IBU) 400 MG tablet Take 1 tablet by mouth every 6 hours as needed for Pain 30 tablet 0    Handicap Placard MISC by Does not apply route Exp 1/2024 1 each 0    ondansetron (ZOFRAN ODT) 4 MG disintegrating tablet Take 1 tablet by mouth every 8 hours as needed for Nausea (Patient not taking: Reported on 10/16/2020) 20 tablet 0    Cholecalciferol (VITAMIN D3) 400 units CAPS Take 1 tablet by mouth daily      aspirin EC 81 MG EC tablet Take 1 tablet by mouth daily (Patient taking differently: Take 81 mg by mouth daily as needed ) 30 tablet 3    magnesium gluconate (MAGONATE) 500 MG tablet Take 400 mg by mouth every evening       vitamin B-6 (PYRIDOXINE) 100 MG tablet Take 100 mg by mouth daily      nitroGLYCERIN (NITROSTAT) 0.4 MG SL tablet Place 1 tablet under the tongue every 5 minutes as needed for Chest pain up to max of 3 total doses. If no relief after 1 dose, call 911. (Patient not taking: Reported on 10/16/2020) 25 tablet 3    Multiple Vitamins-Minerals (THERAPEUTIC MULTIVITAMIN-MINERALS) tablet Take 1 tablet by mouth daily.  vitamin B-12 (CYANOCOBALAMIN) 1000 MCG tablet Take 1,000 mcg by mouth daily. No current facility-administered medications for this visit. Allergies  Allergies have been reviewed. Leila Dakins is allergic to sulfa antibiotics. Social History  Leila Dakins  reports that she has been smoking cigarettes. She has been smoking about 1.00 pack per day. She has never used smokeless tobacco. She reports current drug use. Drug: Marijuana. She reports that she does not drink alcohol. Family History  Cary's family history includes Heart Disease in her father; High Blood Pressure in her father; High Cholesterol in her father; Other in her brother and mother.      Physical Exam:     Temp 97.9 °F (36.6 °C)   Ht 5' 7\" (1.702 m)   Wt 155 lb (70.3 kg)   BMI 24.28 kg/m²    General Appearance: alert, well appearing, and in no distress  Mental Status: alert, oriented to person, place, and time  Gait: normal    Shoulder:    Skin: warm and dry, no rash or erythema; no swelling or obvious muscular atrophy  Vasculature: 2+ radial pulses bilaterally  Neuro: Sensation grossly intact to light touch diffusely  Tenderness: Tender to palpation over the anterior aspect of the right shoulder but is exquisitely tender to palpation at the level of the acromioclavicular joint of the right shoulder    ROM: (Degrees)    Right   A P   Left   A P    Elevation  110 145   Elevation  145   Abduction  100 155   Abduction  150 ER   60 85   ER   85   IR   L3    IR   T12   90 abd/ER      90 abd/ER     90 abd/IR      90 abd/IR     Crepitation  No    Crepitation No  Dyskenesia  No    Dyskenesia No      Muscle strength:    Right       Left    Deltoid   5    Deltoid   5  Supraspinatus  5    Supraspinatus  5  ER   5    ER   5  IR   5    IR   5    Special tests    Right   Rotator Cuff    Left    y   Painful arc    n   n   Pain with ER    n    y   Neer's     n    y   Hawkin's    n    n   Drop Arm    n  n   Lift off/Belly Press   n  n   ER Lag    n          TRISTAR [facility] [place] [facility]  y   AC tenderness   n  y   Cross-chest adduction  n       Labrum/biceps    y (equivocal)  Custer's    n   n   Biceps sheer    n      y   Speed's/Yergason's   n    y   Tenderness Biceps Groove  n    n   Dg's    n         Instability  n   Ant Apprehension   n    n   Post Apprehension   n    n   Ant Load shift    n    n   Post Load shift   n   n   Sulcus     n  n   Generalized Laxity   n  n   Relocation test   n  n   Crank test     n  n   Jaime-superior escape  n       Imaging:  Xrays: 4 views of the right shoulder obtained on 12/16/2020 were independently reviewed  Indications: Right shoulder pain  Findings: Normal glenohumeral joint space. Mild acromioclavicular joint space narrowing with moderately sized distal clavicle osteophyte. Type II acromion. No obvious fracture, dislocation, or subluxation. Impression: Right shoulder radiographs with mild to moderate degenerative changes at the acromioclavicular joint. Impression/Plan:     Deanne Saba is a 54 y.o. old female with right shoulder pain that appears to be primarily due to acromioclavicular joint osteoarthritis at this time although she is still has some anterior shoulder pain as well. I had a discussion with the patient today educating her about this problem and discussed treatment options. I would recommend proceeding conservatively at this time with a cortisone injection into the acromioclavicular joint.   To increase the accuracy of the injection I would like to have this done under ultrasound guidance. I believe this will help therapeutically but also help to confirm her diagnosis or at least clarify how much of her pain is being caused by the acromioclavicular joint. Consequently shows instructed to pay attention to how much pain relief she gets from her injection irregardless of how long the effects of the injection last.  She was referred to see Dr. Radha Thompson for the injection. I anticipate improvement and resolution with this treatment however should she have persistent or worsening symptoms she was encouraged to notify me so that she may be reevaluated.         NA = Not assessed  RTC = Rotator cuff  RCT = Rotator cuff tear  ER = External rotation  IR = Internal rotation  AC = Acromioclavicular  GH = Glenohumeral  n = No  y = Yes

## 2020-12-21 ENCOUNTER — HOSPITAL ENCOUNTER (INPATIENT)
Age: 55
LOS: 2 days | Discharge: HOME | DRG: 190 | End: 2020-12-23
Attending: EMERGENCY MEDICINE | Admitting: INTERNAL MEDICINE
Payer: MEDICARE

## 2020-12-21 ENCOUNTER — APPOINTMENT (OUTPATIENT)
Dept: GENERAL RADIOLOGY | Age: 55
DRG: 190 | End: 2020-12-21
Payer: MEDICARE

## 2020-12-21 ENCOUNTER — APPOINTMENT (OUTPATIENT)
Dept: CT IMAGING | Age: 55
DRG: 190 | End: 2020-12-21
Payer: MEDICARE

## 2020-12-21 PROBLEM — I10 HYPERTENSION: Status: ACTIVE | Noted: 2020-12-21

## 2020-12-21 PROBLEM — J18.9 PNEUMONIA: Status: ACTIVE | Noted: 2020-12-21

## 2020-12-21 PROBLEM — R00.0 SINUS TACHYCARDIA: Status: ACTIVE | Noted: 2020-12-21

## 2020-12-21 LAB
ABSOLUTE EOS #: 1.04 K/UL (ref 0–0.44)
ABSOLUTE IMMATURE GRANULOCYTE: <0.03 K/UL (ref 0–0.3)
ABSOLUTE LYMPH #: 3.49 K/UL (ref 1.1–3.7)
ABSOLUTE MONO #: 0.65 K/UL (ref 0.1–1.2)
ANION GAP SERPL CALCULATED.3IONS-SCNC: 13 MMOL/L (ref 9–17)
BASOPHILS # BLD: 1 % (ref 0–2)
BASOPHILS ABSOLUTE: 0.08 K/UL (ref 0–0.2)
BNP INTERPRETATION: NORMAL
BUN BLDV-MCNC: 8 MG/DL (ref 6–20)
BUN/CREAT BLD: ABNORMAL (ref 9–20)
CALCIUM SERPL-MCNC: 9.6 MG/DL (ref 8.6–10.4)
CHLORIDE BLD-SCNC: 106 MMOL/L (ref 98–107)
CO2: 25 MMOL/L (ref 20–31)
CREAT SERPL-MCNC: 0.69 MG/DL (ref 0.5–0.9)
DIFFERENTIAL TYPE: ABNORMAL
EOSINOPHILS RELATIVE PERCENT: 11 % (ref 1–4)
GFR AFRICAN AMERICAN: >60 ML/MIN
GFR NON-AFRICAN AMERICAN: >60 ML/MIN
GFR SERPL CREATININE-BSD FRML MDRD: ABNORMAL ML/MIN/{1.73_M2}
GFR SERPL CREATININE-BSD FRML MDRD: ABNORMAL ML/MIN/{1.73_M2}
GLUCOSE BLD-MCNC: 107 MG/DL (ref 70–99)
HCT VFR BLD CALC: 45.4 % (ref 36.3–47.1)
HEMOGLOBIN: 14.8 G/DL (ref 11.9–15.1)
IMMATURE GRANULOCYTES: 0 %
LYMPHOCYTES # BLD: 37 % (ref 24–43)
MCH RBC QN AUTO: 31.6 PG (ref 25.2–33.5)
MCHC RBC AUTO-ENTMCNC: 32.6 G/DL (ref 28.4–34.8)
MCV RBC AUTO: 97 FL (ref 82.6–102.9)
MONOCYTES # BLD: 7 % (ref 3–12)
NRBC AUTOMATED: 0 PER 100 WBC
PDW BLD-RTO: 13.5 % (ref 11.8–14.4)
PLATELET # BLD: 304 K/UL (ref 138–453)
PLATELET ESTIMATE: ABNORMAL
PMV BLD AUTO: 8.3 FL (ref 8.1–13.5)
POTASSIUM SERPL-SCNC: 4.5 MMOL/L (ref 3.7–5.3)
PRO-BNP: <20 PG/ML
RBC # BLD: 4.68 M/UL (ref 3.95–5.11)
RBC # BLD: ABNORMAL 10*6/UL
SARS-COV-2, RAPID: NOT DETECTED
SARS-COV-2: NORMAL
SARS-COV-2: NORMAL
SEG NEUTROPHILS: 44 % (ref 36–65)
SEGMENTED NEUTROPHILS ABSOLUTE COUNT: 4.27 K/UL (ref 1.5–8.1)
SODIUM BLD-SCNC: 144 MMOL/L (ref 135–144)
SOURCE: NORMAL
TROPONIN INTERP: NORMAL
TROPONIN INTERP: NORMAL
TROPONIN T: NORMAL NG/ML
TROPONIN T: NORMAL NG/ML
TROPONIN, HIGH SENSITIVITY: 6 NG/L (ref 0–14)
TROPONIN, HIGH SENSITIVITY: <6 NG/L (ref 0–14)
WBC # BLD: 9.5 K/UL (ref 3.5–11.3)
WBC # BLD: ABNORMAL 10*3/UL

## 2020-12-21 PROCEDURE — 87070 CULTURE OTHR SPECIMN AEROBIC: CPT

## 2020-12-21 PROCEDURE — 80048 BASIC METABOLIC PNL TOTAL CA: CPT

## 2020-12-21 PROCEDURE — 6360000002 HC RX W HCPCS: Performed by: STUDENT IN AN ORGANIZED HEALTH CARE EDUCATION/TRAINING PROGRAM

## 2020-12-21 PROCEDURE — 6360000004 HC RX CONTRAST MEDICATION: Performed by: STUDENT IN AN ORGANIZED HEALTH CARE EDUCATION/TRAINING PROGRAM

## 2020-12-21 PROCEDURE — 6370000000 HC RX 637 (ALT 250 FOR IP)

## 2020-12-21 PROCEDURE — 87205 SMEAR GRAM STAIN: CPT

## 2020-12-21 PROCEDURE — 84484 ASSAY OF TROPONIN QUANT: CPT

## 2020-12-21 PROCEDURE — U0002 COVID-19 LAB TEST NON-CDC: HCPCS

## 2020-12-21 PROCEDURE — 99285 EMERGENCY DEPT VISIT HI MDM: CPT

## 2020-12-21 PROCEDURE — 1200000000 HC SEMI PRIVATE

## 2020-12-21 PROCEDURE — 87449 NOS EACH ORGANISM AG IA: CPT

## 2020-12-21 PROCEDURE — 71260 CT THORAX DX C+: CPT

## 2020-12-21 PROCEDURE — 87899 AGENT NOS ASSAY W/OPTIC: CPT

## 2020-12-21 PROCEDURE — 6370000000 HC RX 637 (ALT 250 FOR IP): Performed by: STUDENT IN AN ORGANIZED HEALTH CARE EDUCATION/TRAINING PROGRAM

## 2020-12-21 PROCEDURE — U0003 INFECTIOUS AGENT DETECTION BY NUCLEIC ACID (DNA OR RNA); SEVERE ACUTE RESPIRATORY SYNDROME CORONAVIRUS 2 (SARS-COV-2) (CORONAVIRUS DISEASE [COVID-19]), AMPLIFIED PROBE TECHNIQUE, MAKING USE OF HIGH THROUGHPUT TECHNOLOGIES AS DESCRIBED BY CMS-2020-01-R: HCPCS

## 2020-12-21 PROCEDURE — 93005 ELECTROCARDIOGRAM TRACING: CPT | Performed by: STUDENT IN AN ORGANIZED HEALTH CARE EDUCATION/TRAINING PROGRAM

## 2020-12-21 PROCEDURE — 2580000003 HC RX 258: Performed by: STUDENT IN AN ORGANIZED HEALTH CARE EDUCATION/TRAINING PROGRAM

## 2020-12-21 PROCEDURE — 71045 X-RAY EXAM CHEST 1 VIEW: CPT

## 2020-12-21 PROCEDURE — 85025 COMPLETE CBC W/AUTO DIFF WBC: CPT

## 2020-12-21 PROCEDURE — 83880 ASSAY OF NATRIURETIC PEPTIDE: CPT

## 2020-12-21 PROCEDURE — 94640 AIRWAY INHALATION TREATMENT: CPT

## 2020-12-21 PROCEDURE — 86738 MYCOPLASMA ANTIBODY: CPT

## 2020-12-21 RX ORDER — SODIUM CHLORIDE 0.9 % (FLUSH) 0.9 %
10 SYRINGE (ML) INJECTION PRN
Status: DISCONTINUED | OUTPATIENT
Start: 2020-12-21 | End: 2020-12-23 | Stop reason: HOSPADM

## 2020-12-21 RX ORDER — SODIUM CHLORIDE 0.9 % (FLUSH) 0.9 %
10 SYRINGE (ML) INJECTION EVERY 12 HOURS SCHEDULED
Status: DISCONTINUED | OUTPATIENT
Start: 2020-12-21 | End: 2020-12-23 | Stop reason: HOSPADM

## 2020-12-21 RX ORDER — NITROGLYCERIN 0.4 MG/1
0.4 TABLET SUBLINGUAL EVERY 5 MIN PRN
Status: DISCONTINUED | OUTPATIENT
Start: 2020-12-21 | End: 2020-12-23 | Stop reason: HOSPADM

## 2020-12-21 RX ORDER — NICOTINE 21 MG/24HR
1 PATCH, TRANSDERMAL 24 HOURS TRANSDERMAL DAILY
Status: DISCONTINUED | OUTPATIENT
Start: 2020-12-21 | End: 2020-12-23 | Stop reason: HOSPADM

## 2020-12-21 RX ORDER — BACLOFEN 10 MG/1
10 TABLET ORAL NIGHTLY PRN
Status: DISCONTINUED | OUTPATIENT
Start: 2020-12-21 | End: 2020-12-23 | Stop reason: HOSPADM

## 2020-12-21 RX ORDER — ALBUTEROL SULFATE 2.5 MG/3ML
2.5 SOLUTION RESPIRATORY (INHALATION) EVERY 4 HOURS PRN
Status: DISCONTINUED | OUTPATIENT
Start: 2020-12-21 | End: 2020-12-23 | Stop reason: HOSPADM

## 2020-12-21 RX ORDER — BUDESONIDE AND FORMOTEROL FUMARATE DIHYDRATE 160; 4.5 UG/1; UG/1
2 AEROSOL RESPIRATORY (INHALATION) 2 TIMES DAILY
Status: DISCONTINUED | OUTPATIENT
Start: 2020-12-21 | End: 2020-12-23 | Stop reason: HOSPADM

## 2020-12-21 RX ORDER — 0.9 % SODIUM CHLORIDE 0.9 %
1000 INTRAVENOUS SOLUTION INTRAVENOUS ONCE
Status: COMPLETED | OUTPATIENT
Start: 2020-12-21 | End: 2020-12-22

## 2020-12-21 RX ORDER — POLYETHYLENE GLYCOL 3350 17 G/17G
17 POWDER, FOR SOLUTION ORAL DAILY PRN
Status: DISCONTINUED | OUTPATIENT
Start: 2020-12-21 | End: 2020-12-23 | Stop reason: HOSPADM

## 2020-12-21 RX ORDER — ESCITALOPRAM OXALATE 10 MG/1
15 TABLET ORAL DAILY
Status: DISCONTINUED | OUTPATIENT
Start: 2020-12-21 | End: 2020-12-23 | Stop reason: HOSPADM

## 2020-12-21 RX ORDER — METOPROLOL TARTRATE 5 MG/5ML
5 INJECTION INTRAVENOUS EVERY 6 HOURS PRN
Status: DISCONTINUED | OUTPATIENT
Start: 2020-12-21 | End: 2020-12-23 | Stop reason: HOSPADM

## 2020-12-21 RX ORDER — ACETAMINOPHEN 325 MG/1
325 TABLET ORAL EVERY 6 HOURS PRN
Status: DISCONTINUED | OUTPATIENT
Start: 2020-12-21 | End: 2020-12-21 | Stop reason: SDUPTHER

## 2020-12-21 RX ORDER — ACETAMINOPHEN 650 MG/1
650 SUPPOSITORY RECTAL EVERY 6 HOURS PRN
Status: DISCONTINUED | OUTPATIENT
Start: 2020-12-21 | End: 2020-12-23 | Stop reason: HOSPADM

## 2020-12-21 RX ORDER — ONDANSETRON 2 MG/ML
4 INJECTION INTRAMUSCULAR; INTRAVENOUS EVERY 6 HOURS PRN
Status: DISCONTINUED | OUTPATIENT
Start: 2020-12-21 | End: 2020-12-23 | Stop reason: HOSPADM

## 2020-12-21 RX ORDER — METHYLPREDNISOLONE SODIUM SUCCINATE 125 MG/2ML
40 INJECTION, POWDER, LYOPHILIZED, FOR SOLUTION INTRAMUSCULAR; INTRAVENOUS EVERY 8 HOURS
Status: DISCONTINUED | OUTPATIENT
Start: 2020-12-21 | End: 2020-12-23

## 2020-12-21 RX ORDER — AZITHROMYCIN 250 MG/1
500 TABLET, FILM COATED ORAL DAILY
Status: DISCONTINUED | OUTPATIENT
Start: 2020-12-22 | End: 2020-12-23 | Stop reason: HOSPADM

## 2020-12-21 RX ORDER — PROMETHAZINE HYDROCHLORIDE 12.5 MG/1
12.5 TABLET ORAL EVERY 6 HOURS PRN
Status: DISCONTINUED | OUTPATIENT
Start: 2020-12-21 | End: 2020-12-23 | Stop reason: HOSPADM

## 2020-12-21 RX ORDER — ALBUTEROL SULFATE 90 UG/1
AEROSOL, METERED RESPIRATORY (INHALATION)
Qty: 8.5 G | Refills: 2 | Status: SHIPPED | OUTPATIENT
Start: 2020-12-21 | End: 2021-03-08 | Stop reason: SDUPTHER

## 2020-12-21 RX ORDER — ACETAMINOPHEN 325 MG/1
TABLET ORAL
Status: COMPLETED
Start: 2020-12-21 | End: 2020-12-21

## 2020-12-21 RX ORDER — ATORVASTATIN CALCIUM 40 MG/1
40 TABLET, FILM COATED ORAL DAILY
Status: DISCONTINUED | OUTPATIENT
Start: 2020-12-21 | End: 2020-12-23 | Stop reason: HOSPADM

## 2020-12-21 RX ORDER — ACETAMINOPHEN 325 MG/1
650 TABLET ORAL EVERY 6 HOURS PRN
Status: DISCONTINUED | OUTPATIENT
Start: 2020-12-21 | End: 2020-12-23 | Stop reason: HOSPADM

## 2020-12-21 RX ADMIN — SODIUM CHLORIDE 1000 ML: 9 INJECTION, SOLUTION INTRAVENOUS at 22:33

## 2020-12-21 RX ADMIN — ACETAMINOPHEN 650 MG: 325 TABLET ORAL at 15:27

## 2020-12-21 RX ADMIN — BACLOFEN 10 MG: 10 TABLET ORAL at 17:48

## 2020-12-21 RX ADMIN — ACETAMINOPHEN 650 MG: 325 TABLET ORAL at 23:22

## 2020-12-21 RX ADMIN — IOPAMIDOL 75 ML: 755 INJECTION, SOLUTION INTRAVENOUS at 10:45

## 2020-12-21 RX ADMIN — METHYLPREDNISOLONE SODIUM SUCCINATE 40 MG: 125 INJECTION, POWDER, FOR SOLUTION INTRAMUSCULAR; INTRAVENOUS at 23:22

## 2020-12-21 RX ADMIN — ALBUTEROL SULFATE 2.5 MG: 2.5 SOLUTION RESPIRATORY (INHALATION) at 17:25

## 2020-12-21 RX ADMIN — CEFTRIAXONE SODIUM 1 G: 1 INJECTION, POWDER, FOR SOLUTION INTRAMUSCULAR; INTRAVENOUS at 13:38

## 2020-12-21 RX ADMIN — ESCITALOPRAM OXALATE 15 MG: 10 TABLET ORAL at 17:48

## 2020-12-21 RX ADMIN — Medication 500 MG: at 15:28

## 2020-12-21 RX ADMIN — METHYLPREDNISOLONE SODIUM SUCCINATE 40 MG: 125 INJECTION, POWDER, FOR SOLUTION INTRAMUSCULAR; INTRAVENOUS at 17:48

## 2020-12-21 ASSESSMENT — ENCOUNTER SYMPTOMS
COUGH: 1
WHEEZING: 0
NAUSEA: 0
SHORTNESS OF BREATH: 1
VOMITING: 0
ABDOMINAL PAIN: 0
BACK PAIN: 0

## 2020-12-21 ASSESSMENT — PAIN DESCRIPTION - PAIN TYPE: TYPE: ACUTE PAIN

## 2020-12-21 ASSESSMENT — PAIN DESCRIPTION - DESCRIPTORS: DESCRIPTORS: HEADACHE

## 2020-12-21 ASSESSMENT — PAIN DESCRIPTION - FREQUENCY: FREQUENCY: CONTINUOUS

## 2020-12-21 ASSESSMENT — PAIN DESCRIPTION - LOCATION: LOCATION: HEAD

## 2020-12-21 ASSESSMENT — PAIN SCALES - GENERAL
PAINLEVEL_OUTOF10: 6
PAINLEVEL_OUTOF10: 8
PAINLEVEL_OUTOF10: 5

## 2020-12-21 NOTE — H&P
[address] [facility]     Department of Internal Medicine - Staff Internal Medicine Teaching Service          ADMISSION NOTE/HISTORY AND PHYSICAL EXAMINATION   Date: 12/21/2020  Patient Name: Deanne Saba  Date of admission: 12/21/2020  9:14 AM  YOB: 1965  PCP: THOR Lal CNP  History Obtained From:  patient, electronic medical record    CHIEF COMPLAINT     Chief complaint: Shortness of Breath / Cough    HISTORY OF PRESENTING ILLNESS     The patient is a pleasant 54 y.o. female with a past medical history of COPD, Depression, Hypertension, and Hyperlipidemia presents with a chief complaint of worsening shortness of breath for the past couple of days; she normally experiences some shortness of breath but this is worse that her usual baseline. She has been using her albuterol nebulizer at home but it has not been helping. She has a dry nonproductive cough and is currently using 2 liters oxygen via nasal canula but does not use oxygen at home. She denies any fevers, chills, body aches, chest pain, abdominal pain, nausea, vomiting, leg swelling, calf cramping, and issues with bowel and bladder. She was given solu-medrol and breathing treatments via EMS on the way to hospital.       Review of Systems:  Constitutional: Negative for chills and fever. Respiratory: Positive for cough and shortness of breath. Negative for wheezing. Cardiovascular: Negative for chest pain, palpitations and leg swelling. Gastrointestinal: Negative for abdominal pain, nausea and vomiting. Genitourinary: Negative for dysuria and hematuria. Musculoskeletal: Negative for back pain and neck pain. Skin: Negative for rash and wound. Neurological: Negative for dizziness, syncope, weakness, light-headedness, numbness and headaches.      PAST MEDICAL HISTORY     Past Medical History:   Diagnosis Date    Acute MI ([facility] [address]     Anxiety     Chronic back pain  COPD (chronic obstructive pulmonary disease) (HCC)     Depression     Heart disease     Hyperlipidemia     Hypertension     MRSA (methicillin resistant staph aureus) culture positive 09/05/2017    abscess     PAST SURGICAL HISTORY     Past Surgical History:   Procedure Laterality Date    BACK SURGERY      diskectomy 2000, fusion 2007, fusion 7/2017    CHOLECYSTECTOMY, LAPAROSCOPIC  11/10/2017    robotic assisted    CHOLECYSTECTOMY, LAPAROSCOPIC N/A 11/10/2017    XI ROBOTIC CHOLECYSTECTOMY LAPAROSCOPIC performed by Rosa Carnes DO at [address]       ALLERGIES     Sulfa antibiotics    MEDICATIONS PRIOR TO ADMISSION     Prior to Admission medications    Medication Sig Start Date End Date Taking?  Authorizing Provider   albuterol sulfate  (90 Base) MCG/ACT inhaler INHALE TWO PUFFS BY MOUTH EVERY 6 HOURS AS NEEDED FOR WHEEZING 12/21/20   Madalynn Area, APRN - CNP   levETIRAcetam (KEPPRA) 750 MG tablet Take 1 tablet by mouth 2 times daily 12/2/20   Davis Carrier, APRN - CNP   amitriptyline (ELAVIL) 25 MG tablet Take 3 tablets by mouth nightly 12/2/20 1/1/21  Davis Carrier, APRN - CNP   albuterol (PROVENTIL) (2.5 MG/3ML) 0.083% nebulizer solution INHALE 3 MLS BY NEBULIZATION EVERY 6 HOURS AS NEEDED FOR WHEEZING 11/24/20   Madalynn Area, APRN - CNP   baclofen (LIORESAL) 10 MG tablet Take 1 tablet by mouth nightly as needed (pain) 11/12/20   Madalynn Area, APRN - CNP   STIOLTO RESPIMAT 2.5-2.5 MCG/ACT AERS INHALE TWO INHALATION(S) BY MOUTH DAILY  Patient not taking: Reported on 11/30/2020 11/9/20   Madalynn Area, APRN - CNP   budesonide-formoterol [facility]) 160-4.5 MCG/ACT AERO Inhale 2 puffs into the lungs 2 times daily  Patient not taking: Reported on 11/30/2020 10/16/20   Delicia Lemus MD   tiotropium (SPIRIVA RESPIMAT) 2.5 MCG/ACT AERS inhaler Inhale 2 puffs into the lungs daily 10/16/20   Delicia Lemus MD escitalopram (LEXAPRO) 10 MG tablet Take 1.5 tablets by mouth daily 9/30/20   THOR Chan NP   Nebulizers (NEBULIZER COMPRESSOR) MISC Daily as needed 9/4/20   Marien Homans, APRN - CNP   Respiratory Therapy Supplies (NEBULIZER/TUBING/MOUTHPIECE) KIT 1 kit by Does not apply route daily as needed (SOB or wheezing) 9/4/20   Marien Homans, APRN - CNP   atorvastatin (LIPITOR) 40 MG tablet Take 1 tablet by mouth daily 8/7/20 11/30/20  Marien Homans, APRN - CNP   acetaminophen (TYLENOL) 325 MG tablet Take 1 tablet by mouth every 6 hours as needed for Pain 6/15/20   Fariba Dill, DO   ibuprofen (IBU) 400 MG tablet Take 1 tablet by mouth every 6 hours as needed for Pain 6/15/20   Fariba Milans, DO   Handicap Placard MISC by Does not apply route Exp 1/2024 1/20/20   Marien Homans, APRN - CNP   ondansetron (ZOFRAN ODT) 4 MG disintegrating tablet Take 1 tablet by mouth every 8 hours as needed for Nausea  Patient not taking: Reported on 10/16/2020 1/9/20   Consuelo Simmons MD   Cholecalciferol (VITAMIN D3) 400 units CAPS Take 1 tablet by mouth daily    Historical Provider, MD   aspirin EC 81 MG EC tablet Take 1 tablet by mouth daily  Patient taking differently: Take 81 mg by mouth daily as needed  3/6/19   Marien Homans, APRN - CNP   magnesium gluconate (MAGONATE) 500 MG tablet Take 400 mg by mouth every evening     Historical Provider, MD   vitamin B-6 (PYRIDOXINE) 100 MG tablet Take 100 mg by mouth daily    Historical Provider, MD   nitroGLYCERIN (NITROSTAT) 0.4 MG SL tablet Place 1 tablet under the tongue every 5 minutes as needed for Chest pain up to max of 3 total doses. If no relief after 1 dose, call 911. Patient not taking: Reported on 10/16/2020 1/30/18   Miley Penaloza MD   Multiple Vitamins-Minerals (THERAPEUTIC MULTIVITAMIN-MINERALS) tablet Take 1 tablet by mouth daily.     Historical Provider, MD vitamin B-12 (CYANOCOBALAMIN) 1000 MCG tablet Take 1,000 mcg by mouth daily. Historical Provider, MD     SOCIAL HISTORY     Tobacco: Current every day smoker - trying to quit   Alcohol: Sober for 15 years  Illicits: Marijuana Use  Occupation: Did Not Ask     FAMILY HISTORY     Family History   Problem Relation Age of Onset    Other Mother     Heart Disease Father     High Blood Pressure Father     High Cholesterol Father     Other Brother      PHYSICAL EXAM     Vitals: BP (!) 137/99   Pulse 120   Temp 98 °F (36.7 °C) (Oral)   Resp 19   Ht 5' 7\" (1.702 m)   Wt 155 lb (70.3 kg)   SpO2 94%   BMI 24.28 kg/m²   Tmax: Temp (24hrs), Av °F (36.7 °C), Min:98 °F (36.7 °C), Max:98 °F (36.7 °C)    Last Body weight:   Wt Readings from Last 3 Encounters:   20 155 lb (70.3 kg)   20 155 lb (70.3 kg)   10/23/20 155 lb (70.3 kg)     Body Mass Index : Body mass index is 24.28 kg/m². PHYSICAL EXAMINATION:  Constitutional: This is a well developed, well nourished, 18.5-24.9 - Normal 54y.o. year old female who is alert, oriented, cooperative and in no apparent distress. Cardiovascular: Rate and Rhythm: Regular rhythm. Tachycardia present / Heart sounds: No murmur. No gallop. Pulmonary: Effort: No respiratory distress / Breath sounds: No stridor. No wheezing, rhonchi or rales. Patient is tachypneic but is not in acute distress. Good air movement throughout without any wheezes. Abdominal: General: There is no distension / Palpations: Abdomen is soft / Tenderness: There is no abdominal tenderness. There is no guarding. Musculoskeletal: General: No tenderness / Right lower leg: No edema / Left lower leg: No edema. Skin: General: Skin is warm and dry. Neurological: Mental Status: She is alert and oriented to person, place, and time.      INVESTIGATIONS     Laboratory Testing:     Recent Results (from the past 24 hour(s))   EKG 12 Lead    Collection Time: 20  9:18 AM Result Value Ref Range    Ventricular Rate 109 BPM    Atrial Rate 109 BPM    P-R Interval 130 ms    QRS Duration 82 ms    Q-T Interval 328 ms    QTc Calculation (Bazett) 441 ms    P Axis 26 degrees    R Axis 47 degrees    T Axis 55 degrees   CBC WITH AUTO DIFFERENTIAL    Collection Time: 12/21/20  9:23 AM   Result Value Ref Range    WBC 9.5 3.5 - 11.3 k/uL    RBC 4.68 3.95 - 5.11 m/uL    Hemoglobin 14.8 11.9 - 15.1 g/dL    Hematocrit 45.4 36.3 - 47.1 %    MCV 97.0 82.6 - 102.9 fL    MCH 31.6 25.2 - 33.5 pg    MCHC 32.6 28.4 - 34.8 g/dL    RDW 13.5 11.8 - 14.4 %    Platelets 914 767 - 766 k/uL    MPV 8.3 8.1 - 13.5 fL    NRBC Automated 0.0 0.0 per 100 WBC    Differential Type NOT REPORTED     Seg Neutrophils 44 36 - 65 %    Lymphocytes 37 24 - 43 %    Monocytes 7 3 - 12 %    Eosinophils % 11 (H) 1 - 4 %    Basophils 1 0 - 2 %    Immature Granulocytes 0 0 %    Segs Absolute 4.27 1.50 - 8.10 k/uL    Absolute Lymph # 3.49 1.10 - 3.70 k/uL    Absolute Mono # 0.65 0.10 - 1.20 k/uL    Absolute Eos # 1.04 (H) 0.00 - 0.44 k/uL    Basophils Absolute 0.08 0.00 - 0.20 k/uL    Absolute Immature Granulocyte <0.03 0.00 - 0.30 k/uL    WBC Morphology NOT REPORTED     RBC Morphology NOT REPORTED     Platelet Estimate NOT REPORTED    BASIC METABOLIC PANEL    Collection Time: 12/21/20  9:23 AM   Result Value Ref Range    Glucose 107 (H) 70 - 99 mg/dL    BUN 8 6 - 20 mg/dL    CREATININE 0.69 0.50 - 0.90 mg/dL    Bun/Cre Ratio NOT REPORTED 9 - 20    Calcium 9.6 8.6 - 10.4 mg/dL    Sodium 144 135 - 144 mmol/L    Potassium 4.5 3.7 - 5.3 mmol/L    Chloride 106 98 - 107 mmol/L    CO2 25 20 - 31 mmol/L    Anion Gap 13 9 - 17 mmol/L    GFR Non-African American >60 >60 mL/min    GFR African American >60 >60 mL/min    GFR Comment          GFR Staging NOT REPORTED    Brain Natriuretic Peptide    Collection Time: 12/21/20  9:23 AM   Result Value Ref Range    Pro-BNP <20 <300 pg/mL    BNP Interpretation Pro-BNP Reference Range:    Troponin Collection Time: 12/21/20  9:23 AM   Result Value Ref Range    Troponin, High Sensitivity 6 0 - 14 ng/L    Troponin T NOT REPORTED <0.03 ng/mL    Troponin Interp NOT REPORTED    Troponin    Collection Time: 12/21/20 11:16 AM   Result Value Ref Range    Troponin, High Sensitivity <6 0 - 14 ng/L    Troponin T NOT REPORTED <0.03 ng/mL    Troponin Interp NOT REPORTED    COVID-19    Collection Time: 12/21/20 11:20 AM    Specimen: Other   Result Value Ref Range    SARS-CoV-2          SARS-CoV-2, Rapid Not Detected Not Detected    Source . NASOPHARYNGEAL SWAB     SARS-CoV-2           Imaging:   Xr Chest Portable    Result Date: 12/21/2020  No acute process. Ct Chest Pulmonary Embolism W Contrast    Result Date: 12/21/2020  1. No evidence of pulmonary embolic disease. 2. Nonspecific patchy ground-glass opacification in the peripheral aspect of the lungs. Similar, less prevalent findings were noted on the previous study. Differential considerations include hypoaeration. An early multifocal pneumonia is not excluded. 3. Prominent coronary vascular calcification. ASSESSMENT & PLAN     ASSESSMENT / PLAN:     IMPRESSION  This is a 54 y.o. female who presented with worsening shortness of breath and found to be in COPD exacerbation possibly secondary to a pneumonia. Acute COPD Exacerbation   Most likely secondary to pneumonia  Nonspecific patchy ground glass opacification in the peripheral aspect of the lungs on CT PE   Mycoplasma, Legionella, S.  Pneumonia testing pending   Respiratory culture pending   Respiratory Care Evaluation and Treat Ordered  Respiratory Protocol Initiated  Proventil Nebulizer 2.5 mg q 4 PRN   Symbicort Inhaled 2 puff 2 times daily   Spiriva 2 puff Daily  IV Solu-Medrol 40 mg q 8  Azithromycin 500 mg PO Daily starting tomorrow   Rocephin 1 g IV q 24 hrs  Will continue to monitor     Hyperlipidemia   Lipitor 40 mg PO Daily     Anxiety  Elavil 75 mg PO Nightly     Depression Lexapro 15 mg PO Daily     Cigarette Smoking  Counseled regarding Cessation    Illicit Drug Use   Marijuana Use  Counseled     DVT ppx: Lovenox     PT/OT/SW: On Board  Discharge Planning: Ongoing - Home Tomorrow or Next Janie Petersen MD  Internal Medicine Resident, PGY-1  [address] [place], [place]  12/21/2020, 5:03 PM

## 2020-12-21 NOTE — PROGRESS NOTES
This patient was assigned to me by error. This patient was never interviewed nor examined by me. I did not participate in the care of this patient.     Alexandria Espinosa MD  Emergency Medicine Resident

## 2020-12-21 NOTE — TELEPHONE ENCOUNTER
Health Maintenance   Topic Date Due    Hepatitis C screen  1965    HIV screen  08/08/1980    Shingles Vaccine (1 of 2) 08/08/2015    Colon Cancer Screen FIT/FOBT  02/20/2019    Annual Wellness Visit (AWV)  01/09/2020    Breast cancer screen  02/06/2020    Cervical cancer screen  02/28/2021 (Originally 8/8/1986)    Lipid screen  11/08/2022    DTaP/Tdap/Td vaccine (2 - Td) 09/20/2027    Flu vaccine  Completed    Pneumococcal 0-64 years Vaccine  Completed    Hepatitis A vaccine  Aged Out    Hepatitis B vaccine  Aged Out    Hib vaccine  Aged Out    Meningococcal (ACWY) vaccine  Aged Out             (applicable per patient's age: Cancer Screenings, Depression Screening, Fall Risk Screening, Immunizations)    Hemoglobin A1C (%)   Date Value   01/30/2018 5.3   08/02/2013 5.3   02/14/2013 6.0     LDL Cholesterol (mg/dL)   Date Value   11/08/2017 198 (H)     AST (U/L)   Date Value   07/12/2020 18     ALT (U/L)   Date Value   07/12/2020 16     BUN (mg/dL)   Date Value   10/23/2020 9      (goal A1C is < 7)   (goal LDL is <100) need 30-50% reduction from baseline     BP Readings from Last 3 Encounters:   12/11/20 (!) 137/95   10/23/20 (!) 148/95   10/16/20 (!) 133/97    (goal /80)      All Future Testing planned in [facility]:  Lab Frequency Next Occurrence   CONCETTA DIGITAL SCREEN W OR WO CAD BILATERAL Once 05/15/2020   EEG Once 04/30/2020       Next Visit Date:  Future Appointments   Date Time Provider Kaycee Hays   1/19/2021  1:20 PM THOR Mills - CNP Neuro Spec [id number]            Patient Active Problem List:     Heart disease     Chronic back pain     Depression     Hyperlipidemia     CAD, multiple vessel     Asthma     Smoking     Need for vaccination     Syncope     Leg pain     Left hip pain     Chronic cholecystitis     Chest pain     Calculus of gallbladder without cholecystitis without obstruction     History of lumbar fusion     Failed back syndrome     Marijuana use     Abnormal
Initial (On Arrival)

## 2020-12-21 NOTE — ED PROVIDER NOTES
[facility]     Emergency Department     Faculty Attestation    I performed a history and physical examination of the patient and discussed management with the resident. I reviewed the residents note and agree with the documented findings and plan of care. Any areas of disagreement are noted on the chart. I was personally present for the key portions of any procedures. I have documented in the chart those procedures where I was not present during the key portions. I have reviewed the emergency nurses triage note. I agree with the chief complaint, past medical history, past surgical history, allergies, medications, social and family history as documented unless otherwise noted below. For Physician Assistant/ Nurse Practitioner cases/documentation I have personally evaluated this patient and have completed at least one if not all key elements of the E/M (history, physical exam, and MDM). Additional findings are as noted. I have personally seen and evaluated the patient. I find the patient's history and physical exam are consistent with the NP/PA documentation. I agree with the care provided, treatment rendered, disposition and follow-up plan. 59-year-old female with a history of COPD, hypertension, hyperlipidemia presenting with dry cough, difficulty breathing not improved by nebulizer treatments at home. Chest tightness without pain. Exam:  General: Laying on the bed, awake, alert and in no acute distress  CV: normal rate and regular rhythm  Lungs: Breathing comfortably on room air with no tachypnea, hypoxia, or increased work of breathing    Plan:  Rule out Covid  New oxygen requirement, will require admission    CT showing groundglass opacity, possible early multifocal pneumonia. Covid swab negative. Admitted to internal medicine for further management.         Jani Cano MD   Attending Emergency  Physician (Please note that portions of this note were completed with a voice recognition program. Efforts were made to edit the dictations but occasionally words are mis-transcribed.)             Dionne Lerner MD  12/21/20 [id number]

## 2020-12-21 NOTE — ED PROVIDER NOTES
[facility] ED  Emergency Department Encounter  Emergency Medicine Resident     Pt Name: Gilbert Singh  MRN: [id number]  [place] 1965  Date of evaluation: 12/21/20  PCP:  Marien Homans, APRN - [place] [address]       Chief Complaint   Patient presents with    Shortness of Breath     combivent and solumedrol pta,     Cough       HISTORY OFPRESENT ILLNESS  (Location/Symptom, Timing/Onset, Context/Setting, Quality, Duration, Modifying Factors,Severity.)      Gilbert Singh is a 55 yo female who presents with shortness of breath. Patient states for the past couple of days she has been having worsening shortness of breath than usual.  She states that she has a history of COPD and has been taking her breathing treatments but it has not been making her feel better. She denies any home oxygen use but is requiring oxygen currently. She denies any fevers, chills, body aches, chest pain. She notes a dry nonproductive cough. Denies any abdominal pain, nausea, vomiting, leg swelling, calf cramping, history of pulmonary embolus, hemoptysis, estrogen supplementation of hormones, recent surgery or long travel. Patient did receive Solu-Medrol and breathing treatments per EMS. PAST MEDICAL / SURGICAL / SOCIAL / FAMILY HISTORY      has a past medical history of Acute MI ([address] Anxiety, Chronic back pain, COPD (chronic obstructive pulmonary disease) ([facility] [address] Depression, Heart disease, Hyperlipidemia, Hypertension, and MRSA (methicillin resistant staph aureus) culture positive. has a past surgical history that includes Tubal ligation; Tonsillectomy; Cholecystectomy, laparoscopic (11/10/2017); Cholecystectomy, laparoscopic (N/A, 11/10/2017); and back surgery.      Social History     Socioeconomic History    Marital status:      Spouse name: Not on file    Number of children: Not on file    Years of education: Not on file    Highest education level: Not on file   Occupational History  Not on file   Social Needs    Financial resource strain: Not hard at all   [place] insecurity     Worry: Never true     Inability: Never true   [employer] needs     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    Drug use: Yes     Types: Marijuana     Comment: daily - \"a couple bowls\"    Sexual activity: Not on file   Lifestyle    Physical activity     Days per week: Not on file     Minutes per session: Not on file    Stress: Not on file   Relationships    Social connections     Talks on phone: Not on file     Gets together: Not on file     Attends Sabianism service: Not on file     Active member of club or organization: Not on file     Attends meetings of clubs or organizations: Not on file     Relationship status: Not on file    Intimate partner violence     Fear of current or ex partner: Not on file     Emotionally abused: Not on file     Physically abused: Not on file     Forced sexual activity: Not on file   Other Topics Concern    Not on file   Social History Narrative    Not on file       Family History   Problem Relation Age of Onset    Other Mother     Heart Disease Father     High Blood Pressure Father     High Cholesterol Father     Other Brother         Allergies:  Sulfa antibiotics    Home Medications:  Prior to Admission medications    Medication Sig Start Date End Date Taking?  Authorizing Provider   albuterol sulfate  (90 Base) MCG/ACT inhaler INHALE TWO PUFFS BY MOUTH EVERY 6 HOURS AS NEEDED FOR WHEEZING 12/21/20   THOR Mondragon - RAMONITA levETIRAcetam (KEPPRA) 750 MG tablet Take 1 tablet by mouth 2 times daily 12/2/20   THOR Tabares CNP   amitriptyline (ELAVIL) 25 MG tablet Take 3 tablets by mouth nightly 12/2/20 1/1/21  THOR Tabares CNP   albuterol (PROVENTIL) (2.5 MG/3ML) 0.083% nebulizer solution INHALE 3 MLS BY NEBULIZATION EVERY 6 HOURS AS NEEDED FOR WHEEZING 11/24/20   THOR Mondragon CNP   baclofen (LIORESAL) 10 MG tablet Take 1 tablet by mouth nightly as needed (pain) 11/12/20   THOR Mondragon CNP   STIOLTO RESPIMAT 2.5-2.5 MCG/ACT AERS INHALE TWO INHALATION(S) BY MOUTH DAILY  Patient not taking: Reported on 11/30/2020 11/9/20   THOR Mondragon CNP   budesonide-formoterol [facility]) 160-4.5 MCG/ACT AERO Inhale 2 puffs into the lungs 2 times daily  Patient not taking: Reported on 11/30/2020 10/16/20   Nilda Fine MD   tiotropium (SPIRIVA RESPIMAT) 2.5 MCG/ACT AERS inhaler Inhale 2 puffs into the lungs daily 10/16/20   Nilda Fine MD   escitalopram (LEXAPRO) 10 MG tablet Take 1.5 tablets by mouth daily 9/30/20   THOR Lin NP   Nebulizers ([address]) MISC Daily as needed 9/4/20   THOR Mondragon CNP   Respiratory Therapy Supplies (NEBULIZER/TUBING/MOUTHPIECE) KIT 1 kit by Does not apply route daily as needed (SOB or wheezing) 9/4/20   THOR Mondragon CNP   atorvastatin (LIPITOR) 40 MG tablet Take 1 tablet by mouth daily 8/7/20 11/30/20  THOR Mondragon CNP   acetaminophen (TYLENOL) 325 MG tablet Take 1 tablet by mouth every 6 hours as needed for Pain 6/15/20   Basia Guadarrama DO   ibuprofen (IBU) 400 MG tablet Take 1 tablet by mouth every 6 hours as needed for Pain 6/15/20   Basia Guadarrama, DO   Handicap Placard MISC by Does not apply route Exp 1/2024 1/20/20   THOR Mondragon - CNP   ondansetron (ZOFRAN ODT) 4 MG disintegrating tablet Take 1 tablet by mouth every 8 hours as needed for Nausea Patient not taking: Reported on 10/16/2020 1/9/20   Consuelo Simmons MD   Cholecalciferol (VITAMIN D3) 400 units CAPS Take 1 tablet by mouth daily    Historical Provider, MD   aspirin EC 81 MG EC tablet Take 1 tablet by mouth daily  Patient taking differently: Take 81 mg by mouth daily as needed  3/6/19   Marien Homans, APRN - CNP   magnesium gluconate (MAGONATE) 500 MG tablet Take 400 mg by mouth every evening     Historical Provider, MD   vitamin B-6 (PYRIDOXINE) 100 MG tablet Take 100 mg by mouth daily    Historical Provider, MD   nitroGLYCERIN (NITROSTAT) 0.4 MG SL tablet Place 1 tablet under the tongue every 5 minutes as needed for Chest pain up to max of 3 total doses. If no relief after 1 dose, call 911. Patient not taking: Reported on 10/16/2020 1/30/18   Miley Penaloza MD   Multiple Vitamins-Minerals (THERAPEUTIC MULTIVITAMIN-MINERALS) tablet Take 1 tablet by mouth daily. Historical Provider, MD   vitamin B-12 (CYANOCOBALAMIN) 1000 MCG tablet Take 1,000 mcg by mouth daily. Historical Provider, MD       REVIEW OFSYSTEMS    (2-9 systems for level 4, 10 or more for level 5)      Review of Systems   Constitutional: Negative for chills and fever. HENT: Negative. Eyes: Negative for visual disturbance. Respiratory: Positive for cough and shortness of breath. Negative for wheezing. Cardiovascular: Negative for chest pain, palpitations and leg swelling. Gastrointestinal: Negative for abdominal pain, nausea and vomiting. Genitourinary: Negative for dysuria and hematuria. Musculoskeletal: Negative for back pain and neck pain. Skin: Negative for rash and wound. Neurological: Negative for dizziness, syncope, weakness, light-headedness, numbness and headaches.        PHYSICAL EXAM   (up to 7 for level 4, 8 or more forlevel 5)      INITIAL VITALS:   Vitals:    12/21/20 0918 12/21/20 0921 12/21/20 1031   BP: (!) 161/117 (!) 138/101 (!) 124/90   Pulse: 109 108 101   Resp: 19 15 16 Temp:  98 °F (36.7 °C)    TempSrc:  Oral    SpO2: 93% 92% 92%   Weight: 155 lb (70.3 kg)     Height: 5' 7\" (1.702 m)             Physical Exam  Vitals signs and nursing note reviewed. Constitutional:       General: She is not in acute distress. Appearance: Normal appearance. She is not ill-appearing, toxic-appearing or diaphoretic. Cardiovascular:      Rate and Rhythm: Regular rhythm. Tachycardia present. Heart sounds: No murmur. No gallop. Pulmonary:      Effort: No respiratory distress. Breath sounds: No stridor. No wheezing, rhonchi or rales. Comments: Patient is tachypneic but is not in acute distress. Good air movement throughout without any wheezes. Abdominal:      General: There is no distension. Palpations: Abdomen is soft. Tenderness: There is no abdominal tenderness. There is no guarding. Musculoskeletal:         General: No tenderness. Right lower leg: No edema. Left lower leg: No edema. Skin:     General: Skin is warm and dry. Neurological:      Mental Status: She is alert and oriented to person, place, and time.          DIFFERENTIAL  DIAGNOSIS     PLAN (LABS / IMAGING / EKG):  Orders Placed This Encounter   Procedures    XR CHEST PORTABLE    CT CHEST PULMONARY EMBOLISM W CONTRAST    CBC WITH AUTO DIFFERENTIAL    BASIC METABOLIC PANEL    Brain Natriuretic Peptide    Troponin    Troponin    COVID-19    Telemetry Monitoring    Inpatient consult to Internal Medicine    EKG 12 Lead    PATIENT STATUS (FROM ED OR OR/PROCEDURAL) Inpatient       MEDICATIONS ORDERED:  Orders Placed This Encounter   Medications    iopamidol (ISOVUE-370) 76 % injection 75 mL    azithromycin (ZITHROMAX) 500 mg in dextrose 5% 250 mL IVPB     Order Specific Question:   Antimicrobial Indications     Answer:   Pneumonia (CAP)    cefTRIAXone (ROCEPHIN) 1 g IVPB in 50 mL D5W minibag     Order Specific Question:   Antimicrobial Indications     Answer:   Pneumonia (CAP) DDX: PE, ACS, pneumonia, COVID-19, COPD, CHF    Initial MDM/Plan/ED course: 54 y.o. female who presents with shortness of breath. On exam patient is mildly hypoxic at 90% SPO2 on room air and tachycardic but otherwise vitals are normal and patient is in no acute distress and is awake and alert and oriented. Patient denies any need for supplemental oxygen in the past.  On physical exam heart and lung sounds were normal, abdomen soft nontender, no leg swelling calf tenderness. As the patient is tachycardic and hypoxic with normal lung sounds, there was large concern for PE at this time. Cardiac labs obtained as well as CTA of the chest and COVID-19 swab. Work-up was consistent with multifocal pneumonia without evidence of COVID-19 infection. Patient was treated with ceftriaxone and azithromycin and admitted to internal medicine. Patient agreed with this plan throughout her ED stay however, while admitting team was coming down to see the patient she told me that she wanted to leave [address] I spoke with the patient and informed her of the risks and benefits of leaving including leaving without oxygen and experiencing acute respiratory distress and death. Patient then changed her mind and decided that she would be admitted for further evaluation and treatment.     DIAGNOSTIC RESULTS / EMERGENCY DEPARTMENT COURSE / MDM     LABS:  Labs Reviewed   CBC WITH AUTO DIFFERENTIAL - Abnormal; Notable for the following components:       Result Value    Eosinophils % 11 (*)     Absolute Eos # 1.04 (*)     All other components within normal limits   BASIC METABOLIC PANEL - Abnormal; Notable for the following components:    Glucose 107 (*)     All other components within normal limits   BRAIN NATRIURETIC PEPTIDE   TROPONIN   TROPONIN   COVID-19         RADIOLOGY:  Xr Chest Portable    Result Date: 12/21/2020 EXAMINATION: ONE XRAY VIEW OF THE CHEST 12/21/2020 9:40 am COMPARISON: Chest October 23, 2020 HISTORY: ORDERING SYSTEM PROVIDED HISTORY: SOB TECHNOLOGIST PROVIDED HISTORY: SOB Reason for Exam: SOB FINDINGS: The lungs are without acute focal process. There is no effusion or pneumothorax. The cardiomediastinal silhouette is without acute process. No acute process.      Ct Chest Pulmonary Embolism W Contrast    Result Date: 12/21/2020 EXAMINATION: CTA OF THE CHEST 12/21/2020 10:39 am TECHNIQUE: CTA of the chest was performed after the administration of intravenous contrast.  Multiplanar reformatted images are provided for review. MIP images are provided for review. Dose modulation, iterative reconstruction, and/or weight based adjustment of the mA/kV was utilized to reduce the radiation dose to as low as reasonably achievable. COMPARISON: 10/21/2020 and 07/12/2020. HISTORY: ORDERING SYSTEM PROVIDED HISTORY: concenr for PE TECHNOLOGIST PROVIDED HISTORY: concenr for PE Reason for Exam: concenr for PE FINDINGS: Pulmonary Arteries: Pulmonary arteries are adequately opacified for evaluation. No evidence of intraluminal filling defect to suggest pulmonary embolism. Main pulmonary artery is normal in caliber. Mediastinum: The thoracic aorta is normal in course and caliber with mild atherosclerotic plaque. The heart is not enlarged. Coronary vascular calcification and no pericardial effusion. No pathologic hilar or mediastinal adenopathy. Lungs/pleura: There are several poorly defined foci of ground-glass opacification scattered throughout the lungs, particularly in the peripheral aspect. No focal consolidation or pleural fluid. The central airways are patent. Stable 0.5 cm right upper lobe pulmonary nodule. Upper Abdomen: Stable 0.8 cm left hepatic lesion, too small to fully characterize but likely a cyst or hemangioma. The visualized upper abdominal viscera are otherwise unremarkable. Soft Tissues/Bones: No acute osseous or soft tissue abnormality. Mild multilevel osteoarthritic spurring in the thoracic spine. 1. No evidence of pulmonary embolic disease. 2. Nonspecific patchy ground-glass opacification in the peripheral aspect of the lungs. Similar, less prevalent findings were noted on the previous study. Differential considerations include hypoaeration. An early multifocal pneumonia is not excluded. 3. Prominent coronary vascular calcification. EKG      All EKG's are interpreted by the [facility] Physician who either signs or Co-signs this chart in the absence of a cardiologist.      PROCEDURES:  None    CONSULTS:  IP CONSULT TO INTERNAL MEDICINE    CRITICAL CARE:  Please see attending note    FINAL IMPRESSION      1. Pneumonia due to organism    2. Hypoxia          DISPOSITION / PLAN     DISPOSITION Admitted 12/21/2020 12:12:50 PM      PATIENT REFERRED TO:  No follow-up provider specified.     DISCHARGE MEDICATIONS:  New Prescriptions    No medications on file       Radha Ascencio DO  Emergency Medicine Resident    (Please note that portions of this note were completed with a voice recognition program.Efforts were made to edit the dictations but occasionally words are mis-transcribed.)        Felipe Bernal DO  Resident  12/21/20 3903

## 2020-12-22 LAB
ABSOLUTE EOS #: <0.03 K/UL (ref 0–0.44)
ABSOLUTE IMMATURE GRANULOCYTE: 0.05 K/UL (ref 0–0.3)
ABSOLUTE LYMPH #: 1.57 K/UL (ref 1.1–3.7)
ABSOLUTE MONO #: 0.85 K/UL (ref 0.1–1.2)
ANION GAP SERPL CALCULATED.3IONS-SCNC: 14 MMOL/L (ref 9–17)
BASOPHILS # BLD: 0 % (ref 0–2)
BASOPHILS ABSOLUTE: 0.04 K/UL (ref 0–0.2)
BUN BLDV-MCNC: 11 MG/DL (ref 6–20)
BUN/CREAT BLD: ABNORMAL (ref 9–20)
CALCIUM SERPL-MCNC: 9.2 MG/DL (ref 8.6–10.4)
CHLORIDE BLD-SCNC: 108 MMOL/L (ref 98–107)
CO2: 18 MMOL/L (ref 20–31)
CREAT SERPL-MCNC: 0.53 MG/DL (ref 0.5–0.9)
DIFFERENTIAL TYPE: ABNORMAL
EKG ATRIAL RATE: 109 BPM
EKG P AXIS: 26 DEGREES
EKG P-R INTERVAL: 130 MS
EKG Q-T INTERVAL: 328 MS
EKG QRS DURATION: 82 MS
EKG QTC CALCULATION (BAZETT): 441 MS
EKG R AXIS: 47 DEGREES
EKG T AXIS: 55 DEGREES
EKG VENTRICULAR RATE: 109 BPM
EOSINOPHILS RELATIVE PERCENT: 0 % (ref 1–4)
GFR AFRICAN AMERICAN: >60 ML/MIN
GFR NON-AFRICAN AMERICAN: >60 ML/MIN
GFR SERPL CREATININE-BSD FRML MDRD: ABNORMAL ML/MIN/{1.73_M2}
GFR SERPL CREATININE-BSD FRML MDRD: ABNORMAL ML/MIN/{1.73_M2}
GLUCOSE BLD-MCNC: 109 MG/DL (ref 70–99)
HCT VFR BLD CALC: 40 % (ref 36.3–47.1)
HEMOGLOBIN: 13 G/DL (ref 11.9–15.1)
IMMATURE GRANULOCYTES: 0 %
LYMPHOCYTES # BLD: 8 % (ref 24–43)
MCH RBC QN AUTO: 31.6 PG (ref 25.2–33.5)
MCHC RBC AUTO-ENTMCNC: 32.5 G/DL (ref 28.4–34.8)
MCV RBC AUTO: 97.3 FL (ref 82.6–102.9)
MONOCYTES # BLD: 5 % (ref 3–12)
NRBC AUTOMATED: 0 PER 100 WBC
PDW BLD-RTO: 13.4 % (ref 11.8–14.4)
PLATELET # BLD: 268 K/UL (ref 138–453)
PLATELET ESTIMATE: ABNORMAL
PMV BLD AUTO: 8.6 FL (ref 8.1–13.5)
POTASSIUM SERPL-SCNC: 4.1 MMOL/L (ref 3.7–5.3)
RBC # BLD: 4.11 M/UL (ref 3.95–5.11)
RBC # BLD: ABNORMAL 10*6/UL
SEG NEUTROPHILS: 87 % (ref 36–65)
SEGMENTED NEUTROPHILS ABSOLUTE COUNT: 16.55 K/UL (ref 1.5–8.1)
SODIUM BLD-SCNC: 140 MMOL/L (ref 135–144)
WBC # BLD: 19.1 K/UL (ref 3.5–11.3)
WBC # BLD: ABNORMAL 10*3/UL

## 2020-12-22 PROCEDURE — 2500000003 HC RX 250 WO HCPCS: Performed by: STUDENT IN AN ORGANIZED HEALTH CARE EDUCATION/TRAINING PROGRAM

## 2020-12-22 PROCEDURE — 6360000002 HC RX W HCPCS: Performed by: STUDENT IN AN ORGANIZED HEALTH CARE EDUCATION/TRAINING PROGRAM

## 2020-12-22 PROCEDURE — 94640 AIRWAY INHALATION TREATMENT: CPT

## 2020-12-22 PROCEDURE — 2580000003 HC RX 258: Performed by: STUDENT IN AN ORGANIZED HEALTH CARE EDUCATION/TRAINING PROGRAM

## 2020-12-22 PROCEDURE — 6370000000 HC RX 637 (ALT 250 FOR IP): Performed by: STUDENT IN AN ORGANIZED HEALTH CARE EDUCATION/TRAINING PROGRAM

## 2020-12-22 PROCEDURE — 6370000000 HC RX 637 (ALT 250 FOR IP): Performed by: NURSE PRACTITIONER

## 2020-12-22 PROCEDURE — 93010 ELECTROCARDIOGRAM REPORT: CPT | Performed by: INTERNAL MEDICINE

## 2020-12-22 PROCEDURE — 80048 BASIC METABOLIC PNL TOTAL CA: CPT

## 2020-12-22 PROCEDURE — 85025 COMPLETE CBC W/AUTO DIFF WBC: CPT

## 2020-12-22 PROCEDURE — 36415 COLL VENOUS BLD VENIPUNCTURE: CPT

## 2020-12-22 PROCEDURE — 1200000000 HC SEMI PRIVATE

## 2020-12-22 PROCEDURE — 94761 N-INVAS EAR/PLS OXIMETRY MLT: CPT

## 2020-12-22 PROCEDURE — 99223 1ST HOSP IP/OBS HIGH 75: CPT | Performed by: INTERNAL MEDICINE

## 2020-12-22 RX ORDER — METOPROLOL TARTRATE 5 MG/5ML
5 INJECTION INTRAVENOUS ONCE
Status: COMPLETED | OUTPATIENT
Start: 2020-12-22 | End: 2020-12-22

## 2020-12-22 RX ORDER — IBUPROFEN 400 MG/1
400 TABLET ORAL ONCE
Status: COMPLETED | OUTPATIENT
Start: 2020-12-22 | End: 2020-12-22

## 2020-12-22 RX ORDER — BUTALBITAL, ACETAMINOPHEN AND CAFFEINE 50; 325; 40 MG/1; MG/1; MG/1
1 TABLET ORAL EVERY 4 HOURS PRN
Status: DISCONTINUED | OUTPATIENT
Start: 2020-12-22 | End: 2020-12-23 | Stop reason: HOSPADM

## 2020-12-22 RX ORDER — TRAMADOL HYDROCHLORIDE 50 MG/1
50 TABLET ORAL ONCE
Status: COMPLETED | OUTPATIENT
Start: 2020-12-22 | End: 2020-12-22

## 2020-12-22 RX ORDER — SODIUM CHLORIDE 9 MG/ML
INJECTION, SOLUTION INTRAVENOUS CONTINUOUS
Status: DISCONTINUED | OUTPATIENT
Start: 2020-12-22 | End: 2020-12-22

## 2020-12-22 RX ADMIN — IBUPROFEN 400 MG: 400 TABLET, FILM COATED ORAL at 00:52

## 2020-12-22 RX ADMIN — Medication 10 ML: at 21:27

## 2020-12-22 RX ADMIN — BUTALBITAL, ACETAMINOPHEN AND CAFFEINE 1 TABLET: 50; 325; 40 TABLET ORAL at 08:05

## 2020-12-22 RX ADMIN — LEVETIRACETAM 750 MG: 500 TABLET, FILM COATED ORAL at 00:25

## 2020-12-22 RX ADMIN — METOPROLOL TARTRATE 5 MG: 5 INJECTION, SOLUTION INTRAVENOUS at 08:07

## 2020-12-22 RX ADMIN — ACETAMINOPHEN 650 MG: 325 TABLET ORAL at 05:00

## 2020-12-22 RX ADMIN — ENOXAPARIN SODIUM 40 MG: 40 INJECTION SUBCUTANEOUS at 08:04

## 2020-12-22 RX ADMIN — AZITHROMYCIN 500 MG: 250 TABLET, FILM COATED ORAL at 08:06

## 2020-12-22 RX ADMIN — BUDESONIDE AND FORMOTEROL FUMARATE DIHYDRATE 2 PUFF: 160; 4.5 AEROSOL RESPIRATORY (INHALATION) at 07:55

## 2020-12-22 RX ADMIN — BUDESONIDE AND FORMOTEROL FUMARATE DIHYDRATE 2 PUFF: 160; 4.5 AEROSOL RESPIRATORY (INHALATION) at 19:15

## 2020-12-22 RX ADMIN — METHYLPREDNISOLONE SODIUM SUCCINATE 40 MG: 125 INJECTION, POWDER, FOR SOLUTION INTRAMUSCULAR; INTRAVENOUS at 17:03

## 2020-12-22 RX ADMIN — ALBUTEROL SULFATE 2.5 MG: 2.5 SOLUTION RESPIRATORY (INHALATION) at 05:54

## 2020-12-22 RX ADMIN — BUTALBITAL, ACETAMINOPHEN AND CAFFEINE 1 TABLET: 50; 325; 40 TABLET ORAL at 18:15

## 2020-12-22 RX ADMIN — ALBUTEROL SULFATE 2.5 MG: 2.5 SOLUTION RESPIRATORY (INHALATION) at 13:02

## 2020-12-22 RX ADMIN — METHYLPREDNISOLONE SODIUM SUCCINATE 40 MG: 125 INJECTION, POWDER, FOR SOLUTION INTRAMUSCULAR; INTRAVENOUS at 09:04

## 2020-12-22 RX ADMIN — SODIUM CHLORIDE: 9 INJECTION, SOLUTION INTRAVENOUS at 08:08

## 2020-12-22 RX ADMIN — LEVETIRACETAM 750 MG: 500 TABLET, FILM COATED ORAL at 21:27

## 2020-12-22 RX ADMIN — ALBUTEROL SULFATE 2.5 MG: 2.5 SOLUTION RESPIRATORY (INHALATION) at 01:04

## 2020-12-22 RX ADMIN — TIOTROPIUM BROMIDE INHALATION SPRAY 2 PUFF: 3.12 SPRAY, METERED RESPIRATORY (INHALATION) at 07:55

## 2020-12-22 RX ADMIN — CEFTRIAXONE SODIUM 1 G: 1 INJECTION, POWDER, FOR SOLUTION INTRAMUSCULAR; INTRAVENOUS at 13:45

## 2020-12-22 RX ADMIN — LEVETIRACETAM 750 MG: 500 TABLET, FILM COATED ORAL at 08:05

## 2020-12-22 RX ADMIN — TRAMADOL HYDROCHLORIDE 50 MG: 50 TABLET, COATED ORAL at 05:16

## 2020-12-22 RX ADMIN — METOPROLOL TARTRATE 5 MG: 1 INJECTION, SOLUTION INTRAVENOUS at 03:07

## 2020-12-22 RX ADMIN — DESMOPRESSIN ACETATE 40 MG: 0.2 TABLET ORAL at 08:06

## 2020-12-22 RX ADMIN — AMITRIPTYLINE HYDROCHLORIDE 75 MG: 50 TABLET, FILM COATED ORAL at 00:25

## 2020-12-22 RX ADMIN — AMITRIPTYLINE HYDROCHLORIDE 75 MG: 50 TABLET, FILM COATED ORAL at 21:27

## 2020-12-22 RX ADMIN — SODIUM CHLORIDE: 9 INJECTION, SOLUTION INTRAVENOUS at 08:15

## 2020-12-22 RX ADMIN — ESCITALOPRAM OXALATE 15 MG: 10 TABLET ORAL at 08:06

## 2020-12-22 ASSESSMENT — PAIN DESCRIPTION - LOCATION
LOCATION: HEAD
LOCATION: HEAD

## 2020-12-22 ASSESSMENT — PAIN SCALES - GENERAL
PAINLEVEL_OUTOF10: 4
PAINLEVEL_OUTOF10: 9
PAINLEVEL_OUTOF10: 9
PAINLEVEL_OUTOF10: 3
PAINLEVEL_OUTOF10: 9
PAINLEVEL_OUTOF10: 3
PAINLEVEL_OUTOF10: 6
PAINLEVEL_OUTOF10: 0

## 2020-12-22 ASSESSMENT — PAIN DESCRIPTION - PAIN TYPE
TYPE: ACUTE PAIN
TYPE: ACUTE PAIN

## 2020-12-22 ASSESSMENT — PAIN DESCRIPTION - DESCRIPTORS: DESCRIPTORS: HEADACHE

## 2020-12-22 ASSESSMENT — PAIN DESCRIPTION - FREQUENCY: FREQUENCY: CONTINUOUS

## 2020-12-22 NOTE — PROGRESS NOTES
Occupational Therapy    Occupational Therapy Not Seen Note    DATE: 2020  Name: Herbie Diop  : 1965  MRN: [id number]    Patient not available for Occupational Therapy due to:    Pt independent with functional tasks and functional mobility.  Pt with no OT acute care needs at this time, will defer OT eval.    Next Scheduled Treatment: N/A    Electronically signed by Ronda Sullivan OT on 2020 at 3:52 PM

## 2020-12-22 NOTE — ED NOTES
Dr Nory Tolentino at [address] Bhandari RN  12/21/20 7113
Internal Medicine @ bedside.       Francoise Camara RN  12/21/20 3273
Pt ambulated to the bathroom with a steady gait, no apparent distress. Pt is placed back on full monitor, IV pump reconnected and restarted.      Francoise Camara RN  12/21/20 8400
Pt given meal tray.       Cyndee Barney RN  12/21/20 9064
Pt presented to ed via life squad c/o sob and cough ongoing since yesterday. Pt is A&Ox4 with even non labored breaths. Pt transferred self to bed. Pt took multiple nebulizers at home. Pt received 1 combivent and 125 solumedrol pta by ems. Pt denies cp. Pt placed on full monitor, ekg done, and iv continued.       Elena Rosario RN  12/21/20 2465
Pt sitting up on cart, eating. Pt states she feels she needs another RT tx. Pt c/o headache.       Saintclair Guitar, RN  12/21/20 4655
Pt to [address] via [address] [place] [address], RN  12/21/20 9439
[facility]       Ze Taveras RN  12/21/20 3274
Report received from [facility]. Pt resting in bed. NAD at this time. Pt c/o headache but states she was given Tylenol. NAD at this time. Call light in reach. Will continue to monitor.         Dirk Ward RN  12/21/20 8971
Report tiven to [facility] answered all questions      Nelson Sr RN  12/21/20 1790
[FreeTextEntry2] : 57 y/o F with PMH of HTN, DM type II presenting here for a post hospital discharge appointment. \par Pt initially presented with the complaint of left sided chest pain and SOB on 7/5/19.\par \par On ED admission EKG revealed NSR at a rate of 85 with possible LAE and QTc of\par 461, CE x 1: Trop: 9, CE x 2: Trop: 9, CE x 3: Trop: 9, D-dimer: Negative,\par Gluc: 106. CXR: Clear lungs. No pleural effusions or pneumothorax. Cardiac and\par mediastinal silhouettes within normal limits. Trachea midline. Unremarkable\par osseous structures. In the ED patient received Aspirin 81mg. \par \par ACS was ruled out 3 sets negative cardiac enzymes, D-dimer negative, CXR clear.\par TTE was performed, which shows mild segmental systolic dysfxn, mild diastolic dysfxn, and new wall\par motion abnormality. Echo discussed with Dr. Bone and with cardiology\par NP, given no evidence of ACS, patient was d/c next day after admission with plan for Cardiology f/u in August.\par \par Currently, the pt conveys that CP mostly resolved. Yesterday pt ate rice w/ shrimp and experienced a cramping pain in upper stomach and chest, non-radiating,constant, lasting about 20 minutes while pt was upright, not worsened with exertion, with no alleviating factors, and 6/10 in intensity. The pain was not associated with nausea, vomiting, diarrhea, constipation, melena/gross blood in stool. Pt reports weight loss of 10lbs over a year which she attributes to diet changes with decreased fat intake and red meat intake. The pt's abdominal/chest pain has been ongoing for years. Pt has not been seen by GI for this particular complaint. Pt reports 6 years ago she had colonoscopy, which was negative as per pt; she denies hx of EGD. Pt agreed to fax colonoscopy report to clinic.  The pain that the pt experienced yesterday is similar to what she experienced when she presented to the ED, but is less severe.

## 2020-12-22 NOTE — PROGRESS NOTES
Senior note    20-year-old female with past medical history of COPD, generalized anxiety, hypertension presented with a chief complaint of worsening dyspnea and cough since 2 days. Patient called EMS, they gave her some breathing treatment and IV Solu-Medrol which made her breathing and he did better. In the ER patient was started on 2 L nasal cannula saturating 95%. Patient is tachycardic in 110s. Patient CT PE was done which showed  Nonspecific patchy ground-glass opacification in the peripheral aspect of   the lungs.  Similar, less prevalent findings were noted on the previous   study.  Differential considerations include hypoaeration.  An early   multifocal pneumonia is not excluded. EKG consistent with sinus tachycardia. Patient was given IV Rocephin and a Zithromax along with Solu-Medrol. On my examination, patient had bilateral end expiratory wheezing. Patient is a chronic smoker for more than 20 years and smokes 1 pack/day. Present on Admission:   Pneumonia   Depression   Smoking   Hypertension   Sinus tachycardia    Plan  1. Continue IV Rocephin and oral Zithromax. Symbicort and Spiriva restarted. 2. Continue IV Solu-Medrol 40 mg every 8 hours followed with oral prednisone taper on discharge. 3. Follow-up respiratory culture, Legionella, mycoplasma and strep pneumo antigen  4. Continues hydration and IV Lopressor as needed for sinus tachycardia. 5. Resume home medication for depression/anxiety.     Alberto Baum MD  PGY-2 Internal Medicine Resident  [address] [place] [address]  12/21/2020 7:08 PM

## 2020-12-22 NOTE — PROGRESS NOTES
[facility]  Internal Medicine Teaching Residency Program  Inpatient Daily Progress Note  ______________________________________________________________________________    Patient: Paddy Mccracken  YOB: 1965   [id number]:[id number]    Acct: [de-identified]     Room: [address] [place] [address]  Admit date: 12/21/2020  Today's date: 12/22/20  Number of days in the hospital: 1    SUBJECTIVE   Admitting Diagnosis: Pneumonia  CC: Shortness of Breath / Cough  Pt examined at bedside. Chart & results reviewed. No acute episodes overnight  Patient is heme stable and afebrile  Wheezing significantly improved  Shortness of breath improved  Home Tomorrow pending improved aeration     ROS:  Constitutional: Negative for chills and fever. Respiratory: Positive for cough and shortness of breath. Negative for wheezing.    Cardiovascular: Negative for chest pain, palpitations and leg swelling. Gastrointestinal: Negative for abdominal pain, nausea and vomiting. Genitourinary: Negative for dysuria and hematuria. Musculoskeletal: Negative for back pain and neck pain. Skin: Negative for rash and wound.    Neurological: Negative for dizziness, syncope, weakness, light-headedness, numbness and headaches.     BRIEF HISTORY The patient is a pleasant 54 y.o. female with a past medical history of COPD, Depression, Hypertension, and Hyperlipidemia presents with a chief complaint of worsening shortness of breath for the past couple of days; she normally experiences some shortness of breath but this is worse that her usual baseline. She has been using her albuterol nebulizer at home but it has not been helping. She has a dry nonproductive cough and is currently using 2 liters oxygen via nasal canula but does not use oxygen at home. She denies any fevers, chills, body aches, chest pain, abdominal pain, nausea, vomiting, leg swelling, calf cramping, and issues with bowel and bladder. She was given solu-medrol and breathing treatments via EMS on the way to hospital.       OBJECTIVE     Vital Signs:  BP (!) 135/90   Pulse 100   Temp 97.5 °F (36.4 °C) (Oral)   Resp 18   Ht 5' 7\" (1.702 m)   Wt 155 lb (70.3 kg)   SpO2 98%   BMI 24.28 kg/m²     Temp (24hrs), Av.9 °F (36.6 °C), Min:97.5 °F (36.4 °C), Max:98.2 °F (36.8 °C)    In: 990   Out: 700 [Urine:700]    Physical Exam:  Constitutional: This is a well developed, well nourished, 18.5-24.9 - Normal 54y.o. year old female who is alert, oriented, cooperative and in no apparent distress. Cardiovascular: Rate and Rhythm: Regular rhythm. Tachycardia present / Heart sounds: No murmur. No gallop.    Pulmonary: Effort: No respiratory distress / Breath sounds: No stridor. No wheezing, rhonchi or rales. Patient is tachypneic but is not in acute distress.  Good air movement throughout without any wheezes. Abdominal: General: There is no distension / Palpations: Abdomen is soft / Tenderness: There is no abdominal tenderness. There is no guarding. Musculoskeletal: General: No tenderness / Right lower leg: No edema / Left lower leg: No edema. Skin: General: Skin is warm and dry.    Neurological: Mental Status: She is alert and oriented to person, place, and time.     Medications: No acute process. Ct Chest Pulmonary Embolism W Contrast    Result Date: 12/22/2020  1. No evidence of pulmonary embolic disease. 2. Nonspecific patchy ground-glass opacification in the peripheral aspect of the lungs. Similar, less prevalent findings were noted on the previous study. Differential considerations include hypoaeration. An early multifocal pneumonia is not excluded. 3. Prominent coronary vascular calcification. ASSESSMENT & PLAN     ASSESSMENT / PLAN:     Acute COPD Exacerbation   Most likely secondary to pneumonia  Nonspecific patchy ground glass opacification in the peripheral aspect of the lungs on CT PE   Mycoplasma, Legionella, S. Pneumonia testing pending   Respiratory culture pending   Respiratory Care Evaluation and Treat Ordered  Respiratory Protocol Initiated  Proventil Nebulizer 2.5 mg q 4 PRN   Symbicort Inhaled 2 puff 2 times daily   Spiriva 2 puff Daily  IV Solu-Medrol 40 mg q 8  Azithromycin 500 mg PO Daily starting tomorrow   Rocephin 1 g IV q 24 hrs  Will continue to monitor      Hyperlipidemia   Lipitor 40 mg PO Daily      Anxiety  Elavil 75 mg PO Nightly      Depression  Lexapro 15 mg PO Daily      Cigarette Smoking  Counseled regarding Cessation     Illicit Drug Use   Marijuana Use  Counseled      DVT ppx: Lovenox      PT/OT/SW: On Board  Discharge Planning: Ongoing - Home Tomorrow    Mercedes Minor MD  Internal Medicine Resident, PGY-1  [address] [facility];  [place], [place]  12/22/2020, 11:29 AM

## 2020-12-22 NOTE — CARE COORDINATION
Case Management Initial Discharge Plan  Estuardo Durant,             Met with:patient to discuss discharge plans. Information verified: address, contacts, phone number, , insurance Yes    Emergency Contact/Next of Kin name & number: christina Wiggins [phone], [address] [phone]    PCP: THOR Newberry CNP  Date of last visit: few months ago    Insurance Provider: [facility] Medicare, [place]    Discharge Planning    Living Arrangements:  Spouse/Significant Other, Children   Support Systems:  None    Home has 1 stories  Flight of  stairs to climb to get into front door, flight of stairs to climb to reach second floor  Location of bedroom/bathroom in home main    Patient able to perform ADL's:Independent    Current Services (outpatient & in home) none  DME equipment: nebulizer  DME provider: na    Receiving oral anticoagulation therapy? No    If indicated:   Physician managing anticoagulation treatment: na  Where does patient obtain lab work for ATC treatment? na      Potential Assistance Needed:  Outpatient IV    Patient agreeable to home care: No  [place] of choice provided:  n/a    Prior SNF/Rehab Placement and Facility: none  Agreeable to SNF/Rehab: No  [place] of choice provided: n/a     Evaluation: n/a    Expected Discharge date:  20    Patient expects to be discharged to:  Home  Follow Up Appointment: Best Day/ Time: Monday AM    Transportation provider: Gabrielle Desir  Transportation arrangements needed for discharge: No    Readmission Risk              Risk of Unplanned Readmission:        19             Does patient have a readmission risk score greater than 14?: Yes  If yes, follow-up appointment must be made within 7 days of discharge.      Goals of Care: Self Care      Discharge Plan: home independent          Electronically signed by Nba Alejandro RN on 20 at 12:28 PM EST

## 2020-12-22 NOTE — PROGRESS NOTES
Physical Therapy  DATE: 2020    NAME: Kermit Sandifer  MRN: [id number]   : 1965    Patient not seen this date for Physical Therapy due to:  [] Blood transfusion in progress  [] Hemodialysis  [] Patient Declined  [] Spine Precautions   [] Strict Bedrest  [] Surgery/ Procedure  [] Testing      [] Other        [x] PT is being discontinued at this time. Patient independent with functional mobility and verbalizes no concerns in regards to functional mobility upon discharge. No further needs. [] PT is being discontinued at this time due to declining physical/ medical status. Therapy is not appropriate at this time.     Frederick Gomez, PT

## 2020-12-23 VITALS
RESPIRATION RATE: 15 BRPM | WEIGHT: 155 LBS | BODY MASS INDEX: 24.33 KG/M2 | TEMPERATURE: 97.3 F | HEIGHT: 67 IN | SYSTOLIC BLOOD PRESSURE: 122 MMHG | OXYGEN SATURATION: 91 % | DIASTOLIC BLOOD PRESSURE: 82 MMHG | HEART RATE: 78 BPM

## 2020-12-23 LAB
ABSOLUTE EOS #: <0.03 K/UL (ref 0–0.44)
ABSOLUTE IMMATURE GRANULOCYTE: 0.1 K/UL (ref 0–0.3)
ABSOLUTE LYMPH #: 1.99 K/UL (ref 1.1–3.7)
ABSOLUTE MONO #: 0.81 K/UL (ref 0.1–1.2)
ANION GAP SERPL CALCULATED.3IONS-SCNC: 10 MMOL/L (ref 9–17)
BASOPHILS # BLD: 0 % (ref 0–2)
BASOPHILS ABSOLUTE: 0.03 K/UL (ref 0–0.2)
BUN BLDV-MCNC: 17 MG/DL (ref 6–20)
BUN/CREAT BLD: ABNORMAL (ref 9–20)
CALCIUM SERPL-MCNC: 9.3 MG/DL (ref 8.6–10.4)
CHLORIDE BLD-SCNC: 105 MMOL/L (ref 98–107)
CO2: 22 MMOL/L (ref 20–31)
CREAT SERPL-MCNC: 0.4 MG/DL (ref 0.5–0.9)
DIFFERENTIAL TYPE: ABNORMAL
EOSINOPHILS RELATIVE PERCENT: 0 % (ref 1–4)
GFR AFRICAN AMERICAN: >60 ML/MIN
GFR NON-AFRICAN AMERICAN: >60 ML/MIN
GFR SERPL CREATININE-BSD FRML MDRD: ABNORMAL ML/MIN/{1.73_M2}
GFR SERPL CREATININE-BSD FRML MDRD: ABNORMAL ML/MIN/{1.73_M2}
GLUCOSE BLD-MCNC: 115 MG/DL (ref 70–99)
HCT VFR BLD CALC: 38.8 % (ref 36.3–47.1)
HEMOGLOBIN: 12.5 G/DL (ref 11.9–15.1)
IMMATURE GRANULOCYTES: 1 %
LYMPHOCYTES # BLD: 9 % (ref 24–43)
MCH RBC QN AUTO: 31 PG (ref 25.2–33.5)
MCHC RBC AUTO-ENTMCNC: 32.2 G/DL (ref 28.4–34.8)
MCV RBC AUTO: 96.3 FL (ref 82.6–102.9)
MONOCYTES # BLD: 4 % (ref 3–12)
NRBC AUTOMATED: 0 PER 100 WBC
PDW BLD-RTO: 13.7 % (ref 11.8–14.4)
PLATELET # BLD: 286 K/UL (ref 138–453)
PLATELET ESTIMATE: ABNORMAL
PMV BLD AUTO: 8.9 FL (ref 8.1–13.5)
POTASSIUM SERPL-SCNC: 4.1 MMOL/L (ref 3.7–5.3)
RBC # BLD: 4.03 M/UL (ref 3.95–5.11)
RBC # BLD: ABNORMAL 10*6/UL
SEG NEUTROPHILS: 86 % (ref 36–65)
SEGMENTED NEUTROPHILS ABSOLUTE COUNT: 18.13 K/UL (ref 1.5–8.1)
SODIUM BLD-SCNC: 137 MMOL/L (ref 135–144)
WBC # BLD: 21.1 K/UL (ref 3.5–11.3)
WBC # BLD: ABNORMAL 10*3/UL

## 2020-12-23 PROCEDURE — 36415 COLL VENOUS BLD VENIPUNCTURE: CPT

## 2020-12-23 PROCEDURE — 6360000002 HC RX W HCPCS: Performed by: STUDENT IN AN ORGANIZED HEALTH CARE EDUCATION/TRAINING PROGRAM

## 2020-12-23 PROCEDURE — 86738 MYCOPLASMA ANTIBODY: CPT

## 2020-12-23 PROCEDURE — 99232 SBSQ HOSP IP/OBS MODERATE 35: CPT | Performed by: INTERNAL MEDICINE

## 2020-12-23 PROCEDURE — 6370000000 HC RX 637 (ALT 250 FOR IP): Performed by: STUDENT IN AN ORGANIZED HEALTH CARE EDUCATION/TRAINING PROGRAM

## 2020-12-23 PROCEDURE — 85025 COMPLETE CBC W/AUTO DIFF WBC: CPT

## 2020-12-23 PROCEDURE — 94640 AIRWAY INHALATION TREATMENT: CPT

## 2020-12-23 PROCEDURE — 2580000003 HC RX 258: Performed by: STUDENT IN AN ORGANIZED HEALTH CARE EDUCATION/TRAINING PROGRAM

## 2020-12-23 PROCEDURE — 80048 BASIC METABOLIC PNL TOTAL CA: CPT

## 2020-12-23 RX ORDER — PREDNISONE 20 MG/1
40 TABLET ORAL DAILY
Qty: 10 TABLET | Refills: 0 | Status: SHIPPED | OUTPATIENT
Start: 2020-12-23 | End: 2020-12-28

## 2020-12-23 RX ORDER — METHYLPREDNISOLONE SODIUM SUCCINATE 40 MG/ML
40 INJECTION, POWDER, LYOPHILIZED, FOR SOLUTION INTRAMUSCULAR; INTRAVENOUS EVERY 8 HOURS
Status: DISCONTINUED | OUTPATIENT
Start: 2020-12-23 | End: 2020-12-23 | Stop reason: HOSPADM

## 2020-12-23 RX ORDER — NICOTINE 21 MG/24HR
1 PATCH, TRANSDERMAL 24 HOURS TRANSDERMAL DAILY
Qty: 10 PATCH | Refills: 0 | Status: SHIPPED | OUTPATIENT
Start: 2020-12-23 | End: 2021-09-15

## 2020-12-23 RX ORDER — PREDNISONE 1 MG/1
10 TABLET ORAL DAILY
Qty: 10 TABLET | Refills: 0 | Status: SHIPPED | OUTPATIENT
Start: 2021-01-06 | End: 2021-01-11

## 2020-12-23 RX ORDER — AZITHROMYCIN 250 MG/1
250 TABLET, FILM COATED ORAL DAILY
Qty: 3 TABLET | Refills: 0 | Status: SHIPPED | OUTPATIENT
Start: 2020-12-23 | End: 2020-12-26

## 2020-12-23 RX ORDER — PREDNISONE 10 MG/1
20 TABLET ORAL DAILY
Qty: 10 TABLET | Refills: 0 | Status: SHIPPED | OUTPATIENT
Start: 2021-01-01 | End: 2021-01-06

## 2020-12-23 RX ORDER — CEFDINIR 300 MG/1
300 CAPSULE ORAL DAILY
Qty: 2 CAPSULE | Refills: 0 | Status: CANCELLED | OUTPATIENT
Start: 2020-12-23 | End: 2020-12-25

## 2020-12-23 RX ORDER — CEFDINIR 300 MG/1
300 CAPSULE ORAL 2 TIMES DAILY
Qty: 10 CAPSULE | Refills: 0 | Status: SHIPPED | OUTPATIENT
Start: 2020-12-23 | End: 2020-12-28

## 2020-12-23 RX ORDER — PREDNISONE 10 MG/1
30 TABLET ORAL DAILY
Qty: 12 TABLET | Refills: 0 | Status: SHIPPED | OUTPATIENT
Start: 2020-12-28 | End: 2021-01-01

## 2020-12-23 RX ADMIN — BUDESONIDE AND FORMOTEROL FUMARATE DIHYDRATE 2 PUFF: 160; 4.5 AEROSOL RESPIRATORY (INHALATION) at 09:01

## 2020-12-23 RX ADMIN — BUTALBITAL, ACETAMINOPHEN AND CAFFEINE 1 TABLET: 50; 325; 40 TABLET ORAL at 00:08

## 2020-12-23 RX ADMIN — BUTALBITAL, ACETAMINOPHEN AND CAFFEINE 1 TABLET: 50; 325; 40 TABLET ORAL at 10:22

## 2020-12-23 RX ADMIN — METHYLPREDNISOLONE SODIUM SUCCINATE 40 MG: 40 INJECTION, POWDER, FOR SOLUTION INTRAMUSCULAR; INTRAVENOUS at 01:14

## 2020-12-23 RX ADMIN — CEFTRIAXONE SODIUM 1 G: 1 INJECTION, POWDER, FOR SOLUTION INTRAMUSCULAR; INTRAVENOUS at 14:02

## 2020-12-23 RX ADMIN — LEVETIRACETAM 750 MG: 500 TABLET, FILM COATED ORAL at 08:43

## 2020-12-23 RX ADMIN — ESCITALOPRAM OXALATE 15 MG: 10 TABLET ORAL at 08:43

## 2020-12-23 RX ADMIN — METHYLPREDNISOLONE SODIUM SUCCINATE 40 MG: 40 INJECTION, POWDER, FOR SOLUTION INTRAMUSCULAR; INTRAVENOUS at 08:43

## 2020-12-23 RX ADMIN — ALBUTEROL SULFATE 2.5 MG: 2.5 SOLUTION RESPIRATORY (INHALATION) at 09:09

## 2020-12-23 RX ADMIN — DESMOPRESSIN ACETATE 40 MG: 0.2 TABLET ORAL at 08:43

## 2020-12-23 RX ADMIN — AZITHROMYCIN 500 MG: 250 TABLET, FILM COATED ORAL at 08:43

## 2020-12-23 ASSESSMENT — PAIN SCALES - GENERAL
PAINLEVEL_OUTOF10: 2
PAINLEVEL_OUTOF10: 4
PAINLEVEL_OUTOF10: 5

## 2020-12-23 NOTE — CARE COORDINATION
Discharge 1 [facility] Case Management Department  Written by: Annette Schumacher RN    Patient Name: Zuleika Bustos  Attending Provider: Coco Luz MD  Admit Date: 2020  9:14 AM  MRN: [id number]  Account: [de-identified]                     : 1965  Discharge Date: 2020      Disposition: home    Annette Schumacher RN

## 2020-12-23 NOTE — PLAN OF CARE
Problem: Pain:  Goal: Pain level will decrease  Description: Pain level will decrease  12/23/2020 0857 by Tyson Ovalle  Outcome: Ongoing  12/23/2020 0717 by Donte Colón RN  Outcome: Ongoing  Goal: Control of acute pain  Description: Control of acute pain  12/23/2020 0857 by Tyson Ovalle  Outcome: Ongoing  12/23/2020 0717 by Donte Colón RN  Outcome: Ongoing  Goal: Control of chronic pain  Description: Control of chronic pain  12/23/2020 0857 by Tyson Ovalle  Outcome: Ongoing  12/23/2020 0717 by Donte Colón RN  Outcome: Ongoing

## 2020-12-23 NOTE — PROGRESS NOTES
[facility]  Internal Medicine Teaching Residency Program  Inpatient Daily Progress Note  ______________________________________________________________________________    Patient: Corinne Fernandez  YOB: 1965   [id number]:[id number]    Acct: [de-identified]     Room: [facility]-  Admit date: 12/21/2020  Today's date: 12/23/20  Number of days in the hospital: 2    SUBJECTIVE   Admitting Diagnosis: Pneumonia due to organism  CC: Shortness of breath / Cough  Pt examined at bedside. Chart & results reviewed. No acute episodes overnight   Patient is heme stable and febrile  Complains of slight headache most likely due to nicotine withdrawal  Wheezing resolved  Discharged today on oral antibiotics and oral prednisone  Counseled on continued smoking cessation     ROS:  Constitutional: Negative for chills and fever. Respiratory: Positive for cough and shortness of breath. Negative for wheezing.    Cardiovascular: Negative for chest pain, palpitations and leg swelling. Gastrointestinal: Negative for abdominal pain, nausea and vomiting. Musculoskeletal: Negative for back pain and neck pain. Skin: Negative for rash and wound.      BRIEF HISTORY The patient is a pleasant 55 y. o. female with a past medical history of COPD, Depression, Hypertension, and Hyperlipidemia presents with a chief complaint of worsening shortness of breath for the past couple of days; she normally experiences some shortness of breath but this is worse that her usual baseline.  She has been using her albuterol nebulizer at home but it has not been helping. Jony Figueroa has a dry nonproductive cough and is currently using 2 liters oxygen via nasal canula but does not use oxygen at home. Jony Figueroa denies any fevers, chills, body aches, chest pain, abdominal pain, nausea, vomiting, leg swelling, calf cramping, and issues with bowel and bladder.  She was given solu-medrol and breathing treatments via EMS on the way to hospital.       OBJECTIVE     Vital Signs:  /82   Pulse 78   Temp 97.3 °F (36.3 °C) (Oral)   Resp 15   Ht 5' 7\" (1.702 m)   Wt 155 lb (70.3 kg)   SpO2 91%   BMI 24.28 kg/m²     Temp (24hrs), Av.8 °F (36.6 °C), Min:97.3 °F (36.3 °C), Max:98.2 °F (36.8 °C)    No intake/output data recorded. Physical Exam:  Constitutional: This is a well developed, well nourished, 18.5-24.9 - Normal 55 y. o. year old female who is alert, oriented, cooperative and in no apparent distress.    Cardiovascular: Rate and Rhythm: Regular rhythm. Tachycardia present / Heart sounds: No murmur. No gallop.    Pulmonary: Effort: No respiratory distress / Breath sounds: No stridor. No wheezing, rhonchi or rales. Patient is tachypneic but is not in acute distress.  Good air movement throughout without any wheezes. Abdominal: General: There is no distension / Palpations: Abdomen is soft / Tenderness: There is no abdominal tenderness. There is no guarding. Musculoskeletal: General: No tenderness / Right lower leg: No edema / Left lower leg: No edema. Skin: General: Skin is warm and dry.    Neurological: Mental Status: She is alert and oriented to person, place, and time.         Medications: Xr Chest Portable    Result Date: 12/21/2020  No acute process. Ct Chest Pulmonary Embolism W Contrast    Result Date: 12/22/2020  1. No evidence of pulmonary embolic disease. 2. Nonspecific patchy ground-glass opacification in the peripheral aspect of the lungs. Similar, less prevalent findings were noted on the previous study. Differential considerations include hypoaeration. An early multifocal pneumonia is not excluded. 3. Prominent coronary vascular calcification. ASSESSMENT & PLAN     ASSESSMENT / PLAN:     Acute COPD Exacerbation   Most likely secondary to pneumonia  Nonspecific patchy ground glass opacification in the peripheral aspect of the lungs on CT PE   Discharged on Prednisone taper, Azithromycin for 3 days, and Omnicef 300 mg for 5 days     Hyperlipidemia   Lipitor 40 mg PO Daily      Anxiety  Elavil 75 mg PO Nightly      Depression  Lexapro 15 mg PO Daily      Cigarette Smoking  Counseled regarding Cessation  Discharged with Nicotine Patches      Illicit Drug Use   Marijuana Use  Counseled      DVT ppx: Lovenox      PT/OT/SW: On Board  Discharge Planning: Home Today     Valentino Sanchez MD  Internal Medicine Resident, PGY-1  [facility], [facility]  12/23/2020, 3:40 PM

## 2020-12-25 LAB — MYCOPLASMA PNEUMONIAE IGM: 0.11

## 2020-12-27 LAB
CULTURE: NORMAL
DIRECT EXAM: NORMAL
Lab: NORMAL
SPECIMEN DESCRIPTION: NORMAL

## 2021-01-04 NOTE — DISCHARGE SUMMARY
[facility]     Department of Internal Medicine - Staff Internal Medicine Teaching Service    INPATIENT DISCHARGE SUMMARY      Patient Identification:  Alexandria Caldwell is a 54 y.o. female. :  1965  MRN: [id number]     Acct: [de-identified]   PCP: THOR Jensen CNP  Admit Date:  2020  Discharge date and time: 2020  4:12 PM   Attending Provider: Jason Olivera MD                                    [id number]  Problem Lists:  Principal Problem:    Pneumonia due to organism  Active Problems:    Depression    Smoking    Hypertension    Sinus tachycardia  Resolved Problems:    * No resolved hospital problems. *    HOSPITAL STAY     Brief Inpatient course:   Alexandria Caldwell is a 54 y.o. female who was admitted for the management of Pneumonia due to organism, presented to the emergency department with worsening shortness of breath for the past couple of days and was admitted for management of COPD exacerbation secondary to pneumonia. Patient was treated with respiratory treatments including inhalers, nebulizer, and corticosteroids; antibiotics were started as well. Once the shortness of breath improved and medically clear and stable patient was discharged.       Procedures/ Significant Interventions:    N/A    Consults:     Consults:     Final Specialist Recommendations/Findings:   IP CONSULT TO INTERNAL MEDICINE  IP CONSULT TO CASE MANAGEMENT      Any Hospital Acquired Infections: none    Discharge Functional Status:  stable    DISCHARGE PLAN     Disposition: home    Patient Instructions:   Discharge Medication List as of 2020  3:29 PM      START taking these medications    Details   azithromycin (ZITHROMAX) 250 MG tablet Take 1 tablet by mouth daily for 3 days, Disp-3 tablet, R-0Normal      nicotine (NICODERM CQ) 21 MG/24HR Place 1 patch onto the skin daily for 10 days, Disp-10 patch, R-0Normal cefdinir (OMNICEF) 300 MG capsule Take 1 capsule by mouth 2 times daily for 5 days, Disp-10 capsule, R-0Normal      !! predniSONE (DELTASONE) 20 MG tablet Take 2 tablets by mouth daily for 5 days, Disp-10 tablet, R-0Normal      !! predniSONE (DELTASONE) 10 MG tablet Take 3 tablets by mouth daily for 4 days, Disp-12 tablet, R-0Normal      !! predniSONE (DELTASONE) 10 MG tablet Take 2 tablets by mouth daily for 5 days, Disp-10 tablet, R-0Normal      !! predniSONE (DELTASONE) 5 MG tablet Take 2 tablets by mouth daily for 5 days, Disp-10 tablet, R-0Normal       !! - Potential duplicate medications found. Please discuss with provider.       CONTINUE these medications which have NOT CHANGED    Details   albuterol sulfate  (90 Base) MCG/ACT inhaler INHALE TWO PUFFS BY MOUTH EVERY 6 HOURS AS NEEDED FOR WHEEZING, Disp-8.5 g, R-2Normal      levETIRAcetam (KEPPRA) 750 MG tablet Take 1 tablet by mouth 2 times daily, Disp-60 tablet,R-3Normal      amitriptyline (ELAVIL) 25 MG tablet Take 3 tablets by mouth nightly, Disp-90 tablet,R-5Normal      albuterol (PROVENTIL) (2.5 MG/3ML) 0.083% nebulizer solution INHALE 3 MLS BY NEBULIZATION EVERY 6 HOURS AS NEEDED FOR WHEEZING, Disp-120 each,R-2Normal      baclofen (LIORESAL) 10 MG tablet Take 1 tablet by mouth nightly as needed (pain), Disp-30 tablet,R-2Normal      STIOLTO RESPIMAT 2.5-2.5 MCG/ACT AERS INHALE TWO INHALATION(S) BY MOUTH DAILY, Disp-3 Inhaler,R-0Normal      budesonide-formoterol (SYMBICORT) 160-4.5 MCG/ACT AERO Inhale 2 puffs into the lungs 2 times daily, Disp-1 Inhaler,R-11Normal      tiotropium (SPIRIVA RESPIMAT) 2.5 MCG/ACT AERS inhaler Inhale 2 puffs into the lungs daily, Disp-1 Inhaler,R-11Normal      escitalopram (LEXAPRO) 10 MG tablet Take 1.5 tablets by mouth daily, Disp-45 tablet,R-1Normal      Nebulizers (NEBULIZER COMPRESSOR) MISC Disp-1 each,R-0, PrintDaily as needed Respiratory Therapy Supplies (NEBULIZER/TUBING/MOUTHPIECE) KIT DAILY PRN Starting Fri 9/4/2020, Disp-1 kit,R-0, Print      atorvastatin (LIPITOR) 40 MG tablet Take 1 tablet by mouth daily, Disp-90 tablet,R-1Normal      acetaminophen (TYLENOL) 325 MG tablet Take 1 tablet by mouth every 6 hours as needed for Pain, Disp-30 tablet, R-0Print      ibuprofen (IBU) 400 MG tablet Take 1 tablet by mouth every 6 hours as needed for Pain, Disp-30 tablet, R-0Print      Handicap Placard MISC Starting Mon 1/20/2020, Disp-1 each, R-0, PrintExp 1/2024      ondansetron (ZOFRAN ODT) 4 MG disintegrating tablet Take 1 tablet by mouth every 8 hours as needed for Nausea, Disp-20 tablet, R-0Print      Cholecalciferol (VITAMIN D3) 400 units CAPS Take 1 tablet by mouth dailyHistorical Med      aspirin EC 81 MG EC tablet Take 1 tablet by mouth daily, Disp-30 tablet, R-3Normal      magnesium gluconate (MAGONATE) 500 MG tablet Take 400 mg by mouth every evening Historical Med      vitamin B-6 (PYRIDOXINE) 100 MG tablet Take 100 mg by mouth dailyHistorical Med      nitroGLYCERIN (NITROSTAT) 0.4 MG SL tablet Place 1 tablet under the tongue every 5 minutes as needed for Chest pain up to max of 3 total doses. If no relief after 1 dose, call 911., Disp-25 tablet, R-3Normal      Multiple Vitamins-Minerals (THERAPEUTIC MULTIVITAMIN-MINERALS) tablet Take 1 tablet by mouth daily. vitamin B-12 (CYANOCOBALAMIN) 1000 MCG tablet Take 1,000 mcg by mouth daily. Activity: activity as tolerated    Diet: cardiac diet    Follow-up:    Phyllis Sen, APRN - CNP  [address] [facility] [address]  [phone]      [facility] visit follow up     Patient Instructions: Please take all medications as prescribed and continue abstinence from cigarette smoking. Please take antibiotics to completion and follow up with your Primary Care Provider as soon as possible. Note that over 30 minutes was spent in preparing discharge papers, discussing discharge with patient, medication review, etc.    Saad Quintero MD  Internal Medicine Resident, PGY-1  [facility];  [place], [place]  1/3/2021, 7:51 PM

## 2021-01-15 DIAGNOSIS — F33.1 MODERATE EPISODE OF RECURRENT MAJOR DEPRESSIVE DISORDER (HCC): ICD-10-CM

## 2021-01-15 RX ORDER — ESCITALOPRAM OXALATE 10 MG/1
TABLET ORAL
Qty: 45 TABLET | Refills: 0 | Status: SHIPPED | OUTPATIENT
Start: 2021-01-15 | End: 2021-03-03

## 2021-01-15 NOTE — TELEPHONE ENCOUNTER
.Please Approve or Refuse.   Send to Pharmacy per Pt's Request:      Next Visit Date:  Visit date not found   Last Visit Date: 2/25/2020

## 2021-01-23 ENCOUNTER — OFFICE VISIT (OUTPATIENT)
Dept: PRIMARY CARE CLINIC | Age: 56
End: 2021-01-23
Payer: MEDICARE

## 2021-01-23 VITALS
HEIGHT: 67 IN | HEART RATE: 99 BPM | BODY MASS INDEX: 24.33 KG/M2 | TEMPERATURE: 98.1 F | DIASTOLIC BLOOD PRESSURE: 88 MMHG | SYSTOLIC BLOOD PRESSURE: 133 MMHG | WEIGHT: 155 LBS

## 2021-01-23 DIAGNOSIS — L30.9 DERMATITIS: Primary | ICD-10-CM

## 2021-01-23 PROCEDURE — G8427 DOCREV CUR MEDS BY ELIG CLIN: HCPCS | Performed by: FAMILY MEDICINE

## 2021-01-23 PROCEDURE — 99213 OFFICE O/P EST LOW 20 MIN: CPT | Performed by: FAMILY MEDICINE

## 2021-01-23 PROCEDURE — G8420 CALC BMI NORM PARAMETERS: HCPCS | Performed by: FAMILY MEDICINE

## 2021-01-23 PROCEDURE — 3017F COLORECTAL CA SCREEN DOC REV: CPT | Performed by: FAMILY MEDICINE

## 2021-01-23 PROCEDURE — 4004F PT TOBACCO SCREEN RCVD TLK: CPT | Performed by: FAMILY MEDICINE

## 2021-01-23 PROCEDURE — G8482 FLU IMMUNIZE ORDER/ADMIN: HCPCS | Performed by: FAMILY MEDICINE

## 2021-01-23 RX ORDER — PREDNISONE 20 MG/1
40 TABLET ORAL DAILY
Qty: 10 TABLET | Refills: 0 | Status: SHIPPED | OUTPATIENT
Start: 2021-01-23 | End: 2021-01-28

## 2021-01-23 ASSESSMENT — ENCOUNTER SYMPTOMS: EYE ITCHING: 0

## 2021-01-23 ASSESSMENT — PATIENT HEALTH QUESTIONNAIRE - PHQ9
1. LITTLE INTEREST OR PLEASURE IN DOING THINGS: 1
SUM OF ALL RESPONSES TO PHQ QUESTIONS 1-9: 2
SUM OF ALL RESPONSES TO PHQ QUESTIONS 1-9: 2
2. FEELING DOWN, DEPRESSED OR HOPELESS: 1
SUM OF ALL RESPONSES TO PHQ9 QUESTIONS 1 & 2: 2
SUM OF ALL RESPONSES TO PHQ QUESTIONS 1-9: 2

## 2021-01-23 NOTE — PROGRESS NOTES
Criss Mcmahon MD  [facility]  [address] [place] [id number]  Dept: [phone]    Madisyn Pascual is a 54 y.o. female who presents today for her medical conditions/complaints as noted below.   Madisyn Pascual is here today c/o Other (here for possible shingles)       HPI:     HPI    Patient presents to the walk-in clinic for evaluation of rash  States that the rash began couple days ago  Describes it as tiny erythematous macules the size of a pencil point along the entire abdomen and lower back bilaterally  Rash is itchy but not painful, no vesicles, no exudate  No one at home is affected  No new medications or foods or exposures or travel or shampoo/creams or other than some new laundry detergent that she started using 1 week ago (made by TERRY Harris)  She has tried some antiitch cream at home which has not been helpful, Benadryl has helped the itching  No fevers or joint swelling or vision symptoms or chest pain or dizziness  Itching is not worse at night  No rash or itching at her arms or legs  She thinks this may be shingles, she has never had shingles before  Has not had the shingles vaccine    Patient Active Problem List   Diagnosis    Heart disease    Chronic back pain    Depression    Hyperlipidemia    CAD, multiple vessel    Asthma    Smoking    Need for vaccination    Syncope    Leg pain    Left hip pain    Chronic cholecystitis    Chest pain    Calculus of gallbladder without cholecystitis without obstruction    History of lumbar fusion    Failed back syndrome    Marijuana use    Abnormal weight loss    Allergic rhinitis    Anxiety state    Chronic midline low back pain with sciatica    Moderate episode of recurrent major depressive disorder ([address]    Seizure ([address]    Chronic tension-type headache, intractable    Pneumonia due to organism    Hypertension    Sinus tachycardia       Past Medical History:   Diagnosis Date    Acute MI ([email or web address]     Anxiety     Chronic back pain     COPD (chronic obstructive pulmonary disease) (HCC)     Depression     Heart disease     Hyperlipidemia     Hypertension     MRSA (methicillin resistant staph aureus) culture positive 09/05/2017    abscess     Past Surgical History:   Procedure Laterality Date    BACK SURGERY      diskectomy 2000, fusion 2007, fusion 7/2017    CHOLECYSTECTOMY, LAPAROSCOPIC  11/10/2017    robotic assisted    CHOLECYSTECTOMY, LAPAROSCOPIC N/A 11/10/2017    XI ROBOTIC CHOLECYSTECTOMY LAPAROSCOPIC performed by Selene Gatica DO at [address]       Family History   Problem Relation Age of Onset    Other Mother     Heart Disease Father     High Blood Pressure Father     High Cholesterol Father     Other Brother      Social History     Tobacco Use    Smoking status: Current Every Day Smoker     Packs/day: 1.00     Types: Cigarettes    Smokeless tobacco: Never Used    Tobacco comment: Attempting to quit with patch now 10/16/20 currently smoking no longer using patch   Substance Use Topics    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 Do not use for longer than 2 weeks. , Disp: 1 Tube, Rfl: 0    predniSONE (DELTASONE) 20 MG tablet, Take 2 tablets by mouth daily for 5 days, Disp: 10 tablet, Rfl: 0    escitalopram (LEXAPRO) 10 MG tablet, TAKE 1 AND 1/2 TABLET BY MOUTH DAILY, Disp: 45 tablet, Rfl: 0    nicotine (NICODERM CQ) 21 MG/24HR, Place 1 patch onto the skin daily for 10 days, Disp: 10 patch, Rfl: 0    albuterol sulfate  (90 Base) MCG/ACT inhaler, INHALE TWO PUFFS BY MOUTH EVERY 6 HOURS AS NEEDED FOR WHEEZING, Disp: 8.5 g, Rfl: 2    levETIRAcetam (KEPPRA) 750 MG tablet, Take 1 tablet by mouth 2 times daily, Disp: 60 tablet, Rfl: 3    amitriptyline (ELAVIL) 25 MG tablet, Take 3 tablets by mouth nightly, Disp: 90 tablet, Rfl: 5    albuterol (PROVENTIL) (2.5 MG/3ML) 0.083% nebulizer solution, INHALE 3 MLS BY NEBULIZATION EVERY 6 HOURS AS NEEDED FOR WHEEZING, Disp: 120 each, Rfl: 2    baclofen (LIORESAL) 10 MG tablet, Take 1 tablet by mouth nightly as needed (pain), Disp: 30 tablet, Rfl: 2    STIOLTO RESPIMAT 2.5-2.5 MCG/ACT AERS, INHALE TWO INHALATION(S) BY MOUTH DAILY (Patient not taking: Reported on 11/30/2020), Disp: 3 Inhaler, Rfl: 0    budesonide-formoterol (SYMBICORT) 160-4.5 MCG/ACT AERO, Inhale 2 puffs into the lungs 2 times daily (Patient not taking: Reported on 11/30/2020), Disp: 1 Inhaler, Rfl: 11    tiotropium (SPIRIVA RESPIMAT) 2.5 MCG/ACT AERS inhaler, Inhale 2 puffs into the lungs daily, Disp: 1 Inhaler, Rfl: 11    Nebulizers (NEBULIZER COMPRESSOR) MISC, Daily as needed, Disp: 1 each, Rfl: 0    Respiratory Therapy Supplies (NEBULIZER/TUBING/MOUTHPIECE) KIT, 1 kit by Does not apply route daily as needed (SOB or wheezing), Disp: 1 kit, Rfl: 0    atorvastatin (LIPITOR) 40 MG tablet, Take 1 tablet by mouth daily, Disp: 90 tablet, Rfl: 1    acetaminophen (TYLENOL) 325 MG tablet, Take 1 tablet by mouth every 6 hours as needed for Pain, Disp: 30 tablet, Rfl: 0    ibuprofen (IBU) 400 MG tablet, Take 1 tablet by mouth every 6 hours as needed for Pain, Disp: 30 tablet, Rfl: 0    Handicap Placard MISC, by Does not apply route Exp 1/2024, Disp: 1 each, Rfl: 0    ondansetron (ZOFRAN ODT) 4 MG disintegrating tablet, Take 1 tablet by mouth every 8 hours as needed for Nausea (Patient not taking: Reported on 10/16/2020), Disp: 20 tablet, Rfl: 0    Cholecalciferol (VITAMIN D3) 400 units CAPS, Take 1 tablet by mouth daily, Disp: , Rfl:     aspirin EC 81 MG EC tablet, Take 1 tablet by mouth daily (Patient taking differently: Take 81 mg by mouth daily as needed ), Disp: 30 tablet, Rfl: 3    magnesium gluconate (MAGONATE) 500 MG tablet, Take 400 mg by mouth every evening , Disp: , Rfl:     vitamin B-6 (PYRIDOXINE) 100 MG tablet, Take 100 mg by mouth daily, Disp: , Rfl:     nitroGLYCERIN (NITROSTAT) 0.4 MG SL tablet, Place 1 tablet under the tongue every 5 minutes as needed for Chest pain up to max of 3 total doses. If no relief after 1 dose, call 911. (Patient not taking: Reported on 10/16/2020), Disp: 25 tablet, Rfl: 3    Multiple Vitamins-Minerals (THERAPEUTIC MULTIVITAMIN-MINERALS) tablet, Take 1 tablet by mouth daily. , Disp: , Rfl:     vitamin B-12 (CYANOCOBALAMIN) 1000 MCG tablet, Take 1,000 mcg by mouth daily. , Disp: , Rfl:     Subjective:     Review of Systems   Constitutional: Negative for fever. Eyes: Negative for itching and visual disturbance. Cardiovascular: Negative for chest pain. Skin: Positive for rash. Negative for wound. Neurological: Negative for dizziness and light-headedness. Psychiatric/Behavioral: The patient is nervous/anxious. Objective:     /88   Pulse 99   Temp 98.1 °F (36.7 °C) (Temporal)   Ht 5' 7\" (1.702 m)   Wt 155 lb (70.3 kg)   BMI 24.28 kg/m²     Physical Exam  Constitutional:       Appearance: Normal appearance. She is well-developed. She is not ill-appearing, toxic-appearing or diaphoretic. HENT:      Head: Normocephalic. Eyes:      General:         Right eye: No discharge. Left eye: No discharge. Conjunctiva/sclera: Conjunctivae normal.   Skin:            Comments: Tiny pinpoint red macules along the abdomen and lower back, some of these macules have a tiny dark red crust; no vesicles; no surrounding erythema or edema; areas are nontender to touch   Neurological:      Mental Status: She is alert. Psychiatric:         Behavior: Behavior normal.         Thought Content: Thought content normal.         Judgment: Judgment normal.         Assessment & Plan:      1. Dermatitis  Etiology of the rash is unclear, may be dermatitis from the new laundry detergent. Explained that I do not think this is shingles based on the bilateral nature of the rash and multiple dermatomes involved. Regardless I would recommend the shingles vaccine. Instructed the patient to start with betamethasone cream and continue using Benadryl at home. If the rash fails to improve, she can fill the prescription for oral prednisone. Discussed potential side effects of steroids include avascular necrosis, osteoporosis, elevated blood sugars, elevated blood pressure, insomnia, mood changes, vision changes. Discussed red flag symptoms that would warrant urgent medical attention. Follow-up with PCP in 1 to 2 weeks. - betamethasone valerate (VALISONE) 0.1 % cream; Apply topically 2 times daily. Do not use for longer than 2 weeks. Dispense: 1 Tube; Refill: 0  - predniSONE (DELTASONE) 20 MG tablet; Take 2 tablets by mouth daily for 5 days  Dispense: 10 tablet; Refill: 0    Call or return to clinic prn if these symptoms worsen or fail to improve as anticipated. I have reviewed the instructions with the patient, answering all questions to their satisfaction.     Electronically signed by Karla Jimenes MD on 1/23/2021 at 11:09 AM

## 2021-02-16 ENCOUNTER — HOSPITAL ENCOUNTER (EMERGENCY)
Age: 56
Discharge: HOME | End: 2021-02-16
Attending: EMERGENCY MEDICINE
Payer: MEDICARE

## 2021-02-16 ENCOUNTER — APPOINTMENT (OUTPATIENT)
Dept: GENERAL RADIOLOGY | Age: 56
End: 2021-02-16
Payer: MEDICARE

## 2021-02-16 ENCOUNTER — APPOINTMENT (OUTPATIENT)
Dept: CT IMAGING | Age: 56
End: 2021-02-16
Payer: MEDICARE

## 2021-02-16 VITALS
DIASTOLIC BLOOD PRESSURE: 89 MMHG | SYSTOLIC BLOOD PRESSURE: 131 MMHG | HEART RATE: 100 BPM | TEMPERATURE: 97.6 F | WEIGHT: 155 LBS | RESPIRATION RATE: 13 BRPM | BODY MASS INDEX: 24.33 KG/M2 | HEIGHT: 67 IN | OXYGEN SATURATION: 97 %

## 2021-02-16 DIAGNOSIS — J44.1 COPD EXACERBATION (HCC): Primary | ICD-10-CM

## 2021-02-16 LAB
ABSOLUTE EOS #: 0.81 K/UL (ref 0–0.44)
ABSOLUTE IMMATURE GRANULOCYTE: 0.03 K/UL (ref 0–0.3)
ABSOLUTE LYMPH #: 3.47 K/UL (ref 1.1–3.7)
ABSOLUTE MONO #: 0.63 K/UL (ref 0.1–1.2)
ANION GAP SERPL CALCULATED.3IONS-SCNC: 10 MMOL/L (ref 9–17)
BASOPHILS # BLD: 1 % (ref 0–2)
BASOPHILS ABSOLUTE: 0.08 K/UL (ref 0–0.2)
BUN BLDV-MCNC: 12 MG/DL (ref 6–20)
BUN/CREAT BLD: ABNORMAL (ref 9–20)
CALCIUM SERPL-MCNC: 9.5 MG/DL (ref 8.6–10.4)
CHLORIDE BLD-SCNC: 103 MMOL/L (ref 98–107)
CO2: 23 MMOL/L (ref 20–31)
CREAT SERPL-MCNC: 0.64 MG/DL (ref 0.5–0.9)
D-DIMER QUANTITATIVE: 0.57 MG/L FEU
DIFFERENTIAL TYPE: ABNORMAL
DIRECT EXAM: NORMAL
EOSINOPHILS RELATIVE PERCENT: 9 % (ref 1–4)
GFR AFRICAN AMERICAN: >60 ML/MIN
GFR NON-AFRICAN AMERICAN: >60 ML/MIN
GFR SERPL CREATININE-BSD FRML MDRD: ABNORMAL ML/MIN/{1.73_M2}
GFR SERPL CREATININE-BSD FRML MDRD: ABNORMAL ML/MIN/{1.73_M2}
GLUCOSE BLD-MCNC: 112 MG/DL (ref 70–99)
HCT VFR BLD CALC: 41.6 % (ref 36.3–47.1)
HEMOGLOBIN: 13.5 G/DL (ref 11.9–15.1)
IMMATURE GRANULOCYTES: 0 %
LYMPHOCYTES # BLD: 36 % (ref 24–43)
Lab: NORMAL
MCH RBC QN AUTO: 31.6 PG (ref 25.2–33.5)
MCHC RBC AUTO-ENTMCNC: 32.5 G/DL (ref 28.4–34.8)
MCV RBC AUTO: 97.4 FL (ref 82.6–102.9)
MONOCYTES # BLD: 7 % (ref 3–12)
NRBC AUTOMATED: 0 PER 100 WBC
PDW BLD-RTO: 14 % (ref 11.8–14.4)
PLATELET # BLD: 247 K/UL (ref 138–453)
PLATELET ESTIMATE: ABNORMAL
PMV BLD AUTO: 8.7 FL (ref 8.1–13.5)
POTASSIUM SERPL-SCNC: 4 MMOL/L (ref 3.7–5.3)
RBC # BLD: 4.27 M/UL (ref 3.95–5.11)
RBC # BLD: ABNORMAL 10*6/UL
SARS-COV-2, RAPID: NOT DETECTED
SEG NEUTROPHILS: 47 % (ref 36–65)
SEGMENTED NEUTROPHILS ABSOLUTE COUNT: 4.56 K/UL (ref 1.5–8.1)
SODIUM BLD-SCNC: 136 MMOL/L (ref 135–144)
SPECIMEN DESCRIPTION: NORMAL
SPECIMEN DESCRIPTION: NORMAL
TROPONIN INTERP: NORMAL
TROPONIN T: NORMAL NG/ML
TROPONIN, HIGH SENSITIVITY: <6 NG/L (ref 0–14)
WBC # BLD: 9.6 K/UL (ref 3.5–11.3)
WBC # BLD: ABNORMAL 10*3/UL

## 2021-02-16 PROCEDURE — 96365 THER/PROPH/DIAG IV INF INIT: CPT

## 2021-02-16 PROCEDURE — 6360000002 HC RX W HCPCS: Performed by: STUDENT IN AN ORGANIZED HEALTH CARE EDUCATION/TRAINING PROGRAM

## 2021-02-16 PROCEDURE — 87804 INFLUENZA ASSAY W/OPTIC: CPT

## 2021-02-16 PROCEDURE — 71045 X-RAY EXAM CHEST 1 VIEW: CPT

## 2021-02-16 PROCEDURE — 85379 FIBRIN DEGRADATION QUANT: CPT

## 2021-02-16 PROCEDURE — 99285 EMERGENCY DEPT VISIT HI MDM: CPT

## 2021-02-16 PROCEDURE — U0002 COVID-19 LAB TEST NON-CDC: HCPCS

## 2021-02-16 PROCEDURE — 85025 COMPLETE CBC W/AUTO DIFF WBC: CPT

## 2021-02-16 PROCEDURE — 71260 CT THORAX DX C+: CPT

## 2021-02-16 PROCEDURE — 84484 ASSAY OF TROPONIN QUANT: CPT

## 2021-02-16 PROCEDURE — 6360000004 HC RX CONTRAST MEDICATION: Performed by: STUDENT IN AN ORGANIZED HEALTH CARE EDUCATION/TRAINING PROGRAM

## 2021-02-16 PROCEDURE — 93005 ELECTROCARDIOGRAM TRACING: CPT | Performed by: STUDENT IN AN ORGANIZED HEALTH CARE EDUCATION/TRAINING PROGRAM

## 2021-02-16 PROCEDURE — 80048 BASIC METABOLIC PNL TOTAL CA: CPT

## 2021-02-16 RX ORDER — PREDNISONE 10 MG/1
TABLET ORAL
Qty: 20 TABLET | Refills: 0 | Status: SHIPPED | OUTPATIENT
Start: 2021-02-16 | End: 2021-02-16 | Stop reason: SDUPTHER

## 2021-02-16 RX ORDER — MAGNESIUM SULFATE IN WATER 40 MG/ML
2000 INJECTION, SOLUTION INTRAVENOUS ONCE
Status: COMPLETED | OUTPATIENT
Start: 2021-02-16 | End: 2021-02-16

## 2021-02-16 RX ORDER — PREDNISONE 10 MG/1
TABLET ORAL
Qty: 20 TABLET | Refills: 0 | Status: SHIPPED | OUTPATIENT
Start: 2021-02-16 | End: 2021-02-26

## 2021-02-16 RX ADMIN — IOPAMIDOL 75 ML: 755 INJECTION, SOLUTION INTRAVENOUS at 20:19

## 2021-02-16 RX ADMIN — MAGNESIUM SULFATE 2000 MG: 2 INJECTION INTRAVENOUS at 19:10

## 2021-02-16 ASSESSMENT — PAIN DESCRIPTION - DESCRIPTORS: DESCRIPTORS: PRESSURE

## 2021-02-16 ASSESSMENT — PAIN DESCRIPTION - FREQUENCY: FREQUENCY: CONTINUOUS

## 2021-02-16 ASSESSMENT — ENCOUNTER SYMPTOMS
BACK PAIN: 0
COUGH: 0
WHEEZING: 1
NAUSEA: 0
SHORTNESS OF BREATH: 1
VOMITING: 0
ABDOMINAL PAIN: 0

## 2021-02-16 ASSESSMENT — PAIN SCALES - GENERAL: PAINLEVEL_OUTOF10: 5

## 2021-02-16 NOTE — ED PROVIDER NOTES
[facility]  ED  Emergency Department Encounter  Emergency Medicine Resident     Pt Name: Patrick Early  MRN: [id number]  [place] 1965  Date of evaluation: 2/16/21  PCP:  THOR Lange [id number]       Chief Complaint   Patient presents with    Shortness of Breath       HISTORY [place]  (Location/Symptom, Timing/Onset, Context/Setting, Quality, Duration, Modifying Factors,Severity.)      Patrick Early is a 53 yo female who presents with shortness of breath and respiratory distress. Patient states that for the past few days she has been more short of breath than usual and does have a history of COPD. She states that she has been taking her breathing treatments at home without relief. She notes that a recent trip to [facility] with concern for possible Covid exposure. Denies any fevers or sweats or cough. She does note chest pressure and heaviness for the past few days as well with a history of coronary artery disease in the past.  Denies any history of blood clots in her legs or lungs, hemoptysis, recent surgery, estrogen use, recent leg swelling or calf cramping. PAST MEDICAL / SURGICAL / SOCIAL / FAMILY HISTORY      has a past medical history of Acute MI ([address] Anxiety, Chronic back pain, COPD (chronic obstructive pulmonary disease) ([address] Depression, Heart disease, Hyperlipidemia, Hypertension, and MRSA (methicillin resistant staph aureus) culture positive. has a past surgical history that includes Tubal ligation; Tonsillectomy; Cholecystectomy, laparoscopic (11/10/2017); Cholecystectomy, laparoscopic (N/A, 11/10/2017); and back surgery.      Social History     Socioeconomic History    Marital status:      Spouse name: Not on file    Number of children: Not on file    Years of education: Not on file    Highest education level: Not on file   Occupational History    Not on file   Social Needs  Financial resource strain: Not hard at all   [employer] insecurity     Worry: Never true     Inability: Never true   [employer] needs     Medical: Not on file     Non-medical: Not on file   Tobacco Use    Smoking status: Current Every Day Smoker     Packs/day: 0.50     Types: Cigarettes    Smokeless tobacco: Never Used    Tobacco comment: Attempting to quit with patch now 10/16/20 currently smoking no longer using patch   Substance and Sexual Activity    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    Drug use: Yes     Types: Marijuana     Comment: daily - \"a couple bowls\"    Sexual activity: Not on file   Lifestyle    Physical activity     Days per week: Not on file     Minutes per session: Not on file    Stress: Not on file   Relationships    Social connections     Talks on phone: Not on file     Gets together: Not on file     Attends Adventism service: Not on file     Active member of club or organization: Not on file     Attends meetings of clubs or organizations: Not on file     Relationship status: Not on file    Intimate partner violence     Fear of current or ex partner: Not on file     Emotionally abused: Not on file     Physically abused: Not on file     Forced sexual activity: Not on file   Other Topics Concern    Not on file   Social History Narrative    Not on file       Family History   Problem Relation Age of Onset    Other Mother     Heart Disease Father     High Blood Pressure Father     High Cholesterol Father     Other Brother         Allergies:  Sulfa antibiotics    Home Medications:  Prior to Admission medications    Medication Sig Start Date End Date Taking? Authorizing Provider   predniSONE (DELTASONE) 10 MG tablet Take 4 tablets by mouth once daily for 5 days 2/16/21 2/26/21 Yes Etienne Palacio, DO   betamethasone valerate (VALISONE) 0.1 % cream Apply topically 2 times daily. Do not use for longer than 2 weeks.  1/23/21   Anna Benavides MD escitalopram (LEXAPRO) 10 MG tablet TAKE 1 AND 1/2 TABLET BY MOUTH DAILY 1/15/21   THOR Greco NP   nicotine (NICODERM CQ) 21 MG/24HR Place 1 patch onto the skin daily for 10 days 12/23/20 1/2/21  [employer] [place] MD   albuterol sulfate  (90 Base) MCG/ACT inhaler INHALE TWO PUFFS BY MOUTH EVERY 6 HOURS AS NEEDED FOR WHEEZING 12/21/20   THOR Singleton CNP   levETIRAcetam (KEPPRA) 750 MG tablet Take 1 tablet by mouth 2 times daily 12/2/20   THOR Robbins CNP   amitriptyline (ELAVIL) 25 MG tablet Take 3 tablets by mouth nightly 12/2/20 1/1/21  THOR Robbins CNP   albuterol (PROVENTIL) (2.5 MG/3ML) 0.083% nebulizer solution INHALE 3 MLS BY NEBULIZATION EVERY 6 HOURS AS NEEDED FOR WHEEZING 11/24/20   THOR Singleton CNP   baclofen (LIORESAL) 10 MG tablet Take 1 tablet by mouth nightly as needed (pain) 11/12/20   THOR Singleton CNP   STIOLTO RESPIMAT 2.5-2.5 MCG/ACT AERS INHALE TWO INHALATION(S) BY MOUTH DAILY  Patient not taking: Reported on 11/30/2020 11/9/20   THOR Singleton CNP   budesonide-formoterol [facility]) 160-4.5 MCG/ACT AERO Inhale 2 puffs into the lungs 2 times daily  Patient not taking: Reported on 11/30/2020 10/16/20   Jenny Iraheta MD   tiotropium (SPIRIVA RESPIMAT) 2.5 MCG/ACT AERS inhaler Inhale 2 puffs into the lungs daily 10/16/20   Jenny Iraheta MD   Nebulizers (NEBULIZER COMPRESSOR) MISC Daily as needed 9/4/20   THOR Singleton CNP   Respiratory Therapy Supplies (NEBULIZER/TUBING/MOUTHPIECE) KIT 1 kit by Does not apply route daily as needed (SOB or wheezing) 9/4/20   THOR Singleton CNP   atorvastatin (LIPITOR) 40 MG tablet Take 1 tablet by mouth daily 8/7/20 11/30/20  THOR Singleton CNP   acetaminophen (TYLENOL) 325 MG tablet Take 1 tablet by mouth every 6 hours as needed for Pain 6/15/20   Veryl Severance, DO ibuprofen (IBU) 400 MG tablet Take 1 tablet by mouth every 6 hours as needed for Pain 6/15/20   Veryl Kevin, DO   Handicap Placard MISC by Does not apply route Exp 1/2024 1/20/20   THOR Singleton CNP   ondansetron (ZOFRAN ODT) 4 MG disintegrating tablet Take 1 tablet by mouth every 8 hours as needed for Nausea  Patient not taking: Reported on 10/16/2020 1/9/20   Raúl Church MD   Cholecalciferol (VITAMIN D3) 400 units CAPS Take 1 tablet by mouth daily    Historical Provider, MD   aspirin EC 81 MG EC tablet Take 1 tablet by mouth daily  Patient taking differently: Take 81 mg by mouth daily as needed  3/6/19   THOR Singleton CNP   magnesium gluconate (MAGONATE) 500 MG tablet Take 400 mg by mouth every evening     Historical Provider, MD   vitamin B-6 (PYRIDOXINE) 100 MG tablet Take 100 mg by mouth daily    Historical Provider, MD   nitroGLYCERIN (NITROSTAT) 0.4 MG SL tablet Place 1 tablet under the tongue every 5 minutes as needed for Chest pain up to max of 3 total doses. If no relief after 1 dose, call 911. Patient not taking: Reported on 10/16/2020 1/30/18   Ray Faust MD   Multiple Vitamins-Minerals (THERAPEUTIC MULTIVITAMIN-MINERALS) tablet Take 1 tablet by mouth daily. Historical Provider, MD   vitamin B-12 (CYANOCOBALAMIN) 1000 MCG tablet Take 1,000 mcg by mouth daily. Historical Provider, MD       REVIEW OFSYSTEMS    (2-9 systems for level 4, 10 or more for level 5)      Review of Systems   Constitutional: Negative for chills and fever. HENT: Negative. Respiratory: Positive for shortness of breath and wheezing. Negative for cough. Cardiovascular: Positive for chest pain. Negative for palpitations and leg swelling. Gastrointestinal: Negative for abdominal pain, nausea and vomiting. Musculoskeletal: Negative for back pain and neck pain. Neurological: Negative for dizziness, syncope, weakness, light-headedness, numbness and headaches. Hematological: Does not bruise/bleed easily. PHYSICAL EXAM   (up to 7 for level 4, 8 or more forlevel 5)      INITIAL VITALS:   ED Triage Vitals [02/16/21 1857]   BP Temp Temp Source Pulse Resp SpO2 Height Weight   (!) 180/93 97.6 °F (36.4 °C) Oral 103 13 95 % 5' 7\" (1.702 m) 155 lb (70.3 kg)       Physical Exam  Vitals signs and nursing note reviewed. Constitutional:       General: She is not in acute distress. Appearance: Normal appearance. She is not ill-appearing, toxic-appearing or diaphoretic. Cardiovascular:      Rate and Rhythm: Normal rate and regular rhythm. Heart sounds: No murmur. No gallop. Pulmonary:      Effort: Pulmonary effort is normal. No respiratory distress. Breath sounds: No stridor. Wheezing present. No rhonchi or rales. Abdominal:      General: There is no distension. Palpations: Abdomen is soft. Tenderness: There is no abdominal tenderness. There is no guarding. Musculoskeletal:         General: No tenderness. Right lower leg: No edema. Left lower leg: No edema. Skin:     General: Skin is warm and dry. Neurological:      Mental Status: She is alert and oriented to person, place, and time.          DIFFERENTIAL  DIAGNOSIS     PLAN (LABS / IMAGING / EKG):  Orders Placed This Encounter   Procedures    RAPID INFLUENZA A/B ANTIGENS    COVID-19, Rapid    XR CHEST PORTABLE    CT CHEST PULMONARY EMBOLISM W CONTRAST    CBC WITH AUTO DIFFERENTIAL    BASIC METABOLIC PANEL    Troponin    D-DIMER, QUANTITATIVE    EKG 12 Lead       MEDICATIONS ORDERED:  Orders Placed This Encounter   Medications    magnesium sulfate 2000 mg in 50 mL IVPB premix    iopamidol (ISOVUE-370) 76 % injection 75 mL    DISCONTD: predniSONE (DELTASONE) 10 MG tablet     Sig: Take 4 tablets by mouth once daily for 5 days     Dispense:  20 tablet     Refill:  0    DISCONTD: predniSONE (DELTASONE) 10 MG tablet     Sig: Take 4 tablets by mouth once daily for 5 days Dispense:  20 tablet     Refill:  0    predniSONE (DELTASONE) 10 MG tablet     Sig: Take 4 tablets by mouth once daily for 5 days     Dispense:  20 tablet     Refill:  0       DDX: COPD exacerbation, pneumonia, viral illness, PE, ACS    Initial MDM/Plan/ED course: 54 y.o. female who presents with shortness of breath and COPD exacerbation. Patient was given Solu-Medrol and Combivent breathing treatments by EMS with improvement of symptoms. On exam vitals except for mild tachycardia are normal and patient is in no acute distress with SPO2 of 95% on room air. Physical exam does reveal diffuse end expiratory wheezes throughout all lung fields but is otherwise unremarkable. Cardiac work-up including D-dimer was obtained and unremarkable except for a mildly elevated D-dimer. CTA of the chest was obtained and showed no evidence of pulmonary embolus or pneumonia. Patient had significant improvement with breathing treatments and IV magnesium and was discharged home with steroid burst.  Covid and influenza swabs were also negative.     DIAGNOSTIC RESULTS / EMERGENCY DEPARTMENT COURSE / MDM     LABS:  Labs Reviewed   CBC WITH AUTO DIFFERENTIAL - Abnormal; Notable for the following components:       Result Value    Eosinophils % 9 (*)     Absolute Eos # 0.81 (*)     All other components within normal limits   BASIC METABOLIC PANEL - Abnormal; Notable for the following components:    Glucose 112 (*)     All other components within normal limits   RAPID INFLUENZA A/B ANTIGENS   COVID-19, RAPID   TROPONIN   D-DIMER, QUANTITATIVE         RADIOLOGY:  Xr Chest Portable    Result Date: 2/16/2021 EXAMINATION: ONE XRAY VIEW OF THE CHEST 2/16/2021 7:22 pm COMPARISON: 12/21/2020 HISTORY: ORDERING SYSTEM PROVIDED HISTORY: SIRD+, COPD TECHNOLOGIST PROVIDED HISTORY: SIRD+, COPD Reason for Exam: upr,sob,concern for covid19 FINDINGS: Linear atelectatic change within the left lung base. The lungs are otherwise clear without acute focal process. There is no effusion or pneumothorax. The cardiomediastinal silhouette is stable. The osseous structures are stable. Linear atelectatic change within the left lung base. No other  acute intrathoracic process. Ct Chest Pulmonary Embolism W Contrast    Result Date: 2/16/2021  EXAMINATION: CTA OF THE CHEST 2/16/2021 7:56 pm TECHNIQUE: CTA of the chest was performed after the administration of intravenous contrast.  Multiplanar reformatted images are provided for review. MIP images are provided for review. Dose modulation, iterative reconstruction, and/or weight based adjustment of the mA/kV was utilized to reduce the radiation dose to as low as reasonably achievable. COMPARISON: 10/21/2020 HISTORY: ORDERING SYSTEM PROVIDED HISTORY: r/o PE TECHNOLOGIST PROVIDED HISTORY: r/o PE Decision Support Exception->Emergency Medical Condition (MA) Reason for Exam: SOB Acuity: Acute Type of Exam: Initial FINDINGS: Pulmonary Arteries: Pulmonary arteries are adequately opacified for evaluation. No evidence of intraluminal filling defect to suggest pulmonary embolism. Main pulmonary artery is normal in caliber. Mediastinum: No evidence of mediastinal lymphadenopathy. The heart and pericardium demonstrate no acute abnormality. There is no acute abnormality of the thoracic aorta. Lungs/pleura: The lungs are without acute process. No focal consolidation or pulmonary edema. No evidence of pleural effusion or pneumothorax. Upper Abdomen: Limited images of the upper abdomen are unremarkable. Soft Tissues/Bones: No acute bone or soft tissue abnormality. No evidence of pulmonary embolism or acute pulmonary abnormality.          EKG      All EKG's are interpreted by the EmergencyDepartment Physician who either signs or Co-signs this chart in the absence of a cardiologist.      PROCEDURES:  None    CONSULTS:  None    CRITICAL CARE:  Please see attending note    FINAL IMPRESSION      1. COPD exacerbation ([address]          DISPOSITION / PLAN     DISPOSITION Decision To Discharge 02/16/2021 09:02:00 PM      PATIENT REFERRED TO:  THOR [address]  [phone]    Schedule an appointment as soon as possible for a visit   Follow up      DISCHARGE MEDICATIONS:  Discharge Medication List as of 2/16/2021  9:02 PM      START taking these medications    Details   predniSONE (DELTASONE) 10 MG tablet Take 4 tablets by mouth once daily for 5 days, Disp-20 tablet, R-0Print             New Modi DO  Emergency Medicine Resident    (Please note that portions of this note were completed with a voice recognition program.Efforts were made to edit the dictations but occasionally words are mis-transcribed.)       Hailey Varela DO  Resident  02/16/21 [id number]

## 2021-02-16 NOTE — ED TRIAGE NOTES
Pt brought in by EMS for SOB that started this morning. Pt arrived on BiPap, was given 125 solumedrol, 1 connivent, and a total of 8 albuterol tx given btw home and EMS. Pt c/o chest pressure. Pt has expiratory wheezes. Pt placed on 3L NC on arrival. RR even and unlabored, AND, A&O. Call light in reach, Dr at bedside. On cardiac monitor, EKG complete, IV established pta.

## 2021-02-17 LAB
EKG ATRIAL RATE: 100 BPM
EKG P AXIS: 49 DEGREES
EKG P-R INTERVAL: 132 MS
EKG Q-T INTERVAL: 346 MS
EKG QRS DURATION: 82 MS
EKG QTC CALCULATION (BAZETT): 446 MS
EKG R AXIS: 54 DEGREES
EKG T AXIS: 60 DEGREES
EKG VENTRICULAR RATE: 100 BPM

## 2021-02-17 PROCEDURE — 93010 ELECTROCARDIOGRAM REPORT: CPT | Performed by: INTERNAL MEDICINE

## 2021-02-17 NOTE — ED PROVIDER NOTES
[address] [facility]     Emergency Department     Faculty Attestation    I performed a history and physical examination of the patient and discussed management with the resident. I reviewed the residents note and agree with the documented findings including all diagnostic interpretations and plan of care. Any areas of disagreement are noted on the chart. I was personally present for the key portions of any procedures. I have documented in the chart those procedures where I was not present during the key portions. I have reviewed the emergency nurses triage note. I agree with the chief complaint, past medical history, past surgical history, allergies, medications, social and family history as documented unless otherwise noted below. Documentation of the HPI, Physical Exam and Medical Decision Making performed by scribes is based on my personal performance of the HPI, PE and MDM. For Physician Assistant/ Nurse Practitioner cases/documentation I have personally evaluated this patient and have completed at least one if not all key elements of the E/M (history, physical exam, and MDM). Additional findings are as noted. This patient was evaluated in the Emergency Department for symptoms described in the history of present illness. He/she was evaluated in the context of the global COVID-19 pandemic, which necessitated consideration that the patient might be at risk for infection with the SARS-CoV-2 virus that causes COVID-19. Institutional protocols and algorithms that pertain to the evaluation of patients at risk for COVID-19 are in a state of rapid change based on information released by regulatory bodies including the CDC and federal and state organizations. These policies and algorithms were followed during the patient's care in the ED.     Primary Care Physician: THOR Gutierrez - RAMONITA History: This is a 54 y.o. female who presents to the Emergency Department with complaint of shortness of breath. Increasing over the past day. Given steroids, aerosols, multiple treatments at home. Does have some complaint of chest pressure with this. Known history of COPD. Physical:     height is 5' 7\" (1.702 m) and weight is 155 lb (70.3 kg). Her oral temperature is 97.6 °F (36.4 °C). Her blood pressure is 131/89 and her pulse is 100. Her respiration is 13 and oxygen saturation is 97%.    54 y.o. female able to speak full sentences, cardiac exam borderline tachycardic, pulmonary shows end phase wheezes throughout, abdomen soft nontender nondistended, calves nontender nonswollen. Impression: COPD exacerbation    Plan: Aerosols, cardiac work-up, reassess. Potential admission.     EKG Interpretation    Interpreted by me    Rhythm: normal sinus   Rate: normal  Axis: normal  Ectopy: none  Conduction: normal  ST Segments: no acute change  T Waves: no acute change  Q Waves: none    Clinical Impression: no acute changes and normal EKG    Enzo Wang MD, Mazin Olguin  Attending Emergency Physician         Mandeep Art MD  02/16/21 2005

## 2021-03-03 DIAGNOSIS — F33.1 MODERATE EPISODE OF RECURRENT MAJOR DEPRESSIVE DISORDER (HCC): ICD-10-CM

## 2021-03-03 RX ORDER — ESCITALOPRAM OXALATE 10 MG/1
TABLET ORAL
Qty: 45 TABLET | Refills: 0 | Status: SHIPPED | OUTPATIENT
Start: 2021-03-03 | End: 2021-09-15

## 2021-03-03 NOTE — TELEPHONE ENCOUNTER
.Please Approve or Refuse.   Send to Pharmacy per Pt's Request:      Next Visit Date:  Visit date not found   Last Visit Date: 1/29/2021

## 2021-03-08 RX ORDER — BACLOFEN 10 MG/1
TABLET ORAL
Qty: 30 TABLET | Refills: 1 | Status: SHIPPED | OUTPATIENT
Start: 2021-03-08 | End: 2021-05-27

## 2021-03-08 RX ORDER — ALBUTEROL SULFATE 90 UG/1
AEROSOL, METERED RESPIRATORY (INHALATION)
Qty: 8.5 G | Refills: 2 | Status: ON HOLD
Start: 2021-03-08 | End: 2021-06-15 | Stop reason: HOSPADM

## 2021-03-08 NOTE — TELEPHONE ENCOUNTER
Health Maintenance   Topic Date Due    Hepatitis C screen  Never done    HIV screen  Never done    COVID-19 Vaccine (1 of 2) Never done    Cervical cancer screen  Never done    Shingles Vaccine (1 of 2) Never done    Colon Cancer Screen FIT/FOBT  02/20/2019    Annual Wellness Visit (AWV)  Never done    Breast cancer screen  02/06/2020    Lipid screen  11/08/2022    DTaP/Tdap/Td vaccine (2 - Td) 09/20/2027    Flu vaccine  Completed    Pneumococcal 0-64 years Vaccine  Completed    Hepatitis A vaccine  Aged Out    Hepatitis B vaccine  Aged Out    Hib vaccine  Aged Out    Meningococcal (ACWY) vaccine  Aged Out             (applicable per patient's age: Cancer Screenings, Depression Screening, Fall Risk Screening, Immunizations)    Hemoglobin A1C (%)   Date Value   01/30/2018 5.3   08/02/2013 5.3   02/14/2013 6.0     LDL Cholesterol (mg/dL)   Date Value   11/08/2017 198 (H)     AST (U/L)   Date Value   07/12/2020 18     ALT (U/L)   Date Value   07/12/2020 16     BUN (mg/dL)   Date Value   02/16/2021 12      (goal A1C is < 7)   (goal LDL is <100) need 30-50% reduction from baseline     BP Readings from Last 3 Encounters:   02/16/21 131/89   01/23/21 133/88   12/23/20 122/82    (goal /80)      All Future Testing planned in [facility]:  Lab Frequency Next Occurrence   CONCETTA DIGITAL SCREEN W OR WO CAD BILATERAL Once 05/15/2020   EEG Once 04/30/2020       Next Visit Date:  Future Appointments   Date Time Provider Kaycee Hays   3/10/2021  1:45 PM THOR Coreas -  [facility]            Patient Active Problem List:     Heart disease     Chronic back pain     Depression     Hyperlipidemia     CAD, multiple vessel     Asthma     Smoking     Need for vaccination     Syncope     Leg pain     Left hip pain     Chronic cholecystitis     Chest pain     Calculus of gallbladder without cholecystitis without obstruction     History of lumbar fusion     Failed back syndrome     Marijuana use Abnormal weight loss     Allergic rhinitis     Anxiety state     Chronic midline low back pain with sciatica     Moderate episode of recurrent major depressive disorder (HCC)     Seizure (HCC)     Chronic tension-type headache, intractable     Pneumonia due to organism     Hypertension     Sinus tachycardia

## 2021-03-14 DIAGNOSIS — E78.00 PURE HYPERCHOLESTEROLEMIA: ICD-10-CM

## 2021-03-15 RX ORDER — ATORVASTATIN CALCIUM 40 MG/1
TABLET, FILM COATED ORAL
Qty: 90 TABLET | Refills: 0 | Status: SHIPPED | OUTPATIENT
Start: 2021-03-15 | End: 2021-08-02

## 2021-03-15 NOTE — TELEPHONE ENCOUNTER
Abnormal weight loss     Allergic rhinitis     Anxiety state     Chronic midline low back pain with sciatica     Moderate episode of recurrent major depressive disorder (HCC)     Seizure (HCC)     Chronic tension-type headache, intractable     Pneumonia due to organism     Hypertension     Sinus tachycardia

## 2021-03-30 RX ORDER — LEVETIRACETAM 750 MG/1
TABLET ORAL
Qty: 60 TABLET | Refills: 2 | Status: SHIPPED | OUTPATIENT
Start: 2021-03-30 | End: 2021-06-30

## 2021-03-30 NOTE — TELEPHONE ENCOUNTER
Pharmacy requesting refill of Keppra 750 mg.      Medication active on med list yes      Date last ordered: 12/2/2020  verified on 3/30/2021  verified by RENETTA Garcia LPN      Date of last appointment 12/2/2020    Next Visit Date:  Visit date not found

## 2021-04-01 ENCOUNTER — HOSPITAL ENCOUNTER (EMERGENCY)
Age: 56
Discharge: HOME | End: 2021-04-01
Attending: EMERGENCY MEDICINE
Payer: MEDICARE

## 2021-04-01 ENCOUNTER — APPOINTMENT (OUTPATIENT)
Dept: GENERAL RADIOLOGY | Age: 56
End: 2021-04-01
Payer: MEDICARE

## 2021-04-01 VITALS
HEIGHT: 67 IN | OXYGEN SATURATION: 91 % | WEIGHT: 155 LBS | RESPIRATION RATE: 17 BRPM | SYSTOLIC BLOOD PRESSURE: 129 MMHG | HEART RATE: 108 BPM | BODY MASS INDEX: 24.33 KG/M2 | DIASTOLIC BLOOD PRESSURE: 91 MMHG | TEMPERATURE: 97.9 F

## 2021-04-01 DIAGNOSIS — J44.1 COPD EXACERBATION (HCC): Primary | ICD-10-CM

## 2021-04-01 LAB
ABSOLUTE EOS #: 0.73 K/UL (ref 0–0.44)
ABSOLUTE IMMATURE GRANULOCYTE: <0.03 K/UL (ref 0–0.3)
ABSOLUTE LYMPH #: 3.69 K/UL (ref 1.1–3.7)
ABSOLUTE MONO #: 0.69 K/UL (ref 0.1–1.2)
ANION GAP SERPL CALCULATED.3IONS-SCNC: 11 MMOL/L (ref 9–17)
BASOPHILS # BLD: 1 % (ref 0–2)
BASOPHILS ABSOLUTE: 0.09 K/UL (ref 0–0.2)
BUN BLDV-MCNC: 11 MG/DL (ref 6–20)
BUN/CREAT BLD: NORMAL (ref 9–20)
CALCIUM SERPL-MCNC: 9.5 MG/DL (ref 8.6–10.4)
CHLORIDE BLD-SCNC: 102 MMOL/L (ref 98–107)
CO2: 24 MMOL/L (ref 20–31)
CREAT SERPL-MCNC: 0.59 MG/DL (ref 0.5–0.9)
DIFFERENTIAL TYPE: ABNORMAL
EOSINOPHILS RELATIVE PERCENT: 8 % (ref 1–4)
GFR AFRICAN AMERICAN: >60 ML/MIN
GFR NON-AFRICAN AMERICAN: >60 ML/MIN
GFR SERPL CREATININE-BSD FRML MDRD: NORMAL ML/MIN/{1.73_M2}
GFR SERPL CREATININE-BSD FRML MDRD: NORMAL ML/MIN/{1.73_M2}
GLUCOSE BLD-MCNC: 93 MG/DL (ref 70–99)
HCT VFR BLD CALC: 45 % (ref 36.3–47.1)
HEMOGLOBIN: 14.6 G/DL (ref 11.9–15.1)
IMMATURE GRANULOCYTES: 0 %
LYMPHOCYTES # BLD: 41 % (ref 24–43)
MCH RBC QN AUTO: 31.8 PG (ref 25.2–33.5)
MCHC RBC AUTO-ENTMCNC: 32.4 G/DL (ref 28.4–34.8)
MCV RBC AUTO: 98 FL (ref 82.6–102.9)
MONOCYTES # BLD: 8 % (ref 3–12)
NRBC AUTOMATED: 0 PER 100 WBC
PDW BLD-RTO: 13.4 % (ref 11.8–14.4)
PLATELET # BLD: 311 K/UL (ref 138–453)
PLATELET ESTIMATE: ABNORMAL
PMV BLD AUTO: 8.5 FL (ref 8.1–13.5)
POTASSIUM SERPL-SCNC: 4 MMOL/L (ref 3.7–5.3)
RBC # BLD: 4.59 M/UL (ref 3.95–5.11)
RBC # BLD: ABNORMAL 10*6/UL
SARS-COV-2, RAPID: NOT DETECTED
SEG NEUTROPHILS: 42 % (ref 36–65)
SEGMENTED NEUTROPHILS ABSOLUTE COUNT: 3.74 K/UL (ref 1.5–8.1)
SODIUM BLD-SCNC: 137 MMOL/L (ref 135–144)
SPECIMEN DESCRIPTION: NORMAL
TROPONIN INTERP: NORMAL
TROPONIN INTERP: NORMAL
TROPONIN T: NORMAL NG/ML
TROPONIN T: NORMAL NG/ML
TROPONIN, HIGH SENSITIVITY: <6 NG/L (ref 0–14)
TROPONIN, HIGH SENSITIVITY: <6 NG/L (ref 0–14)
WBC # BLD: 9 K/UL (ref 3.5–11.3)
WBC # BLD: ABNORMAL 10*3/UL

## 2021-04-01 PROCEDURE — 6370000000 HC RX 637 (ALT 250 FOR IP): Performed by: STUDENT IN AN ORGANIZED HEALTH CARE EDUCATION/TRAINING PROGRAM

## 2021-04-01 PROCEDURE — 93005 ELECTROCARDIOGRAM TRACING: CPT | Performed by: STUDENT IN AN ORGANIZED HEALTH CARE EDUCATION/TRAINING PROGRAM

## 2021-04-01 PROCEDURE — 71045 X-RAY EXAM CHEST 1 VIEW: CPT

## 2021-04-01 PROCEDURE — 80048 BASIC METABOLIC PNL TOTAL CA: CPT

## 2021-04-01 PROCEDURE — 94640 AIRWAY INHALATION TREATMENT: CPT

## 2021-04-01 PROCEDURE — 6360000002 HC RX W HCPCS: Performed by: STUDENT IN AN ORGANIZED HEALTH CARE EDUCATION/TRAINING PROGRAM

## 2021-04-01 PROCEDURE — 85025 COMPLETE CBC W/AUTO DIFF WBC: CPT

## 2021-04-01 PROCEDURE — 84484 ASSAY OF TROPONIN QUANT: CPT

## 2021-04-01 PROCEDURE — 96374 THER/PROPH/DIAG INJ IV PUSH: CPT

## 2021-04-01 PROCEDURE — 87635 SARS-COV-2 COVID-19 AMP PRB: CPT

## 2021-04-01 PROCEDURE — 99285 EMERGENCY DEPT VISIT HI MDM: CPT

## 2021-04-01 RX ORDER — PREDNISONE 20 MG/1
40 TABLET ORAL DAILY
Qty: 5 TABLET | Refills: 0 | Status: SHIPPED | OUTPATIENT
Start: 2021-04-01 | End: 2021-04-04

## 2021-04-01 RX ORDER — ACETAMINOPHEN 500 MG
1000 TABLET ORAL ONCE
Status: COMPLETED | OUTPATIENT
Start: 2021-04-01 | End: 2021-04-01

## 2021-04-01 RX ORDER — BUDESONIDE AND FORMOTEROL FUMARATE DIHYDRATE 80; 4.5 UG/1; UG/1
2 AEROSOL RESPIRATORY (INHALATION) 2 TIMES DAILY
Status: DISCONTINUED | OUTPATIENT
Start: 2021-04-01 | End: 2021-04-01 | Stop reason: HOSPADM

## 2021-04-01 RX ORDER — ALBUTEROL SULFATE 2.5 MG/3ML
2.5 SOLUTION RESPIRATORY (INHALATION) EVERY 4 HOURS PRN
Status: DISCONTINUED | OUTPATIENT
Start: 2021-04-01 | End: 2021-04-01 | Stop reason: HOSPADM

## 2021-04-01 RX ORDER — ALBUTEROL SULFATE 90 UG/1
2 AEROSOL, METERED RESPIRATORY (INHALATION) 4 TIMES DAILY PRN
Qty: 3 INHALER | Refills: 1 | Status: SHIPPED | OUTPATIENT
Start: 2021-04-01 | End: 2021-04-12 | Stop reason: SDUPTHER

## 2021-04-01 RX ORDER — METHYLPREDNISOLONE SODIUM SUCCINATE 125 MG/2ML
125 INJECTION, POWDER, LYOPHILIZED, FOR SOLUTION INTRAMUSCULAR; INTRAVENOUS ONCE
Status: COMPLETED | OUTPATIENT
Start: 2021-04-01 | End: 2021-04-01

## 2021-04-01 RX ADMIN — ALBUTEROL SULFATE 2.5 MG: 2.5 SOLUTION RESPIRATORY (INHALATION) at 20:07

## 2021-04-01 RX ADMIN — ACETAMINOPHEN 1000 MG: 500 TABLET ORAL at 20:55

## 2021-04-01 RX ADMIN — METHYLPREDNISOLONE SODIUM SUCCINATE 125 MG: 125 INJECTION, POWDER, FOR SOLUTION INTRAMUSCULAR; INTRAVENOUS at 19:19

## 2021-04-01 RX ADMIN — BUDESONIDE AND FORMOTEROL FUMARATE DIHYDRATE 2 PUFF: 80; 4.5 AEROSOL RESPIRATORY (INHALATION) at 20:07

## 2021-04-01 ASSESSMENT — PAIN SCALES - GENERAL
PAINLEVEL_OUTOF10: 5
PAINLEVEL_OUTOF10: 8

## 2021-04-01 ASSESSMENT — PAIN DESCRIPTION - FREQUENCY: FREQUENCY: INTERMITTENT

## 2021-04-01 ASSESSMENT — PAIN DESCRIPTION - LOCATION: LOCATION: HEAD;CHEST

## 2021-04-01 NOTE — ED NOTES
Pt to the ER with c/o SOB, non-productive cough, headache and midsternal chest pain x 2 days. Has h/o COPD. Pt reports no relief with home breathing tx's. Placed on full cardiac monitor, pt tachycardic. No acute distress noted, rr even and NL. Awaiting physician evaluation.      Jw Rouse RN  04/01/21 [id number]

## 2021-04-01 NOTE — ED PROVIDER NOTES
[address] [facility]  ED  Emergency Department Encounter  Emergency Medicine Resident     Pt Name: Jim Borjas  MRN: [id number]  [place] 1965  Date of evaluation: 4/1/21  PCP:  THOR Ledes[id number]       Chief Complaint   Patient presents with    Shortness of Breath     COPD       HISTORY OFPRESENT ILLNESS  (Location/Symptom, Timing/Onset, Context/Setting, Quality, Duration, Modifying Beatriz Jd.)      Jim Borjas is a 54 y.o. female who presents with concern for shortness of breath that has been progressively worsening for 2 days duration. Patient states that she went to a [employer] party approximately 7 to 10 days ago and is concerned that she has contracted Covid. Patient does have a diagnosis of COPD, however she has been out of her albuterol inhaler, but has been using her nebulizer therapies that she has at home. Patient states that she has Spiriva and albuterol on file that she nebulizes. Patient states that she has been using the more frequently. Patient does admit to an increase in cough with productive sputum, however denies any fever, nausea, vomiting. Patient does admit to a headache. Patient states that this is not the worst headache of her life. Patient denies any numbness, tingling, weakness. Patient admits to some chest pressure but associates it with coughing. Patient is stating that she has worsening shortness of breath on normal distances that she walks her house and has worsening shortness of breath while speaking in full sentences. PAST MEDICAL / SURGICAL / SOCIAL / FAMILY HISTORY      has a past medical history of Acute MI ([address] Anxiety, Chronic back pain, COPD (chronic obstructive pulmonary disease) ([address] Depression, Heart disease, Hyperlipidemia, Hypertension, and MRSA (methicillin resistant staph aureus) culture positive. has a past surgical history that includes Tubal ligation; Tonsillectomy;  Cholecystectomy, laparoscopic (11/10/2017); Cholecystectomy, laparoscopic (N/A, 11/10/2017); and back surgery.      Social History     Socioeconomic History    Marital status:      Spouse name: Not on file    Number of children: Not on file    Years of education: Not on file    Highest education level: Not on file   Occupational History    Not on file   Social Needs    Financial resource strain: Not hard at all   [employer] insecurity     Worry: Never true     Inability: Never true   [employer] needs     Medical: Not on file     Non-medical: Not on file   Tobacco Use    Smoking status: Current Every Day Smoker     Packs/day: 0.50     Types: Cigarettes    Smokeless tobacco: Never Used    Tobacco comment: Attempting to quit with patch now 10/16/20 currently smoking no longer using patch   Substance and Sexual Activity    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    Drug use: Yes     Types: Marijuana     Comment: daily - \"a couple bowls\"    Sexual activity: Not on file   Lifestyle    Physical activity     Days per week: Not on file     Minutes per session: Not on file    Stress: Not on file   Relationships    Social connections     Talks on phone: Not on file     Gets together: Not on file     Attends Sabianism service: Not on file     Active member of club or organization: Not on file     Attends meetings of clubs or organizations: Not on file     Relationship status: Not on file    Intimate partner violence     Fear of current or ex partner: Not on file     Emotionally abused: Not on file     Physically abused: Not on file     Forced sexual activity: Not on file   Other Topics Concern    Not on file   Social History Narrative    Not on file       Family History   Problem Relation Age of Onset    Other Mother     Heart Disease Father     High Blood Pressure Father     High Cholesterol Father     Other Brother         Allergies:  Sulfa antibiotics    Home Medications:  Prior to Admission medications Medication Sig Start Date End Date Taking? Authorizing Provider   albuterol sulfate HFA (VENTOLIN HFA) 108 (90 Base) MCG/ACT inhaler Inhale 2 puffs into the lungs 4 times daily as needed for Wheezing 4/1/21  Yes Meghan Roy,    predniSONE (DELTASONE) 20 MG tablet Take 2 tablets by mouth daily for 3 days 4/1/21 4/4/21 Yes Meghan Roy,    levETIRAcetam (KEPPRA) 750 MG tablet TAKE ONE TABLET BY MOUTH TWICE A DAY 3/30/21   THOR Black CNP   atorvastatin (LIPITOR) 40 MG tablet TAKE ONE TABLET BY MOUTH DAILY 3/15/21   THOR Brewer CNP   baclofen (LIORESAL) 10 MG tablet TAKE ONE TABLET BY MOUTH ONCE NIGHTLY AS NEEDED FOR PAIN 3/8/21   THOR Brewer CNP   albuterol sulfate  (90 Base) MCG/ACT inhaler INHALE TWO PUFFS BY MOUTH EVERY 6 HOURS AS NEEDED FOR WHEEZING 3/8/21   THOR Brewer CNP   escitalopram (LEXAPRO) 10 MG tablet TAKE 1 AND 1/2 TABLET BY MOUTH DAILY 3/3/21   THOR Craig NP   betamethasone valerate (VALISONE) 0.1 % cream Apply topically 2 times daily. Do not use for longer than 2 weeks.  1/23/21   Ramos Oshea MD   nicotine (NICODERM CQ) 21 MG/24HR Place 1 patch onto the skin daily for 10 days 12/23/20 1/2/21  iSlvina Dimas MD   amitriptyline (ELAVIL) 25 MG tablet Take 3 tablets by mouth nightly 12/2/20 1/1/21  THOR Black CNP   albuterol (PROVENTIL) (2.5 MG/3ML) 0.083% nebulizer solution INHALE 3 MLS BY NEBULIZATION EVERY 6 HOURS AS NEEDED FOR WHEEZING 11/24/20   THOR Brewer CNP   STIOLTO RESPIMAT 2.5-2.5 MCG/ACT AERS INHALE TWO INHALATION(S) BY MOUTH DAILY  Patient not taking: Reported on 11/30/2020 11/9/20   THOR Brewer - CNP   budesonide-formoterol [facility]) 160-4.5 MCG/ACT AERO Inhale 2 puffs into the lungs 2 times daily  Patient not taking: Reported on 11/30/2020 10/16/20   [address] CHRISTINE MD   tiotropium (SPIRIVA RESPIMAT) 2.5 MCG/ACT AERS inhaler Inhale 2 puffs into the lungs daily 10/16/20   Coretta Naranjo MD   Nebulizers (NEBULIZER COMPRESSOR) MISC Daily as needed 9/4/20   Watt THOR Dominguez CNP   Respiratory Therapy Supplies (NEBULIZER/TUBING/MOUTHPIECE) KIT 1 kit by Does not apply route daily as needed (SOB or wheezing) 9/4/20   Watt THOR Dominguez CNP   acetaminophen (TYLENOL) 325 MG tablet Take 1 tablet by mouth every 6 hours as needed for Pain 6/15/20   Ashley Fusi, DO   ibuprofen (IBU) 400 MG tablet Take 1 tablet by mouth every 6 hours as needed for Pain 6/15/20   Alvah Fusi, DO   Handicap Placard MISC by Does not apply route Exp 1/2024 1/20/20   Watt THOR Dominguez CNP   ondansetron (ZOFRAN ODT) 4 MG disintegrating tablet Take 1 tablet by mouth every 8 hours as needed for Nausea  Patient not taking: Reported on 10/16/2020 1/9/20   Calvin Camejo MD   Cholecalciferol (VITAMIN D3) 400 units CAPS Take 1 tablet by mouth daily    Historical Provider, MD   aspirin EC 81 MG EC tablet Take 1 tablet by mouth daily  Patient taking differently: Take 81 mg by mouth daily as needed  3/6/19   Watt THOR Dominguez CNP   magnesium gluconate (MAGONATE) 500 MG tablet Take 400 mg by mouth every evening     Historical Provider, MD   vitamin B-6 (PYRIDOXINE) 100 MG tablet Take 100 mg by mouth daily    Historical Provider, MD   nitroGLYCERIN (NITROSTAT) 0.4 MG SL tablet Place 1 tablet under the tongue every 5 minutes as needed for Chest pain up to max of 3 total doses. If no relief after 1 dose, call 911. Patient not taking: Reported on 10/16/2020 1/30/18   Robe Westbrook MD   Multiple Vitamins-Minerals (THERAPEUTIC MULTIVITAMIN-MINERALS) tablet Take 1 tablet by mouth daily. Historical Provider, MD   vitamin B-12 (CYANOCOBALAMIN) 1000 MCG tablet Take 1,000 mcg by mouth daily.     Historical Provider, MD       REVIEW OFSYSTEMS    (2-9 systems for level 4, 10 or more for level 5)      Constitutional ROS - No recent fevers, No recent chills  Neurological ROS - No Headache, No Syncope  Opthalmologic ROS- No eye pain, No vision changes   ENT ROS - No sore throat, No congestion  Respiratory ROS - No cough, +  shortness of breath  Cardiovascular ROS - + chest pain, No palpitations   Gastrointestinal ROS - No abdominal pain, No nausea, No vomiting  Genito-Urinary ROS - No dysuria, Nohematuria  Musculoskeletal ROS - No back pain, No neck pain  Dermatological ROS - No wound, No rash  PHYSICAL EXAM   (up to 7 for level 4, 8 or more forlevel 5)      INITIAL VITALS:   ED Triage Vitals [04/01/21 1826]   BP Temp Temp Source Pulse Resp SpO2 Height Weight   (!) 151/97 97.9 °F (36.6 °C) Oral 111 20 94 % 5' 7\" (1.702 m) 155 lb (70.3 kg)       Physical Exam  Constitutional:       Appearance: She is well-developed. Comments: Alert and oriented female, appears worrisome but and not in acute distress, nontoxic appearing, drove herself in today for concern of worsening COPD exacerbation   HENT:      Head: Normocephalic and atraumatic. Eyes:      Pupils: Pupils are equal, round, and reactive to light. Neck:      Musculoskeletal: Normal range of motion and neck supple. Cardiovascular:      Rate and Rhythm: Normal rate and regular rhythm. Pulmonary:      Effort: Pulmonary effort is normal. No respiratory distress. Breath sounds: No stridor. Comments: Diffuse wheezes heard throughout all lung fields, patient is not using accessory muscles, is speaking in fragmented sentences, in no acute respiratory distress  Abdominal:      General: Bowel sounds are normal. There is no distension. Palpations: Abdomen is soft. Musculoskeletal: Normal range of motion. General: No deformity. Skin:     General: Skin is warm and dry. Capillary Refill: Capillary refill takes less than 2 seconds. Neurological:      Mental Status: She is alert and oriented to person, place, and time.    Psychiatric:         Behavior: Behavior normal.         DIFFERENTIAL  DIAGNOSIS     PLAN (Jessica Miles / IMAGING start patient on 125 mg Solu-Medrol. DIAGNOSTIC RESULTS / EMERGENCYDEPARTMENT COURSE / MDM     LABS:  Labs Reviewed   CBC WITH AUTO DIFFERENTIAL - Abnormal; Notable for the following components:       Result Value    Eosinophils % 8 (*)     Absolute Eos # 0.73 (*)     All other components within normal limits   COVID-19, RAPID   BASIC METABOLIC PANEL   TROPONIN   TROPONIN         RADIOLOGY:  Xr Chest Portable    Result Date: 4/1/2021  EXAMINATION: ONE XRAY VIEW OF THE CHEST 4/1/2021 6:28 pm COMPARISON: February 16, 2021. HISTORY: ORDERING SYSTEM PROVIDED HISTORY: sob TECHNOLOGIST PROVIDED HISTORY: sob Reason for Exam: sob; port/upright FINDINGS: A portable upright frontal view chest radiograph was obtained. The heart size, mediastinal contour, and pleural spaces are within normal limits. The lungs are clear. There is no focal consolidation or pneumothorax. The pulmonary vascular pattern is within normal limits. No significant thoracic osseous abnormality. Clear lungs. No acute cardiopulmonary abnormality. EKG  EKG Interpretation  See chart   EKG shows no acute ST or T wave elevation or depressions    Clinical Impression: no acute changes and normal EKG    All EKG's are interpreted by the Emergency Department Physicianwho either signs or Co-signs this chart in the absence of a cardiologist.    EMERGENCY DEPARTMENT COURSE:  ED Course as of Apr 02 0036   Thu Apr 01, 2021 2122 Patient is feeling better after respiratory treatment, was given 10 mg noncontinuous albuterol therapy. Plan to discharge patient home with albuterol rescue inhaler, patient states that this is nothing she does not have, she is going to call her pulmonologist again appointment tomorrow. Patient has a negative Covid test, was instructed that that does not mean that she does not have other infections. Patient was instructed about smoking cessation.     [JERAMIE]      ED Course User Index  [JERAMIE] Randa Roy DO PROCEDURES:  None    CONSULTS:  None    CRITICAL CARE:  Please see attending note    FINAL IMPRESSION      1. COPD exacerbation ([address]          DISPOSITION / PLAN     DISPOSITION Decision To Discharge 04/01/2021 09:37:16 PM      PATIENT REFERRED TO:  THOR Ahn - CNP  [address]  [phone]    Call   As needed    [facility] ED  [address] [place] [address]  [phone]  Call   As needed      DISCHARGE MEDICATIONS:  Discharge Medication List as of 4/1/2021  9:56 PM      START taking these medications    Details   !! albuterol sulfate HFA (VENTOLIN HFA) 108 (90 Base) MCG/ACT inhaler Inhale 2 puffs into the lungs 4 times daily as needed for Wheezing, Disp-3 Inhaler, R-1Print      predniSONE (DELTASONE) 20 MG tablet Take 2 tablets by mouth daily for 3 days, Disp-5 tablet, R-0Print       !! - Potential duplicate medications found. Please discuss with provider.           Skylar Denton DO  Emergency Medicine Resident    (Please note that portions of this note were completed with a voice recognition program.Efforts were made to edit the dictations but occasionally words are mis-transcribed.)       Skylar Denton DO  Resident  04/02/21 8600

## 2021-04-02 LAB
EKG ATRIAL RATE: 98 BPM
EKG P AXIS: 26 DEGREES
EKG P-R INTERVAL: 124 MS
EKG Q-T INTERVAL: 344 MS
EKG QRS DURATION: 82 MS
EKG QTC CALCULATION (BAZETT): 439 MS
EKG R AXIS: 56 DEGREES
EKG T AXIS: 68 DEGREES
EKG VENTRICULAR RATE: 98 BPM

## 2021-04-02 NOTE — FLOWSHEET NOTE
SPIRITUAL CARE DEPARTMENT - [address]  PROGRESS NOTE    Shift date: 4.1.2021  Shift day: Thursday   Shift # 2    Room # 15/15   Name: Remington Martínez            Age: 54 y.o. Gender: female          [facility]: unknown   Place of [facility]: unknown    Referral: Routine Visit    Admit Date & Time: 4/1/2021  6:35 PM    PATIENT/EVENT DESCRIPTION:  Remington Martínez is a 54 y.o. female who states that she is having headaches      SPIRITUAL ASSESSMENT/INTERVENTION:  Patient seems to be calm and coping but states she has a headache. With regards to family support, patient states \"he knows I'm here. \"  Patient thanked  for coming in and was slightly passive. SPIRITUAL CARE FOLLOW-UP PLAN:  Chaplains will remain available to offer spiritual and emotional support as needed. Electronically signed by Glover Babinski, on 4/1/2021 at 8:57 PM.  [facility]  [phone]       04/01/21 2056   Encounter Summary   Services provided to: Patient   Referral/Consult From: [facility] Significant other   Continue Visiting   (4.1.2021)   Complexity of Encounter Moderate   Length of Encounter 15 minutes   Spiritual Assessment Completed Yes   Routine   Type Initial   Assessment Calm; Approachable;Coping   Intervention Active listening;Explored feelings, thoughts, concerns;Explored coping resources; Discussed illness/injury and it's impact   Outcome Expressed gratitude;Expressed feelings/needs/concerns     Electronically signed by Remington Luna on 4/1/2021 at 8:57 PM
The patient is a 49y Female complaining of abdominal pain.

## 2021-04-12 ENCOUNTER — HOSPITAL ENCOUNTER (EMERGENCY)
Age: 56
Discharge: HOME | End: 2021-04-12
Attending: EMERGENCY MEDICINE
Payer: MEDICARE

## 2021-04-12 ENCOUNTER — APPOINTMENT (OUTPATIENT)
Dept: GENERAL RADIOLOGY | Age: 56
End: 2021-04-12
Payer: MEDICARE

## 2021-04-12 VITALS
DIASTOLIC BLOOD PRESSURE: 80 MMHG | WEIGHT: 155 LBS | RESPIRATION RATE: 19 BRPM | TEMPERATURE: 97.9 F | BODY MASS INDEX: 24.28 KG/M2 | SYSTOLIC BLOOD PRESSURE: 132 MMHG | HEART RATE: 114 BPM | OXYGEN SATURATION: 98 %

## 2021-04-12 DIAGNOSIS — J44.9 CHRONIC OBSTRUCTIVE PULMONARY DISEASE, UNSPECIFIED COPD TYPE (HCC): ICD-10-CM

## 2021-04-12 DIAGNOSIS — R06.02 SHORTNESS OF BREATH: ICD-10-CM

## 2021-04-12 DIAGNOSIS — J44.1 COPD EXACERBATION (HCC): Primary | ICD-10-CM

## 2021-04-12 DIAGNOSIS — R05.9 COUGH: ICD-10-CM

## 2021-04-12 DIAGNOSIS — J45.909 ASTHMA, UNSPECIFIED ASTHMA SEVERITY, UNSPECIFIED WHETHER COMPLICATED, UNSPECIFIED WHETHER PERSISTENT: ICD-10-CM

## 2021-04-12 LAB
ABSOLUTE EOS #: 0.44 K/UL (ref 0–0.44)
ABSOLUTE IMMATURE GRANULOCYTE: <0.03 K/UL (ref 0–0.3)
ABSOLUTE LYMPH #: 2.7 K/UL (ref 1.1–3.7)
ABSOLUTE MONO #: 0.48 K/UL (ref 0.1–1.2)
ANION GAP SERPL CALCULATED.3IONS-SCNC: 9 MMOL/L (ref 9–17)
BASOPHILS # BLD: 1 % (ref 0–2)
BASOPHILS ABSOLUTE: 0.08 K/UL (ref 0–0.2)
BUN BLDV-MCNC: 8 MG/DL (ref 6–20)
BUN/CREAT BLD: ABNORMAL (ref 9–20)
CALCIUM SERPL-MCNC: 9 MG/DL (ref 8.6–10.4)
CHLORIDE BLD-SCNC: 104 MMOL/L (ref 98–107)
CO2: 24 MMOL/L (ref 20–31)
CREAT SERPL-MCNC: 0.54 MG/DL (ref 0.5–0.9)
D-DIMER QUANTITATIVE: 0.5 MG/L FEU
DIFFERENTIAL TYPE: ABNORMAL
EOSINOPHILS RELATIVE PERCENT: 6 % (ref 1–4)
GFR AFRICAN AMERICAN: >60 ML/MIN
GFR NON-AFRICAN AMERICAN: >60 ML/MIN
GFR SERPL CREATININE-BSD FRML MDRD: ABNORMAL ML/MIN/{1.73_M2}
GFR SERPL CREATININE-BSD FRML MDRD: ABNORMAL ML/MIN/{1.73_M2}
GLUCOSE BLD-MCNC: 103 MG/DL (ref 70–99)
HCT VFR BLD CALC: 40.7 % (ref 36.3–47.1)
HEMOGLOBIN: 13.5 G/DL (ref 11.9–15.1)
IMMATURE GRANULOCYTES: 0 %
LYMPHOCYTES # BLD: 35 % (ref 24–43)
MCH RBC QN AUTO: 32.3 PG (ref 25.2–33.5)
MCHC RBC AUTO-ENTMCNC: 33.2 G/DL (ref 28.4–34.8)
MCV RBC AUTO: 97.4 FL (ref 82.6–102.9)
MONOCYTES # BLD: 6 % (ref 3–12)
NRBC AUTOMATED: 0 PER 100 WBC
PDW BLD-RTO: 13.8 % (ref 11.8–14.4)
PLATELET # BLD: 318 K/UL (ref 138–453)
PLATELET ESTIMATE: ABNORMAL
PMV BLD AUTO: 8.3 FL (ref 8.1–13.5)
POTASSIUM SERPL-SCNC: 3.7 MMOL/L (ref 3.7–5.3)
RBC # BLD: 4.18 M/UL (ref 3.95–5.11)
RBC # BLD: ABNORMAL 10*6/UL
SARS-COV-2, RAPID: NOT DETECTED
SEG NEUTROPHILS: 52 % (ref 36–65)
SEGMENTED NEUTROPHILS ABSOLUTE COUNT: 3.95 K/UL (ref 1.5–8.1)
SODIUM BLD-SCNC: 137 MMOL/L (ref 135–144)
SPECIMEN DESCRIPTION: NORMAL
TROPONIN INTERP: NORMAL
TROPONIN T: NORMAL NG/ML
TROPONIN, HIGH SENSITIVITY: <6 NG/L (ref 0–14)
WBC # BLD: 7.7 K/UL (ref 3.5–11.3)
WBC # BLD: ABNORMAL 10*3/UL

## 2021-04-12 PROCEDURE — 80048 BASIC METABOLIC PNL TOTAL CA: CPT

## 2021-04-12 PROCEDURE — 96376 TX/PRO/DX INJ SAME DRUG ADON: CPT

## 2021-04-12 PROCEDURE — 6360000002 HC RX W HCPCS: Performed by: GENERAL PRACTICE

## 2021-04-12 PROCEDURE — 85025 COMPLETE CBC W/AUTO DIFF WBC: CPT

## 2021-04-12 PROCEDURE — 71045 X-RAY EXAM CHEST 1 VIEW: CPT

## 2021-04-12 PROCEDURE — 93005 ELECTROCARDIOGRAM TRACING: CPT | Performed by: GENERAL PRACTICE

## 2021-04-12 PROCEDURE — 96365 THER/PROPH/DIAG IV INF INIT: CPT

## 2021-04-12 PROCEDURE — 96361 HYDRATE IV INFUSION ADD-ON: CPT

## 2021-04-12 PROCEDURE — 96375 TX/PRO/DX INJ NEW DRUG ADDON: CPT

## 2021-04-12 PROCEDURE — 85379 FIBRIN DEGRADATION QUANT: CPT

## 2021-04-12 PROCEDURE — 87635 SARS-COV-2 COVID-19 AMP PRB: CPT

## 2021-04-12 PROCEDURE — 84484 ASSAY OF TROPONIN QUANT: CPT

## 2021-04-12 PROCEDURE — 99283 EMERGENCY DEPT VISIT LOW MDM: CPT

## 2021-04-12 PROCEDURE — 2580000003 HC RX 258: Performed by: GENERAL PRACTICE

## 2021-04-12 PROCEDURE — 94640 AIRWAY INHALATION TREATMENT: CPT

## 2021-04-12 PROCEDURE — 6370000000 HC RX 637 (ALT 250 FOR IP): Performed by: GENERAL PRACTICE

## 2021-04-12 RX ORDER — DIPHENHYDRAMINE HYDROCHLORIDE 50 MG/ML
25 INJECTION INTRAMUSCULAR; INTRAVENOUS EVERY 6 HOURS PRN
Status: DISCONTINUED | OUTPATIENT
Start: 2021-04-12 | End: 2021-04-12 | Stop reason: HOSPADM

## 2021-04-12 RX ORDER — ALBUTEROL SULFATE 2.5 MG/3ML
SOLUTION RESPIRATORY (INHALATION)
Qty: 120 EACH | Refills: 2 | Status: ON HOLD | OUTPATIENT
Start: 2021-04-12 | End: 2021-06-15 | Stop reason: HOSPADM

## 2021-04-12 RX ORDER — METHYLPREDNISOLONE SODIUM SUCCINATE 125 MG/2ML
125 INJECTION, POWDER, LYOPHILIZED, FOR SOLUTION INTRAMUSCULAR; INTRAVENOUS DAILY
Status: DISCONTINUED | OUTPATIENT
Start: 2021-04-12 | End: 2021-04-12 | Stop reason: HOSPADM

## 2021-04-12 RX ORDER — GUAIFENESIN 600 MG/1
1200 TABLET, EXTENDED RELEASE ORAL 2 TIMES DAILY
Qty: 40 TABLET | Refills: 0 | Status: SHIPPED | OUTPATIENT
Start: 2021-04-12 | End: 2021-04-22

## 2021-04-12 RX ORDER — ALBUTEROL SULFATE 90 UG/1
2 AEROSOL, METERED RESPIRATORY (INHALATION) EVERY 6 HOURS PRN
Status: DISCONTINUED | OUTPATIENT
Start: 2021-04-12 | End: 2021-04-12 | Stop reason: HOSPADM

## 2021-04-12 RX ORDER — MAGNESIUM SULFATE IN WATER 40 MG/ML
2000 INJECTION, SOLUTION INTRAVENOUS ONCE
Status: COMPLETED | OUTPATIENT
Start: 2021-04-12 | End: 2021-04-12

## 2021-04-12 RX ORDER — PROCHLORPERAZINE EDISYLATE 5 MG/ML
10 INJECTION INTRAMUSCULAR; INTRAVENOUS ONCE
Status: COMPLETED | OUTPATIENT
Start: 2021-04-12 | End: 2021-04-12

## 2021-04-12 RX ORDER — IPRATROPIUM BROMIDE AND ALBUTEROL SULFATE 2.5; .5 MG/3ML; MG/3ML
1 SOLUTION RESPIRATORY (INHALATION)
Status: DISCONTINUED | OUTPATIENT
Start: 2021-04-12 | End: 2021-04-12 | Stop reason: HOSPADM

## 2021-04-12 RX ORDER — PREDNISONE 10 MG/1
TABLET ORAL
Qty: 20 TABLET | Refills: 0 | Status: SHIPPED | OUTPATIENT
Start: 2021-04-12 | End: 2021-04-22

## 2021-04-12 RX ORDER — ALBUTEROL SULFATE 90 UG/1
2 AEROSOL, METERED RESPIRATORY (INHALATION) 4 TIMES DAILY PRN
Qty: 3 INHALER | Refills: 1 | Status: ON HOLD | OUTPATIENT
Start: 2021-04-12 | End: 2021-06-15 | Stop reason: SDUPTHER

## 2021-04-12 RX ORDER — KETOROLAC TROMETHAMINE 30 MG/ML
30 INJECTION, SOLUTION INTRAMUSCULAR; INTRAVENOUS ONCE
Status: COMPLETED | OUTPATIENT
Start: 2021-04-12 | End: 2021-04-12

## 2021-04-12 RX ORDER — 0.9 % SODIUM CHLORIDE 0.9 %
1000 INTRAVENOUS SOLUTION INTRAVENOUS ONCE
Status: COMPLETED | OUTPATIENT
Start: 2021-04-12 | End: 2021-04-12

## 2021-04-12 RX ADMIN — DIPHENHYDRAMINE HYDROCHLORIDE 25 MG: 50 INJECTION, SOLUTION INTRAMUSCULAR; INTRAVENOUS at 18:36

## 2021-04-12 RX ADMIN — ALBUTEROL SULFATE 2 PUFF: 90 AEROSOL, METERED RESPIRATORY (INHALATION) at 16:37

## 2021-04-12 RX ADMIN — METHYLPREDNISOLONE SODIUM SUCCINATE 125 MG: 125 INJECTION, POWDER, FOR SOLUTION INTRAMUSCULAR; INTRAVENOUS at 18:45

## 2021-04-12 RX ADMIN — SODIUM CHLORIDE 1000 ML: 9 INJECTION, SOLUTION INTRAVENOUS at 18:40

## 2021-04-12 RX ADMIN — KETOROLAC TROMETHAMINE 30 MG: 30 INJECTION, SOLUTION INTRAMUSCULAR; INTRAVENOUS at 18:40

## 2021-04-12 RX ADMIN — PROCHLORPERAZINE EDISYLATE 10 MG: 5 INJECTION INTRAMUSCULAR; INTRAVENOUS at 18:36

## 2021-04-12 RX ADMIN — IPRATROPIUM BROMIDE AND ALBUTEROL SULFATE 1 AMPULE: .5; 3 SOLUTION RESPIRATORY (INHALATION) at 18:32

## 2021-04-12 RX ADMIN — MAGNESIUM SULFATE 2000 MG: 2 INJECTION INTRAVENOUS at 18:59

## 2021-04-12 RX ADMIN — METHYLPREDNISOLONE SODIUM SUCCINATE 125 MG: 125 INJECTION, POWDER, FOR SOLUTION INTRAMUSCULAR; INTRAVENOUS at 16:29

## 2021-04-12 NOTE — ED PROVIDER NOTES
[facility] [address] [facility]  Emergency Department  Emergency Medicine Resident Sign-out     Care of Ivone Eason was assumed from Dr. Sanjuana Renteria and is being seen for Shortness of Breath (wheezing with SOB x 4 days)  . The patient's initial evaluation and plan have been discussed with the prior provider who initially evaluated the patient.      EMERGENCY DEPARTMENT COURSE / MEDICAL DECISION MAKING:       MEDICATIONS GIVEN:  Orders Placed This Encounter   Medications    methylPREDNISolone sodium (SOLU-MEDROL) injection 125 mg    albuterol sulfate  (90 Base) MCG/ACT inhaler 2 puff    diphenhydrAMINE (BENADRYL) injection 25 mg    prochlorperazine (COMPAZINE) injection 10 mg    ketorolac (TORADOL) injection 30 mg    0.9 % sodium chloride bolus    magnesium sulfate 2000 mg in 50 mL IVPB premix    ipratropium-albuterol (DUONEB) nebulizer solution 1 ampule    guaiFENesin (MUCINEX) 600 MG extended release tablet     Sig: Take 2 tablets by mouth 2 times daily for 10 days     Dispense:  40 tablet     Refill:  0    albuterol (PROVENTIL) (2.5 MG/3ML) 0.083% nebulizer solution     Sig: INHALE 3 MLS BY NEBULIZATION EVERY 6 HOURS AS NEEDED FOR WHEEZING     Dispense:  120 each     Refill:  2    albuterol sulfate HFA (VENTOLIN HFA) 108 (90 Base) MCG/ACT inhaler     Sig: Inhale 2 puffs into the lungs 4 times daily as needed for Wheezing     Dispense:  3 Inhaler     Refill:  1    predniSONE (DELTASONE) 10 MG tablet     Sig: Take 4 tablets by mouth once daily for 5 days     Dispense:  20 tablet     Refill:  0       LABS / RADIOLOGY:     Labs Reviewed   CBC WITH AUTO DIFFERENTIAL - Abnormal; Notable for the following components:       Result Value    Eosinophils % 6 (*)     All other components within normal limits   BASIC METABOLIC PANEL W/ REFLEX TO MG FOR LOW K - Abnormal; Notable for the following components:    Glucose 103 (*)     All other components within normal limits   COVID-19, RAPID   TROPONIN       Xr Chest Portable    Result Date: 4/12/2021  EXAMINATION: ONE XRAY VIEW OF THE CHEST 4/12/2021 4:50 pm COMPARISON: 1 April 2021 HISTORY: ORDERING SYSTEM PROVIDED HISTORY: cp TECHNOLOGIST PROVIDED HISTORY: cp FINDINGS: AP portable view of the chest time stamped at 1649 hours demonstrates overlying cardiac monitoring electrodes and horizontal linear density at the left base likely atelectasis. Heart size is normal.  No vascular congestion, focal consolidation, effusion, or pneumothorax is noted. Osseous and mediastinal structures are age-appropriate. Probable left basilar atelectasis. Otherwise unremarkable study. Stable granuloma right apex. Xr Chest Portable    Result Date: 4/1/2021  EXAMINATION: ONE XRAY VIEW OF THE CHEST 4/1/2021 6:28 pm COMPARISON: February 16, 2021. HISTORY: ORDERING SYSTEM PROVIDED HISTORY: sob TECHNOLOGIST PROVIDED HISTORY: sob Reason for Exam: sob; port/upright FINDINGS: A portable upright frontal view chest radiograph was obtained. The heart size, mediastinal contour, and pleural spaces are within normal limits. The lungs are clear. There is no focal consolidation or pneumothorax. The pulmonary vascular pattern is within normal limits. No significant thoracic osseous abnormality. Clear lungs. No acute cardiopulmonary abnormality. RECENT VITALS:     Temp: 97.9 °F (36.6 °C),  Pulse: 114, Resp: 19, BP: 132/80, SpO2: 98 %    This patient is a 54 y.o. Female with shortness of breath, history of smoking, known Covid contacts, concern for Covid. Covid test negative, getting breathing treatments via respiratory therapy. If continued shortness of breath, tachycardia, D-dimer and possible CT. If patient has resolution of symptoms after breathing treatments, discharge home. Patient also had a headache, getting migraine cocktail. On reassessment, patient feels back to baseline, heart rate is improved, patient is ready for discharge.   Patient's heart rate improved to 102, did not desaturate or become more tachycardic with walking. Patient remained stable emergency department with improving vital signs. Gave strict return precautions to the emerge department and discharge patient home. Recommend patient follow-up with PCP in the next few days for reassessment. OUTSTANDING TASKS / RECOMMENDATIONS:    1. Reassessment     FINAL IMPRESSION:     1. COPD exacerbation ([facility]    2. Shortness of breath    3. Cough    4. Chronic obstructive pulmonary disease, unspecified COPD type ([facility]    5.  Asthma, unspecified asthma severity, unspecified whether complicated, unspecified whether persistent        DISPOSITION:         DISPOSITION:  [x]  Discharge   []  Transfer -    []  Admission -     []  Against Medical Advice   []  Eloped   FOLLOW-UP: THOR Boo - [facility]  [phone]    In 1 week       DISCHARGE MEDICATIONS: New Prescriptions    GUAIFENESIN (MUCINEX) 600 MG EXTENDED RELEASE TABLET    Take 2 tablets by mouth 2 times daily for 10 days    PREDNISONE (DELTASONE) 10 MG TABLET    Take 4 tablets by mouth once daily for 5 days           Marcia Mera MD  Emergency Medicine Resident  [facility]       Marcia Mera MD  Resident  04/12/21 8633       Marcia Mera MD  Resident  04/12/21 1453

## 2021-04-12 NOTE — ED PROVIDER NOTES
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    Drug use: Yes     Types: Marijuana     Comment: daily - \"a couple bowls\"    Sexual activity: Not on file   Lifestyle    Physical activity     Days per week: Not on file     Minutes per session: Not on file    Stress: Not on file   Relationships    Social connections     Talks on phone: Not on file     Gets together: Not on file     Attends Rastafarian service: Not on file     Active member of club or organization: Not on file     Attends meetings of clubs or organizations: Not on file     Relationship status: Not on file    Intimate partner violence     Fear of current or ex partner: Not on file     Emotionally abused: Not on file     Physically abused: Not on file     Forced sexual activity: Not on file   Other Topics Concern    Not on file   Social History Narrative    Not on file       Family History   Problem Relation Age of Onset    Other Mother     Heart Disease Father     High Blood Pressure Father     High Cholesterol Father     Other Brother        Allergies:  Sulfa antibiotics    Home Medications:  Prior to Admission medications    Medication Sig Start Date End Date Taking?  Authorizing Provider   guaiFENesin (MUCINEX) 600 MG extended release tablet Take 2 tablets by mouth 2 times daily for 10 days 4/12/21 4/22/21 Yes Sanlorenzo BUTCH Keivn DO   albuterol (PROVENTIL) (2.5 MG/3ML) 0.083% nebulizer solution INHALE 3 MLS BY NEBULIZATION EVERY 6 HOURS AS NEEDED FOR WHEEZING 4/12/21  Yes Sanlorenzo BUTCH Kevin DO   albuterol sulfate HFA (VENTOLIN HFA) 108 (90 Base) MCG/ACT inhaler Inhale 2 puffs into the lungs 4 times daily as needed for Wheezing 4/12/21 Yes Shayy Copeland,    predniSONE (DELTASONE) 10 MG tablet Take 4 tablets by mouth once daily for 5 days 4/12/21 4/22/21 Yes Rodger Kevin DO   levETIRAcetam (KEPPRA) 750 MG tablet TAKE ONE TABLET BY MOUTH TWICE A DAY 3/30/21   THOR Reddy CNP   atorvastatin (LIPITOR) 40 MG tablet TAKE ONE TABLET BY MOUTH DAILY 3/15/21   THOR Ngo CNP   baclofen (LIORESAL) 10 MG tablet TAKE ONE TABLET BY MOUTH ONCE NIGHTLY AS NEEDED FOR PAIN 3/8/21   THOR Ngo CNP   albuterol sulfate  (90 Base) MCG/ACT inhaler INHALE TWO PUFFS BY MOUTH EVERY 6 HOURS AS NEEDED FOR WHEEZING 3/8/21   THOR Ngo CNP   escitalopram (LEXAPRO) 10 MG tablet TAKE 1 AND 1/2 TABLET BY MOUTH DAILY 3/3/21   THOR Robbins NP   betamethasone valerate (VALISONE) 0.1 % cream Apply topically 2 times daily. Do not use for longer than 2 weeks.  1/23/21   Tommy Meredith MD   nicotine (NICODERM CQ) 21 MG/24HR Place 1 patch onto the skin daily for 10 days 12/23/20 1/2/21  Tr Cano MD   amitriptyline (ELAVIL) 25 MG tablet Take 3 tablets by mouth nightly 12/2/20 1/1/21  THOR Reddy CNP   STIOLTO RESPIMAT 2.5-2.5 MCG/ACT AERS INHALE TWO INHALATION(S) BY MOUTH DAILY  Patient not taking: Reported on 11/30/2020 11/9/20   THOR Ngo CNP   budesonide-formoterol [facility]) 160-4.5 MCG/ACT AERO Inhale 2 puffs into the lungs 2 times daily  Patient not taking: Reported on 11/30/2020 10/16/20   Marcos Caceres MD   tiotropium (SPIRIVA RESPIMAT) 2.5 MCG/ACT AERS inhaler Inhale 2 puffs into the lungs daily 10/16/20   Marcos Caceres MD   Nebulizers (NEBULIZER COMPRESSOR) MISC Daily as needed 9/4/20   THOR Ngo CNP   Respiratory Therapy Supplies (NEBULIZER/TUBING/MOUTHPIECE) KIT 1 kit by Does not apply route daily as needed (SOB or wheezing) 9/4/20   THOR Ngo CNP   acetaminophen (TYLENOL) 325 MG tablet Take 1 tablet by mouth every 6 hours as needed for Pain 6/15/20   Alfredito Osnua DO   ibuprofen (IBU) 400 MG tablet Take 1 tablet by mouth every 6 hours as needed for Pain 6/15/20   Alfredito Osuna DO   Handicap Placard MISC by Does not apply route Exp 1/2024 1/20/20   THOR Pimentel CNP   ondansetron (ZOFRAN ODT) 4 MG disintegrating tablet Take 1 tablet by mouth every 8 hours as needed for Nausea  Patient not taking: Reported on 10/16/2020 1/9/20   Sharonda Morataya MD   Cholecalciferol (VITAMIN D3) 400 units CAPS Take 1 tablet by mouth daily    Historical Provider, MD   aspirin EC 81 MG EC tablet Take 1 tablet by mouth daily  Patient taking differently: Take 81 mg by mouth daily as needed  3/6/19   THOR Pimentel CNP   magnesium gluconate (MAGONATE) 500 MG tablet Take 400 mg by mouth every evening     Historical Provider, MD   vitamin B-6 (PYRIDOXINE) 100 MG tablet Take 100 mg by mouth daily    Historical Provider, MD   nitroGLYCERIN (NITROSTAT) 0.4 MG SL tablet Place 1 tablet under the tongue every 5 minutes as needed for Chest pain up to max of 3 total doses. If no relief after 1 dose, call 911. Patient not taking: Reported on 10/16/2020 1/30/18   Dewey Cooper MD   Multiple Vitamins-Minerals (THERAPEUTIC MULTIVITAMIN-MINERALS) tablet Take 1 tablet by mouth daily. Historical Provider, MD   vitamin B-12 (CYANOCOBALAMIN) 1000 MCG tablet Take 1,000 mcg by mouth daily. Historical Provider, MD       REVIEW OF SYSTEMS    (2-9 systems for level 4, 10 or more for level 5)      Review of Systems    Review of Systems   Constitutional: Negative for chills and fever. HENT: Negative for sore throat. Eyes: Negative for pain. Respiratory: Positive for cough and shortness of breath  Cardiovascular: Negative for chest pain and palpitations. Gastrointestinal: Negative for abdominal pain, nausea and vomiting. Genitourinary: Negative for dysuria. Musculoskeletal: Negative for gait problem. Skin: Negative for wound.    Neurological: Positive for headache        PHYSICAL EXAM   (up to 7 for level 4, 8 or more for level 5)      INITIAL VITALS:   /80   Pulse 114   Temp 97.9 °F (36.6 °C) (Oral)   Resp 19   Wt 155 lb (70.3 kg)   SpO2 98%   BMI 24.28 kg/m²     Physical Exam   Gen. Appearance: patient appears dyspneic and short of breath  Head: head atraumatic, normocephalic. Eyes: Extraocular movements intact. No scleral icterus  Mouth: Oropharynx clear and moist.  No oral lesions  Neck: Supple. No lymphadenopathy. Pulmonary: Diffuse wheezing throughout both lung fields. No rhonchi. No rails   cardiovascular: Regular rate and rhythm, no murmurs   Abdomen: Soft, nontender, no guarding or rebound, normal bowel sounds  Neurology: GCS 15. Oriented to person place and time.   moving all extremities   Skin: Warm, dry, well perfused        DIFFERENTIAL  DIAGNOSIS     PLAN (LABS / Lanetta Minks / EKG):  Orders Placed This Encounter   Procedures    COVID-19, Rapid    XR CHEST PORTABLE    CBC Auto Differential    Basic Metabolic Panel w/ Reflex to MG    Troponin    D-Dimer, Quantitative    EKG 12 Lead    EKG REPORT       MEDICATIONS ORDERED:  Orders Placed This Encounter   Medications    DISCONTD: methylPREDNISolone sodium (SOLU-MEDROL) injection 125 mg    DISCONTD: albuterol sulfate  (90 Base) MCG/ACT inhaler 2 puff    DISCONTD: diphenhydrAMINE (BENADRYL) injection 25 mg    prochlorperazine (COMPAZINE) injection 10 mg    ketorolac (TORADOL) injection 30 mg    0.9 % sodium chloride bolus    magnesium sulfate 2000 mg in 50 mL IVPB premix    DISCONTD: ipratropium-albuterol (DUONEB) nebulizer solution 1 ampule    guaiFENesin (MUCINEX) 600 MG extended release tablet     Sig: Take 2 tablets by mouth 2 times daily for 10 days     Dispense:  40 tablet     Refill:  0    albuterol (PROVENTIL) (2.5 MG/3ML) 0.083% nebulizer solution     Sig: INHALE 3 MLS BY NEBULIZATION EVERY 6 HOURS AS NEEDED FOR WHEEZING     Dispense:  120 each     Refill:  2 Gap 9 9 - 17 mmol/L    GFR Non-African American >60 >60 mL/min    GFR African American >60 >60 mL/min    GFR Comment          GFR Staging NOT REPORTED    Troponin   Result Value Ref Range    Troponin, High Sensitivity <6 0 - 14 ng/L    Troponin T NOT REPORTED <0.03 ng/mL    Troponin Interp NOT REPORTED    D-Dimer, Quantitative   Result Value Ref Range    D-Dimer, Quant 0.50 mg/L FEU   EKG 12 Lead   Result Value Ref Range    Ventricular Rate 106 BPM    Atrial Rate 106 BPM    P-R Interval 132 ms    QRS Duration 88 ms    Q-T Interval 340 ms    QTc Calculation (Bazett) 451 ms    P Axis 51 degrees    R Axis 48 degrees    T Axis 52 degrees       RADIOLOGY:  None    EKG  EKG Interpretation    Interpreted by me    Rhythm: normal sinus   Rate: Tachycardia  Axis: normal  Ectopy: none  Conduction: normal  ST Segments: no acute change  T Waves: no acute change  Q Waves: none    Clinical Impression: Sinus tachycardia        All EKG's are interpreted by the Emergency Department Physician who either signs or Co-signs this chart in the absence of a cardiologist.    [address] [facility] MDM:  54 y.o. female who presents with cough and shortness of breath, patient has concern for Covid due to recent exposure by daughter who is currently ill. We will treat as COPD exacerbation, Covid swab, isolation precautions. Cardiac work-up will also be performed although patient is not having chest pain at this time. EKG is sinus tachycardia with no ST or T wave changes        ED Course as of Apr 13 1439   Mon Apr 12, 2021   1823 Patient still short of breath, however heart rate has come down to 97. Covid is negative thus no isolation precautions at this time. Patient has been treated with Solu-Medrol already, she is reporting a severe headache. We will treat with Toradol, Compazine, Benadryl and normal saline with magnesium bolus and reassess.   If shortness of breath is resolved after this treatment, no further work-up is indicated [OSWALDO]      ED Course User Index  [OSWALDO] Ko Carlton DO         PROCEDURES:  None    CONSULTS:  None    CRITICAL CARE:  None    FINAL IMPRESSION      1. COPD exacerbation ([facility]    2. Shortness of breath    3. Cough    4. Chronic obstructive pulmonary disease, unspecified COPD type ([facility]    5.  Asthma, unspecified asthma severity, unspecified whether complicated, unspecified whether persistent            DISPOSITION / PLAN     DISPOSITION Decision To Discharge 04/12/2021 08:27:04 PM      PATIENT REFERRED TO:  THOR Vilchis CNP  [facility]  [phone]    In 1 week        DISCHARGE MEDICATIONS:  Discharge Medication List as of 4/12/2021  6:51 PM      START taking these medications    Details   guaiFENesin (MUCINEX) 600 MG extended release tablet Take 2 tablets by mouth 2 times daily for 10 days, Disp-40 tablet, R-0Print      predniSONE (DELTASONE) 10 MG tablet Take 4 tablets by mouth once daily for 5 days, Disp-20 tablet, R-0Print             Ko Carlton DO  Emergency Medicine Resident    (Please note that portions of thisnote were completed with a voice recognition program.  Efforts were made to edit the dictations but occasionally words are mis-transcribed.)       Ko Carlton DO  Resident  04/13/21 0317

## 2021-04-12 NOTE — ED NOTES
Pt ambulates to the bathroom with a steady gait, no apparent distress.       Ariana Mtz, RN  04/12/21 1920

## 2021-04-12 NOTE — ED NOTES
Pt updated on lab tests, plan of care, current status. All questions answered. Pt is A&Ox4, resting comfortably on stretcher, no apparent distress.  Will continue to monitor     Vladislav Jimenes RN  04/12/21 1918

## 2021-04-12 NOTE — ED PROVIDER NOTES
[facility]     Emergency Department     Faculty Note/ Attestation      Pt Name: Remington Moonr                                       MRN: [id number]  [place] 1965  Date of evaluation: 4/12/2021    Patients PCP:    THOR Ngo - [facility]      Attestation  I performed a history and physical examination of the patient and discussed management with the resident. I reviewed the residents note and agree with the documented findings and plan of care. Any areas of disagreement are noted on the chart. I was personally present for the key portions of any procedures. I have documented in the chart those procedures where I was not present during the key portions. I have reviewed the emergency nurses triage note. I agree with the chief complaint, past medical history, past surgical history, allergies, medications, social and family history as documented unless otherwise noted below. For Physician Assistant/ Nurse Practitioner cases/documentation I have personally evaluated this patient and have completed at least one if not all key elements of the E/M (history, physical exam, and MDM). Additional findings are as noted.       Initial Screens:             Vitals:    Vitals:    04/12/21 1557 04/12/21 1615 04/12/21 1619 04/12/21 1621   BP:  115/80  132/80   Pulse:  114     Resp:  28  22   Temp: 97.9 °F (36.6 °C) 97.9 °F (36.6 °C)     TempSrc: Infrared Oral     SpO2:  97%     Weight:   155 lb (70.3 kg)        CHIEF COMPLAINT       Chief Complaint   Patient presents with    Shortness of Breath     wheezing with SOB x 4 days             DIAGNOSTIC RESULTS             RADIOLOGY:   XR CHEST PORTABLE    (Results Pending)         LABS:  Labs Reviewed   COVID-19, RAPID   CBC WITH AUTO DIFFERENTIAL   BASIC METABOLIC PANEL W/ REFLEX TO MG FOR LOW K   TROPONIN         EMERGENCY DEPARTMENT COURSE:     -------------------------  BP: 132/80, Temp: 97.9 °F (36.6 °C), Pulse: 114, Resp: 22      Comments    COPD, 4 days of worsening whz, daughter has COVID  Has HA, no other sick sx  Diffuse whz, no CP no n/v    Plan for COVID test, COPD pathway    If improved, d/c with meds, if not obs    (Please note that portions of this note were completed with a voice recognition program.  Efforts were made to edit the dictations but occasionally words are mis-transcribed.)      Sanders MD  Attending Emergency Physician         London Munguia MD  04/14/21 0568

## 2021-04-12 NOTE — ED TRIAGE NOTES
Pt presents to ED c/o sob with wheezing worsening over the past 4 days. Pt also c/o n, but denies v/d. PT was around her daughter, who has been dx'd with COVID. Pt denies any LOC, fainting, dizziness, or any other c/o. Pt is A&OX4, placed on full monitor, EKG done and IV established. Pt is changed into gown.

## 2021-04-13 LAB
EKG ATRIAL RATE: 106 BPM
EKG P AXIS: 51 DEGREES
EKG P-R INTERVAL: 132 MS
EKG Q-T INTERVAL: 340 MS
EKG QRS DURATION: 88 MS
EKG QTC CALCULATION (BAZETT): 451 MS
EKG R AXIS: 48 DEGREES
EKG T AXIS: 52 DEGREES
EKG VENTRICULAR RATE: 106 BPM

## 2021-05-05 ENCOUNTER — APPOINTMENT (OUTPATIENT)
Dept: GENERAL RADIOLOGY | Age: 56
End: 2021-05-05
Payer: MEDICARE

## 2021-05-05 ENCOUNTER — HOSPITAL ENCOUNTER (EMERGENCY)
Age: 56
Discharge: HOME | End: 2021-05-05
Attending: EMERGENCY MEDICINE
Payer: MEDICARE

## 2021-05-05 VITALS
RESPIRATION RATE: 17 BRPM | BODY MASS INDEX: 24.33 KG/M2 | OXYGEN SATURATION: 94 % | TEMPERATURE: 97.9 F | HEART RATE: 100 BPM | DIASTOLIC BLOOD PRESSURE: 95 MMHG | SYSTOLIC BLOOD PRESSURE: 130 MMHG | WEIGHT: 155 LBS | HEIGHT: 67 IN

## 2021-05-05 DIAGNOSIS — J44.1 COPD EXACERBATION (HCC): Primary | ICD-10-CM

## 2021-05-05 LAB
ABSOLUTE EOS #: 0.31 K/UL (ref 0–0.44)
ABSOLUTE IMMATURE GRANULOCYTE: <0.03 K/UL (ref 0–0.3)
ABSOLUTE LYMPH #: 2.57 K/UL (ref 1.1–3.7)
ABSOLUTE MONO #: 0.43 K/UL (ref 0.1–1.2)
ANION GAP SERPL CALCULATED.3IONS-SCNC: 12 MMOL/L (ref 9–17)
BASOPHILS # BLD: 1 % (ref 0–2)
BASOPHILS ABSOLUTE: 0.05 K/UL (ref 0–0.2)
BNP INTERPRETATION: NORMAL
BUN BLDV-MCNC: 12 MG/DL (ref 6–20)
BUN/CREAT BLD: NORMAL (ref 9–20)
CALCIUM SERPL-MCNC: 9 MG/DL (ref 8.6–10.4)
CHLORIDE BLD-SCNC: 104 MMOL/L (ref 98–107)
CO2: 24 MMOL/L (ref 20–31)
CREAT SERPL-MCNC: 0.69 MG/DL (ref 0.5–0.9)
D-DIMER QUANTITATIVE: 0.83 MG/L FEU
DIFFERENTIAL TYPE: ABNORMAL
EOSINOPHILS RELATIVE PERCENT: 5 % (ref 1–4)
GFR AFRICAN AMERICAN: >60 ML/MIN
GFR NON-AFRICAN AMERICAN: >60 ML/MIN
GFR SERPL CREATININE-BSD FRML MDRD: NORMAL ML/MIN/{1.73_M2}
GFR SERPL CREATININE-BSD FRML MDRD: NORMAL ML/MIN/{1.73_M2}
GLUCOSE BLD-MCNC: 98 MG/DL (ref 70–99)
HCT VFR BLD CALC: 42.1 % (ref 36.3–47.1)
HEMOGLOBIN: 13.9 G/DL (ref 11.9–15.1)
IMMATURE GRANULOCYTES: 0 %
LYMPHOCYTES # BLD: 43 % (ref 24–43)
MCH RBC QN AUTO: 31.7 PG (ref 25.2–33.5)
MCHC RBC AUTO-ENTMCNC: 33 G/DL (ref 28.4–34.8)
MCV RBC AUTO: 95.9 FL (ref 82.6–102.9)
MONOCYTES # BLD: 7 % (ref 3–12)
NRBC AUTOMATED: 0 PER 100 WBC
PDW BLD-RTO: 13.4 % (ref 11.8–14.4)
PLATELET # BLD: 303 K/UL (ref 138–453)
PLATELET ESTIMATE: ABNORMAL
PMV BLD AUTO: 8.4 FL (ref 8.1–13.5)
POTASSIUM SERPL-SCNC: 3.7 MMOL/L (ref 3.7–5.3)
PRO-BNP: 21 PG/ML
RBC # BLD: 4.39 M/UL (ref 3.95–5.11)
RBC # BLD: ABNORMAL 10*6/UL
SARS-COV-2, RAPID: NOT DETECTED
SEG NEUTROPHILS: 44 % (ref 36–65)
SEGMENTED NEUTROPHILS ABSOLUTE COUNT: 2.66 K/UL (ref 1.5–8.1)
SODIUM BLD-SCNC: 140 MMOL/L (ref 135–144)
SPECIMEN DESCRIPTION: NORMAL
TROPONIN INTERP: NORMAL
TROPONIN T: NORMAL NG/ML
TROPONIN, HIGH SENSITIVITY: <6 NG/L (ref 0–14)
WBC # BLD: 6 K/UL (ref 3.5–11.3)
WBC # BLD: ABNORMAL 10*3/UL

## 2021-05-05 PROCEDURE — 6360000002 HC RX W HCPCS: Performed by: EMERGENCY MEDICINE

## 2021-05-05 PROCEDURE — 87635 SARS-COV-2 COVID-19 AMP PRB: CPT

## 2021-05-05 PROCEDURE — 83880 ASSAY OF NATRIURETIC PEPTIDE: CPT

## 2021-05-05 PROCEDURE — 99285 EMERGENCY DEPT VISIT HI MDM: CPT

## 2021-05-05 PROCEDURE — 80048 BASIC METABOLIC PNL TOTAL CA: CPT

## 2021-05-05 PROCEDURE — 93005 ELECTROCARDIOGRAM TRACING: CPT | Performed by: EMERGENCY MEDICINE

## 2021-05-05 PROCEDURE — 71045 X-RAY EXAM CHEST 1 VIEW: CPT

## 2021-05-05 PROCEDURE — 96375 TX/PRO/DX INJ NEW DRUG ADDON: CPT

## 2021-05-05 PROCEDURE — 85379 FIBRIN DEGRADATION QUANT: CPT

## 2021-05-05 PROCEDURE — 96365 THER/PROPH/DIAG IV INF INIT: CPT

## 2021-05-05 PROCEDURE — 84484 ASSAY OF TROPONIN QUANT: CPT

## 2021-05-05 PROCEDURE — 94640 AIRWAY INHALATION TREATMENT: CPT

## 2021-05-05 PROCEDURE — 85025 COMPLETE CBC W/AUTO DIFF WBC: CPT

## 2021-05-05 RX ORDER — MAGNESIUM SULFATE IN WATER 40 MG/ML
2000 INJECTION, SOLUTION INTRAVENOUS ONCE
Status: COMPLETED | OUTPATIENT
Start: 2021-05-05 | End: 2021-05-05

## 2021-05-05 RX ORDER — ALBUTEROL SULFATE 2.5 MG/3ML
5 SOLUTION RESPIRATORY (INHALATION) EVERY 6 HOURS PRN
Status: DISCONTINUED | OUTPATIENT
Start: 2021-05-05 | End: 2021-05-05 | Stop reason: HOSPADM

## 2021-05-05 RX ORDER — AZITHROMYCIN 250 MG/1
250 TABLET, FILM COATED ORAL SEE ADMIN INSTRUCTIONS
Qty: 6 TABLET | Refills: 0 | Status: SHIPPED | OUTPATIENT
Start: 2021-05-05 | End: 2021-05-10

## 2021-05-05 RX ORDER — METHYLPREDNISOLONE SODIUM SUCCINATE 125 MG/2ML
80 INJECTION, POWDER, LYOPHILIZED, FOR SOLUTION INTRAMUSCULAR; INTRAVENOUS ONCE
Status: COMPLETED | OUTPATIENT
Start: 2021-05-05 | End: 2021-05-05

## 2021-05-05 RX ORDER — PREDNISONE 10 MG/1
TABLET ORAL
Qty: 20 TABLET | Refills: 0 | Status: SHIPPED | OUTPATIENT
Start: 2021-05-05 | End: 2021-05-15

## 2021-05-05 RX ADMIN — METHYLPREDNISOLONE SODIUM SUCCINATE 80 MG: 125 INJECTION, POWDER, FOR SOLUTION INTRAMUSCULAR; INTRAVENOUS at 16:49

## 2021-05-05 RX ADMIN — ALBUTEROL SULFATE 5 MG: 2.5 SOLUTION RESPIRATORY (INHALATION) at 17:02

## 2021-05-05 RX ADMIN — MAGNESIUM SULFATE 2000 MG: 2 INJECTION INTRAVENOUS at 16:49

## 2021-05-05 ASSESSMENT — ENCOUNTER SYMPTOMS
SHORTNESS OF BREATH: 1
VOMITING: 0
NAUSEA: 0
DIARRHEA: 0
COUGH: 1
ABDOMINAL PAIN: 0
CONSTIPATION: 0
SORE THROAT: 0

## 2021-05-05 NOTE — ED PROVIDER NOTES
[facility] ED  Emergency Department Encounter  EmergencyMedicine Resident     Pt Sarina James  MRN: [id number]  [place] 1965  Date of evaluation: 5/5/21  PCP:  THOR Merlos CNP    CHIEF COMPLAINT       Chief Complaint   Patient presents with    Shortness of Breath    COPD       HISTORY OF PRESENT ILLNESS  (Location/Symptom, Timing/Onset, Context/Setting, Quality, Duration, Modifying Factors, Severity.)      Narciso Barahona is a 54 y.o. female who presents to the emergency department with shortness of breath that the patient attributes to COPD based on a recent prior history of multiple COPD exacerbations and the fact that this feels very similar. Patient states that she has continued to smoke since her last exacerbation. Tried her albuterol nebulizer treatment multiple times at home just PTA with minimal improvement and so she presents now for uncontrolled exacerbation. Denies fever, chills, nausea, vomiting, chest pain, abdominal pain, problems with urination or bowel movements, numbness or tingling anywhere, or swelling or pain in the legs. No prior history of blood clots. PAST MEDICAL / SURGICAL / SOCIAL / FAMILY HISTORY      has a past medical history of Acute MI ([address] Anxiety, Chronic back pain, COPD (chronic obstructive pulmonary disease) ([facility] Depression, Heart disease, Hyperlipidemia, Hypertension, and MRSA (methicillin resistant staph aureus) culture positive. has a past surgical history that includes Tubal ligation; Tonsillectomy; Cholecystectomy, laparoscopic (11/10/2017); Cholecystectomy, laparoscopic (N/A, 11/10/2017); and back surgery.     Social History     Socioeconomic History    Marital status:      Spouse name: Not on file    Number of children: Not on file    Years of education: Not on file    Highest education level: Not on file   Occupational History    Not on file   Social Needs    Financial resource strain: Not hard at all   David Jacques Food insecurity     Worry: Never true     Inability: Never true    Transportation needs     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    Drug use: Yes     Types: Marijuana     Comment: daily - \"a couple bowls\"    Sexual activity: Not on file   Lifestyle    Physical activity     Days per week: Not on file     Minutes per session: Not on file    Stress: Not on file   Relationships    Social connections     Talks on phone: Not on file     Gets together: Not on file     Attends Mosque service: Not on file     Active member of club or organization: Not on file     Attends meetings of clubs or organizations: Not on file     Relationship status: Not on file    Intimate partner violence     Fear of current or ex partner: Not on file     Emotionally abused: Not on file     Physically abused: Not on file     Forced sexual activity: Not on file   Other Topics Concern    Not on file   Social History Narrative    Not on file       Family History   Problem Relation Age of Onset    Other Mother     Heart Disease Father     High Blood Pressure Father     High Cholesterol Father     Other Brother        Allergies:  Sulfa antibiotics    Home Medications:  Prior to Admission medications    Medication Sig Start Date End Date Taking?  Authorizing Provider   predniSONE (DELTASONE) 10 MG tablet Take 4 tablets by mouth once daily for 5 days 5/5/21 5/15/21 Yes Cesia Desouza MD   azithromycin (ZITHROMAX) 250 MG tablet Take 1 tablet by mouth See Admin Instructions for 5 days 500mg on day 1 followed by 250mg on days 2 - 5 5/5/21 5/10/21 Yes Cesia Desouza MD   albuterol (PROVENTIL) (2.5 MG/3ML) 0.083% 9/4/20   THOR Brewer CNP   acetaminophen (TYLENOL) 325 MG tablet Take 1 tablet by mouth every 6 hours as needed for Pain 6/15/20   Mariana Chimera, DO   ibuprofen (IBU) 400 MG tablet Take 1 tablet by mouth every 6 hours as needed for Pain 6/15/20   Mariana Chimera, DO   Handicap Placard MISC by Does not apply route Exp 1/2024 1/20/20   THOR Brewer CNP   ondansetron (ZOFRAN ODT) 4 MG disintegrating tablet Take 1 tablet by mouth every 8 hours as needed for Nausea  Patient not taking: Reported on 10/16/2020 1/9/20   Nettie Luna MD   Cholecalciferol (VITAMIN D3) 400 units CAPS Take 1 tablet by mouth daily    Historical Provider, MD   aspirin EC 81 MG EC tablet Take 1 tablet by mouth daily  Patient taking differently: Take 81 mg by mouth daily as needed  3/6/19   THOR Brewer CNP   magnesium gluconate (MAGONATE) 500 MG tablet Take 400 mg by mouth every evening     Historical Provider, MD   vitamin B-6 (PYRIDOXINE) 100 MG tablet Take 100 mg by mouth daily    Historical Provider, MD   nitroGLYCERIN (NITROSTAT) 0.4 MG SL tablet Place 1 tablet under the tongue every 5 minutes as needed for Chest pain up to max of 3 total doses. If no relief after 1 dose, call 911. Patient not taking: Reported on 10/16/2020 1/30/18   Cecil Ramsey MD   Multiple Vitamins-Minerals (THERAPEUTIC MULTIVITAMIN-MINERALS) tablet Take 1 tablet by mouth daily. Historical Provider, MD   vitamin B-12 (CYANOCOBALAMIN) 1000 MCG tablet Take 1,000 mcg by mouth daily. Historical Provider, MD       REVIEW OF SYSTEMS    (2-9 systems for level 4, 10 or more for level 5)      Review of Systems   Constitutional: Negative for chills and fever. HENT: Negative for ear pain, hearing loss and sore throat. Eyes: Negative for visual disturbance. Respiratory: Positive for cough (dry) and shortness of breath. Cardiovascular: Negative for chest pain and leg swelling.    Gastrointestinal: Negative for abdominal pain, bruising or lesion. Neurological:      General: No focal deficit present. Mental Status: She is alert and oriented to person, place, and time. Mental status is at baseline. Motor: No abnormal muscle tone. DIFFERENTIAL  DIAGNOSIS     PLAN (LABS / IMAGING / EKG):  Orders Placed This Encounter   Procedures    COVID-19, Rapid    XR CHEST PORTABLE    CBC Auto Differential    Basic Metabolic Panel w/ Reflex to MG    Brain Natriuretic Peptide    D-DIMER, QUANTITATIVE    Troponin    EKG 12 Lead       MEDICATIONS ORDERED:  Orders Placed This Encounter   Medications    magnesium sulfate 2000 mg in 50 mL IVPB premix    methylPREDNISolone sodium (SOLU-MEDROL) injection 80 mg    DISCONTD: albuterol (PROVENTIL) nebulizer solution 5 mg    predniSONE (DELTASONE) 10 MG tablet     Sig: Take 4 tablets by mouth once daily for 5 days     Dispense:  20 tablet     Refill:  0    azithromycin (ZITHROMAX) 250 MG tablet     Sig: Take 1 tablet by mouth See Admin Instructions for 5 days 500mg on day 1 followed by 250mg on days 2 - 5     Dispense:  6 tablet     Refill:  0       DDX: Jeovanny Marino is a 54 y.o. female who presents to the emergency department with shortness of breath for 2 days. Differential diagnosis includes COPD exacerbation, pneumonia, pneumothorax, Sherman embolism, ACS    DIAGNOSTIC RESULTS / EMERGENCY DEPARTMENT COURSE / MDM   LAB RESULTS:  Results for orders placed or performed during the hospital encounter of 05/05/21   COVID-19, Rapid    Specimen: Nasopharyngeal Swab   Result Value Ref Range    Specimen Description . NASOPHARYNGEAL SWAB     SARS-CoV-2, Rapid Not Detected Not Detected   CBC Auto Differential   Result Value Ref Range    WBC 6.0 3.5 - 11.3 k/uL    RBC 4.39 3.95 - 5.11 m/uL    Hemoglobin 13.9 11.9 - 15.1 g/dL    Hematocrit 42.1 36.3 - 47.1 %    MCV 95.9 82.6 - 102.9 fL    MCH 31.7 25.2 - 33.5 pg    MCHC 33.0 28.4 - 34.8 g/dL    RDW 13.4 11.8 - 14.4 %    Platelets 815 977 - 908 k/uL    MPV 8.4 8.1 - 13.5 fL    NRBC Automated 0.0 0.0 per 100 WBC    Differential Type NOT REPORTED     Seg Neutrophils 44 36 - 65 %    Lymphocytes 43 24 - 43 %    Monocytes 7 3 - 12 %    Eosinophils % 5 (H) 1 - 4 %    Basophils 1 0 - 2 %    Immature Granulocytes 0 0 %    Segs Absolute 2.66 1.50 - 8.10 k/uL    Absolute Lymph # 2.57 1.10 - 3.70 k/uL    Absolute Mono # 0.43 0.10 - 1.20 k/uL    Absolute Eos # 0.31 0.00 - 0.44 k/uL    Basophils Absolute 0.05 0.00 - 0.20 k/uL    Absolute Immature Granulocyte <0.03 0.00 - 0.30 k/uL    WBC Morphology NOT REPORTED     RBC Morphology NOT REPORTED     Platelet Estimate NOT REPORTED    Basic Metabolic Panel w/ Reflex to MG   Result Value Ref Range    Glucose 98 70 - 99 mg/dL    BUN 12 6 - 20 mg/dL    CREATININE 0.69 0.50 - 0.90 mg/dL    Bun/Cre Ratio NOT REPORTED 9 - 20    Calcium 9.0 8.6 - 10.4 mg/dL    Sodium 140 135 - 144 mmol/L    Potassium 3.7 3.7 - 5.3 mmol/L    Chloride 104 98 - 107 mmol/L    CO2 24 20 - 31 mmol/L    Anion Gap 12 9 - 17 mmol/L    GFR Non-African American >60 >60 mL/min    GFR African American >60 >60 mL/min    GFR Comment          GFR Staging NOT REPORTED    Brain Natriuretic Peptide   Result Value Ref Range    Pro-BNP 21 <300 pg/mL    BNP Interpretation Pro-BNP Reference Range:    D-DIMER, QUANTITATIVE   Result Value Ref Range    D-Dimer, Quant 0.83 mg/L FEU   Troponin   Result Value Ref Range    Troponin, High Sensitivity <6 0 - 14 ng/L    Troponin T NOT REPORTED <0.03 ng/mL    Troponin Interp NOT REPORTED        IMPRESSION: Jin Hutson is a 54 y.o. female who presents to the emergency department with shortness of breath for 2 days. On examination she is afebrile, vital signs demonstrate tachycardia and hypertension and examination is significant for an ill-appearing female who appears to be working harder than normal to breathe. Wheezes present on auscultation despite the patient's use of her nebulizer at home.   We will question RT evaluation and treatment, provide IV magnesium and solumedrol, check basic labs for cardiopulmonary pathology, check D-dimer per YEARS algorithm, reassess. RADIOLOGY:  Xr Chest Portable    Result Date: 4/12/2021  EXAMINATION: ONE XRAY VIEW OF THE CHEST 4/12/2021 4:50 pm COMPARISON: 1 April 2021 HISTORY: ORDERING SYSTEM PROVIDED HISTORY: cp TECHNOLOGIST PROVIDED HISTORY: cp FINDINGS: AP portable view of the chest time stamped at 1649 hours demonstrates overlying cardiac monitoring electrodes and horizontal linear density at the left base likely atelectasis. Heart size is normal.  No vascular congestion, focal consolidation, effusion, or pneumothorax is noted. Osseous and mediastinal structures are age-appropriate. Probable left basilar atelectasis. Otherwise unremarkable study. Stable granuloma right apex. EKG  EKG Interpretation    Interpreted by emergency department physician    Rhythm: Sinus tachycardia  Rate: 103  Axis: normal  Ectopy: none  Conduction: normal  ST Segments: no acute change  T Waves: no acute change  Q Waves: none    Clinical Impression: no acute changes and normal EKG    Gaurang Donato MD    All EKG's are interpreted by the Emergency Department Physician who either signs or co-signs this chart in the absence of a cardiologist.    EMERGENCY DEPARTMENT COURSE:  ED Course as of May 05 2209   Wed May 05, 2021   1732 Excluded per YEARS algorithm   D-Dimer, Quant: 0.83 [TS]      ED Course User Index  [TS] Janelle Oshea MD       PROCEDURES:  None    CONSULTS:  None    CRITICAL CARE:  Please see attending note.     FINAL IMPRESSION      1. COPD exacerbation ([address]          DISPOSITION / PLAN     DISPOSITION Decision To Discharge 05/05/2021 06:57:51 PM      PATIENT REFERRED TO:  THOR Ni - CNP  [address]  [phone]    Schedule an appointment as soon as possible for a visit in 1 week  For followup    [facility] ED  [address] [facility]

## 2021-05-05 NOTE — ED TRIAGE NOTES
Pt presents to ED with co of SOB with COPD, states 1 ppd, pt denies other drug use, pt denies alcohol use. Pt ambulatory with steady gait. Pt speech clear and apropriate. Side rails up x 2 call light within reach.

## 2021-05-05 NOTE — PROGRESS NOTES
Rapid Covid 19 swab taken from LEFT nare, labeled, placed in red dot bag, and handed off to second healthcare worker outside of room for transport to laboratory per hospital policy and procedure.   Patient tolerated procedureFAIRLY WELL

## 2021-05-05 NOTE — ED PROVIDER NOTES
[facility] [address] ED     Emergency Department     Faculty Attestation        I performed a history and physical examination of the patient and discussed management with the resident. I reviewed the residents note and agree with the documented findings and plan of care. Any areas of disagreement are noted on the chart. I was personally present for the key portions of any procedures. I have documented in the chart those procedures where I was not present during the key portions. I have reviewed the emergency nurses triage note. I agree with the chief complaint, past medical history, past surgical history, allergies, medications, social and family history as documented unless otherwise noted below. For mid-level providers such as nurse practitioners as well as physicians assistants:    I have personally seen and evaluated the patient. I find the patient's history and physical exam are consistent with NP/PA documentation. I agree with the care provided, treatment rendered, disposition, & follow-up plan. Additional findings are as noted. Vital Signs: BP (!) 157/93   Pulse 110   Temp 97.9 °F (36.6 °C) (Temporal)   Resp 15   Ht 5' 7\" (1.702 m)   Wt 155 lb (70.3 kg)   SpO2 100%   BMI 24.28 kg/m²   PCP:  Pretty Dejesus, APRN - CNP    Pertinent Comments:     Patient presents to the emergency department for evaluation of shortness of breath she has a history of COPD she states she has had increasingly frequent COPD exacerbations. Unfortunately she continues to smoke. She claims her typical COPD symptoms but denies any nausea vomiting, fevers or chills. She received a breathing treatment prior to examination and she feels significantly improved she is resting comfortably in no acute distress but is slightly tachycardic.   Will check EKG, CBC, BMP, D-dimer, Covid swab chest x-ray, steroids, magnesium, reassessment      Critical Care  None          Alvarez H MD Cirilo  [facility]  Attending Emergency Medicine Physician              Kt Putnam MD  05/05/21 [id number]

## 2021-05-06 LAB
EKG ATRIAL RATE: 103 BPM
EKG P AXIS: 45 DEGREES
EKG P-R INTERVAL: 128 MS
EKG Q-T INTERVAL: 336 MS
EKG QRS DURATION: 82 MS
EKG QTC CALCULATION (BAZETT): 440 MS
EKG R AXIS: 46 DEGREES
EKG T AXIS: 61 DEGREES
EKG VENTRICULAR RATE: 103 BPM

## 2021-05-06 PROCEDURE — 93010 ELECTROCARDIOGRAM REPORT: CPT | Performed by: INTERNAL MEDICINE

## 2021-05-27 DIAGNOSIS — F33.1 MODERATE EPISODE OF RECURRENT MAJOR DEPRESSIVE DISORDER (HCC): ICD-10-CM

## 2021-05-27 RX ORDER — BACLOFEN 10 MG/1
TABLET ORAL
Qty: 30 TABLET | Refills: 0 | Status: SHIPPED | OUTPATIENT
Start: 2021-05-27 | End: 2021-06-30

## 2021-05-27 RX ORDER — ESCITALOPRAM OXALATE 10 MG/1
TABLET ORAL
Qty: 45 TABLET | Refills: 0 | OUTPATIENT
Start: 2021-05-27

## 2021-05-27 NOTE — TELEPHONE ENCOUNTER
Health Maintenance   Topic Date Due    Hepatitis C screen  Never done    COVID-19 Vaccine (1) Never done    HIV screen  Never done    Cervical cancer screen  Never done    Lipid screen  11/08/2018    Colon Cancer Screen FIT/FOBT  02/20/2019    Annual Wellness Visit (AWV)  Never done    Breast cancer screen  02/06/2020    Shingles Vaccine (2 of 2) 06/25/2021    DTaP/Tdap/Td vaccine (2 - Td) 09/20/2027    Pneumococcal 0-64 years Vaccine (2 of 2) 08/08/2030    Flu vaccine  Completed    Hepatitis A vaccine  Aged Out    Hepatitis B vaccine  Aged Out    Hib vaccine  Aged Out    Meningococcal (ACWY) vaccine  Aged Out             (applicable per patient's age: Cancer Screenings, Depression Screening, Fall Risk Screening, Immunizations)    Hemoglobin A1C (%)   Date Value   01/30/2018 5.3   08/02/2013 5.3   02/14/2013 6.0     LDL Cholesterol (mg/dL)   Date Value   11/08/2017 198 (H)     AST (U/L)   Date Value   07/12/2020 18     ALT (U/L)   Date Value   07/12/2020 16     BUN (mg/dL)   Date Value   05/05/2021 12      (goal A1C is < 7)   (goal LDL is <100) need 30-50% reduction from baseline     BP Readings from Last 3 Encounters:   05/05/21 (!) 130/95   04/12/21 132/80   04/01/21 (!) 129/91    (goal /80)      All Future Testing planned in [facility]:  Lab Frequency Next Occurrence       Next Visit Date:  No future appointments.          Patient Active Problem List:     Heart disease     Chronic back pain     Depression     Hyperlipidemia     CAD, multiple vessel     Asthma     Smoking     Need for vaccination     Syncope     Leg pain     Left hip pain     Chronic cholecystitis     Chest pain     Calculus of gallbladder without cholecystitis without obstruction     History of lumbar fusion     Failed back syndrome     Marijuana use     Abnormal weight loss     Allergic rhinitis     Anxiety state     Chronic midline low back pain with sciatica     Moderate episode of recurrent major depressive disorder ([address] Seizure ([facility]     Chronic tension-type headache, intractable     Pneumonia due to organism     Hypertension     Sinus tachycardia

## 2021-06-12 ENCOUNTER — APPOINTMENT (OUTPATIENT)
Dept: GENERAL RADIOLOGY | Age: 56
DRG: 190 | End: 2021-06-12
Payer: MEDICARE

## 2021-06-12 ENCOUNTER — APPOINTMENT (OUTPATIENT)
Dept: CT IMAGING | Age: 56
DRG: 190 | End: 2021-06-12
Payer: MEDICARE

## 2021-06-12 ENCOUNTER — HOSPITAL ENCOUNTER (INPATIENT)
Age: 56
LOS: 3 days | Discharge: HOME | DRG: 190 | End: 2021-06-15
Attending: EMERGENCY MEDICINE | Admitting: INTERNAL MEDICINE
Payer: MEDICARE

## 2021-06-12 DIAGNOSIS — J44.1 COPD EXACERBATION (HCC): Primary | ICD-10-CM

## 2021-06-12 DIAGNOSIS — J96.01 ACUTE RESPIRATORY FAILURE WITH HYPOXIA (HCC): ICD-10-CM

## 2021-06-12 DIAGNOSIS — J45.909 ASTHMA, UNSPECIFIED ASTHMA SEVERITY, UNSPECIFIED WHETHER COMPLICATED, UNSPECIFIED WHETHER PERSISTENT: ICD-10-CM

## 2021-06-12 DIAGNOSIS — J44.9 CHRONIC OBSTRUCTIVE PULMONARY DISEASE, UNSPECIFIED COPD TYPE (HCC): ICD-10-CM

## 2021-06-12 PROBLEM — J96.00 ACUTE RESPIRATORY FAILURE (HCC): Status: ACTIVE | Noted: 2021-06-12

## 2021-06-12 LAB
ALLEN TEST: ABNORMAL
ANION GAP SERPL CALCULATED.3IONS-SCNC: 9 MMOL/L (ref 9–17)
BUN BLDV-MCNC: 11 MG/DL (ref 6–20)
BUN/CREAT BLD: ABNORMAL (ref 9–20)
CALCIUM SERPL-MCNC: 9 MG/DL (ref 8.6–10.4)
CARBOXYHEMOGLOBIN: 4.3 % (ref 0–5)
CHLORIDE BLD-SCNC: 104 MMOL/L (ref 98–107)
CO2: 27 MMOL/L (ref 20–31)
CREAT SERPL-MCNC: 0.56 MG/DL (ref 0.5–0.9)
D-DIMER QUANTITATIVE: 0.67 MG/L FEU (ref 0–0.59)
FIO2: ABNORMAL
GFR AFRICAN AMERICAN: >60 ML/MIN
GFR NON-AFRICAN AMERICAN: >60 ML/MIN
GFR SERPL CREATININE-BSD FRML MDRD: ABNORMAL ML/MIN/{1.73_M2}
GFR SERPL CREATININE-BSD FRML MDRD: ABNORMAL ML/MIN/{1.73_M2}
GLUCOSE BLD-MCNC: 175 MG/DL (ref 70–99)
HCO3 ARTERIAL: 27.7 MMOL/L (ref 22–26)
HCT VFR BLD CALC: 38.3 % (ref 36–46)
HEMOGLOBIN: 13.1 G/DL (ref 12–16)
INR BLD: 0.9
LACTIC ACID: 1.3 MMOL/L (ref 0.5–2.2)
MCH RBC QN AUTO: 32.6 PG (ref 26–34)
MCHC RBC AUTO-ENTMCNC: 34.2 G/DL (ref 31–37)
MCV RBC AUTO: 95.4 FL (ref 80–100)
METHEMOGLOBIN: 0.5 % (ref 0–1.9)
MODE: ABNORMAL
NEGATIVE BASE EXCESS, ART: ABNORMAL MMOL/L (ref 0–2)
NOTIFICATION TIME: ABNORMAL
NOTIFICATION: ABNORMAL
NRBC AUTOMATED: NORMAL PER 100 WBC
O2 DEVICE/FLOW/%: ABNORMAL
O2 SAT, ARTERIAL: 93.2 % (ref 95–98)
OXYHEMOGLOBIN: ABNORMAL % (ref 95–98)
PARTIAL THROMBOPLASTIN TIME: 29.1 SEC (ref 24–36)
PATIENT TEMP: 37
PCO2 ARTERIAL: 44.2 MMHG (ref 35–45)
PCO2, ART, TEMP ADJ: ABNORMAL (ref 35–45)
PDW BLD-RTO: 14.3 % (ref 11.5–14.9)
PEEP/CPAP: ABNORMAL
PH ARTERIAL: 7.41 (ref 7.35–7.45)
PH, ART, TEMP ADJ: ABNORMAL (ref 7.35–7.45)
PLATELET # BLD: 286 K/UL (ref 150–450)
PMV BLD AUTO: 7.3 FL (ref 6–12)
PO2 ARTERIAL: 96.8 MMHG (ref 80–100)
PO2, ART, TEMP ADJ: ABNORMAL MMHG (ref 80–100)
POSITIVE BASE EXCESS, ART: 3 MMOL/L (ref 0–2)
POTASSIUM SERPL-SCNC: 3.5 MMOL/L (ref 3.7–5.3)
PROCALCITONIN: 0.05 NG/ML
PROTHROMBIN TIME: 11.9 SEC (ref 11.8–14.6)
PSV: ABNORMAL
PT. POSITION: ABNORMAL
RBC # BLD: 4.02 M/UL (ref 4–5.2)
RESPIRATORY RATE: 28
SAMPLE SITE: ABNORMAL
SARS-COV-2, RAPID: NOT DETECTED
SET RATE: ABNORMAL
SODIUM BLD-SCNC: 140 MMOL/L (ref 135–144)
SPECIMEN DESCRIPTION: NORMAL
TEXT FOR RESPIRATORY: ABNORMAL
TOTAL HB: ABNORMAL G/DL (ref 12–16)
TOTAL RATE: ABNORMAL
TROPONIN INTERP: NORMAL
TROPONIN T: NORMAL NG/ML
TROPONIN, HIGH SENSITIVITY: <6 NG/L (ref 0–14)
VT: ABNORMAL
WBC # BLD: 8.2 K/UL (ref 3.5–11)

## 2021-06-12 PROCEDURE — 99223 1ST HOSP IP/OBS HIGH 75: CPT | Performed by: INTERNAL MEDICINE

## 2021-06-12 PROCEDURE — 6360000002 HC RX W HCPCS: Performed by: INTERNAL MEDICINE

## 2021-06-12 PROCEDURE — 2580000003 HC RX 258: Performed by: STUDENT IN AN ORGANIZED HEALTH CARE EDUCATION/TRAINING PROGRAM

## 2021-06-12 PROCEDURE — 94640 AIRWAY INHALATION TREATMENT: CPT

## 2021-06-12 PROCEDURE — 87635 SARS-COV-2 COVID-19 AMP PRB: CPT

## 2021-06-12 PROCEDURE — 93005 ELECTROCARDIOGRAM TRACING: CPT | Performed by: STUDENT IN AN ORGANIZED HEALTH CARE EDUCATION/TRAINING PROGRAM

## 2021-06-12 PROCEDURE — 2700000000 HC OXYGEN THERAPY PER DAY

## 2021-06-12 PROCEDURE — 94761 N-INVAS EAR/PLS OXIMETRY MLT: CPT

## 2021-06-12 PROCEDURE — 94660 CPAP INITIATION&MGMT: CPT

## 2021-06-12 PROCEDURE — 85610 PROTHROMBIN TIME: CPT

## 2021-06-12 PROCEDURE — 6370000000 HC RX 637 (ALT 250 FOR IP): Performed by: STUDENT IN AN ORGANIZED HEALTH CARE EDUCATION/TRAINING PROGRAM

## 2021-06-12 PROCEDURE — 6360000002 HC RX W HCPCS: Performed by: STUDENT IN AN ORGANIZED HEALTH CARE EDUCATION/TRAINING PROGRAM

## 2021-06-12 PROCEDURE — 36600 WITHDRAWAL OF ARTERIAL BLOOD: CPT

## 2021-06-12 PROCEDURE — 83605 ASSAY OF LACTIC ACID: CPT

## 2021-06-12 PROCEDURE — 2580000003 HC RX 258: Performed by: EMERGENCY MEDICINE

## 2021-06-12 PROCEDURE — 82805 BLOOD GASES W/O2 SATURATION: CPT

## 2021-06-12 PROCEDURE — 80048 BASIC METABOLIC PNL TOTAL CA: CPT

## 2021-06-12 PROCEDURE — 71045 X-RAY EXAM CHEST 1 VIEW: CPT

## 2021-06-12 PROCEDURE — 36415 COLL VENOUS BLD VENIPUNCTURE: CPT

## 2021-06-12 PROCEDURE — 6370000000 HC RX 637 (ALT 250 FOR IP): Performed by: EMERGENCY MEDICINE

## 2021-06-12 PROCEDURE — 85730 THROMBOPLASTIN TIME PARTIAL: CPT

## 2021-06-12 PROCEDURE — 6370000000 HC RX 637 (ALT 250 FOR IP): Performed by: INTERNAL MEDICINE

## 2021-06-12 PROCEDURE — 6370000000 HC RX 637 (ALT 250 FOR IP)

## 2021-06-12 PROCEDURE — 71260 CT THORAX DX C+: CPT

## 2021-06-12 PROCEDURE — 84145 PROCALCITONIN (PCT): CPT

## 2021-06-12 PROCEDURE — 99285 EMERGENCY DEPT VISIT HI MDM: CPT

## 2021-06-12 PROCEDURE — 6360000004 HC RX CONTRAST MEDICATION: Performed by: EMERGENCY MEDICINE

## 2021-06-12 PROCEDURE — 87040 BLOOD CULTURE FOR BACTERIA: CPT

## 2021-06-12 PROCEDURE — 85027 COMPLETE CBC AUTOMATED: CPT

## 2021-06-12 PROCEDURE — 2000000000 HC ICU R&B

## 2021-06-12 PROCEDURE — 84484 ASSAY OF TROPONIN QUANT: CPT

## 2021-06-12 PROCEDURE — 85379 FIBRIN DEGRADATION QUANT: CPT

## 2021-06-12 PROCEDURE — 2500000003 HC RX 250 WO HCPCS: Performed by: INTERNAL MEDICINE

## 2021-06-12 RX ORDER — PREDNISONE 20 MG/1
40 TABLET ORAL DAILY
Status: DISCONTINUED | OUTPATIENT
Start: 2021-06-12 | End: 2021-06-12

## 2021-06-12 RX ORDER — POLYETHYLENE GLYCOL 3350 17 G/17G
17 POWDER, FOR SOLUTION ORAL DAILY PRN
Status: DISCONTINUED | OUTPATIENT
Start: 2021-06-12 | End: 2021-06-15 | Stop reason: HOSPADM

## 2021-06-12 RX ORDER — LEVETIRACETAM 750 MG/1
750 TABLET ORAL DAILY
Status: DISCONTINUED | OUTPATIENT
Start: 2021-06-13 | End: 2021-06-15 | Stop reason: HOSPADM

## 2021-06-12 RX ORDER — IPRATROPIUM BROMIDE AND ALBUTEROL SULFATE 2.5; .5 MG/3ML; MG/3ML
1 SOLUTION RESPIRATORY (INHALATION)
Status: DISCONTINUED | OUTPATIENT
Start: 2021-06-12 | End: 2021-06-12

## 2021-06-12 RX ORDER — ALBUTEROL SULFATE 2.5 MG/3ML
2.5 SOLUTION RESPIRATORY (INHALATION) EVERY 6 HOURS PRN
Status: DISCONTINUED | OUTPATIENT
Start: 2021-06-12 | End: 2021-06-15

## 2021-06-12 RX ORDER — SODIUM CHLORIDE FOR INHALATION 0.9 %
3 VIAL, NEBULIZER (ML) INHALATION EVERY 8 HOURS PRN
Status: DISCONTINUED | OUTPATIENT
Start: 2021-06-12 | End: 2021-06-14

## 2021-06-12 RX ORDER — LEVALBUTEROL 1.25 MG/.5ML
1.25 SOLUTION, CONCENTRATE RESPIRATORY (INHALATION) EVERY 8 HOURS PRN
Status: DISCONTINUED | OUTPATIENT
Start: 2021-06-12 | End: 2021-06-12

## 2021-06-12 RX ORDER — MULTIVIT WITH MINERALS/LUTEIN
250 TABLET ORAL DAILY
COMMUNITY

## 2021-06-12 RX ORDER — BACLOFEN 10 MG/1
10 TABLET ORAL NIGHTLY PRN
Status: DISCONTINUED | OUTPATIENT
Start: 2021-06-12 | End: 2021-06-15 | Stop reason: HOSPADM

## 2021-06-12 RX ORDER — SODIUM CHLORIDE 9 MG/ML
25 INJECTION, SOLUTION INTRAVENOUS PRN
Status: DISCONTINUED | OUTPATIENT
Start: 2021-06-12 | End: 2021-06-15 | Stop reason: HOSPADM

## 2021-06-12 RX ORDER — POTASSIUM CHLORIDE 7.45 MG/ML
10 INJECTION INTRAVENOUS PRN
Status: DISCONTINUED | OUTPATIENT
Start: 2021-06-12 | End: 2021-06-15 | Stop reason: HOSPADM

## 2021-06-12 RX ORDER — ATORVASTATIN CALCIUM 40 MG/1
40 TABLET, FILM COATED ORAL DAILY
Status: DISCONTINUED | OUTPATIENT
Start: 2021-06-12 | End: 2021-06-15 | Stop reason: HOSPADM

## 2021-06-12 RX ORDER — SENNA PLUS 8.6 MG/1
1 TABLET ORAL DAILY PRN
Status: DISCONTINUED | OUTPATIENT
Start: 2021-06-12 | End: 2021-06-15 | Stop reason: HOSPADM

## 2021-06-12 RX ORDER — SODIUM CHLORIDE 0.9 % (FLUSH) 0.9 %
10 SYRINGE (ML) INJECTION PRN
Status: DISCONTINUED | OUTPATIENT
Start: 2021-06-12 | End: 2021-06-12

## 2021-06-12 RX ORDER — ACETAMINOPHEN 325 MG/1
650 TABLET ORAL EVERY 4 HOURS PRN
Status: DISCONTINUED | OUTPATIENT
Start: 2021-06-12 | End: 2021-06-12

## 2021-06-12 RX ORDER — OXYCODONE HYDROCHLORIDE AND ACETAMINOPHEN 5; 325 MG/1; MG/1
1 TABLET ORAL EVERY 6 HOURS PRN
Status: DISCONTINUED | OUTPATIENT
Start: 2021-06-12 | End: 2021-06-15 | Stop reason: HOSPADM

## 2021-06-12 RX ORDER — IPRATROPIUM BROMIDE AND ALBUTEROL SULFATE 2.5; .5 MG/3ML; MG/3ML
1 SOLUTION RESPIRATORY (INHALATION)
Status: DISCONTINUED | OUTPATIENT
Start: 2021-06-12 | End: 2021-06-12 | Stop reason: SDUPTHER

## 2021-06-12 RX ORDER — ALPRAZOLAM 0.5 MG/1
0.5 TABLET ORAL 3 TIMES DAILY PRN
Status: DISCONTINUED | OUTPATIENT
Start: 2021-06-12 | End: 2021-06-15 | Stop reason: HOSPADM

## 2021-06-12 RX ORDER — ACETAMINOPHEN 325 MG/1
650 TABLET ORAL EVERY 6 HOURS PRN
Status: DISCONTINUED | OUTPATIENT
Start: 2021-06-12 | End: 2021-06-15 | Stop reason: HOSPADM

## 2021-06-12 RX ORDER — SODIUM CHLORIDE 0.9 % (FLUSH) 0.9 %
5-40 SYRINGE (ML) INJECTION EVERY 12 HOURS SCHEDULED
Status: DISCONTINUED | OUTPATIENT
Start: 2021-06-12 | End: 2021-06-15 | Stop reason: HOSPADM

## 2021-06-12 RX ORDER — DOXYCYCLINE 100 MG/1
100 CAPSULE ORAL EVERY 12 HOURS
Status: DISCONTINUED | OUTPATIENT
Start: 2021-06-12 | End: 2021-06-12

## 2021-06-12 RX ORDER — LEVALBUTEROL 1.25 MG/.5ML
1.25 SOLUTION, CONCENTRATE RESPIRATORY (INHALATION)
Status: DISCONTINUED | OUTPATIENT
Start: 2021-06-12 | End: 2021-06-15 | Stop reason: HOSPADM

## 2021-06-12 RX ORDER — METOPROLOL TARTRATE 5 MG/5ML
2.5 INJECTION INTRAVENOUS EVERY 6 HOURS PRN
Status: DISCONTINUED | OUTPATIENT
Start: 2021-06-12 | End: 2021-06-13

## 2021-06-12 RX ORDER — IPRATROPIUM BROMIDE AND ALBUTEROL SULFATE 2.5; .5 MG/3ML; MG/3ML
3 SOLUTION RESPIRATORY (INHALATION) ONCE
Status: COMPLETED | OUTPATIENT
Start: 2021-06-12 | End: 2021-06-12

## 2021-06-12 RX ORDER — LEVALBUTEROL 1.25 MG/.5ML
1.25 SOLUTION, CONCENTRATE RESPIRATORY (INHALATION) EVERY 6 HOURS
Status: DISCONTINUED | OUTPATIENT
Start: 2021-06-12 | End: 2021-06-12

## 2021-06-12 RX ORDER — ONDANSETRON 2 MG/ML
4 INJECTION INTRAMUSCULAR; INTRAVENOUS EVERY 6 HOURS PRN
Status: DISCONTINUED | OUTPATIENT
Start: 2021-06-12 | End: 2021-06-15 | Stop reason: HOSPADM

## 2021-06-12 RX ORDER — LEVALBUTEROL 1.25 MG/.5ML
1.25 SOLUTION, CONCENTRATE RESPIRATORY (INHALATION) EVERY 6 HOURS PRN
Status: DISCONTINUED | OUTPATIENT
Start: 2021-06-12 | End: 2021-06-15 | Stop reason: HOSPADM

## 2021-06-12 RX ORDER — POTASSIUM CHLORIDE 20 MEQ/1
40 TABLET, EXTENDED RELEASE ORAL PRN
Status: DISCONTINUED | OUTPATIENT
Start: 2021-06-12 | End: 2021-06-15 | Stop reason: HOSPADM

## 2021-06-12 RX ORDER — IPRATROPIUM BROMIDE AND ALBUTEROL SULFATE 2.5; .5 MG/3ML; MG/3ML
SOLUTION RESPIRATORY (INHALATION)
Status: COMPLETED
Start: 2021-06-12 | End: 2021-06-12

## 2021-06-12 RX ORDER — LEVETIRACETAM 750 MG/1
750 TABLET ORAL 2 TIMES DAILY
Status: DISCONTINUED | OUTPATIENT
Start: 2021-06-12 | End: 2021-06-12

## 2021-06-12 RX ORDER — ONDANSETRON 4 MG/1
4 TABLET, ORALLY DISINTEGRATING ORAL EVERY 8 HOURS PRN
Status: DISCONTINUED | OUTPATIENT
Start: 2021-06-12 | End: 2021-06-15 | Stop reason: HOSPADM

## 2021-06-12 RX ORDER — METHYLPREDNISOLONE SODIUM SUCCINATE 40 MG/ML
40 INJECTION, POWDER, LYOPHILIZED, FOR SOLUTION INTRAMUSCULAR; INTRAVENOUS EVERY 6 HOURS
Status: DISCONTINUED | OUTPATIENT
Start: 2021-06-12 | End: 2021-06-12

## 2021-06-12 RX ORDER — SODIUM CHLORIDE 0.9 % (FLUSH) 0.9 %
5-40 SYRINGE (ML) INJECTION PRN
Status: DISCONTINUED | OUTPATIENT
Start: 2021-06-12 | End: 2021-06-15 | Stop reason: HOSPADM

## 2021-06-12 RX ORDER — 0.9 % SODIUM CHLORIDE 0.9 %
80 INTRAVENOUS SOLUTION INTRAVENOUS ONCE
Status: COMPLETED | OUTPATIENT
Start: 2021-06-12 | End: 2021-06-12

## 2021-06-12 RX ORDER — ACETAMINOPHEN 650 MG/1
650 SUPPOSITORY RECTAL EVERY 6 HOURS PRN
Status: DISCONTINUED | OUTPATIENT
Start: 2021-06-12 | End: 2021-06-15 | Stop reason: HOSPADM

## 2021-06-12 RX ORDER — ESCITALOPRAM OXALATE 10 MG/1
15 TABLET ORAL DAILY
Status: DISCONTINUED | OUTPATIENT
Start: 2021-06-12 | End: 2021-06-15 | Stop reason: HOSPADM

## 2021-06-12 RX ORDER — ZINC GLUCONATE 50 MG
50 TABLET ORAL DAILY
COMMUNITY

## 2021-06-12 RX ORDER — METHYLPREDNISOLONE SODIUM SUCCINATE 125 MG/2ML
60 INJECTION, POWDER, LYOPHILIZED, FOR SOLUTION INTRAMUSCULAR; INTRAVENOUS EVERY 6 HOURS
Status: DISCONTINUED | OUTPATIENT
Start: 2021-06-12 | End: 2021-06-13

## 2021-06-12 RX ORDER — IBUPROFEN 400 MG/1
400 TABLET ORAL EVERY 6 HOURS PRN
Status: DISCONTINUED | OUTPATIENT
Start: 2021-06-12 | End: 2021-06-15 | Stop reason: HOSPADM

## 2021-06-12 RX ORDER — AMITRIPTYLINE HYDROCHLORIDE 75 MG/1
75 TABLET, FILM COATED ORAL NIGHTLY
Status: DISCONTINUED | OUTPATIENT
Start: 2021-06-12 | End: 2021-06-15 | Stop reason: HOSPADM

## 2021-06-12 RX ORDER — NICOTINE 21 MG/24HR
1 PATCH, TRANSDERMAL 24 HOURS TRANSDERMAL DAILY
Status: DISCONTINUED | OUTPATIENT
Start: 2021-06-12 | End: 2021-06-15 | Stop reason: HOSPADM

## 2021-06-12 RX ADMIN — ENOXAPARIN SODIUM 40 MG: 40 INJECTION SUBCUTANEOUS at 11:55

## 2021-06-12 RX ADMIN — ATORVASTATIN CALCIUM 40 MG: 40 TABLET, FILM COATED ORAL at 13:30

## 2021-06-12 RX ADMIN — DOXYCYCLINE 100 MG: 100 CAPSULE ORAL at 07:55

## 2021-06-12 RX ADMIN — ALPRAZOLAM 0.5 MG: 0.5 TABLET ORAL at 11:47

## 2021-06-12 RX ADMIN — IPRATROPIUM BROMIDE AND ALBUTEROL SULFATE 1 AMPULE: .5; 3 SOLUTION RESPIRATORY (INHALATION) at 15:08

## 2021-06-12 RX ADMIN — SODIUM CHLORIDE 80 ML: 9 INJECTION, SOLUTION INTRAVENOUS at 05:58

## 2021-06-12 RX ADMIN — IPRATROPIUM BROMIDE AND ALBUTEROL SULFATE 3 AMPULE: 2.5; .5 SOLUTION RESPIRATORY (INHALATION) at 03:30

## 2021-06-12 RX ADMIN — ACETAMINOPHEN 650 MG: 325 TABLET ORAL at 07:55

## 2021-06-12 RX ADMIN — METHYLPREDNISOLONE SODIUM SUCCINATE 60 MG: 125 INJECTION, POWDER, FOR SOLUTION INTRAMUSCULAR; INTRAVENOUS at 13:29

## 2021-06-12 RX ADMIN — LEVETIRACETAM 750 MG: 750 TABLET ORAL at 11:52

## 2021-06-12 RX ADMIN — METHYLPREDNISOLONE SODIUM SUCCINATE 60 MG: 125 INJECTION, POWDER, FOR SOLUTION INTRAMUSCULAR; INTRAVENOUS at 18:50

## 2021-06-12 RX ADMIN — IPRATROPIUM BROMIDE AND ALBUTEROL SULFATE 1 AMPULE: .5; 3 SOLUTION RESPIRATORY (INHALATION) at 19:50

## 2021-06-12 RX ADMIN — PREDNISONE 40 MG: 20 TABLET ORAL at 09:17

## 2021-06-12 RX ADMIN — BACLOFEN 10 MG: 10 TABLET ORAL at 20:51

## 2021-06-12 RX ADMIN — IPRATROPIUM BROMIDE AND ALBUTEROL SULFATE 1 AMPULE: .5; 3 SOLUTION RESPIRATORY (INHALATION) at 11:41

## 2021-06-12 RX ADMIN — IPRATROPIUM BROMIDE AND ALBUTEROL SULFATE 1 AMPULE: 2.5; .5 SOLUTION RESPIRATORY (INHALATION) at 06:09

## 2021-06-12 RX ADMIN — SODIUM CHLORIDE, PRESERVATIVE FREE 10 ML: 5 INJECTION INTRAVENOUS at 13:31

## 2021-06-12 RX ADMIN — ALBUTEROL SULFATE 2.5 MG: 2.5 SOLUTION RESPIRATORY (INHALATION) at 08:21

## 2021-06-12 RX ADMIN — IPRATROPIUM BROMIDE 0.5 MG: 0.5 SOLUTION RESPIRATORY (INHALATION) at 08:21

## 2021-06-12 RX ADMIN — METOPROLOL TARTRATE 2.5 MG: 1 INJECTION, SOLUTION INTRAVENOUS at 22:24

## 2021-06-12 RX ADMIN — IOPAMIDOL 75 ML: 755 INJECTION, SOLUTION INTRAVENOUS at 05:57

## 2021-06-12 RX ADMIN — ACETAMINOPHEN 650 MG: 325 TABLET ORAL at 16:58

## 2021-06-12 RX ADMIN — SODIUM CHLORIDE, PRESERVATIVE FREE 10 ML: 5 INJECTION INTRAVENOUS at 19:20

## 2021-06-12 RX ADMIN — OXYCODONE HYDROCHLORIDE AND ACETAMINOPHEN 1 TABLET: 5; 325 TABLET ORAL at 20:51

## 2021-06-12 RX ADMIN — IBUPROFEN 400 MG: 400 TABLET, FILM COATED ORAL at 11:47

## 2021-06-12 RX ADMIN — SODIUM CHLORIDE, PRESERVATIVE FREE 10 ML: 5 INJECTION INTRAVENOUS at 05:58

## 2021-06-12 RX ADMIN — ESCITALOPRAM OXALATE 15 MG: 10 TABLET ORAL at 11:53

## 2021-06-12 RX ADMIN — AMITRIPTYLINE HYDROCHLORIDE 75 MG: 75 TABLET, FILM COATED ORAL at 20:51

## 2021-06-12 ASSESSMENT — PAIN DESCRIPTION - LOCATION
LOCATION: HEAD
LOCATION: BACK;HEAD
LOCATION: HEAD
LOCATION: HEAD

## 2021-06-12 ASSESSMENT — ENCOUNTER SYMPTOMS
NAUSEA: 0
COUGH: 1
BACK PAIN: 0
WHEEZING: 1
CHOKING: 0
ABDOMINAL PAIN: 0
VOMITING: 0
RHINORRHEA: 0
TROUBLE SWALLOWING: 0
SHORTNESS OF BREATH: 1
CHEST TIGHTNESS: 1

## 2021-06-12 ASSESSMENT — PAIN SCALES - GENERAL
PAINLEVEL_OUTOF10: 6
PAINLEVEL_OUTOF10: 6
PAINLEVEL_OUTOF10: 1
PAINLEVEL_OUTOF10: 2
PAINLEVEL_OUTOF10: 6
PAINLEVEL_OUTOF10: 2
PAINLEVEL_OUTOF10: 0
PAINLEVEL_OUTOF10: 5
PAINLEVEL_OUTOF10: 4
PAINLEVEL_OUTOF10: 0

## 2021-06-12 ASSESSMENT — PAIN DESCRIPTION - FREQUENCY
FREQUENCY: CONTINUOUS

## 2021-06-12 ASSESSMENT — PAIN DESCRIPTION - PAIN TYPE
TYPE: ACUTE PAIN
TYPE: ACUTE PAIN;CHRONIC PAIN
TYPE: ACUTE PAIN
TYPE: ACUTE PAIN

## 2021-06-12 ASSESSMENT — PAIN DESCRIPTION - DESCRIPTORS
DESCRIPTORS: DULL;HEADACHE
DESCRIPTORS: DULL;PRESSURE

## 2021-06-12 NOTE — CARE COORDINATION
[address] Encounter Date/Time: 2021 Roman Betts Account: [de-identified]    MRN: [id number]    Patient: Yelitza Rivero    Contact Serial #: [id number]      ENCOUNTER          Patient Class: I Private Enc? No Unit RM BD: [facility] ICU    Hospital Service: ICU/CCU   Encounter DX: COPD exacerbation ([facility] *   ADM Provider: Siena Reed MD   Procedure:     ATT Provider: Siena Reed MD   REF Provider:        Admission DX: COPD exacerbation ([facility] and DX codes: J44.1      PATIENT                 Name: Yelitza Rivero : 1965 (55 yrs)   Address: [address] [place] [address] Marcell Bhandari Upper Sex: Female   [place] city: [address] [place] [address]         Marital Status:    Employer: DISABLED         Evangelical: Protestant   Primary Care Provider: THOR Escobar -*         Primary Phone: [phone]   EMERGENCY CONTACT   Contact Name Legal Guardian? Relationship to Patient Home Phone Work Phone   1. Lovely Samano  2. Melody      Child  Other [phone]         GUARANTOR            Guarantor: Yelitza Rivero     : 1965   Address: [address] [place] [address] SAARH Bhandari Upper Sex: Female   [place] [address]     Relation to Patient: Self       Home Phone: [phone]   Guarantor ID: [id number]       Work Phone:     Guarantor Employer: DISABLED         Status: DISABLED      COVERAGE  PRIMARY INSURANCE   Payor: [facility] MEDICARE Plan: [employer]*   Payor Address: ,          Group Number: [id number] Insurance Type: INDEMNITY   Subscriber Name: Karen Richey : 1965   Subscriber ID: [id number] Pat. Rel. to Sub: Self   SECONDARY INSURANCE   Payor: [place] Plan: [facility] DEPT OF*   Payor Address:  [address]          Group Number:   Insurance Type: INDEMNITY   Subscriber Name: Karen Richey : 1965   Subscriber ID: [id number] Pat.  Rel. to Sub: SELF

## 2021-06-12 NOTE — ED NOTES
Admission Dx: copd exacerbation    Pts Chief Complaints on Arrival: respiratory distress    ADL's - Partial assistance    Pending Diagnostics:  n/a    Residence PTA: single story home    Special Considerations/Circumstances:  n/a    Vitals: Current vital signs:  BP (!) 136/100   Pulse 104   Temp 98.6 °F (37 °C) (Axillary)   Resp 18   Ht 5' 7\" (1.702 m)   Wt 160 lb (72.6 kg)   SpO2 96%   BMI 25.06 kg/m²                MEWS Score: Lew Evangelista 40, RN  06/12/21 [id number]

## 2021-06-12 NOTE — PLAN OF CARE
Problem: Pain:  Goal: Pain level will decrease  Outcome: Met This Shift  Goal: Control of acute pain  Outcome: Met This Shift  Goal: Control of chronic pain  Outcome: Met This Shift     Problem: Breathing Pattern - Ineffective:  Goal: Ability to achieve and maintain a regular respiratory rate will improve  Outcome: Met This Shift     Problem: Discharge Planning:  Goal: Patients continuum of care needs are met  Outcome: Met This Shift     Problem: Gas Exchange - Impaired:  Goal: Levels of oxygenation will improve  Outcome: Met This Shift

## 2021-06-12 NOTE — ED PROVIDER NOTES
[address]  EMERGENCY DEPARTMENT ENCOUNTER      Pt Name: Bryson Acharya  MRN: [id number]  [place] 1965  Date of evaluation: 6/12/21      CHIEF COMPLAINT     Respiratory distress    HISTORY OF PRESENT ILLNESS   HPI 54 y.o. female presents with c/o sob. History is limited secondary to the patient's respiratory distress. History is obtained from the patient, EMS report, and review of the medical record. Patient has been having worsening difficulty breathing over the last day. Patient was using her home albuterol treatments without any relief. EMS arrived to find the patient in respiratory distress. Tachypneic and speaking 1-2 words at a time. They started her on breathing treatments, they placed an IV, they gave 2 mg magnesium and also 125 mg of Solu-Medrol. She was still having a very hard time breathing and so she was started on CPAP. Patient denies fevers cough chest pain abdominal pain vomiting diarrhea. She did not have a coronavirus vaccination. REVIEW OF SYSTEMS     Review of Systems   Constitutional: Negative for fever. HENT: Negative for congestion. Eyes: Negative for visual disturbance. Respiratory: Positive for cough, shortness of breath and wheezing. Cardiovascular: Negative for chest pain. Gastrointestinal: Negative for abdominal pain, nausea and vomiting. Genitourinary: Negative for difficulty urinating. Musculoskeletal: Negative for back pain. Skin: Negative for rash. Neurological: Negative for headaches. Psychiatric/Behavioral: Negative for confusion.        PAST MEDICAL HISTORY     Past Medical History:   Diagnosis Date    Acute MI ([address]     Anxiety     Chronic back pain     COPD (chronic obstructive pulmonary disease) (HCC)     Depression     Heart disease     Hyperlipidemia     Hypertension     MRSA (methicillin resistant staph aureus) culture positive 09/05/2017    abscess       SURGICAL HISTORY       Past Surgical History:   Procedure Laterality Date    BACK SURGERY      diskectomy 2000, fusion 2007, fusion 7/2017    CHOLECYSTECTOMY, LAPAROSCOPIC  11/10/2017    robotic assisted    CHOLECYSTECTOMY, LAPAROSCOPIC N/A 11/10/2017    XI ROBOTIC CHOLECYSTECTOMY LAPAROSCOPIC performed by Nuvia Peralta DO at [facility]         CURRENT MEDICATIONS       Previous Medications    ACETAMINOPHEN (TYLENOL) 325 MG TABLET    Take 1 tablet by mouth every 6 hours as needed for Pain    ALBUTEROL (PROVENTIL) (2.5 MG/3ML) 0.083% NEBULIZER SOLUTION    INHALE 3 MLS BY NEBULIZATION EVERY 6 HOURS AS NEEDED FOR WHEEZING    ALBUTEROL SULFATE HFA (VENTOLIN HFA) 108 (90 BASE) MCG/ACT INHALER    Inhale 2 puffs into the lungs 4 times daily as needed for Wheezing    ALBUTEROL SULFATE  (90 BASE) MCG/ACT INHALER    INHALE TWO PUFFS BY MOUTH EVERY 6 HOURS AS NEEDED FOR WHEEZING    AMITRIPTYLINE (ELAVIL) 25 MG TABLET    Take 3 tablets by mouth nightly    ASPIRIN EC 81 MG EC TABLET    Take 1 tablet by mouth daily    ATORVASTATIN (LIPITOR) 40 MG TABLET    TAKE ONE TABLET BY MOUTH DAILY    BACLOFEN (LIORESAL) 10 MG TABLET    TAKE ONE TABLET BY MOUTH ONCE NIGHTLY AS NEEDED FOR PAIN    BETAMETHASONE VALERATE (VALISONE) 0.1 % CREAM    Apply topically 2 times daily. Do not use for longer than 2 weeks.     BUDESONIDE-FORMOTEROL (SYMBICORT) 160-4.5 MCG/ACT AERO    Inhale 2 puffs into the lungs 2 times daily    CHOLECALCIFEROL (VITAMIN D3) 400 UNITS CAPS    Take 1 tablet by mouth daily    ESCITALOPRAM (LEXAPRO) 10 MG TABLET    TAKE 1 AND 1/2 TABLET BY MOUTH DAILY    HANDICAP PLACARD MISC    by Does not apply route Exp 1/2024    IBUPROFEN (IBU) 400 MG TABLET    Take 1 tablet by mouth every 6 hours as needed for Pain    LEVETIRACETAM (KEPPRA) 750 MG TABLET    TAKE ONE TABLET BY MOUTH TWICE A DAY    MAGNESIUM GLUCONATE (MAGONATE) 500 MG TABLET    Take 400 mg by mouth every evening     MULTIPLE VITAMINS-MINERALS (THERAPEUTIC tenderness palpation  Abdomen: Soft, nontender, nondistended, with no peritoneal signs  Neurologic: Patient is alert able to move all extremities, breathing is too labored for speech   extremities: No edema, no calf tenderness to palpation, no unequal edema    MEDICAL DECISION MAKING:     MDM  54 y.o. female presenting with sob. Differential diagnosis of COPD exacerbation, Covid, CHF, PNA, Bronchitis, atypical presentation of acs, pneumothorax, arrhythmia, pulmonary embolismi, anemia, renal failure, metabolic acidosis. We'll obtain a cbc, cmp, ekg, troponin, bnp, and chest xray. Pt treated with magnesium Solu-Medrol duo nebs prior to presentation we will continue nebulization treatments and the patient was put on BiPAP. The patient is put on a cardiac, O2 monitor. We will monitor closely and reassess. Emergency Department course:    5:25 AM EDT  Pt breaking more comfortably. Covid negative. No lactic acid elevation. No respiratory acidosis on abg. No troponin elevation. D.dimer elevated. Ct scan pending. 6:52 AM EDT  Attempted the patient off BiPAP for the CT PE study. This was performed but she became very dyspneic again poor air movement. BiPAP was restarted. Admitting to the ICU. Treatment of COPD exacerbation. Discussed with internal medicine staff Dr. Laurent Rao and Pulmonary Staff Dr. Cinthia Fields. DIAGNOSTIC RESULTS     EKG: All EKG's are interpreted by the Emergency Department Physician who either signs or Co-signs this chart in the absence of a cardiologist.    EKG shows a sinus tachycardia HR is 116, , QRS 90, , no LAZARUS, No STD, No TWI, the axis is normal.        RADIOLOGY:All plain film, CT, MRI, and formal ultrasound images (except ED bedside ultrasound) are read by the radiologist and the images and interpretations are directly viewed by the emergency physician.      CT CHEST PULMONARY EMBOLISM W CONTRAST   Final Result   No evidence of pulmonary embolism or acute pulmonary abnormality. Chronically stable right upper lung lobe ovoid 6 mm diameter noncalcified   pulmonary nodule. Finding considered benign. RECOMMENDATIONS:            XR CHEST PORTABLE   Final Result   Unremarkable single portable AP view of the chest.             LABS: All lab results were reviewed by myself, and all abnormals are listed below. Labs Reviewed   BASIC METABOLIC PANEL - Abnormal; Notable for the following components:       Result Value    Glucose 175 (*)     Potassium 3.5 (*)     All other components within normal limits   D-DIMER, QUANTITATIVE - Abnormal; Notable for the following components:    D-Dimer, Quant 0.67 (*)     All other components within normal limits   BLOOD GAS, ARTERIAL - Abnormal; Notable for the following components:    HCO3, Arterial 27.7 (*)     Positive Base Excess, Art 3.0 (*)     O2 Sat, Arterial 93.2 (*)     All other components within normal limits   COVID-19, RAPID   CULTURE, BLOOD 1   CULTURE, BLOOD 1   CBC   LACTIC ACID   PROTIME-INR   APTT   TROPONIN       EMERGENCY DEPARTMENT COURSE:   Vitals:    Vitals:    06/12/21 0515 06/12/21 0600 06/12/21 0605 06/12/21 0609   BP: 135/89 135/84 130/83    Pulse: 105 109 108    Resp: 15 24 19 19   Temp:       TempSrc:       SpO2: 96% 96% 98% 97%   Weight:       Height:           The patient was given the following medications while in the emergency department:  Orders Placed This Encounter   Medications    ipratropium-albuterol (DUONEB) nebulizer solution 3 ampule     Order Specific Question:   Initiate RT Bronchodilator Protocol     Answer: Yes    iopamidol (ISOVUE-370) 76 % injection 75 mL    0.9 % sodium chloride bolus    sodium chloride flush 0.9 % injection 10 mL    ipratropium-albuterol (DUONEB) nebulizer solution 1 ampule     Order Specific Question:   Initiate RT Bronchodilator Protocol     Answer:    Yes    ipratropium-albuterol (DUONEB) 0.5-2.5 (3) MG/3ML nebulizer solution     Esperanza Feliciano: vita

## 2021-06-12 NOTE — ED NOTES
Returned from ct scan, using oxygen at 4l/min per nc,pt states her chest feels tiight, pt would like to go back on bipap, lungs diminished t/o posteriorly with wheezes, respiratory therapy paged to put patient back on bipap     Lyssa Gaxiola RN  06/12/21 [id number]

## 2021-06-12 NOTE — CARE COORDINATION
CASE MANAGEMENT NOTE:    Admission Date:  6/12/2021 Christine Patricio is a 54 y.o.  female    Admitted for : COPD exacerbation ([facility] [J44.1]    Met with:  Patient    PCP:  Brad Jensen                                Insurance:  [facility] Medicare      Is patient alert and oriented at time of discussion:  Yes    Current Residence/ Living Arrangements:  independently at home Lives w/ BF           Current Services PTA:  No    Does patient go to outpatient dialysis: No  If yes, location and chair time: NA    Is patient agreeable to VNS: Yes    Freedom of choice provided:  Yes    List of [address] provided: No    VNS chosen:  Yes,[facility], Referral Sent, Spoke to Elaine Waggoner,    DME:  none    Home Oxygen: No    Nebulizer: Yes    CPAP/BIPAP: No    Supplier: N/A    Potential Assistance Needed: Yes,[facility], Follow for possible Home O2    SNF needed: No    Freedom of choice and list provided: NA    Pharmacy:  [facility] [address]       Does Patient want to use MEDS to BEDS? No    Is patient currently receiving oral anticoagulation therapy? No    Is the Patient an OXANA ROSS [facility] with Readmission Risk Score greater than 14%? No  If yes, pt needs a follow up appointment made within 7 days. Family Members/Caregivers that pt would like involved in their care:    Yes    If yes, list name here:  Kian Camejo, Daughter, [address]    Transportation Provider:  Patient             Discharge Plan:  6/12/21 [facility] Medicare Pt. Lives in [place], w/ [place],w/ BF. DME Nebulizer, Wants, [facility]NS, [facility], referral made.  Currently on BIPAP, Does not have Oxygen, at home, will follow, Pulm, PO Doxy, IV steroids 60MG, Q6, Jad will need signed/completed, will follow//KB                 Electronically signed by: Wing Cristina RN on 6/12/2021 at 1:16 PM

## 2021-06-12 NOTE — H&P
noncalcified pulmonary nodule. Findings benign            Past Medical History:     Past Medical History:   Diagnosis Date    Acute MI ([address]     Anxiety     Chronic back pain     COPD (chronic obstructive pulmonary disease) (HCC)     Depression     Heart disease     Hyperlipidemia     Hypertension     MRSA (methicillin resistant staph aureus) culture positive 09/05/2017    abscess        Past SurgicalHistory:     Past Surgical History:   Procedure Laterality Date    BACK SURGERY      diskectomy 2000, fusion 2007, fusion 7/2017    CHOLECYSTECTOMY, LAPAROSCOPIC  11/10/2017    robotic assisted    CHOLECYSTECTOMY, LAPAROSCOPIC N/A 11/10/2017    XI ROBOTIC CHOLECYSTECTOMY LAPAROSCOPIC performed by Jessica Beasley DO at [address] [place] [address]          Medications Prior to Admission:        Prior to Admission medications    Medication Sig Start Date End Date Taking?  Authorizing Provider   zinc gluconate 50 MG tablet Take 50 mg by mouth daily   Yes Historical Provider, MD   Ascorbic Acid (VITAMIN C) 250 MG tablet Take 250 mg by mouth daily   Yes Historical Provider, MD   baclofen (LIORESAL) 10 MG tablet TAKE ONE TABLET BY MOUTH ONCE NIGHTLY AS NEEDED FOR PAIN 5/27/21  Yes THOR Mcnulty CNP   albuterol sulfate HFA (VENTOLIN HFA) 108 (90 Base) MCG/ACT inhaler Inhale 2 puffs into the lungs 4 times daily as needed for Wheezing 4/12/21  Yes Rodger Kevin DO   atorvastatin (LIPITOR) 40 MG tablet TAKE ONE TABLET BY MOUTH DAILY 3/15/21  Yes THOR Mcnulty CNP   albuterol sulfate  (90 Base) MCG/ACT inhaler INHALE TWO PUFFS BY MOUTH EVERY 6 HOURS AS NEEDED FOR WHEEZING 3/8/21  Yes THOR Mcnulty CNP   escitalopram (LEXAPRO) 10 MG tablet TAKE 1 AND 1/2 TABLET BY MOUTH DAILY 3/3/21  Yes THOR Burciaga NP   amitriptyline (ELAVIL) 25 MG tablet Take 3 tablets by mouth nightly 12/2/20 6/12/21 Yes THOR Banuelos CNP   tiotropium ([address]) 2. 5 MCG/ACT AERS inhaler Inhale 2 puffs into the lungs daily 10/16/20  Yes Monie Malin MD   Nebulizers (NEBULIZER COMPRESSOR) MISC Daily as needed 9/4/20  Yes THOR Escobar CNP   acetaminophen (TYLENOL) 325 MG tablet Take 1 tablet by mouth every 6 hours as needed for Pain 6/15/20  Yes Mickey Flow, DO   ibuprofen (IBU) 400 MG tablet Take 1 tablet by mouth every 6 hours as needed for Pain 6/15/20  Yes Mickey Flow, DO   Cholecalciferol (VITAMIN D3) 400 units CAPS Take 1 tablet by mouth daily   Yes Historical Provider, MD   aspirin EC 81 MG EC tablet Take 1 tablet by mouth daily  Patient taking differently: Take 81 mg by mouth daily as needed  3/6/19  Yes THOR Escobar CNP   magnesium gluconate (MAGONATE) 500 MG tablet Take 400 mg by mouth every evening    Yes Historical Provider, MD   Multiple Vitamins-Minerals (THERAPEUTIC MULTIVITAMIN-MINERALS) tablet Take 1 tablet by mouth daily. Yes Historical Provider, MD   vitamin B-12 (CYANOCOBALAMIN) 1000 MCG tablet Take 1,000 mcg by mouth daily. Yes Historical Provider, MD   albuterol (PROVENTIL) (2.5 MG/3ML) 0.083% nebulizer solution INHALE 3 MLS BY NEBULIZATION EVERY 6 HOURS AS NEEDED FOR WHEEZING 4/12/21   Jerel Kevin,    levETIRAcetam (KEPPRA) 750 MG tablet TAKE ONE TABLET BY MOUTH TWICE A DAY 3/30/21   THOR Villa CNP   betamethasone valerate (VALISONE) 0.1 % cream Apply topically 2 times daily. Do not use for longer than 2 weeks.  1/23/21   Yi Laboy MD   nicotine (NICODERM CQ) 21 MG/24HR Place 1 patch onto the skin daily for 10 days 12/23/20 1/2/21  Lynnie Sacks, MD   STIOLTO RESPIMAT 2.5-2.5 MCG/ACT AERS INHALE TWO INHALATION(S) BY MOUTH DAILY  Patient not taking: Reported on 11/30/2020 11/9/20   THOR Escobar CNP   budesonide-formoterol [facility]) 160-4.5 MCG/ACT AERO Inhale 2 puffs into the lungs 2 times daily  Patient not taking: Reported on 11/30/2020 10/16/20   Monie Malin MD   Respiratory Therapy headaches. Psychiatric/Behavioral: The patient is nervous/anxious. Physical Exam:   /78   Pulse 106   Temp 97.7 °F (36.5 °C) (Oral)   Resp 26   Ht 5' 7\" (1.702 m)   Wt 181 lb 7 oz (82.3 kg)   SpO2 100%   BMI 28.42 kg/m²   Temp (24hrs), Av.3 °F (36.8 °C), Min:97.7 °F (36.5 °C), Max:98.7 °F (37.1 °C)    No results for input(s): POCGLU in the last 72 hours. No intake or output data in the 24 hours ending 21 1259    Physical Exam  Constitutional:       General: She is not in acute distress. Appearance: She is not ill-appearing. Comments: On BiPAP FiO2 40%   HENT:      Head: Normocephalic and atraumatic. Eyes:      Extraocular Movements: Extraocular movements intact. Pupils: Pupils are equal, round, and reactive to light. Cardiovascular:      Rate and Rhythm: Normal rate and regular rhythm. Pulses: Normal pulses. Pulmonary:      Effort: Pulmonary effort is normal. No respiratory distress. Breath sounds: Wheezing present. No rhonchi or rales. Comments: Decreased breath sounds. Mild end expiratory wheezes  Abdominal:      General: Bowel sounds are normal. There is no distension. Palpations: Abdomen is soft. Tenderness: There is no abdominal tenderness. There is no guarding or rebound. Skin:     General: Skin is warm. Neurological:      General: No focal deficit present. Mental Status: She is alert and oriented to person, place, and time.    Psychiatric:         Mood and Affect: Mood normal.         Investigations:     Laboratory Testing:  Recent Results (from the past 24 hour(s))   BLOOD GAS, ARTERIAL    Collection Time: 21  3:47 AM   Result Value Ref Range    pH, Arterial 7.405 7.350 - 7.450    pCO2, Arterial 44.2 35.0 - 45.0 mmHg    pO2, Arterial 96.8 80.0 - 100.0 mmHg    HCO3, Arterial 27.7 (H) 22.0 - 26.0 mmol/L    Positive Base Excess, Art 3.0 (H) 0.0 - 2.0 mmol/L    Negative Base Excess, Art NOT REPORTED 0.0 - 2.0 mmol/L CONTRAST    Result Date: 6/12/2021  EXAMINATION: CTA OF THE CHEST 6/12/2021 4:55 am TECHNIQUE: CTA of the chest was performed after the administration of intravenous contrast.  Multiplanar reformatted images are provided for review. MIP images are provided for review. Dose modulation, iterative reconstruction, and/or weight based adjustment of the mA/kV was utilized to reduce the radiation dose to as low as reasonably achievable. COMPARISON: Chest portable June 12, 2021. CT chest pulmonary embolism with contrast February 16, 2021. HISTORY: ORDERING SYSTEM PROVIDED HISTORY: sob, elevated d.dimer TECHNOLOGIST PROVIDED HISTORY: sob, elevated d.dimer Decision Support Exception - unselect if not a suspected or confirmed emergency medical condition->Emergency Medical Condition (MA) Reason for Exam: SOB, elevated d-dimer Acuity: Acute Type of Exam: Initial Additional signs and symptoms: Pt c/o difficulty breathing that began suddenly tonight. H/O COPD, asthma. FINDINGS: Pulmonary Arteries: Pulmonary arteries are adequately opacified for evaluation. No evidence of intraluminal filling defect to suggest pulmonary embolism. Main pulmonary artery is normal in caliber. Mediastinum: No evidence of mediastinal lymphadenopathy. The heart and pericardium demonstrate no acute abnormality. There is no acute abnormality of the thoracic aorta. Lungs/pleura: The lungs are without acute process. Right upper lung lobe ovoid noncalcified pulmonary nodule is identified which measures up to 6 mm in diameter. No focal consolidation or pulmonary edema. No evidence of pleural effusion or pneumothorax. Upper Abdomen: Limited images of the upper abdomen are unremarkable. Soft Tissues/Bones: No acute bone or soft tissue abnormality. No evidence of pulmonary embolism or acute pulmonary abnormality. Chronically stable right upper lung lobe ovoid 6 mm diameter noncalcified pulmonary nodule. Finding considered benign.  RECOMMENDATIONS: Assessment :      Primary Problem  Acute respiratory failure [facility])    Active Hospital Problems    Diagnosis Date Noted    COPD exacerbation ([facility] [J44.1] 06/12/2021    Acute respiratory failure (HCC) [J96.00] 06/12/2021    Hypertension [I10] 12/21/2020    Moderate episode of recurrent major depressive disorder ([facility] [F33.1] 10/02/2018    Smoking [F17.200] 10/22/2013    CAD, multiple vessel [I25.10] 03/15/2013       Plan:     Patient status Admit as inpatient in the  Medical ICU    Acute respiratory failure 2/2 acute on chronic COPD exacerbation  -On admission: Afebrile; tachypneic and tachycardic on BiPAP FiO2 40%  -S/p IV magnesium 2G x1, IV Solu-Medrol 125 mg x1   -S/p 0.9% NaCl fluid bolus X1  -EKG: Sinus tachycardia  -CXR: Unremarkable  -D-dimer: 0.67  -CT PE: No acute PE, chronically stable RUL 6 mm diameter noncalcified pulmonary nodule  -Blood culture 1 and 2 NGTD  -Procalcitonin 0.05  -Started IV Solu-Medrol 60 mg every 6 hours  -Duonebs, BiPAP  -Xanax 0.5 mg TID PRN  -Pulmonology consulted    Hx seizure disorder, migraines  -Oral Keppra 750 mg BID  -Elavil 75 mg nightly    Hypertension    Depression  -Lexapro 15 mg daily    Smoking cessation  -Nicotine patch    DVT PPx: Lovenox 40 mg subcu daily  GI PPx: Not indicated    Consultations:   IP CONSULT TO PRIMARY CARE PROVIDER  IP CONSULT TO PRIMARY CARE PROVIDER  IP CONSULT TO PULMONOLOGY     Patient is admitted as inpatient status because of co-morbiditieslisted above, severity of signs and symptoms as outlined, requirement for current medical therapies and most importantly because of direct risk to patient if care not provided in a hospital setting.     Allison Vernon MD  6/12/2021  12:59 PM    Copy sent to THOR Taveras - CNP      Attending Physician Statement  I have discussed the care of Carlita Fletcher with the resident team. I have examined the patient myself and taken ros and hpi , including pertinent history and exam findings, with the resident. I have reviewed the key elements of all parts of the encounter with the resident. I agree with the assessment, plan and orders as documented by the resident. Principal Problem:    Acute respiratory failure (HCC)  Active Problems:    CAD, multiple vessel    Smoking    Moderate episode of recurrent major depressive disorder ([facility]    Hypertension    COPD exacerbation ([facility]  Resolved Problems:    * No resolved hospital problems. *    Ac hypoxic resp failure, sec to COPD exac- currently requiring 4 L by nasal cannula does not use oxygen at home-titrate to keep sats above 92  Steroids iv, bronchodilator  Patient has been only on LAMA and albuterol at home, does not follow with pulmonology.   Will start on ICS and LABA at the time of discharge    Electronically signed by Shabbir Quiles MD

## 2021-06-12 NOTE — FLOWSHEET NOTE
Dr. De La Paz Me at bedside. Patient returned from bathroom. Anxious, labored breathing. See physician progress note and orders.

## 2021-06-12 NOTE — PROGRESS NOTES
Patient received from ED. Assisted into bed, monitor and SpO2 applied. Call light within reach, bed in low position, locked, side rails up x 2. Patient oriented to unit and call system.

## 2021-06-12 NOTE — ED NOTES
Mode of arrival (squad #, walk in, police, etc) : LS # 1        Chief complaint(s): Respiratory Distress        Arrival Note (brief scenario, treatment PTA, etc). :Patient states she awoke with difficulty breathing, hx copd, does not use home oxygen. Patient given 2 mg Versed IVP, Solumedrol 125 mg ivp and 2 grams of Magnesium. Patient also given Combivent and albuterol tx prior to arrival to ER. Patient was on CPAP upon arrival to ER and was then put on bipap. A/o x 3.         C= \"Have you ever felt that you should Cut down on your drinking? \"  No  A= \"Have people Annoyed you by criticizing your drinking? \"  No  G= \"Have you ever felt bad or Guilty about your drinking? \"  No  E= \"Have you ever had a drink as an Eye-opener first thing in the morning to steady your nerves or to help a hangover? \"  No      Deferred []      Reason for deferring: N/A    *If yes to two or more: probable alcohol abuse. Jonny Churchill RN  06/12/21 [id number]

## 2021-06-12 NOTE — Clinical Note
Patient Class: Inpatient [101]   REQUIRED: Diagnosis: COPD exacerbation ([facility] [[id number]]   Estimated Length of Stay: Estimated stay of more than 2 midnights   Admitting Provider: Michael Kwon [[id number]]

## 2021-06-12 NOTE — PLAN OF CARE
Problem: Pain:  Goal: Pain level will decrease  6/12/2021 1747 by Jose Godfrey RN  Outcome: Met This Shift  6/12/2021 1745 by Jose Godfrey RN  Outcome: Met This Shift  Goal: Control of acute pain  Outcome: Met This Shift  Goal: Control of chronic pain  Outcome: Met This Shift     Problem: Discharge Planning:  Goal: Patients continuum of care needs are met  6/12/2021 1747 by Jose Godfrey RN  Outcome: Met This Shift  6/12/2021 1745 by Jose Godfrey RN  Outcome: Met This Shift     Problem: Gas Exchange - Impaired:  Goal: Levels of oxygenation will improve  6/12/2021 1747 by Jose Godfrey RN  Outcome: Met This Shift  6/12/2021 1745 by Jose Godfrey RN  Outcome: Met This Shift

## 2021-06-13 ENCOUNTER — APPOINTMENT (OUTPATIENT)
Dept: GENERAL RADIOLOGY | Age: 56
DRG: 190 | End: 2021-06-13
Payer: MEDICARE

## 2021-06-13 LAB
ANION GAP SERPL CALCULATED.3IONS-SCNC: 10 MMOL/L (ref 9–17)
BUN BLDV-MCNC: 14 MG/DL (ref 6–20)
BUN/CREAT BLD: ABNORMAL (ref 9–20)
CALCIUM SERPL-MCNC: 9.5 MG/DL (ref 8.6–10.4)
CHLORIDE BLD-SCNC: 107 MMOL/L (ref 98–107)
CO2: 24 MMOL/L (ref 20–31)
CREAT SERPL-MCNC: 0.46 MG/DL (ref 0.5–0.9)
GFR AFRICAN AMERICAN: >60 ML/MIN
GFR NON-AFRICAN AMERICAN: >60 ML/MIN
GFR SERPL CREATININE-BSD FRML MDRD: ABNORMAL ML/MIN/{1.73_M2}
GFR SERPL CREATININE-BSD FRML MDRD: ABNORMAL ML/MIN/{1.73_M2}
GLUCOSE BLD-MCNC: 154 MG/DL (ref 70–99)
HCT VFR BLD CALC: 40.4 % (ref 36–46)
HEMOGLOBIN: 13.2 G/DL (ref 12–16)
MCH RBC QN AUTO: 31.3 PG (ref 26–34)
MCHC RBC AUTO-ENTMCNC: 32.7 G/DL (ref 31–37)
MCV RBC AUTO: 95.8 FL (ref 80–100)
NRBC AUTOMATED: ABNORMAL PER 100 WBC
PDW BLD-RTO: 14.3 % (ref 11.5–14.9)
PLATELET # BLD: 290 K/UL (ref 150–450)
PMV BLD AUTO: 6.6 FL (ref 6–12)
POTASSIUM SERPL-SCNC: 4.5 MMOL/L (ref 3.7–5.3)
RBC # BLD: 4.21 M/UL (ref 4–5.2)
SODIUM BLD-SCNC: 141 MMOL/L (ref 135–144)
TSH SERPL DL<=0.05 MIU/L-ACNC: 0.36 MIU/L (ref 0.3–5)
WBC # BLD: 17.6 K/UL (ref 3.5–11)

## 2021-06-13 PROCEDURE — 6370000000 HC RX 637 (ALT 250 FOR IP): Performed by: STUDENT IN AN ORGANIZED HEALTH CARE EDUCATION/TRAINING PROGRAM

## 2021-06-13 PROCEDURE — 2060000000 HC ICU INTERMEDIATE R&B

## 2021-06-13 PROCEDURE — 6370000000 HC RX 637 (ALT 250 FOR IP): Performed by: INTERNAL MEDICINE

## 2021-06-13 PROCEDURE — 6360000002 HC RX W HCPCS: Performed by: STUDENT IN AN ORGANIZED HEALTH CARE EDUCATION/TRAINING PROGRAM

## 2021-06-13 PROCEDURE — 94660 CPAP INITIATION&MGMT: CPT

## 2021-06-13 PROCEDURE — 94640 AIRWAY INHALATION TREATMENT: CPT

## 2021-06-13 PROCEDURE — 2580000003 HC RX 258: Performed by: STUDENT IN AN ORGANIZED HEALTH CARE EDUCATION/TRAINING PROGRAM

## 2021-06-13 PROCEDURE — 6360000002 HC RX W HCPCS: Performed by: INTERNAL MEDICINE

## 2021-06-13 PROCEDURE — 85027 COMPLETE CBC AUTOMATED: CPT

## 2021-06-13 PROCEDURE — 99232 SBSQ HOSP IP/OBS MODERATE 35: CPT | Performed by: INTERNAL MEDICINE

## 2021-06-13 PROCEDURE — 80048 BASIC METABOLIC PNL TOTAL CA: CPT

## 2021-06-13 PROCEDURE — 71045 X-RAY EXAM CHEST 1 VIEW: CPT

## 2021-06-13 PROCEDURE — 94761 N-INVAS EAR/PLS OXIMETRY MLT: CPT

## 2021-06-13 PROCEDURE — 2500000003 HC RX 250 WO HCPCS: Performed by: STUDENT IN AN ORGANIZED HEALTH CARE EDUCATION/TRAINING PROGRAM

## 2021-06-13 PROCEDURE — 84443 ASSAY THYROID STIM HORMONE: CPT

## 2021-06-13 PROCEDURE — 36415 COLL VENOUS BLD VENIPUNCTURE: CPT

## 2021-06-13 RX ORDER — GUAIFENESIN/DEXTROMETHORPHAN 100-10MG/5
5 SYRUP ORAL EVERY 4 HOURS PRN
Status: DISCONTINUED | OUTPATIENT
Start: 2021-06-13 | End: 2021-06-15 | Stop reason: HOSPADM

## 2021-06-13 RX ORDER — AZITHROMYCIN 250 MG/1
500 TABLET, FILM COATED ORAL ONCE
Status: COMPLETED | OUTPATIENT
Start: 2021-06-14 | End: 2021-06-14

## 2021-06-13 RX ORDER — METHYLPREDNISOLONE SODIUM SUCCINATE 40 MG/ML
40 INJECTION, POWDER, LYOPHILIZED, FOR SOLUTION INTRAMUSCULAR; INTRAVENOUS EVERY 8 HOURS
Status: DISCONTINUED | OUTPATIENT
Start: 2021-06-13 | End: 2021-06-14

## 2021-06-13 RX ORDER — METOPROLOL TARTRATE 5 MG/5ML
5 INJECTION INTRAVENOUS EVERY 6 HOURS PRN
Status: DISCONTINUED | OUTPATIENT
Start: 2021-06-13 | End: 2021-06-15 | Stop reason: HOSPADM

## 2021-06-13 RX ORDER — METHYLPREDNISOLONE SODIUM SUCCINATE 40 MG/ML
40 INJECTION, POWDER, LYOPHILIZED, FOR SOLUTION INTRAMUSCULAR; INTRAVENOUS EVERY 6 HOURS
Status: DISCONTINUED | OUTPATIENT
Start: 2021-06-13 | End: 2021-06-13

## 2021-06-13 RX ORDER — AZITHROMYCIN 250 MG/1
250 TABLET, FILM COATED ORAL DAILY
Status: DISCONTINUED | OUTPATIENT
Start: 2021-06-15 | End: 2021-06-15 | Stop reason: HOSPADM

## 2021-06-13 RX ORDER — GUAIFENESIN 600 MG/1
600 TABLET, EXTENDED RELEASE ORAL 2 TIMES DAILY
Status: DISCONTINUED | OUTPATIENT
Start: 2021-06-13 | End: 2021-06-13

## 2021-06-13 RX ADMIN — METHYLPREDNISOLONE SODIUM SUCCINATE 60 MG: 125 INJECTION, POWDER, FOR SOLUTION INTRAMUSCULAR; INTRAVENOUS at 04:55

## 2021-06-13 RX ADMIN — METHYLPREDNISOLONE SODIUM SUCCINATE 40 MG: 40 INJECTION, POWDER, FOR SOLUTION INTRAMUSCULAR; INTRAVENOUS at 12:02

## 2021-06-13 RX ADMIN — ENOXAPARIN SODIUM 40 MG: 40 INJECTION SUBCUTANEOUS at 09:05

## 2021-06-13 RX ADMIN — LEVALBUTEROL 1.25 MG: 1.25 SOLUTION, CONCENTRATE RESPIRATORY (INHALATION) at 16:46

## 2021-06-13 RX ADMIN — LEVETIRACETAM 750 MG: 750 TABLET ORAL at 09:05

## 2021-06-13 RX ADMIN — SODIUM CHLORIDE, PRESERVATIVE FREE 10 ML: 5 INJECTION INTRAVENOUS at 09:13

## 2021-06-13 RX ADMIN — IPRATROPIUM BROMIDE 0.5 MG: 0.5 SOLUTION RESPIRATORY (INHALATION) at 20:33

## 2021-06-13 RX ADMIN — IPRATROPIUM BROMIDE 0.5 MG: 0.5 SOLUTION RESPIRATORY (INHALATION) at 11:03

## 2021-06-13 RX ADMIN — LEVALBUTEROL 1.25 MG: 1.25 SOLUTION, CONCENTRATE RESPIRATORY (INHALATION) at 20:33

## 2021-06-13 RX ADMIN — METHYLPREDNISOLONE SODIUM SUCCINATE 60 MG: 125 INJECTION, POWDER, FOR SOLUTION INTRAMUSCULAR; INTRAVENOUS at 00:43

## 2021-06-13 RX ADMIN — METOPROLOL TARTRATE 5 MG: 1 INJECTION, SOLUTION INTRAVENOUS at 09:05

## 2021-06-13 RX ADMIN — ALPRAZOLAM 0.5 MG: 0.5 TABLET ORAL at 17:28

## 2021-06-13 RX ADMIN — ESCITALOPRAM OXALATE 15 MG: 10 TABLET ORAL at 09:05

## 2021-06-13 RX ADMIN — LEVALBUTEROL 1.25 MG: 1.25 SOLUTION, CONCENTRATE RESPIRATORY (INHALATION) at 00:45

## 2021-06-13 RX ADMIN — IPRATROPIUM BROMIDE 0.5 MG: 0.5 SOLUTION RESPIRATORY (INHALATION) at 16:46

## 2021-06-13 RX ADMIN — GUAIFENESIN AND DEXTROMETHORPHAN 5 ML: 100; 10 SYRUP ORAL at 18:30

## 2021-06-13 RX ADMIN — METHYLPREDNISOLONE SODIUM SUCCINATE 40 MG: 40 INJECTION, POWDER, FOR SOLUTION INTRAMUSCULAR; INTRAVENOUS at 21:28

## 2021-06-13 RX ADMIN — CEFTRIAXONE SODIUM 1000 MG: 1 INJECTION, POWDER, FOR SOLUTION INTRAMUSCULAR; INTRAVENOUS at 12:03

## 2021-06-13 RX ADMIN — LEVALBUTEROL 1.25 MG: 1.25 SOLUTION, CONCENTRATE RESPIRATORY (INHALATION) at 05:02

## 2021-06-13 RX ADMIN — OXYCODONE HYDROCHLORIDE AND ACETAMINOPHEN 1 TABLET: 5; 325 TABLET ORAL at 04:56

## 2021-06-13 RX ADMIN — OXYCODONE HYDROCHLORIDE AND ACETAMINOPHEN 1 TABLET: 5; 325 TABLET ORAL at 10:59

## 2021-06-13 RX ADMIN — OXYCODONE HYDROCHLORIDE AND ACETAMINOPHEN 1 TABLET: 5; 325 TABLET ORAL at 18:30

## 2021-06-13 RX ADMIN — GUAIFENESIN 600 MG: 600 TABLET, EXTENDED RELEASE ORAL at 10:59

## 2021-06-13 RX ADMIN — AMITRIPTYLINE HYDROCHLORIDE 75 MG: 75 TABLET, FILM COATED ORAL at 22:06

## 2021-06-13 RX ADMIN — SODIUM CHLORIDE, PRESERVATIVE FREE 10 ML: 5 INJECTION INTRAVENOUS at 21:28

## 2021-06-13 RX ADMIN — LEVALBUTEROL 1.25 MG: 1.25 SOLUTION, CONCENTRATE RESPIRATORY (INHALATION) at 11:02

## 2021-06-13 RX ADMIN — ATORVASTATIN CALCIUM 40 MG: 40 TABLET, FILM COATED ORAL at 09:05

## 2021-06-13 ASSESSMENT — PAIN DESCRIPTION - LOCATION
LOCATION: HEAD

## 2021-06-13 ASSESSMENT — ENCOUNTER SYMPTOMS
NAUSEA: 0
ABDOMINAL PAIN: 0
WHEEZING: 1
VOMITING: 0
SHORTNESS OF BREATH: 0
COUGH: 1

## 2021-06-13 ASSESSMENT — PAIN SCALES - GENERAL
PAINLEVEL_OUTOF10: 7
PAINLEVEL_OUTOF10: 4
PAINLEVEL_OUTOF10: 0
PAINLEVEL_OUTOF10: 2
PAINLEVEL_OUTOF10: 0
PAINLEVEL_OUTOF10: 5

## 2021-06-13 ASSESSMENT — PAIN DESCRIPTION - PAIN TYPE
TYPE: ACUTE PAIN

## 2021-06-13 ASSESSMENT — PAIN DESCRIPTION - DESCRIPTORS
DESCRIPTORS: DULL;HEADACHE
DESCRIPTORS: DULL;HEADACHE;PRESSURE

## 2021-06-13 ASSESSMENT — PAIN DESCRIPTION - FREQUENCY
FREQUENCY: INTERMITTENT
FREQUENCY: INTERMITTENT

## 2021-06-13 NOTE — PROGRESS NOTES
[address] [facility].    PROGRESS NOTE             6/13/2021    8:35 AM    Name:   Sohan Turner  MRN:     [id number]     Acct:      [de-identified]   Room:   2012/2012-01   Day:  1  Admit Date:  6/12/2021  4:08 AM    PCP:  THOR Hernandez CNP  Code Status:  Full Code    Subjective:     C/C:   Chief Complaint   Patient presents with    Respiratory Distress     Interval History Status: . Patient was seen and examined at bedside. No acute events overnight. As per nursing staff, patient did not wear BIPAP overnight due to it being uncomfortable. She was on 2L NC and took off oxygen around midnight and went to sleep breathing room air. As per patient, she woke up this morning around 5:30 AM and shortly thereafter felt short of breath. She require BIPAP. Patient is currently lying comfortably in bed. She is breathing room air. She reports feeling better after receiving BIPAP this AM. She is complaining of a nonproductive cough. We will add mucinex this AM. We will add IV Rocephin empirically. Patient remains tachycardic. We will add on a TSH level to morning labs. Patient denies fever, chills, worsening SOB, abdominal pain. Nausea, vomiting, or lower extremity tenderness. Brief History:     Please see H&P    Review of Systems:     Review of Systems   Constitutional: Negative for chills, fatigue and fever. HENT: Negative for drooling. Respiratory: Positive for cough and wheezing. Negative for shortness of breath. Cardiovascular: Negative for chest pain and palpitations. Gastrointestinal: Negative for abdominal pain, nausea and vomiting. Endocrine: Negative for cold intolerance. Genitourinary: Negative for difficulty urinating. Musculoskeletal: Negative for arthralgias. Neurological: Negative for syncope, weakness, numbness and headaches. Psychiatric/Behavioral: Negative for agitation.          Medications: Allergies: Allergies   Allergen Reactions    Sulfa Antibiotics Anaphylaxis       Current Meds:   Scheduled Meds:    nicotine  1 patch Transdermal Daily    amitriptyline  75 mg Oral Nightly    atorvastatin  40 mg Oral Daily    escitalopram  15 mg Oral Daily    enoxaparin  40 mg Subcutaneous Daily    sodium chloride flush  5-40 mL Intravenous 2 times per day    methylPREDNISolone  60 mg Intravenous Q6H    levETIRAcetam  750 mg Oral Daily    ipratropium  0.5 mg Nebulization 4x daily    levalbuterol  1.25 mg Nebulization Q4H While awake     Continuous Infusions:    sodium chloride       PRN Meds: senna, albuterol, ondansetron **OR** ondansetron, polyethylene glycol, acetaminophen **OR** acetaminophen, sodium chloride flush, sodium chloride, potassium chloride **OR** potassium alternative oral replacement **OR** potassium chloride, ALPRAZolam, ibuprofen, sodium chloride nebulizer, levalbuterol, baclofen, sodium chloride nebulizer, oxyCODONE-acetaminophen, metoprolol    Data:     Past Medical History:   has a past medical history of Acute MI ([facility] [address] Anxiety, Chronic back pain, [facility] Depression, Heart disease, Hyperlipidemia, Hypertension, and MRSA (methicillin resistant staph aureus) culture positive. Social History:   reports that she has been smoking cigarettes. She has been smoking about 1.00 pack per day. She has never used smokeless tobacco. She reports current drug use. Drug: Marijuana. She reports that she does not drink alcohol.      Family History:   Family History   Problem Relation Age of Onset    Other Mother     Heart Disease Father     High Blood Pressure Father     High Cholesterol Father     Other Brother        Vitals:  BP (!) 151/67   Pulse 120   Temp 98.1 °F (36.7 °C) (Oral)   Resp 14   Ht 5' 7\" (1.702 m)   Wt 196 lb 3.4 oz (89 kg)   SpO2 97%   BMI 30.73 kg/m²   Temp (24hrs), Av.2 °F (36.8 °C), Min:98.1 °F (36.7 °C), Max:98.3 °F (36.8 °C)    No results for input(s): POCGLU in the last 72 hours. I/O(24Hr): Intake/Output Summary (Last 24 hours) at 6/13/2021 0835  Last data filed at 6/12/2021 1920  Gross per 24 hour   Intake 970 ml   Output    Net 970 ml       Labs:    [unfilled]    Lab Results   Component Value Date/Time    SPECIAL NOT REPORTED 06/12/2021 04:25 AM     Lab Results   Component Value Date/Time    CULTURE NO GROWTH 21 HOURS 06/12/2021 04:25 AM       [unfilled]    Radiology:    XR CHEST PORTABLE    Result Date: 6/12/2021  EXAMINATION: ONE XRAY VIEW OF THE CHEST 6/12/2021 5:11 am COMPARISON: Chest portable May 5, 2021 HISTORY: ORDERING SYSTEM PROVIDED HISTORY: sob TECHNOLOGIST PROVIDED HISTORY: sob Reason for Exam: Shortness of breath Acuity: Acute Type of Exam: Initial Additional signs and symptoms: Shortness of breath Relevant Medical/Surgical History: Shortness of breath FINDINGS: The heart is normal in size and configuration. The mediastinal contours are within normal limits. The lungs are well aerated. The pleural surfaces are normal and no evidence of a pleural effusion is seen. Bones and soft tissues are unremarkable. Unremarkable single portable AP view of the chest.     CT CHEST PULMONARY EMBOLISM W CONTRAST    Result Date: 6/12/2021  EXAMINATION: CTA OF THE CHEST 6/12/2021 4:55 am TECHNIQUE: CTA of the chest was performed after the administration of intravenous contrast.  Multiplanar reformatted images are provided for review. MIP images are provided for review. Dose modulation, iterative reconstruction, and/or weight based adjustment of the mA/kV was utilized to reduce the radiation dose to as low as reasonably achievable. COMPARISON: Chest portable June 12, 2021. CT chest pulmonary embolism with contrast February 16, 2021.  HISTORY: ORDERING SYSTEM PROVIDED HISTORY: sob, elevated d.dimer TECHNOLOGIST PROVIDED HISTORY: sob, elevated d.dimer Decision Support Exception - unselect if not a suspected or confirmed emergency medical condition->Emergency Medical Condition (MA) Reason for Exam: SOB, elevated d-dimer Acuity: Acute Type of Exam: Initial Additional signs and symptoms: Pt c/o difficulty breathing that began suddenly tonight. H/O COPD, asthma. FINDINGS: Pulmonary Arteries: Pulmonary arteries are adequately opacified for evaluation. No evidence of intraluminal filling defect to suggest pulmonary embolism. Main pulmonary artery is normal in caliber. Mediastinum: No evidence of mediastinal lymphadenopathy. The heart and pericardium demonstrate no acute abnormality. There is no acute abnormality of the thoracic aorta. Lungs/pleura: The lungs are without acute process. Right upper lung lobe ovoid noncalcified pulmonary nodule is identified which measures up to 6 mm in diameter. No focal consolidation or pulmonary edema. No evidence of pleural effusion or pneumothorax. Upper Abdomen: Limited images of the upper abdomen are unremarkable. Soft Tissues/Bones: No acute bone or soft tissue abnormality. No evidence of pulmonary embolism or acute pulmonary abnormality. Chronically stable right upper lung lobe ovoid 6 mm diameter noncalcified pulmonary nodule. Finding considered benign. RECOMMENDATIONS:         Physical Examination:        Physical Exam  Constitutional:       General: She is not in acute distress. Appearance: Normal appearance. She is not ill-appearing. HENT:      Head: Normocephalic and atraumatic. Eyes:      Extraocular Movements: Extraocular movements intact. Pupils: Pupils are equal, round, and reactive to light. Cardiovascular:      Rate and Rhythm: Regular rhythm. Tachycardia present. Pulses: Normal pulses. Heart sounds: No murmur heard. No gallop. Pulmonary:      Effort: Pulmonary effort is normal. No respiratory distress. Breath sounds: Wheezing present. No rhonchi or rales.    Abdominal:      General: Bowel sounds are normal. There is no distension. Palpations: Abdomen is soft. Tenderness: There is no abdominal tenderness. There is no guarding or rebound. Musculoskeletal:         General: No swelling. Skin:     General: Skin is warm. Capillary Refill: Capillary refill takes less than 2 seconds. Neurological:      General: No focal deficit present. Mental Status: She is alert and oriented to person, place, and time.    Psychiatric:         Mood and Affect: Mood normal.           Assessment:        Primary Problem  Acute respiratory failure [facility])    Active Hospital Problems    Diagnosis Date Noted    [facility] [J44.1] 06/12/2021    Acute respiratory failure (HCC) [J96.00] 06/12/2021    Hypertension [I10] 12/21/2020    Moderate episode of recurrent major depressive disorder ([facility] [F33.1] 10/02/2018    Smoking [F17.200] 10/22/2013    CAD, multiple vessel [I25.10] 03/15/2013       Plan:      Acute respiratory failure 2/2 acute on chronic COPD exacerbation  -On admission: Afebrile; tachypneic and tachycardic on BiPAP FiO2 40%  -EKG: Sinus tachycardia  -CXR: Unremarkable  -D-dimer: 0.67  -CT PE: No acute PE, chronically stable RUL 6 mm diameter noncalcified pulmonary nodule  -Blood culture 1 and 2 NGTD  -Procalcitonin 0.05  -Continue IV Solu-Medrol 60 mg every 6 hours  -Duonebs, BiPAP  -Xanax 0.5 mg TID PRN  -Will start IV rocephin this AM  -Added Mucinex 600 mg BID  -Pulmonology consulted:   -BiPAP as needed   -DuoNeb treatments every 4 hours   -Continue IV Solu-Medrol 60 mg every 6 hours     Hx seizure disorder, migraines  -Oral Keppra 750 mg BID  -Elavil 75 mg nightly     Hypertension, tachycardia  -Lopressor 5 mg PRN  -Ordered TSH level     Depression  -Lexapro 15 mg daily     Smoking cessation  -Nicotine patch     DVT PPx: Lovenox 40 mg subcu daily  GI PPx: Not indicated      Shirley Shannon MD  6/13/2021  8:35 AM     Attending Physician Statement  I have discussed the care of Vickey Braga with the resident team. I have examined the patient myself and taken ros and hpi , including pertinent history and exam findings,  with the resident. I have reviewed the key elements of all parts of the encounter with the resident. I agree with the assessment, plan and orders as documented by the resident. Principal Problem:    Acute respiratory failure (HCC)  Active Problems:    CAD, multiple vessel    Smoking    Moderate episode of recurrent major depressive disorder ([facility]    Hypertension    COPD exacerbation ([facility]  Resolved Problems:    * No resolved hospital problems. *    Patient overall improving. Able to stay on room air at rest however desaturated on mobilization requiring BiPAP. IV steroid dose was increased today. Pulmonology following. Continues to be tachycardic, TSH normal, suspect anxiety playing a role. Beta agonist has been changed to Xopenex. Patient improving but not at goal, continue current treatment.       Electronically signed by Jesse Louis MD

## 2021-06-13 NOTE — PROGRESS NOTES
Physical Therapy        Physical Therapy Cancel Note      DATE: 2021    NAME: Shaneka Cabrera  MRN: [id number]   : 1965      Patient not seen this date for Physical Therapy due to:    Pt up independently in room- denies need for PT      Electronically signed by Jessica Ellis PT on 2021 at 1:29 PM

## 2021-06-13 NOTE — FLOWSHEET NOTE
SC visit with patient and her sig other; Patient asked for prayer as soon as writer entered room; patient talked about wanting to stop smoking and about her addiction to nicotine; patient provided medical update and stated her current medical issues \"scared her\"; listening presence and support;     06/13/21 1303   Encounter Summary   Services provided to: Patient and family together   Referral/Consult From: [address] [place] [address] Significant other   Continue Visiting   (6/9/21)   Complexity of Encounter Moderate   Length of Encounter 15 minutes   Spiritual Assessment Completed Yes   Grief and Life Adjustment   Type Adjustment to illness   Assessment Approachable; Anxious; Fearful; Hopeful;Coping;Helplessness   Intervention Active listening;Explored feelings, thoughts, concerns;Prayer;Sustaining presence/ Ministry of presence; Discussed illness/injury and it's impact   Outcome Comfort;Expressed gratitude;Engaged in conversation;Expressed feelings/needs/concerns;Coping; Hopeful;Receptive

## 2021-06-13 NOTE — PROGRESS NOTES
Pulmonary Progress Note  Pulmonary [facility] Specialists      Patient - Travis Gonzalez,  Age - 54 y.o.    - 1965      Room Number -    MRN -  [id number]   [facility]  Date of Admission -  2021  4:08 AM    Jerome Downey MD  Primary Care Physician - Pj Shah, APRN - CNP     SUBJECTIVE   Patient looks much better. She claims she feels a lot better. Less dyspneic. OBJECTIVE   VITALS    height is 5' 7\" (1.702 m) and weight is 196 lb 3.4 oz (89 kg). Her axillary temperature is 97.9 °F (36.6 °C). Her blood pressure is 127/71 and her pulse is 114. Her respiration is 15 and oxygen saturation is 93%. Body mass index is 30.73 kg/m². Temperature Range: Temp: 97.9 °F (36.6 °C) Temp  Av.1 °F (36.7 °C)  Min: 97.9 °F (36.6 °C)  Max: 98.3 °F (36.8 °C)  BP Range:  Systolic (68GOF), VVL:318 , Min:124 , DZR:242     Diastolic (68RYU), YDU:41, Min:57, Max:146    Pulse Range: Pulse  Av.8  Min: 103  Max: 127  Respiration Range: Resp  Av.2  Min: 14  Max: 26  Current Pulse Ox[de-identified]  SpO2: 93 %  24HR Pulse Ox Range:  SpO2  Av.4 %  Min: 90 %  Max: 100 %  Oxygen Amount and Delivery: O2 Flow Rate (L/min): 2 L/min    Wt Readings from Last 3 Encounters:   21 196 lb 3.4 oz (89 kg)   21 155 lb (70.3 kg)   21 155 lb (70.3 kg)       I/O (24 Hours)    Intake/Output Summary (Last 24 hours) at 2021 1059  Last data filed at 2021 1920  Gross per 24 hour   Intake 970 ml   Output    Net 970 ml       EXAM     General Appearance  Awake, alert, oriented, in no acute distress  HEENT - normocephalic, atraumatic. Neck - Supple,  trachea midline   Lungs -I do hear some wheezing I do hear some faint rhonchi. No crackles. Better airway movement from yesterday  Heart Exam:PMI normal. No lifts, heaves, or thrills. RRR. No murmurs, clicks, gallops, or rubs  Abdomen Exam: Abdomen soft, non-tender.    Extremity Exam: No gross Component Value Date    APTT 29.1 06/12/2021         RADIOLOGY     (See actual reports for details)    ASSESSMENT/PLAN     Patient Active Problem List   Diagnosis    Heart disease    Chronic back pain    Depression    Hyperlipidemia    CAD, multiple vessel    Asthma    Smoking    Need for vaccination    Syncope    Leg pain    Left hip pain    Chronic cholecystitis    Chest pain    Calculus of gallbladder without cholecystitis without obstruction    History of lumbar fusion    Failed back syndrome    Marijuana use    Abnormal weight loss    Allergic rhinitis    Anxiety state    Chronic midline low back pain with sciatica    Moderate episode of recurrent major depressive disorder (HCC)    Seizure (HCC)    Chronic tension-type headache, intractable    Pneumonia due to organism    Hypertension    Sinus tachycardia    COPD exacerbation (HCC)    Acute respiratory failure (HCC)     IMPRESSION:  1. Clinical diagnosis of COPD. 2.  Severe dyspnea. 3.  Severe bronchospasm. 4.  I do not see any signs of any community-acquired pneumonia. 5.  Significant tobacco abuse. 6.  Anxiety. Patient had issues with tachycardia earlier. I switched duo nebs to Atrovent and Xopenex. On DVT prophylaxis. On NicoDerm patch  Decrease Solu-Medrol 40 every 8. Patient has a groundglass process in the right lower lobe. I did show the patient and significant other the images on CT scan. She voiced understanding that she did not have a pulmonary embolism. I did recommend a repeat noncontrast CT scan in 8 weeks time. Patient has advised to not smoke again.   Pastoral care was visiting as well    Transfer to [facility]      Electronically signed by Isrrael Orellana MD on 6/13/2021 at 10:59 AM

## 2021-06-13 NOTE — PROGRESS NOTES
Pt transferred to 2096 via w/c with all documented belongings. [employer], PCU RN, given report and present at bedside.

## 2021-06-13 NOTE — FLOWSHEET NOTE
06/12/21 2204   Provider Notification   Reason for Communication Evaluate   Provider Name Dr. Jean Royal. Provider Notification Physician   Method of Communication Secure Message   Response See orders   Dr. Jean Royal paged regarding patient's heart rate still maintaining 125-130's. Updated on EKG results from earlier, CT PE results, DVT prophylaxis and current oxygen requirements. MD also updated that when RN spoke with Dr. Danika Jensen earlier that she discontinued the Duoneb treatments and added Xopenex and Atrovent. Orders received to give lopressor 2.5 mg IV q6h PRN and change Xopenex treatments to every four hours while awake and PRN. See orders.

## 2021-06-13 NOTE — FLOWSHEET NOTE
06/12/21 2031   Provider Notification   Reason for Communication Evaluate;New orders   Provider Name Dr. Emily Vincent. Provider Notification Physician   Method of Communication Call   Response See orders   Dr. Emily Vincent paged to review home medications. Updated that patient takes baclofen 10 mg nightly for chronic back pain. Additionally, MD updated that patient is requesting something else for headache as the Tylenol and Motrin are not providing any relief. Lastly, MD updated that patient's heart rate is still maintaining 125-130's and updated on EKG results from earlier. MD stated she believes that patient is tachycardic due to the inhaler she is on. See orders.

## 2021-06-13 NOTE — CARE COORDINATION
ONGOING DISCHARGE PLAN:      Pt transferred from ICU today. Patient is alert and oriented x4. Spoke with patient regarding discharge plan and patient confirms that plan is still to DC to home w/ BF. Pt. Still would like services w/ [facility]. Per Imtiaz Rush, they are accepting & following. Pt. Remains on Iv steroids, 40MG, Q6, IV Rocephin. BIPAP at the bedside. Currently sating 92-93% on RA. Pulmonary following. Follow for Home Oxygen, if needed. Therapy on board. Will continue to follow for additional discharge needs.     Electronically signed by Diana Adams RN on 6/13/2021 at 3:29 PM

## 2021-06-14 LAB
EKG ATRIAL RATE: 119 BPM
EKG P AXIS: 38 DEGREES
EKG P-R INTERVAL: 140 MS
EKG Q-T INTERVAL: 332 MS
EKG QRS DURATION: 84 MS
EKG QTC CALCULATION (BAZETT): 467 MS
EKG R AXIS: 37 DEGREES
EKG T AXIS: 42 DEGREES
EKG VENTRICULAR RATE: 119 BPM
THYROXINE, FREE: 0.96 NG/DL (ref 0.93–1.7)

## 2021-06-14 PROCEDURE — 6370000000 HC RX 637 (ALT 250 FOR IP): Performed by: STUDENT IN AN ORGANIZED HEALTH CARE EDUCATION/TRAINING PROGRAM

## 2021-06-14 PROCEDURE — 6360000002 HC RX W HCPCS: Performed by: INTERNAL MEDICINE

## 2021-06-14 PROCEDURE — 2580000003 HC RX 258: Performed by: STUDENT IN AN ORGANIZED HEALTH CARE EDUCATION/TRAINING PROGRAM

## 2021-06-14 PROCEDURE — 6370000000 HC RX 637 (ALT 250 FOR IP): Performed by: INTERNAL MEDICINE

## 2021-06-14 PROCEDURE — 1200000000 HC SEMI PRIVATE

## 2021-06-14 PROCEDURE — 94640 AIRWAY INHALATION TREATMENT: CPT

## 2021-06-14 PROCEDURE — 36415 COLL VENOUS BLD VENIPUNCTURE: CPT

## 2021-06-14 PROCEDURE — 6360000002 HC RX W HCPCS: Performed by: STUDENT IN AN ORGANIZED HEALTH CARE EDUCATION/TRAINING PROGRAM

## 2021-06-14 PROCEDURE — 97165 OT EVAL LOW COMPLEX 30 MIN: CPT

## 2021-06-14 PROCEDURE — 99232 SBSQ HOSP IP/OBS MODERATE 35: CPT | Performed by: INTERNAL MEDICINE

## 2021-06-14 PROCEDURE — 94761 N-INVAS EAR/PLS OXIMETRY MLT: CPT

## 2021-06-14 PROCEDURE — 84439 ASSAY OF FREE THYROXINE: CPT

## 2021-06-14 PROCEDURE — 94660 CPAP INITIATION&MGMT: CPT

## 2021-06-14 RX ORDER — METHYLPREDNISOLONE SODIUM SUCCINATE 40 MG/ML
40 INJECTION, POWDER, LYOPHILIZED, FOR SOLUTION INTRAMUSCULAR; INTRAVENOUS EVERY 12 HOURS
Status: DISCONTINUED | OUTPATIENT
Start: 2021-06-14 | End: 2021-06-15 | Stop reason: HOSPADM

## 2021-06-14 RX ADMIN — SODIUM CHLORIDE, PRESERVATIVE FREE 10 ML: 5 INJECTION INTRAVENOUS at 10:15

## 2021-06-14 RX ADMIN — ALPRAZOLAM 0.5 MG: 0.5 TABLET ORAL at 10:24

## 2021-06-14 RX ADMIN — GUAIFENESIN AND DEXTROMETHORPHAN 5 ML: 100; 10 SYRUP ORAL at 10:14

## 2021-06-14 RX ADMIN — AZITHROMYCIN MONOHYDRATE 500 MG: 250 TABLET ORAL at 05:33

## 2021-06-14 RX ADMIN — LEVETIRACETAM 750 MG: 750 TABLET ORAL at 10:14

## 2021-06-14 RX ADMIN — IPRATROPIUM BROMIDE 0.5 MG: 0.5 SOLUTION RESPIRATORY (INHALATION) at 10:35

## 2021-06-14 RX ADMIN — ATORVASTATIN CALCIUM 40 MG: 40 TABLET, FILM COATED ORAL at 10:14

## 2021-06-14 RX ADMIN — AMITRIPTYLINE HYDROCHLORIDE 75 MG: 75 TABLET, FILM COATED ORAL at 21:41

## 2021-06-14 RX ADMIN — BACLOFEN 10 MG: 10 TABLET ORAL at 21:41

## 2021-06-14 RX ADMIN — LEVALBUTEROL 1.25 MG: 1.25 SOLUTION, CONCENTRATE RESPIRATORY (INHALATION) at 21:01

## 2021-06-14 RX ADMIN — SODIUM CHLORIDE, PRESERVATIVE FREE 10 ML: 5 INJECTION INTRAVENOUS at 21:41

## 2021-06-14 RX ADMIN — METHYLPREDNISOLONE SODIUM SUCCINATE 40 MG: 40 INJECTION, POWDER, FOR SOLUTION INTRAMUSCULAR; INTRAVENOUS at 17:42

## 2021-06-14 RX ADMIN — IPRATROPIUM BROMIDE 0.5 MG: 0.5 SOLUTION RESPIRATORY (INHALATION) at 06:03

## 2021-06-14 RX ADMIN — ENOXAPARIN SODIUM 40 MG: 40 INJECTION SUBCUTANEOUS at 10:14

## 2021-06-14 RX ADMIN — OXYCODONE HYDROCHLORIDE AND ACETAMINOPHEN 1 TABLET: 5; 325 TABLET ORAL at 05:33

## 2021-06-14 RX ADMIN — LEVALBUTEROL 1.25 MG: 1.25 SOLUTION, CONCENTRATE RESPIRATORY (INHALATION) at 06:03

## 2021-06-14 RX ADMIN — LEVALBUTEROL 1.25 MG: 1.25 SOLUTION, CONCENTRATE RESPIRATORY (INHALATION) at 10:35

## 2021-06-14 RX ADMIN — IPRATROPIUM BROMIDE 0.5 MG: 0.5 SOLUTION RESPIRATORY (INHALATION) at 15:37

## 2021-06-14 RX ADMIN — OXYCODONE HYDROCHLORIDE AND ACETAMINOPHEN 1 TABLET: 5; 325 TABLET ORAL at 16:05

## 2021-06-14 RX ADMIN — IPRATROPIUM BROMIDE 0.5 MG: 0.5 SOLUTION RESPIRATORY (INHALATION) at 21:01

## 2021-06-14 RX ADMIN — METHYLPREDNISOLONE SODIUM SUCCINATE 40 MG: 40 INJECTION, POWDER, FOR SOLUTION INTRAMUSCULAR; INTRAVENOUS at 04:17

## 2021-06-14 RX ADMIN — LEVALBUTEROL 1.25 MG: 1.25 SOLUTION, CONCENTRATE RESPIRATORY (INHALATION) at 15:36

## 2021-06-14 RX ADMIN — ESCITALOPRAM OXALATE 15 MG: 10 TABLET ORAL at 10:14

## 2021-06-14 ASSESSMENT — PAIN SCALES - GENERAL
PAINLEVEL_OUTOF10: 3
PAINLEVEL_OUTOF10: 5
PAINLEVEL_OUTOF10: 4
PAINLEVEL_OUTOF10: 7

## 2021-06-14 NOTE — PLAN OF CARE
Problem: Pain:  Goal: Control of acute pain  Description: Control of acute pain  Outcome: Ongoing  Pain controlled with currently ordered medications. Problem: Falls - Risk of:  Goal: Will remain free from falls  Description: Will remain free from falls  Outcome: Ongoing   No falls this shift. Patient alert and oriented. Call light within reach.

## 2021-06-14 NOTE — PROGRESS NOTES
[address] [facility]    Occupational Therapy Evaluation  Date: 21  Patient Name: Jeovanny Marino       Room: 5325/5663-44  MRN: [id number]  Account: [de-identified]   : 1965  (54 y.o.) Gender: female     Discharge Recommendations: The patient's needs are being met with no further Occupational Therapy recommended at discharge. Referring Practitioner: Dr. Madison Leonardo  Diagnosis: Acute Respiratory Failure  Additional Pertinent Hx: COPD, depression, HTN,       Past Medical History:  has a past medical history of Acute MI ([facility] Anxiety, Chronic back pain, COPD (chronic obstructive pulmonary disease) ([facility] Depression, Heart disease, Hyperlipidemia, Hypertension, and MRSA (methicillin resistant staph aureus) culture positive. Past Surgical History:   has a past surgical history that includes Tubal ligation; Tonsillectomy; Cholecystectomy, laparoscopic (11/10/2017); Cholecystectomy, laparoscopic (N/A, 11/10/2017); and back surgery. Restrictions        Vitals  Temp: 98.2 °F (36.8 °C)  Pulse: 114  Resp: 16  BP: (!) 137/93  Height: 5' 7\" (170.2 cm)  Weight: 177 lb 14.6 oz (80.7 kg)  BMI (Calculated): 27.9  Oxygen Therapy  SpO2: 96 %  Pulse Oximeter Device Mode: Intermittent  Pulse Oximeter Device Location: Finger  O2 Device: None (Room air)  Skin Assessment: Clean, dry, & intact  FiO2 : 30 %  O2 Flow Rate (L/min): 2 L/min  Blood Gas  Performed?: No  Alberto's Test #1: Collateral flow confirmed  Site #1: Left Radial  Site Prepped #1: Yes  Number of Attempts #1: 1  Pressure Held #1: Yes  Complications #1: None  Post-procedure #1: Standard  Specimen Status #1: Point of care  How Tolerated?: Tolerated well  Level of Consciousness: Alert (0)    Subjective  Subjective: Patient stated  she was feeling much better.         Social/Functional History  ADL Assistance: Independent  Homemaking Assistance:  (Patient stated her boyfriend does all cooking, cleaning, shopping.)       Objective              ADL  LE Dressing:  (Patient completed lower body dressing EOB at independent level to don/doff socks.)    UE Function   No difficulty using hands during care tasks                                                      Fine Motor Skills    Able to operate room / Bed controls, manipulate clothing and fasteners                           Mobility                Functional Mobility  Functional - Mobility Device: No device  Activity: Other (from head of bed to foot of bed.)  Assist Level: Independent        Transfers  Sit to stand: Independent  Stand to sit: Independent                 Goals   D/V understanding of Home Program for safety and energy conservation    Plan   No additional occupational therapy needed at this time. OT Education  OT Education: OT Role, Energy Conservation  Patient Education: Patient education provided addressing EC techiques including pursed breathing and pacing activity while in the hospital, and upon discharge. Safe body mechanics during activity also addressed including no twisting, and placing needed items within reach to protect her lower back. Patient verbalized understanding. OT Individual Minutes  Time In: 0845  Time Out: 0900  Minutes: 15    Electronically signed by Brigido Fields on 6/14/21 at 10:26 AM EDT    This patient  Jeovanny Marino  was seen by the identified Occupational Therapy Student  including any evaluation, treatment and documentation activity.   The documentation completed by Occupational Therapy Student  was reviewed, amended,  and accepted by the Supervising Clinical Instructor  Miriam Foster OT.

## 2021-06-14 NOTE — CONSULTS
[facility] Internal Medicine    Progress Note     6/14/2021    12:26 PM    Name:   Kiran Hendrix  MRN:     [id number]     Acct:      [de-identified]   Room:   2087/2087-01   Day:  2  Admit Date:  6/12/2021  4:08 AM    PCP:   THOR Pham CNP  Code Status:  Full Code    Subjective:     C/C:   Chief Complaint   Patient presents with    Respiratory Distress     Principal Problem:    Acute respiratory failure ([facility]  Active Problems:    CAD, multiple vessel    Asthma    Smoking    Moderate episode of recurrent major depressive disorder ([facility] [address]    Hypertension    COPD exacerbation ([facility] [address]  Resolved Problems:    * No resolved hospital problems. *      Interval History Status: improved. Coughing spell in am .  Dry cough  Did not sleep well   Feels jittery   Chest xray atelectasis left base  Afebrile   resp rate ok         On admission; The patient is a 54 y.o. Non-/non  female with a PMH significant for COPD on home O2, CAD, s/p MI 2013, Hx seizure disorder, hypertension, and depression who presents withRespiratory Distress   and she is admitted to the hospital for the management of acute on chronic COPD exacerbation. As per patient, patient began experiencing progressive shortness of breath about 2 days ago. Patient states she used her home nebulizer with minimal relief of symptoms. Patient states she is able to walk up a flight of steps before feeling dyspneic. Patient denies any fever or chills. Patient denies any sputum production. Reportedly, EMS found patient respiratory duress. Patient was tachypneic and speaking 12 word sentences. On the way to the hospital, patient was given breathing treatments, s/p magnesium 2G x1, and IV Solu-Medrol 125 mg x1. In the ED patient was started on BiPAP.   Patient denies fever, chills, chest pain, abdominal pain, nausea, vomiting, dysuria, diarrhea, or lower extremity tenderness          Transfer from [facility] service note ;  Patient has past medical history of COPD on home O2, CAD, status post MI 2013 history of seizure disorder, hypertension and depression who presented to the ED with respiratory distress. Ivory Cadet She was admitted for acute on chronic COPD exacerbation. Patient presented to the ED on BiPAP status post magnesium 2 g and IV Solu-Medrol. PE work-up negative. Patient was continued on Solu-Medrol 60 mg, duo nebs and BiPAP. Pulmonology was consulted. Decrease Solu-Medrol 40 mg every 8 hours. CT scan of the chest was significant for groundglass process right lower lobe. And they recommended a repeat noncontrast CT in 8 weeks. Patient [facility].        Assessment and plan     Acute respiratory failure 2/2 acute on chronic COPD exacerbation  -On admission: Afebrile; tachypneic and tachycardic on BiPAP FiO2 40%  -EKG: Sinus tachycardia  -CXR: Unremarkable  -D-dimer: 0.67  -CT PE: No acute PE, chronically stable RUL 6 mm diameter noncalcified pulmonary nodule  -Blood culture 1 and 2 NGTD  -Procalcitonin 0.05  -Continue IV Solu-Medrol 60 mg every 6 hours  -Duonebs, BiPAP  -Xanax 0.5 mg TID PRN  -Will start IV rocephin this AM  -Added Mucinex 600 mg BID  -Pulmonology consulted:              -BSLFD as needed              -DuoNeb treatments every 4 hours              -Continue IV Solu-Medrol 40 mg every 8 hours                     Significant last 24 hr data reviewed ;   Vitals:    06/14/21 0533 06/14/21 0604 06/14/21 0800 06/14/21 1035   BP:   (!) 137/93    Pulse:   114    Resp:   16 18   Temp:   98.2 °F (36.8 °C)    TempSrc:   Oral    SpO2:  95% 96% 96%   Weight: 177 lb 14.6 oz (80.7 kg)      Height:          Recent Results (from the past 24 hour(s))   T4, Free    Collection Time: 06/14/21  5:25 AM   Result Value Ref Range    Thyroxine, Free 0.96 0.93 - 1.70 ng/dL     No results for input(s): POCGLU in the last 72 hours.      XR CHEST PORTABLE    Result Date: 6/13/2021  EXAMINATION: ONE XRAY

## 2021-06-14 NOTE — PROGRESS NOTES
Pulmonary Progress Note  [facility]      Patient - Aretha Murrieta,  Age - 54 y.o.    - 1965      Room Number - 2087/2087-01   N -  [id number]   [facility]  Date of Admission -  2021  4:08 AM        Consulting Jeanette Pimentel MD  Primary Care Physician - Nando العلي, APRN - CNP     SUBJECTIVE   She continues to improve    OBJECTIVE   VITALS    height is 5' 7\" (1.702 m) and weight is 177 lb 14.6 oz (80.7 kg). Her oral temperature is 97.9 °F (36.6 °C). Her blood pressure is 115/74 and her pulse is 114. Her respiration is 17 and oxygen saturation is 96%. Body mass index is 27.86 kg/m². Temperature Range: Temp: 97.9 °F (36.6 °C) Temp  Av.1 °F (36.7 °C)  Min: 97.9 °F (36.6 °C)  Max: 98.2 °F (36.8 °C)  BP Range:  Systolic (36YSL), GVJ:822 , Min:115 , QXW:533     Diastolic (66XDB), CMA:12, Min:74, Max:93    Pulse Range: Pulse  Av.3  Min: 109  Max: 114  Respiration Range: Resp  Av.2  Min: 14  Max: 18  Current Pulse Ox[de-identified]  SpO2: 96 %  24HR Pulse Ox Range:  SpO2  Av.3 %  Min: 94 %  Max: 96 %  Oxygen Amount and Delivery: O2 Flow Rate (L/min): 2 L/min    Wt Readings from Last 3 Encounters:   21 177 lb 14.6 oz (80.7 kg)   21 155 lb (70.3 kg)   21 155 lb (70.3 kg)       I/O (24 Hours)  No intake or output data in the 24 hours ending 21 1400    EXAM     General Appearance  Awake, alert, oriented, in no acute distress  HEENT - normocephalic, atraumatic.  []  Mallampati  [] Crowded airway   [] Macroglossia  []  Retrognathia  [] Micrognathia  []  Normal tongue size []  Normal Bite  [] Mode sign positive    Neck - Supple,  trachea midline   Lungs -no wheezes  Cardiovascular - Heart sounds are normal.  Regular rate and rhythm   Abdomen - Soft, nontender, nondistended, no masses or organomegaly  Neurologic - There are no focal motor or sensory deficits  Skin - No bruising or bleeding  Extremities - No clubbing, cyanosis, edema    MEDS      methylPREDNISolone  40 mg Intravenous Q8H    [START ON 6/15/2021] azithromycin  250 mg Oral Daily    nicotine  1 patch Transdermal Daily    amitriptyline  75 mg Oral Nightly    atorvastatin  40 mg Oral Daily    escitalopram  15 mg Oral Daily    enoxaparin  40 mg Subcutaneous Daily    sodium chloride flush  5-40 mL Intravenous 2 times per day    levETIRAcetam  750 mg Oral Daily    ipratropium  0.5 mg Nebulization 4x daily    levalbuterol  1.25 mg Nebulization Q4H While awake      sodium chloride       metoprolol, guaiFENesin-dextromethorphan, senna, albuterol, ondansetron **OR** ondansetron, polyethylene glycol, acetaminophen **OR** acetaminophen, sodium chloride flush, sodium chloride, potassium chloride **OR** potassium alternative oral replacement **OR** potassium chloride, ALPRAZolam, ibuprofen, levalbuterol, baclofen, oxyCODONE-acetaminophen    LABS   CBC   Recent Labs     06/13/21  0547   WBC 17.6*   HGB 13.2   HCT 40.4   MCV 95.8        BMP:   Lab Results   Component Value Date     06/13/2021    K 4.5 06/13/2021     06/13/2021    CO2 24 06/13/2021    BUN 14 06/13/2021    LABALBU 4.2 07/12/2020    CREATININE 0.46 06/13/2021    CALCIUM 9.5 06/13/2021    GFRAA >60 06/13/2021    LABGLOM >60 06/13/2021     ABGs:  Lab Results   Component Value Date    PHART 7.405 06/12/2021    PO2ART 96.8 06/12/2021    LTO2OKO 44.2 06/12/2021      Lab Results   Component Value Date    MODE I/E 12/6 06/12/2021     Ionized Calcium:  No results found for: IONCA  Magnesium:    Lab Results   Component Value Date    MG 2.2 09/30/2016     Phosphorus:    Lab Results   Component Value Date    PHOS 3.5 02/13/2013        LIVER PROFILE No results for input(s): AST, ALT, LIPASE, BILIDIR, BILITOT, ALKPHOS in the last 72 hours. Invalid input(s):   AMYLASE,  ALB  INR   Recent Labs     06/12/21  0400   INR 0.9     PTT   Lab Results

## 2021-06-14 NOTE — PROGRESS NOTES
Transfer of care note. Patient has past medical history of COPD on home O2, CAD, status post MI 2013 history of seizure disorder, hypertension and depression who presented to the ED with respiratory distress. Eusebio Wood She was admitted for acute on chronic COPD exacerbation. Patient presented to the ED on BiPAP status post magnesium 2 g and IV Solu-Medrol. PE work-up negative. Patient was continued on Solu-Medrol 60 mg, duo nebs and BiPAP. Pulmonology was consulted. Decrease Solu-Medrol 40 mg every 8 hours. CT scan of the chest was significant for groundglass process right lower lobe. And they recommended a repeat noncontrast CT in 8 weeks. Patient was transferred to [facility].       Assessment and plan    Acute respiratory failure 2/2 acute on chronic COPD exacerbation  -On admission: Afebrile; tachypneic and tachycardic on BiPAP FiO2 40%  -EKG: Sinus tachycardia  -CXR: Unremarkable  -D-dimer: 0.67  -CT PE: No acute PE, chronically stable RUL 6 mm diameter noncalcified pulmonary nodule  -Blood culture 1 and 2 NGTD  -Procalcitonin 0.05  -Continue IV Solu-Medrol 60 mg every 6 hours  -Duonebs, BiPAP  -Xanax 0.5 mg TID PRN  -Will start IV rocephin this AM  -Added Mucinex 600 mg BID  -Pulmonology consulted:              -BiPAP as needed              -DuoNeb treatments every 4 hours              -Continue IV Solu-Medrol 40 mg every 8 hours       Resident team signing off

## 2021-06-14 NOTE — CARE COORDINATION
ONGOING DISCHARGE PLAN:    Patient is alert and oriented x4. Spoke with patient regarding discharge plan and patient confirms that plan is still to go home with [facility]. Probable home tomorrow. Remains on IV Zithromax, Iv solu medrol    Follow for home oxygen     Will continue to follow for additional discharge needs.     Electronically signed by Gloria Joy RN on 6/14/2021 at 1:50 PM

## 2021-06-14 NOTE — PLAN OF CARE
Problem: Pain:  Goal: Pain level will decrease  Description: Pain level will decrease  Outcome: Ongoing  Note: Pt has not complained of any pain for this RN. Will monitor      Problem: Gas Exchange - Impaired:  Goal: Levels of oxygenation will improve  Description: Levels of oxygenation will improve  Outcome: Ongoing  Note: Pts O2 remains in the upper 90's on room air      Problem: Falls - Risk of:  Goal: Will remain free from falls  Description: Will remain free from falls  Outcome: Ongoing  Note: Pt up per self, gait steady. Remains free from falls.  Will monitor

## 2021-06-15 VITALS
WEIGHT: 177.25 LBS | HEIGHT: 67 IN | RESPIRATION RATE: 20 BRPM | SYSTOLIC BLOOD PRESSURE: 148 MMHG | DIASTOLIC BLOOD PRESSURE: 104 MMHG | TEMPERATURE: 97.7 F | OXYGEN SATURATION: 97 % | HEART RATE: 111 BPM | BODY MASS INDEX: 27.82 KG/M2

## 2021-06-15 PROCEDURE — 6360000002 HC RX W HCPCS: Performed by: INTERNAL MEDICINE

## 2021-06-15 PROCEDURE — 6370000000 HC RX 637 (ALT 250 FOR IP): Performed by: STUDENT IN AN ORGANIZED HEALTH CARE EDUCATION/TRAINING PROGRAM

## 2021-06-15 PROCEDURE — 99239 HOSP IP/OBS DSCHRG MGMT >30: CPT | Performed by: INTERNAL MEDICINE

## 2021-06-15 PROCEDURE — 94640 AIRWAY INHALATION TREATMENT: CPT

## 2021-06-15 PROCEDURE — 94761 N-INVAS EAR/PLS OXIMETRY MLT: CPT

## 2021-06-15 PROCEDURE — 6370000000 HC RX 637 (ALT 250 FOR IP): Performed by: INTERNAL MEDICINE

## 2021-06-15 PROCEDURE — 6360000002 HC RX W HCPCS: Performed by: STUDENT IN AN ORGANIZED HEALTH CARE EDUCATION/TRAINING PROGRAM

## 2021-06-15 RX ORDER — AZITHROMYCIN 250 MG/1
250 TABLET, FILM COATED ORAL DAILY
Qty: 10 TABLET | Refills: 0 | Status: SHIPPED | OUTPATIENT
Start: 2021-06-16 | End: 2021-06-15

## 2021-06-15 RX ORDER — ALBUTEROL SULFATE 90 UG/1
2 AEROSOL, METERED RESPIRATORY (INHALATION) EVERY 4 HOURS PRN
Qty: 3 INHALER | Refills: 1 | Status: SHIPPED | OUTPATIENT
Start: 2021-06-15 | End: 2021-07-09 | Stop reason: SDUPTHER

## 2021-06-15 RX ORDER — PREDNISONE 10 MG/1
TABLET ORAL
Qty: 31 TABLET | Refills: 0 | Status: SHIPPED | OUTPATIENT
Start: 2021-06-15 | End: 2021-07-06

## 2021-06-15 RX ORDER — AZITHROMYCIN 250 MG/1
250 TABLET, FILM COATED ORAL DAILY
Qty: 3 TABLET | Refills: 0 | Status: SHIPPED | OUTPATIENT
Start: 2021-06-16 | End: 2021-06-19

## 2021-06-15 RX ADMIN — AZITHROMYCIN MONOHYDRATE 250 MG: 250 TABLET ORAL at 10:31

## 2021-06-15 RX ADMIN — LEVALBUTEROL 1.25 MG: 1.25 SOLUTION, CONCENTRATE RESPIRATORY (INHALATION) at 10:47

## 2021-06-15 RX ADMIN — METHYLPREDNISOLONE SODIUM SUCCINATE 40 MG: 40 INJECTION, POWDER, FOR SOLUTION INTRAMUSCULAR; INTRAVENOUS at 04:39

## 2021-06-15 RX ADMIN — ATORVASTATIN CALCIUM 40 MG: 40 TABLET, FILM COATED ORAL at 10:31

## 2021-06-15 RX ADMIN — LEVALBUTEROL 1.25 MG: 1.25 SOLUTION, CONCENTRATE RESPIRATORY (INHALATION) at 06:58

## 2021-06-15 RX ADMIN — ACETAMINOPHEN 650 MG: 325 TABLET ORAL at 06:20

## 2021-06-15 RX ADMIN — ESCITALOPRAM OXALATE 15 MG: 10 TABLET ORAL at 10:31

## 2021-06-15 RX ADMIN — ENOXAPARIN SODIUM 40 MG: 40 INJECTION SUBCUTANEOUS at 10:30

## 2021-06-15 RX ADMIN — GUAIFENESIN AND DEXTROMETHORPHAN 5 ML: 100; 10 SYRUP ORAL at 10:13

## 2021-06-15 RX ADMIN — OXYCODONE HYDROCHLORIDE AND ACETAMINOPHEN 1 TABLET: 5; 325 TABLET ORAL at 11:53

## 2021-06-15 RX ADMIN — IPRATROPIUM BROMIDE 0.5 MG: 0.5 SOLUTION RESPIRATORY (INHALATION) at 06:58

## 2021-06-15 RX ADMIN — LEVETIRACETAM 750 MG: 750 TABLET ORAL at 10:31

## 2021-06-15 RX ADMIN — IPRATROPIUM BROMIDE 0.5 MG: 0.5 SOLUTION RESPIRATORY (INHALATION) at 10:47

## 2021-06-15 ASSESSMENT — PAIN SCALES - GENERAL
PAINLEVEL_OUTOF10: 0
PAINLEVEL_OUTOF10: 6
PAINLEVEL_OUTOF10: 5

## 2021-06-15 NOTE — PROGRESS NOTES
CLINICAL PHARMACY NOTE: MEDS TO BEDS    Total # of Prescriptions Filled: 1   The following medications were delivered to the patient:  · Azithromycin 250mg    Additional Documentation:  Delivered 1 rx to patients room

## 2021-06-15 NOTE — CONSULTS
[place] [address] Internal Medicine    Progress Note     6/15/2021    11:42 AM    Name:   Narciso Barahona  MRN:     [id number]     Acct:      [de-identified]   Room:   2087/2087-01   Day:  3  Admit Date:  6/12/2021  4:08 AM    PCP:   THOR Merlos CNP  Code Status:  Full Code    Subjective:     C/C:   Chief Complaint   Patient presents with    Respiratory Distress     Principal Problem:    Acute respiratory failure ([facility] [address]  Active Problems:    CAD, multiple vessel    Asthma    Smoking    Moderate episode of recurrent major depressive disorder ([facility] [address]    Hypertension    COPD exacerbation ([facility] [address]  Resolved Problems:    * No resolved hospital problems. *      Interval History Status: improved. Coughing spell in am .  Dry cough  Did not sleep well   Feels jittery   Chest xray atelectasis left base  Afebrile   resp rate ok         On admission; The patient is a 54 y.o. Non-/non  female with a PMH significant for COPD on home O2, CAD, s/p MI 2013, Hx seizure disorder, hypertension, and depression who presents withRespiratory Distress   and she is admitted to the hospital for the management of acute on chronic COPD exacerbation. As per patient, patient began experiencing progressive shortness of breath about 2 days ago. Patient states she used her home nebulizer with minimal relief of symptoms. Patient states she is able to walk up a flight of steps before feeling dyspneic. Patient denies any fever or chills. Patient denies any sputum production. Reportedly, EMS found patient respiratory duress. Patient was tachypneic and speaking 12 word sentences. On the way to the hospital, patient was given breathing treatments, s/p magnesium 2G x1, and IV Solu-Medrol 125 mg x1. In the ED patient was started on BiPAP.   Patient denies fever, chills, chest pain, abdominal pain, nausea, vomiting, dysuria, diarrhea, or lower extremity tenderness          Transfer from [facility] service note ;  Patient has past medical history of COPD on home O2, CAD, status post MI 2013 history of seizure disorder, hypertension and depression who presented to the ED with respiratory distress. Alexandru Camara She was admitted for acute on chronic COPD exacerbation. Patient presented to the ED on BiPAP status post magnesium 2 g and IV Solu-Medrol. PE work-up negative. Patient was continued on Solu-Medrol 60 mg, duo nebs and BiPAP. Pulmonology was consulted. Decrease Solu-Medrol 40 mg every 8 hours. CT scan of the chest was significant for groundglass process right lower lobe. And they recommended a repeat noncontrast CT in 8 weeks. Patient was transferred to [facility].        Assessment and plan     Acute respiratory failure 2/2 acute on chronic COPD exacerbation  -On admission: Afebrile; tachypneic and tachycardic on BiPAP FiO2 40%  -EKG: Sinus tachycardia  -CXR: Unremarkable  -D-dimer: 0.67  -CT PE: No acute PE, chronically stable RUL 6 mm diameter noncalcified pulmonary nodule  -Blood culture 1 and 2 NGTD  -Procalcitonin 0.05  -Continue IV Solu-Medrol 60 mg every 6 hours  -Duonebs, BiPAP  -Xanax 0.5 mg TID PRN  -Will start IV rocephin this AM  -Added Mucinex 600 mg BID  -Pulmonology consulted:              -MWXGF as needed              -DuoNeb treatments every 4 hours              -Continue IV Solu-Medrol 40 mg every 8 hours                     Significant last 24 hr data reviewed ;   Vitals:    06/15/21 0714 06/15/21 0943 06/15/21 0944 06/15/21 1047   BP: (!) 163/106 (!) 130/110 138/85    Pulse: 92      Resp: 18   20   Temp: 97.7 °F (36.5 °C)      TempSrc: Axillary      SpO2: 95%   96%   Weight:       Height:          No results found for this or any previous visit (from the past 24 hour(s)). No results for input(s): POCGLU in the last 72 hours. XR CHEST PORTABLE    Result Date: 6/13/2021  EXAMINATION: ONE XRAY VIEW OF THE CHEST 6/13/2021 6:25 am COMPARISON: June 12, 2021.  HISTORY: ORDERING SYSTEM PROVIDED HISTORY: COPD TECHNOLOGIST PROVIDED HISTORY: COPD Reason for Exam: COPD Acuity: Unknown Type of Exam: Unknown Additional signs and symptoms: COPD Relevant Medical/Surgical History: COPD FINDINGS: Frontal portable view of the chest.  Normal lung volume. Mild left basilar subsegmental atelectasis. No focal consolidation. No pleural effusion or pneumothorax. Stable cardiomediastinal silhouette and great vessels. Stable regional skeleton. Mild left basilar subsegmental atelectasis. No focal consolidation. HPI:   See history in H and P         Review of Systems:     Constitutional:  negative for chills, fevers, sweats  Respiratory:  negative for cough, dyspnea on exertion, hemoptysis, shortness of breath, wheezing  Cardiovascular:  negative for chest pain, chest pressure/discomfort, lower extremity edema, palpitations  Gastrointestinal:  negative for abdominal pain, constipation, diarrhea, nausea, vomiting  Neurological:  negative for dizziness, headache  Data:     Past Medical History:  no change     Social History:  no change    Family History: @no change    Vitals:      I/O (24Hr): Intake/Output Summary (Last 24 hours) at 6/15/2021 1142  Last data filed at 6/15/2021 0833  Gross per 24 hour   Intake 520 ml   Output    Net 520 ml       Labs:    URINE ANALYSIS: No results found for: LABURIN     CBC:  Lab Results   Component Value Date    WBC 17.6 06/13/2021    HGB 13.2 06/13/2021     06/13/2021        BMP:    Lab Results   Component Value Date     06/13/2021    K 4.5 06/13/2021     06/13/2021    CO2 24 06/13/2021    BUN 14 06/13/2021    CREATININE 0.46 06/13/2021    GLUCOSE 154 06/13/2021      LIVER PROFILE:  Lab Results   Component Value Date    ALT 16 07/12/2020    AST 18 07/12/2020    PROT 7.0 07/12/2020    BILITOT <0.15 07/12/2020    BILIDIR 0.11 11/11/2017    LABALBU 4.2 07/12/2020               Radiology:  Medications:      Allergies:      Current Meds: Scheduled Meds:    methylPREDNISolone  40 mg Intravenous Q12H    azithromycin  250 mg Oral Daily    nicotine  1 patch Transdermal Daily    amitriptyline  75 mg Oral Nightly    atorvastatin  40 mg Oral Daily    escitalopram  15 mg Oral Daily    enoxaparin  40 mg Subcutaneous Daily    sodium chloride flush  5-40 mL Intravenous 2 times per day    levETIRAcetam  750 mg Oral Daily    ipratropium  0.5 mg Nebulization 4x daily    levalbuterol  1.25 mg Nebulization Q4H While awake     Continuous Infusions:    sodium chloride       PRN Meds: metoprolol, guaiFENesin-dextromethorphan, senna, ondansetron **OR** ondansetron, polyethylene glycol, acetaminophen **OR** acetaminophen, sodium chloride flush, sodium chloride, potassium chloride **OR** potassium alternative oral replacement **OR** potassium chloride, ALPRAZolam, ibuprofen, levalbuterol, baclofen, oxyCODONE-acetaminophen      Physical Examination:        /85   Pulse 92   Temp 97.7 °F (36.5 °C) (Axillary)   Resp 20   Ht 5' 7\" (1.702 m)   Wt 177 lb 4 oz (80.4 kg)   SpO2 96%   BMI 27.76 kg/m²   Temp (24hrs), Av.3 °F (36.3 °C), Min:96.4 °F (35.8 °C), Max:97.9 °F (36.6 °C)    No results for input(s): POCGLU in the last 72 hours. Intake/Output Summary (Last 24 hours) at 6/15/2021 1142  Last data filed at 6/15/2021 7999  Gross per 24 hour   Intake 520 ml   Output    Net 520 ml       General Appearance:  alert, well appearing, and in no acute distress  Mental status:   Head:  normocephalic, atraumatic.   Eye: no icterus, redness, pupils equal and reactive, extraocular eye movements intact, conjunctiva clear  Ear: normal external ear, no discharge, hearing intact  Nose:  no drainage noted  Mouth: mucous membranes moist  Neck: supple, no carotid bruits, thyroid not palpable  Lungs: Bilateral equal air entry, clear to ausculation, no wheezing, rales or rhonchi, normal effort  Cardiovascular: normal rate, regular rhythm, no murmur, gallop, rub.  Abdomen: Soft, nontender, nondistended, normal bowel sounds, no hepatomegaly or splenomegaly  Neurologic: There are no new focal motor or sensory deficits,   Skin: No gross lesions, rashes, bruising or bleeding on exposed skin area  Extremities:  peripheral pulses palpable, no pedal edema or calf pain with palpation  Psych:             Assessment:        Primary Problem  Acute respiratory failure [facility])    Active Hospital Problems    Diagnosis Date Noted    COPD exacerbation ([facility] [J44.1] 06/12/2021    Acute respiratory failure ([facility] [J96.00] 06/12/2021    Hypertension [I10] 12/21/2020    Moderate episode of recurrent major depressive disorder ([facility] [F33.1] 10/02/2018    Smoking [F17.200] 10/22/2013    Asthma [J45.909] 10/22/2013    CAD, multiple vessel [I25.10] 03/15/2013     Plan:        Improving   Advised to take xanax if sleep trouble tonight . pulm following . May be able to go home tomorrow . History asthma .   will add symbicort  Home today     Will monitor vitals and clinical course , and  Optimize therapy  as needed .       Baron Shearer MD  6/15/2021  11:42 AM

## 2021-06-15 NOTE — PLAN OF CARE
Problem: Pain:  Goal: Pain level will decrease  Description: Pain level will decrease  Outcome: Ongoing  Note: Patient will have good control of her pain will continue to encourage her to verbalise whe     Problem: Breathing Pattern - Ineffective:  Goal: Ability to achieve and maintain a regular respiratory rate will improve  Description: Ability to achieve and maintain a regular respiratory rate will improve  Outcome: Ongoing  Note: Patient on breathing treatments and her saturations are better can also breathe better will continue to monitor     Problem: Falls - Risk of:  Goal: Will remain free from falls  Description: Will remain free from falls  6/15/2021 0545 by Flaquita Leavitt RN  Outcome: Ongoing  Note: Will have no fall will continue to monitor  6/14/2021 1908 by Caroline Robles RN  Outcome: Ongoing

## 2021-06-15 NOTE — PROGRESS NOTES
Pulmonary Progress Note  [facility]      Patient - Daisy Villela,  Age - 54 y.o.    - 1965      Room Number - 2087/2087-01   N -  [id number]   Acct # - [de-identified]  Date of Admission -  2021  4:08 AM        Consulting Sammie Coughlin MD  Primary Care Physician - Aicha Valladares, APRN - CNP     SUBJECTIVE   She looks well today, did not qualify for oxygen and nocturnal    OBJECTIVE   VITALS    height is 5' 7\" (1.702 m) and weight is 177 lb 4 oz (80.4 kg). Her axillary temperature is 97.7 °F (36.5 °C). Her blood pressure is 138/85 and her pulse is 92. Her respiration is 20 and oxygen saturation is 96%. Body mass index is 27.76 kg/m². Temperature Range: Temp: 97.7 °F (36.5 °C) Temp  Av.3 °F (36.3 °C)  Min: 96.4 °F (35.8 °C)  Max: 97.9 °F (36.6 °C)  BP Range:  Systolic (08INW), WBA:662 , Min:115 , FUENTES:777     Diastolic (06YFI), DHB:26, Min:74, Max:110    Pulse Range: Pulse  Av.5  Min: 88  Max: 114  Respiration Range: Resp  Av.5  Min: 16  Max: 20  Current Pulse Ox[de-identified]  SpO2: 96 %  24HR Pulse Ox Range:  SpO2  Av.8 %  Min: 93 %  Max: 96 %  Oxygen Amount and Delivery: O2 Flow Rate (L/min): 2 L/min    Wt Readings from Last 3 Encounters:   06/15/21 177 lb 4 oz (80.4 kg)   21 155 lb (70.3 kg)   21 155 lb (70.3 kg)       I/O (24 Hours)    Intake/Output Summary (Last 24 hours) at 6/15/2021 1201  Last data filed at 6/15/2021 0833  Gross per 24 hour   Intake 520 ml   Output    Net 520 ml       EXAM     General Appearance  Awake, alert, oriented, in no acute distress  HEENT - normocephalic, atraumatic.  []  Mallampati  [] Crowded airway   [] Macroglossia  []  Retrognathia  [] Micrognathia  []  Normal tongue size []  Normal Bite  [] [place] sign positive    Neck - Supple,  trachea midline   Lungs -no wheezes  Cardiovascular - Heart sounds are normal.  Regular rate and rhythm   Abdomen - Soft, AMYLASE,  ALB  INR No results for input(s): INR in the last 72 hours. PTT   Lab Results   Component Value Date    APTT 29.1 06/12/2021         RADIOLOGY     (See actual reports for details)    ASSESSMENT/PLAN   Principal Problem:    Acute respiratory failure ([facility] [address]  Active Problems:    CAD, multiple vessel    Asthma    Smoking    Moderate episode of recurrent major depressive disorder ([facility] [address]    Hypertension    COPD exacerbation ([facility] [address]  Resolved Problems:    * No resolved hospital problems.  *    Stable for discharge from pulmonary standpoint  We will send her home on prednisone taper  Pulmonary medicines reconciled  Continue aerosol treatments  I told her that she will need a follow-up CT of chest in approximately 8 weeks  Follow-up in the office in 6 weeks she will need PFTs to be done  She has a nebulizer machine at home  Electronically signed by Gal Galaviz MD on 6/15/2021 at 12:01 PM

## 2021-06-15 NOTE — FLOWSHEET NOTE
06/15/21 1105   Encounter Summary   Services provided to: Patient   Referral/Consult From: Rounding   Continue Visiting   (6-15-21)   Complexity of Encounter Low   Length of Encounter 15 minutes   Spiritual/Voodoo   Type Ritual   Intervention Anointing   Sacraments   Sacrament of Sick-Anointing Anointed  (Fr Mistry 6-15-21)

## 2021-06-15 NOTE — DISCHARGE SUMMARY
[place] [address] Internal Medicine    Discharge Summary     Patient ID: Remington Martínez  :  1965   MRN: [id number]     ACCOUNT:  [de-identified]   Patient's PCP: THOR Ngo CNP  Admit Date: 2021   Discharge Date:    Length of Stay: 3  Code Status:  Full Code  Admitting Physician: Lakhwinder Chambers MD  Discharge Physician: Jeancarlos Ruiz MD     Active Discharge Diagnoses:     Primary Problem  Acute respiratory failure [facility])      [facility] Problems    Diagnosis Date Noted    COPD exacerbation ([address] [J44.1] 2021    Acute respiratory failure ([address] [J96.00] 2021    Hypertension [I10] 2020    Moderate episode of recurrent major depressive disorder ([facility] [F33.1] 10/02/2018    Smoking [F17.200] 10/22/2013    Asthma [J45.909] 10/22/2013    CAD, multiple vessel [I25.10] 03/15/2013       Admission Condition:  fair     Discharged Condition: good    Hospital Stay:     Hospital Course:  Remington Martínez is a 54 y.o. female who was admitted for the management of   .     , presented with Respiratory Distress      ,                         Acute respiratory failure ([address] Principal Problem:    Acute respiratory failure (HCC)  Active Problems:    CAD, multiple vessel    Asthma    Smoking    Moderate episode of recurrent major depressive disorder ([address]    Hypertension    COPD exacerbation ([address]  Resolved Problems:    * No resolved hospital problems. *       Significant therapeutic interventions:              1. Improving   2. Advised to take xanax if sleep trouble tonight . 3. pulm following . 4. May be able to go home tomorrow . History asthma .   will add symbicort  Home today     Coughing spell in am .  Dry cough  Did not sleep well   Feels jittery   Chest xray atelectasis left base  Afebrile   resp rate ok           On admission; The patient is a 47 y. o.  Non-/non  female with a PMH significant for COPD on home O2, CAD, s/p MI 2013, Hx seizure disorder, hypertension, and depression who presents withRespiratory Distress   and she is admitted to the hospital for the management of acute on chronic COPD exacerbation.     As per patient, patient began experiencing progressive shortness of breath about 2 days ago.  Patient states she used her home nebulizer with minimal relief of symptoms.  Patient states she is able to walk up a flight of steps before feeling dyspneic.  Patient denies any fever or chills.  Patient denies any sputum production.  Reportedly, EMS found patient respiratory duress.  Patient was tachypneic and speaking 12 word sentences.  On the way to the hospital, patient was given breathing treatments, s/p magnesium 2G x1, and IV Solu-Medrol 125 mg x1.  In the ED patient was started on BiPAP.  Patient denies fever, chills, chest pain, abdominal pain, nausea, vomiting, dysuria, diarrhea, or lower extremity tenderness            Transfer from [facility] service note ;  Patient has past medical history of COPD on home O2, CAD, status post MI 2013 history of seizure disorder, hypertension and depression who presented to the ED with respiratory distress. Bharat Christensen was admitted for acute on chronic COPD exacerbation.  Patient presented to the ED on BiPAP status post magnesium 2 g and IV Solu-Medrol.  PE work-up negative.  Patient was continued on Solu-Medrol 60 mg, duo nebs and BiPAP.  Pulmonology was consulted.  Decrease Solu-Medrol 40 mg every 8 hours.  CT scan of the chest was significant for groundglass process right lower lobe.  And they recommended a repeat noncontrast CT in 8 weeks.  Patient was transferred to [facility].             Significant Diagnostic Studies:   Labs / Micro:       Results for orders placed or performed during the hospital encounter of 06/12/21   Culture, Blood 1    Specimen: Blood   Result Value Ref Range    Specimen Description . BLOOD 10MLS [address] [place] [address] Special Requests NOT REPORTED     Culture NO GROWTH 2 DAYS    COVID-19, Rapid    Specimen: Nasopharyngeal Swab   Result Value Ref Range    Specimen Description . NASOPHARYNGEAL SWAB     SARS-CoV-2, Rapid Not Detected Not Detected   Culture, Blood 1    Specimen: Blood   Result Value Ref Range    Specimen Description . BLOOD ORG 1ML, GRN 2MLS, LEFT FOREARM     Special Requests NOT REPORTED     Culture NO GROWTH 2 DAYS    Basic Metabolic Panel   Result Value Ref Range    Glucose 175 (H) 70 - 99 mg/dL    BUN 11 6 - 20 mg/dL    CREATININE 0.56 0.50 - 0.90 mg/dL    Bun/Cre Ratio NOT REPORTED 9 - 20    Calcium 9.0 8.6 - 10.4 mg/dL    Sodium 140 135 - 144 mmol/L    Potassium 3.5 (L) 3.7 - 5.3 mmol/L    Chloride 104 98 - 107 mmol/L    CO2 27 20 - 31 mmol/L    Anion Gap 9 9 - 17 mmol/L    GFR Non-African American >60 >60 mL/min    GFR African American >60 >60 mL/min    GFR Comment          GFR Staging NOT REPORTED    CBC   Result Value Ref Range    WBC 8.2 3.5 - 11.0 k/uL    RBC 4.02 4.0 - 5.2 m/uL    Hemoglobin 13.1 12.0 - 16.0 g/dL    Hematocrit 38.3 36 - 46 %    MCV 95.4 80 - 100 fL    MCH 32.6 26 - 34 pg    MCHC 34.2 31 - 37 g/dL    RDW 14.3 11.5 - 14.9 %    Platelets 887 019 - 056 k/uL    MPV 7.3 6.0 - 12.0 fL    NRBC Automated NOT REPORTED per 100 WBC   D-Dimer, Quantitative   Result Value Ref Range    D-Dimer, Quant 0.67 (H) 0.00 - 0.59 mg/L FEU   Lactic Acid   Result Value Ref Range    Lactic Acid 1.3 0.5 - 2.2 mmol/L   Protime-INR   Result Value Ref Range    Protime 11.9 11.8 - 14.6 sec    INR 0.9    APTT   Result Value Ref Range    PTT 29.1 24.0 - 36.0 sec   Troponin   Result Value Ref Range    Troponin, High Sensitivity <6 0 - 14 ng/L    Troponin T NOT REPORTED <0.03 ng/mL    Troponin Interp NOT REPORTED    BLOOD GAS, ARTERIAL   Result Value Ref Range    pH, Arterial 7.405 7.350 - 7.450    pCO2, Arterial 44.2 35.0 - 45.0 mmHg    pO2, Arterial 96.8 80.0 - 100.0 mmHg    HCO3, Arterial 27.7 (H) 22.0 - 26.0 mmol/L Positive Base Excess, Art 3.0 (H) 0.0 - 2.0 mmol/L    Negative Base Excess, Art NOT REPORTED 0.0 - 2.0 mmol/L    O2 Sat, Arterial 93.2 (L) 95 - 98 %    Total Hb NOT REPORTED 12.0 - 16.0 g/dl    Oxyhemoglobin NOT REPORTED 95.0 - 98.0 %    Carboxyhemoglobin 4.3 0 - 5 %    Methemoglobin 0.5 0.0 - 1.9 %    Pt Temp 37     pH, Art, Temp Adj NOT REPORTED 7.350 - 7.450    pCO2, Art, Temp Adj NOT REPORTED 35.0 - 45.0    pO2, Art, Temp Adj NOT REPORTED 80.0 - 100.0 mmHg    O2 Device/Flow/% BIPAP     Respiratory Rate 28     Alberto Test PASS     Sample Site Left Radial Artery     Pt.  Position SEMI-FOWLERS     Mode I/E 12/6     Set Rate NOT REPORTED     Total Rate NOT REPORTED     VT NOT REPORTED     FIO2 NOT REPORTED     Peep/Cpap NOT REPORTED     PSV NOT REPORTED     Text for Respiratory RESULTS GIVEN TO PHYSICIAN     NOTIFICATION NOT REPORTED     NOTIFICATION TIME NOT REPORTED    Procalcitonin   Result Value Ref Range    Procalcitonin 0.05 <0.09 ng/mL   Basic Metabolic Panel w/ Reflex to MG   Result Value Ref Range    Glucose 154 (H) 70 - 99 mg/dL    BUN 14 6 - 20 mg/dL    CREATININE 0.46 (L) 0.50 - 0.90 mg/dL    Bun/Cre Ratio NOT REPORTED 9 - 20    Calcium 9.5 8.6 - 10.4 mg/dL    Sodium 141 135 - 144 mmol/L    Potassium 4.5 3.7 - 5.3 mmol/L    Chloride 107 98 - 107 mmol/L    CO2 24 20 - 31 mmol/L    Anion Gap 10 9 - 17 mmol/L    GFR Non-African American >60 >60 mL/min    GFR African American >60 >60 mL/min    GFR Comment          GFR Staging NOT REPORTED    CBC   Result Value Ref Range    WBC 17.6 (H) 3.5 - 11.0 k/uL    RBC 4.21 4.0 - 5.2 m/uL    Hemoglobin 13.2 12.0 - 16.0 g/dL    Hematocrit 40.4 36 - 46 %    MCV 95.8 80 - 100 fL    MCH 31.3 26 - 34 pg    MCHC 32.7 31 - 37 g/dL    RDW 14.3 11.5 - 14.9 %    Platelets 311 561 - 839 k/uL    MPV 6.6 6.0 - 12.0 fL    NRBC Automated NOT REPORTED per 100 WBC   TSH with Reflex   Result Value Ref Range    TSH 0.36 0.30 - 5.00 mIU/L   T4, Free   Result Value Ref Range    Thyroxine, Free 0.96 0.93 - 1.70 ng/dL   EKG 12 Lead   Result Value Ref Range    Ventricular Rate 119 BPM    Atrial Rate 119 BPM    P-R Interval 140 ms    QRS Duration 84 ms    Q-T Interval 332 ms    QTc Calculation (Bazett) 467 ms    P Axis 38 degrees    R Axis 37 degrees    T Axis 42 degrees        {Radiology:    XR CHEST PORTABLE    Result Date: 6/13/2021  EXAMINATION: ONE XRAY VIEW OF THE CHEST 6/13/2021 6:25 am COMPARISON: June 12, 2021. HISTORY: ORDERING SYSTEM PROVIDED HISTORY: COPD TECHNOLOGIST PROVIDED HISTORY: COPD Reason for Exam: COPD Acuity: Unknown Type of Exam: Unknown Additional signs and symptoms: COPD Relevant Medical/Surgical History: COPD FINDINGS: Frontal portable view of the chest.  Normal lung volume. Mild left basilar subsegmental atelectasis. No focal consolidation. No pleural effusion or pneumothorax. Stable cardiomediastinal silhouette and great vessels. Stable regional skeleton. Mild left basilar subsegmental atelectasis. No focal consolidation. XR CHEST PORTABLE    Result Date: 6/12/2021  EXAMINATION: ONE XRAY VIEW OF THE CHEST 6/12/2021 5:11 am COMPARISON: Chest portable May 5, 2021 HISTORY: ORDERING SYSTEM PROVIDED HISTORY: sob TECHNOLOGIST PROVIDED HISTORY: sob Reason for Exam: Shortness of breath Acuity: Acute Type of Exam: Initial Additional signs and symptoms: Shortness of breath Relevant Medical/Surgical History: Shortness of breath FINDINGS: The heart is normal in size and configuration. The mediastinal contours are within normal limits. The lungs are well aerated. The pleural surfaces are normal and no evidence of a pleural effusion is seen. Bones and soft tissues are unremarkable.      Unremarkable single portable AP view of the chest.     CT CHEST PULMONARY EMBOLISM W CONTRAST    Result Date: 6/12/2021  EXAMINATION: CTA OF THE CHEST 6/12/2021 4:55 am TECHNIQUE: CTA of the chest was performed after the administration of intravenous contrast.  Multiplanar reformatted images are provided for review. MIP images are provided for review. Dose modulation, iterative reconstruction, and/or weight based adjustment of the mA/kV was utilized to reduce the radiation dose to as low as reasonably achievable. COMPARISON: Chest portable June 12, 2021. CT chest pulmonary embolism with contrast February 16, 2021. HISTORY: ORDERING SYSTEM PROVIDED HISTORY: sob, elevated d.dimer TECHNOLOGIST PROVIDED HISTORY: sob, elevated d.dimer Decision Support Exception - unselect if not a suspected or confirmed emergency medical condition->Emergency Medical Condition (MA) Reason for Exam: SOB, elevated d-dimer Acuity: Acute Type of Exam: Initial Additional signs and symptoms: Pt c/o difficulty breathing that began suddenly tonight. H/O COPD, asthma. FINDINGS: Pulmonary Arteries: Pulmonary arteries are adequately opacified for evaluation. No evidence of intraluminal filling defect to suggest pulmonary embolism. Main pulmonary artery is normal in caliber. Mediastinum: No evidence of mediastinal lymphadenopathy. The heart and pericardium demonstrate no acute abnormality. There is no acute abnormality of the thoracic aorta. Lungs/pleura: The lungs are without acute process. Right upper lung lobe ovoid noncalcified pulmonary nodule is identified which measures up to 6 mm in diameter. No focal consolidation or pulmonary edema. No evidence of pleural effusion or pneumothorax. Upper Abdomen: Limited images of the upper abdomen are unremarkable. Soft Tissues/Bones: No acute bone or soft tissue abnormality. No evidence of pulmonary embolism or acute pulmonary abnormality. Chronically stable right upper lung lobe ovoid 6 mm diameter noncalcified pulmonary nodule. Finding considered benign.  RECOMMENDATIONS:        Consultations:    Consults:     Final Specialist Recommendations/Findings:   IP CONSULT TO PRIMARY CARE PROVIDER  IP CONSULT TO PRIMARY CARE PROVIDER  IP CONSULT TO PULMONOLOGY  IP CONSULT TO SOCIAL WORK The patient was seen and examined on day of discharge and this discharge summary is in conjunction with any daily progress note from day of discharge. Discharge plan:     Disposition: Home    Physician Follow Up: With PCP and as specified     Requiring Further Evaluation/Follow Up POST HOSPITALIZATION/Incidental Findings:    Diet: regular     Activity: As tolerated    I  Discharge Medications:      Medication List      START taking these medications    azithromycin 250 MG tablet  Commonly known as: ZITHROMAX  Take 1 tablet by mouth daily for 10 days  Start taking on: June 16, 2021        CHANGE how you take these medications    aspirin EC 81 MG EC tablet  Take 1 tablet by mouth daily  What changed:   · when to take this  · reasons to take this        CONTINUE taking these medications    acetaminophen 325 MG tablet  Commonly known as: Tylenol  Take 1 tablet by mouth every 6 hours as needed for Pain     * albuterol sulfate  (90 Base) MCG/ACT inhaler  INHALE TWO PUFFS BY MOUTH EVERY 6 HOURS AS NEEDED FOR WHEEZING     * albuterol (2.5 MG/3ML) 0.083% nebulizer solution  Commonly known as: PROVENTIL  INHALE 3 MLS BY NEBULIZATION EVERY 6 HOURS AS NEEDED FOR WHEEZING     * albuterol sulfate  (90 Base) MCG/ACT inhaler  Commonly known as: Ventolin HFA  Inhale 2 puffs into the lungs 4 times daily as needed for Wheezing     amitriptyline 25 MG tablet  Commonly known as: ELAVIL  Take 3 tablets by mouth nightly     atorvastatin 40 MG tablet  Commonly known as: LIPITOR  TAKE ONE TABLET BY MOUTH DAILY     baclofen 10 MG tablet  Commonly known as: LIORESAL  TAKE ONE TABLET BY MOUTH ONCE NIGHTLY AS NEEDED FOR PAIN     betamethasone valerate 0.1 % cream  Commonly known as: VALISONE  Apply topically 2 times daily. Do not use for longer than 2 weeks.      budesonide-formoterol 160-4.5 MCG/ACT Aero  Commonly known as: Symbicort  Inhale 2 puffs into the lungs 2 times daily     escitalopram 10 MG tablet  Commonly known as: LEXAPRO  TAKE 1 AND 1/2 TABLET BY MOUTH DAILY     Handicap Placard Misc  by Does not apply route Exp 1/2024     ibuprofen 400 MG tablet  Commonly known as: IBU  Take 1 tablet by mouth every 6 hours as needed for Pain     levETIRAcetam 750 MG tablet  Commonly known as: KEPPRA  TAKE ONE TABLET BY MOUTH TWICE A DAY     magnesium gluconate 500 MG tablet  Commonly known as: MAGONATE     Nebulizer Compressor Misc  Daily as needed     Nebulizer/Tubing/Mouthpiece Kit  1 kit by Does not apply route daily as needed (SOB or wheezing)     nicotine 21 MG/24HR  Commonly known as: NICODERM CQ  Place 1 patch onto the skin daily for 10 days     nitroGLYCERIN 0.4 MG SL tablet  Commonly known as: Nitrostat  Place 1 tablet under the tongue every 5 minutes as needed for Chest pain up to max of 3 total doses. If no relief after 1 dose, call 911. ondansetron 4 MG disintegrating tablet  Commonly known as: Zofran ODT  Take 1 tablet by mouth every 8 hours as needed for Nausea     Stiolto Respimat 2.5-2.5 MCG/ACT Aers  Generic drug: tiotropium-olodaterol  INHALE TWO INHALATION(S) BY MOUTH DAILY     * EMERGEN-C VITAMIN C PO     * therapeutic multivitamin-minerals tablet     tiotropium 2.5 MCG/ACT Aers inhaler  Commonly known as: Spiriva Respimat  Inhale 2 puffs into the lungs daily     vitamin B-12 1000 MCG tablet  Commonly known as: CYANOCOBALAMIN     vitamin B-6 100 MG tablet  Commonly known as: PYRIDOXINE     vitamin C 250 MG tablet     Vitamin D3 10 MCG (400 UNIT) Caps     zinc gluconate 50 MG tablet         * This list has 5 medication(s) that are the same as other medications prescribed for you. Read the directions carefully, and ask your doctor or other care provider to review them with you.                Where to Get Your Medications      These medications were sent to [facility], [address], [facility] [place] [address]    Phone: [phone]   · azithromycin 250 MG tablet           Time spent on discharge planning ;          [] less than 30 minutes . [x]   more  than 30 minutes . 36 minutes        Ellectronically signed by   Lorraine Varela MD      Thank you THOR Tate - RAMONITA for the opportunity to be involved in this patient's care. Please note that this chart was generated using voice recognition Dragon dictation software. Although every effort was made to ensure the accuracy of this automated transcription, some errors in transcription may have occurred.

## 2021-06-18 LAB
CULTURE: NORMAL
CULTURE: NORMAL
Lab: NORMAL
Lab: NORMAL
SPECIMEN DESCRIPTION: NORMAL
SPECIMEN DESCRIPTION: NORMAL

## 2021-06-30 RX ORDER — LEVETIRACETAM 750 MG/1
TABLET ORAL
Qty: 60 TABLET | Refills: 1 | Status: SHIPPED | OUTPATIENT
Start: 2021-06-30 | End: 2021-09-15

## 2021-06-30 RX ORDER — BACLOFEN 10 MG/1
TABLET ORAL
Qty: 30 TABLET | Refills: 0 | Status: SHIPPED | OUTPATIENT
Start: 2021-06-30 | End: 2021-07-09 | Stop reason: SDUPTHER

## 2021-06-30 NOTE — TELEPHONE ENCOUNTER
Health Maintenance   Topic Date Due    Hepatitis C screen  Never done    COVID-19 Vaccine (1) Never done    HIV screen  Never done    Cervical cancer screen  Never done    Lipid screen  11/08/2018    Colon Cancer Screen FIT/FOBT  02/20/2019    Annual Wellness Visit (AWV)  Never done    Breast cancer screen  02/06/2020    Diabetes screen  01/30/2021    Shingles Vaccine (2 of 2) 06/25/2021    DTaP/Tdap/Td vaccine (2 - Td or Tdap) 09/20/2027    Pneumococcal 0-64 years Vaccine (2 of 2 - PPSV23) 08/08/2030    Flu vaccine  Completed    Hepatitis A vaccine  Aged Out    Hepatitis B vaccine  Aged Out    Hib vaccine  Aged Out    Meningococcal (ACWY) vaccine  Aged Out             (applicable per patient's age: Cancer Screenings, Depression Screening, Fall Risk Screening, Immunizations)    Hemoglobin A1C (%)   Date Value   01/30/2018 5.3   08/02/2013 5.3   02/14/2013 6.0     LDL Cholesterol (mg/dL)   Date Value   11/08/2017 198 (H)     AST (U/L)   Date Value   07/12/2020 18     ALT (U/L)   Date Value   07/12/2020 16     BUN (mg/dL)   Date Value   06/13/2021 14      (goal A1C is < 7)   (goal LDL is <100) need 30-50% reduction from baseline     BP Readings from Last 3 Encounters:   06/15/21 (!) 148/104   05/05/21 (!) 130/95   04/12/21 132/80    (goal /80)      All Future Testing planned in [facility]:  Lab Frequency Next Occurrence       Next Visit Date:  Future Appointments   Date Time Provider Kaycee Hays   7/9/2021  9:45 AM THOR Maguire - CNP [facility]            Patient Active Problem List:     Heart disease     Chronic back pain     Depression     Hyperlipidemia     CAD, multiple vessel     Asthma     Smoking     Need for vaccination     Syncope     Leg pain     Left hip pain     Chronic cholecystitis     Chest pain     Calculus of gallbladder without cholecystitis without obstruction     History of lumbar fusion     Failed back syndrome     Marijuana use     Abnormal weight loss     Allergic rhinitis     Anxiety state     Chronic midline low back pain with sciatica     Moderate episode of recurrent major depressive disorder (HCC)     Seizure (HCC)     Chronic tension-type headache, intractable     Pneumonia due to organism     Hypertension     Sinus tachycardia     COPD exacerbation ([facility]     Acute respiratory failure ([facility]

## 2021-07-01 ENCOUNTER — TELEPHONE (OUTPATIENT)
Dept: PRIMARY CARE CLINIC | Age: 56
End: 2021-07-01

## 2021-07-01 NOTE — TELEPHONE ENCOUNTER
----- Message from Sariah Wilberthamilton sent at 6/30/2021 10:07 AM EDT -----  Subject: Hospital Follow Up    QUESTIONS  What hospital was the Patient Discharged from? [facility]  Date of Discharge? 2021-06-15  Discharge Location? Home  Reason for hospitalization as patient stated? Acute respiratory failure  What question does the patient have, if applicable?   ---------------------------------------------------------------------------  --------------  CALL BACK INFO  What is the best way for the office to contact you? OK to leave message on   voicemail  Preferred Call Back Phone Number? [phone]  ---------------------------------------------------------------------------  --------------  SCRIPT ANSWERS  Relationship to Patient? Self  Appointment reason? Well Care/Follow Ups  Select a Well Care/Follow Ups appointment reason? Adult Hospital Follow Up   [Inpatient Discharge, [address]  (Patient requests to see provider urgently. )? No  (Has the patient been discharged from the hospital within 2 business days   AND does not have a Telephone Encounter  Follow Up From [address] [place] [address]   documented in [address]?)? Yes  Do you have any questions for your primary care provider that need to be   answered prior to your appointment? (Use RN Triage if question pertains to   anything on the red flag list)? No  (Patient needs follow up visit after hospital discharge) Book first   available appointment within 7 days OF DISCHARGE, if no appt, proceed to   book the next available time slot within 14 days OF DISCHARGE AND Send   Message to Provider. [address] [facility] Follow Up appointment cannot be booked   beyond 14 Days and should result in a Message to Provider. ?  Yes

## 2021-07-06 ENCOUNTER — TELEPHONE (OUTPATIENT)
Dept: OTHER | Facility: CLINIC | Age: 56
End: 2021-07-06

## 2021-07-06 ENCOUNTER — HOSPITAL ENCOUNTER (EMERGENCY)
Age: 56
Discharge: HOME | End: 2021-07-06
Attending: EMERGENCY MEDICINE
Payer: MEDICARE

## 2021-07-06 ENCOUNTER — APPOINTMENT (OUTPATIENT)
Dept: GENERAL RADIOLOGY | Age: 56
End: 2021-07-06
Payer: MEDICARE

## 2021-07-06 VITALS
TEMPERATURE: 97.5 F | BODY MASS INDEX: 27.47 KG/M2 | DIASTOLIC BLOOD PRESSURE: 94 MMHG | HEART RATE: 109 BPM | WEIGHT: 175 LBS | OXYGEN SATURATION: 97 % | HEIGHT: 67 IN | RESPIRATION RATE: 16 BRPM | SYSTOLIC BLOOD PRESSURE: 135 MMHG

## 2021-07-06 DIAGNOSIS — J44.1 COPD EXACERBATION (HCC): Primary | ICD-10-CM

## 2021-07-06 LAB
ABSOLUTE EOS #: 0.05 K/UL (ref 0–0.4)
ABSOLUTE IMMATURE GRANULOCYTE: ABNORMAL K/UL (ref 0–0.3)
ABSOLUTE LYMPH #: 2.97 K/UL (ref 1–4.8)
ABSOLUTE MONO #: 0.26 K/UL (ref 0.1–1.3)
ALLEN TEST: ABNORMAL
ANION GAP SERPL CALCULATED.3IONS-SCNC: 12 MMOL/L (ref 9–17)
BASOPHILS # BLD: 0 % (ref 0–2)
BASOPHILS ABSOLUTE: 0 K/UL (ref 0–0.2)
BUN BLDV-MCNC: 13 MG/DL (ref 6–20)
BUN/CREAT BLD: ABNORMAL (ref 9–20)
CALCIUM SERPL-MCNC: 9.6 MG/DL (ref 8.6–10.4)
CARBOXYHEMOGLOBIN: 2.8 % (ref 0–5)
CHLORIDE BLD-SCNC: 106 MMOL/L (ref 98–107)
CO2: 22 MMOL/L (ref 20–31)
CREAT SERPL-MCNC: 0.59 MG/DL (ref 0.5–0.9)
DIFFERENTIAL TYPE: ABNORMAL
EOSINOPHILS RELATIVE PERCENT: 1 % (ref 0–4)
FIO2: ABNORMAL
GFR AFRICAN AMERICAN: >60 ML/MIN
GFR NON-AFRICAN AMERICAN: >60 ML/MIN
GFR SERPL CREATININE-BSD FRML MDRD: ABNORMAL ML/MIN/{1.73_M2}
GFR SERPL CREATININE-BSD FRML MDRD: ABNORMAL ML/MIN/{1.73_M2}
GLUCOSE BLD-MCNC: 100 MG/DL (ref 70–99)
HCO3 VENOUS: 24.4 MMOL/L (ref 24–30)
HCT VFR BLD CALC: 43.1 % (ref 36–46)
HEMOGLOBIN: 14.3 G/DL (ref 12–16)
IMMATURE GRANULOCYTES: ABNORMAL %
LYMPHOCYTES # BLD: 57 % (ref 24–44)
MCH RBC QN AUTO: 31.2 PG (ref 26–34)
MCHC RBC AUTO-ENTMCNC: 33.2 G/DL (ref 31–37)
MCV RBC AUTO: 94 FL (ref 80–100)
METHEMOGLOBIN: 0.8 % (ref 0–1.9)
MODE: ABNORMAL
MONOCYTES # BLD: 5 % (ref 1–7)
MORPHOLOGY: ABNORMAL
NEGATIVE BASE EXCESS, VEN: ABNORMAL MMOL/L (ref 0–2)
NOTIFICATION TIME: ABNORMAL
NOTIFICATION: ABNORMAL
NRBC AUTOMATED: ABNORMAL PER 100 WBC
O2 DEVICE/FLOW/%: ABNORMAL
O2 SAT, VEN: 95.7 % (ref 60–85)
OXYHEMOGLOBIN: ABNORMAL % (ref 95–98)
PATIENT TEMP: 37
PCO2, VEN, TEMP ADJ: ABNORMAL MMHG (ref 39–55)
PCO2, VEN: 30 (ref 39–55)
PDW BLD-RTO: 14 % (ref 11.5–14.9)
PEEP/CPAP: ABNORMAL
PH VENOUS: 7.52 (ref 7.32–7.42)
PH, VEN, TEMP ADJ: ABNORMAL (ref 7.32–7.42)
PLATELET # BLD: 318 K/UL (ref 150–450)
PLATELET ESTIMATE: ABNORMAL
PMV BLD AUTO: 6 FL (ref 6–12)
PO2, VEN, TEMP ADJ: ABNORMAL MMHG (ref 30–50)
PO2, VEN: 150 (ref 30–50)
POSITIVE BASE EXCESS, VEN: 1.6 MMOL/L (ref 0–2)
POTASSIUM SERPL-SCNC: 4.4 MMOL/L (ref 3.7–5.3)
PSV: ABNORMAL
PT. POSITION: ABNORMAL
RBC # BLD: 4.59 M/UL (ref 4–5.2)
RBC # BLD: ABNORMAL 10*6/UL
RESPIRATORY RATE: ABNORMAL
SAMPLE SITE: ABNORMAL
SEG NEUTROPHILS: 37 % (ref 36–66)
SEGMENTED NEUTROPHILS ABSOLUTE COUNT: 1.92 K/UL (ref 1.3–9.1)
SET RATE: ABNORMAL
SODIUM BLD-SCNC: 140 MMOL/L (ref 135–144)
TEXT FOR RESPIRATORY: ABNORMAL
TOTAL HB: ABNORMAL G/DL (ref 12–16)
TOTAL RATE: ABNORMAL
VT: ABNORMAL
WBC # BLD: 5.2 K/UL (ref 3.5–11)
WBC # BLD: ABNORMAL 10*3/UL

## 2021-07-06 PROCEDURE — 6370000000 HC RX 637 (ALT 250 FOR IP): Performed by: EMERGENCY MEDICINE

## 2021-07-06 PROCEDURE — 82805 BLOOD GASES W/O2 SATURATION: CPT

## 2021-07-06 PROCEDURE — 99284 EMERGENCY DEPT VISIT MOD MDM: CPT

## 2021-07-06 PROCEDURE — 6360000002 HC RX W HCPCS: Performed by: EMERGENCY MEDICINE

## 2021-07-06 PROCEDURE — 94761 N-INVAS EAR/PLS OXIMETRY MLT: CPT

## 2021-07-06 PROCEDURE — 85025 COMPLETE CBC W/AUTO DIFF WBC: CPT

## 2021-07-06 PROCEDURE — 80048 BASIC METABOLIC PNL TOTAL CA: CPT

## 2021-07-06 PROCEDURE — 94640 AIRWAY INHALATION TREATMENT: CPT

## 2021-07-06 PROCEDURE — 96374 THER/PROPH/DIAG INJ IV PUSH: CPT

## 2021-07-06 PROCEDURE — 71045 X-RAY EXAM CHEST 1 VIEW: CPT

## 2021-07-06 PROCEDURE — 36415 COLL VENOUS BLD VENIPUNCTURE: CPT

## 2021-07-06 RX ORDER — IPRATROPIUM BROMIDE AND ALBUTEROL SULFATE 2.5; .5 MG/3ML; MG/3ML
1 SOLUTION RESPIRATORY (INHALATION)
Status: DISCONTINUED | OUTPATIENT
Start: 2021-07-06 | End: 2021-07-06 | Stop reason: HOSPADM

## 2021-07-06 RX ORDER — PREDNISONE 10 MG/1
TABLET ORAL
Qty: 20 TABLET | Refills: 0 | Status: SHIPPED | OUTPATIENT
Start: 2021-07-07 | End: 2021-07-11

## 2021-07-06 RX ORDER — ACETAMINOPHEN 325 MG/1
650 TABLET ORAL ONCE
Status: COMPLETED | OUTPATIENT
Start: 2021-07-06 | End: 2021-07-06

## 2021-07-06 RX ORDER — METHYLPREDNISOLONE SODIUM SUCCINATE 125 MG/2ML
125 INJECTION, POWDER, LYOPHILIZED, FOR SOLUTION INTRAMUSCULAR; INTRAVENOUS ONCE
Status: COMPLETED | OUTPATIENT
Start: 2021-07-06 | End: 2021-07-06

## 2021-07-06 RX ORDER — IPRATROPIUM BROMIDE AND ALBUTEROL SULFATE 2.5; .5 MG/3ML; MG/3ML
1 SOLUTION RESPIRATORY (INHALATION) ONCE
Status: DISCONTINUED | OUTPATIENT
Start: 2021-07-06 | End: 2021-07-06 | Stop reason: HOSPADM

## 2021-07-06 RX ORDER — HYDROXYZINE HYDROCHLORIDE 25 MG/1
25 TABLET, FILM COATED ORAL ONCE
Status: DISCONTINUED | OUTPATIENT
Start: 2021-07-06 | End: 2021-07-06 | Stop reason: HOSPADM

## 2021-07-06 RX ADMIN — IPRATROPIUM BROMIDE AND ALBUTEROL SULFATE 1 AMPULE: .5; 3 SOLUTION RESPIRATORY (INHALATION) at 18:05

## 2021-07-06 RX ADMIN — ACETAMINOPHEN 650 MG: 325 TABLET, FILM COATED ORAL at 18:18

## 2021-07-06 RX ADMIN — METHYLPREDNISOLONE SODIUM SUCCINATE 125 MG: 125 INJECTION, POWDER, FOR SOLUTION INTRAMUSCULAR; INTRAVENOUS at 18:18

## 2021-07-06 ASSESSMENT — ENCOUNTER SYMPTOMS
BACK PAIN: 0
VOMITING: 0
WHEEZING: 1
SHORTNESS OF BREATH: 1
COUGH: 1
NAUSEA: 0
DIARRHEA: 1

## 2021-07-06 ASSESSMENT — PAIN DESCRIPTION - FREQUENCY: FREQUENCY: CONTINUOUS

## 2021-07-06 ASSESSMENT — PAIN DESCRIPTION - LOCATION: LOCATION: HEAD

## 2021-07-06 ASSESSMENT — PAIN DESCRIPTION - DESCRIPTORS: DESCRIPTORS: DISCOMFORT

## 2021-07-06 ASSESSMENT — PAIN SCALES - GENERAL
PAINLEVEL_OUTOF10: 0
PAINLEVEL_OUTOF10: 8

## 2021-07-06 ASSESSMENT — PAIN DESCRIPTION - PAIN TYPE: TYPE: ACUTE PAIN

## 2021-07-06 NOTE — ED PROVIDER NOTES
[address]  EMERGENCY DEPARTMENT ENCOUNTER      Pt Name: Pearl Maxwell  MRN: [id number]  [place] 1965  Date of evaluation: 7/6/21      CHIEF COMPLAINT       Chief Complaint   Patient presents with    Shortness of Breath     Pt states \"COPD flare up\" stating symptoms x 2 days. Pt states treatments at home are not helping         HISTORY OF PRESENT ILLNESS   HPI 54 y.o. female presents with c/o sob. Symptoms have been present over the last 3 days prior to arrival. Pt reports symptoms associated with wheezing and cough. Reports similar to previous COPD exacerbations. Using inhaler without relief. Has AC but only in 1 room. Thinks heat is provoking symptoms. Patient tried her inhaler with some relief of symtpoms. Recently admitted to hospital for COPD exacerbatin between 6/12 and 6/15. REVIEW OF SYSTEMS       Review of Systems   Constitutional: Negative for fever. HENT: Negative for congestion. Eyes: Negative for visual disturbance. Respiratory: Positive for cough, shortness of breath and wheezing. Cardiovascular: Negative for chest pain and leg swelling. Gastrointestinal: Positive for diarrhea. Negative for nausea and vomiting. Genitourinary: Negative for difficulty urinating. Musculoskeletal: Negative for back pain. Skin: Negative for rash.        PAST MEDICAL HISTORY     Past Medical History:   Diagnosis Date    Acute MI ([address]     Anxiety     Chronic back pain     COPD (chronic obstructive pulmonary disease) ([address]     Depression     Heart disease     Hyperlipidemia     Hypertension     MRSA (methicillin resistant staph aureus) culture positive 09/05/2017    abscess       SURGICAL HISTORY       Past Surgical History:   Procedure Laterality Date    BACK SURGERY      diskectomy 2000, fusion 2007, fusion 7/2017    CHOLECYSTECTOMY, LAPAROSCOPIC  11/10/2017    robotic assisted    CHOLECYSTECTOMY, LAPAROSCOPIC N/A 11/10/2017    XI ROBOTIC CHOLECYSTECTOMY LAPAROSCOPIC performed by Taras Spears DO at [place] [address]       Discharge Medication List as of 7/6/2021  7:10 PM      CONTINUE these medications which have NOT CHANGED    Details   levETIRAcetam (KEPPRA) 750 MG tablet TAKE ONE TABLET BY MOUTH TWICE A DAY **MUST CALL MD FOR APPOINTMENT**, Disp-60 tablet, R-1Normal      baclofen (LIORESAL) 10 MG tablet TAKE ONE TABLET BY MOUTH ONCE NIGHTLY AS NEEDED FOR PAIN, Disp-30 tablet, R-0Normal      albuterol sulfate HFA (VENTOLIN HFA) 108 (90 Base) MCG/ACT inhaler Inhale 2 puffs into the lungs every 4 hours as needed for Wheezing, Disp-3 Inhaler, R-1Normal      tiotropium (SPIRIVA RESPIMAT) 2.5 MCG/ACT AERS inhaler Inhale 2 puffs into the lungs daily, Disp-1 Inhaler, R-2Normal      albuterol (PROVENTIL) (5 MG/ML) 0.5% nebulizer solution Take 0.5 mLs by nebulization 4 times daily, Disp-120 each, R-2Normal      zinc gluconate 50 MG tablet Take 50 mg by mouth dailyHistorical Med      Ascorbic Acid (VITAMIN C) 250 MG tablet Take 250 mg by mouth dailyHistorical Med      Multiple Vitamins-Minerals (EMERGEN-C VITAMIN C PO) Take by mouth dailyHistorical Med      atorvastatin (LIPITOR) 40 MG tablet TAKE ONE TABLET BY MOUTH DAILY, Disp-90 tablet, R-0Normal      escitalopram (LEXAPRO) 10 MG tablet TAKE 1 AND 1/2 TABLET BY MOUTH DAILY, Disp-45 tablet, R-0Normal      betamethasone valerate (VALISONE) 0.1 % cream Apply topically 2 times daily. Do not use for longer than 2 weeks. , Disp-1 Tube, R-0, Normal      nicotine (NICODERM CQ) 21 MG/24HR Place 1 patch onto the skin daily for 10 days, Disp-10 patch, R-0Normal      amitriptyline (ELAVIL) 25 MG tablet Take 3 tablets by mouth nightly, Disp-90 tablet, R-5Normal      budesonide-formoterol (SYMBICORT) 160-4.5 MCG/ACT AERO Inhale 2 puffs into the lungs 2 times daily, Disp-1 Inhaler,R-11Normal      Nebulizers (NEBULIZER COMPRESSOR) MISC Disp-1 each,R-0, PrintDaily as needed Respiratory Therapy Supplies (NEBULIZER/TUBING/MOUTHPIECE) KIT DAILY PRN Starting 2020, Disp-1 kit,R-0, Print      acetaminophen (TYLENOL) 325 MG tablet Take 1 tablet by mouth every 6 hours as needed for Pain, Disp-30 tablet, R-0Print      ibuprofen (IBU) 400 MG tablet Take 1 tablet by mouth every 6 hours as needed for Pain, Disp-30 tablet, R-0Print      Handicap Placard MISC Starting Mon 2020, Disp-1 each, R-0, PrintExp 2024      ondansetron (ZOFRAN ODT) 4 MG disintegrating tablet Take 1 tablet by mouth every 8 hours as needed for Nausea, Disp-20 tablet, R-0Print      Cholecalciferol (VITAMIN D3) 400 units CAPS Take 1 tablet by mouth dailyHistorical Med      aspirin EC 81 MG EC tablet Take 1 tablet by mouth daily, Disp-30 tablet, R-3Normal      magnesium gluconate (MAGONATE) 500 MG tablet Take 400 mg by mouth every evening Historical Med      vitamin B-6 (PYRIDOXINE) 100 MG tablet Take 100 mg by mouth dailyHistorical Med      nitroGLYCERIN (NITROSTAT) 0.4 MG SL tablet Place 1 tablet under the tongue every 5 minutes as needed for Chest pain up to max of 3 total doses. If no relief after 1 dose, call 911., Disp-25 tablet, R-3Normal      Multiple Vitamins-Minerals (THERAPEUTIC MULTIVITAMIN-MINERALS) tablet Take 1 tablet by mouth daily. vitamin B-12 (CYANOCOBALAMIN) 1000 MCG tablet Take 1,000 mcg by mouth daily. ALLERGIES     is allergic to sulfa antibiotics. FAMILY HISTORY     She indicated that her mother is alive. She indicated that her father is . She indicated that her brother is alive. SOCIAL HISTORY      reports that she has been smoking cigarettes. She has been smoking about 1.00 pack per day. She has never used smokeless tobacco. She reports current drug use. Drug: Marijuana. She reports that she does not drink alcohol.     PHYSICAL EXAM     INITIAL VITALS: BP (!) 135/94   Pulse 109   Temp 97.5 °F (36.4 °C) (Oral)   Resp 16   Ht 5' 7\" (1.702 m)   Wt 175 lb myself, and all abnormals are listed below. Labs Reviewed   CBC WITH AUTO DIFFERENTIAL - Abnormal; Notable for the following components:       Result Value    Lymphocytes 57 (*)     All other components within normal limits   BASIC METABOLIC PANEL - Abnormal; Notable for the following components:    Glucose 100 (*)     All other components within normal limits   BLOOD GAS, VENOUS - Abnormal; Notable for the following components:    pH, Bogdan 7.519 (*)     pCO2, Bogdan 30.0 (*)     pO2, Bogdan 150.0 (*)     O2 Sat, Bogdan 95.7 (*)     All other components within normal limits       EMERGENCY DEPARTMENT COURSE:   Vitals:    Vitals:    07/06/21 1739 07/06/21 1805 07/06/21 1916   BP: (!) 135/94     Pulse: 109     Resp: 22  16   Temp: 97.5 °F (36.4 °C)     TempSrc: Oral     SpO2: 97% 96% 97%   Weight: 175 lb (79.4 kg)     Height: 5' 7\" (1.702 m)         The patient was given the following medications while in the emergency department:  Orders Placed This Encounter   Medications    acetaminophen (TYLENOL) tablet 650 mg    DISCONTD: ipratropium-albuterol (DUONEB) nebulizer solution 1 ampule     Order Specific Question:   Initiate RT Bronchodilator Protocol     Answer: Yes    methylPREDNISolone sodium (SOLU-MEDROL) injection 125 mg    DISCONTD: hydrOXYzine (ATARAX) tablet 25 mg    DISCONTD: ipratropium-albuterol (DUONEB) nebulizer solution 1 ampule     Order Specific Question:   Initiate RT Bronchodilator Protocol     Answer:    Yes    predniSONE (DELTASONE) 10 MG tablet     Sig: Take 4 tablets by mouth once daily for 5 days     Dispense:  20 tablet     Refill:  0     -------------------------  CRITICAL CARE:   CONSULTS: None  PROCEDURES: Procedures     FINAL IMPRESSION      1. COPD exacerbation ([facility] [address] [facility]          DISPOSITION/PLAN   DISPOSITION        PATIENT REFERRED TO:  THOR Whitmore - CNP  [address]  [phone]    In 2 days      [facility] ED  [facility] [address]  [phone]    If symptoms worsen      DISCHARGE MEDICATIONS:  Discharge Medication List as of 7/6/2021  7:10 PM            Elena Harper MD  Attending Emergency Physician                      Elena Harper MD  07/08/21 8305

## 2021-07-06 NOTE — PROGRESS NOTES
07/06/21 1805   Treatment   Treatment Type HHN   $Treatment Type $Inhaled Therapy/Meds   Medications Albuterol/Ipratropium   Pre-Tx Pulse 100   Pre-Tx Resps 20   Breath Sounds Pre-Tx SEUN Expiratory Wheezes   Breath Sounds Pre-Tx LLL Diminished   Breath Sounds Pre-Tx RUL Expiratory Wheezes   Breath Sounds Pre-Tx RML Expiratory Wheezes   Breath Sounds Pre-Tx RLL Diminished   Breath Sounds Post-Tx SEUN Diminished   Breath Sounds Post-Tx LLL Diminished   Breath Sounds Post-Tx RUL Diminished   Breath Sounds Post-Tx RML Diminished   Breath Sounds Post-Tx RLL Diminished   Post-Tx Pulse 102   Post-Tx Resps 20   Delivery Source Oxygen   Position Semi-Babcock's   Tx Tolerance Well   Is patient on O2? N   Oxygen Therapy/Pulse Ox   O2 Therapy Room air   SpO2 96 %     Alexandro Carrillo came in with c/c of increasing sob over the last few days with no relief from home treatment regimen. She is in NAD with stable vitals. Hx of COPD/Asthma.

## 2021-07-06 NOTE — TELEPHONE ENCOUNTER
Discharge orders were placed before writer contacted Dr. Maciej Rowan to inform of 30 day readmission risk.

## 2021-07-07 ENCOUNTER — TELEPHONE (OUTPATIENT)
Dept: OTHER | Facility: CLINIC | Age: 56
End: 2021-07-07

## 2021-07-09 ENCOUNTER — OFFICE VISIT (OUTPATIENT)
Dept: PRIMARY CARE CLINIC | Age: 56
End: 2021-07-09
Payer: MEDICARE

## 2021-07-09 VITALS
HEART RATE: 88 BPM | DIASTOLIC BLOOD PRESSURE: 93 MMHG | WEIGHT: 179 LBS | OXYGEN SATURATION: 97 % | SYSTOLIC BLOOD PRESSURE: 135 MMHG | TEMPERATURE: 97.9 F | BODY MASS INDEX: 28.04 KG/M2

## 2021-07-09 DIAGNOSIS — F17.200 SMOKING: ICD-10-CM

## 2021-07-09 DIAGNOSIS — Z12.11 COLON CANCER SCREENING: ICD-10-CM

## 2021-07-09 DIAGNOSIS — J43.9 PULMONARY EMPHYSEMA, UNSPECIFIED EMPHYSEMA TYPE (HCC): Primary | ICD-10-CM

## 2021-07-09 DIAGNOSIS — I25.10 CAD, MULTIPLE VESSEL: ICD-10-CM

## 2021-07-09 DIAGNOSIS — J45.909 ASTHMA, UNSPECIFIED ASTHMA SEVERITY, UNSPECIFIED WHETHER COMPLICATED, UNSPECIFIED WHETHER PERSISTENT: ICD-10-CM

## 2021-07-09 DIAGNOSIS — D22.9 CHANGE IN MOLE: ICD-10-CM

## 2021-07-09 PROBLEM — J44.1 COPD EXACERBATION (HCC): Status: RESOLVED | Noted: 2021-06-12 | Resolved: 2021-07-09

## 2021-07-09 PROBLEM — J18.9 PNEUMONIA DUE TO ORGANISM: Status: RESOLVED | Noted: 2020-12-21 | Resolved: 2021-07-09

## 2021-07-09 PROCEDURE — G8419 CALC BMI OUT NRM PARAM NOF/U: HCPCS | Performed by: NURSE PRACTITIONER

## 2021-07-09 PROCEDURE — 1111F DSCHRG MED/CURRENT MED MERGE: CPT | Performed by: NURSE PRACTITIONER

## 2021-07-09 PROCEDURE — 4004F PT TOBACCO SCREEN RCVD TLK: CPT | Performed by: NURSE PRACTITIONER

## 2021-07-09 PROCEDURE — 99214 OFFICE O/P EST MOD 30 MIN: CPT | Performed by: NURSE PRACTITIONER

## 2021-07-09 PROCEDURE — G8926 SPIRO NO PERF OR DOC: HCPCS | Performed by: NURSE PRACTITIONER

## 2021-07-09 PROCEDURE — 3017F COLORECTAL CA SCREEN DOC REV: CPT | Performed by: NURSE PRACTITIONER

## 2021-07-09 PROCEDURE — 3023F SPIROM DOC REV: CPT | Performed by: NURSE PRACTITIONER

## 2021-07-09 PROCEDURE — G8427 DOCREV CUR MEDS BY ELIG CLIN: HCPCS | Performed by: NURSE PRACTITIONER

## 2021-07-09 RX ORDER — BUPROPION HYDROCHLORIDE 150 MG/1
TABLET, EXTENDED RELEASE ORAL
Qty: 60 TABLET | Refills: 5 | Status: SHIPPED | OUTPATIENT
Start: 2021-07-09 | End: 2022-01-12

## 2021-07-09 RX ORDER — ALBUTEROL SULFATE 90 UG/1
2 AEROSOL, METERED RESPIRATORY (INHALATION) EVERY 4 HOURS PRN
Qty: 3 INHALER | Refills: 1 | Status: SHIPPED | OUTPATIENT
Start: 2021-07-09 | End: 2022-08-17 | Stop reason: SDUPTHER

## 2021-07-09 RX ORDER — FLUTICASONE FUROATE AND VILANTEROL TRIFENATATE 100; 25 UG/1; UG/1
1 POWDER RESPIRATORY (INHALATION) DAILY
Qty: 30 EACH | Refills: 1 | Status: SHIPPED | OUTPATIENT
Start: 2021-07-09 | End: 2021-09-14

## 2021-07-09 RX ORDER — BACLOFEN 10 MG/1
TABLET ORAL
Qty: 30 TABLET | Refills: 3 | Status: SHIPPED | OUTPATIENT
Start: 2021-07-09 | End: 2022-01-20 | Stop reason: SDUPTHER

## 2021-07-09 SDOH — ECONOMIC STABILITY: FOOD INSECURITY: WITHIN THE PAST 12 MONTHS, THE FOOD YOU BOUGHT JUST DIDN'T LAST AND YOU DIDN'T HAVE MONEY TO GET MORE.: NEVER TRUE

## 2021-07-09 SDOH — ECONOMIC STABILITY: FOOD INSECURITY: WITHIN THE PAST 12 MONTHS, YOU WORRIED THAT YOUR FOOD WOULD RUN OUT BEFORE YOU GOT MONEY TO BUY MORE.: NEVER TRUE

## 2021-07-09 ASSESSMENT — ENCOUNTER SYMPTOMS
TROUBLE SWALLOWING: 0
DIARRHEA: 0
SHORTNESS OF BREATH: 1
COUGH: 1
VOMITING: 0
ABDOMINAL PAIN: 0
WHEEZING: 0
BLOOD IN STOOL: 0

## 2021-07-09 ASSESSMENT — SOCIAL DETERMINANTS OF HEALTH (SDOH): HOW HARD IS IT FOR YOU TO PAY FOR THE VERY BASICS LIKE FOOD, HOUSING, MEDICAL CARE, AND HEATING?: NOT HARD AT ALL

## 2021-07-09 NOTE — PROGRESS NOTES
--Julito Gilmore MA on 7/9/2021 at 9:44 AM  Visit Information    Have you changed or started any medications since your last visit including any over-the-counter medicines, vitamins, or herbal medicines? no   Are you having any side effects from any of your medications? -  no  Have you stopped taking any of your medications? Is so, why? -  no    Have you seen any other physician or provider since your last visit? Yes - Records Obtained  Have you had any other diagnostic tests since your last visit? Yes - Records Obtained  Have you been seen in the emergency room and/or had an admission to a hospital since we last saw you? Yes - Records Obtained  Have you had your routine dental cleaning in the past 6 months? no    Have you activated your [employer] account? If not, what are your barriers?  Yes     Patient Care Team:  THOR Cao CNP as PCP - General (Family Medicine)  THOR Cao CNP as PCP [facility]    Medical History Review  Past Medical, Family, and Social History reviewed and does not contribute to the patient presenting condition    Health Maintenance   Topic Date Due    Hepatitis C screen  Never done    COVID-19 Vaccine (1) Never done    HIV screen  Never done    Cervical cancer screen  Never done    Lipid screen  11/08/2018    Colon Cancer Screen FIT/FOBT  02/20/2019    Annual Wellness Visit (AWV)  Never done    Breast cancer screen  02/06/2020    Shingles Vaccine (2 of 2) 06/25/2021    Flu vaccine (1) 09/01/2021    DTaP/Tdap/Td vaccine (2 - Td or Tdap) 09/20/2027    Pneumococcal 0-64 years Vaccine (2 of 2 - PPSV23) 08/08/2030    Hepatitis A vaccine  Aged Out    Hepatitis B vaccine  Aged Out    Hib vaccine  Aged Out    Meningococcal (ACWY) vaccine  Aged Out

## 2021-07-09 NOTE — PROGRESS NOTES
[facility]  [id number] [facility] [address]  Dept: [phone]  Dept Fax: [phone]    Patient Care Team:  THOR Andres CNP as PCP - General (Family Medicine)  THOR Andres CNP as PCP - [facility] Empaneled Provider    2021     Malissa Beltran (:  [id number])TY a 54 y.o. female, here for evaluation of the following medical concerns:   Chief Complaint   Patient presents with    Follow-Up from Hospital     COPD    Discuss Medications    Medication Refill     Fabien Clifton is a 51-year-old female here today to follow-up for COPD. Last visit with me was 2020, via virtual visit. First and only visit with pulmonology was on 10/16/2020. Since that time the patient has been seen in the ER for multiple COPD exacerbations. Twice in 2021, and then again in May, , and early 2021. Upon her discharge from the ER on 2021, inhalers include Spiriva Respimat 2.5 and albuterol. Chest x-ray negative for pneumonia, WBC count was normal.  Negative hypercapnia on blood gas. Long history of smoking, starting at age 13. Pulmonology believes patient has asthmatic bronchitis in addition to obstruction. At that time she was referred to cardiology for an ischemia work-up because of history of smoking and risk of CAD. Symbicort 160 twice daily prescribed. Was to continue with Spiriva. Has appt with Pulmonology on Aug 23rd, with Dr Nettie Rosado  Currently on albuterol and Spiriva    Wants off TCA, feels it makes her groggy. It does seem to be helping the HA's, has them les often    Wants to start Wellbutrin for smoking cessation. Asking to see dermatology, has a mole on mid left back near bra that appears to be darker and now itches. As well as an itchy growth on her right temple region    . Review of Systems   Constitutional: Negative for appetite change, fever and unexpected weight change. HENT: Negative for hearing loss and trouble swallowing. Eyes: Negative for visual disturbance. Respiratory: Positive for cough and shortness of breath. Negative for wheezing. Cardiovascular: Negative for chest pain and palpitations. Gastrointestinal: Negative for abdominal pain, blood in stool, diarrhea and vomiting. Endocrine: Negative for polydipsia and polyuria. Genitourinary: Negative for frequency and hematuria. Musculoskeletal: Negative for myalgias and neck pain. Neurological: Negative for seizures, numbness and headaches. Psychiatric/Behavioral: Negative for suicidal ideas. The patient is not nervous/anxious. Prior to Visit Medications    Medication Sig Taking?  Authorizing Provider   predniSONE (DELTASONE) 10 MG tablet Take 4 tablets by mouth once daily for 5 days Yes Riley Simon MD   levETIRAcetam (KEPPRA) 750 MG tablet TAKE ONE TABLET BY MOUTH TWICE A DAY **MUST CALL MD FOR APPOINTMENT** Yes THOR Kuhn CNP   baclofen (LIORESAL) 10 MG tablet TAKE ONE TABLET BY MOUTH ONCE NIGHTLY AS NEEDED FOR PAIN Yes THOR Manrique CNP   albuterol sulfate HFA (VENTOLIN HFA) 108 (90 Base) MCG/ACT inhaler Inhale 2 puffs into the lungs every 4 hours as needed for Wheezing Yes Xenia Montenegro MD   tiotropium (SPIRIVA RESPIMAT) 2.5 MCG/ACT AERS inhaler Inhale 2 puffs into the lungs daily Yes Xenia Montenegro MD   albuterol (PROVENTIL) (5 MG/ML) 0.5% nebulizer solution Take 0.5 mLs by nebulization 4 times daily Yes Xenia Montenegro MD   zinc gluconate 50 MG tablet Take 50 mg by mouth daily Yes Historical Provider, MD   Ascorbic Acid (VITAMIN C) 250 MG tablet Take 250 mg by mouth daily Yes Historical Provider, MD   Multiple Vitamins-Minerals (EMERGEN-C VITAMIN C PO) Take by mouth daily Yes Historical Provider, MD   atorvastatin (LIPITOR) 40 MG tablet TAKE ONE TABLET BY MOUTH DAILY Yes THOR Manrique CNP   betamethasone valerate (VALISONE) 0.1 % cream Apply topically 2 times daily. Do not use for longer than 2 weeks. Yes Kaiden Reno MD   amitriptyline (ELAVIL) 25 MG tablet Take 3 tablets by mouth nightly Yes THOR Tabares CNP   Nebulizers (NEBULIZER COMPRESSOR) MISC Daily as needed Yes THOR Mondragon CNP   Respiratory Therapy Supplies (NEBULIZER/TUBING/MOUTHPIECE) KIT 1 kit by Does not apply route daily as needed (SOB or wheezing) Yes THOR Mondragon CNP   acetaminophen (TYLENOL) 325 MG tablet Take 1 tablet by mouth every 6 hours as needed for Pain Yes Basia Hams, DO   ibuprofen (IBU) 400 MG tablet Take 1 tablet by mouth every 6 hours as needed for Pain Yes Basia Hams, DO   Cholecalciferol (VITAMIN D3) 400 units CAPS Take 1 tablet by mouth daily Yes Historical Provider, MD   aspirin EC 81 MG EC tablet Take 1 tablet by mouth daily  Patient taking differently: Take 81 mg by mouth daily as needed  Yes THOR Mondragon CNP   magnesium gluconate (MAGONATE) 500 MG tablet Take 400 mg by mouth every evening  Yes Historical Provider, MD   vitamin B-6 (PYRIDOXINE) 100 MG tablet Take 100 mg by mouth daily Yes Historical Provider, MD   Multiple Vitamins-Minerals (THERAPEUTIC MULTIVITAMIN-MINERALS) tablet Take 1 tablet by mouth daily. Yes Historical Provider, MD   vitamin B-12 (CYANOCOBALAMIN) 1000 MCG tablet Take 1,000 mcg by mouth daily.  Yes Historical Provider, MD   escitalopram (LEXAPRO) 10 MG tablet TAKE 1 AND 1/2 TABLET BY MOUTH DAILY  Patient not taking: Reported on 7/9/2021  THOR Lin NP   nicotine (NICODERM CQ) 21 MG/24HR Place 1 patch onto the skin daily for 10 days  Patient not taking: Reported on 7/9/2021  Elen Ruiz MD   budesonide-formoterol [facility]) 160-4.5 MCG/ACT AERO Inhale 2 puffs into the lungs 2 times daily  Patient not taking: Reported on 11/30/2020  Nilda Fine MD   Handicap Placard MISC by Does not apply route Exp 1/2024  THOR Mondragon CNP   nitroGLYCERIN (NITROSTAT) 0.4 MG SL tablet Place 1 tablet under the tongue every 5 minutes as needed for Chest pain up to max of 3 total doses. If no relief after 1 dose, call 911. 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 Weight as of this encounter: 179 lb (81.2 kg). DIAGNOSTIC FINDINGS:  CBC:  Lab Results   Component Value Date    WBC 5.2 07/06/2021    HGB 14.3 07/06/2021     07/06/2021       BMP:    Lab Results   Component Value Date     07/06/2021    K 4.4 07/06/2021     07/06/2021    CO2 22 07/06/2021    BUN 13 07/06/2021    CREATININE 0.59 07/06/2021    GLUCOSE 100 07/06/2021       HEMOGLOBIN A1C:   Lab Results   Component Value Date    LABA1C 5.3 01/30/2018       FASTING LIPID PANEL:  Lab Results   Component Value Date    CHOL 265 (H) 11/08/2017    HDL 44 11/08/2017    TRIG 115 11/08/2017       Physical Exam  Vitals and nursing note reviewed. Constitutional:       General: She is not in acute distress. Appearance: She is well-developed. HENT:      Head: Normocephalic. Mouth/Throat:      Mouth: Mucous membranes are moist.   Eyes:      General: No scleral icterus. Pupils: Pupils are equal, round, and reactive to light.    Cardiovascular:      Rate and Rhythm: Normal rate and regular rhythm. Pulmonary:      Effort: Pulmonary effort is normal. No respiratory distress. Breath sounds: Normal breath sounds. No wheezing. Abdominal:      Palpations: Abdomen is soft. Tenderness: There is no abdominal tenderness. Musculoskeletal:      Cervical back: Normal range of motion and neck supple. Skin:     General: Skin is warm and dry. Findings: Lesion present. Neurological:      Mental Status: She is alert and oriented to person, place, and time. Coordination: Coordination normal.   Psychiatric:         Behavior: Behavior normal. Behavior is cooperative. Thought Content: Thought content normal.         Judgment: Judgment normal.         ASSESSMENT     Diagnosis Orders   1. CAD, multiple vessel     2. Pulmonary emphysema, unspecified emphysema type ([facility] [address]     3. Asthma, unspecified asthma severity, unspecified whether complicated, unspecified whether persistent     4. Smoking            PLAN:  No orders of the defined types were placed in this encounter. 1. Dermatology referral placed due to concerns for itching and color change to lesion to thoracic back. Advised patient I suspect lesion to scalp likely benign, a seborrheic keratosis or Sebaceous hyperplasia  2. COPD remains uncontrolled. Discussed my concerns with Guzman Jefferson, that every exacerbation damages lung function. She is not sure why she no longer has a steroid inhaler. Inhaler switch to [employer] based on apparent insurance coverage, from [employer], today. 3. Patient wishes to address smoking cessation, will start Wellbutrin. 4. Guzman Jefferson is motivated to stop amitriptyline due to side effects. Encouraged her to wean off slowly, she can take 1 pill less for 2 to 3 weeks and monitor for improvement in fatigue or grogginess. Discussed migraines or headaches could recur and worsen as this medication is prescribed for headache prevention. She verbalized understanding.     FOLLOW UP AND INSTRUCTIONS:  · No follow-ups on file. · Marce Easley received counseling on the following healthy behaviors:medication adherence and tobacco cessation    · Discussed use, benefit, and side effects of prescribed medications. Barriers to  medication compliance addressed. All patient questions answered. Pt  verbalized understanding of all instructions given. · Patient given educational materials - see patient instructions      · Patient advised to contact scheduling offices for any referrals or imaging orders  placed today if they have not been contacted in 48 hours. No follow-ups on file. An electronic signature was used to authenticate this note. --THOR England - CNP on 7/9/2021 at 10:12 AM    Tobacco Cessation Counseling: Patient advised about behavior change, including information about personal health harms, usage of appropriate cessation measures and benefits of cessation.   Time spent (minutes): 5

## 2021-07-21 ENCOUNTER — TELEPHONE (OUTPATIENT)
Dept: PRIMARY CARE CLINIC | Age: 56
End: 2021-07-21

## 2021-07-21 DIAGNOSIS — D22.9 CHANGE IN MOLE: Primary | ICD-10-CM

## 2021-07-21 NOTE — TELEPHONE ENCOUNTER
Patient is requesting a new referral  be sent to [facility] with Dr. Sana Goodwin, fax #  [phone], office number is [phone].   Health Maintenance   Topic Date Due    Hepatitis C screen  Never done    COVID-19 Vaccine (1) Never done    HIV screen  Never done    Cervical cancer screen  Never done    Lipid screen  11/08/2018    Colon Cancer Screen FIT/FOBT  02/20/2019    Annual Wellness Visit (AWV)  Never done    Breast cancer screen  02/06/2020    Shingles Vaccine (2 of 2) 06/25/2021    Flu vaccine (1) 09/01/2021    Low dose CT lung screening  10/21/2021    DTaP/Tdap/Td vaccine (2 - Td or Tdap) 09/20/2027    Pneumococcal 0-64 years Vaccine (2 of 2 - PPSV23) 08/08/2030    Hepatitis A vaccine  Aged Out    Hepatitis B vaccine  Aged Out    Hib vaccine  Aged Out    Meningococcal (ACWY) vaccine  Aged Out             (applicable per patient's age: Cancer Screenings, Depression Screening, Fall Risk Screening, Immunizations)    Hemoglobin A1C (%)   Date Value   01/30/2018 5.3   08/02/2013 5.3   02/14/2013 6.0     LDL Cholesterol (mg/dL)   Date Value   11/08/2017 198 (H)     AST (U/L)   Date Value   07/12/2020 18     ALT (U/L)   Date Value   07/12/2020 16     BUN (mg/dL)   Date Value   07/06/2021 13      (goal A1C is < 7)   (goal LDL is <100) need 30-50% reduction from baseline     BP Readings from Last 3 Encounters:   07/09/21 (!) 135/93   07/06/21 (!) 135/94   06/15/21 (!) 148/104    (goal /80)      All Future Testing planned in [facility]:  Lab Frequency Next Occurrence   Lipid, Fasting Once 10/19/2021   POCT Fecal Immunochemical Test (FIT) Once 10/12/2021       Next Visit Date:  Future Appointments   Date Time Provider Kaycee Hays   9/2/2021  8:40 AM THOR Kate CNP Neuro Spec [facility]   9/9/2021  8:30 AM THOR Jensen CNP [facility]   12/20/2021  8:45 AM Santiago Bowling MD  derm [facility]            Patient Active Problem List:     Heart disease     Chronic back pain     Depression     Hyperlipidemia     CAD, multiple vessel     Asthma     Smoking     Need for vaccination     Syncope     Leg pain     Left hip pain     Chronic cholecystitis     Chest pain     Calculus of gallbladder without cholecystitis without obstruction     History of lumbar fusion     Failed back syndrome     Marijuana use     Abnormal weight loss     Allergic rhinitis     Anxiety state     Chronic midline low back pain with sciatica     Moderate episode of recurrent major depressive disorder (HCC)     Seizure (HCC)     Chronic tension-type headache, intractable     Hypertension     Sinus tachycardia     Acute respiratory failure (HCC)     COPD (chronic obstructive pulmonary disease) ([facility] [address]

## 2021-07-29 DIAGNOSIS — E78.00 PURE HYPERCHOLESTEROLEMIA: ICD-10-CM

## 2021-07-30 NOTE — TELEPHONE ENCOUNTER
Health Maintenance   Topic Date Due    Hepatitis C screen  Never done    COVID-19 Vaccine (1) Never done    HIV screen  Never done    Cervical cancer screen  Never done    Lipid screen  11/08/2018    Colon Cancer Screen FIT/FOBT  02/20/2019    Annual Wellness Visit (AWV)  Never done    Breast cancer screen  02/06/2020    Shingles Vaccine (2 of 2) 06/25/2021    Flu vaccine (1) 09/01/2021    Low dose CT lung screening  10/21/2021    DTaP/Tdap/Td vaccine (2 - Td or Tdap) 09/20/2027    Pneumococcal 0-64 years Vaccine (2 of 2 - PPSV23) 08/08/2030    Hepatitis A vaccine  Aged Out    Hepatitis B vaccine  Aged Out    Hib vaccine  Aged Out    Meningococcal (ACWY) vaccine  Aged Out             (applicable per patient's age: Cancer Screenings, Depression Screening, Fall Risk Screening, Immunizations)    Hemoglobin A1C (%)   Date Value   01/30/2018 5.3   08/02/2013 5.3   02/14/2013 6.0     LDL Cholesterol (mg/dL)   Date Value   11/08/2017 198 (H)     AST (U/L)   Date Value   07/12/2020 18     ALT (U/L)   Date Value   07/12/2020 16     BUN (mg/dL)   Date Value   07/06/2021 13      (goal A1C is < 7)   (goal LDL is <100) need 30-50% reduction from baseline     BP Readings from Last 3 Encounters:   07/09/21 (!) 135/93   07/06/21 (!) 135/94   06/15/21 (!) 148/104    (goal /80)      All Future Testing planned in [facility]:  Lab Frequency Next Occurrence   Lipid, Fasting Once 10/19/2021   POCT Fecal Immunochemical Test (FIT) Once 10/12/2021       Next Visit Date:  Future Appointments   Date Time Provider Kaycee Hays   9/2/2021  8:40 AM THOR Carmichael CNP Neuro Spec [facility]   9/9/2021  8:30 AM THOR Bermudez CNP [facility]   12/20/2021  8:45 AM Rodger Connor MD  derm [facility]            Patient Active Problem List:     Heart disease     Chronic back pain     Depression     Hyperlipidemia     CAD, multiple vessel     Asthma     Smoking     Need for vaccination     Syncope Leg pain     Left hip pain     Chronic cholecystitis     Chest pain     Calculus of gallbladder without cholecystitis without obstruction     History of lumbar fusion     Failed back syndrome     Marijuana use     Abnormal weight loss     Allergic rhinitis     Anxiety state     Chronic midline low back pain with sciatica     Moderate episode of recurrent major depressive disorder (HCC)     Seizure (HCC)     Chronic tension-type headache, intractable     Hypertension     Sinus tachycardia     Acute respiratory failure (HCC)     COPD (chronic obstructive pulmonary disease) ([facility] [address]

## 2021-08-02 RX ORDER — ATORVASTATIN CALCIUM 40 MG/1
TABLET, FILM COATED ORAL
Qty: 90 TABLET | Refills: 0 | Status: SHIPPED | OUTPATIENT
Start: 2021-08-02 | End: 2021-11-30

## 2021-09-13 DIAGNOSIS — J43.9 PULMONARY EMPHYSEMA, UNSPECIFIED EMPHYSEMA TYPE (HCC): ICD-10-CM

## 2021-09-14 RX ORDER — FLUTICASONE FUROATE AND VILANTEROL TRIFENATATE 100; 25 UG/1; UG/1
POWDER RESPIRATORY (INHALATION)
Qty: 3 EACH | Refills: 1 | Status: SHIPPED | OUTPATIENT
Start: 2021-09-14 | End: 2022-02-22

## 2021-09-15 ENCOUNTER — APPOINTMENT (OUTPATIENT)
Dept: GENERAL RADIOLOGY | Age: 56
DRG: 192 | End: 2021-09-15
Payer: MEDICARE

## 2021-09-15 ENCOUNTER — HOSPITAL ENCOUNTER (INPATIENT)
Age: 56
LOS: 2 days | Discharge: HOME | DRG: 192 | End: 2021-09-17
Attending: EMERGENCY MEDICINE | Admitting: INTERNAL MEDICINE
Payer: MEDICARE

## 2021-09-15 DIAGNOSIS — J44.1 COPD EXACERBATION (HCC): Primary | ICD-10-CM

## 2021-09-15 LAB
ABSOLUTE EOS #: 0.6 K/UL (ref 0–0.4)
ABSOLUTE IMMATURE GRANULOCYTE: ABNORMAL K/UL (ref 0–0.3)
ABSOLUTE LYMPH #: 1.9 K/UL (ref 1–4.8)
ABSOLUTE MONO #: 0.4 K/UL (ref 0.1–1.3)
ANION GAP SERPL CALCULATED.3IONS-SCNC: 15 MMOL/L (ref 9–17)
BASOPHILS # BLD: 1 % (ref 0–2)
BASOPHILS ABSOLUTE: 0 K/UL (ref 0–0.2)
BUN BLDV-MCNC: 7 MG/DL (ref 6–20)
BUN/CREAT BLD: ABNORMAL (ref 9–20)
CALCIUM SERPL-MCNC: 9.9 MG/DL (ref 8.6–10.4)
CHLORIDE BLD-SCNC: 106 MMOL/L (ref 98–107)
CO2: 22 MMOL/L (ref 20–31)
CREAT SERPL-MCNC: 0.7 MG/DL (ref 0.5–0.9)
DIFFERENTIAL TYPE: ABNORMAL
EOSINOPHILS RELATIVE PERCENT: 10 % (ref 0–4)
GFR AFRICAN AMERICAN: >60 ML/MIN
GFR NON-AFRICAN AMERICAN: >60 ML/MIN
GFR SERPL CREATININE-BSD FRML MDRD: ABNORMAL ML/MIN/{1.73_M2}
GFR SERPL CREATININE-BSD FRML MDRD: ABNORMAL ML/MIN/{1.73_M2}
GLUCOSE BLD-MCNC: 118 MG/DL (ref 70–99)
HCT VFR BLD CALC: 45.2 % (ref 36–46)
HEMOGLOBIN: 15 G/DL (ref 12–16)
IMMATURE GRANULOCYTES: ABNORMAL %
INR BLD: 0.9
KEPPRA: <2 UG/ML
LYMPHOCYTES # BLD: 33 % (ref 24–44)
MCH RBC QN AUTO: 31.3 PG (ref 26–34)
MCHC RBC AUTO-ENTMCNC: 33.3 G/DL (ref 31–37)
MCV RBC AUTO: 94 FL (ref 80–100)
MONOCYTES # BLD: 7 % (ref 1–7)
MYOGLOBIN: 47 NG/ML (ref 25–58)
MYOGLOBIN: 61 NG/ML (ref 25–58)
NRBC AUTOMATED: ABNORMAL PER 100 WBC
PARTIAL THROMBOPLASTIN TIME: 29.6 SEC (ref 24–36)
PDW BLD-RTO: 14.6 % (ref 11.5–14.9)
PLATELET # BLD: 292 K/UL (ref 150–450)
PLATELET ESTIMATE: ABNORMAL
PMV BLD AUTO: 6 FL (ref 6–12)
POTASSIUM SERPL-SCNC: 3.5 MMOL/L (ref 3.7–5.3)
PROTHROMBIN TIME: 12.5 SEC (ref 11.8–14.6)
RBC # BLD: 4.81 M/UL (ref 4–5.2)
RBC # BLD: ABNORMAL 10*6/UL
SARS-COV-2, RAPID: NOT DETECTED
SEG NEUTROPHILS: 49 % (ref 36–66)
SEGMENTED NEUTROPHILS ABSOLUTE COUNT: 2.9 K/UL (ref 1.3–9.1)
SODIUM BLD-SCNC: 143 MMOL/L (ref 135–144)
SPECIMEN DESCRIPTION: NORMAL
THYROXINE, FREE: 1.23 NG/DL (ref 0.93–1.7)
TROPONIN INTERP: ABNORMAL
TROPONIN INTERP: NORMAL
TROPONIN T: ABNORMAL NG/ML
TROPONIN T: NORMAL NG/ML
TROPONIN, HIGH SENSITIVITY: <6 NG/L (ref 0–14)
TROPONIN, HIGH SENSITIVITY: <6 NG/L (ref 0–14)
TSH SERPL DL<=0.05 MIU/L-ACNC: 2.13 MIU/L (ref 0.3–5)
WBC # BLD: 5.8 K/UL (ref 3.5–11)
WBC # BLD: ABNORMAL 10*3/UL

## 2021-09-15 PROCEDURE — 6360000002 HC RX W HCPCS: Performed by: EMERGENCY MEDICINE

## 2021-09-15 PROCEDURE — 87635 SARS-COV-2 COVID-19 AMP PRB: CPT

## 2021-09-15 PROCEDURE — 6370000000 HC RX 637 (ALT 250 FOR IP): Performed by: INTERNAL MEDICINE

## 2021-09-15 PROCEDURE — 93005 ELECTROCARDIOGRAM TRACING: CPT | Performed by: EMERGENCY MEDICINE

## 2021-09-15 PROCEDURE — 84484 ASSAY OF TROPONIN QUANT: CPT

## 2021-09-15 PROCEDURE — 80177 DRUG SCRN QUAN LEVETIRACETAM: CPT

## 2021-09-15 PROCEDURE — 84439 ASSAY OF FREE THYROXINE: CPT

## 2021-09-15 PROCEDURE — 85025 COMPLETE CBC W/AUTO DIFF WBC: CPT

## 2021-09-15 PROCEDURE — 83874 ASSAY OF MYOGLOBIN: CPT

## 2021-09-15 PROCEDURE — 71045 X-RAY EXAM CHEST 1 VIEW: CPT

## 2021-09-15 PROCEDURE — 85610 PROTHROMBIN TIME: CPT

## 2021-09-15 PROCEDURE — 84443 ASSAY THYROID STIM HORMONE: CPT

## 2021-09-15 PROCEDURE — 94761 N-INVAS EAR/PLS OXIMETRY MLT: CPT

## 2021-09-15 PROCEDURE — 36415 COLL VENOUS BLD VENIPUNCTURE: CPT

## 2021-09-15 PROCEDURE — 85730 THROMBOPLASTIN TIME PARTIAL: CPT

## 2021-09-15 PROCEDURE — 99233 SBSQ HOSP IP/OBS HIGH 50: CPT | Performed by: INTERNAL MEDICINE

## 2021-09-15 PROCEDURE — 2700000000 HC OXYGEN THERAPY PER DAY

## 2021-09-15 PROCEDURE — 6360000002 HC RX W HCPCS: Performed by: NURSE PRACTITIONER

## 2021-09-15 PROCEDURE — 6370000000 HC RX 637 (ALT 250 FOR IP)

## 2021-09-15 PROCEDURE — 6370000000 HC RX 637 (ALT 250 FOR IP): Performed by: EMERGENCY MEDICINE

## 2021-09-15 PROCEDURE — 80048 BASIC METABOLIC PNL TOTAL CA: CPT

## 2021-09-15 PROCEDURE — 2580000003 HC RX 258: Performed by: INTERNAL MEDICINE

## 2021-09-15 PROCEDURE — 1200000000 HC SEMI PRIVATE

## 2021-09-15 PROCEDURE — 6360000002 HC RX W HCPCS: Performed by: INTERNAL MEDICINE

## 2021-09-15 PROCEDURE — 6370000000 HC RX 637 (ALT 250 FOR IP): Performed by: STUDENT IN AN ORGANIZED HEALTH CARE EDUCATION/TRAINING PROGRAM

## 2021-09-15 PROCEDURE — 99285 EMERGENCY DEPT VISIT HI MDM: CPT

## 2021-09-15 PROCEDURE — 94640 AIRWAY INHALATION TREATMENT: CPT

## 2021-09-15 PROCEDURE — 96374 THER/PROPH/DIAG INJ IV PUSH: CPT

## 2021-09-15 RX ORDER — ACETAMINOPHEN 325 MG/1
650 TABLET ORAL EVERY 6 HOURS PRN
Status: DISCONTINUED | OUTPATIENT
Start: 2021-09-15 | End: 2021-09-17 | Stop reason: HOSPADM

## 2021-09-15 RX ORDER — ATORVASTATIN CALCIUM 40 MG/1
40 TABLET, FILM COATED ORAL DAILY
Status: DISCONTINUED | OUTPATIENT
Start: 2021-09-15 | End: 2021-09-17 | Stop reason: HOSPADM

## 2021-09-15 RX ORDER — IPRATROPIUM BROMIDE AND ALBUTEROL SULFATE 2.5; .5 MG/3ML; MG/3ML
1 SOLUTION RESPIRATORY (INHALATION)
Status: DISCONTINUED | OUTPATIENT
Start: 2021-09-15 | End: 2021-09-17 | Stop reason: HOSPADM

## 2021-09-15 RX ORDER — DEXTROMETHORPHAN POLISTIREX 30 MG/5ML
60 SUSPENSION ORAL EVERY 12 HOURS SCHEDULED
Status: DISCONTINUED | OUTPATIENT
Start: 2021-09-15 | End: 2021-09-17 | Stop reason: HOSPADM

## 2021-09-15 RX ORDER — NICOTINE 21 MG/24HR
1 PATCH, TRANSDERMAL 24 HOURS TRANSDERMAL DAILY
Status: DISCONTINUED | OUTPATIENT
Start: 2021-09-15 | End: 2021-09-17 | Stop reason: HOSPADM

## 2021-09-15 RX ORDER — DIPHENHYDRAMINE HYDROCHLORIDE 50 MG/ML
25 INJECTION INTRAMUSCULAR; INTRAVENOUS ONCE
Status: COMPLETED | OUTPATIENT
Start: 2021-09-15 | End: 2021-09-15

## 2021-09-15 RX ORDER — ONDANSETRON 2 MG/ML
4 INJECTION INTRAMUSCULAR; INTRAVENOUS EVERY 6 HOURS PRN
Status: DISCONTINUED | OUTPATIENT
Start: 2021-09-15 | End: 2021-09-17 | Stop reason: HOSPADM

## 2021-09-15 RX ORDER — SODIUM CHLORIDE 0.9 % (FLUSH) 0.9 %
5-40 SYRINGE (ML) INJECTION PRN
Status: DISCONTINUED | OUTPATIENT
Start: 2021-09-15 | End: 2021-09-17 | Stop reason: HOSPADM

## 2021-09-15 RX ORDER — BUPROPION HYDROCHLORIDE 150 MG/1
150 TABLET, EXTENDED RELEASE ORAL 2 TIMES DAILY
Status: DISCONTINUED | OUTPATIENT
Start: 2021-09-15 | End: 2021-09-17 | Stop reason: HOSPADM

## 2021-09-15 RX ORDER — SODIUM CHLORIDE 0.9 % (FLUSH) 0.9 %
5-40 SYRINGE (ML) INJECTION EVERY 12 HOURS SCHEDULED
Status: DISCONTINUED | OUTPATIENT
Start: 2021-09-15 | End: 2021-09-17 | Stop reason: HOSPADM

## 2021-09-15 RX ORDER — LEVETIRACETAM 750 MG/1
750 TABLET ORAL NIGHTLY
Status: DISCONTINUED | OUTPATIENT
Start: 2021-09-15 | End: 2021-09-17 | Stop reason: HOSPADM

## 2021-09-15 RX ORDER — PREDNISONE 20 MG/1
40 TABLET ORAL DAILY
Status: DISCONTINUED | OUTPATIENT
Start: 2021-09-17 | End: 2021-09-15

## 2021-09-15 RX ORDER — SODIUM CHLORIDE 9 MG/ML
INJECTION, SOLUTION INTRAVENOUS CONTINUOUS
Status: DISCONTINUED | OUTPATIENT
Start: 2021-09-15 | End: 2021-09-16

## 2021-09-15 RX ORDER — SODIUM CHLORIDE 9 MG/ML
25 INJECTION, SOLUTION INTRAVENOUS PRN
Status: DISCONTINUED | OUTPATIENT
Start: 2021-09-15 | End: 2021-09-17 | Stop reason: HOSPADM

## 2021-09-15 RX ORDER — POLYETHYLENE GLYCOL 3350 17 G/17G
17 POWDER, FOR SOLUTION ORAL DAILY PRN
Status: DISCONTINUED | OUTPATIENT
Start: 2021-09-15 | End: 2021-09-17 | Stop reason: HOSPADM

## 2021-09-15 RX ORDER — METHYLPREDNISOLONE SODIUM SUCCINATE 40 MG/ML
40 INJECTION, POWDER, LYOPHILIZED, FOR SOLUTION INTRAMUSCULAR; INTRAVENOUS EVERY 6 HOURS
Status: DISCONTINUED | OUTPATIENT
Start: 2021-09-15 | End: 2021-09-15

## 2021-09-15 RX ORDER — METHYLPREDNISOLONE SODIUM SUCCINATE 125 MG/2ML
125 INJECTION, POWDER, LYOPHILIZED, FOR SOLUTION INTRAMUSCULAR; INTRAVENOUS ONCE
Status: COMPLETED | OUTPATIENT
Start: 2021-09-15 | End: 2021-09-15

## 2021-09-15 RX ORDER — METHYLPREDNISOLONE SODIUM SUCCINATE 40 MG/ML
40 INJECTION, POWDER, LYOPHILIZED, FOR SOLUTION INTRAMUSCULAR; INTRAVENOUS EVERY 6 HOURS
Status: DISCONTINUED | OUTPATIENT
Start: 2021-09-15 | End: 2021-09-16

## 2021-09-15 RX ORDER — ACETAMINOPHEN 325 MG/1
650 TABLET ORAL EVERY 6 HOURS PRN
COMMUNITY

## 2021-09-15 RX ORDER — IPRATROPIUM BROMIDE AND ALBUTEROL SULFATE 2.5; .5 MG/3ML; MG/3ML
1 SOLUTION RESPIRATORY (INHALATION)
Status: DISCONTINUED | OUTPATIENT
Start: 2021-09-15 | End: 2021-09-15

## 2021-09-15 RX ORDER — ASPIRIN 81 MG/1
81 TABLET ORAL DAILY
Status: DISCONTINUED | OUTPATIENT
Start: 2021-09-15 | End: 2021-09-17 | Stop reason: HOSPADM

## 2021-09-15 RX ORDER — POTASSIUM CHLORIDE 7.45 MG/ML
10 INJECTION INTRAVENOUS PRN
Status: DISCONTINUED | OUTPATIENT
Start: 2021-09-15 | End: 2021-09-17 | Stop reason: HOSPADM

## 2021-09-15 RX ORDER — BENZONATATE 100 MG/1
200 CAPSULE ORAL 3 TIMES DAILY
Status: DISCONTINUED | OUTPATIENT
Start: 2021-09-15 | End: 2021-09-17 | Stop reason: HOSPADM

## 2021-09-15 RX ORDER — LEVETIRACETAM 750 MG/1
750 TABLET ORAL 2 TIMES DAILY
Status: DISCONTINUED | OUTPATIENT
Start: 2021-09-15 | End: 2021-09-15

## 2021-09-15 RX ORDER — ACETAMINOPHEN 650 MG/1
650 SUPPOSITORY RECTAL EVERY 6 HOURS PRN
Status: DISCONTINUED | OUTPATIENT
Start: 2021-09-15 | End: 2021-09-17 | Stop reason: HOSPADM

## 2021-09-15 RX ORDER — LANOLIN ALCOHOL/MO/W.PET/CERES
1000 CREAM (GRAM) TOPICAL DAILY
Status: DISCONTINUED | OUTPATIENT
Start: 2021-09-15 | End: 2021-09-17 | Stop reason: HOSPADM

## 2021-09-15 RX ORDER — ASPIRIN 81 MG/1
324 TABLET, CHEWABLE ORAL ONCE
Status: COMPLETED | OUTPATIENT
Start: 2021-09-15 | End: 2021-09-15

## 2021-09-15 RX ORDER — IBUPROFEN 400 MG/1
400 TABLET ORAL
Status: COMPLETED | OUTPATIENT
Start: 2021-09-15 | End: 2021-09-15

## 2021-09-15 RX ORDER — POTASSIUM CHLORIDE 20 MEQ/1
40 TABLET, EXTENDED RELEASE ORAL PRN
Status: DISCONTINUED | OUTPATIENT
Start: 2021-09-15 | End: 2021-09-17 | Stop reason: HOSPADM

## 2021-09-15 RX ORDER — IPRATROPIUM BROMIDE AND ALBUTEROL SULFATE 2.5; .5 MG/3ML; MG/3ML
1 SOLUTION RESPIRATORY (INHALATION) EVERY 4 HOURS PRN
Status: DISCONTINUED | OUTPATIENT
Start: 2021-09-15 | End: 2021-09-17 | Stop reason: HOSPADM

## 2021-09-15 RX ORDER — ONDANSETRON 4 MG/1
4 TABLET, ORALLY DISINTEGRATING ORAL EVERY 8 HOURS PRN
Status: DISCONTINUED | OUTPATIENT
Start: 2021-09-15 | End: 2021-09-17 | Stop reason: HOSPADM

## 2021-09-15 RX ORDER — AZITHROMYCIN 250 MG/1
250 TABLET, FILM COATED ORAL DAILY
Status: DISCONTINUED | OUTPATIENT
Start: 2021-09-15 | End: 2021-09-16

## 2021-09-15 RX ORDER — ALBUTEROL SULFATE 2.5 MG/3ML
2.5 SOLUTION RESPIRATORY (INHALATION) EVERY 4 HOURS PRN
Status: DISCONTINUED | OUTPATIENT
Start: 2021-09-15 | End: 2021-09-16

## 2021-09-15 RX ADMIN — METHYLPREDNISOLONE SODIUM SUCCINATE 40 MG: 40 INJECTION, POWDER, FOR SOLUTION INTRAMUSCULAR; INTRAVENOUS at 21:13

## 2021-09-15 RX ADMIN — ACETAMINOPHEN 650 MG: 325 TABLET, FILM COATED ORAL at 14:08

## 2021-09-15 RX ADMIN — BENZONATATE 200 MG: 100 CAPSULE ORAL at 16:20

## 2021-09-15 RX ADMIN — METHYLPREDNISOLONE SODIUM SUCCINATE 40 MG: 40 INJECTION, POWDER, FOR SOLUTION INTRAMUSCULAR; INTRAVENOUS at 16:21

## 2021-09-15 RX ADMIN — SODIUM CHLORIDE: 9 INJECTION, SOLUTION INTRAVENOUS at 16:20

## 2021-09-15 RX ADMIN — ATORVASTATIN CALCIUM 40 MG: 40 TABLET, FILM COATED ORAL at 17:08

## 2021-09-15 RX ADMIN — LEVETIRACETAM 750 MG: 750 TABLET ORAL at 20:08

## 2021-09-15 RX ADMIN — IPRATROPIUM BROMIDE AND ALBUTEROL SULFATE 1 AMPULE: 2.5; .5 SOLUTION RESPIRATORY (INHALATION) at 17:50

## 2021-09-15 RX ADMIN — BENZONATATE 200 MG: 100 CAPSULE ORAL at 20:08

## 2021-09-15 RX ADMIN — POTASSIUM CHLORIDE 40 MEQ: 1500 TABLET, EXTENDED RELEASE ORAL at 18:43

## 2021-09-15 RX ADMIN — ASPIRIN 324 MG: 81 TABLET, CHEWABLE ORAL at 09:54

## 2021-09-15 RX ADMIN — Medication 60 MG: at 20:08

## 2021-09-15 RX ADMIN — IPRATROPIUM BROMIDE AND ALBUTEROL SULFATE 1 AMPULE: 2.5; .5 SOLUTION RESPIRATORY (INHALATION) at 14:01

## 2021-09-15 RX ADMIN — ASPIRIN 81 MG: 81 TABLET, COATED ORAL at 17:08

## 2021-09-15 RX ADMIN — IBUPROFEN 400 MG: 400 TABLET ORAL at 18:43

## 2021-09-15 RX ADMIN — CYANOCOBALAMIN TAB 1000 MCG 1000 MCG: 1000 TAB at 17:10

## 2021-09-15 RX ADMIN — METHYLPREDNISOLONE SODIUM SUCCINATE 125 MG: 125 INJECTION, POWDER, FOR SOLUTION INTRAMUSCULAR; INTRAVENOUS at 09:55

## 2021-09-15 RX ADMIN — DIPHENHYDRAMINE HYDROCHLORIDE 25 MG: 50 INJECTION, SOLUTION INTRAMUSCULAR; INTRAVENOUS at 23:35

## 2021-09-15 RX ADMIN — AZITHROMYCIN MONOHYDRATE 250 MG: 250 TABLET ORAL at 16:21

## 2021-09-15 RX ADMIN — ACETAMINOPHEN 650 MG: 325 TABLET, FILM COATED ORAL at 21:13

## 2021-09-15 RX ADMIN — IPRATROPIUM BROMIDE AND ALBUTEROL SULFATE 1 AMPULE: .5; 3 SOLUTION RESPIRATORY (INHALATION) at 09:23

## 2021-09-15 RX ADMIN — BUPROPION HYDROCHLORIDE 150 MG: 150 TABLET, EXTENDED RELEASE ORAL at 20:08

## 2021-09-15 ASSESSMENT — PAIN DESCRIPTION - FREQUENCY
FREQUENCY: CONTINUOUS
FREQUENCY: CONTINUOUS

## 2021-09-15 ASSESSMENT — ENCOUNTER SYMPTOMS
WHEEZING: 1
ABDOMINAL PAIN: 0
NAUSEA: 0
SINUS PRESSURE: 0
FACIAL SWELLING: 0
EYE DISCHARGE: 0
EYE REDNESS: 0
VOMITING: 0
CONSTIPATION: 0
SHORTNESS OF BREATH: 1
RHINORRHEA: 0
BLOOD IN STOOL: 0
COLOR CHANGE: 0
SORE THROAT: 0
CHEST TIGHTNESS: 1
TROUBLE SWALLOWING: 0
BACK PAIN: 0
DIARRHEA: 0
COUGH: 1
EYE PAIN: 0

## 2021-09-15 ASSESSMENT — PAIN DESCRIPTION - PAIN TYPE
TYPE: ACUTE PAIN

## 2021-09-15 ASSESSMENT — PAIN DESCRIPTION - LOCATION
LOCATION: HEAD

## 2021-09-15 ASSESSMENT — PAIN DESCRIPTION - DESCRIPTORS
DESCRIPTORS: HEADACHE;THROBBING
DESCRIPTORS: HEADACHE

## 2021-09-15 ASSESSMENT — PAIN SCALES - GENERAL
PAINLEVEL_OUTOF10: 8
PAINLEVEL_OUTOF10: 5
PAINLEVEL_OUTOF10: 3
PAINLEVEL_OUTOF10: 10
PAINLEVEL_OUTOF10: 6
PAINLEVEL_OUTOF10: 8

## 2021-09-15 NOTE — CARE COORDINATION
CASE MANAGEMENT NOTE:    Admission Date:  9/15/2021 Wellington Camara is a 64 y.o.  female    Admitted for : COPD exacerbation ([facility] [address] [J44.1]    Met with:  Patient    PCP:  Fletcher Marcelo NP                                Insurance:  [facility] Medicare and Medicaid      Is patient alert and oriented at time of discussion:  Yes    Current Residence/ Living Arrangements:  independently at home with boyfriend and drives             Current Services PTA:  No    Does patient go to outpatient dialysis: No  If yes, location and chair time: n/a    Is patient agreeable to VNS: No    Freedom of choice provided:  Yes    List of [address] provided: No    VNS chosen:  NA    DME:  none    Home Oxygen: No, but will need tested prior to discharge    Nebulizer: Yes    CPAP/BIPAP: No    Supplier: N/A    Potential Assistance Needed: home 02    SNF needed: No    Freedom of choice and list provided: NA    Pharmacy:  [facility].       Does Patient want to use MEDS to BEDS? No    Is patient currently receiving oral anticoagulation therapy? No    Is the Patient an OXANA ROSS [facility] with Readmission Risk Score greater than 14%? No  If yes, pt needs a follow up appointment made within 7 days. Family Members/Caregivers that pt would like involved in their care:    Yes    If yes, list name here:  Chen and [address] [place] [address]    Transportation Provider:  Patient and Family             Discharge Plan:  Home without needs. Following for home 02.                  Electronically signed by: Joe Gonsalez RN on 9/15/2021 at 4:39 PM

## 2021-09-15 NOTE — CONSULTS
[facility]   CONSULT NOTE:      DATE OF CONSULT 9/15/2021    REASON FOR CONSULTATION:  COPD pulmonary management      PCP Marisol Jacome, THOR - CNP     CHIEF COMPLAINT: Dyspnea    HISTORY OF PRESENT ILLNESS:     Lio Patino is a 77-year-old female well-known to our service who had just seen back in June. At that time, she was supposed to follow-up in the office but she missed her appointment. She had presented in June with an exacerbation of COPD. After going home, she initially felt well but then resumed smoking. She smokes about a pack a day. She has smoked since she was a teenager, roughly a 43+ pack year history of smoking. She stated that on Monday, she had \"flulike\" symptoms including myalgias, mild cough, and poor appetite. Tuesday, she felt it got better but by that evening she started having more of a cough than her usual.  It was nonproductive in nature. She also had some subjective fevers. This morning, she became more short of breath and decided to come into the hospital.  He has been taking her nebulizer treatments every 4 hours. Does not have home oxygen. She tested negative for Covid and has been vaccinated. Emergency room she was also given DuoNeb aerosols and 1 dose of Solu-Medrol. ALLERGIES:  Allergies   Allergen Reactions    Sulfa Antibiotics Anaphylaxis       HOME MEDICATIONS:  Not in a hospital admission.       PAST MEDICAL HISTORY:  Past Medical History:   Diagnosis Date    Acute MI ([address]     Anxiety     Chronic back pain     COPD (chronic obstructive pulmonary disease) (HCC)     Depression     Heart disease     Hyperlipidemia     Hypertension     MRSA (methicillin resistant staph aureus) culture positive 09/05/2017    abscess       PAST SURGICAL HISTORY:  Past Surgical History:   Procedure Laterality Date    BACK SURGERY      diskectomy 2000, fusion 2007, fusion 7/2017    CHOLECYSTECTOMY, LAPAROSCOPIC  11/10/2017    robotic assisted    CHOLECYSTECTOMY, LAPAROSCOPIC N/A 11/10/2017    XI ROBOTIC CHOLECYSTECTOMY LAPAROSCOPIC performed by Dioni Kaplan DO at [address]            SOCIAL HISTORY:  Social History     Socioeconomic History    Marital status:      Spouse name: Not on file    Number of children: Not on file    Years of education: Not on file    Highest education level: Not on file   Occupational History    Not on file   Tobacco Use    Smoking status: Current Every Day Smoker     Packs/day: 1.00     Years: 40.00     Pack years: 40.00     Types: Cigarettes    Smokeless tobacco: Never Used    Tobacco comment: Attempting to quit with patch now 10/16/20 currently smoking no longer using patch   Vaping Use    Vaping Use: Never used   Substance and Sexual Activity    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    Drug use: Yes     Types: Marijuana     Comment: daily - \"a couple bowls\"    Sexual activity: Not on file   Other Topics Concern    Not on file   Social History Narrative    Not on file     Social Determinants of Health     Financial Resource Strain: Low Risk     Difficulty of Paying Living Expenses: Not hard at all   Food Insecurity: No Food Insecurity    Worried About Running Out of Food in the Last Year: Never true    Skyler of Food in the Last Year: Never true   Transportation Needs:     Lack of Transportation (Medical):      Lack of Transportation (Non-Medical):    Physical Activity:     Days of Exercise per Week:     Minutes of Exercise per Session:    Stress:     Feeling of Stress :    Social Connections:     Frequency of Communication with Friends and Family:     Frequency of Social Gatherings with Friends and Family:     Attends [employer] Services:     Active Member of Clubs or Organizations:     Attends Club or Organization Meetings:     Marital Status:    Intimate Partner Violence:     Fear of Current or Ex-Partner:     Emotionally Abused:     Physically Abused:     Sexually Abused:        FAMILY HISTORY:  Family History   Problem Relation Age of Onset    Other Mother     Heart Disease Father     High Blood Pressure Father     High Cholesterol Father     Other Brother        REVIEW OF SYSTEMS:  All other systems reviewed and are negative. PHYSICAL EXAM:  Vital Signs Blood pressure (!) 137/93, pulse 82, temperature 97.8 °F (36.6 °C), temperature source Oral, resp. rate 21, height 5' 7\" (1.702 m), weight 160 lb (72.6 kg), SpO2 96 %, not currently breastfeeding. Oxygen Amount and Delivery: O2 Flow Rate (L/min): 2 L/min    Admission Weight Weight: 160 lb (72.6 kg)    General Appearance   Middle-aged female looking older than her stated age, looking fatigued  Head  Normocephalic, without obvious abnormality, atraumatic    Eyes  conjunctivae/corneas clear. PERRL, EOM's intact. Fundi benign. ENT no thrush in the oral cavity  Neck  no adenopathy, no carotid bruit, no JVD, supple, symmetrical, trachea midline and thyroid not enlarged, symmetric, no tenderness/mass/nodules  Lungs diffuse bilateral wheezes  Heart: regular rate and rhythm, S1, S2 normal, no murmur, click, rub or gallop  Abdomen  soft, non-tender; bowel sounds normal; no masses,  no organomegaly  Extremities  No edema  Skin  Skin color, texture, turgor normal. No rashes or lesions  Neurologic: Alert and oriented X 3, normal strength and tone.          Imaging      Lab Review  CBC     Lab Results   Component Value Date    WBC 5.8 09/15/2021    RBC 4.81 09/15/2021    HGB 15.0 09/15/2021    HCT 45.2 09/15/2021     09/15/2021    MCV 94.0 09/15/2021    MCH 31.3 09/15/2021    MCHC 33.3 09/15/2021    RDW 14.6 09/15/2021    LYMPHOPCT 33 09/15/2021    MONOPCT 7 09/15/2021    BASOPCT 1 09/15/2021    MONOSABS 0.40 09/15/2021    LYMPHSABS 1.90 09/15/2021    EOSABS 0.60 09/15/2021    BASOSABS 0.00 09/15/2021    DIFFTYPE NOT REPORTED 09/15/2021       BMP   Lab Results   Component Value Date     09/15/2021    K 3.5 09/15/2021     09/15/2021    CO2 22 09/15/2021    BUN 7 09/15/2021    CREATININE 0.70 09/15/2021    GLUCOSE 118 09/15/2021    CALCIUM 9.9 09/15/2021       LFTS  Lab Results   Component Value Date    ALKPHOS 85 07/12/2020    ALT 16 07/12/2020    AST 18 07/12/2020    PROT 7.0 07/12/2020    BILITOT <0.15 07/12/2020    BILIDIR 0.11 11/11/2017    IBILI 0.15 11/11/2017    LABALBU 4.2 07/12/2020       INR  Recent Labs     09/15/21  0917   PROTIME 12.5   INR 0.9       APTT  Recent Labs     09/15/21  0917   APTT 29.6       Lactic Acid  Lab Results   Component Value Date    LACTA 1.3 06/12/2021        PRO-BNP   No results for input(s): PROBNP in the last 72 hours. ABGs:   Lab Results   Component Value Date    PHART 7.405 06/12/2021    PO2ART 96.8 06/12/2021    KQB3BAO 44.2 06/12/2021       Lab Results   Component Value Date    MODE NOT REPORTED 07/06/2021         Impression    Acute exacerbation COPD  Acute bronchitis  History of active tobacco use      Plan:      Admit to stepdown  DuoNeb aerosols every 4 hours  IV Solu-Medrol  Antibiotics for exacerbation of COPD, does not seem to have pneumonia  Antitussives  We will order Acapella treatments  Discussed with her at length smoking cessation  Was supposed to get a CT of the chest in 8 weeks from the time she was here last and she did not we will go ahead and ordered on this admission; this was to follow-up of groundglass opacity in the right lower lobe.   Home oxygen evaluation with exercise and nocturnal prior to discharge  Outpatient pulmonary function testing

## 2021-09-15 NOTE — ED NOTES
Report given to KEVIN banegas from med surg. Report method by phone   The following was reviewed with receiving RN:   Current vital signs:  BP (!) 131/94   Pulse 114   Temp 97.8 °F (36.6 °C) (Oral)   Resp 28   Ht 5' 7\" (1.702 m)   Wt 160 lb (72.6 kg)   SpO2 96%   BMI 25.06 kg/m²                MEWS Score: 4     Any medication or safety alerts were reviewed. Any pending diagnostics and notifications were also reviewed, as well as any safety concerns or issues, abnormal labs, abnormal imaging, and abnormal assessment findings. Questions were answered.           Dave Ansari RN  09/15/21 [id number]

## 2021-09-15 NOTE — PROGRESS NOTES
Medication History completed:    New medications: none    Medications discontinued: nicotine patches, levetiracetam, ibuprofen, escitalopram, betamethasone cream    Changes to dosing: none    Stated allergies: As listed    Other pertinent information: Medications confirmed with [employer].      Thank you,  Yolanda Sorenson, PharmD, BCPS  [phone]

## 2021-09-15 NOTE — H&P
[address]     HISTORY AND PHYSICAL EXAMINATION            Date:   9/15/2021  Patient name:  Malissa Beltran  Date of admission:  9/15/2021  9:05 AM  MRN:   [id number]  Account:  [de-identified]  YOB: 1965  PCP:    THOR Andres CNP  Room:   [address] [place] [address]  Code Status:    Full Code    Chief Complaint:     Chief Complaint   Patient presents with    Shortness of Breath    Chest Pain       History Obtained From:     patient, electronic medical record    History of Present Illness: The patient is a 59-year-old female history of COPD, hypertension, hyperlipidemia, CAD who presents with 1 week history of increasing fatigue and shortness of breath. Patient symptoms started on Monday with increasing fatigue. Throughout the week, she began experiencing worsening shortness of breath with exertion that was accompanied by an irritating cough productive of a brownish sputum. Patient denies any fever, chills. Patient was recently admitted 7/2021 for similar exacerbation. She reports a 40+ pack year smoking history. She also states that she is a current pack-a-day smoker. Patient follows with pulmonology, and recently missed a follow-up appointment to discuss groundglass opacity in the right lower lobe discovered on previous hospitalization. In the ED, patient was afebrile, tachycardic , hypertensive /103. Initial laboratory work-up significant for potassium 3.5, normal troponin. Additional work-up included:     CXR unremarkable   EKG showed normal sinus, QTc 450    Patient was given Solu-Medrol 125 mg x1 and chewable aspirin 324 mg x1. Decision was made to admit patient to [place] [id number] unit for management of acute exacerbation of chronic COPD.     Past Medical History:     Past Medical History:   Diagnosis Date    Acute MI ([facility] [address]  Anxiety     Chronic back pain     COPD (chronic obstructive pulmonary disease) (HCC)     Depression     Heart disease     Hyperlipidemia     Hypertension     MRSA (methicillin resistant staph aureus) culture positive 09/05/2017    abscess        Past SurgicalHistory:     Past Surgical History:   Procedure Laterality Date    BACK SURGERY      diskectomy 2000, fusion 2007, fusion 7/2017    CHOLECYSTECTOMY, LAPAROSCOPIC  11/10/2017    robotic assisted    CHOLECYSTECTOMY, LAPAROSCOPIC N/A 11/10/2017    XI ROBOTIC CHOLECYSTECTOMY LAPAROSCOPIC performed by Dia Ramsey DO at [address]          Medications Prior to Admission:     Prior to Admission medications    Medication Sig Start Date End Date Taking?  Authorizing Provider   acetaminophen (TYLENOL) 325 MG tablet Take 650 mg by mouth every 6 hours as needed for Pain   Yes Historical Provider, MD   atorvastatin (LIPITOR) 40 MG tablet TAKE ONE TABLET BY MOUTH DAILY 8/2/21  Yes THOR Partida CNP   buPROPion [facility]) 150 MG extended release tablet Once a day for three days, then increase to 1 pill twice a day 7/9/21  Yes THOR Partida - CNP   albuterol sulfate HFA (VENTOLIN HFA) 108 (90 Base) MCG/ACT inhaler Inhale 2 puffs into the lungs every 4 hours as needed for Wheezing 7/9/21  Yes THOR Partida CNP   baclofen (LIORESAL) 10 MG tablet TAKE ONE TABLET BY MOUTH ONCE NIGHTLY AS NEEDED FOR PAIN 7/9/21  Yes THOR Partida - CNP   albuterol (PROVENTIL) (5 MG/ML) 0.5% nebulizer solution Take 0.5 mLs by nebulization 4 times daily 6/15/21  Yes Calvin Pfeiffer MD   zinc gluconate 50 MG tablet Take 50 mg by mouth daily   Yes Historical Provider, MD   Ascorbic Acid (VITAMIN C) 250 MG tablet Take 250 mg by mouth daily   Yes Historical Provider, MD   Multiple Vitamins-Minerals (EMERGEN-C VITAMIN C PO) Take by mouth daily   Yes Historical Provider, MD   Cholecalciferol (VITAMIN D3) 400 units CAPS Take 1 tablet by mouth daily   Yes Historical Provider, MD   aspirin EC 81 MG EC tablet Take 1 tablet by mouth daily 3/6/19  Yes THOR Mondragon CNP   magnesium gluconate (MAGONATE) 500 MG tablet Take 400 mg by mouth every evening    Yes Historical Provider, MD   vitamin B-6 (PYRIDOXINE) 100 MG tablet Take 100 mg by mouth daily   Yes Historical Provider, MD   nitroGLYCERIN (NITROSTAT) 0.4 MG SL tablet Place 1 tablet under the tongue every 5 minutes as needed for Chest pain up to max of 3 total doses. If no relief after 1 dose, call 911. 1/30/18  Yes Sade Churchill MD   Multiple Vitamins-Minerals (THERAPEUTIC MULTIVITAMIN-MINERALS) tablet Take 1 tablet by mouth daily. Yes Historical Provider, MD   vitamin B-12 (CYANOCOBALAMIN) 1000 MCG tablet Take 1,000 mcg by mouth daily. Yes Historical Provider, MD HOOK ELLIPTA 100-25 MCG/INH AEPB inhaler INHALE ONE DOSE BY MOUTH DAILY 9/14/21   THOR Mondragon CNP   tiotropium (SPIRIVA RESPIMAT) 2.5 MCG/ACT AERS inhaler Inhale 2 puffs into the lungs daily 6/15/21   Merrick العراقي MD   amitriptyline (ELAVIL) 25 MG tablet Take 3 tablets by mouth nightly 12/2/20 9/15/21  THOR Tabares CNP   budesonide-formoterol [facility]) 160-4.5 MCG/ACT AERO Inhale 2 puffs into the lungs 2 times daily  Patient not taking: Reported on 11/30/2020 10/16/20 7/9/21  Nilda Fine MD   Nebulizers (NEBULIZER COMPRESSOR) MISC Daily as needed 9/4/20   THOR Mondragon CNP   Respiratory Therapy Supplies (NEBULIZER/TUBING/MOUTHPIECE) KIT 1 kit by Does not apply route daily as needed (SOB or wheezing) 9/4/20   THOR Mondragon CNP   Handicap Placard MISC by Does not apply route Exp 1/2024 1/20/20   THOR Mondragon CNP        Allergies:     Sulfa antibiotics    Social History:     Tobacco:    reports that she has been smoking cigarettes. She has a 40.00 pack-year smoking history.  She has never used smokeless tobacco.  Alcohol:      reports no history of alcohol use. Drug Use:  reports current drug use. Drug: Marijuana. Family History:     Family History   Problem Relation Age of Onset    Other Mother     Heart Disease Father     High Blood Pressure Father     High Cholesterol Father     Other Brother        Review of Systems:     Positive and Negative as described in HPI. Review of Systems   Constitutional: Positive for fatigue. Negative for chills and fever. Eyes: Negative for pain and redness. Respiratory: Positive for cough, shortness of breath and wheezing. Cardiovascular: Positive for chest pain. Negative for leg swelling. Gastrointestinal: Negative for abdominal pain, constipation, diarrhea, nausea and vomiting. Genitourinary: Negative for difficulty urinating and dysuria. Musculoskeletal: Negative for arthralgias. Neurological: Negative for dizziness and headaches. Psychiatric/Behavioral: Negative for agitation and behavioral problems. Physical Exam:   BP (!) 144/104   Pulse 112   Temp 97.5 °F (36.4 °C)   Resp 22   Ht 5' 7\" (1.702 m)   Wt 160 lb (72.6 kg)   SpO2 94%   Breastfeeding No   BMI 25.06 kg/m²   Temp (24hrs), Av.7 °F (36.5 °C), Min:97.5 °F (36.4 °C), Max:97.8 °F (36.6 °C)    No results for input(s): POCGLU in the last 72 hours. No intake or output data in the 24 hours ending 09/15/21 1726    Physical Exam  Constitutional:       Appearance: Normal appearance. Comments: Patient completely conversational throughout. HENT:      Head: Normocephalic and atraumatic. Eyes:      Extraocular Movements: Extraocular movements intact. Conjunctiva/sclera: Conjunctivae normal.   Cardiovascular:      Rate and Rhythm: Normal rate and regular rhythm. Pulses: Normal pulses. Heart sounds: Normal heart sounds. Pulmonary:      Effort: Pulmonary effort is normal.      Comments: Diffuse wheezing appreciated. Abdominal:      General: Abdomen is flat. Palpations: Abdomen is soft. 1.3 k/uL    Absolute Eos # 0.60 (H) 0.0 - 0.4 k/uL    Basophils Absolute 0.00 0.0 - 0.2 k/uL    Absolute Immature Granulocyte NOT REPORTED 0.00 - 0.30 k/uL    WBC Morphology NOT REPORTED     RBC Morphology NOT REPORTED     Platelet Estimate NOT REPORTED    TROP/MYOGLOBIN    Collection Time: 09/15/21  9:17 AM   Result Value Ref Range    Troponin, High Sensitivity <6 0 - 14 ng/L    Troponin T NOT REPORTED <0.03 ng/mL    Troponin Interp NOT REPORTED     Myoglobin 47 25 - 58 ng/mL   TSH without Reflex    Collection Time: 09/15/21  9:17 AM   Result Value Ref Range    TSH 2.13 0.30 - 5.00 mIU/L   T4, Free    Collection Time: 09/15/21  9:17 AM   Result Value Ref Range    Thyroxine, Free 1.23 0.93 - 1.70 ng/dL   APTT    Collection Time: 09/15/21  9:17 AM   Result Value Ref Range    PTT 29.6 24.0 - 36.0 sec   Protime-INR    Collection Time: 09/15/21  9:17 AM   Result Value Ref Range    Protime 12.5 11.8 - 14.6 sec    INR 0.9    COVID-19, Rapid    Collection Time: 09/15/21 11:04 AM    Specimen: Nasopharyngeal Swab   Result Value Ref Range    Specimen Description . NASOPHARYNGEAL SWAB     SARS-CoV-2, Rapid Not Detected Not Detected   TROP/MYOGLOBIN    Collection Time: 09/15/21 11:19 AM   Result Value Ref Range    Troponin, High Sensitivity <6 0 - 14 ng/L    Troponin T NOT REPORTED <0.03 ng/mL    Troponin Interp NOT REPORTED     Myoglobin 61 (H) 25 - 58 ng/mL   EKG 12 Lead    Collection Time: 09/15/21 12:16 PM   Result Value Ref Range    Ventricular Rate 87 BPM    Atrial Rate 87 BPM    P-R Interval 126 ms    QRS Duration 90 ms    Q-T Interval 374 ms    QTc Calculation (Bazett) 450 ms    P Axis 32 degrees    R Axis 47 degrees    T Axis 59 degrees       Imaging/Diagnostics:  XR CHEST PORTABLE    Result Date: 9/15/2021  No acute intrathoracic process.        Assessment :      Primary Problem  COPD exacerbation [facility])    Active Hospital Problems    Diagnosis Date Noted    [facility] [J44.1] 09/15/2021    Hypertension Mike Sizer 12/21/2020    Smoking [F17.200] 10/22/2013    Depression [F32.9] 03/15/2013    Hyperlipidemia [E78.5] 03/15/2013    CAD, multiple vessel [I25.10] 03/15/2013    Chronic back pain [M54.9, G89.29]        Plan:     Patient status Admit as inpatient in the  Med/Surge    Acute exacerbation of chronic COPD  - Patient experiencing increasing fatigue/SOB for the past week  - CXR unremarkable  - Respiratory culture/Gram stain pending  - DuoNebs every 4 hours while awake  - Solu-Medrol 40 mg every 6 hours  - Delsym antitussive, Tessalon capsules  - Pulmonology consulted   CT chest ordered, follow-up from previous admission    Azithromycin 250 mg daily    Seizure disorder  - Continue home Keppra 750 mg twice daily    Hyperlipidemia  - Continue home atorvastatin 40 mg daily    Anxiety/depression  - Continue home Wellbutrin 150 mg twice daily    DVT prophylaxis: Lovenox 40 mg daily  GI prophylaxis: None  Code: Full  Dispo: Home    Consultations:   IP CONSULT TO PRIMARY CARE PROVIDER    Patient is admitted as inpatient status because of co-morbiditieslisted above, severity of signs and symptoms as outlined, requirement for current medical therapies and most importantly because of direct risk to patient if care not provided in a hospital setting. Cailin Pruitt MD  9/15/2021  5:26 PM    Copy sent to THOR Watson - CNP    Attestation and add on       I have discussed the care of Rik Gutierrez , including pertinent history and exam findings,    9/15/21    with the resident. I have seen and examined the patient and the key elements of all parts of the encounter have been performed by me . I agree with the assessment, plan and orders as documented by the resident.      Principal Problem:    COPD exacerbation ([facility] [address]  Active Problems:    Chronic back pain    Depression    Hyperlipidemia    CAD, multiple vessel    Smoking    Hypertension    Acute bronchitis  Resolved Problems:    * No resolved hospital problems. *            ---- ;        Medications: Allergies: Allergies   Allergen Reactions    Sulfa Antibiotics Anaphylaxis       Current Meds:   Scheduled Meds:    budesonide-formoterol  2 puff Inhalation BID    cefTRIAXone (ROCEPHIN) IV  1,000 mg IntraVENous Q24H    methylPREDNISolone  40 mg IntraVENous Q8H    sodium chloride flush  5-40 mL IntraVENous 2 times per day    enoxaparin  40 mg SubCUTAneous Daily    ipratropium-albuterol  1 ampule Inhalation Q4H WA    nicotine  1 patch TransDERmal Daily    benzonatate  200 mg Oral TID    dextromethorphan  60 mg Oral 2 times per day    aspirin EC  81 mg Oral Daily    atorvastatin  40 mg Oral Daily    buPROPion  150 mg Oral BID    vitamin B-12  1,000 mcg Oral Daily    levETIRAcetam  750 mg Oral Nightly     Continuous Infusions:    sodium chloride       PRN Meds: ketorolac, sodium chloride flush, sodium chloride, ondansetron **OR** ondansetron, polyethylene glycol, acetaminophen **OR** acetaminophen, ipratropium-albuterol, potassium chloride **OR** potassium alternative oral replacement **OR** potassium chloride        MD CED Beal [address], [address] [place] [address].    Phone [phone]   Fax: [phone]  Answering Service: [phone]

## 2021-09-15 NOTE — ED PROVIDER NOTES
[address]  eMERGENCY dEPARTMENT eNCOUnter      Pt Name: Hilda Costa  MRN: [id number]  [place] 1965  Date of evaluation: 9/15/21      CHIEF COMPLAINT       Chief Complaint   Patient presents with    Shortness of Breath    Chest Pain         HISTORY OF PRESENT ILLNESS    Hilda Costa is a 64 y.o. female who presents complaining of shortness of breath. Patient states that she has not been feeling well for the last couple days. Patient states she noted she was using her nebulizer treatments more frequently. Patient states today she started having chest pain and does not feel like she can breathe well. Patient has a history of COPD. Patient states that she has not had any fevers. Patient has had a cough productive of sputum. Patient did get vaccinated against Covid. Patient denies any leg pain or swelling. REVIEW OF SYSTEMS       Review of Systems   Constitutional: Negative for activity change, appetite change, chills, diaphoresis and fever. HENT: Negative for congestion, ear pain, facial swelling, nosebleeds, rhinorrhea, sinus pressure, sore throat and trouble swallowing. Eyes: Negative for pain, discharge and redness. Respiratory: Positive for cough, chest tightness, shortness of breath and wheezing. Cardiovascular: Positive for chest pain. Negative for palpitations and leg swelling. Gastrointestinal: Negative for abdominal pain, blood in stool, constipation, diarrhea, nausea and vomiting. Genitourinary: Negative for difficulty urinating, dysuria, flank pain, frequency, genital sores and hematuria. Musculoskeletal: Negative for arthralgias, back pain, gait problem, joint swelling, myalgias and neck pain. Skin: Negative for color change, pallor, rash and wound. Neurological: Negative for dizziness, tremors, seizures, syncope, speech difficulty, weakness, numbness and headaches.    Psychiatric/Behavioral: Negative for confusion, decreased concentration, hallucinations, self-injury, sleep disturbance and suicidal ideas. PAST MEDICAL HISTORY     Past Medical History:   Diagnosis Date    [facility] [address]     Anxiety     Chronic back pain     COPD (chronic obstructive pulmonary disease) ([facility] [address]     Depression     Heart disease     Hyperlipidemia     Hypertension     MRSA (methicillin resistant staph aureus) culture positive 09/05/2017    abscess       SURGICAL HISTORY       Past Surgical History:   Procedure Laterality Date    BACK SURGERY      diskectomy 2000, fusion 2007, fusion 7/2017    CHOLECYSTECTOMY, LAPAROSCOPIC  11/10/2017    robotic assisted    CHOLECYSTECTOMY, LAPAROSCOPIC N/A 11/10/2017    XI ROBOTIC CHOLECYSTECTOMY LAPAROSCOPIC performed by Jody Valencia DO at [facility]         CURRENT MEDICATIONS       Previous Medications    ACETAMINOPHEN (TYLENOL) 325 MG TABLET    Take 1 tablet by mouth every 6 hours as needed for Pain    ALBUTEROL (PROVENTIL) (5 MG/ML) 0.5% NEBULIZER SOLUTION    Take 0.5 mLs by nebulization 4 times daily    ALBUTEROL SULFATE HFA (VENTOLIN HFA) 108 (90 BASE) MCG/ACT INHALER    Inhale 2 puffs into the lungs every 4 hours as needed for Wheezing    AMITRIPTYLINE (ELAVIL) 25 MG TABLET    Take 3 tablets by mouth nightly    ASCORBIC ACID (VITAMIN C) 250 MG TABLET    Take 250 mg by mouth daily    ASPIRIN EC 81 MG EC TABLET    Take 1 tablet by mouth daily    ATORVASTATIN (LIPITOR) 40 MG TABLET    TAKE ONE TABLET BY MOUTH DAILY    BACLOFEN (LIORESAL) 10 MG TABLET    TAKE ONE TABLET BY MOUTH ONCE NIGHTLY AS NEEDED FOR PAIN    BETAMETHASONE VALERATE (VALISONE) 0.1 % CREAM    Apply topically 2 times daily. Do not use for longer than 2 weeks.     BREO ELLIPTA 100-25 MCG/INH AEPB INHALER    INHALE ONE DOSE BY MOUTH DAILY    BUPROPION (WELLBUTRIN SR) 150 MG EXTENDED RELEASE TABLET    Once a day for three days, then increase to 1 pill twice a day    CHOLECALCIFEROL (VITAMIN D3) 400 UNITS CAPS    Take 1 tablet by mouth daily    ESCITALOPRAM (LEXAPRO) 10 MG TABLET    TAKE 1 AND 1/2 TABLET BY MOUTH DAILY    HANDICAP PLACARD MISC    by Does not apply route Exp 1/2024    IBUPROFEN (IBU) 400 MG TABLET    Take 1 tablet by mouth every 6 hours as needed for Pain    LEVETIRACETAM (KEPPRA) 750 MG TABLET    TAKE ONE TABLET BY MOUTH TWICE A DAY **MUST CALL MD FOR APPOINTMENT**    MAGNESIUM GLUCONATE (MAGONATE) 500 MG TABLET    Take 400 mg by mouth every evening     MULTIPLE VITAMINS-MINERALS (EMERGEN-C VITAMIN C PO)    Take by mouth daily    MULTIPLE VITAMINS-MINERALS (THERAPEUTIC MULTIVITAMIN-MINERALS) TABLET    Take 1 tablet by mouth daily. NEBULIZERS (NEBULIZER COMPRESSOR) MISC    Daily as needed    NICOTINE (NICODERM CQ) 21 MG/24HR    Place 1 patch onto the skin daily for 10 days    NITROGLYCERIN (NITROSTAT) 0.4 MG SL TABLET    Place 1 tablet under the tongue every 5 minutes as needed for Chest pain up to max of 3 total doses. If no relief after 1 dose, call 911. RESPIRATORY THERAPY SUPPLIES (NEBULIZER/TUBING/MOUTHPIECE) KIT    1 kit by Does not apply route daily as needed (SOB or wheezing)    TIOTROPIUM (SPIRIVA RESPIMAT) 2.5 MCG/ACT AERS INHALER    Inhale 2 puffs into the lungs daily    VITAMIN B-12 (CYANOCOBALAMIN) 1000 MCG TABLET    Take 1,000 mcg by mouth daily. VITAMIN B-6 (PYRIDOXINE) 100 MG TABLET    Take 100 mg by mouth daily    ZINC GLUCONATE 50 MG TABLET    Take 50 mg by mouth daily       ALLERGIES     is allergic to sulfa antibiotics. FAMILY HISTORY     [unfilled]     SOCIAL HISTORY      reports that she has been smoking cigarettes. She has a 40.00 pack-year smoking history. She has never used smokeless tobacco. She reports current drug use. Drug: Marijuana. She reports that she does not drink alcohol.     PHYSICAL EXAM     INITIAL VITALS: BP (!) 137/93   Pulse 82   Temp 97.8 °F (36.6 °C) (Oral)   Resp 17   Ht 5' 7\" (1.702 m)   Wt 160 lb (72.6 kg)   SpO2 96%   BMI 25.06 kg/m²      Physical Exam  Vitals and nursing note reviewed. Constitutional:       General: She is not in acute distress. Appearance: She is well-developed. She is not diaphoretic. HENT:      Head: Normocephalic and atraumatic. Eyes:      General: No scleral icterus. Right eye: No discharge. Left eye: No discharge. Conjunctiva/sclera: Conjunctivae normal.      Pupils: Pupils are equal, round, and reactive to light. Cardiovascular:      Rate and Rhythm: Normal rate and regular rhythm. Heart sounds: Normal heart sounds. No murmur heard. No friction rub. No gallop. Pulmonary:      Effort: Tachypnea, accessory muscle usage and respiratory distress present. Breath sounds: Decreased breath sounds and wheezing present. No rales. Chest:      Chest wall: No tenderness. Abdominal:      General: Bowel sounds are normal. There is no distension. Palpations: Abdomen is soft. There is no mass. Tenderness: There is no abdominal tenderness. There is no guarding or rebound. Musculoskeletal:         General: No tenderness. Normal range of motion. Skin:     General: Skin is warm and dry. Coloration: Skin is not pale. Findings: No erythema or rash. Neurological:      Mental Status: She is alert and oriented to person, place, and time. Cranial Nerves: No cranial nerve deficit. Sensory: No sensory deficit. Motor: No abnormal muscle tone. Coordination: Coordination normal.      Deep Tendon Reflexes: Reflexes normal.   Psychiatric:         Behavior: Behavior normal.         Thought Content: Thought content normal.         Judgment: Judgment normal.         MEDICAL DECISION MAKING:     Patient symptoms I think are most likely just related to COPD but with the chest pain and the cough will make sure she does not have pneumonia we will also do a cardiac work-up.   Patient was hypoxic on arrival put her on oxygen give her the breathing treatment see if we get her back off the oxygen. DIAGNOSTIC RESULTS     EKG: All EKG's are interpreted by the Emergency Department Physician who either signs or Co-signs this chart in the absence of a cardiologist.    EKG Interpretation    Interpreted by me    Rhythm: normal sinus   Rate: normal  Axis: normal  Ectopy: none  Conduction: normal  ST Segments: no acute change  T Waves: no acute change  Q Waves: none    Clinical Impression: no acute changes and normal EKG    RADIOLOGY:All plain film, CT, MRI, and formal ultrasound images (except ED bedside ultrasound)are read by the radiologist and interpretations are directly viewed by the emergency physician. XR CHEST PORTABLE    Result Date: 9/15/2021  EXAMINATION: ONE XRAY VIEW OF THE CHEST 9/15/2021 9:48 am COMPARISON: 07/06/2021 HISTORY: ORDERING SYSTEM PROVIDED HISTORY: Chest Pain TECHNOLOGIST PROVIDED HISTORY: Chest Pain Reason for Exam: [facility] chest pain 1 day Acuity: Unknown Type of Exam: Unknown FINDINGS: Cardiomediastinal silhouette and pulmonary vasculature are within normal limits. No focal airspace consolidation, pneumothorax, or pleural effusion. No free air beneath the diaphragm. No acute osseous abnormality. No acute intrathoracic process. LABS: All lab results were reviewed bylf, and all abnormals are listed below.   Labs Reviewed   BASIC METABOLIC PANEL - Abnormal; Notable for the following components:       Result Value    Glucose 118 (*)     Potassium 3.5 (*)     All other components within normal limits   CBC WITH AUTO DIFFERENTIAL - Abnormal; Notable for the following components:    Eosinophils % 10 (*)     Absolute Eos # 0.60 (*)     All other components within normal limits   COVID-19, RAPID   TROP/MYOGLOBIN   TSH WITHOUT REFLEX   T4, FREE   APTT   PROTIME-INR   TROP/MYOGLOBIN         EMERGENCY DEPARTMENT COURSE:   Vitals:    Vitals:    09/15/21 0923 09/15/21 0930 09/15/21 0945 09/15/21 1000   BP:  134/87 123/86 (!) 137/93   Pulse:  92 89 82   Resp: 29 26 23 17   Temp:       TempSrc:       SpO2: 96% 99% 96% 96%   Weight:       Height:           The patient was given the following medications while in the emergency department:     Orders Placed This Encounter   Medications    aspirin chewable tablet 324 mg    methylPREDNISolone sodium (SOLU-MEDROL) injection 125 mg    ipratropium-albuterol (DUONEB) nebulizer solution 1 ampule     Order Specific Question:   Initiate RT Bronchodilator Protocol     Answer:   Yes       -------------------------  10:59 AM EDT  Patient was reevaluated states she is feeling better but she was still on the oxygen. I took her off the oxygen she still maintaining her sats but she states she can definitely tell a difference in her breathing. Got her up and walked around she became very dyspneic but did not become hypoxic. Offered again and patient states that she feel more comfortable being admitted. 11:39 AM EDT  Patient's Covid swab came back negative. I spoke with Dr. Álvaro Moreno who agrees to the admission. CRITICAL CARE:   None    CONSULTS:  IP CONSULT TO PRIMARY CARE PROVIDER    PROCEDURES:  None    FINAL IMPRESSION      1. COPD exacerbation ([facility] [address]          DISPOSITION/PLAN   DISPOSITION Decision To Admit 09/15/2021 11:37:24 AM      PATIENT REFERRED TO:  No follow-up provider specified.     DISCHARGE MEDICATIONS:  New Prescriptions    No medications on file       (Please note that portions of this note were completed with a voice recognition program.  Efforts were made to edit the dictations but occasionally words are mis-transcribed.)    Phuong Plascencia MD  Attending Elen Estrada MD  09/15/21 [id number]

## 2021-09-16 ENCOUNTER — APPOINTMENT (OUTPATIENT)
Dept: CT IMAGING | Age: 56
DRG: 192 | End: 2021-09-16
Payer: MEDICARE

## 2021-09-16 PROBLEM — J20.9 ACUTE BRONCHITIS: Status: ACTIVE | Noted: 2021-09-16

## 2021-09-16 LAB
ANION GAP SERPL CALCULATED.3IONS-SCNC: 11 MMOL/L (ref 9–17)
BUN BLDV-MCNC: 12 MG/DL (ref 6–20)
BUN/CREAT BLD: ABNORMAL (ref 9–20)
CALCIUM SERPL-MCNC: 9.6 MG/DL (ref 8.6–10.4)
CHLORIDE BLD-SCNC: 106 MMOL/L (ref 98–107)
CO2: 25 MMOL/L (ref 20–31)
CREAT SERPL-MCNC: 0.62 MG/DL (ref 0.5–0.9)
EKG ATRIAL RATE: 87 BPM
EKG ATRIAL RATE: 98 BPM
EKG P AXIS: 32 DEGREES
EKG P AXIS: 64 DEGREES
EKG P-R INTERVAL: 126 MS
EKG P-R INTERVAL: 128 MS
EKG Q-T INTERVAL: 352 MS
EKG Q-T INTERVAL: 374 MS
EKG QRS DURATION: 82 MS
EKG QRS DURATION: 90 MS
EKG QTC CALCULATION (BAZETT): 449 MS
EKG QTC CALCULATION (BAZETT): 450 MS
EKG R AXIS: 47 DEGREES
EKG R AXIS: 53 DEGREES
EKG T AXIS: 53 DEGREES
EKG T AXIS: 59 DEGREES
EKG VENTRICULAR RATE: 87 BPM
EKG VENTRICULAR RATE: 98 BPM
GFR AFRICAN AMERICAN: >60 ML/MIN
GFR NON-AFRICAN AMERICAN: >60 ML/MIN
GFR SERPL CREATININE-BSD FRML MDRD: ABNORMAL ML/MIN/{1.73_M2}
GFR SERPL CREATININE-BSD FRML MDRD: ABNORMAL ML/MIN/{1.73_M2}
GLUCOSE BLD-MCNC: 169 MG/DL (ref 70–99)
HCT VFR BLD CALC: 41.3 % (ref 36–46)
HEMOGLOBIN: 14.1 G/DL (ref 12–16)
MCH RBC QN AUTO: 31.8 PG (ref 26–34)
MCHC RBC AUTO-ENTMCNC: 34 G/DL (ref 31–37)
MCV RBC AUTO: 93.3 FL (ref 80–100)
NRBC AUTOMATED: NORMAL PER 100 WBC
PDW BLD-RTO: 14.4 % (ref 11.5–14.9)
PLATELET # BLD: 269 K/UL (ref 150–450)
PMV BLD AUTO: 6.1 FL (ref 6–12)
POTASSIUM SERPL-SCNC: 4.4 MMOL/L (ref 3.7–5.3)
RBC # BLD: 4.43 M/UL (ref 4–5.2)
SODIUM BLD-SCNC: 142 MMOL/L (ref 135–144)
WBC # BLD: 9.8 K/UL (ref 3.5–11)

## 2021-09-16 PROCEDURE — 2580000003 HC RX 258: Performed by: INTERNAL MEDICINE

## 2021-09-16 PROCEDURE — 85027 COMPLETE CBC AUTOMATED: CPT

## 2021-09-16 PROCEDURE — 6370000000 HC RX 637 (ALT 250 FOR IP): Performed by: INTERNAL MEDICINE

## 2021-09-16 PROCEDURE — 99233 SBSQ HOSP IP/OBS HIGH 50: CPT | Performed by: INTERNAL MEDICINE

## 2021-09-16 PROCEDURE — 2580000003 HC RX 258: Performed by: STUDENT IN AN ORGANIZED HEALTH CARE EDUCATION/TRAINING PROGRAM

## 2021-09-16 PROCEDURE — 6370000000 HC RX 637 (ALT 250 FOR IP)

## 2021-09-16 PROCEDURE — 36415 COLL VENOUS BLD VENIPUNCTURE: CPT

## 2021-09-16 PROCEDURE — 6360000002 HC RX W HCPCS: Performed by: INTERNAL MEDICINE

## 2021-09-16 PROCEDURE — 80048 BASIC METABOLIC PNL TOTAL CA: CPT

## 2021-09-16 PROCEDURE — 71250 CT THORAX DX C-: CPT

## 2021-09-16 PROCEDURE — 94761 N-INVAS EAR/PLS OXIMETRY MLT: CPT

## 2021-09-16 PROCEDURE — 97161 PT EVAL LOW COMPLEX 20 MIN: CPT

## 2021-09-16 PROCEDURE — 6370000000 HC RX 637 (ALT 250 FOR IP): Performed by: STUDENT IN AN ORGANIZED HEALTH CARE EDUCATION/TRAINING PROGRAM

## 2021-09-16 PROCEDURE — 1200000000 HC SEMI PRIVATE

## 2021-09-16 PROCEDURE — 94640 AIRWAY INHALATION TREATMENT: CPT

## 2021-09-16 PROCEDURE — 93010 ELECTROCARDIOGRAM REPORT: CPT | Performed by: INTERNAL MEDICINE

## 2021-09-16 PROCEDURE — 97165 OT EVAL LOW COMPLEX 30 MIN: CPT

## 2021-09-16 PROCEDURE — 6360000002 HC RX W HCPCS: Performed by: STUDENT IN AN ORGANIZED HEALTH CARE EDUCATION/TRAINING PROGRAM

## 2021-09-16 PROCEDURE — 6360000002 HC RX W HCPCS: Performed by: NURSE PRACTITIONER

## 2021-09-16 RX ORDER — METHYLPREDNISOLONE SODIUM SUCCINATE 40 MG/ML
40 INJECTION, POWDER, LYOPHILIZED, FOR SOLUTION INTRAMUSCULAR; INTRAVENOUS EVERY 8 HOURS
Status: DISCONTINUED | OUTPATIENT
Start: 2021-09-16 | End: 2021-09-17 | Stop reason: HOSPADM

## 2021-09-16 RX ORDER — KETOROLAC TROMETHAMINE 30 MG/ML
30 INJECTION, SOLUTION INTRAMUSCULAR; INTRAVENOUS EVERY 6 HOURS PRN
Status: DISCONTINUED | OUTPATIENT
Start: 2021-09-16 | End: 2021-09-17 | Stop reason: HOSPADM

## 2021-09-16 RX ORDER — KETOROLAC TROMETHAMINE 30 MG/ML
30 INJECTION, SOLUTION INTRAMUSCULAR; INTRAVENOUS ONCE
Status: COMPLETED | OUTPATIENT
Start: 2021-09-16 | End: 2021-09-16

## 2021-09-16 RX ORDER — BUDESONIDE AND FORMOTEROL FUMARATE DIHYDRATE 80; 4.5 UG/1; UG/1
2 AEROSOL RESPIRATORY (INHALATION) 2 TIMES DAILY
Refills: 1 | Status: DISCONTINUED | OUTPATIENT
Start: 2021-09-16 | End: 2021-09-17 | Stop reason: HOSPADM

## 2021-09-16 RX ADMIN — SODIUM CHLORIDE: 9 INJECTION, SOLUTION INTRAVENOUS at 04:43

## 2021-09-16 RX ADMIN — ACETAMINOPHEN 650 MG: 325 TABLET, FILM COATED ORAL at 08:00

## 2021-09-16 RX ADMIN — Medication 60 MG: at 08:01

## 2021-09-16 RX ADMIN — METHYLPREDNISOLONE SODIUM SUCCINATE 40 MG: 40 INJECTION, POWDER, FOR SOLUTION INTRAMUSCULAR; INTRAVENOUS at 10:27

## 2021-09-16 RX ADMIN — LEVETIRACETAM 750 MG: 750 TABLET ORAL at 21:03

## 2021-09-16 RX ADMIN — IPRATROPIUM BROMIDE AND ALBUTEROL SULFATE 1 AMPULE: 2.5; .5 SOLUTION RESPIRATORY (INHALATION) at 10:46

## 2021-09-16 RX ADMIN — KETOROLAC TROMETHAMINE 30 MG: 30 INJECTION, SOLUTION INTRAMUSCULAR; INTRAVENOUS at 16:42

## 2021-09-16 RX ADMIN — BENZONATATE 200 MG: 100 CAPSULE ORAL at 14:02

## 2021-09-16 RX ADMIN — METHYLPREDNISOLONE SODIUM SUCCINATE 40 MG: 40 INJECTION, POWDER, FOR SOLUTION INTRAMUSCULAR; INTRAVENOUS at 18:32

## 2021-09-16 RX ADMIN — IPRATROPIUM BROMIDE AND ALBUTEROL SULFATE 1 AMPULE: 2.5; .5 SOLUTION RESPIRATORY (INHALATION) at 15:05

## 2021-09-16 RX ADMIN — AZITHROMYCIN MONOHYDRATE 250 MG: 250 TABLET ORAL at 07:59

## 2021-09-16 RX ADMIN — BUDESONIDE AND FORMOTEROL FUMARATE DIHYDRATE 2 PUFF: 80; 4.5 AEROSOL RESPIRATORY (INHALATION) at 19:28

## 2021-09-16 RX ADMIN — BENZONATATE 200 MG: 100 CAPSULE ORAL at 21:03

## 2021-09-16 RX ADMIN — ATORVASTATIN CALCIUM 40 MG: 40 TABLET, FILM COATED ORAL at 07:59

## 2021-09-16 RX ADMIN — IPRATROPIUM BROMIDE AND ALBUTEROL SULFATE 1 AMPULE: 2.5; .5 SOLUTION RESPIRATORY (INHALATION) at 07:01

## 2021-09-16 RX ADMIN — CYANOCOBALAMIN TAB 1000 MCG 1000 MCG: 1000 TAB at 07:59

## 2021-09-16 RX ADMIN — KETOROLAC TROMETHAMINE 30 MG: 30 INJECTION, SOLUTION INTRAMUSCULAR; INTRAVENOUS at 10:35

## 2021-09-16 RX ADMIN — KETOROLAC TROMETHAMINE 30 MG: 30 INJECTION, SOLUTION INTRAMUSCULAR; INTRAVENOUS at 04:40

## 2021-09-16 RX ADMIN — CEFTRIAXONE SODIUM 1000 MG: 1 INJECTION, POWDER, FOR SOLUTION INTRAMUSCULAR; INTRAVENOUS at 12:42

## 2021-09-16 RX ADMIN — BUPROPION HYDROCHLORIDE 150 MG: 150 TABLET, EXTENDED RELEASE ORAL at 07:59

## 2021-09-16 RX ADMIN — ASPIRIN 81 MG: 81 TABLET, COATED ORAL at 07:59

## 2021-09-16 RX ADMIN — IPRATROPIUM BROMIDE AND ALBUTEROL SULFATE 1 AMPULE: 2.5; .5 SOLUTION RESPIRATORY (INHALATION) at 19:28

## 2021-09-16 RX ADMIN — METHYLPREDNISOLONE SODIUM SUCCINATE 40 MG: 40 INJECTION, POWDER, FOR SOLUTION INTRAMUSCULAR; INTRAVENOUS at 04:40

## 2021-09-16 RX ADMIN — Medication 60 MG: at 21:04

## 2021-09-16 RX ADMIN — BENZONATATE 200 MG: 100 CAPSULE ORAL at 07:59

## 2021-09-16 RX ADMIN — BUPROPION HYDROCHLORIDE 150 MG: 150 TABLET, EXTENDED RELEASE ORAL at 21:04

## 2021-09-16 RX ADMIN — KETOROLAC TROMETHAMINE 30 MG: 30 INJECTION, SOLUTION INTRAMUSCULAR; INTRAVENOUS at 23:35

## 2021-09-16 RX ADMIN — SODIUM CHLORIDE, PRESERVATIVE FREE 10 ML: 5 INJECTION INTRAVENOUS at 21:04

## 2021-09-16 RX ADMIN — ENOXAPARIN SODIUM 40 MG: 40 INJECTION SUBCUTANEOUS at 08:01

## 2021-09-16 ASSESSMENT — PAIN SCALES - GENERAL
PAINLEVEL_OUTOF10: 8
PAINLEVEL_OUTOF10: 4
PAINLEVEL_OUTOF10: 3
PAINLEVEL_OUTOF10: 8
PAINLEVEL_OUTOF10: 10
PAINLEVEL_OUTOF10: 8
PAINLEVEL_OUTOF10: 10
PAINLEVEL_OUTOF10: 8
PAINLEVEL_OUTOF10: 7
PAINLEVEL_OUTOF10: 3

## 2021-09-16 ASSESSMENT — ENCOUNTER SYMPTOMS
WHEEZING: 1
EYE REDNESS: 0
ABDOMINAL PAIN: 0
COUGH: 1
EYE PAIN: 0
CONSTIPATION: 0
VOMITING: 0
SHORTNESS OF BREATH: 1
NAUSEA: 0
DIARRHEA: 0

## 2021-09-16 ASSESSMENT — PAIN DESCRIPTION - PAIN TYPE
TYPE: ACUTE PAIN

## 2021-09-16 ASSESSMENT — PAIN DESCRIPTION - DESCRIPTORS
DESCRIPTORS: HEADACHE

## 2021-09-16 ASSESSMENT — PAIN DESCRIPTION - LOCATION
LOCATION: HEAD

## 2021-09-16 ASSESSMENT — PAIN DESCRIPTION - FREQUENCY
FREQUENCY: CONTINUOUS

## 2021-09-16 ASSESSMENT — PAIN DESCRIPTION - ONSET: ONSET: ON-GOING

## 2021-09-16 ASSESSMENT — PAIN - FUNCTIONAL ASSESSMENT: PAIN_FUNCTIONAL_ASSESSMENT: ACTIVITIES ARE NOT PREVENTED

## 2021-09-16 ASSESSMENT — PAIN DESCRIPTION - PROGRESSION: CLINICAL_PROGRESSION: NOT CHANGED

## 2021-09-16 NOTE — PROGRESS NOTES
Access: Stairs to enter with rails  Entrance Stairs - Number of Steps: \"at least 20\"  Entrance Stairs - Rails: Right  Bathroom Shower/Tub: Tub/Shower unit, Curtain  Bathroom Toilet: Standard  Bathroom Equipment: Grab bars in shower, Built-in shower seat  Bathroom Accessibility: Walker accessible  Home Equipment:  (No DME)  Receives Help From: Family  ADL Assistance: Independent  Homemaking Assistance: Needs assistance (BF or son assist with laundry; pt does groceries; share )  Homemaking Responsibilities: Yes  Ambulation Assistance: Independent  Transfer Assistance: Independent  Active : Yes  Mode of Transportation: SUV  Occupation: On disability  IADL Comments: Pt reports sleeping in flat bed  Additional Comments: Pt reports boyfriend is on disability and able to provide assistance as needed. Pt denies any recent PT/OT. Pain Assessment  Pain Assessment: 0-10  Pain Level: 8  Pain Type: Acute pain  Pain Location: Head  Pain Descriptors: Headache  Pain Frequency: Continuous    Objective          Sensation  Overall Sensation Status: Impaired (Intermittent numbness in bilateral hands and feet. )   ADL  Feeding: Independent  Grooming: Modified independent   UE Bathing: Modified independent   LE Bathing: Modified independent   UE Dressing: Modified independent   LE Dressing: Modified independent   Toileting: Modified independent   Additional Comments: ADL scores based on clinical reaonsing and skilled observation unless otherwise noted. Pt completed lower body dressing doffing/donning bilateral socks while sitting EOB without any increased difficutly noted. Pt denies any concerns with completing self care tasks at this time.      UE Function           LUE Strength  Gross LUE Strength: WFL  L Hand General: 4/5     LUE Tone: Normotonic     LUE AROM (degrees)  LUE AROM : WFL     Left Hand AROM (degrees)  Left Hand AROM: WFL  RUE Strength  Gross RUE Strength: WFL  R Hand General: 4/5      RUE Tone: Normotonic     RUE AROM (degrees)  RUE AROM : WFL     Right Hand AROM (degrees)  Right Hand AROM: WFL    Fine Motor Skills  Coordination  Movements Are Fluid And Coordinated: Yes                           Mobility  Sit to Supine: Modified independent       Balance  Sitting Balance: Independent  Standing Balance: Stand by assistance (SBA with functional mobility; otherwise independent)  Standing Balance  Time: 1-2 minutes x 2; 4-5 minutes x 1  Activity: functional transfers; functional mobility  Comment: without device  Functional Mobility  Functional - Mobility Device: No device  Activity: Other (hallway)  Assist Level: Stand by assistance  Bed mobility  Sit to Supine: Modified independent  Scooting: Modified independent  Comment: Bed mobility completed with HOB elevated     Transfers  Sit to stand: Independent  Stand to sit: Independent  Transfer Comments: Pt demonstrated slight dizziness with change in position. Functional Activity Tolerance  Functional Activity Tolerance: Tolerates 30 min exercise with multiple rests   Assessment  Assessment: Pt demonstrated independence with bed mobility, functional transfers, and lower body dressing. Pt was SBA for functional mobility with IV management. PT to address functional mobility and endurance at this time. Pt denies concerns with completing self care tasks at this time.  No further occupational therapy services needed at this time  Prognosis: Good  Decision Making: Low Complexity  REQUIRES OT FOLLOW UP: No  No Skilled OT: Independent with ADL's, Safe to return home, No OT goals identified  Activity Tolerance: Patient Tolerated treatment well         Functional Outcome Measures  AM-PAC Daily Activity Inpatient   How much help for putting on and taking off regular lower body clothing?: None  How much help for Bathing?: None  How much help for Toileting?: None  How much help for putting on and taking off regular upper body clothing?: None  How much help for taking care of personal grooming?: None  How much help for eating meals?: None  AM-PAC Inpatient Daily Activity Raw Score: 24  AM-PAC Inpatient ADL T-Scale Score : 57.54  ADL Inpatient CMS 0-100% Score: 0  ADL Inpatient CMS G-Code Modifier : [address]       Goals  Short term goals  Time Frame for Short term goals: D/C OT    Plan  Safety Devices  Safety Devices in place: Yes  Type of devices: Call light within reach, Gait belt, Left in chair, Nurse notified (Family in room)     Plan  Times per week: D/C OT          OT Individual Minutes  Time In: 4422  Time Out: 6742  Minutes: 23    Electronically signed by Basil Leary OT on 9/16/21 at 12:38 PM EDT

## 2021-09-16 NOTE — FLOWSHEET NOTE
09/16/21 1205   Encounter Summary   Services provided to: Patient and family together   Referral/Consult From: Mayra   Continue Visiting   (9/16/21V)   Volunteer Visit Yes   Complexity of Encounter Low   Length of Encounter 15 minutes   Spiritual/Holiness   Type Spiritual support   Intervention [address] Patient received communion;Family received communion

## 2021-09-16 NOTE — PROGRESS NOTES
Soft, nontender, nondistended, no masses or organomegaly  Neurologic - There are no focal motor or sensory deficits  Skin - No bruising or bleeding  Extremities - No clubbing, cyanosis, edema    MEDS      budesonide-formoterol  2 puff Inhalation BID    cefTRIAXone (ROCEPHIN) IV  1,000 mg IntraVENous Q24H    sodium chloride flush  5-40 mL IntraVENous 2 times per day    enoxaparin  40 mg SubCUTAneous Daily    ipratropium-albuterol  1 ampule Inhalation Q4H WA    methylPREDNISolone  40 mg IntraVENous Q6H    nicotine  1 patch TransDERmal Daily    benzonatate  200 mg Oral TID    dextromethorphan  60 mg Oral 2 times per day    aspirin EC  81 mg Oral Daily    atorvastatin  40 mg Oral Daily    buPROPion  150 mg Oral BID    vitamin B-12  1,000 mcg Oral Daily    levETIRAcetam  750 mg Oral Nightly      sodium chloride 75 mL/hr at 09/16/21 0443    sodium chloride       ketorolac, sodium chloride flush, sodium chloride, ondansetron **OR** ondansetron, polyethylene glycol, acetaminophen **OR** acetaminophen, ipratropium-albuterol, potassium chloride **OR** potassium alternative oral replacement **OR** potassium chloride    LABS   CBC   Recent Labs     09/16/21  0542   WBC 9.8   HGB 14.1   HCT 41.3   MCV 93.3        BMP:   Lab Results   Component Value Date     09/16/2021    K 4.4 09/16/2021     09/16/2021    CO2 25 09/16/2021    BUN 12 09/16/2021    LABALBU 4.2 07/12/2020    CREATININE 0.62 09/16/2021    CALCIUM 9.6 09/16/2021    GFRAA >60 09/16/2021    LABGLOM >60 09/16/2021     ABGs:  Lab Results   Component Value Date    PHART 7.405 06/12/2021    PO2ART 96.8 06/12/2021    WJF3DCO 44.2 06/12/2021      Lab Results   Component Value Date    MODE NOT REPORTED 07/06/2021     Ionized Calcium:  No results found for: IONCA  Magnesium:    Lab Results   Component Value Date    MG 2.2 09/30/2016     Phosphorus:    Lab Results   Component Value Date    PHOS 3.5 02/13/2013        LIVER PROFILE No results

## 2021-09-16 NOTE — PROGRESS NOTES
Physical Therapy    Facility/Department: [facility] MED SURG  Initial Assessment    NAME: Juan Tejada  : 1965  MRN: [id number]    Date of Service: 2021    Discharge Recommendations: The patient's needs are being met with no further Physical Therapy recommended at discharge. PT Equipment Recommendations  Equipment Needed: No    Assessment   Body structures, Functions, Activity limitations: Decreased functional mobility ; Decreased ADL status; Decreased endurance  Assessment: Pt is IND to SBA for mobility - limited by endurance and motivated to exercises. Handouts given  Treatment Diagnosis: Impaired functional mobility 2* COPD  Specific instructions for Next Treatment: HEP, nustep, gait, stairs, pacing  Prognosis: Good  Decision Making: Low Complexity  History: 64 y.o. female who presents complaining of shortness of breath. Patient states that she has not been feeling well for the last couple days. Patient states she noted she was using her nebulizer treatments more frequently. Patient states today she started having chest pain and does not feel like she can breathe well. Patient has a history of COPD. Patient states that she has not had any fevers. Patient has had a cough productive of sputum. Patient did get vaccinated against Covid. Patient denies any leg pain or swelling. Exam: ROM, MMT, bed mobility, transfers, amb, balance  Clinical Presentation: Pt alert, cooperative, pleasant  Barriers to Learning: none  REQUIRES PT FOLLOW UP: Yes  Activity Tolerance  Activity Tolerance: Patient Tolerated treatment well;Patient limited by endurance       Patient Diagnosis(es): The encounter diagnosis was COPD exacerbation ([address] has a past medical history of Acute MI ([address] Anxiety, Chronic back pain, COPD (chronic obstructive pulmonary disease) ([address] Depression, Heart disease, Hyperlipidemia, Hypertension, and MRSA (methicillin resistant staph aureus) culture positive.    has a past surgical history that includes Tubal ligation; Tonsillectomy; Cholecystectomy, laparoscopic (11/10/2017); Cholecystectomy, laparoscopic (N/A, 11/10/2017); and back surgery. Restrictions  Restrictions/Precautions  Restrictions/Precautions: Seizure, Contact Precautions  Required Braces or Orthoses?: No  Implants present? : Metal implants (Back fusion)  Position Activity Restriction  Other position/activity restrictions: PT OT eval and treat; keep O2 sats >92%  Vision/Hearing  Vision: Impaired  Vision Exceptions:  (Contacts)  Hearing: Within functional limits     Subjective  General  Chart Reviewed: Yes  Patient assessed for rehabilitation services?: Yes  Family / Caregiver Present: Yes (S/O and mother)  Referring Practitioner: Jorden Love MD  Referral Date : 09/15/21  Diagnosis: COPD exacerbation  Follows Commands: Within Functional Limits  Subjective  Subjective: Pt in bed, agreeable to PT OT. KEVIN Aceves  Pain Screening  Patient Currently in Pain: Yes  Pain Assessment  Pain Assessment: 0-10  Pain Level: 8  Pain Type: Acute pain  Pain Location: Head  Pain Descriptors: Headache  Pain Frequency: Continuous  Pain Onset: On-going  Clinical Progression: Not changed  Functional Pain Assessment: Activities are not prevented  Non-Pharmaceutical Pain Intervention(s): Ambulation/Increased Activity; Distraction;Rest;Repositioned  Response to Pain Intervention: Patient Satisfied  Vital Signs  Patient Currently in Pain: Yes  Oxygen Therapy  SpO2: 97 %  O2 Device: None (Room air)  Patient Observation  Observations: O2 sats 94-97% with rest and activity, HR 120s at rest and with mobility - slight increase, does not go over 129       Orientation  Orientation  Overall Orientation Status: Within Normal Limits  Social/Functional History  Social/Functional History  Lives With: Significant other  Type of Home: Apartment (Upstairs apartment)  Home Layout: One level  Home Access: Stairs to enter with rails  Entrance Stairs - Number of Steps: \"at least 20\"  Entrance Stairs - Rails: Right  Bathroom Shower/Tub: Tub/Shower unit, Curtain  Bathroom Toilet: Standard  Bathroom Equipment: Grab bars in shower, Built-in shower seat  Bathroom Accessibility: Walker accessible  Home Equipment:  (No DME)  Receives Help From: Family  ADL Assistance: Independent  Homemaking Assistance: Needs assistance (BF or son assist with laundry; pt does groceries; share )  Homemaking Responsibilities: Yes  Ambulation Assistance: Independent  Transfer Assistance: Independent  Active : Yes  Mode of Transportation: SUV  Occupation: On disability  IADL Comments: Pt reports sleeping in flat bed  Additional Comments: Pt reports boyfriend is on disability and able to provide assistance as needed. Pt denies any recent PT/OT. Cognition        Objective          AROM RLE (degrees)  RLE AROM: WNL  AROM LLE (degrees)  LLE AROM : WNL  AROM RUE (degrees)  RUE General AROM: See OT  AROM LUE (degrees)  LUE General AROM: See OT  Strength RLE  Strength RLE: WNL  Comment: Grossly 4/5  Strength LLE  Strength LLE: WNL  Comment: Grossly 4/5  Strength RUE  Comment: See OT  Strength LUE  Comment: See OT     Sensation  Overall Sensation Status: Impaired (Intermittent numbness in bilateral hands and feet. )  Bed mobility  Rolling to Right: Modified independent  Supine to Sit: Modified independent  Sit to Supine: Unable to assess  Scooting: Modified independent  Comment: Head of bed elevated, mild dizziness upon initial. standing. pt up in chair end of session. Transfers  Sit to Stand: Independent  Stand to sit:  Independent  Bed to Chair: Independent  Stand Pivot Transfers: Independent  Comment: IND no device  Ambulation  Ambulation?: Yes  Ambulation 1  Surface: level tile  Device: No Device  Assistance: Stand by assistance  Quality of Gait: normal monalisa, steady, no LOB  Distance: [de-identified]'  Comments: O2 sats 94-97% on room air, -129  Stairs/Curb  Stairs?: No     Balance  Posture: Good  Sitting - Static: Good  Sitting - Dynamic: Good  Standing - Static: Good  Standing - Dynamic: Good  Comments: no LOB, no device  Other exercises  Other exercises?: Yes  Other exercises 1: HEP given - standing program, walking program and energy conservation handouts     Plan   Plan  Times per week: 1-2 more tx  Specific instructions for Next Treatment: HEP, nustep, gait, stairs, pacing  Current Treatment Recommendations: Strengthening, Functional Mobility Training, Endurance Training, Gait Training, Stair training, Home Exercise Program, Safety Education & Training  Safety Devices  Type of devices: Call light within reach, Gait belt, Left in chair, Nurse notified (KEVIN Noguera)  Restraints  Initially in place: No    G-Code       OutComes Score                                                  AM-PAC Score  AM-PAC Inpatient Mobility Raw Score : 20 (09/16/21 0913)  AM-PAC Inpatient T-Scale Score : 47.67 (09/16/21 0913)  Mobility Inpatient CMS 0-100% Score: 35.83 (09/16/21 0913)  Mobility Inpatient CMS G-Code Modifier : Eddie Mejia (09/16/21 0913)          Goals  Short term goals  Time Frame for Short term goals: 1-2 tx  Short term goal 1: Pt to demo IND bed mobility. Short term goal 2: Pt to amb 150' IND. Short term goal 3: Pt to ascend/descend 20 stairs, 1 HR, SBA. Short term goal 4: Pt to tolerate 30 minute PT session with good demo of HEP. Short term goal 5: Pt to perform nustep at least 5 minutes without change in symptoms. Patient Goals   Patient goals :  To go home       Therapy Time   Individual Concurrent Group Co-treatment   Time In 0913         Time Out [address]         Minutes [address], [place]

## 2021-09-16 NOTE — PROGRESS NOTES
[facility] [employer]    PROGRESS NOTE             9/16/2021    7:57 AM    Name:   Alexandria Caldwell  MRN:     [id number]     Acct:      [de-identified]   Room:   2056/2056-01   Day:  1  Admit Date:  9/15/2021  9:05 AM    PCP:  THOR Jensen CNP  Code Status:  Full Code    Subjective:     C/C:   Chief Complaint   Patient presents with    Shortness of Breath    Chest Pain     Interval History Status: improved. Patient seen and examined at bedside in [address]. Overnight, patient was experiencing constant headache that was not relieved with ibuprofen, Tylenol, Toradol, Benadryl. Possibly due to steroids. Respiratory status continues to improve. Cough slowly resolving. Brief History:     Patient is a 68-year-old female with history of COPD, hypertension, hyperlipidemia, CAD, current pack-a-day smoker who presents with shortness of breath for the past week and was found to be in acute exacerbation of COPD. Patient is being treated with duo nebs and IV steroids. Review of Systems:     Review of Systems   Constitutional: Positive for fatigue. Negative for chills and fever. Eyes: Negative for pain and redness. Respiratory: Positive for cough, shortness of breath and wheezing. Cardiovascular: Negative for chest pain and leg swelling. Gastrointestinal: Negative for abdominal pain, constipation, diarrhea, nausea and vomiting. Genitourinary: Negative for difficulty urinating and dysuria. Musculoskeletal: Negative for arthralgias. Neurological: Positive for headaches. Negative for dizziness. Psychiatric/Behavioral: Negative for agitation and behavioral problems. Medications: Allergies:     Allergies   Allergen Reactions    Sulfa Antibiotics Anaphylaxis       Current Meds:   Scheduled Meds:    sodium chloride flush  5-40 mL IntraVENous 2 times per day    enoxaparin  40 mg SubCUTAneous Daily    ipratropium-albuterol  1 ampule Inhalation Q4H WA    methylPREDNISolone  40 mg IntraVENous Q6H    nicotine  1 patch TransDERmal Daily    benzonatate  200 mg Oral TID    dextromethorphan  60 mg Oral 2 times per day    azithromycin  250 mg Oral Daily    aspirin EC  81 mg Oral Daily    atorvastatin  40 mg Oral Daily    buPROPion  150 mg Oral BID    vitamin B-12  1,000 mcg Oral Daily    levETIRAcetam  750 mg Oral Nightly     Continuous Infusions:    sodium chloride 75 mL/hr at 21 0443    sodium chloride       PRN Meds: sodium chloride flush, sodium chloride, ondansetron **OR** ondansetron, polyethylene glycol, acetaminophen **OR** acetaminophen, albuterol, ipratropium-albuterol, potassium chloride **OR** potassium alternative oral replacement **OR** potassium chloride    Data:     Past Medical History:   has a past medical history of Acute MI ([facility] Anxiety, Chronic back pain, COPD (chronic obstructive pulmonary disease) ([facility] Depression, Heart disease, Hyperlipidemia, Hypertension, and MRSA (methicillin resistant staph aureus) culture positive. Social History:   reports that she has been smoking cigarettes. She has a 40.00 pack-year smoking history. She has never used smokeless tobacco. She reports current drug use. Drug: Marijuana. She reports that she does not drink alcohol. Family History:   Family History   Problem Relation Age of Onset    Other Mother     Heart Disease Father     High Blood Pressure Father     High Cholesterol Father     Other Brother        Vitals:  BP (!) 138/96   Pulse 109   Temp 98.4 °F (36.9 °C) (Oral)   Resp 18   Ht 5' 7\" (1.702 m)   Wt 160 lb (72.6 kg)   SpO2 99%   Breastfeeding No   BMI 25.06 kg/m²   Temp (24hrs), Av.9 °F (36.6 °C), Min:97.5 °F (36.4 °C), Max:98.4 °F (36.9 °C)    No results for input(s): POCGLU in the last 72 hours. I/O(24Hr):     Intake/Output Summary (Last 24 hours) at 2021 0570  Last data filed at 2021 4381  Gross per 24 hour   Intake 978.52 ml   Output --   Net 978.52 ml       Labs:  [unfilled]    Lab Results   Component Value Date/Time    SPECIAL NOT REPORTED 06/12/2021 04:25 AM     Lab Results   Component Value Date/Time    CULTURE NO GROWTH 6 DAYS 06/12/2021 04:25 AM       [unfilled]    Radiology:    XR CHEST PORTABLE    Result Date: 9/15/2021  No acute intrathoracic process. Physical Examination:        Physical Exam  Constitutional:       Appearance: Normal appearance. HENT:      Head: Normocephalic and atraumatic. Eyes:      Extraocular Movements: Extraocular movements intact. Conjunctiva/sclera: Conjunctivae normal.   Cardiovascular:      Rate and Rhythm: Normal rate and regular rhythm. Pulses: Normal pulses. Heart sounds: Normal heart sounds. Pulmonary:      Effort: Pulmonary effort is normal.      Comments: Diffuse mild wheezing appreciated  Abdominal:      General: Abdomen is flat. Palpations: Abdomen is soft. Musculoskeletal:         General: Normal range of motion. Skin:     General: Skin is warm. Neurological:      General: No focal deficit present. Mental Status: She is alert. Mental status is at baseline.    Psychiatric:         Mood and Affect: Mood normal.         Behavior: Behavior normal.       Assessment:        Primary Problem  COPD exacerbation ([facility]    Active Hospital Problems    Diagnosis Date Noted    COPD exacerbation ([facility] [J44.1] 09/15/2021    Hypertension [I10] 12/21/2020    Smoking [F17.200] 10/22/2013    Depression [F32.9] 03/15/2013    Hyperlipidemia [E78.5] 03/15/2013    CAD, multiple vessel [I25.10] 03/15/2013    Chronic back pain [M54.9, G89.29]        Plan:        Acute exacerbation of chronic COPD  - Patient experiencing increasing fatigue/SOB for the past week  - CXR unremarkable  - Respiratory culture/Gram stain pending  - DuoNebs every 4 hours while awake  - Solu-Medrol 40 mg every 6 hours  - Delsym antitussive, Tessalon capsules  - Pulmonology consulted              CT chest showed stable right apical nodule                       Azithromycin 250 mg daily     Seizure disorder  - Continue home Keppra 750 mg twice daily     Hyperlipidemia  - Continue home atorvastatin 40 mg daily     Anxiety/depression  - Continue home Wellbutrin 150 mg twice daily     DVT prophylaxis: Lovenox 40 mg daily  GI prophylaxis: None  Code: Full  Dispo: Home    Kathleen Martinez MD  9/16/2021  7:57 AM     Attestation and add on       I have discussed the care of Sonali Padilla , including pertinent history and exam findings,      9/16/21    with the resident. I have seen and examined the patient and the key elements of all parts of the encounter have been performed by me . I agree with the assessment, plan and orders as documented by the resident. Principal Problem:    COPD exacerbation ([address]  Active Problems:    Chronic back pain    Depression    Hyperlipidemia    CAD, multiple vessel    Smoking    Hypertension    Acute bronchitis  Resolved Problems:    * No resolved hospital problems. *            ---- ;        Medications: Allergies:     Allergies   Allergen Reactions    Sulfa Antibiotics Anaphylaxis       Current Meds:   Scheduled Meds:    budesonide-formoterol  2 puff Inhalation BID    cefTRIAXone (ROCEPHIN) IV  1,000 mg IntraVENous Q24H    methylPREDNISolone  40 mg IntraVENous Q8H    sodium chloride flush  5-40 mL IntraVENous 2 times per day    enoxaparin  40 mg SubCUTAneous Daily    ipratropium-albuterol  1 ampule Inhalation Q4H WA    nicotine  1 patch TransDERmal Daily    benzonatate  200 mg Oral TID    dextromethorphan  60 mg Oral 2 times per day    aspirin EC  81 mg Oral Daily    atorvastatin  40 mg Oral Daily    buPROPion  150 mg Oral BID    vitamin B-12  1,000 mcg Oral Daily    levETIRAcetam  750 mg Oral Nightly     Continuous Infusions:    sodium chloride       PRN Meds: ketorolac, sodium chloride flush, sodium chloride, ondansetron **OR** ondansetron, polyethylene glycol, acetaminophen **OR** acetaminophen, ipratropium-albuterol, potassium chloride **OR** potassium alternative oral replacement **OR** potassium chloride        MD CED Lal [address], [address] [place] [address].    Phone [phone]   Fax: [phone]  Answering Service: [phone]

## 2021-09-16 NOTE — PROGRESS NOTES
CALLED THE RESIDENTS TO INFORM THEM THAT PT CONTINUES TO HAVE HEADACHES. ABOUT FIVE HOURS AFTER THE TORADOL WAS GIVEN, THE HEADACHE RETURNED. RESIDENT THINKS IT IS STEROID RELATED. RELAYED THAT THE PULMONOLOGIST SAID THE STEROIDS COULD BE TAPERED AND THE FLUIDS REDUCED.

## 2021-09-16 NOTE — PROGRESS NOTES
Patient continues to complain of headache pain. Tylenol and ibuprofen have been given as ordered as well as caffeine.  Patient continues to be very uncomfortable, message sent to Ariane Carlson NP

## 2021-09-16 NOTE — FLOWSHEET NOTE
Patient tearful and anxious as she talked about her son's addictions;  welcomed prayer; listening presence and support;     09/16/21 1406   Encounter Summary   Services provided to: Patient   Referral/Consult From: [address] [place] [address] Significant other;Children   Continue Visiting   (9/16/21)   Complexity of Encounter Moderate   Length of Encounter 15 minutes   Spiritual Assessment Completed Yes   Spiritual/Temple   Type Spiritual support   Assessment Approachable;Tearful; Anxious; Fearful;Coping;Helplessness; Hopeful   Intervention Active listening;Explored feelings, thoughts, concerns;Prayer;Sustaining presence/ Ministry of presence; Discussed illness/injury and it's impact; Discussed belief system/Scientology practices/vitor   Outcome Expressed gratitude;Engaged in conversation;Expressed feelings/needs/concerns;Coping; Hopeful;Receptive        09/16/21 1406   Encounter Summary   Services provided to: Patient   Referral/Consult From: [address] [place] [address] Significant other;Children   Continue Visiting   (9/16/21)   Complexity of Encounter Moderate   Length of Encounter 15 minutes   Spiritual Assessment Completed Yes   Spiritual/Temple   Type Spiritual support   Assessment Approachable;Tearful; Anxious; Fearful;Coping;Helplessness; Hopeful   Intervention Active listening;Explored feelings, thoughts, concerns;Prayer;Sustaining presence/ Ministry of presence; Discussed illness/injury and it's impact; Discussed belief system/Scientology practices/vitor   Outcome Expressed gratitude;Engaged in conversation;Expressed feelings/needs/concerns;Coping; Hopeful;Receptive

## 2021-09-16 NOTE — CARE COORDINATION
ONGOING DISCHARGE PLAN:    Patient is alert and oriented x4. Spoke with patient regarding discharge plan and patient confirms that plan is still to return to home w/ Boyfriend. Pt. Would like VNS, IF Oxygen, is needed at home. Currently sating 98% on RA, Will continue to follow. Remains on IV Rocephin & Iv steroids, 40MG, Q6.    CT Chest.     PT/OT on board, will follow for any rec/needs. Will continue to follow for additional discharge needs.     Electronically signed by Nguyen Calhoun RN on 9/16/2021 at 12:36 PM

## 2021-09-17 VITALS
BODY MASS INDEX: 25.11 KG/M2 | WEIGHT: 160 LBS | DIASTOLIC BLOOD PRESSURE: 95 MMHG | OXYGEN SATURATION: 97 % | RESPIRATION RATE: 18 BRPM | SYSTOLIC BLOOD PRESSURE: 153 MMHG | HEART RATE: 101 BPM | HEIGHT: 67 IN | TEMPERATURE: 97.9 F

## 2021-09-17 PROBLEM — R51.9 HEADACHE: Status: ACTIVE | Noted: 2021-09-17

## 2021-09-17 PROCEDURE — 6370000000 HC RX 637 (ALT 250 FOR IP): Performed by: INTERNAL MEDICINE

## 2021-09-17 PROCEDURE — 99239 HOSP IP/OBS DSCHRG MGMT >30: CPT | Performed by: INTERNAL MEDICINE

## 2021-09-17 PROCEDURE — 6360000002 HC RX W HCPCS: Performed by: INTERNAL MEDICINE

## 2021-09-17 PROCEDURE — 97116 GAIT TRAINING THERAPY: CPT

## 2021-09-17 PROCEDURE — 2580000003 HC RX 258: Performed by: INTERNAL MEDICINE

## 2021-09-17 PROCEDURE — 94640 AIRWAY INHALATION TREATMENT: CPT

## 2021-09-17 PROCEDURE — 2580000003 HC RX 258: Performed by: STUDENT IN AN ORGANIZED HEALTH CARE EDUCATION/TRAINING PROGRAM

## 2021-09-17 PROCEDURE — 94761 N-INVAS EAR/PLS OXIMETRY MLT: CPT

## 2021-09-17 PROCEDURE — 6370000000 HC RX 637 (ALT 250 FOR IP): Performed by: STUDENT IN AN ORGANIZED HEALTH CARE EDUCATION/TRAINING PROGRAM

## 2021-09-17 PROCEDURE — 6360000002 HC RX W HCPCS: Performed by: STUDENT IN AN ORGANIZED HEALTH CARE EDUCATION/TRAINING PROGRAM

## 2021-09-17 RX ORDER — PREDNISONE 10 MG/1
TABLET ORAL
Qty: 31 TABLET | Refills: 0 | Status: SHIPPED | OUTPATIENT
Start: 2021-09-17 | End: 2022-01-20 | Stop reason: ALTCHOICE

## 2021-09-17 RX ORDER — DEXTROMETHORPHAN POLISTIREX 30 MG/5ML
60 SUSPENSION ORAL EVERY 12 HOURS SCHEDULED
Qty: 200 ML | Refills: 0 | Status: SHIPPED | OUTPATIENT
Start: 2021-09-17 | End: 2021-09-27

## 2021-09-17 RX ORDER — PREDNISONE 10 MG/1
20 TABLET ORAL DAILY
Qty: 10 TABLET | Refills: 0 | Status: SHIPPED
Start: 2021-09-17 | End: 2021-09-17 | Stop reason: HOSPADM

## 2021-09-17 RX ORDER — LEVETIRACETAM 750 MG/1
750 TABLET ORAL NIGHTLY
Qty: 60 TABLET | Refills: 3 | Status: SHIPPED | OUTPATIENT
Start: 2021-09-17

## 2021-09-17 RX ORDER — BENZONATATE 200 MG/1
200 CAPSULE ORAL 3 TIMES DAILY
Qty: 21 CAPSULE | Refills: 0 | Status: SHIPPED | OUTPATIENT
Start: 2021-09-17 | End: 2021-09-24

## 2021-09-17 RX ADMIN — Medication 60 MG: at 08:52

## 2021-09-17 RX ADMIN — ENOXAPARIN SODIUM 40 MG: 40 INJECTION SUBCUTANEOUS at 08:52

## 2021-09-17 RX ADMIN — ACETAMINOPHEN 650 MG: 325 TABLET, FILM COATED ORAL at 10:35

## 2021-09-17 RX ADMIN — CEFTRIAXONE SODIUM 1000 MG: 1 INJECTION, POWDER, FOR SOLUTION INTRAMUSCULAR; INTRAVENOUS at 10:35

## 2021-09-17 RX ADMIN — BENZONATATE 200 MG: 100 CAPSULE ORAL at 08:52

## 2021-09-17 RX ADMIN — ATORVASTATIN CALCIUM 40 MG: 40 TABLET, FILM COATED ORAL at 08:52

## 2021-09-17 RX ADMIN — ASPIRIN 81 MG: 81 TABLET, COATED ORAL at 08:52

## 2021-09-17 RX ADMIN — KETOROLAC TROMETHAMINE 30 MG: 30 INJECTION, SOLUTION INTRAMUSCULAR; INTRAVENOUS at 14:06

## 2021-09-17 RX ADMIN — BUDESONIDE AND FORMOTEROL FUMARATE DIHYDRATE 2 PUFF: 80; 4.5 AEROSOL RESPIRATORY (INHALATION) at 07:09

## 2021-09-17 RX ADMIN — IPRATROPIUM BROMIDE AND ALBUTEROL SULFATE 1 AMPULE: 2.5; .5 SOLUTION RESPIRATORY (INHALATION) at 14:42

## 2021-09-17 RX ADMIN — KETOROLAC TROMETHAMINE 30 MG: 30 INJECTION, SOLUTION INTRAMUSCULAR; INTRAVENOUS at 06:45

## 2021-09-17 RX ADMIN — IPRATROPIUM BROMIDE AND ALBUTEROL SULFATE 1 AMPULE: 2.5; .5 SOLUTION RESPIRATORY (INHALATION) at 06:58

## 2021-09-17 RX ADMIN — SODIUM CHLORIDE, PRESERVATIVE FREE 10 ML: 5 INJECTION INTRAVENOUS at 08:53

## 2021-09-17 RX ADMIN — BUPROPION HYDROCHLORIDE 150 MG: 150 TABLET, EXTENDED RELEASE ORAL at 08:52

## 2021-09-17 RX ADMIN — METHYLPREDNISOLONE SODIUM SUCCINATE 40 MG: 40 INJECTION, POWDER, FOR SOLUTION INTRAMUSCULAR; INTRAVENOUS at 02:22

## 2021-09-17 RX ADMIN — IPRATROPIUM BROMIDE AND ALBUTEROL SULFATE 1 AMPULE: 2.5; .5 SOLUTION RESPIRATORY (INHALATION) at 10:42

## 2021-09-17 RX ADMIN — METHYLPREDNISOLONE SODIUM SUCCINATE 40 MG: 40 INJECTION, POWDER, FOR SOLUTION INTRAMUSCULAR; INTRAVENOUS at 10:35

## 2021-09-17 RX ADMIN — CYANOCOBALAMIN TAB 1000 MCG 1000 MCG: 1000 TAB at 08:52

## 2021-09-17 ASSESSMENT — ENCOUNTER SYMPTOMS
EYE REDNESS: 0
VOMITING: 0
EYE PAIN: 0
COUGH: 1
NAUSEA: 0
CONSTIPATION: 0
WHEEZING: 0
CHEST TIGHTNESS: 0
DIARRHEA: 0
SHORTNESS OF BREATH: 0
ABDOMINAL PAIN: 0

## 2021-09-17 ASSESSMENT — PAIN SCALES - GENERAL
PAINLEVEL_OUTOF10: 8
PAINLEVEL_OUTOF10: 6
PAINLEVEL_OUTOF10: 5
PAINLEVEL_OUTOF10: 3

## 2021-09-17 NOTE — CARE COORDINATION
ONGOING DISCHARGE PLAN:    Patient is alert and oriented x4. Spoke with patient regarding discharge plan and patient confirms that plan is still to return to home w/ Boyfriend.     Pt. Would like VNS, IF Oxygen, is needed at home. Guzman Jefferson, from [address] [place] [address] is following.     Currently sating 96% on RA, Will continue to follow.     Remains on IV Rocephin & Iv steroids, 40MG, Q8.    Pulmonary following. PT/OT on board. Pt. States Toby Wright has had a Headache for the last 3 days\". Enc. To let Dr's. & Nursing know. Denies other needs. Will continue to follow for additional discharge needs.     Electronically signed by Candice Marino RN on 9/17/2021 at 8:06 AM

## 2021-09-17 NOTE — PROGRESS NOTES
Pulmonary Progress Note  [facility]      Patient - Zuleika Bustos,  Age - 64 y.o.    - 1965      Room Number - 8859/3894-45   N -  [id number]   [facility]  Date of Admission -  9/15/2021  9:05 AM        Consulting Ginger Bella MD  Primary Care Physician - Dinorah Spaulding, APRN - CNP     SUBJECTIVE   She says she feels better and ready to go home    OBJECTIVE   VITALS    height is 5' 7\" (1.702 m) and weight is 160 lb (72.6 kg). Her oral temperature is 97.9 °F (36.6 °C). Her blood pressure is 153/95 (abnormal) and her pulse is 101. Her respiration is 18 and oxygen saturation is 97%. Body mass index is 25.06 kg/m². Temperature Range: Temp: 97.9 °F (36.6 °C) Temp  Av.1 °F (36.7 °C)  Min: 97.9 °F (36.6 °C)  Max: 98.2 °F (36.8 °C)  BP Range:  Systolic (24IIS), MQ , Min:119 , XDZ:419     Diastolic (54KSU), UOA:96, Min:82, Max:95    Pulse Range: Pulse  Av  Min: 101  Max: 105  Respiration Range: Resp  Av.3  Min: 18  Max: 20  Current Pulse Ox[de-identified]  SpO2: 97 %  24HR Pulse Ox Range:  SpO2  Av.8 %  Min: 95 %  Max: 98 %  Oxygen Amount and Delivery: O2 Flow Rate (L/min): 0 L/min    Wt Readings from Last 3 Encounters:   09/15/21 160 lb (72.6 kg)   21 179 lb (81.2 kg)   21 175 lb (79.4 kg)       I/O (24 Hours)    Intake/Output Summary (Last 24 hours) at 2021 6630  Last data filed at 2021 0853  Gross per 24 hour   Intake 10 ml   Output --   Net 10 ml       EXAM     General Appearance  Awake, alert, oriented, in no acute distress  HEENT - normocephalic, atraumatic.  []  Mallampati  [] Crowded airway   [] Macroglossia  []  Retrognathia  [] Micrognathia  []  Normal tongue size []  Normal Bite  [] Bryan sign positive    Neck - Supple,  trachea midline   Lungs -minimal wheezes, improved  Cardiovascular - Heart sounds are normal.  Regular rate and rhythm   Abdomen - Soft, nontender, nondistended, no masses or organomegaly  Neurologic - There are no focal motor or sensory deficits  Skin - No bruising or bleeding  Extremities - No clubbing, cyanosis, edema    MEDS      budesonide-formoterol  2 puff Inhalation BID    cefTRIAXone (ROCEPHIN) IV  1,000 mg IntraVENous Q24H    methylPREDNISolone  40 mg IntraVENous Q8H    sodium chloride flush  5-40 mL IntraVENous 2 times per day    enoxaparin  40 mg SubCUTAneous Daily    ipratropium-albuterol  1 ampule Inhalation Q4H WA    nicotine  1 patch TransDERmal Daily    benzonatate  200 mg Oral TID    dextromethorphan  60 mg Oral 2 times per day    aspirin EC  81 mg Oral Daily    atorvastatin  40 mg Oral Daily    buPROPion  150 mg Oral BID    vitamin B-12  1,000 mcg Oral Daily    levETIRAcetam  750 mg Oral Nightly      sodium chloride       ketorolac, sodium chloride flush, sodium chloride, ondansetron **OR** ondansetron, polyethylene glycol, acetaminophen **OR** acetaminophen, ipratropium-albuterol, potassium chloride **OR** potassium alternative oral replacement **OR** potassium chloride    LABS   CBC   Recent Labs     09/16/21  0542   WBC 9.8   HGB 14.1   HCT 41.3   MCV 93.3        BMP:   Lab Results   Component Value Date     09/16/2021    K 4.4 09/16/2021     09/16/2021    CO2 25 09/16/2021    BUN 12 09/16/2021    LABALBU 4.2 07/12/2020    CREATININE 0.62 09/16/2021    CALCIUM 9.6 09/16/2021    GFRAA >60 09/16/2021    LABGLOM >60 09/16/2021     ABGs:  Lab Results   Component Value Date    PHART 7.405 06/12/2021    PO2ART 96.8 06/12/2021    TAE2XEQ 44.2 06/12/2021      Lab Results   Component Value Date    MODE NOT REPORTED 07/06/2021     Ionized Calcium:  No results found for: IONCA  Magnesium:    Lab Results   Component Value Date    MG 2.2 09/30/2016     Phosphorus:    Lab Results   Component Value Date    PHOS 3.5 02/13/2013        LIVER PROFILE No results for input(s): AST, ALT, LIPASE, BILIDIR, BILITOT, ALKPHOS in the last 72 hours. Invalid input(s): AMYLASE,  ALB  INR   Recent Labs     09/15/21  0917   INR 0.9     PTT   Lab Results   Component Value Date    APTT 29.6 09/15/2021         RADIOLOGY     (See actual reports for details)    ASSESSMENT/PLAN   Principal Problem:    COPD exacerbation ([address]  Active Problems:    Chronic back pain    Depression    Hyperlipidemia    CAD, multiple vessel    Smoking    Hypertension    Acute bronchitis    Headache  Resolved Problems:    * No resolved hospital problems.  *    Okay to discharge from pulmonary standpoint, pulmonary medications reconciled  I have told her the past follow-up but she has not can be seen in 4 to 6 weeks  Smoking cessation advised  Does not qualify for oxygen    Electronically signed by Faviola Pavon MD on 9/17/2021 at 2:57 PM

## 2021-09-17 NOTE — PLAN OF CARE
Problem: Breathing Pattern - Ineffective:  Goal: Ability to achieve and maintain a regular respiratory rate will improve  Description: Ability to achieve and maintain a regular respiratory rate will improve  Outcome: Ongoing  Note: Regular respiratory rate maintained      Problem: Pain:  Goal: Pain level will decrease  Description: Pain level will decrease  Outcome: Ongoing  Note: Acute headache. Medicated as needed. Patient tolerating      Problem: Pain:  Goal: Control of acute pain  Description: Control of acute pain  Outcome: Ongoing  Note: Acute headache. Medicated as needed. Patient tolerating      Problem: Pain:  Goal: Control of chronic pain  Description: Control of chronic pain  Outcome: Ongoing  Note: Acute headache. Medicated as needed.  Patient tolerating      Problem: Musculor/Skeletal Functional Status  Goal: Highest potential functional level  Outcome: Ongoing  Note: Ongoing functional improvement      Problem: Musculor/Skeletal Functional Status  Goal: Absence of falls  Outcome: Ongoing  Note: Free of falls

## 2021-09-17 NOTE — PROGRESS NOTES
Patient and significant other educated on discharge instructions - all questions answered.   Patient ambulated to lobby with significant other at patients request.

## 2021-09-17 NOTE — PROGRESS NOTES
Status: Within Normal Limits  Cognition      Objective   Bed mobility  Rolling to Right: Independent  Supine to Sit: Independent  Scooting: Independent  Comment: Head of bed flat, did not utilize bed rails. Pt sitting up edge of bed end of session. Transfers  Sit to Stand: Independent  Stand to sit: Independent  Stand Pivot Transfers: Independent  Comment: IND no device     Stairs/Curb  Stairs?: Yes  Stairs  # Steps : 20  Stairs Height: 4\"  Rails: Left ascending (bed rail)  Assistance: Supervision  Comment: Pt reports having 20 stairs with R ascending hand rail per home set up. Cues for pacing and to take rest breaks as needed. Steady, no LOB. Vitals >92% throughout. Balance  Posture: Good  Sitting - Static: Good  Sitting - Dynamic: Good  Standing - Static: Good  Standing - Dynamic: Good     Other exercises  Other exercises 1: pt educ of energy conservation and standing program - no questions of patient at this time. Other exercises 2: stair training 4\" step x20 with R HR     Goals  Short term goals  Time Frame for Short term goals: 1-2 tx  Short term goal 1: Pt to demo IND bed mobility. Short term goal 2: Pt to amb 150' IND. Short term goal 3: Pt to ascend/descend 20 stairs, 1 HR, SBA. Short term goal 4: Pt to tolerate 30 minute PT session with good demo of HEP. Short term goal 5: Pt to perform nustep at least 5 minutes without change in symptoms. Patient Goals   Patient goals :  To go home    Plan    Plan  Times per week: 1-2 more tx  Specific instructions for Next Treatment: HEP, nustep, gait, stairs, pacing  Current Treatment Recommendations: Strengthening, Functional Mobility Training, Endurance Training, Gait Training, Stair training, Home Exercise Program, Safety Education & Training  Safety Devices  Type of devices: Call light within reach, Left in bed  Restraints  Initially in place: No     Therapy Time   Individual Concurrent Group Co-treatment   Time In 1236         Time Out 1248 Minutes [address] [facility]

## 2021-09-17 NOTE — PROGRESS NOTES
[address]    PROGRESS NOTE             9/17/2021    7:31 AM    Name:   Juan Tejada  MRN:     [id number]     Acct:      [de-identified]   Room:   2056/2056-01   Day:  2  Admit Date:  9/15/2021  9:05 AM    PCP:  THOR Adkins CNP  Code Status:  Full Code    Subjective:     C/C:   Chief Complaint   Patient presents with    Shortness of Breath    Chest Pain     Interval History Status: improved. Patient seen and examined at bedside this morning in [facility] unit. She reports continued headaches, and says that they are moderately relieved with administration of toradol. Suspect headaches are multifactorial due to combination of steroid therapy/anxiety/nicotine withdrawal. Patient is getting high dose nicotine patch, but reports a home history of smoking 1 PPD for some time. Her respiratory status has continued to improve. She is able to ambulate without SOB, and her lungs sound clear with minimal end-expiratory wheezing. Probable discharge today. Brief History:     Patient is a 66-year-old female with history of COPD, hypertension, hyperlipidemia, CAD, current pack-a-day smoker who presents with shortness of breath for the past week and was found to be in acute exacerbation of COPD.     Patient is being treated with duo nebs and IV steroids. Review of Systems:     Review of Systems   Constitutional: Negative for chills, fatigue and fever. Eyes: Negative for pain and redness. Respiratory: Positive for cough. Negative for chest tightness, shortness of breath and wheezing. Cardiovascular: Negative for chest pain and leg swelling. Gastrointestinal: Negative for abdominal pain, constipation, diarrhea, nausea and vomiting. Genitourinary: Negative for difficulty urinating and dysuria. Musculoskeletal: Negative for arthralgias. Neurological: Negative for dizziness and headaches.    Psychiatric/Behavioral: Negative for Breastfeeding No   BMI 25.06 kg/m²   Temp (24hrs), Av.1 °F (36.7 °C), Min:97.7 °F (36.5 °C), Max:98.3 °F (36.8 °C)    No results for input(s): POCGLU in the last 72 hours. I/O(24Hr): No intake or output data in the 24 hours ending 21 0731    Labs:  [unfilled]    Lab Results   Component Value Date/Time    SPECIAL NOT REPORTED 2021 04:25 AM     Lab Results   Component Value Date/Time    CULTURE NO GROWTH 6 DAYS 2021 04:25 AM       [unfilled]    Radiology:    CT CHEST WO CONTRAST    Result Date: 2021  Previous ground-glass opacities seen are no longer demonstrated. No acute cardiopulmonary process. Stable benign 6 mm right apical pulmonary nodule for which no further follow-up is necessary. No spiculated lung mass or lymphadenopathy in the chest. Atherosclerotic disease with coronary artery involvement. Cholecystectomy clips. XR CHEST PORTABLE    Result Date: 9/15/2021  No acute intrathoracic process. Physical Examination:        Physical Exam  Constitutional:       Appearance: Normal appearance. HENT:      Head: Normocephalic and atraumatic. Eyes:      Extraocular Movements: Extraocular movements intact. Conjunctiva/sclera: Conjunctivae normal.   Cardiovascular:      Rate and Rhythm: Normal rate and regular rhythm. Pulses: Normal pulses. Heart sounds: Normal heart sounds. Pulmonary:      Effort: Pulmonary effort is normal.      Comments: Minimal end-expiratory wheezing appreciated  Abdominal:      General: Abdomen is flat. Palpations: Abdomen is soft. Musculoskeletal:         General: Normal range of motion. Skin:     General: Skin is warm. Neurological:      General: No focal deficit present. Mental Status: She is alert. Mental status is at baseline.    Psychiatric:         Mood and Affect: Mood normal.         Behavior: Behavior normal.       Assessment:        Primary Problem  COPD exacerbation [facility])    Active Hospital Problems Diagnosis Date Noted    Acute bronchitis [J20.9] 09/16/2021    [facility] exacerb[facility] ([facility] [J44.1] 09/15/2021    Hypertension [I10] 12/21/2020    Smoking [F17.200] 10/22/2013    Depression [F32.9] 03/15/2013    Hyperlipidemia [E78.5] 03/15/2013    CAD, multiple vessel [I25.10] 03/15/2013    Chronic back pain [M54.9, G89.29]        Plan:        Acute exacerbation of chronic COPD  - Patient experiencing increasing fatigue/SOB for the past week  - CXR unremarkable  - Respiratory culture/Gram stain pending  - DuoNebs every 4 hours while awake  - Solu-Medrol decreased to 40 mg every 8 hours  - Delsym antitussive, Tessalon capsules  - Pulmonology consulted              CT chest showed stable right apical nodule                       Azithromycin 250 mg daily     Headaches  - Patient experiencing recurrent frontal headache throughout admission  - Adequately managed with Toradol  - Suspect multifactorial etiology due to steroids/anxiety/nicotine withdrawal    Seizure disorder  - Continue home Keppra 750 mg twice daily     Hyperlipidemia  - Continue home atorvastatin 40 mg daily     Anxiety/depression  - Continue home Wellbutrin 150 mg twice daily     DVT prophylaxis: Lovenox 40 mg daily  GI prophylaxis: None  Code: Full  Dispo: Home    Frannie Hendrickson MD  9/17/2021  7:31 AM     Attestation and add on       I have discussed the care of Oralia Trevino , including pertinent history and exam findings,      9/17/21    with the resident. I have seen and examined the patient and the key elements of all parts of the encounter have been performed by me . I agree with the assessment, plan and orders as documented by the resident. Principal Problem:    COPD exacerbation ([facility]  Active Problems:    Chronic back pain    Depression    Hyperlipidemia    CAD, multiple vessel    Smoking    Hypertension    Acute bronchitis    Headache  Resolved Problems:    * No resolved hospital problems.  *            ---- ;        Medications: Allergies: Allergies   Allergen Reactions    Sulfa Antibiotics Anaphylaxis       Current Meds:   Scheduled Meds:    budesonide-formoterol  2 puff Inhalation BID    cefTRIAXone (ROCEPHIN) IV  1,000 mg IntraVENous Q24H    methylPREDNISolone  40 mg IntraVENous Q8H    sodium chloride flush  5-40 mL IntraVENous 2 times per day    enoxaparin  40 mg SubCUTAneous Daily    ipratropium-albuterol  1 ampule Inhalation Q4H WA    nicotine  1 patch TransDERmal Daily    benzonatate  200 mg Oral TID    dextromethorphan  60 mg Oral 2 times per day    aspirin EC  81 mg Oral Daily    atorvastatin  40 mg Oral Daily    buPROPion  150 mg Oral BID    vitamin B-12  1,000 mcg Oral Daily    levETIRAcetam  750 mg Oral Nightly     Continuous Infusions:    sodium chloride       PRN Meds: ketorolac, sodium chloride flush, sodium chloride, ondansetron **OR** ondansetron, polyethylene glycol, acetaminophen **OR** acetaminophen, ipratropium-albuterol, potassium chloride **OR** potassium alternative oral replacement **OR** potassium chloride        MD CED Anaya [address].    Phone [phone]   Fax: [phone]  Answering Service: [phone]

## 2021-09-17 NOTE — PLAN OF CARE
Problem: Breathing Pattern - Ineffective:  Goal: Ability to achieve and maintain a regular respiratory rate will improve  9/17/2021 1525 by Geovany Wilcox RN  Outcome: Completed  9/17/2021 0309 by Ariana Ramirez RN  Outcome: Ongoing     Problem: Pain:  Goal: Pain level will decrease  9/17/2021 1525 by Geovany Wilcox RN  Outcome: Completed  9/17/2021 0309 by Ariana Ramirez RN  Outcome: Ongoing  Goal: Control of acute pain  9/17/2021 1525 by Geovany Wilcox RN  Outcome: Completed  9/17/2021 0309 by Ariana Ramirez RN  Outcome: Ongoing  Goal: Control of chronic pain  9/17/2021 1525 by Geovany Wilcox RN  Outcome: Completed  9/17/2021 0309 by Ariana Ramirez RN  Outcome: Ongoing     Problem: Musculor/Skeletal Functional Status  Goal: Highest potential functional level  9/17/2021 1525 by Geovany Wilcox RN  Outcome: Completed  9/17/2021 0309 by Ariana Ramirez RN  Outcome: Ongoing  Goal: Absence of falls  9/17/2021 1525 by Geovany Wilcox RN  Outcome: Completed  9/17/2021 0309 by Ariana Ramirez RN  Outcome: Ongoing

## 2021-09-17 NOTE — DISCHARGE SUMMARY
[address]    Discharge Summary     Patient ID: Alexandria Caldwell  :  1965   MRN: [id number]     ACCOUNT:  [de-identified]   Patient's PCP: THOR Jensen CNP  Admit Date: 9/15/2021   Discharge Date: 2021     Length of Stay: 2  Code Status:  Full Code  Admitting Physician: Bebo Feliciano MD  Discharge Physician: Lori Roland MD     Active Discharge Diagnoses:       Primary Problem  COPD exacerbation [facility])      [place] Problems    Diagnosis Date Noted    Headache [R51.9] 2021    Acute bronchitis [J20.9] 2021    COPD exacerbation ([address] [J44.1] 09/15/2021    Hypertension [I10] 2020    Smoking [F17.200] 10/22/2013    Depression [F32.9] 03/15/2013    Hyperlipidemia [E78.5] 03/15/2013    CAD, multiple vessel [I25.10] 03/15/2013    Chronic back pain [M54.9, G89.29]        Admission Condition:  poor     Discharged Condition: good    Hospital Stay:       Hospital Course:    Rajni Kuo is a 49-year-old female with history of COPD, hypertension, hyperlipidemia, CAD, current pack-a-day smoker who presented for shortness of breath and was found to be in an acute exacerbation of COPD. Patient was treated with IV Solu-Medrol and DuoNeb breathing treatments for 48 hours until her respiratory status improved to the point where the primary team was comfortable with discharge. During her stay, she was experiencing headaches, which were presumed to be multifactorial partly due to the steroid she was being given and possibly due to nicotine withdrawal during her stay. On day of discharge, patient's respiratory status is back to baseline, and patient is comfortable with being sent home. Patient to go with 5-day course prednisone 20 mg.     Significant therapeutic interventions: IV Solu-Medrol, IV antibiotics    Significant Diagnostic Studies:   Labs / Micro:  CBC:   Lab Results   Component Value Date    WBC 9.8 09/16/2021    RBC 4.43 09/16/2021    HGB 14.1 09/16/2021    HCT 41.3 09/16/2021    MCV 93.3 09/16/2021    MCH 31.8 09/16/2021    MCHC 34.0 09/16/2021    RDW 14.4 09/16/2021     09/16/2021     BMP:    Lab Results   Component Value Date    GLUCOSE 169 09/16/2021     09/16/2021    K 4.4 09/16/2021     09/16/2021    CO2 25 09/16/2021    ANIONGAP 11 09/16/2021    BUN 12 09/16/2021    CREATININE 0.62 09/16/2021    BUNCRER NOT REPORTED 09/16/2021    CALCIUM 9.6 09/16/2021    LABGLOM >60 09/16/2021    GFRAA >60 09/16/2021    GFR      09/16/2021    GFR NOT REPORTED 09/16/2021     Radiology:  CT CHEST WO CONTRAST    Result Date: 9/16/2021  EXAMINATION: CT OF THE CHEST WITHOUT CONTRAST 9/16/2021 8:20 am TECHNIQUE: CT of the chest was performed without the administration of intravenous contrast. Multiplanar reformatted images are provided for review. Dose modulation, iterative reconstruction, and/or weight based adjustment of the mA/kV was utilized to reduce the radiation dose to as low as reasonably achievable. COMPARISON: 06/12/2021 HISTORY: ORDERING SYSTEM PROVIDED HISTORY: Follow-up right lower lobe groundglass infiltratenodule TECHNOLOGIST PROVIDED HISTORY: Follow-up right lower lobe groundglass infiltrateÂnodule Reason for Exam: follow up on copd per patient ,Follow-up right lower lobe groundglass infiltratenodule Acuity: Acute Type of Exam: Initial FINDINGS: Mediastinum: No thoracic aortic aneurysm is identified. Atherosclerotic calcifications are seen including coronary artery involvement. No significant pericardial effusion. No focal esophageal thickening. No mediastinal lymphadenopathy is identified. Lungs/pleura: No pleural effusion is identified. No focal consolidation. Subsegmental linear atelectatic changes are identified. There is a stable 6 mm right apical nodule on axial image 27, unchanged from the previous examination.   This is also noted on 05/23/2019, without significant interval change. No new or suspicious pulmonary nodule. No spiculated lung mass is identified. Upper Abdomen: No adrenal mass. Cholecystectomy clips are identified. No acute process seen in the upper abdomen. Soft Tissues/Bones: No acute subcutaneous soft tissue abnormality is identified. No axillary or supraclavicular lymphadenopathy is identified. The thyroid gland appears unremarkable. Multilevel degenerative changes are seen in the spine. No acute osseous fracture is identified. No osseous destructive process is identified. Previous ground-glass opacities seen are no longer demonstrated. No acute cardiopulmonary process. Stable benign 6 mm right apical pulmonary nodule for which no further follow-up is necessary. No spiculated lung mass or lymphadenopathy in the chest. Atherosclerotic disease with coronary artery involvement. Cholecystectomy clips. XR CHEST PORTABLE    Result Date: 9/15/2021  EXAMINATION: ONE XRAY VIEW OF THE CHEST 9/15/2021 9:48 am COMPARISON: 07/06/2021 HISTORY: ORDERING SYSTEM PROVIDED HISTORY: Chest Pain TECHNOLOGIST PROVIDED HISTORY: Chest Pain Reason for Exam: [facility] chest pain 1 day Acuity: Unknown Type of Exam: Unknown FINDINGS: Cardiomediastinal silhouette and pulmonary vasculature are within normal limits. No focal airspace consolidation, pneumothorax, or pleural effusion. No free air beneath the diaphragm. No acute osseous abnormality. No acute intrathoracic process. Consultations:    Consults:     Final Specialist Recommendations/Findings:   IP CONSULT TO PRIMARY CARE PROVIDER  IP CONSULT TO SPIRITUAL SERVICES      The patient was seen and examined on day of discharge and this discharge summary is in conjunction with any daily progress note from day of discharge. Discharge plan:       Disposition: Home    Physician Follow Up:     No follow-up provider specified.      Requiring Further Evaluation/Follow Up POST HOSPITALIZATION/Incidental Findings: CT chest showed stable 6 mm nodule in right lung. Requires no further follow-up at this time. Diet: regular diet    Activity: As tolerated    Instructions to Patient: Please keep all follow-up appointments and take all medications as directed. Discharge Medications:      Medication List      START taking these medications    predniSONE 10 MG tablet  Commonly known as: DELTASONE  Take 2 tablets by mouth daily for 5 days        CHANGE how you take these medications    levETIRAcetam 750 MG tablet  Commonly known as: KEPPRA  Take 1 tablet by mouth nightly  What changed: See the new instructions.         CONTINUE taking these medications    acetaminophen 325 MG tablet  Commonly known as: TYLENOL     * albuterol (5 MG/ML) 0.5% nebulizer solution  Commonly known as: PROVENTIL  Take 0.5 mLs by nebulization 4 times daily     * albuterol sulfate  (90 Base) MCG/ACT inhaler  Commonly known as: Ventolin HFA  Inhale 2 puffs into the lungs every 4 hours as needed for Wheezing     amitriptyline 25 MG tablet  Commonly known as: ELAVIL  Take 3 tablets by mouth nightly     aspirin EC 81 MG EC tablet  Take 1 tablet by mouth daily     atorvastatin 40 MG tablet  Commonly known as: LIPITOR  TAKE ONE TABLET BY MOUTH DAILY     baclofen 10 MG tablet  Commonly known as: LIORESAL  TAKE ONE TABLET BY MOUTH ONCE NIGHTLY AS NEEDED FOR PAIN     Breo Ellipta 100-25 MCG/INH Aepb inhaler  Generic drug: fluticasone-vilanterol  INHALE ONE DOSE BY MOUTH DAILY     buPROPion 150 MG extended release tablet  Commonly known as: WELLBUTRIN SR  Once a day for three days, then increase to 1 pill twice a day     Handicap Placard Misc  by Does not apply route Exp 1/2024     magnesium gluconate 500 MG tablet  Commonly known as: MAGONATE     Nebulizer Compressor Misc  Daily as needed     Nebulizer/Tubing/Mouthpiece Kit  1 kit by Does not apply route daily as needed (SOB or wheezing)     nitroGLYCERIN 0.4 MG SL tablet  Commonly known as: Nitrostat  Place 1 tablet under the tongue every 5 minutes as needed for Chest pain up to max of 3 total doses. If no relief after 1 dose, call 911. * EMERGEN-C VITAMIN C PO     * therapeutic multivitamin-minerals tablet     tiotropium 2.5 MCG/ACT Aers inhaler  Commonly known as: Spiriva Respimat  Inhale 2 puffs into the lungs daily     vitamin B-12 1000 MCG tablet  Commonly known as: CYANOCOBALAMIN     vitamin B-6 100 MG tablet  Commonly known as: PYRIDOXINE     vitamin C 250 MG tablet     Vitamin D3 10 MCG (400 UNIT) Caps     zinc gluconate 50 MG tablet         * This list has 4 medication(s) that are the same as other medications prescribed for you. Read the directions carefully, and ask your doctor or other care provider to review them with you. Where to Get Your Medications      These medications were sent to [address], [address] [place] [address]    Phone: [phone]   levETIRAcetam 750 MG tablet  predniSONE 10 MG tablet           Electronically signed by     Alexey Felix MD  9/17/2021  2:31 PM      Thank you Dr. Mart Rai, APRN - CNP for the opportunity to be involved in this patient's care. Attending Physician Statement  I have reviewed and edited the discharge summary of  [address] [place] [address]  ,   including pertinent history and exam findings. I have personally seen the patient and participated in discharge planning and evaluation . I have reviewed the key elements of all parts of the discharge summary . I agree with the information and plans as documented by the resident. Time spent on discharge planning ;          [] less than 30 minutes . [x]   more  than 30 minutes . 33 minutes    Electronically signed by Brianna Call MD on 9/17/2021 .

## 2021-09-21 ENCOUNTER — TELEPHONE (OUTPATIENT)
Dept: PRIMARY CARE CLINIC | Age: 56
End: 2021-09-21

## 2021-09-21 NOTE — TELEPHONE ENCOUNTER
[facility] Trans[facility] Initial Follow Up Call    Outreach made within 2 business days of discharge: Yes    Patient: Tyson Benavides Patient : 1965   MRN: [id number]  Reason for Admission: There are no discharge diagnoses documented for the most recent discharge.   Discharge Date: 21       Spoke with: Charbel Calhoun     Discharge department/facility: [facility]     [facility] will try back again     [place], [place]

## 2021-10-13 ENCOUNTER — TELEPHONE (OUTPATIENT)
Dept: FAMILY MEDICINE CLINIC | Age: 56
End: 2021-10-13

## 2021-10-13 NOTE — TELEPHONE ENCOUNTER
Wade Mitchell was contacted as a part of [employer]. A voicemail message was left reminding the patient to schedule an AWV with their PCP.

## 2021-11-29 DIAGNOSIS — E78.00 PURE HYPERCHOLESTEROLEMIA: ICD-10-CM

## 2021-11-29 NOTE — TELEPHONE ENCOUNTER
Next Visit Date:  12/1/2021    Hemoglobin A1C (%)   Date Value   01/30/2018 5.3   08/02/2013 5.3   02/14/2013 6.0             ( goal A1C is < 7)   No results found for: LABMICR  LDL Cholesterol (mg/dL)   Date Value   11/08/2017 198 (H)       (goal LDL is <100)   AST (U/L)   Date Value   07/12/2020 18     ALT (U/L)   Date Value   07/12/2020 16     BUN (mg/dL)   Date Value   09/16/2021 12     BP Readings from Last 3 Encounters:   09/17/21 (!) 153/95   07/09/21 (!) 135/93   07/06/21 (!) 135/94          (goal 120/80)        Patient Active Problem List:     Heart disease     Chronic back pain     Depression     Hyperlipidemia     CAD, multiple vessel     Asthma     Smoking     Need for vaccination     Syncope     Leg pain     Left hip pain     Chronic cholecystitis     Chest pain     Calculus of gallbladder without cholecystitis without obstruction     History of lumbar fusion     Failed back syndrome     Marijuana use     Abnormal weight loss     Allergic rhinitis     Anxiety state     Chronic midline low back pain with sciatica     Moderate episode of recurrent major depressive disorder (HCC)     Seizure (HCC)     Chronic tension-type headache, intractable     Hypertension     Sinus tachycardia     Acute respiratory failure (HCC)     COPD (chronic obstructive pulmonary disease) (HCC)     COPD exacerbation (HCC)     Acute bronchitis     Headache      ----Lili Kelso

## 2021-11-30 RX ORDER — ATORVASTATIN CALCIUM 40 MG/1
TABLET, FILM COATED ORAL
Qty: 90 TABLET | Refills: 0 | Status: SHIPPED | OUTPATIENT
Start: 2021-11-30 | End: 2022-03-23

## 2022-01-20 ENCOUNTER — HOSPITAL ENCOUNTER (OUTPATIENT)
Age: 57
Setting detail: SPECIMEN
Discharge: HOME | End: 2022-01-20

## 2022-01-20 ENCOUNTER — OFFICE VISIT (OUTPATIENT)
Dept: PRIMARY CARE CLINIC | Age: 57
End: 2022-01-20
Payer: MEDICARE

## 2022-01-20 VITALS
SYSTOLIC BLOOD PRESSURE: 145 MMHG | DIASTOLIC BLOOD PRESSURE: 92 MMHG | HEART RATE: 85 BPM | BODY MASS INDEX: 26 KG/M2 | TEMPERATURE: 98.1 F | WEIGHT: 166 LBS | OXYGEN SATURATION: 98 %

## 2022-01-20 DIAGNOSIS — F33.1 MODERATE EPISODE OF RECURRENT MAJOR DEPRESSIVE DISORDER (HCC): Primary | ICD-10-CM

## 2022-01-20 DIAGNOSIS — I10 PRIMARY HYPERTENSION: ICD-10-CM

## 2022-01-20 DIAGNOSIS — Z12.31 ENCOUNTER FOR SCREENING MAMMOGRAM FOR BREAST CANCER: ICD-10-CM

## 2022-01-20 DIAGNOSIS — K59.09 OTHER CONSTIPATION: ICD-10-CM

## 2022-01-20 DIAGNOSIS — Z23 NEED FOR INFLUENZA VACCINATION: ICD-10-CM

## 2022-01-20 DIAGNOSIS — G56.03 CARPAL TUNNEL SYNDROME, BILATERAL: ICD-10-CM

## 2022-01-20 DIAGNOSIS — R56.9 SEIZURE (HCC): ICD-10-CM

## 2022-01-20 DIAGNOSIS — J44.1 CHRONIC OBSTRUCTIVE PULMONARY DISEASE WITH (ACUTE) EXACERBATION (HCC): ICD-10-CM

## 2022-01-20 DIAGNOSIS — Z11.52 ENCOUNTER FOR SCREENING FOR COVID-19: ICD-10-CM

## 2022-01-20 DIAGNOSIS — Z13.220 SCREENING FOR HYPERLIPIDEMIA: ICD-10-CM

## 2022-01-20 DIAGNOSIS — Z13.1 ENCOUNTER FOR SCREENING FOR DIABETES MELLITUS: ICD-10-CM

## 2022-01-20 PROCEDURE — G8419 CALC BMI OUT NRM PARAM NOF/U: HCPCS | Performed by: NURSE PRACTITIONER

## 2022-01-20 PROCEDURE — 90674 CCIIV4 VAC NO PRSV 0.5 ML IM: CPT | Performed by: NURSE PRACTITIONER

## 2022-01-20 PROCEDURE — 3017F COLORECTAL CA SCREEN DOC REV: CPT | Performed by: NURSE PRACTITIONER

## 2022-01-20 PROCEDURE — G8482 FLU IMMUNIZE ORDER/ADMIN: HCPCS | Performed by: NURSE PRACTITIONER

## 2022-01-20 PROCEDURE — 3023F SPIROM DOC REV: CPT | Performed by: NURSE PRACTITIONER

## 2022-01-20 PROCEDURE — 99214 OFFICE O/P EST MOD 30 MIN: CPT | Performed by: NURSE PRACTITIONER

## 2022-01-20 PROCEDURE — G8427 DOCREV CUR MEDS BY ELIG CLIN: HCPCS | Performed by: NURSE PRACTITIONER

## 2022-01-20 PROCEDURE — G0008 ADMIN INFLUENZA VIRUS VAC: HCPCS | Performed by: NURSE PRACTITIONER

## 2022-01-20 PROCEDURE — 4004F PT TOBACCO SCREEN RCVD TLK: CPT | Performed by: NURSE PRACTITIONER

## 2022-01-20 RX ORDER — DOCUSATE SODIUM 100 MG/1
100 CAPSULE, LIQUID FILLED ORAL 2 TIMES DAILY
Qty: 10 CAPSULE | Refills: 1 | Status: SHIPPED | OUTPATIENT
Start: 2022-01-20 | End: 2022-01-25

## 2022-01-20 RX ORDER — FLUOXETINE 10 MG/1
10 CAPSULE ORAL DAILY
Qty: 30 CAPSULE | Refills: 3 | Status: SHIPPED | OUTPATIENT
Start: 2022-01-20 | End: 2022-05-16

## 2022-01-20 RX ORDER — BACLOFEN 10 MG/1
TABLET ORAL
Qty: 30 TABLET | Refills: 3 | Status: SHIPPED | OUTPATIENT
Start: 2022-01-20 | End: 2022-06-20

## 2022-01-20 RX ORDER — PHENYLEPHRINE HYDROCHLORIDE 10 MG/1
TABLET, COATED ORAL
Qty: 2 EACH | Refills: 0 | Status: SHIPPED | OUTPATIENT
Start: 2022-01-20

## 2022-01-20 ASSESSMENT — ENCOUNTER SYMPTOMS
NAUSEA: 1
WHEEZING: 0
ABDOMINAL PAIN: 0
SHORTNESS OF BREATH: 1
RHINORRHEA: 1
TROUBLE SWALLOWING: 0
DIARRHEA: 0
CHANGE IN BOWEL HABIT: 1
BLOOD IN STOOL: 0
COUGH: 1
VOMITING: 0

## 2022-01-20 NOTE — PROGRESS NOTES
Visit Information    Have you changed or started any medications since your last visit including any over-the-counter medicines, vitamins, or herbal medicines? no   Are you having any side effects from any of your medications? -  no  Have you stopped taking any of your medications? Is so, why? -  no    Have you seen any other physician or provider since your last visit? No  Have you had any other diagnostic tests since your last visit? No  Have you been seen in the emergency room and/or had an admission to a hospital since we last saw you? No  Have you had your routine dental cleaning in the past 6 months? no    Have you activated your [employer] account? If not, what are your barriers?  Yes     Patient Care Team:  THOR Guerra CNP as PCP - General (Family Medicine)  THRO Guerra CNP as PCP - [facility] Provider    Medical History Review  Past Medical, Family, and Social History reviewed and does contribute to the patient presenting condition    Health Maintenance   Topic Date Due    Hepatitis C screen  Never done    HIV screen  Never done    Cervical cancer screen  Never done    Lipid screen  11/08/2018    Colon Cancer Screen FIT/FOBT  02/20/2019    Annual Wellness Visit (AWV)  Never done    Breast cancer screen  02/06/2020    Diabetes screen  01/30/2021    Shingles Vaccine (2 of 2) 06/25/2021    Flu vaccine (1) 09/01/2021    Depression Monitoring  01/23/2022    COVID-19 Vaccine (3 - Booster for Pfizer series) 03/09/2022    Low dose CT lung screening  09/16/2022    DTaP/Tdap/Td vaccine (2 - Td or Tdap) 09/20/2027    Pneumococcal 0-64 years Vaccine (2 of 2 - PPSV23) 08/08/2030    Hepatitis A vaccine  Aged Out    Hepatitis B vaccine  Aged Out    Hib vaccine  Aged Out    Meningococcal (ACWY) vaccine  Aged Out

## 2022-01-20 NOTE — PROGRESS NOTES
[facility]  [id number] [facility] [address]  Dept: [phone]  Dept Fax: [phone]    Patient Care Team:  Martine Angelucci, APRN - CNP as PCP - General (Family Medicine)  Martine Angelucci, APRN - CNP as PCP - [facility] Empaneled Provider    2022     Judie Smith (:  [id number])AC a 64 y.o. female, here for evaluation of the following medical concerns:   Chief Complaint   Patient presents with    Annual Exam    Chest Pain     started yesterday, on and off     Fatigue     would like covid tested, x's 1 week     Cough       Judie Smith is a 59-year-old female here today to follow-up for anxiety and depression, COPD heart disease. Last office visit 2021. COPD is managed by pulmonology, however patient admits she has not been back \"in a while\"      Asking to be tested for Covid today. Has been feeling run down for 1 week, worse than usual  + nasal congestion and chest congestion. Clear nasal drainage, coughs up brown phlegm. Feels more SOB than usual.    Per pulmonology, she is to use her nebulizer 4x a day, often forgets to do it as often    Laura Milligan admits to having some right-sided upper chest pain that started yesterday, appears worse with breathing or arm movement    She is also concerned about nausea that is been ongoing over the past few months, worse when she wakes up. Admits she does often have small, and frequently hard stools and intermittently will have diarrhea. No abdominal pains, no bloody stools, no unexplained weight loss or appetite loss    Laura Milligan endorses worsening depressive symptoms, feeling more down and depressed and helpless. No longer finding pleasure in activities or people. Lacking energy. Denies anxiety symptoms, denies SI. Nausea & Vomiting  This is a new problem. The current episode started more than 1 month ago. The problem occurs daily.  Associated symptoms include a change in bowel habit (hard stools versus diarrhea), congestion, coughing, fatigue, nausea and numbness (Bilateral hands, intermittently). Pertinent negatives include no abdominal pain, chest pain, fever, headaches, myalgias, neck pain, urinary symptoms or vomiting. Nothing aggravates the symptoms. She has tried eating and lying down for the symptoms. The treatment provided mild relief. .    Review of Systems   Constitutional: Positive for fatigue. Negative for appetite change, fever and unexpected weight change. HENT: Positive for congestion and rhinorrhea. Negative for hearing loss and trouble swallowing. Eyes: Negative for visual disturbance. Respiratory: Positive for cough and shortness of breath. Negative for wheezing. Cardiovascular: Negative for chest pain and palpitations. Gastrointestinal: Positive for change in bowel habit (hard stools versus diarrhea) and nausea. Negative for abdominal pain, blood in stool, diarrhea and vomiting. Endocrine: Negative for polydipsia and polyuria. Genitourinary: Negative for difficulty urinating, frequency and hematuria. Musculoskeletal: Negative for myalgias and neck pain. Neurological: Positive for numbness (Bilateral hands, intermittently). Negative for seizures and headaches. Psychiatric/Behavioral: Positive for dysphoric mood and sleep disturbance. Negative for self-injury and suicidal ideas. The patient is not nervous/anxious. Prior to Visit Medications    Medication Sig Taking?  Authorizing Provider   baclofen (LIORESAL) 10 MG tablet TAKE ONE TABLET BY MOUTH ONCE NIGHTLY AS NEEDED FOR PAIN Yes THOR Guerra CNP   FLUoxetine (PROZAC) 10 MG capsule Take 1 capsule by mouth daily Yes THOR Guerra CNP   Elastic Bandages & Supports (CARPAL TUNNEL WRIST STABILIZER) [address] [facility] Wear nightly while asleep Yes THOR Guerra CNP   docusate sodium (COLACE) 100 MG capsule Take 1 capsule by mouth 2 times daily for 5 days Yes Trinna Baumgarten, APRN - CNP   buPROPion [facility]) 150 MG extended release tablet TAKE ONE TABLET BY MOUTH DAILY FOR 3 DAYS, THEN TAKE ONE TABLET BY MOUTH TWICE A DAY THEREAFTER Yes Trinna Baumgarten, APRN - CNP   atorvastatin (LIPITOR) 40 MG tablet TAKE ONE TABLET BY MOUTH DAILY Yes Trinna Baumgarten, APRN - CNP   levETIRAcetam (KEPPRA) 750 MG tablet Take 1 tablet by mouth nightly Yes Shiva Weldon MD   acetaminophen (TYLENOL) 325 MG tablet Take 650 mg by mouth every 6 hours as needed for Pain Yes Historical Provider, MD   BREGUZMAN ELLIPTA 100-25 MCG/INH AEPB inhaler INHALE ONE DOSE BY MOUTH DAILY Yes Trinna Baumgarten, APRN - CNP   albuterol sulfate HFA (VENTOLIN HFA) 108 (90 Base) MCG/ACT inhaler Inhale 2 puffs into the lungs every 4 hours as needed for Wheezing Yes Trinna Baumgarten, APRN - CNP   tiotropium (SPIRIVA RESPIMAT) 2.5 MCG/ACT AERS inhaler Inhale 2 puffs into the lungs daily Yes Sage Carranza MD   albuterol (PROVENTIL) (5 MG/ML) 0.5% nebulizer solution Take 0.5 mLs by nebulization 4 times daily Yes Sage Carranza MD   zinc gluconate 50 MG tablet Take 50 mg by mouth daily Yes Historical Provider, MD   Ascorbic Acid (VITAMIN C) 250 MG tablet Take 250 mg by mouth daily Yes Historical Provider, MD   Multiple Vitamins-Minerals (EMERGEN-C VITAMIN C PO) Take by mouth daily Yes Historical Provider, MD   Cholecalciferol (VITAMIN D3) 400 units CAPS Take 1 tablet by mouth daily Yes Historical Provider, MD   aspirin EC 81 MG EC tablet Take 1 tablet by mouth daily Yes Trinna Baumgarten, APRN - CNP   magnesium gluconate (MAGONATE) 500 MG tablet Take 400 mg by mouth every evening  Yes Historical Provider, MD   vitamin B-6 (PYRIDOXINE) 100 MG tablet Take 100 mg by mouth daily Yes Historical Provider, MD   vitamin B-12 (CYANOCOBALAMIN) 1000 MCG tablet Take 1,000 mcg by mouth daily.  Yes Historical Provider, MD   budesonide-formoterol (SYMBICORT) 160-4.5 MCG/ACT AERO Inhale 2 puffs into the lungs 2 times daily  Patient not taking: Reported on 11/30/2020  Beverly Tucker MD   Nebulizers (NEBULIZER COMPRESSOR) MISC Daily as needed  THOR Santoyo CNP   Respiratory Therapy Supplies (NEBULIZER/TUBING/MOUTHPIECE) KIT 1 kit by Does not apply route daily as needed (SOB or wheezing)  THOR Santoyo CNP   Handicap Placard MISC by Does not apply route Exp 1/2024  THOR Santoyo CNP   nitroGLYCERIN (NITROSTAT) 0.4 MG SL tablet Place 1 tablet under the tongue every 5 minutes as needed for Chest pain up to max of 3 total doses. If no relief after 1 dose, call 911. Patient not taking: Reported on 1/20/2022  Madalyn Snowden MD   Multiple Vitamins-Minerals (THERAPEUTIC MULTIVITAMIN-MINERALS) tablet Take 1 tablet by mouth daily. 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 Weight as of this encounter: 166 lb (75.3 kg).     DIAGNOSTIC FINDINGS:  CBC:  Lab Results   Component Value Date    WBC 9.8 09/16/2021    HGB 14.1 09/16/2021     09/16/2021       BMP:    Lab Results   Component Value Date     09/16/2021    K 4.4 09/16/2021     09/16/2021    CO2 25 09/16/2021    BUN 12 09/16/2021    CREATININE 0.62 09/16/2021    GLUCOSE 169 09/16/2021       HEMOGLOBIN A1C:   Lab Results   Component Value Date    LABA1C 5.3 01/30/2018       FASTING LIPID PANEL:  Lab Results   Component Value Date    CHOL 265 (H) 11/08/2017    HDL 44 11/08/2017    TRIG 115 11/08/2017       Physical Exam  Vitals and nursing note reviewed. Constitutional:       General: She is not in acute distress. Appearance: She is well-developed. HENT:      Head: Normocephalic. Mouth/Throat:      Mouth: Mucous membranes are moist.   Eyes:      General: No scleral icterus. Pupils: Pupils are equal, round, and reactive to light. Cardiovascular:      Rate and Rhythm: Normal rate and regular rhythm. Pulmonary:      Effort: Pulmonary effort is normal. No respiratory distress. Breath sounds: Normal breath sounds. No wheezing. Abdominal:      Palpations: Abdomen is soft. Tenderness: There is no abdominal tenderness. Musculoskeletal:      Cervical back: Normal range of motion and neck supple. Comments: Negative Finkelstein's  Negative Tinel's-negative Phalen's   Skin:     General: Skin is warm and dry. Neurological:      Mental Status: She is alert and oriented to person, place, and time. Coordination: Coordination normal.   Psychiatric:         Behavior: Behavior normal. Behavior is cooperative. Thought Content: Thought content normal.         Judgment: Judgment normal.         ASSESSMENT     Diagnosis Orders   1. Need for influenza vaccination  INFLUENZA, MDCK QUADV, 2 YRS AND OLDER, IM, PF, PREFILL SYR OR SDV, 0.5ML (FLUCELVAX QUADV, PF)   2. Encounter for screening mammogram for breast cancer  CONCETTA Digital Screen Bilateral [[id number]]   3. Screening for hyperlipidemia  Lipid, Fasting   4. Encounter for screening for diabetes mellitus     5. Chronic obstructive pulmonary disease with (acute) exacerbation (HCC)     6. Moderate episode of recurrent major depressive disorder (HCC)  FLUoxetine (PROZAC) 10 MG capsule   7. Seizure ([address]     8. Primary hypertension     9. Encounter for screening for COVID-19  COVID-19   10.  Carpal tunnel syndrome, bilateral  Elastic Bandages & Supports (CARPAL TUNNEL WRIST STABILIZER) MISC   11. Other constipation  docusate sodium (COLACE) 100 MG capsule          PLAN:  Orders Placed This Encounter   Medications    baclofen (LIORESAL) 10 MG tablet     Sig: TAKE ONE TABLET BY MOUTH ONCE NIGHTLY AS NEEDED FOR PAIN     Dispense:  30 tablet     Refill:  3    FLUoxetine (PROZAC) 10 MG capsule     Sig: Take 1 capsule by mouth daily     Dispense:  30 capsule     Refill:  3    Elastic Bandages & Supports (CARPAL TUNNEL WRIST STABILIZER) MISC     Sig: Wear nightly while asleep     Dispense:  2 each     Refill:  0    docusate sodium (COLACE) 100 MG capsule     Sig: Take 1 capsule by mouth 2 times daily for 5 days     Dispense:  10 capsule     Refill:  1         1. Blood pressure elevated today, patient reports not previously requiring blood pressure medication. Will monitor today, reevaluate at follow-up and discuss starting antihypertensives  2. Worsening depressive symptoms per patient, will continue Wellbutrin and add Prozac today. 3. Complaining of numbness and tingling in hands in the morning, suspicion for carpal tunnel syndrome. Advised nighttime bracing x4 weeks, orders placed  4. Suspect nausea secondary to constipation, encouraged increasing fiber in diet as well as water intake. We will do stool softener twice daily for 5 days. Discussed using over-the-counter fiber supplement such as Benefiber or Metamucil  5. We will swap today for Covid, lungs clear bilaterally without wheezing. Pulse ox at baseline today. Heavily encouraged patient to contact this office or pulmonology breathing worsens over the next 2 to 3 days. Patient also advised she needs to routinely follow-up with pulmonology due to the severity of her COPD    FOLLOW UP AND INSTRUCTIONS:  Return in about 6 weeks (around 3/3/2022) for Anxiety.     · Deberah Gitelman received counseling on the following healthy behaviors:nutrition, medication adherence and tobacco

## 2022-01-21 DIAGNOSIS — Z11.52 ENCOUNTER FOR SCREENING FOR COVID-19: ICD-10-CM

## 2022-01-22 LAB
SARS-COV-2: NORMAL
SARS-COV-2: NOT DETECTED
SOURCE: NORMAL

## 2022-01-25 NOTE — TELEPHONE ENCOUNTER
Health Maintenance   Topic Date Due    Hepatitis C screen  Never done    Depression Monitoring  Never done    HIV screen  Never done    Cervical cancer screen  Never done    Lipid screen  11/08/2018    Colon Cancer Screen FIT/FOBT  02/20/2019    Annual Wellness Visit (AWV)  Never done    Breast cancer screen  02/06/2020    Diabetes screen  01/30/2021    Shingles Vaccine (2 of 2) 06/25/2021    COVID-19 Vaccine (3 - Booster for Pfizer series) 02/09/2022    Low dose CT lung screening  09/16/2022    DTaP/Tdap/Td vaccine (2 - Td or Tdap) 09/20/2027    Pneumococcal 0-64 years Vaccine (2 of 2 - PPSV23) 08/08/2030    Flu vaccine  Completed    Hepatitis A vaccine  Aged Out    Hepatitis B vaccine  Aged Out    Hib vaccine  Aged Out    Meningococcal (ACWY) vaccine  Aged Out             (applicable per patient's age: Cancer Screenings, Depression Screening, Fall Risk Screening, Immunizations)    Hemoglobin A1C (%)   Date Value   01/30/2018 5.3   08/02/2013 5.3   02/14/2013 6.0     LDL Cholesterol (mg/dL)   Date Value   11/08/2017 198 (H)     AST (U/L)   Date Value   07/12/2020 18     ALT (U/L)   Date Value   07/12/2020 16     BUN (mg/dL)   Date Value   09/16/2021 12      (goal A1C is < 7)   (goal LDL is <100) need 30-50% reduction from baseline     BP Readings from Last 3 Encounters:   01/20/22 (!) 145/92   09/17/21 (!) 153/95   07/09/21 (!) 135/93    (goal /80)      All Future Testing planned in [facility]:  Lab Frequency Next Occurrence   POCT Fecal Immunochemical Test (FIT) Once 10/12/2021   CONCETTA Digital Screen Bilateral [[id number]] Once 02/19/2022   Lipid, Fasting Once 02/19/2022       Next Visit Date:  Future Appointments   Date Time Provider Kaycee Hays   3/3/2022  1:15 PM THOR Elizabeth Si -  [address]            Patient Active Problem List:     Heart disease     Chronic back pain     Depression     Hyperlipidemia     CAD, multiple vessel     Asthma     Smoking     Need for vaccination     Syncope     Leg pain     Left hip pain     Chronic cholecystitis     Chest pain     Calculus of gallbladder without cholecystitis without obstruction     History of lumbar fusion     Failed back syndrome     Marijuana use     Abnormal weight loss     Allergic rhinitis     Anxiety state     Chronic midline low back pain with sciatica     Moderate episode of recurrent major depressive disorder (HCC)     Seizure ([facility])     Chronic tension-type headache, intractable     Hypertension     Sinus tachycardia     Acute respiratory failure (HCC)     COPD (chronic obstructive pulmonary disease) (HCC)     COPD exacerbation (HCC)     Acute bronchitis     Headache

## 2022-02-11 ENCOUNTER — HOSPITAL ENCOUNTER (OUTPATIENT)
Age: 57
Discharge: HOME | End: 2022-02-11
Payer: MEDICARE

## 2022-02-11 DIAGNOSIS — Z13.220 SCREENING FOR HYPERLIPIDEMIA: ICD-10-CM

## 2022-02-11 LAB
CHOLESTEROL, FASTING: 176 MG/DL
CHOLESTEROL/HDL RATIO: 2.8
HDLC SERPL-MCNC: 63 MG/DL
LDL CHOLESTEROL: 90 MG/DL (ref 0–130)
TRIGLYCERIDE, FASTING: 115 MG/DL
VLDLC SERPL CALC-MCNC: NORMAL MG/DL (ref 1–30)

## 2022-02-11 PROCEDURE — 36415 COLL VENOUS BLD VENIPUNCTURE: CPT

## 2022-02-11 PROCEDURE — 80061 LIPID PANEL: CPT

## 2022-02-19 DIAGNOSIS — J43.9 PULMONARY EMPHYSEMA, UNSPECIFIED EMPHYSEMA TYPE (HCC): ICD-10-CM

## 2022-02-21 NOTE — TELEPHONE ENCOUNTER
Health Maintenance   Topic Date Due    Hepatitis C screen  Never done    Depression Monitoring  Never done    HIV screen  Never done    Cervical cancer screen  Never done    Colorectal Cancer Screen  02/20/2019    Annual Wellness Visit (AWV)  Never done    Breast cancer screen  02/06/2020    Diabetes screen  01/30/2021    Shingles Vaccine (2 of 2) 06/25/2021    COVID-19 Vaccine (3 - Booster for Pfizer series) 02/09/2022    Low dose CT lung screening  09/16/2022    Lipid screen  02/11/2023    DTaP/Tdap/Td vaccine (2 - Td or Tdap) 09/20/2027    Pneumococcal 0-64 years Vaccine (2 of 2 - PPSV23) 08/08/2030    Flu vaccine  Completed    Hepatitis A vaccine  Aged Out    Hepatitis B vaccine  Aged Out    Hib vaccine  Aged Out    Meningococcal (ACWY) vaccine  Aged Out             (applicable per patient's age: Cancer Screenings, Depression Screening, Fall Risk Screening, Immunizations)    Hemoglobin A1C (%)   Date Value   01/30/2018 5.3   08/02/2013 5.3   02/14/2013 6.0     LDL Cholesterol (mg/dL)   Date Value   02/11/2022 90     AST (U/L)   Date Value   07/12/2020 18     ALT (U/L)   Date Value   07/12/2020 16     BUN (mg/dL)   Date Value   09/16/2021 12      (goal A1C is < 7)   (goal LDL is <100) need 30-50% reduction from baseline     BP Readings from Last 3 Encounters:   01/20/22 (!) 145/92   09/17/21 (!) 153/95   07/09/21 (!) 135/93    (goal /80)      All Future Testing planned in [facility]:  Lab Frequency Next Occurrence   POCT Fecal Immunochemical Test (FIT) Once 10/12/2021   CONCETTA Digital Screen Bilateral [[id number]] Once 02/19/2022       Next Visit Date:  Future Appointments   Date Time Provider Kaycee Hays   3/3/2022  1:15 PM THOR Guerra CNP [facility]            Patient Active Problem List:     Heart disease     Chronic back pain     Depression     Hyperlipidemia     CAD, multiple vessel     Asthma     Smoking     Need for vaccination     Syncope     Leg pain     Left hip pain     Chronic cholecystitis     Chest pain     Calculus of gallbladder without cholecystitis without obstruction     History of lumbar fusion     Failed back syndrome     Marijuana use     Abnormal weight loss     Allergic rhinitis     Anxiety state     Chronic midline low back pain with sciatica     Moderate episode of recurrent major depressive disorder (HCC)     Seizure (HCC)     Chronic tension-type headache, intractable     Hypertension     Sinus tachycardia     Acute respiratory failure (HCC)     COPD (chronic obstructive pulmonary disease) (HCC)     COPD exacerbation (HCC)     Acute bronchitis     Headache

## 2022-02-22 RX ORDER — FLUTICASONE FUROATE AND VILANTEROL TRIFENATATE 100; 25 UG/1; UG/1
POWDER RESPIRATORY (INHALATION)
Qty: 1 EACH | Refills: 1 | Status: SHIPPED | OUTPATIENT
Start: 2022-02-22 | End: 2022-05-23

## 2022-02-22 RX ORDER — TIOTROPIUM BROMIDE INHALATION SPRAY 3.12 UG/1
SPRAY, METERED RESPIRATORY (INHALATION)
Qty: 4 G | Refills: 1 | Status: SHIPPED | OUTPATIENT
Start: 2022-02-22 | End: 2022-04-26

## 2022-03-10 ENCOUNTER — TELEPHONE (OUTPATIENT)
Dept: PRIMARY CARE CLINIC | Age: 57
End: 2022-03-10

## 2022-03-10 NOTE — TELEPHONE ENCOUNTER
Placed call to pt, left v-mail message for a return call regarding need for appt. The patient is neutropenic, afebrile  If febrile Pan Cx and CXR  Continue Cefepime and Posaconazole  5/15, 5/22, 5/29 & 6/5 COVID PCR (-)  5/27 C. Diff (-)

## 2022-03-10 NOTE — TELEPHONE ENCOUNTER
----- Message from Oniel Anderson sent at 3/10/2022 10:16 AM EST -----  Subject: Message to Provider    QUESTIONS  Information for Provider? Patient requesting appointment for medication   follow up, computer scheduling down in [place]. Please call back to schedule.  ---------------------------------------------------------------------------  --------------  CALL BACK INFO  What is the best way for the office to contact you? OK to leave message on   voicemail  Preferred Call Back Phone Number? [phone]  ---------------------------------------------------------------------------  --------------  SCRIPT ANSWERS  Relationship to Patient?  Self

## 2022-03-23 DIAGNOSIS — E78.00 PURE HYPERCHOLESTEROLEMIA: ICD-10-CM

## 2022-03-23 RX ORDER — ATORVASTATIN CALCIUM 40 MG/1
TABLET, FILM COATED ORAL
Qty: 90 TABLET | Refills: 0 | Status: SHIPPED | OUTPATIENT
Start: 2022-03-23 | End: 2022-06-29

## 2022-03-23 NOTE — TELEPHONE ENCOUNTER
Health Maintenance   Topic Date Due    Hepatitis C screen  Never done    Depression Monitoring  Never done    HIV screen  Never done    Cervical cancer screen  Never done    Colorectal Cancer Screen  02/20/2019    Annual Wellness Visit (AWV)  Never done    Breast cancer screen  02/06/2020    Diabetes screen  01/30/2021    Shingles Vaccine (2 of 2) 06/25/2021    COVID-19 Vaccine (3 - Booster for Pfizer series) 02/09/2022    Low dose CT lung screening  09/16/2022    Lipid screen  02/11/2023    DTaP/Tdap/Td vaccine (2 - Td or Tdap) 09/20/2027    Pneumococcal 0-64 years Vaccine (2 of 2 - PPSV23) 08/08/2030    Flu vaccine  Completed    Hepatitis A vaccine  Aged Out    Hepatitis B vaccine  Aged Out    Hib vaccine  Aged Out    Meningococcal (ACWY) vaccine  Aged Out             (applicable per patient's age: Cancer Screenings, Depression Screening, Fall Risk Screening, Immunizations)    Hemoglobin A1C (%)   Date Value   01/30/2018 5.3   08/02/2013 5.3   02/14/2013 6.0     LDL Cholesterol (mg/dL)   Date Value   02/11/2022 90     AST (U/L)   Date Value   07/12/2020 18     ALT (U/L)   Date Value   07/12/2020 16     BUN (mg/dL)   Date Value   09/16/2021 12      (goal A1C is < 7)   (goal LDL is <100) need 30-50% reduction from baseline     BP Readings from Last 3 Encounters:   01/20/22 (!) 145/92   09/17/21 (!) 153/95   07/09/21 (!) 135/93    (goal /80)      All Future Testing planned in [facility]:  Lab Frequency Next Occurrence   POCT Fecal Immunochemical Test (FIT) Once 10/12/2021   CONCETTA Digital Screen Bilateral [[id number]] Once 04/07/2022       Next Visit Date:  No future appointments.          Patient Active Problem List:     Heart disease     Chronic back pain     Depression     Hyperlipidemia     CAD, multiple vessel     Asthma     Smoking     Need for vaccination     Syncope     Leg pain     Left hip pain     Chronic cholecystitis     Chest pain     Calculus of gallbladder without cholecystitis without obstruction     History of lumbar fusion     Failed back syndrome     Marijuana use     Abnormal weight loss     Allergic rhinitis     Anxiety state     Chronic midline low back pain with sciatica     Moderate episode of recurrent major depressive disorder ([facility])     Seizure ([facility])     Chronic tension-type headache, intractable     Hypertension     Sinus tachycardia     Acute respiratory failure ([facility])     COPD (chronic obstructive pulmonary disease) ([facility])     COPD exacerbation ([facility])     Acute bronchitis     Headache

## 2022-04-10 ENCOUNTER — APPOINTMENT (OUTPATIENT)
Dept: GENERAL RADIOLOGY | Age: 57
End: 2022-04-10
Payer: MEDICARE

## 2022-04-10 ENCOUNTER — APPOINTMENT (OUTPATIENT)
Dept: CT IMAGING | Age: 57
End: 2022-04-10
Payer: MEDICARE

## 2022-04-10 ENCOUNTER — HOSPITAL ENCOUNTER (EMERGENCY)
Age: 57
Discharge: HOME | End: 2022-04-10
Attending: EMERGENCY MEDICINE
Payer: MEDICARE

## 2022-04-10 VITALS
DIASTOLIC BLOOD PRESSURE: 104 MMHG | SYSTOLIC BLOOD PRESSURE: 173 MMHG | TEMPERATURE: 97.5 F | RESPIRATION RATE: 17 BRPM | HEART RATE: 68 BPM | OXYGEN SATURATION: 98 %

## 2022-04-10 DIAGNOSIS — R51.9 NONINTRACTABLE HEADACHE, UNSPECIFIED CHRONICITY PATTERN, UNSPECIFIED HEADACHE TYPE: Primary | ICD-10-CM

## 2022-04-10 LAB
ABSOLUTE EOS #: 0.24 K/UL (ref 0–0.44)
ABSOLUTE IMMATURE GRANULOCYTE: <0.03 K/UL (ref 0–0.3)
ABSOLUTE LYMPH #: 1.96 K/UL (ref 1.1–3.7)
ABSOLUTE MONO #: 0.44 K/UL (ref 0.1–1.2)
ANION GAP SERPL CALCULATED.3IONS-SCNC: 13 MMOL/L (ref 9–17)
BASOPHILS # BLD: 1 % (ref 0–2)
BASOPHILS ABSOLUTE: 0.05 K/UL (ref 0–0.2)
BUN BLDV-MCNC: 9 MG/DL (ref 6–20)
CALCIUM SERPL-MCNC: 9.6 MG/DL (ref 8.6–10.4)
CHLORIDE BLD-SCNC: 103 MMOL/L (ref 98–107)
CO2: 23 MMOL/L (ref 20–31)
CREAT SERPL-MCNC: 0.62 MG/DL (ref 0.5–0.9)
EOSINOPHILS RELATIVE PERCENT: 3 % (ref 1–4)
GFR AFRICAN AMERICAN: >60 ML/MIN
GFR NON-AFRICAN AMERICAN: >60 ML/MIN
GFR SERPL CREATININE-BSD FRML MDRD: NORMAL ML/MIN/{1.73_M2}
GLUCOSE BLD-MCNC: 97 MG/DL (ref 70–99)
HCT VFR BLD CALC: 44.2 % (ref 36.3–47.1)
HEMOGLOBIN: 14.7 G/DL (ref 11.9–15.1)
IMMATURE GRANULOCYTES: 0 %
LYMPHOCYTES # BLD: 28 % (ref 24–43)
MCH RBC QN AUTO: 32.4 PG (ref 25.2–33.5)
MCHC RBC AUTO-ENTMCNC: 33.3 G/DL (ref 28.4–34.8)
MCV RBC AUTO: 97.4 FL (ref 82.6–102.9)
MONOCYTES # BLD: 6 % (ref 3–12)
NRBC AUTOMATED: 0 PER 100 WBC
PDW BLD-RTO: 14.1 % (ref 11.8–14.4)
PLATELET # BLD: NORMAL K/UL (ref 138–453)
PLATELET, FLUORESCENCE: NORMAL K/UL (ref 138–453)
POTASSIUM SERPL-SCNC: 4.5 MMOL/L (ref 3.7–5.3)
RBC # BLD: 4.54 M/UL (ref 3.95–5.11)
SEG NEUTROPHILS: 61 % (ref 36–65)
SEGMENTED NEUTROPHILS ABSOLUTE COUNT: 4.26 K/UL (ref 1.5–8.1)
SODIUM BLD-SCNC: 139 MMOL/L (ref 135–144)
TROPONIN, HIGH SENSITIVITY: <6 NG/L (ref 0–14)
WBC # BLD: 7 K/UL (ref 3.5–11.3)

## 2022-04-10 PROCEDURE — 85055 RETICULATED PLATELET ASSAY: CPT

## 2022-04-10 PROCEDURE — 85025 COMPLETE CBC W/AUTO DIFF WBC: CPT

## 2022-04-10 PROCEDURE — 71046 X-RAY EXAM CHEST 2 VIEWS: CPT

## 2022-04-10 PROCEDURE — 2580000003 HC RX 258: Performed by: EMERGENCY MEDICINE

## 2022-04-10 PROCEDURE — 99285 EMERGENCY DEPT VISIT HI MDM: CPT

## 2022-04-10 PROCEDURE — 6370000000 HC RX 637 (ALT 250 FOR IP): Performed by: EMERGENCY MEDICINE

## 2022-04-10 PROCEDURE — 80048 BASIC METABOLIC PNL TOTAL CA: CPT

## 2022-04-10 PROCEDURE — 93005 ELECTROCARDIOGRAM TRACING: CPT | Performed by: EMERGENCY MEDICINE

## 2022-04-10 PROCEDURE — 70450 CT HEAD/BRAIN W/O DYE: CPT

## 2022-04-10 PROCEDURE — 96374 THER/PROPH/DIAG INJ IV PUSH: CPT

## 2022-04-10 PROCEDURE — 84484 ASSAY OF TROPONIN QUANT: CPT

## 2022-04-10 PROCEDURE — 96361 HYDRATE IV INFUSION ADD-ON: CPT

## 2022-04-10 PROCEDURE — 6360000002 HC RX W HCPCS: Performed by: EMERGENCY MEDICINE

## 2022-04-10 PROCEDURE — 96375 TX/PRO/DX INJ NEW DRUG ADDON: CPT

## 2022-04-10 RX ORDER — KETOROLAC TROMETHAMINE 15 MG/ML
15 INJECTION, SOLUTION INTRAMUSCULAR; INTRAVENOUS ONCE
Status: DISCONTINUED | OUTPATIENT
Start: 2022-04-10 | End: 2022-04-10

## 2022-04-10 RX ORDER — 0.9 % SODIUM CHLORIDE 0.9 %
1000 INTRAVENOUS SOLUTION INTRAVENOUS ONCE
Status: COMPLETED | OUTPATIENT
Start: 2022-04-10 | End: 2022-04-10

## 2022-04-10 RX ORDER — PROCHLORPERAZINE EDISYLATE 5 MG/ML
10 INJECTION INTRAMUSCULAR; INTRAVENOUS ONCE
Status: COMPLETED | OUTPATIENT
Start: 2022-04-10 | End: 2022-04-10

## 2022-04-10 RX ORDER — 0.9 % SODIUM CHLORIDE 0.9 %
30 INTRAVENOUS SOLUTION INTRAVENOUS ONCE
Status: DISCONTINUED | OUTPATIENT
Start: 2022-04-10 | End: 2022-04-10

## 2022-04-10 RX ORDER — DIPHENHYDRAMINE HCL 25 MG
25 TABLET ORAL ONCE
Status: COMPLETED | OUTPATIENT
Start: 2022-04-10 | End: 2022-04-10

## 2022-04-10 RX ORDER — KETOROLAC TROMETHAMINE 30 MG/ML
30 INJECTION, SOLUTION INTRAMUSCULAR; INTRAVENOUS ONCE
Status: COMPLETED | OUTPATIENT
Start: 2022-04-10 | End: 2022-04-10

## 2022-04-10 RX ADMIN — SODIUM CHLORIDE 1000 ML: 9 INJECTION, SOLUTION INTRAVENOUS at 12:28

## 2022-04-10 RX ADMIN — KETOROLAC TROMETHAMINE 30 MG: 30 INJECTION, SOLUTION INTRAMUSCULAR at 13:16

## 2022-04-10 RX ADMIN — DIPHENHYDRAMINE HCL 25 MG: 25 TABLET ORAL at 12:28

## 2022-04-10 RX ADMIN — PROCHLORPERAZINE EDISYLATE 10 MG: 5 INJECTION INTRAMUSCULAR; INTRAVENOUS at 12:28

## 2022-04-10 ASSESSMENT — ENCOUNTER SYMPTOMS
NAUSEA: 0
VOMITING: 0
CONSTIPATION: 0
ABDOMINAL PAIN: 0
BACK PAIN: 0
SORE THROAT: 0
SINUS PRESSURE: 0
COUGH: 1
DIARRHEA: 0

## 2022-04-10 ASSESSMENT — PAIN SCALES - GENERAL: PAINLEVEL_OUTOF10: 3

## 2022-04-10 NOTE — ED NOTES
Pt to ed from home c/o nausea, some vision changes and headache for the past few days. Pt reports intermittent chest pain,denies at this time. Also, intermittent lightheadedness. Patient is aox4, non labored RR, speaking in full sentences with clear speech.       Akua May RN  04/10/22 [id number]

## 2022-04-10 NOTE — ED PROVIDER NOTES
[facility] ED  Emergency Department Encounter  EmergencyMedicine Resident     Pt Deloras Kawasaki  MRN: [id number]  [place] 1965  Date of evaluation: 4/10/22  PCP:  THOR Yeager [id number]       Chief Complaint   Patient presents with    Headache     with nausea and some vision changes. HISTORY OF PRESENT ILLNESS  (Location/Symptom, Timing/Onset, Context/Setting, Quality, Duration, Modifying Factors, Severity.)      Daniel Carlin is a 64 y.o. female who presents with history of COPD, seizures who presents with severe headache that started on Friday, describes as top of the head, radiates throughout with associated lightheadedness, nausea. Patient also reports having chest pain off and on over the last 3 days. Patient's been increased coughing. Patient denies any fevers, chills, abdominal pain, change in urination or bowel habits. Patient describes her headache as throbbing, nonradiating, associated vision changes. Able to ambulate without any difficulty, able to function with activities daily living. Does state that she has been a little more forgetful over the last couple days. Patient denies any trauma, syncopal episodes, loss conscious. PAST MEDICAL / SURGICAL / SOCIAL / FAMILY HISTORY      has a past medical history of Acute MI ([address] Anxiety, Chronic back pain, COPD (chronic obstructive pulmonary disease) ([address] Depression, Heart disease, Hyperlipidemia, Hypertension, and MRSA (methicillin resistant staph aureus) culture positive. No additional pertinent     has a past surgical history that includes Tubal ligation; Tonsillectomy; Cholecystectomy, laparoscopic (11/10/2017); Cholecystectomy, laparoscopic (N/A, 11/10/2017); and back surgery.   No additional pertinent    Social History     Socioeconomic History    Marital status:      Spouse name: Not on file    Number of children: Not on file    Years of education: Not on file    Highest education level: Not on file   Occupational History    Not on file   Tobacco Use    Smoking status: Current Every Day Smoker     Packs/day: 1.00     Years: 40.00     Pack years: 40.00     Types: Cigarettes    Smokeless tobacco: Never Used    Tobacco comment: Attempting to quit with patch now 10/16/20 currently smoking no longer using patch   Vaping Use    Vaping Use: Never used   Substance and Sexual Activity    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    Drug use: Yes     Types: Marijuana (Weed)     Comment: daily - \"a couple bowls\"    Sexual activity: Not on file   Other Topics Concern    Not on file   Social History Narrative    Not on file     Social Determinants of Health     Financial Resource Strain: Low Risk     Difficulty of Paying Living Expenses: Not hard at all   Food Insecurity: No Food Insecurity    Worried About Running Out of Food in the Last Year: Never true    Skyler of Food in the Last Year: Never true   Transportation Needs:     Lack of Transportation (Medical): Not on file    Lack of Transportation (Non-Medical):  Not on file   Physical Activity:     Days of Exercise per Week: Not on file    Minutes of Exercise per Session: Not on file   Stress:     Feeling of Stress : Not on file   Social Connections:     Frequency of Communication with Friends and Family: Not on file    Frequency of Social Gatherings with Friends and Family: Not on file    Attends [employer] Services: Not on file    Active Member of Clubs or Organizations: Not on file    Attends Club or Organization Meetings: Not on file    Marital Status: Not on file   Intimate Partner Violence:     Fear of Current or Ex-Partner: Not on file    Emotionally Abused: Not on file    Physically Abused: Not on file    Sexually Abused: Not on file   Housing Stability:     Unable to Pay for Housing in the Last Year: Not on file    Number of [place] in the Last Year: Not on file    Unstable Housing in the Last Year: Not on file       Family History   Problem Relation Age of Onset    Other Mother     Heart Disease Father     High Blood Pressure Father     High Cholesterol Father     Other Brother        Allergies:  Sulfa antibiotics    Home Medications:  Prior to Admission medications    Medication Sig Start Date End Date Taking?  Authorizing Provider   atorvastatin (LIPITOR) 40 MG tablet TAKE ONE TABLET BY MOUTH DAILY 3/23/22   THOR Espinosa CNP   SPIRIVA RESPIMAT 2.5 MCG/ACT AERS inhaler INHALE TWO PUFFS BY MOUTH DAILY 2/22/22   THOR Espinosa CNP   BREO ELLIPTA 100-25 MCG/INH AEPB inhaler INHALE ONE DOSE BY MOUTH DAILY 2/22/22   THOR Espinosa CNP   buPROPion [facility] SR) 150 MG extended release tablet TAKE ONE TABLET BY MOUTH DAILY FOR 3 DAYS, THEN TAKE ONE TABLET BY MOUTH TWICE A DAY THEREAFTER 2/10/22   THOR Espinosa CNP   baclofen (LIORESAL) 10 MG tablet TAKE ONE TABLET BY MOUTH ONCE NIGHTLY AS NEEDED FOR PAIN 1/20/22   THOR Espinosa CNP   FLUoxetine (PROZAC) 10 MG capsule Take 1 capsule by mouth daily 1/20/22   THOR Espinosa CNP   Elastic Bandages & Supports (CARPAL TUNNEL WRIST STABILIZER) [address] [facility] Wear nightly while asleep 1/20/22   THOR Espinosa CNP   levETIRAcetam (KEPPRA) 750 MG tablet Take 1 tablet by mouth nightly 9/17/21   Percy Eubanks MD   acetaminophen (TYLENOL) 325 MG tablet Take 650 mg by mouth every 6 hours as needed for Pain    Historical Provider, MD   albuterol sulfate HFA (VENTOLIN HFA) 108 (90 Base) MCG/ACT inhaler Inhale 2 puffs into the lungs every 4 hours as needed for Wheezing 7/9/21   THOR Espinosa CNP   albuterol (PROVENTIL) (5 MG/ML) 0.5% nebulizer solution Take 0.5 mLs by nebulization 4 times daily 6/15/21   Fabrizio Marte MD   zinc gluconate 50 MG tablet Take 50 mg by mouth daily    Historical Provider, MD   Ascorbic Acid (VITAMIN C) 250 MG tablet Take 250 mg by mouth daily    Historical Provider, MD Multiple Vitamins-Minerals (EMERGEN-C VITAMIN C PO) Take by mouth daily    Historical Provider, MD   budesonide-formoterol (SYMBICORT) 160-4.5 MCG/ACT AERO Inhale 2 puffs into the lungs 2 times daily  Patient not taking: Reported on 11/30/2020 10/16/20 7/9/21  John Hall MD   Nebulizers (NEBULIZER COMPRESSOR) MISC Daily as needed 9/4/20   THOR James CNP   Respiratory Therapy Supplies (NEBULIZER/TUBING/MOUTHPIECE) KIT 1 kit by Does not apply route daily as needed (SOB or wheezing) 9/4/20   THOR James CNP   Handicap Placard MISC by Does not apply route Exp 1/2024 1/20/20   THOR James CNP   Cholecalciferol (VITAMIN D3) 400 units CAPS Take 1 tablet by mouth daily    Historical Provider, MD   aspirin EC 81 MG EC tablet Take 1 tablet by mouth daily 3/6/19   THOR James CNP   magnesium gluconate (MAGONATE) 500 MG tablet Take 400 mg by mouth every evening     Historical Provider, MD   vitamin B-6 (PYRIDOXINE) 100 MG tablet Take 100 mg by mouth daily    Historical Provider, MD   nitroGLYCERIN (NITROSTAT) 0.4 MG SL tablet Place 1 tablet under the tongue every 5 minutes as needed for Chest pain up to max of 3 total doses. If no relief after 1 dose, call 911. Patient not taking: Reported on 1/20/2022 1/30/18   Rafaela Toth MD   Multiple Vitamins-Minerals (THERAPEUTIC MULTIVITAMIN-MINERALS) tablet Take 1 tablet by mouth daily. Historical Provider, MD   vitamin B-12 (CYANOCOBALAMIN) 1000 MCG tablet Take 1,000 mcg by mouth daily. Historical Provider, MD       REVIEW OF SYSTEMS    (2-9 systems for level 4, 10 or more for level 5)      Review of Systems   Constitutional: Negative for chills and fever. HENT: Negative for sinus pressure and sore throat. Eyes: Positive for visual disturbance. Respiratory: Positive for cough (chronic). Cardiovascular: Positive for chest pain.    Gastrointestinal: Negative for abdominal pain, constipation, diarrhea, nausea and vomiting. Genitourinary: Negative for dysuria. Musculoskeletal: Negative for back pain. Skin: Negative for rash. Neurological: Positive for headaches. Negative for dizziness, syncope, weakness and numbness. PHYSICAL EXAM   (up to 7 for level 4, 8 or more for level 5)      INITIAL VITALS:   BP (!) 173/104   Pulse 68   Temp 97.5 °F (36.4 °C) (Oral)   Resp 17   SpO2 98%     Physical Exam  Constitutional:       Appearance: Normal appearance. HENT:      Head: Normocephalic. Mouth/Throat:      Mouth: Mucous membranes are moist.      Pharynx: Oropharynx is clear. Eyes:      Extraocular Movements: Extraocular movements intact. Pupils: Pupils are equal, round, and reactive to light. Cardiovascular:      Rate and Rhythm: Normal rate and regular rhythm. Pulses: Normal pulses. Pulmonary:      Effort: Pulmonary effort is normal.   Abdominal:      General: Abdomen is flat. Palpations: Abdomen is soft. Tenderness: There is no abdominal tenderness. There is no guarding. Musculoskeletal:         General: Normal range of motion. Cervical back: Normal range of motion. Skin:     General: Skin is warm and dry. Capillary Refill: Capillary refill takes 2 to 3 seconds. Neurological:      General: No focal deficit present. Mental Status: She is alert and oriented to person, place, and time.    Psychiatric:         Mood and Affect: Mood normal.         Behavior: Behavior normal.       DIFFERENTIAL  DIAGNOSIS     PLAN (LABS / IMAGING / EKG):  Orders Placed This Encounter   Procedures    XR CHEST (2 VW)    CT HEAD WO CONTRAST    CBC with Auto Differential    Basic Metabolic Panel w/ Reflex to MG    Troponin    Immature Platelet Fraction    EKG 12 Lead    Insert peripheral IV       MEDICATIONS ORDERED:  Orders Placed This Encounter   Medications    DISCONTD: 0.9 % sodium chloride IV bolus 30 mL/kg    DISCONTD: ketorolac (TORADOL) injection 15 mg    prochlorperazine (COMPAZINE) injection 10 mg    diphenhydrAMINE (BENADRYL) tablet 25 mg    0.9 % sodium chloride bolus    ketorolac (TORADOL) injection 30 mg       DIAGNOSTIC RESULTS / EMERGENCY DEPARTMENT COURSE / MDM   LAB RESULTS:  Results for orders placed or performed during the hospital encounter of 04/10/22   CBC with Auto Differential   Result Value Ref Range    WBC 7.0 3.5 - 11.3 k/uL    RBC 4.54 3.95 - 5.11 m/uL    Hemoglobin 14.7 11.9 - 15.1 g/dL    Hematocrit 44.2 36.3 - 47.1 %    MCV 97.4 82.6 - 102.9 fL    MCH 32.4 25.2 - 33.5 pg    MCHC 33.3 28.4 - 34.8 g/dL    RDW 14.1 11.8 - 14.4 %    Platelets See Reflexed IPF Result 138 - 453 k/uL    NRBC Automated 0.0 0.0 per 100 WBC    Seg Neutrophils 61 36 - 65 %    Lymphocytes 28 24 - 43 %    Monocytes 6 3 - 12 %    Eosinophils % 3 1 - 4 %    Basophils 1 0 - 2 %    Immature Granulocytes 0 0 %    Segs Absolute 4.26 1.50 - 8.10 k/uL    Absolute Lymph # 1.96 1.10 - 3.70 k/uL    Absolute Mono # 0.44 0.10 - 1.20 k/uL    Absolute Eos # 0.24 0.00 - 0.44 k/uL    Basophils Absolute 0.05 0.00 - 0.20 k/uL    Absolute Immature Granulocyte <0.03 0.00 - 0.30 k/uL   Basic Metabolic Panel w/ Reflex to MG   Result Value Ref Range    Glucose 97 70 - 99 mg/dL    BUN 9 6 - 20 mg/dL    CREATININE 0.62 0.50 - 0.90 mg/dL    Calcium 9.6 8.6 - 10.4 mg/dL    Sodium 139 135 - 144 mmol/L    Potassium 4.5 3.7 - 5.3 mmol/L    Chloride 103 98 - 107 mmol/L    CO2 23 20 - 31 mmol/L    Anion Gap 13 9 - 17 mmol/L    GFR Non-African American >60 >60 mL/min    GFR African American >60 >60 mL/min    GFR Comment         Troponin   Result Value Ref Range    Troponin, High Sensitivity <6 0 - 14 ng/L   Immature Platelet Fraction   Result Value Ref Range    Platelet, Fluorescence Platelet clumps present, count appears adequate.  138 - 453 k/uL   EKG 12 Lead   Result Value Ref Range    Ventricular Rate 83 BPM    Atrial Rate 83 BPM    P-R Interval 132 ms    QRS Duration 84 ms    Q-T Interval 362 ms QTc Calculation (Bazett) 425 ms    P Axis 39 degrees    R Axis 18 degrees    T Axis 36 degrees       RADIOLOGY:  CT HEAD WO CONTRAST   Final Result   No acute intracranial abnormality. XR CHEST (2 VW)   Final Result   No acute cardiopulmonary disease                EKG  EKG Interpretation    Interpreted by emergency department physician    Rhythm: normal sinus   Rate: normal  Axis: normal  Ectopy: none  Conduction: normal  ST Segments: normal  T Waves: normal  Q Waves: none    Clinical Impression: non-specific EKG    [place] Aliza Guzman,      All EKG's are interpreted by the Emergency Department Physician who either signs or Co-signs this chart in the absence of a cardiologist.      PROCEDURES:  none    CONSULTS:  None    MEDICAL DECISION MAKING:  Patient presenting with worst headache of her life, CT head was obtained was noted to be negative. Patient had laboratory evaluations that there is no ACS cause for symptoms. Patient appeared clinically well after IV fluids and medications. .  Patient was noted to be hypertensive, instructed to follow-up with PCP due to her hypertension. Patient headache improved with symptomatic control   Patient was neurologically stable, had no deficits noted. Patient was able to ambulate without any issue. Patient was discharged home in stable condition for further work-up by PCP. Patient agreeable to plan discharged home in stable condition. Patient structured return for any neurological changes such as strokelike symptoms such as facial droop, headache, difficulty ambulating, weakness or numbness. Patient agreeable with the follow-up plan    CRITICAL CARE:  Please see attending note    FINAL IMPRESSION      1.  Nonintractable headache, unspecified chronicity pattern, unspecified headache type    \    DISPOSITION / PLAN     DISPOSITION Decision To Discharge 04/10/2022 01:35:32 PM      PATIENT REFERRED TO:  THOR Andres - CNP  [address] [facility] [place] [address]  [phone]    Schedule an appointment as soon as possible for a visit         DISCHARGE MEDICATIONS:  Discharge Medication List as of 4/10/2022  1:41 PM          Balaji Salomon,   Emergency Medicine Resident    (Please note that portions of thisnote were completed with a voice recognition program.  Efforts were made to edit the dictations but occasionally words are mis-transcribed.)       Balaji Salomon DO  Resident  04/11/22 [id number]

## 2022-04-10 NOTE — ED PROVIDER NOTES
[address] [facility]     Emergency Department     Faculty Note/ Attestation      Pt Name: Tyson Benavides                                       MRN: [id number]  [place] 1965  Date of evaluation: 4/10/2022    Patients PCP:    Mortimer Singleton, APRN - [facility]      Attestation  I performed a history and physical examination of the patient and discussed management with the resident. I reviewed the residents note and agree with the documented findings and plan of care. Any areas of disagreement are noted on the chart. I was personally present for the key portions of any procedures. I have documented in the chart those procedures where I was not present during the key portions. I have reviewed the emergency nurses triage note. I agree with the chief complaint, past medical history, past surgical history, allergies, medications, social and family history as documented unless otherwise noted below. For Physician Assistant/ Nurse Practitioner cases/documentation I have personally evaluated this patient and have completed at least one if not all key elements of the E/M (history, physical exam, and MDM). Additional findings are as noted. Initial Screens:        [place] Coma Scale  Eye Opening: Spontaneous  Best Verbal Response: Oriented  Best Motor Response: Obeys commands  [place] Coma Scale Score: 15    Vitals:    Vitals:    04/10/22 1207   BP: (!) 173/104   Pulse: 68   Resp: 17   Temp: 97.5 °F (36.4 °C)   TempSrc: Oral   SpO2: 98%       CHIEF COMPLAINT       Chief Complaint   Patient presents with    Headache     with nausea and some vision changes.               DIAGNOSTIC RESULTS             RADIOLOGY:   XR CHEST (2 VW)    (Results Pending)         LABS:  Labs Reviewed   CBC WITH AUTO DIFFERENTIAL   BASIC METABOLIC PANEL W/ REFLEX TO MG FOR LOW K   TROPONIN         EMERGENCY DEPARTMENT COURSE:     -------------------------  BP: (!) 173/104, Temp: 97.5 °F (36.4 °C), Pulse: 68, Resp: 17      Comments    Ha x3 days with nausea  SBP 170s, no hx of HTN  Has COPD, coughing 2 days ago, sudden onset sharp headache, improved yesterday, worse this morning with coughing again    No deficits    Plan for CT head, HA cocktail, labs and trop given report of CP recently but not today    (Please note that portions of this note were completed with a voice recognition program.  Efforts were made to edit the dictations but occasionally words are mis-transcribed.)      Emily Geronimo MD,, MD  Attending Emergency Physician         Emily Geronimo MD  04/13/22 5824

## 2022-04-11 LAB
EKG ATRIAL RATE: 83 BPM
EKG P AXIS: 39 DEGREES
EKG P-R INTERVAL: 132 MS
EKG Q-T INTERVAL: 362 MS
EKG QRS DURATION: 84 MS
EKG QTC CALCULATION (BAZETT): 425 MS
EKG R AXIS: 18 DEGREES
EKG T AXIS: 36 DEGREES
EKG VENTRICULAR RATE: 83 BPM

## 2022-04-25 NOTE — TELEPHONE ENCOUNTER
Letter sent to patient to schedule followup appointment for medication refills. Health Maintenance   Topic Date Due    HIV screen  Never done    Hepatitis C screen  Never done    Cervical cancer screen  Never done    Low dose CT lung screening  Never done    Colorectal Cancer Screen  02/20/2019    Annual Wellness Visit (AWV)  Never done    Breast cancer screen  02/06/2020    Shingles Vaccine (2 of 2) 06/25/2021    Depression Monitoring  01/23/2022    Lipids  02/11/2023    DTaP/Tdap/Td vaccine (2 - Td or Tdap) 09/20/2027    Pneumococcal 0-64 years Vaccine (3 - PPSV23 or PCV20) 08/08/2030    Flu vaccine  Completed    COVID-19 Vaccine  Completed    Hepatitis A vaccine  Aged Out    Hepatitis B vaccine  Aged Out    Hib vaccine  Aged Out    Meningococcal (ACWY) vaccine  Aged Out             (applicable per patient's age: Cancer Screenings, Depression Screening, Fall Risk Screening, Immunizations)    Hemoglobin A1C (%)   Date Value   01/30/2018 5.3   08/02/2013 5.3   02/14/2013 6.0     LDL Cholesterol (mg/dL)   Date Value   02/11/2022 90     AST (U/L)   Date Value   07/12/2020 18     ALT (U/L)   Date Value   07/12/2020 16     BUN (mg/dL)   Date Value   04/10/2022 9      (goal A1C is < 7)   (goal LDL is <100) need 30-50% reduction from baseline     BP Readings from Last 3 Encounters:   04/10/22 (!) 173/104   01/20/22 (!) 145/92   09/17/21 (!) 153/95    (goal /80)      All Future Testing planned in [facility]:  Lab Frequency Next Occurrence   POCT Fecal Immunochemical Test (FIT) Once 10/12/2021   CONCETTA Digital Screen Bilateral [[id number]] Once 04/07/2022       Next Visit Date:  No future appointments.          Patient Active Problem List:     Heart disease     Chronic back pain     Depression     Hyperlipidemia     CAD, multiple vessel     Asthma     Smoking     Need for vaccination     Syncope     Leg pain     Left hip pain     Chronic cholecystitis     Chest pain     Calculus of gallbladder without cholecystitis without obstruction     History of lumbar fusion     Failed back syndrome     Marijuana use     Abnormal weight loss     Allergic rhinitis     Anxiety state     Chronic midline low back pain with sciatica     Moderate episode of recurrent major depressive disorder (HCC)     Seizure (HCC)     Chronic tension-type headache, intractable     Hypertension     Sinus tachycardia     Acute respiratory failure (HCC)     COPD (chronic obstructive pulmonary disease) (HCC)     COPD exacerbation (HCC)     Acute bronchitis     Headache

## 2022-04-26 RX ORDER — TIOTROPIUM BROMIDE INHALATION SPRAY 3.12 UG/1
SPRAY, METERED RESPIRATORY (INHALATION)
Qty: 4 G | Refills: 1 | Status: SHIPPED | OUTPATIENT
Start: 2022-04-26 | End: 2022-07-25

## 2022-05-12 RX ORDER — LEVETIRACETAM 750 MG/1
TABLET ORAL
Qty: 60 TABLET | Refills: 3 | OUTPATIENT
Start: 2022-05-12

## 2022-05-16 DIAGNOSIS — F33.1 MODERATE EPISODE OF RECURRENT MAJOR DEPRESSIVE DISORDER (HCC): ICD-10-CM

## 2022-05-16 RX ORDER — FLUOXETINE 10 MG/1
CAPSULE ORAL
Qty: 30 CAPSULE | Refills: 3 | Status: SHIPPED | OUTPATIENT
Start: 2022-05-16

## 2022-05-16 NOTE — TELEPHONE ENCOUNTER
Health Maintenance   Topic Date Due    Annual Wellness Visit (AWV)  Never done    HIV screen  Never done    Hepatitis C screen  Never done    Cervical cancer screen  Never done    Low dose CT lung screening  Never done    Colorectal Cancer Screen  02/20/2019    Breast cancer screen  02/06/2020    Shingles vaccine (2 of 2) 06/25/2021    Depression Monitoring  01/23/2022    Lipids  02/11/2023    DTaP/Tdap/Td vaccine (2 - Td or Tdap) 09/20/2027    Pneumococcal 0-64 years Vaccine (3 - PPSV23 or PCV20) 08/08/2030    Flu vaccine  Completed    COVID-19 Vaccine  Completed    Hepatitis A vaccine  Aged Out    Hepatitis B vaccine  Aged Out    Hib vaccine  Aged Out    Meningococcal (ACWY) vaccine  Aged Out             (applicable per patient's age: Cancer Screenings, Depression Screening, Fall Risk Screening, Immunizations)    Hemoglobin A1C (%)   Date Value   01/30/2018 5.3   08/02/2013 5.3   02/14/2013 6.0     LDL Cholesterol (mg/dL)   Date Value   02/11/2022 90     AST (U/L)   Date Value   07/12/2020 18     ALT (U/L)   Date Value   07/12/2020 16     BUN (mg/dL)   Date Value   04/10/2022 9      (goal A1C is < 7)   (goal LDL is <100) need 30-50% reduction from baseline     BP Readings from Last 3 Encounters:   04/10/22 (!) 173/104   01/20/22 (!) 145/92   09/17/21 (!) 153/95    (goal /80)      All Future Testing planned in [facility]:  Lab Frequency Next Occurrence   POCT Fecal Immunochemical Test (FIT) Once 10/12/2021   CONCETTA Digital Screen Bilateral [[id number]] Once 04/07/2022       Next Visit Date:  No future appointments.          Patient Active Problem List:     Heart disease     Chronic back pain     Depression     Hyperlipidemia     CAD, multiple vessel     Asthma     Smoking     Need for vaccination     Syncope     Leg pain     Left hip pain     Chronic cholecystitis     Chest pain     Calculus of gallbladder without cholecystitis without obstruction     History of lumbar fusion     Failed back syndrome     Marijuana use     Abnormal weight loss     Allergic rhinitis     Anxiety state     Chronic midline low back pain with sciatica     Moderate episode of recurrent major depressive disorder (HCC)     Seizure (HCC)     Chronic tension-type headache, intractable     Hypertension     Sinus tachycardia     Acute respiratory failure (HCC)     COPD (chronic obstructive pulmonary disease) (HCC)     COPD exacerbation (HCC)     Acute bronchitis     Headache

## 2022-05-22 DIAGNOSIS — J43.9 PULMONARY EMPHYSEMA, UNSPECIFIED EMPHYSEMA TYPE (HCC): ICD-10-CM

## 2022-05-23 RX ORDER — FLUTICASONE FUROATE AND VILANTEROL TRIFENATATE 100; 25 UG/1; UG/1
POWDER RESPIRATORY (INHALATION)
Qty: 3 EACH | Refills: 1 | Status: SHIPPED | OUTPATIENT
Start: 2022-05-23

## 2022-05-23 NOTE — TELEPHONE ENCOUNTER
Hemoglobin A1C (%)   Date Value   01/30/2018 5.3   08/02/2013 5.3   02/14/2013 6.0             ( goal A1C is < 7)   No results found for: LABMICR  LDL Cholesterol (mg/dL)   Date Value   02/11/2022 90       (goal LDL is <100)   AST (U/L)   Date Value   07/12/2020 18     ALT (U/L)   Date Value   07/12/2020 16     BUN (mg/dL)   Date Value   04/10/2022 9     BP Readings from Last 3 Encounters:   04/10/22 (!) 173/104   01/20/22 (!) 145/92   09/17/21 (!) 153/95          (goal 120/80)        Patient Active Problem List:     Heart disease     Chronic back pain     Depression     Hyperlipidemia     CAD, multiple vessel     Asthma     Smoking     Need for vaccination     Syncope     Leg pain     Left hip pain     Chronic cholecystitis     Chest pain     Calculus of gallbladder without cholecystitis without obstruction     History of lumbar fusion     Failed back syndrome     Marijuana use     Abnormal weight loss     Allergic rhinitis     Anxiety state     Chronic midline low back pain with sciatica     Moderate episode of recurrent major depressive disorder (HCC)     Seizure (HCC)     Chronic tension-type headache, intractable     Hypertension     Sinus tachycardia     Acute respiratory failure (HCC)     COPD (chronic obstructive pulmonary disease) (HCC)     COPD exacerbation (HCC)     Acute bronchitis     Headache      ----Cecilia Henson

## 2022-05-26 DIAGNOSIS — F33.1 MODERATE EPISODE OF RECURRENT MAJOR DEPRESSIVE DISORDER (HCC): ICD-10-CM

## 2022-05-27 RX ORDER — FLUOXETINE 10 MG/1
CAPSULE ORAL
Qty: 30 CAPSULE | Refills: 3 | OUTPATIENT
Start: 2022-05-27

## 2022-06-12 RX ORDER — BACLOFEN 10 MG/1
TABLET ORAL
Qty: 30 TABLET | Refills: 3 | OUTPATIENT
Start: 2022-06-12

## 2022-06-20 RX ORDER — BACLOFEN 10 MG/1
TABLET ORAL
Qty: 30 TABLET | Refills: 3 | Status: SHIPPED | OUTPATIENT
Start: 2022-06-20

## 2022-06-20 NOTE — TELEPHONE ENCOUNTER
Health Maintenance   Topic Date Due    Annual Wellness Visit (AWV)  Never done    HIV screen  Never done    Hepatitis C screen  Never done    Cervical cancer screen  Never done    Low dose CT lung screening  Never done    Colorectal Cancer Screen  02/20/2019    Breast cancer screen  02/06/2020    Shingles vaccine (2 of 2) 06/25/2021    Depression Monitoring  01/23/2022    Lipids  02/11/2023    DTaP/Tdap/Td vaccine (2 - Td or Tdap) 09/20/2027    Pneumococcal 0-64 years Vaccine (3 - PPSV23 or PCV20) 08/08/2030    Flu vaccine  Completed    COVID-19 Vaccine  Completed    Hepatitis A vaccine  Aged Out    Hepatitis B vaccine  Aged Out    Hib vaccine  Aged Out    Meningococcal (ACWY) vaccine  Aged Out             (applicable per patient's age: Cancer Screenings, Depression Screening, Fall Risk Screening, Immunizations)    Hemoglobin A1C (%)   Date Value   01/30/2018 5.3   08/02/2013 5.3   02/14/2013 6.0     LDL Cholesterol (mg/dL)   Date Value   02/11/2022 90     AST (U/L)   Date Value   07/12/2020 18     ALT (U/L)   Date Value   07/12/2020 16     BUN (mg/dL)   Date Value   04/10/2022 9      (goal A1C is < 7)   (goal LDL is <100) need 30-50% reduction from baseline     BP Readings from Last 3 Encounters:   04/10/22 (!) 173/104   01/20/22 (!) 145/92   09/17/21 (!) 153/95    (goal /80)      All Future Testing planned in [facility]:  Lab Frequency Next Occurrence   POCT Fecal Immunochemical Test (FIT) Once 10/12/2021   CONCETTA Digital Screen Bilateral [[id number]] Once 04/07/2022       Next Visit Date:  Future Appointments   Date Time Provider Kaycee Hays   6/23/2022  8:15 AM THOR Mckeon CNP [facility]            Patient Active Problem List:     Heart disease     Chronic back pain     Depression     Hyperlipidemia     CAD, multiple vessel     Asthma     Smoking     Need for vaccination     Syncope     Leg pain     Left hip pain     Chronic cholecystitis     Chest pain     Calculus of

## 2022-06-24 ENCOUNTER — APPOINTMENT (OUTPATIENT)
Dept: CT IMAGING | Age: 57
DRG: 287 | End: 2022-06-24
Payer: MEDICARE

## 2022-06-24 ENCOUNTER — HOSPITAL ENCOUNTER (INPATIENT)
Age: 57
LOS: 3 days | Discharge: HOME | DRG: 287 | End: 2022-06-27
Attending: EMERGENCY MEDICINE | Admitting: EMERGENCY MEDICINE
Payer: MEDICARE

## 2022-06-24 DIAGNOSIS — K52.9 COLITIS: ICD-10-CM

## 2022-06-24 DIAGNOSIS — R55 SYNCOPE AND COLLAPSE: Primary | ICD-10-CM

## 2022-06-24 LAB
ABSOLUTE EOS #: 0.13 K/UL (ref 0–0.44)
ABSOLUTE IMMATURE GRANULOCYTE: <0.03 K/UL (ref 0–0.3)
ABSOLUTE LYMPH #: 2.72 K/UL (ref 1.1–3.7)
ABSOLUTE MONO #: 0.53 K/UL (ref 0.1–1.2)
ALBUMIN SERPL-MCNC: 4.2 G/DL (ref 3.5–5.2)
ALBUMIN/GLOBULIN RATIO: 2 (ref 1–2.5)
ALP BLD-CCNC: 80 U/L (ref 35–104)
ALT SERPL-CCNC: 25 U/L (ref 5–33)
ANION GAP SERPL CALCULATED.3IONS-SCNC: 14 MMOL/L (ref 9–17)
AST SERPL-CCNC: 21 U/L
BASOPHILS # BLD: 1 % (ref 0–2)
BASOPHILS ABSOLUTE: 0.06 K/UL (ref 0–0.2)
BILIRUB SERPL-MCNC: 0.2 MG/DL (ref 0.3–1.2)
BILIRUBIN DIRECT: <0.08 MG/DL
BILIRUBIN, INDIRECT: ABNORMAL MG/DL (ref 0–1)
BUN BLDV-MCNC: 9 MG/DL (ref 6–20)
CALCIUM SERPL-MCNC: 9.3 MG/DL (ref 8.6–10.4)
CHLORIDE BLD-SCNC: 104 MMOL/L (ref 98–107)
CO2: 23 MMOL/L (ref 20–31)
CREAT SERPL-MCNC: 0.63 MG/DL (ref 0.5–0.9)
EOSINOPHILS RELATIVE PERCENT: 2 % (ref 1–4)
GFR AFRICAN AMERICAN: >60 ML/MIN
GFR NON-AFRICAN AMERICAN: >60 ML/MIN
GFR SERPL CREATININE-BSD FRML MDRD: ABNORMAL ML/MIN/{1.73_M2}
GLUCOSE BLD-MCNC: 97 MG/DL (ref 70–99)
HCT VFR BLD CALC: 39.5 % (ref 36.3–47.1)
HEMOGLOBIN: 13.3 G/DL (ref 11.9–15.1)
IMMATURE GRANULOCYTES: 0 %
KEPPRA: 2 UG/ML
LIPASE: 15 U/L (ref 13–60)
LYMPHOCYTES # BLD: 36 % (ref 24–43)
MCH RBC QN AUTO: 32.4 PG (ref 25.2–33.5)
MCHC RBC AUTO-ENTMCNC: 33.7 G/DL (ref 28.4–34.8)
MCV RBC AUTO: 96.1 FL (ref 82.6–102.9)
MONOCYTES # BLD: 7 % (ref 3–12)
NRBC AUTOMATED: 0 PER 100 WBC
PDW BLD-RTO: 13.3 % (ref 11.8–14.4)
PLATELET # BLD: 284 K/UL (ref 138–453)
PMV BLD AUTO: 9.1 FL (ref 8.1–13.5)
POTASSIUM SERPL-SCNC: 3.4 MMOL/L (ref 3.7–5.3)
RBC # BLD: 4.11 M/UL (ref 3.95–5.11)
SARS-COV-2, RAPID: NOT DETECTED
SEG NEUTROPHILS: 54 % (ref 36–65)
SEGMENTED NEUTROPHILS ABSOLUTE COUNT: 4.09 K/UL (ref 1.5–8.1)
SODIUM BLD-SCNC: 141 MMOL/L (ref 135–144)
SPECIMEN DESCRIPTION: NORMAL
TOTAL PROTEIN: 6.3 G/DL (ref 6.4–8.3)
TROPONIN, HIGH SENSITIVITY: <6 NG/L (ref 0–14)
WBC # BLD: 7.6 K/UL (ref 3.5–11.3)

## 2022-06-24 PROCEDURE — 1200000000 HC SEMI PRIVATE

## 2022-06-24 PROCEDURE — 87635 SARS-COV-2 COVID-19 AMP PRB: CPT

## 2022-06-24 PROCEDURE — 99285 EMERGENCY DEPT VISIT HI MDM: CPT

## 2022-06-24 PROCEDURE — 85025 COMPLETE CBC W/AUTO DIFF WBC: CPT

## 2022-06-24 PROCEDURE — 96374 THER/PROPH/DIAG INJ IV PUSH: CPT

## 2022-06-24 PROCEDURE — G0378 HOSPITAL OBSERVATION PER HR: HCPCS

## 2022-06-24 PROCEDURE — 74177 CT ABD & PELVIS W/CONTRAST: CPT

## 2022-06-24 PROCEDURE — 84484 ASSAY OF TROPONIN QUANT: CPT

## 2022-06-24 PROCEDURE — 96375 TX/PRO/DX INJ NEW DRUG ADDON: CPT

## 2022-06-24 PROCEDURE — 83690 ASSAY OF LIPASE: CPT

## 2022-06-24 PROCEDURE — 80177 DRUG SCRN QUAN LEVETIRACETAM: CPT

## 2022-06-24 PROCEDURE — 6360000004 HC RX CONTRAST MEDICATION: Performed by: EMERGENCY MEDICINE

## 2022-06-24 PROCEDURE — 80048 BASIC METABOLIC PNL TOTAL CA: CPT

## 2022-06-24 PROCEDURE — 96376 TX/PRO/DX INJ SAME DRUG ADON: CPT

## 2022-06-24 PROCEDURE — 80076 HEPATIC FUNCTION PANEL: CPT

## 2022-06-24 PROCEDURE — 6360000002 HC RX W HCPCS: Performed by: STUDENT IN AN ORGANIZED HEALTH CARE EDUCATION/TRAINING PROGRAM

## 2022-06-24 PROCEDURE — 6360000002 HC RX W HCPCS: Performed by: EMERGENCY MEDICINE

## 2022-06-24 PROCEDURE — 93005 ELECTROCARDIOGRAM TRACING: CPT | Performed by: EMERGENCY MEDICINE

## 2022-06-24 RX ORDER — ATORVASTATIN CALCIUM 80 MG/1
40 TABLET, FILM COATED ORAL NIGHTLY
Status: CANCELLED | OUTPATIENT
Start: 2022-06-24

## 2022-06-24 RX ORDER — MORPHINE SULFATE 4 MG/ML
2 INJECTION, SOLUTION INTRAMUSCULAR; INTRAVENOUS EVERY 4 HOURS PRN
Status: DISCONTINUED | OUTPATIENT
Start: 2022-06-24 | End: 2022-06-27 | Stop reason: HOSPADM

## 2022-06-24 RX ORDER — ASPIRIN 81 MG/1
81 TABLET ORAL DAILY
Status: CANCELLED | OUTPATIENT
Start: 2022-06-24

## 2022-06-24 RX ORDER — MORPHINE SULFATE 4 MG/ML
4 INJECTION, SOLUTION INTRAMUSCULAR; INTRAVENOUS ONCE
Status: COMPLETED | OUTPATIENT
Start: 2022-06-24 | End: 2022-06-24

## 2022-06-24 RX ORDER — ONDANSETRON 2 MG/ML
4 INJECTION INTRAMUSCULAR; INTRAVENOUS ONCE
Status: COMPLETED | OUTPATIENT
Start: 2022-06-24 | End: 2022-06-24

## 2022-06-24 RX ADMIN — IOPAMIDOL 75 ML: 755 INJECTION, SOLUTION INTRAVENOUS at 18:33

## 2022-06-24 RX ADMIN — ONDANSETRON 4 MG: 2 INJECTION INTRAMUSCULAR; INTRAVENOUS at 16:26

## 2022-06-24 RX ADMIN — MORPHINE SULFATE 4 MG: 4 INJECTION INTRAVENOUS at 16:26

## 2022-06-24 RX ADMIN — MORPHINE SULFATE 2 MG: 4 INJECTION, SOLUTION INTRAMUSCULAR; INTRAVENOUS at 22:56

## 2022-06-24 ASSESSMENT — ENCOUNTER SYMPTOMS
BACK PAIN: 0
NAUSEA: 0
COUGH: 0
RHINORRHEA: 0
SORE THROAT: 0
DIARRHEA: 0
ABDOMINAL PAIN: 1
VOMITING: 0
SHORTNESS OF BREATH: 0
COLOR CHANGE: 0

## 2022-06-24 ASSESSMENT — PAIN SCALES - GENERAL
PAINLEVEL_OUTOF10: 7
PAINLEVEL_OUTOF10: 3
PAINLEVEL_OUTOF10: 0
PAINLEVEL_OUTOF10: 6

## 2022-06-24 ASSESSMENT — PAIN DESCRIPTION - ORIENTATION: ORIENTATION: RIGHT;LEFT

## 2022-06-24 ASSESSMENT — PAIN DESCRIPTION - LOCATION: LOCATION: ABDOMEN

## 2022-06-24 ASSESSMENT — PAIN - FUNCTIONAL ASSESSMENT: PAIN_FUNCTIONAL_ASSESSMENT: 0-10

## 2022-06-24 ASSESSMENT — PAIN DESCRIPTION - DESCRIPTORS: DESCRIPTORS: ACHING

## 2022-06-24 NOTE — FLOWSHEET NOTE
[address] [facility] [address]  PROGRESS NOTE    Shift date: 06/24/22  Shift day: Friday   Shift # 1    Room # 17/17   Name: Sameer Gan                Buddhist:    Place of Synagogue:     Referral: Routine Visit    Admit Date & Time: 6/24/2022  3:47 PM    Assessment:  Sameer Gan is a 64 y.o. female       Intervention:  Writer introduced self and title as  Writer offered space for patient  to express feelings, needs, and concerns and provided a ministry presence. Patient engaged in conversation regarding her health and its impact.  was a ministry of presence to patient and offered prayer for spiritual comfort/support. Outcome:  Patient accepted gratitude for visit and accepted prayer. Plan:  Chaplains will remain available to offer spiritual and emotional support as needed.       Electronically signed by Angi Grover, on 6/24/2022 at 6:00 PM.  [facility]  [phone]

## 2022-06-24 NOTE — PROGRESS NOTES
[address] [facility] [address]  PROGRESS NOTE    Shift date: 06/24/202  Shift day: Friday   Shift # 2    Room # 17/17   Name: Juan Tejada                Worship: [address] of Zoroastrianism: Phyllis Villarreal    Referral: family requests food    Admit Date & Time: 6/24/2022  3:47 PM    Assessment:  Juan Tejada is a 64 y.o. female in the hospital because he may have had seizure. Upon entering the room writer observes pt. Resting in bed with significant other at bedside. Pt. Is wishing to go home or be served dinner. She wants to see ED supervisor. Intervention:  Writer introduced self and title as  Writer offered space for patient and significant other  to express feelings, needs, and concerns and provided a ministry presence.  spoke with RN who will refer physician. Outcome:  Pt. Calm and open to discussion with physician. Plan:  Chaplains will remain available to offer spiritual and emotional support as needed.       Electronically signed by Jw Lopez on 6/24/2022 at 7:37 PM.  Sharif Pete  [phone]

## 2022-06-24 NOTE — ED NOTES
Pt. Resting on stretcher, eyes open, RR even and non-labored  Pt.  Updated on POC  Will continue to monitor        Geeta Campoverde RN  06/24/22 4905

## 2022-06-25 ENCOUNTER — APPOINTMENT (OUTPATIENT)
Dept: NUCLEAR MEDICINE | Age: 57
DRG: 287 | End: 2022-06-25
Payer: MEDICARE

## 2022-06-25 LAB
-: ABNORMAL
BILIRUBIN URINE: NEGATIVE
COLOR: YELLOW
EKG ATRIAL RATE: 79 BPM
EKG ATRIAL RATE: 83 BPM
EKG P AXIS: 21 DEGREES
EKG P AXIS: 51 DEGREES
EKG P-R INTERVAL: 134 MS
EKG P-R INTERVAL: 144 MS
EKG Q-T INTERVAL: 378 MS
EKG Q-T INTERVAL: 384 MS
EKG QRS DURATION: 94 MS
EKG QRS DURATION: 94 MS
EKG QTC CALCULATION (BAZETT): 440 MS
EKG QTC CALCULATION (BAZETT): 444 MS
EKG R AXIS: 11 DEGREES
EKG R AXIS: 55 DEGREES
EKG T AXIS: 25 DEGREES
EKG T AXIS: 54 DEGREES
EKG VENTRICULAR RATE: 79 BPM
EKG VENTRICULAR RATE: 83 BPM
EPITHELIAL CELLS UA: ABNORMAL /HPF (ref 0–5)
GLUCOSE URINE: NEGATIVE
KETONES, URINE: NEGATIVE
LEUKOCYTE ESTERASE, URINE: NEGATIVE
LV EF: 51 %
LVEF MODALITY: NORMAL
NITRITE, URINE: NEGATIVE
PH UA: 7 (ref 5–8)
PROTEIN UA: NEGATIVE
RBC UA: ABNORMAL /HPF (ref 0–4)
SPECIFIC GRAVITY UA: 1.04 (ref 1–1.03)
TURBIDITY: CLEAR
URINE HGB: ABNORMAL
UROBILINOGEN, URINE: NORMAL
WBC UA: ABNORMAL /HPF (ref 0–5)

## 2022-06-25 PROCEDURE — 6370000000 HC RX 637 (ALT 250 FOR IP)

## 2022-06-25 PROCEDURE — 2580000003 HC RX 258: Performed by: EMERGENCY MEDICINE

## 2022-06-25 PROCEDURE — 6360000002 HC RX W HCPCS

## 2022-06-25 PROCEDURE — 1200000000 HC SEMI PRIVATE

## 2022-06-25 PROCEDURE — 93005 ELECTROCARDIOGRAM TRACING: CPT | Performed by: EMERGENCY MEDICINE

## 2022-06-25 PROCEDURE — A9500 TC99M SESTAMIBI: HCPCS | Performed by: EMERGENCY MEDICINE

## 2022-06-25 PROCEDURE — 2580000003 HC RX 258: Performed by: STUDENT IN AN ORGANIZED HEALTH CARE EDUCATION/TRAINING PROGRAM

## 2022-06-25 PROCEDURE — 6370000000 HC RX 637 (ALT 250 FOR IP): Performed by: EMERGENCY MEDICINE

## 2022-06-25 PROCEDURE — 96375 TX/PRO/DX INJ NEW DRUG ADDON: CPT

## 2022-06-25 PROCEDURE — 96376 TX/PRO/DX INJ SAME DRUG ADON: CPT

## 2022-06-25 PROCEDURE — G0378 HOSPITAL OBSERVATION PER HR: HCPCS

## 2022-06-25 PROCEDURE — 87324 CLOSTRIDIUM AG IA: CPT

## 2022-06-25 PROCEDURE — 6360000002 HC RX W HCPCS: Performed by: EMERGENCY MEDICINE

## 2022-06-25 PROCEDURE — 94640 AIRWAY INHALATION TREATMENT: CPT

## 2022-06-25 PROCEDURE — 87449 NOS EACH ORGANISM AG IA: CPT

## 2022-06-25 PROCEDURE — 81001 URINALYSIS AUTO W/SCOPE: CPT

## 2022-06-25 PROCEDURE — 78452 HT MUSCLE IMAGE SPECT MULT: CPT

## 2022-06-25 PROCEDURE — 2580000003 HC RX 258

## 2022-06-25 PROCEDURE — 96372 THER/PROPH/DIAG INJ SC/IM: CPT

## 2022-06-25 PROCEDURE — 6360000002 HC RX W HCPCS: Performed by: STUDENT IN AN ORGANIZED HEALTH CARE EDUCATION/TRAINING PROGRAM

## 2022-06-25 PROCEDURE — 93017 CV STRESS TEST TRACING ONLY: CPT | Performed by: NURSE PRACTITIONER

## 2022-06-25 PROCEDURE — 3430000000 HC RX DIAGNOSTIC RADIOPHARMACEUTICAL: Performed by: EMERGENCY MEDICINE

## 2022-06-25 RX ORDER — ONDANSETRON 4 MG/1
4 TABLET, ORALLY DISINTEGRATING ORAL EVERY 8 HOURS PRN
Status: DISCONTINUED | OUTPATIENT
Start: 2022-06-25 | End: 2022-06-27 | Stop reason: HOSPADM

## 2022-06-25 RX ORDER — ATORVASTATIN CALCIUM 40 MG/1
40 TABLET, FILM COATED ORAL NIGHTLY
Status: DISCONTINUED | OUTPATIENT
Start: 2022-06-25 | End: 2022-06-27 | Stop reason: HOSPADM

## 2022-06-25 RX ORDER — BUPROPION HYDROCHLORIDE 150 MG/1
150 TABLET, EXTENDED RELEASE ORAL DAILY
Status: DISCONTINUED | OUTPATIENT
Start: 2022-06-25 | End: 2022-06-27 | Stop reason: HOSPADM

## 2022-06-25 RX ORDER — GUAIFENESIN 600 MG/1
600 TABLET, EXTENDED RELEASE ORAL 2 TIMES DAILY
Status: DISCONTINUED | OUTPATIENT
Start: 2022-06-25 | End: 2022-06-27 | Stop reason: HOSPADM

## 2022-06-25 RX ORDER — SODIUM CHLORIDE 9 MG/ML
INJECTION, SOLUTION INTRAVENOUS CONTINUOUS
Status: DISCONTINUED | OUTPATIENT
Start: 2022-06-25 | End: 2022-06-26

## 2022-06-25 RX ORDER — POTASSIUM CHLORIDE 7.45 MG/ML
10 INJECTION INTRAVENOUS PRN
Status: DISCONTINUED | OUTPATIENT
Start: 2022-06-25 | End: 2022-06-27 | Stop reason: HOSPADM

## 2022-06-25 RX ORDER — NITROGLYCERIN 0.4 MG/1
0.4 TABLET SUBLINGUAL EVERY 5 MIN PRN
Status: DISCONTINUED | OUTPATIENT
Start: 2022-06-25 | End: 2022-06-25

## 2022-06-25 RX ORDER — METOPROLOL TARTRATE 5 MG/5ML
5 INJECTION INTRAVENOUS EVERY 5 MIN PRN
Status: DISCONTINUED | OUTPATIENT
Start: 2022-06-25 | End: 2022-06-25

## 2022-06-25 RX ORDER — POLYETHYLENE GLYCOL 3350 17 G/17G
17 POWDER, FOR SOLUTION ORAL DAILY PRN
Status: DISCONTINUED | OUTPATIENT
Start: 2022-06-25 | End: 2022-06-27 | Stop reason: HOSPADM

## 2022-06-25 RX ORDER — FLUOXETINE 10 MG/1
10 CAPSULE ORAL DAILY
Status: DISCONTINUED | OUTPATIENT
Start: 2022-06-25 | End: 2022-06-27

## 2022-06-25 RX ORDER — SODIUM CHLORIDE 9 MG/ML
INJECTION, SOLUTION INTRAVENOUS PRN
Status: DISCONTINUED | OUTPATIENT
Start: 2022-06-25 | End: 2022-06-27 | Stop reason: HOSPADM

## 2022-06-25 RX ORDER — KETOROLAC TROMETHAMINE 30 MG/ML
30 INJECTION, SOLUTION INTRAMUSCULAR; INTRAVENOUS ONCE
Status: COMPLETED | OUTPATIENT
Start: 2022-06-25 | End: 2022-06-25

## 2022-06-25 RX ORDER — ATROPINE SULFATE 0.1 MG/ML
0.5 INJECTION INTRAVENOUS EVERY 5 MIN PRN
Status: DISCONTINUED | OUTPATIENT
Start: 2022-06-25 | End: 2022-06-25

## 2022-06-25 RX ORDER — ASPIRIN 81 MG/1
81 TABLET ORAL DAILY
Status: DISCONTINUED | OUTPATIENT
Start: 2022-06-25 | End: 2022-06-27 | Stop reason: HOSPADM

## 2022-06-25 RX ORDER — SODIUM CHLORIDE 0.9 % (FLUSH) 0.9 %
5-40 SYRINGE (ML) INJECTION EVERY 12 HOURS SCHEDULED
Status: DISCONTINUED | OUTPATIENT
Start: 2022-06-25 | End: 2022-06-27 | Stop reason: HOSPADM

## 2022-06-25 RX ORDER — SODIUM CHLORIDE 0.9 % (FLUSH) 0.9 %
5-40 SYRINGE (ML) INJECTION PRN
Status: DISCONTINUED | OUTPATIENT
Start: 2022-06-25 | End: 2022-06-25

## 2022-06-25 RX ORDER — DICYCLOMINE HYDROCHLORIDE 10 MG/ML
20 INJECTION INTRAMUSCULAR ONCE
Status: COMPLETED | OUTPATIENT
Start: 2022-06-25 | End: 2022-06-25

## 2022-06-25 RX ORDER — POTASSIUM BICARBONATE 25 MEQ/1
25 TABLET, EFFERVESCENT ORAL 2 TIMES DAILY
Status: DISCONTINUED | OUTPATIENT
Start: 2022-06-25 | End: 2022-06-25

## 2022-06-25 RX ORDER — ALBUTEROL SULFATE 2.5 MG/3ML
2.5 SOLUTION RESPIRATORY (INHALATION) EVERY 6 HOURS PRN
Status: DISCONTINUED | OUTPATIENT
Start: 2022-06-25 | End: 2022-06-27 | Stop reason: HOSPADM

## 2022-06-25 RX ORDER — ACETAMINOPHEN 325 MG/1
650 TABLET ORAL EVERY 6 HOURS PRN
Status: DISCONTINUED | OUTPATIENT
Start: 2022-06-25 | End: 2022-06-27 | Stop reason: HOSPADM

## 2022-06-25 RX ORDER — ONDANSETRON 2 MG/ML
4 INJECTION INTRAMUSCULAR; INTRAVENOUS EVERY 6 HOURS PRN
Status: DISCONTINUED | OUTPATIENT
Start: 2022-06-25 | End: 2022-06-27 | Stop reason: HOSPADM

## 2022-06-25 RX ORDER — POTASSIUM CHLORIDE 20 MEQ/1
40 TABLET, EXTENDED RELEASE ORAL PRN
Status: DISCONTINUED | OUTPATIENT
Start: 2022-06-25 | End: 2022-06-27 | Stop reason: HOSPADM

## 2022-06-25 RX ORDER — SODIUM CHLORIDE 0.9 % (FLUSH) 0.9 %
10 SYRINGE (ML) INJECTION PRN
Status: DISCONTINUED | OUTPATIENT
Start: 2022-06-25 | End: 2022-06-27 | Stop reason: HOSPADM

## 2022-06-25 RX ORDER — BACLOFEN 10 MG/1
10 TABLET ORAL NIGHTLY
Status: DISCONTINUED | OUTPATIENT
Start: 2022-06-25 | End: 2022-06-27 | Stop reason: HOSPADM

## 2022-06-25 RX ORDER — ALBUTEROL SULFATE 90 UG/1
2 AEROSOL, METERED RESPIRATORY (INHALATION) PRN
Status: DISCONTINUED | OUTPATIENT
Start: 2022-06-25 | End: 2022-06-25

## 2022-06-25 RX ORDER — AMINOPHYLLINE DIHYDRATE 25 MG/ML
50 INJECTION, SOLUTION INTRAVENOUS PRN
Status: DISCONTINUED | OUTPATIENT
Start: 2022-06-25 | End: 2022-06-25

## 2022-06-25 RX ORDER — ACETAMINOPHEN 650 MG/1
650 SUPPOSITORY RECTAL EVERY 6 HOURS PRN
Status: DISCONTINUED | OUTPATIENT
Start: 2022-06-25 | End: 2022-06-27 | Stop reason: HOSPADM

## 2022-06-25 RX ORDER — SODIUM CHLORIDE 9 MG/ML
500 INJECTION, SOLUTION INTRAVENOUS CONTINUOUS PRN
Status: DISCONTINUED | OUTPATIENT
Start: 2022-06-25 | End: 2022-06-25

## 2022-06-25 RX ORDER — SODIUM CHLORIDE 0.9 % (FLUSH) 0.9 %
5-40 SYRINGE (ML) INJECTION PRN
Status: DISCONTINUED | OUTPATIENT
Start: 2022-06-25 | End: 2022-06-27 | Stop reason: HOSPADM

## 2022-06-25 RX ORDER — BUDESONIDE AND FORMOTEROL FUMARATE DIHYDRATE 80; 4.5 UG/1; UG/1
2 AEROSOL RESPIRATORY (INHALATION) 2 TIMES DAILY
Status: DISCONTINUED | OUTPATIENT
Start: 2022-06-25 | End: 2022-06-27 | Stop reason: HOSPADM

## 2022-06-25 RX ADMIN — DICYCLOMINE HYDROCHLORIDE 20 MG: 20 INJECTION INTRAMUSCULAR at 14:30

## 2022-06-25 RX ADMIN — MORPHINE SULFATE 2 MG: 4 INJECTION, SOLUTION INTRAMUSCULAR; INTRAVENOUS at 20:09

## 2022-06-25 RX ADMIN — BUDESONIDE AND FORMOTEROL FUMARATE DIHYDRATE 2 PUFF: 80; 4.5 AEROSOL RESPIRATORY (INHALATION) at 08:40

## 2022-06-25 RX ADMIN — ACETAMINOPHEN 650 MG: 325 TABLET ORAL at 09:00

## 2022-06-25 RX ADMIN — DESMOPRESSIN ACETATE 40 MG: 0.2 TABLET ORAL at 20:23

## 2022-06-25 RX ADMIN — POTASSIUM BICARBONATE 40 MEQ: 782 TABLET, EFFERVESCENT ORAL at 10:16

## 2022-06-25 RX ADMIN — REGADENOSON 0.4 MG: 0.08 INJECTION, SOLUTION INTRAVENOUS at 11:09

## 2022-06-25 RX ADMIN — SODIUM CHLORIDE, PRESERVATIVE FREE 10 ML: 5 INJECTION INTRAVENOUS at 10:07

## 2022-06-25 RX ADMIN — TETRAKIS(2-METHOXYISOBUTYLISOCYANIDE)COPPER(I) TETRAFLUOROBORATE 53 MILLICURIE: 1 INJECTION, POWDER, LYOPHILIZED, FOR SOLUTION INTRAVENOUS at 12:10

## 2022-06-25 RX ADMIN — LEVETIRACETAM 750 MG: 500 TABLET, FILM COATED ORAL at 01:12

## 2022-06-25 RX ADMIN — SODIUM CHLORIDE, PRESERVATIVE FREE 10 ML: 5 INJECTION INTRAVENOUS at 10:50

## 2022-06-25 RX ADMIN — KETOROLAC TROMETHAMINE 30 MG: 30 INJECTION, SOLUTION INTRAMUSCULAR; INTRAVENOUS at 10:07

## 2022-06-25 RX ADMIN — BACLOFEN 10 MG: 10 TABLET ORAL at 20:50

## 2022-06-25 RX ADMIN — SODIUM CHLORIDE, PRESERVATIVE FREE 10 ML: 5 INJECTION INTRAVENOUS at 13:50

## 2022-06-25 RX ADMIN — ACETAMINOPHEN 650 MG: 325 TABLET ORAL at 17:40

## 2022-06-25 RX ADMIN — SODIUM CHLORIDE, PRESERVATIVE FREE 10 ML: 5 INJECTION INTRAVENOUS at 12:10

## 2022-06-25 RX ADMIN — LEVETIRACETAM 750 MG: 500 TABLET, FILM COATED ORAL at 20:23

## 2022-06-25 RX ADMIN — GUAIFENESIN 600 MG: 600 TABLET, EXTENDED RELEASE ORAL at 20:23

## 2022-06-25 RX ADMIN — DESMOPRESSIN ACETATE 40 MG: 0.2 TABLET ORAL at 01:11

## 2022-06-25 RX ADMIN — TETRAKIS(2-METHOXYISOBUTYLISOCYANIDE)COPPER(I) TETRAFLUOROBORATE 14.3 MILLICURIE: 1 INJECTION, POWDER, LYOPHILIZED, FOR SOLUTION INTRAVENOUS at 11:10

## 2022-06-25 RX ADMIN — SODIUM CHLORIDE, PRESERVATIVE FREE 10 ML: 5 INJECTION INTRAVENOUS at 11:09

## 2022-06-25 RX ADMIN — Medication 81 MG: at 09:00

## 2022-06-25 RX ADMIN — SODIUM CHLORIDE, PRESERVATIVE FREE 10 ML: 5 INJECTION INTRAVENOUS at 09:00

## 2022-06-25 RX ADMIN — BUDESONIDE AND FORMOTEROL FUMARATE DIHYDRATE 2 PUFF: 80; 4.5 AEROSOL RESPIRATORY (INHALATION) at 20:29

## 2022-06-25 RX ADMIN — ONDANSETRON 4 MG: 2 INJECTION INTRAMUSCULAR; INTRAVENOUS at 13:49

## 2022-06-25 RX ADMIN — SODIUM CHLORIDE: 9 INJECTION, SOLUTION INTRAVENOUS at 14:29

## 2022-06-25 RX ADMIN — SODIUM CHLORIDE, PRESERVATIVE FREE 10 ML: 5 INJECTION INTRAVENOUS at 20:22

## 2022-06-25 RX ADMIN — ALBUTEROL SULFATE 2.5 MG: 2.5 SOLUTION RESPIRATORY (INHALATION) at 14:59

## 2022-06-25 RX ADMIN — POTASSIUM BICARBONATE 20 MEQ: 782 TABLET, EFFERVESCENT ORAL at 21:44

## 2022-06-25 RX ADMIN — GUAIFENESIN 600 MG: 600 TABLET, EXTENDED RELEASE ORAL at 10:16

## 2022-06-25 RX ADMIN — SODIUM CHLORIDE, PRESERVATIVE FREE 10 ML: 5 INJECTION INTRAVENOUS at 11:10

## 2022-06-25 RX ADMIN — BUPROPION HYDROCHLORIDE 150 MG: 150 TABLET, EXTENDED RELEASE ORAL at 08:59

## 2022-06-25 ASSESSMENT — PAIN DESCRIPTION - LOCATION
LOCATION: HEAD
LOCATION: ABDOMEN
LOCATION: HEAD;CHEST
LOCATION: ABDOMEN;HEAD
LOCATION: HEAD

## 2022-06-25 ASSESSMENT — PAIN - FUNCTIONAL ASSESSMENT
PAIN_FUNCTIONAL_ASSESSMENT: PREVENTS OR INTERFERES SOME ACTIVE ACTIVITIES AND ADLS
PAIN_FUNCTIONAL_ASSESSMENT: ACTIVITIES ARE NOT PREVENTED

## 2022-06-25 ASSESSMENT — PAIN DESCRIPTION - ORIENTATION
ORIENTATION: LEFT;RIGHT;LOWER
ORIENTATION: MID;ANTERIOR
ORIENTATION: ANTERIOR;MID
ORIENTATION: RIGHT;LEFT;ANTERIOR
ORIENTATION: ANTERIOR

## 2022-06-25 ASSESSMENT — PAIN DESCRIPTION - DESCRIPTORS
DESCRIPTORS: CRAMPING
DESCRIPTORS: PRESSURE;SQUEEZING
DESCRIPTORS: DISCOMFORT;DULL;OTHER (COMMENT)
DESCRIPTORS: ACHING;PRESSURE
DESCRIPTORS: ACHING;CRAMPING;DULL

## 2022-06-25 ASSESSMENT — PAIN SCALES - WONG BAKER
WONGBAKER_NUMERICALRESPONSE: 0

## 2022-06-25 ASSESSMENT — PAIN SCALES - GENERAL
PAINLEVEL_OUTOF10: 6
PAINLEVEL_OUTOF10: 6
PAINLEVEL_OUTOF10: 2
PAINLEVEL_OUTOF10: 8
PAINLEVEL_OUTOF10: 0
PAINLEVEL_OUTOF10: 7
PAINLEVEL_OUTOF10: 7
PAINLEVEL_OUTOF10: 0
PAINLEVEL_OUTOF10: 6

## 2022-06-25 ASSESSMENT — PAIN DESCRIPTION - ONSET: ONSET: SUDDEN

## 2022-06-25 ASSESSMENT — PAIN DESCRIPTION - PAIN TYPE: TYPE: ACUTE PAIN

## 2022-06-25 ASSESSMENT — PAIN DESCRIPTION - FREQUENCY: FREQUENCY: CONTINUOUS

## 2022-06-25 NOTE — CARE COORDINATION
06/25/22 1442   Service Assessment   Patient Orientation Alert and Oriented   History Provided By Patient   Support Systems Spouse/Significant Other   PCP Verified by CM Yes   Last Visit to PCP Within last 6 months   Prior Functional Level Independent in ADLs/IADLs   Current Functional Level Independent in ADLs/IADLs   Can patient return to prior living arrangement Yes   Ability to make needs known: Good   Social/Functional History   Lives With Significant other   Type of [address] One [address]   Home Access Stairs to enter without rails   ADL Assistance Independent   Homemaking Assistance Independent   Ambulation Assistance Independent   Transfer Assistance Independent   Discharge Planning   [facility] [address] [facility] Other (Comment)  (neb)   Potential Assistance Purchasing Medications No   Meds-to-Beds: Does the patient want to have any new prescriptions delivered to bedside prior to discharge?  Yes   Patient expects to be discharged to: Apartment   One/Two Story Residence Other (comment)  (2nd floor duplex)   # of Interior Steps 20   Services At/After Discharge   Mode of Transport at Discharge Self

## 2022-06-25 NOTE — PROCEDURES
[address]                  [facility] [address]                              CARDIAC STRESS TEST    PATIENT NAME: Radha Chaparro                      :        1965  MED REC NO:   [id number]                             ROOM:       0361  ACCOUNT NO:   [de-identified]                           ADMIT DATE: 2022  PROVIDER:     Joann Kumar MD    DATE OF STUDY:  2022    ORDERING PROVIDER: Dr. Columba Guan PROVIDER: EFRAIN Ramon    INTERPRETING PHYSICIAN: Dr. Ailyn Kessler:    Indication: Syncope    Procedure explained and consent signed. Medications: Lexiscan, 0.4 mg  Resting Heart rate: 77 bpm  Resting blood pressure:  127/82 mm/Hg  Infusion heart rate:  111 bpm  Infusion blood pressure:  163/84 mm/Hg  Resting EKG: Normal  Stress heart response: Normal Response  Stress BP response: Appropriate  Stress EKG(S): No changes seen  Chest discomfort: 4/10 chest pressure  Ischemic EKG changes: None    IMPRESSION:  Electrocardiographically Negative Lexiscan stress study. Radio-isotope  results to follow from the department of Nuclear Medicine.              Nevaeh Dodd MD    D: 2022 14:11:55       T: 2022 14:12:42     /[id number]  Job#: [id number]     Doc#: Unknown    CC:

## 2022-06-25 NOTE — ED NOTES
ED to inpatient nurses report    Chief Complaint   Patient presents with    Abdominal Pain     pt. was having abdominal pain, having bowel movement and near snycope while defecating       Present to ED from home  LOC: alert and orientated to name, place, date  Vital signs   Vitals:    06/24/22 2033 06/24/22 2116 06/24/22 2131 06/24/22 2146   BP: 99/77 111/62 113/80 (!) 117/92   Pulse: 80 75 76 78   Resp: 13 13 11 11   Temp:       TempSrc:       SpO2: 96% 96% 95% 96%   Weight:          Oxygen Baseline RA    Current needs required RA  LDAs:   Peripheral IV 06/24/22 Right Antecubital (Active)   Site Assessment Clean, dry & intact 06/24/22 1552   Phlebitis Assessment No symptoms 06/24/22 1552   Infiltration Assessment 0 06/24/22 1552     Mobility: Independent  Pending ED orders:   Present condition: stable  Code Status: FUll   Consults:  []  Hospitalist  Completed  [] yes [] no  []  Medicine  Completed  [] yes [] No  []  Cardiology  Completed  [] yes [] No  []  GI   Completed  [] yes [] No  []  Neurology  Completed  [] yes [] No  []  Nephrology Completed  [] yes [] No  []  Vascular  Completed  [] yes [] No   []  Surgery  Completed  [] yes [] No   []  Urology  Completed  [] yes [] No   []  Plastics  Completed  [] yes [] No   []  ENT  Completed  [] yes [] No   []  Other   Completed  [] yes [] No  Pertinent event(s)   Pertinent event(s)   Electronically signed by Samantha Browne RN on 6/24/2022 at 10:39 PM     Hortencia Franklin RN  06/24/22 6137

## 2022-06-25 NOTE — H&P
[address] [facility]  CDU / OBSERVATION eNCOUnter  Resident Note     Pt Name: Chito Wahl  MRN: [id number]  [place] 1965  Date of evaluation: 6/25/22  Patient's PCP is :  THOR Martinez [address]       Chief Complaint   Patient presents with    Abdominal Pain     pt. was having abdominal pain, having bowel movement and near snycope while defecating          HISTORY OF PRESENT ILLNESS    Chito Wahl is a 64 y.o. female who presents to ED with complaints of abdominal pain and near syncope when defecating. Which she felt lightheaded and thinks she may have briefly passed out. Remained on the toilet and never fell to the ground. Significant other called 911. Does note some pain to left lower abdomen. Does have history of seizures and has been taking Keppra. Notes that significant other did see her shaking when she was not responsive. EKG nonacute. CT consistent with mild colon wall thickening possibly consistent with colitis. Placed in observation unit for cardiology evaluation. Noted minimal headache this morning. No numbness, weakness, tingling. No further episodes of syncope overnight. Was n.p.o. prior to midnight. Abdominal pain improving. Has been ambulatory without difficulty. Does note nausea prior to episode.     Location/Symptom: Syncope  Timing/Onset: Yesterday  Provocation: Bowel movement  Quality: N/A  Radiation: None  Timing/Duration: Improved  Modifying Factors: None    REVIEW OF SYSTEMS       General ROS - No fevers, No malaise   Ophthalmic ROS - No discharge, No changes in vision  ENT ROS -  No sore throat, No rhinorrhea,   Respiratory ROS - no shortness of breath, no cough, no  wheezing  Cardiovascular ROS - No chest pain, no dyspnea on exertion  Gastrointestinal ROS - No abdominal pain, no nausea or vomiting, no change in bowel habits, no black or bloody stools  Genito-Urinary ROS - No dysuria, trouble voiding, or hematuria  Musculoskeletal ROS - No myalgias, No arthalgias  Neurological ROS - No headache, no dizziness/lightheadedness, No focal weakness, no loss of sensation, + syncope  Dermatological ROS - No lesions, No rash     (PQRS) Advance directives on face sheet per hospital policy. No change unless specifically mentioned in chart    PAST MEDICAL HISTORY    has a past medical history of Acute MI ([facility] [address] Anxiety, Chronic back pain, COPD (chronic obstructive pulmonary disease) ([facility] [address] Depression, Heart disease, Hyperlipidemia, Hypertension, and MRSA (methicillin resistant staph aureus) culture positive. I have reviewed the past medical history with the patient and it is pertinent to this complaint. SURGICAL HISTORY      has a past surgical history that includes Tubal ligation; Tonsillectomy; Cholecystectomy, laparoscopic (11/10/2017); Cholecystectomy, laparoscopic (N/A, 11/10/2017); and back surgery. I have reviewed and agree with Surgical History entered and it is pertinent to this complaint. CURRENT MEDICATIONS     aspirin EC tablet 81 mg, Daily  atorvastatin (LIPITOR) tablet 40 mg, Nightly  budesonide-formoterol (SYMBICORT) 80-4.5 MCG/ACT inhaler 2 puff, BID  buPROPion [facility]) extended release tablet 150 mg, Daily  FLUoxetine (PROZAC) capsule 10 mg, Daily  levETIRAcetam (KEPPRA) tablet 750 mg, Nightly  sodium chloride flush 0.9 % injection 5-40 mL, 2 times per day  sodium chloride flush 0.9 % injection 5-40 mL, PRN  0.9 % sodium chloride infusion, PRN  ondansetron (ZOFRAN-ODT) disintegrating tablet 4 mg, Q8H PRN   Or  ondansetron (ZOFRAN) injection 4 mg, Q6H PRN  polyethylene glycol (GLYCOLAX) packet 17 g, Daily PRN  acetaminophen (TYLENOL) tablet 650 mg, Q6H PRN   Or  acetaminophen (TYLENOL) suppository 650 mg, Q6H PRN  morphine injection 2 mg, Q4H PRN        All medication charted and reviewed. ALLERGIES     is allergic to sulfa antibiotics. FAMILY HISTORY     She indicated that her mother is alive.  She indicated that her father is . She indicated that her brother is alive. family history includes Heart Disease in her father; High Blood Pressure in her father; High Cholesterol in her father; Other in her brother and mother. The patient denies any pertinent family history. I have reviewed and agree with the family history entered. I have reviewed the Family History and it is not significant to the case    SOCIAL HISTORY      reports that she has been smoking cigarettes. She has a 40.00 pack-year smoking history. She has never used smokeless tobacco. She reports current drug use. Drug: Marijuana Jai Platt). She reports that she does not drink alcohol. I have reviewed and agree with all Social.  There are no concerns for substance abuse/use. PHYSICAL EXAM     INITIAL VITALS:  height is 5' 7\" (1.702 m) and weight is 155 lb (70.3 kg). Her oral temperature is 97 °F (36.1 °C). Her blood pressure is 157/99 (abnormal) and her pulse is 71. Her respiration is 17 and oxygen saturation is 99%.       CONSTITUTIONAL: AOx4, no apparent distress, appears stated age    HEAD: normocephalic, atraumatic   EYES: PERRLA, EOMI    ENT: moist mucous membranes, uvula midline   NECK: supple, symmetric   BACK: symmetric   LUNGS: clear to auscultation bilaterally   CARDIOVASCULAR: regular rate and rhythm, no murmurs, rubs or gallops   ABDOMEN: soft, non-tender, non-distended with normal active bowel sounds   NEUROLOGIC:  MAEx4, no focal sensory or motor deficits   MUSCULOSKELETAL: no clubbing, cyanosis or edema   SKIN: no rash or wounds       DIFFERENTIAL DIAGNOSIS/MDM:   Headache:  DDX: SAH, subdural hematoma, epidural, intracerebral, meningitis, encephalitis, migraine, tension, cluster, sinusitis, dental, stroke, encephalitis, abscess, CNS mass, increased ICP, venous thrombosis, carbon monoxide poisoning, acute angle closure glaucoma, temporal arteritis, idiopathic intracranial hypertension    Syncope/ Pre-syncope:  DDX: cardiac arrhythmia/ valvular, low glucose, anemia, SAH, dissection, PE, AAA rupture, ectopic pregnancy rupture, seizure, vasovagal, orthostatic    DIAGNOSTIC RESULTS     EKG: All EKG's are interpreted by the Observation Physician who either signs or Co-signs this chart in the absence of a cardiologist.    EKG Interpretation    Rhythm: normal sinus   Rate: normal  Axis: normal  Ectopy: none  Conduction: normal  ST Segments: normal  T Waves: normal  Q Waves: none     Clinical Impression: non-specific EKG    RADIOLOGY:   I directly visualized the following  images and reviewed the radiologist interpretations:    CT ABDOMEN PELVIS W IV CONTRAST Additional Contrast? None    Result Date: 6/24/2022  EXAMINATION: CT OF THE ABDOMEN AND PELVIS WITH CONTRAST 6/24/2022 3:29 pm TECHNIQUE: CT of the abdomen and pelvis was performed with the administration of intravenous contrast. Multiplanar reformatted images are provided for review. Automated exposure control, iterative reconstruction, and/or weight based adjustment of the mA/kV was utilized to reduce the radiation dose to as low as reasonably achievable. COMPARISON: 11/20/2019, 03/27/2019 HISTORY: ORDERING SYSTEM PROVIDED HISTORY: LLQ pain TECHNOLOGIST PROVIDED HISTORY: LLQ pain Decision Support Exception - unselect if not a suspected or confirmed emergency medical condition->Emergency Medical Condition (MA) Reason for Exam:  LLQ pain FINDINGS: Lower Chest: Dependent changes in both lung bases. Coronary artery calcifications. No pleural or pericardial effusion. Organs: Liver is within normal limits for attenuation aside from a tiny hypodensity in the anterior left lobe which is too small to definitively characterize but is unchanged relative to remote prior imaging, likely representing a tiny cyst.  Cholecystectomy with intrahepatic and extrahepatic biliary ductal prominence which is similar to remote prior. Spleen, pancreas, adrenal glands, and kidneys are without acute abnormality.  GI/Bowel: No bowel obstruction. No acute appendicitis. Long segment mild colonic wall thickening from the cecum to the proximal descending colon. Pelvis: The female pelvic organs and urinary bladder are grossly unremarkable. Peritoneum/Retroperitoneum: No free fluid, free air, or lymphadenopathy. Atherosclerotic aortoiliac and femoral vessels without aneurysm. Bones/Soft Tissues: Remote laminectomy with posterior fusion spanning L3 through L5 which is grossly unchanged in appearance from prior. Degenerative changes throughout the remainder of the spine. 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 No acute abnormality in the superficial soft tissues. Fat minimally extends into the right inguinal canal without inflammatory change. Injection granulomata in the gluteal subcutaneous fat. Small fat containing umbilical hernia without inflammation. Long segment mild colonic wall thickening of the colon from the cecum to the proximal descending. Correlate clinically for colitis. LABS:  I have reviewed and interpreted all available lab results. Labs Reviewed   BASIC METABOLIC PANEL - Abnormal; Notable for the following components:       Result Value    Potassium 3.4 (*)     All other components within normal limits   HEPATIC FUNCTION PANEL - Abnormal; Notable for the following components:     Total Bilirubin 0.20 (*)     Total Protein 6.3 (*)     All other components within normal limits   URINALYSIS WITH MICROSCOPIC - Abnormal; Notable for the following components:    Specific Gravity, UA 1.045 (*)     Urine Hgb SMALL (*)     All other components within normal limits   COVID-19, RAPID   CBC WITH AUTO DIFFERENTIAL   LIPASE   TROPONIN   LEVETIRACETAM LEVEL       SCREENING TOOLS:    HEART Risk Score for Chest Pain Patients   History and Physical Exam Suspicion Level  (Nausea, Vomiting, Diaphoresis, Radiation, Exertion)   Slightly accuracy, there may be errors in the transcription that are not intended.

## 2022-06-25 NOTE — PROGRESS NOTES
[address] [facility]  CDU / OBSERVATION ENCOUNTER  ATTENDING NOTE       I performed a history and physical examination of the patient and discussed management with the resident or midlevel provider. I reviewed the resident or midlevel provider's note and agree with the documented findings and plan of care. Any areas of disagreement are noted on the chart. I was personally present for the key portions of any procedures. I have documented in the chart those procedures where I was not present during the key portions. I have reviewed the nurses notes. I agree with the chief complaint, past medical history, past surgical history, allergies, medications, social and family history as documented unless otherwise noted below. The Family history, social history, and ROS are effectively unchanged since admission unless noted elsewhere in the chart. This patient presented to the emergency department after having sustained a syncopal event while having a bowel movement at home. Patient did not sustain any fall or other trauma. She reports experiencing severe cramping abdominal pain with a bowel movement. There is no apparent hematochezia or melena. Patient reports she also experienced some nausea and shortness of breath along with some chest \"tightness\" at the time of her episode. No vomiting. No fevers or chills. No trauma. No urinary symptoms. Patient does have a prior history of seizure disorder but she denies any seizure activity. Awake, alert, coop, responsive. Speech fluent, normal comprehension. GCS is 15. Lungs clear bilaterally. No rales, rhonchi, wheezes, stridor, retractions. Cardiac-S1S2, RRR, no murmur, rub, or gallop. Abdomen soft, nondistended, nontender. No organomegaly, mass, bruit. Normal bowel sounds. Patient has been evaluated by the cardiology service who are recommending stress test today. Impression: Syncopal episode with diarrhea.   CT scan suggest possible colitis. Plan: We will await the results of the stress test prior to determining disposition. Rachid Murrieta MD [facility]  Attending Emergency  Physician

## 2022-06-25 NOTE — PLAN OF CARE
Problem: Pain  Goal: Verbalizes/displays adequate comfort level or baseline comfort level  6/25/2022 1936 by Chinyere Gottlieb RN  Outcome: Progressing  6/25/2022 1517 by Laura Pelaez RN  Outcome: Progressing

## 2022-06-26 PROCEDURE — 6370000000 HC RX 637 (ALT 250 FOR IP)

## 2022-06-26 PROCEDURE — 94640 AIRWAY INHALATION TREATMENT: CPT

## 2022-06-26 PROCEDURE — 6360000002 HC RX W HCPCS

## 2022-06-26 PROCEDURE — 6370000000 HC RX 637 (ALT 250 FOR IP): Performed by: EMERGENCY MEDICINE

## 2022-06-26 PROCEDURE — 87506 IADNA-DNA/RNA PROBE TQ 6-11: CPT

## 2022-06-26 PROCEDURE — 2580000003 HC RX 258

## 2022-06-26 PROCEDURE — 96376 TX/PRO/DX INJ SAME DRUG ADON: CPT

## 2022-06-26 PROCEDURE — 6370000000 HC RX 637 (ALT 250 FOR IP): Performed by: SURGERY

## 2022-06-26 PROCEDURE — 2580000003 HC RX 258: Performed by: EMERGENCY MEDICINE

## 2022-06-26 PROCEDURE — 1200000000 HC SEMI PRIVATE

## 2022-06-26 PROCEDURE — G0378 HOSPITAL OBSERVATION PER HR: HCPCS

## 2022-06-26 PROCEDURE — 6360000002 HC RX W HCPCS: Performed by: STUDENT IN AN ORGANIZED HEALTH CARE EDUCATION/TRAINING PROGRAM

## 2022-06-26 RX ORDER — DICYCLOMINE HYDROCHLORIDE 10 MG/1
10 CAPSULE ORAL
Status: DISCONTINUED | OUTPATIENT
Start: 2022-06-26 | End: 2022-06-27 | Stop reason: HOSPADM

## 2022-06-26 RX ORDER — DICYCLOMINE HYDROCHLORIDE 10 MG/1
20 CAPSULE ORAL ONCE
Status: DISCONTINUED | OUTPATIENT
Start: 2022-06-26 | End: 2022-06-26

## 2022-06-26 RX ORDER — METOPROLOL SUCCINATE 25 MG/1
25 TABLET, EXTENDED RELEASE ORAL DAILY
Status: DISCONTINUED | OUTPATIENT
Start: 2022-06-26 | End: 2022-06-27 | Stop reason: HOSPADM

## 2022-06-26 RX ORDER — SODIUM CHLORIDE 9 MG/ML
INJECTION, SOLUTION INTRAVENOUS CONTINUOUS
Status: DISCONTINUED | OUTPATIENT
Start: 2022-06-27 | End: 2022-06-26

## 2022-06-26 RX ORDER — DICYCLOMINE HYDROCHLORIDE 10 MG/1
10 CAPSULE ORAL
Status: DISCONTINUED | OUTPATIENT
Start: 2022-06-26 | End: 2022-06-26

## 2022-06-26 RX ORDER — CALCIUM CARBONATE 200(500)MG
500 TABLET,CHEWABLE ORAL 3 TIMES DAILY PRN
Status: DISCONTINUED | OUTPATIENT
Start: 2022-06-26 | End: 2022-06-26

## 2022-06-26 RX ORDER — NICOTINE 21 MG/24HR
1 PATCH, TRANSDERMAL 24 HOURS TRANSDERMAL DAILY
Status: DISCONTINUED | OUTPATIENT
Start: 2022-06-26 | End: 2022-06-27 | Stop reason: HOSPADM

## 2022-06-26 RX ORDER — DICYCLOMINE HYDROCHLORIDE 10 MG/ML
20 INJECTION INTRAMUSCULAR ONCE
Status: DISCONTINUED | OUTPATIENT
Start: 2022-06-26 | End: 2022-06-26

## 2022-06-26 RX ORDER — SODIUM CHLORIDE 9 MG/ML
INJECTION, SOLUTION INTRAVENOUS CONTINUOUS
Status: DISCONTINUED | OUTPATIENT
Start: 2022-06-26 | End: 2022-06-27 | Stop reason: HOSPADM

## 2022-06-26 RX ADMIN — BUPROPION HYDROCHLORIDE 150 MG: 150 TABLET, EXTENDED RELEASE ORAL at 11:30

## 2022-06-26 RX ADMIN — Medication 81 MG: at 10:02

## 2022-06-26 RX ADMIN — LEVETIRACETAM 750 MG: 500 TABLET, FILM COATED ORAL at 23:21

## 2022-06-26 RX ADMIN — BUDESONIDE AND FORMOTEROL FUMARATE DIHYDRATE 2 PUFF: 80; 4.5 AEROSOL RESPIRATORY (INHALATION) at 08:46

## 2022-06-26 RX ADMIN — ONDANSETRON 4 MG: 4 TABLET, ORALLY DISINTEGRATING ORAL at 11:43

## 2022-06-26 RX ADMIN — FLUOXETINE 10 MG: 10 CAPSULE ORAL at 10:10

## 2022-06-26 RX ADMIN — ANTACID TABLETS 500 MG: 500 TABLET, CHEWABLE ORAL at 12:03

## 2022-06-26 RX ADMIN — DESMOPRESSIN ACETATE 40 MG: 0.2 TABLET ORAL at 23:21

## 2022-06-26 RX ADMIN — BACLOFEN 10 MG: 10 TABLET ORAL at 23:21

## 2022-06-26 RX ADMIN — SODIUM CHLORIDE: 9 INJECTION, SOLUTION INTRAVENOUS at 23:27

## 2022-06-26 RX ADMIN — ALBUTEROL SULFATE 2.5 MG: 2.5 SOLUTION RESPIRATORY (INHALATION) at 18:43

## 2022-06-26 RX ADMIN — ACETAMINOPHEN 650 MG: 325 TABLET ORAL at 10:02

## 2022-06-26 RX ADMIN — DICYCLOMINE HYDROCHLORIDE 10 MG: 10 CAPSULE ORAL at 13:00

## 2022-06-26 RX ADMIN — MORPHINE SULFATE 2 MG: 4 INJECTION, SOLUTION INTRAMUSCULAR; INTRAVENOUS at 15:38

## 2022-06-26 RX ADMIN — POTASSIUM BICARBONATE 20 MEQ: 782 TABLET, EFFERVESCENT ORAL at 23:21

## 2022-06-26 RX ADMIN — METOPROLOL SUCCINATE 25 MG: 25 TABLET, FILM COATED, EXTENDED RELEASE ORAL at 10:02

## 2022-06-26 RX ADMIN — SODIUM CHLORIDE, PRESERVATIVE FREE 10 ML: 5 INJECTION INTRAVENOUS at 23:21

## 2022-06-26 RX ADMIN — GUAIFENESIN 600 MG: 600 TABLET, EXTENDED RELEASE ORAL at 10:02

## 2022-06-26 RX ADMIN — GUAIFENESIN 600 MG: 600 TABLET, EXTENDED RELEASE ORAL at 23:21

## 2022-06-26 RX ADMIN — BUDESONIDE AND FORMOTEROL FUMARATE DIHYDRATE 2 PUFF: 80; 4.5 AEROSOL RESPIRATORY (INHALATION) at 20:05

## 2022-06-26 RX ADMIN — POTASSIUM BICARBONATE 20 MEQ: 782 TABLET, EFFERVESCENT ORAL at 10:02

## 2022-06-26 ASSESSMENT — PAIN DESCRIPTION - ORIENTATION: ORIENTATION: MID

## 2022-06-26 ASSESSMENT — PAIN SCALES - WONG BAKER

## 2022-06-26 ASSESSMENT — PAIN SCALES - GENERAL
PAINLEVEL_OUTOF10: 0
PAINLEVEL_OUTOF10: 5
PAINLEVEL_OUTOF10: 8
PAINLEVEL_OUTOF10: 0
PAINLEVEL_OUTOF10: 0
PAINLEVEL_OUTOF10: 7
PAINLEVEL_OUTOF10: 0
PAINLEVEL_OUTOF10: 7
PAINLEVEL_OUTOF10: 0

## 2022-06-26 ASSESSMENT — PAIN DESCRIPTION - PAIN TYPE: TYPE: ACUTE PAIN

## 2022-06-26 ASSESSMENT — PAIN - FUNCTIONAL ASSESSMENT: PAIN_FUNCTIONAL_ASSESSMENT: ACTIVITIES ARE NOT PREVENTED

## 2022-06-26 ASSESSMENT — PAIN DESCRIPTION - LOCATION: LOCATION: ABDOMEN

## 2022-06-26 ASSESSMENT — PAIN DESCRIPTION - FREQUENCY: FREQUENCY: CONTINUOUS

## 2022-06-26 ASSESSMENT — PAIN DESCRIPTION - ONSET: ONSET: GRADUAL

## 2022-06-26 ASSESSMENT — PAIN DESCRIPTION - DESCRIPTORS: DESCRIPTORS: DULL

## 2022-06-26 NOTE — PROGRESS NOTES
OBS/CDU   RESIDENT NOTE      Patients PCP is:  THOR Mckeon - RAMONITA        SUBJECTIVE      No acute events overnight. Patient was reevaluated this morning and patient denied any headache, abdominal pain, nausea, vomiting. Resting comfortably no acute distress. No chest pain. No further episodes of lightheadedness or syncope this morning. On reevaluation this afternoon, patient was noted to have syncopal episode and  noted that when she was in the bathroom he went to check on her and appeared to be unresponsive until he called her name. Did not fall off toilet. No blood in stool. Returned to baseline shortly after. But does note some residual abdominal pain. Updated cardiology as well. PHYSICAL EXAM      General: NAD, AO X 3  Heent: EMOI, PERRL  Neck: SUPPLE, NO JVD  Cardiovascular: RRR, S1S2  Pulmonary: CTAB, NO SOB  Abdomen: SOFT, NTTP, ND, +BS  Extremities: +2/4 PULSES DISTAL, NO SWELLING  Neuro / Psych: NO NUMBNESS OR TINGLING, MENTATION AT BASELINE    PERTINENT TEST /EXAMS      I have reviewed all available laboratory results.     MEDICATIONS CURRENT   metoprolol succinate (TOPROL XL) extended release tablet 25 mg, Daily  nicotine (NICODERM CQ) 21 MG/24HR 1 patch, Daily  dicyclomine (BENTYL) injection 20 mg, Once  0.9 % sodium chloride infusion, Continuous  aspirin EC tablet 81 mg, Daily  atorvastatin (LIPITOR) tablet 40 mg, Nightly  budesonide-formoterol (SYMBICORT) 80-4.5 MCG/ACT inhaler 2 puff, BID  buPROPion (WELLBUTRIN SR) extended release tablet 150 mg, Daily  FLUoxetine (PROZAC) capsule 10 mg, Daily  levETIRAcetam (KEPPRA) tablet 750 mg, Nightly  sodium chloride flush 0.9 % injection 5-40 mL, 2 times per day  sodium chloride flush 0.9 % injection 5-40 mL, PRN  0.9 % sodium chloride infusion, PRN  ondansetron (ZOFRAN-ODT) disintegrating tablet 4 mg, Q8H PRN   Or  ondansetron (ZOFRAN) injection 4 mg, Q6H PRN  polyethylene glycol (GLYCOLAX) packet 17 g, Daily PRN  acetaminophen (TYLENOL) tablet 650 mg, Q6H PRN   Or  acetaminophen (TYLENOL) suppository 650 mg, Q6H PRN  guaiFENesin (MUCINEX) extended release tablet 600 mg, BID  potassium chloride (KLOR-CON M) extended release tablet 40 mEq, PRN   Or  potassium bicarb-citric acid (EFFER-K) effervescent tablet 40 mEq, PRN   Or  potassium chloride 10 mEq/100 mL IVPB (Peripheral Line), PRN  albuterol (PROVENTIL) nebulizer solution 2.5 mg, Q6H PRN  sodium chloride flush 0.9 % injection 10 mL, PRN  sodium chloride flush 0.9 % injection 10 mL, PRN  baclofen (LIORESAL) tablet 10 mg, Nightly  potassium bicarb-citric acid (EFFER-K) effervescent tablet 20 mEq, BID  morphine injection 2 mg, Q4H PRN        All medication charted and reviewed. CONSULTS      IP CONSULT TO CARDIOLOGY    ASSESSMENT/PLAN        Isrrael Giordano is a 64 y.o. female who presents with episode of syncope when she was sitting on toilet after having abdominal pain that is now improved. Does note new headache this morning and notes history of headaches in past that Tylenol usually helps. No numbness, weakness, tingling, lightheadedness. Have history of seizure but denies any seizure-like activity overnight. Return to baseline shortly after episode. More likely syncopal episode than seizure. Tress test intermediate risk with a percent perfusion defect. Plan to do cath 6/27/2022. Did have repeat episode of syncope this afternoon after which patient returned to baseline shortly after but did note some persistent abdominal pain. Was given Bentyl and ordered echo.     · Cardiology consult, appreciate recommendations  ? Stress test intermediate risk with 8% perfusion defect  ? Cath 6/27/2022, n.p.o. at midnight  ? Echo pending  ?  Repeat syncopal episode this afternoon, continue hydration  · Continue home medications and pain control  · Monitor vitals, labs, and imaging  · DISPO: pending consults and clinical improvement      --  Senait Ramirez MD  Emergency Medicine Resident Physician     This dictation was generated by voice recognition computer software. Although all attempts are made to edit the dictation for accuracy, there may be errors in the transcription that are not intended.

## 2022-06-26 NOTE — PROGRESS NOTES
[facility] [employer]  CDU / OBSERVATION ENCOUNTER  ATTENDING NOTE       I performed a history and physical examination of the patient and discussed management with the resident or midlevel provider. I reviewed the resident or midlevel provider's note and agree with the documented findings and plan of care. Any areas of disagreement are noted on the chart. I was personally present for the key portions of any procedures. I have documented in the chart those procedures where I was not present during the key portions. I have reviewed the nurses notes. I agree with the chief complaint, past medical history, past surgical history, allergies, medications, social and family history as documented unless otherwise noted below. The Family history, social history, and ROS are effectively unchanged since admission unless noted elsewhere in the chart. I just evaluated the patient and she is complaining of some mild crampy abdominal discomfort and notes that she has had 1 soft stool earlier today and recently just had a small liquid stool. Of note is the fact that she apparently had another syncopal episode during the second bowel movement. Her  states that she was in the bathroom and when he checked out her she appeared to be unresponsive until he called her name which time she responded. She did not fall off the toilet. (Patient's nurse indicates that he was in the patient's room when this happened and that she did not lose consciousness.)  She denies any melena or hematochezia. She had nausea but no emesis. No fevers or chills. She has persistent mild left lower quadrant abdominal discomfort. No urinary symptoms. No vaginal discharge or bleeding. Awake, alert, coop, responsive. Speech fluent, normal comprehension. GCS is 15 lungs clear bilaterally. No rales, rhonchi, wheezes, stridor, retractions. Cardiac-S1S2, RRR, no murmur, rub, or gallop.   Abdomen soft, nondistended, mildly tender in the left lower quadrant. No guarding or rebound. No organomegaly, mass, bruit. Normal bowel sounds. Cardiology consult was appreciated and they are apparently recommending catheterization tomorrow based on the abnormal stress test.  Patient is requesting an increase in the dose of her nicotine patch to 21 mg. Impression: 1. Possible colitis. 2.  Syncopal episode of undetermined etiology. 3.  Abnormal cardiac stress test.  Plan: We will increase the patient's nicotine patch dosage and add dicyclomine for the abdominal cramping. We will attempt to obtain another stool sample. Patient was advised that cardiac cath is scheduled for tomorrow morning. Ward Huggins.  Jessica Weber MD [facility]  Attending Emergency  Physician

## 2022-06-26 NOTE — PROGRESS NOTES
[facility]  Progress Note                   Date:   6/26/2022  Patient name: Khloe Riggs  Date of admission:  6/24/2022  3:47 PM  MRN:   [id number]  YOB: 1965  PCP: THOR Alicea CNP    Reason for Admission: Syncope and collapse [R55]  Colitis [K52.9]    Subjective:       Clinical Changes /Abnormalities: Patient seen and examined. Denies chest pain or shortness of breath. Tele/vitals/labs reviewed . Discussed case with RN. Review of Systems    Medications:   Scheduled Meds:   aspirin EC  81 mg Oral Daily    atorvastatin  40 mg Oral Nightly    budesonide-formoterol  2 puff Inhalation BID    buPROPion  150 mg Oral Daily    FLUoxetine  10 mg Oral Daily    levETIRAcetam  750 mg Oral Nightly    sodium chloride flush  5-40 mL IntraVENous 2 times per day    nicotine  1 patch TransDERmal Daily    guaiFENesin  600 mg Oral BID    baclofen  10 mg Oral Nightly    potassium bicarb-citric acid  20 mEq Oral BID     Continuous Infusions:   sodium chloride      sodium chloride 100 mL/hr at 06/25/22 1429     CBC:   Recent Labs     06/24/22  1738   WBC 7.6   HGB 13.3        BMP:    Recent Labs     06/24/22  1738      K 3.4*      CO2 23   BUN 9   CREATININE 0.63   GLUCOSE 97     Hepatic:  Recent Labs     06/24/22  1738   AST 21   ALT 25   BILITOT 0.20*   ALKPHOS 80     Troponin:   Recent Labs     06/24/22  1738   TROPHS <6     BNP: No results for input(s): BNP in the last 72 hours. Lipids: No results for input(s): CHOL, HDL in the last 72 hours. Invalid input(s): LDLCALCU  INR: No results for input(s): INR in the last 72 hours. EKG:    Date: 06/25/22  Reading: No acute ischemia        LAST ECHO:  NONE        LAST Stress Test:   Date of last ST: MAY 2019  Major Findings: Electrocardiographically negative Lexiscan stress study.     LAST Cardiac Angiography:.   NONE       stress test 6/25/2022        Perfusion:  Perfusion defects at stress involve 8% of the left ventricular   myocardium, with partial reversibility.       Function:  Normal       Risk stratification: INTERMEDIATE BASED ON PERFUSION         Objective:   Vitals: BP (!) 144/86   Pulse 85   Temp 97.2 °F (36.2 °C) (Oral)   Resp 16   Ht 5' 7\" (1.702 m)   Wt 155 lb (70.3 kg)   SpO2 93%   BMI 24.28 kg/m²   General appearance: alert and cooperative with exam  HEENT: Head: Normocephalic, no lesions, without obvious abnormality. Neck:no JVD, trachea midline, no adenopathy  Lungs: Clear to auscultation  Heart: Regular rate and rhythm, s1/s2 auscultated, no murmurs  Abdomen: soft, non-tender, bowel sounds active  Extremities: no edema  Neurologic: not done        Assessment / Acute Cardiac Problems:   1. Patient with syncopal episode, with prodrome including chest tightness dizziness, nausea and diarrhea. Etiology neurologic versus neurocardiogenic versus orthostatic versus vasovagal  2. History of seizures on Keppra  3. History of COPD  4. CAD risk factors of prior MI, dyslipidemia, chronic smoking,    Patient Active Problem List:     Heart disease     Chronic back pain     Depression     Hyperlipidemia     CAD, multiple vessel     Asthma     Smoking     Need for vaccination     Syncope     Leg pain     Left hip pain     Chronic cholecystitis     Chest pain     Calculus of gallbladder without cholecystitis without obstruction     History of lumbar fusion     Failed back syndrome     Marijuana use     Abnormal weight loss     Allergic rhinitis     Anxiety state     Chronic midline low back pain with sciatica     Moderate episode of recurrent major depressive disorder (HCC)     Seizure (HCC)     Chronic tension-type headache, intractable     Hypertension     Sinus tachycardia     Acute respiratory failure (HCC)     COPD (chronic obstructive pulmonary disease) (HCC)     COPD exacerbation (HCC)     Acute bronchitis     Headache     Syncope and collapse      Plan of Treatment:   1.  Add BB, continue ASA, statin ,   2. Stress test reviewed with Dr. John Gomez - plan for  Cardiac catheterization in a.m. please keep patient NPO after midnight. I have discussed risks (including but not limited to vascular injury, infection, hematoma, contrast induced kidney dysfunction, CVA and MI), benefits, alternatives in detail. All questions answered. Patient agrees to proceed. 3. Encourage hydration , compression stocking   4. ECHO pending   5.  Keep K>4, Mg >2     Electronically signed by THOR Ball - NP [facility] 6/26/20[facility] [phone]

## 2022-06-27 ENCOUNTER — APPOINTMENT (OUTPATIENT)
Dept: CARDIAC CATH/INVASIVE PROCEDURES | Age: 57
DRG: 287 | End: 2022-06-27
Payer: MEDICARE

## 2022-06-27 VITALS
HEART RATE: 78 BPM | DIASTOLIC BLOOD PRESSURE: 99 MMHG | HEIGHT: 67 IN | WEIGHT: 155 LBS | OXYGEN SATURATION: 95 % | BODY MASS INDEX: 24.33 KG/M2 | RESPIRATION RATE: 20 BRPM | SYSTOLIC BLOOD PRESSURE: 129 MMHG | TEMPERATURE: 98 F

## 2022-06-27 PROBLEM — I20.0 UNSTABLE ANGINA (HCC): Status: ACTIVE | Noted: 2022-06-27

## 2022-06-27 LAB
C DIFF AG + TOXIN: NEGATIVE
CAMPYLOBACTER PCR: NORMAL
E COLI ENTEROTOXIGENIC PCR: NORMAL
LV EF: 55 %
LVEF MODALITY: NORMAL
PLESIOMONAS SHIGELLOIDES PCR: NORMAL
SALMONELLA PCR: NORMAL
SHIGATOXIN GENE PCR: NORMAL
SHIGELLA SP PCR: NORMAL
SPECIMEN DESCRIPTION: NORMAL
SPECIMEN DESCRIPTION: NORMAL
VIBRIO PCR: NORMAL
YERSINIA ENTEROCOLITICA PCR: NORMAL

## 2022-06-27 PROCEDURE — 2709999900 HC NON-CHARGEABLE SUPPLY

## 2022-06-27 PROCEDURE — 6370000000 HC RX 637 (ALT 250 FOR IP): Performed by: EMERGENCY MEDICINE

## 2022-06-27 PROCEDURE — 96376 TX/PRO/DX INJ SAME DRUG ADON: CPT

## 2022-06-27 PROCEDURE — G0378 HOSPITAL OBSERVATION PER HR: HCPCS

## 2022-06-27 PROCEDURE — 4A023N7 MEASUREMENT OF CARDIAC SAMPLING AND PRESSURE, LEFT HEART, PERCUTANEOUS APPROACH: ICD-10-PCS | Performed by: INTERNAL MEDICINE

## 2022-06-27 PROCEDURE — C1887 CATHETER, GUIDING: HCPCS

## 2022-06-27 PROCEDURE — 6370000000 HC RX 637 (ALT 250 FOR IP): Performed by: SURGERY

## 2022-06-27 PROCEDURE — C1894 INTRO/SHEATH, NON-LASER: HCPCS

## 2022-06-27 PROCEDURE — 94640 AIRWAY INHALATION TREATMENT: CPT

## 2022-06-27 PROCEDURE — 6360000002 HC RX W HCPCS

## 2022-06-27 PROCEDURE — 6360000002 HC RX W HCPCS: Performed by: EMERGENCY MEDICINE

## 2022-06-27 PROCEDURE — 6370000000 HC RX 637 (ALT 250 FOR IP): Performed by: STUDENT IN AN ORGANIZED HEALTH CARE EDUCATION/TRAINING PROGRAM

## 2022-06-27 PROCEDURE — 6360000004 HC RX CONTRAST MEDICATION

## 2022-06-27 PROCEDURE — 6370000000 HC RX 637 (ALT 250 FOR IP)

## 2022-06-27 PROCEDURE — 93458 L HRT ARTERY/VENTRICLE ANGIO: CPT

## 2022-06-27 PROCEDURE — 2580000003 HC RX 258: Performed by: EMERGENCY MEDICINE

## 2022-06-27 PROCEDURE — B2151ZZ FLUOROSCOPY OF LEFT HEART USING LOW OSMOLAR CONTRAST: ICD-10-PCS | Performed by: INTERNAL MEDICINE

## 2022-06-27 PROCEDURE — 93306 TTE W/DOPPLER COMPLETE: CPT

## 2022-06-27 PROCEDURE — 6360000002 HC RX W HCPCS: Performed by: STUDENT IN AN ORGANIZED HEALTH CARE EDUCATION/TRAINING PROGRAM

## 2022-06-27 RX ORDER — ONDANSETRON 4 MG/1
4 TABLET, ORALLY DISINTEGRATING ORAL EVERY 4 HOURS PRN
Status: CANCELLED | OUTPATIENT
Start: 2022-06-27

## 2022-06-27 RX ORDER — SODIUM CHLORIDE 0.9 % (FLUSH) 0.9 %
5-40 SYRINGE (ML) INJECTION PRN
Status: DISCONTINUED | OUTPATIENT
Start: 2022-06-27 | End: 2022-06-27 | Stop reason: HOSPADM

## 2022-06-27 RX ORDER — KETOROLAC TROMETHAMINE 30 MG/ML
15 INJECTION, SOLUTION INTRAMUSCULAR; INTRAVENOUS
Status: COMPLETED | OUTPATIENT
Start: 2022-06-27 | End: 2022-06-27

## 2022-06-27 RX ORDER — ONDANSETRON 2 MG/ML
4 INJECTION INTRAMUSCULAR; INTRAVENOUS EVERY 4 HOURS PRN
Status: CANCELLED | OUTPATIENT
Start: 2022-06-27

## 2022-06-27 RX ORDER — TIOTROPIUM BROMIDE INHALATION SPRAY 3.12 UG/1
SPRAY, METERED RESPIRATORY (INHALATION)
Qty: 4 G | Refills: 1 | OUTPATIENT
Start: 2022-06-27

## 2022-06-27 RX ORDER — SODIUM CHLORIDE 0.9 % (FLUSH) 0.9 %
5-40 SYRINGE (ML) INJECTION EVERY 12 HOURS SCHEDULED
Status: DISCONTINUED | OUTPATIENT
Start: 2022-06-27 | End: 2022-06-27 | Stop reason: HOSPADM

## 2022-06-27 RX ORDER — ACETAMINOPHEN 325 MG/1
650 TABLET ORAL EVERY 4 HOURS PRN
Status: DISCONTINUED | OUTPATIENT
Start: 2022-06-27 | End: 2022-06-27 | Stop reason: HOSPADM

## 2022-06-27 RX ORDER — SODIUM CHLORIDE 9 MG/ML
INJECTION, SOLUTION INTRAVENOUS PRN
Status: DISCONTINUED | OUTPATIENT
Start: 2022-06-27 | End: 2022-06-27 | Stop reason: HOSPADM

## 2022-06-27 RX ORDER — PROMETHAZINE HYDROCHLORIDE 25 MG/ML
12.5 INJECTION, SOLUTION INTRAMUSCULAR; INTRAVENOUS ONCE
Status: CANCELLED | OUTPATIENT
Start: 2022-06-27 | End: 2022-06-27

## 2022-06-27 RX ORDER — KETOROLAC TROMETHAMINE 30 MG/ML
15 INJECTION, SOLUTION INTRAMUSCULAR; INTRAVENOUS ONCE
Status: COMPLETED | OUTPATIENT
Start: 2022-06-27 | End: 2022-06-27

## 2022-06-27 RX ORDER — METOPROLOL SUCCINATE 25 MG/1
25 TABLET, EXTENDED RELEASE ORAL DAILY
Qty: 21 TABLET | Refills: 0 | Status: SHIPPED | OUTPATIENT
Start: 2022-06-28 | End: 2022-09-20

## 2022-06-27 RX ORDER — PROMETHAZINE HYDROCHLORIDE 25 MG/ML
25 INJECTION, SOLUTION INTRAMUSCULAR; INTRAVENOUS ONCE
Status: COMPLETED | OUTPATIENT
Start: 2022-06-27 | End: 2022-06-27

## 2022-06-27 RX ADMIN — KETOROLAC TROMETHAMINE 15 MG: 30 INJECTION, SOLUTION INTRAMUSCULAR; INTRAVENOUS at 14:37

## 2022-06-27 RX ADMIN — ACETAMINOPHEN 650 MG: 325 TABLET ORAL at 18:52

## 2022-06-27 RX ADMIN — ACETAMINOPHEN 650 MG: 325 TABLET ORAL at 12:52

## 2022-06-27 RX ADMIN — PROMETHAZINE HYDROCHLORIDE 25 MG: 25 INJECTION INTRAMUSCULAR; INTRAVENOUS at 15:00

## 2022-06-27 RX ADMIN — ONDANSETRON 4 MG: 2 INJECTION INTRAMUSCULAR; INTRAVENOUS at 08:00

## 2022-06-27 RX ADMIN — POTASSIUM BICARBONATE 20 MEQ: 782 TABLET, EFFERVESCENT ORAL at 12:53

## 2022-06-27 RX ADMIN — MORPHINE SULFATE 2 MG: 4 INJECTION, SOLUTION INTRAMUSCULAR; INTRAVENOUS at 03:40

## 2022-06-27 RX ADMIN — BUPROPION HYDROCHLORIDE 150 MG: 150 TABLET, EXTENDED RELEASE ORAL at 12:54

## 2022-06-27 RX ADMIN — ACETAMINOPHEN 650 MG: 325 TABLET ORAL at 07:53

## 2022-06-27 RX ADMIN — DICYCLOMINE HYDROCHLORIDE 10 MG: 10 CAPSULE ORAL at 12:54

## 2022-06-27 RX ADMIN — METOPROLOL SUCCINATE 25 MG: 25 TABLET, FILM COATED, EXTENDED RELEASE ORAL at 12:54

## 2022-06-27 RX ADMIN — ONDANSETRON 4 MG: 2 INJECTION INTRAMUSCULAR; INTRAVENOUS at 18:52

## 2022-06-27 RX ADMIN — ALBUTEROL SULFATE 2.5 MG: 2.5 SOLUTION RESPIRATORY (INHALATION) at 13:23

## 2022-06-27 RX ADMIN — Medication 81 MG: at 12:54

## 2022-06-27 RX ADMIN — MORPHINE SULFATE 2 MG: 4 INJECTION, SOLUTION INTRAMUSCULAR; INTRAVENOUS at 07:52

## 2022-06-27 RX ADMIN — GUAIFENESIN 600 MG: 600 TABLET, EXTENDED RELEASE ORAL at 12:54

## 2022-06-27 RX ADMIN — SODIUM CHLORIDE, PRESERVATIVE FREE 10 ML: 5 INJECTION INTRAVENOUS at 12:57

## 2022-06-27 RX ADMIN — KETOROLAC TROMETHAMINE 15 MG: 30 INJECTION, SOLUTION INTRAMUSCULAR; INTRAVENOUS at 18:51

## 2022-06-27 ASSESSMENT — PAIN DESCRIPTION - LOCATION
LOCATION: CHEST
LOCATION: ABDOMEN

## 2022-06-27 ASSESSMENT — PAIN DESCRIPTION - DESCRIPTORS
DESCRIPTORS: PRESSURE;SHARP
DESCRIPTORS: SHARP;CRAMPING

## 2022-06-27 ASSESSMENT — PAIN SCALES - WONG BAKER

## 2022-06-27 ASSESSMENT — PAIN SCALES - GENERAL
PAINLEVEL_OUTOF10: 0
PAINLEVEL_OUTOF10: 2
PAINLEVEL_OUTOF10: 0
PAINLEVEL_OUTOF10: 9
PAINLEVEL_OUTOF10: 0
PAINLEVEL_OUTOF10: 8
PAINLEVEL_OUTOF10: 0

## 2022-06-27 ASSESSMENT — PAIN DESCRIPTION - PAIN TYPE
TYPE: ACUTE PAIN
TYPE: ACUTE PAIN

## 2022-06-27 ASSESSMENT — PAIN DESCRIPTION - ONSET
ONSET: SUDDEN
ONSET: SUDDEN

## 2022-06-27 ASSESSMENT — PAIN DESCRIPTION - ORIENTATION: ORIENTATION: LEFT

## 2022-06-27 ASSESSMENT — PAIN DESCRIPTION - FREQUENCY
FREQUENCY: CONTINUOUS
FREQUENCY: INTERMITTENT

## 2022-06-27 NOTE — PROGRESS NOTES
1145  Pt back to room from cath lab, pt drowsy, denies pain, vasc band inplace to right wrist, vs as charted , pulses palpable, cont pox inplace 93-96% , no drainage noted at cath site,   pt vs monitored every 15 min, 2cc of air removed from band every 30 minutes starting at 1230, pt tolerated well, no bleeding at site, pt denies pain or discomfort, pt hand et feet with good pulses, all air removed from vasc band by 1400

## 2022-06-27 NOTE — DISCHARGE SUMMARY
CDU Discharge Summary        Patient:  Kale David  YOB: 1965    MRN: [id number]   Acct: [de-identified]    Primary Care Physician: THOR Cunningham CNP    Admit date:  6/24/2022  3:47 PM  Discharge date: 6/27/2022    Discharge Diagnoses:     1.) Acute syncopal episode. Evaluated by cardiology. Patient had cardiac catheterization and echocardiogram.    2.  History of seizures on Keppra. Stable    3. History of heart disease. Felt to require further cardiac evaluation with cardiac catheterization recommended by cardiology    4. Enteritis. Treated with supportive therapy. Resolving. Follow-up:  Call today/tomorrow for a follow up appointment with THOR Cunningham CNP , or return to the Emergency Room with worsening symptoms    Stressed to patient the importance of following up with primary care doctor for further workup/management of symptoms. Pt verbalizes understanding and agrees with plan. Discharge Medication Changes:       Medication List      START taking these medications    metoprolol succinate 25 MG extended release tablet  Commonly known as: TOPROL XL  Take 1 tablet by mouth daily for 21 days  Start taking on: June 28, 2022        CHANGE how you take these medications    buPROPion 150 MG extended release tablet  Commonly known as: WELLBUTRIN SR  TAKE ONE TABLET BY MOUTH DAILY FOR 3 DAYS, THEN TAKE ONE TABLET BY MOUTH TWICE A DAY THEREAFTER  What changed: See the new instructions.         CONTINUE taking these medications    acetaminophen 325 MG tablet  Commonly known as: TYLENOL     * albuterol (5 MG/ML) 0.5% nebulizer solution  Commonly known as: PROVENTIL  Take 0.5 mLs by nebulization 4 times daily     * albuterol sulfate  (90 Base) MCG/ACT inhaler  Commonly known as: Ventolin HFA  Inhale 2 puffs into the lungs every 4 hours as needed for Wheezing     aspirin EC 81 MG EC tablet  Take 1 tablet by mouth daily     atorvastatin 40 MG tablet  Commonly known as: gm Na, advance as tolerated     Activity:  As tolerated    Consultants: IP CONSULT TO CARDIOLOGY    Procedures: See cardiac catheterization note    Diagnostic Test:   Results for orders placed or performed during the hospital encounter of 06/24/22   COVID-19, Rapid    Specimen: Nasopharyngeal Swab   Result Value Ref Range    Specimen Description . NASOPHARYNGEAL SWAB     SARS-CoV-2, Rapid Not Detected Not Detected   C DIFF TOXIN/ANTIGEN    Specimen: Stool   Result Value Ref Range    Specimen Description . FECES     C DIFF AG + TOXIN NEGATIVE NEGATIVE   Gastrointestinal Panel, Molecular    Specimen: Stool   Result Value Ref Range    Specimen Description . FECES     Campylobacter PCR  NEGATIVE: No Campylobacter spp. (jejuni or coli) DNA Detecte     NEGATIVE: No Campylobacter spp. (jejuni or coli) DNA Detected    Salmonella PCR NEGATIVE: No Salmonella spp. DNA Detected NEGATIVE: No Salmonella spp.  DNA Detected    Shigatoxin Gene PCR  NEGATIVE: No Shiga toxin-producing gene(s) Detected     NEGATIVE: No Shiga toxin-producing gene(s) Detected    Shigella Sp PCR NEGATIVE: No Shigella spp. / EIEC DNA Detected NEGATIVE: No Shigella spp. / EIEC DNA Detected    Plesiomonas Shigelloides PCR NEGATIVE: No Plesionomas shigelloides DNA Detected NEGATIVE: No Plesionomas shigelloides DNA Detected    Vibrio PCR  NEGATIVE: No Vibrio (V. vulnificus, V, parahaemolyticus and     NEGATIVE: No Vibrio (V. vulnificus, V, parahaemolyticus and V. cholerae) DNA Detected    E Coli Enterotoxigenic PCR  NEGATIVE: No Enterotoxigenic E. coli (ETEC) Heat-labile and     NEGATIVE: No Enterotoxigenic E. coli (ETEC) Heat-labile and heat-stable (LT/ST) DNA Detected    Yersinia Enterocolitica PCR NEGATIVE: No Yersinia enterocolitica DNA Detected NEGATIVE: No Yersinia enterocolitica DNA Detected   Basic Metabolic Panel   Result Value Ref Range    Glucose 97 70 - 99 mg/dL    BUN 9 6 - 20 mg/dL    CREATININE 0.63 0.50 - 0.90 mg/dL    Calcium 9.3 8.6 - 10.4 mg/dL Sodium 141 135 - 144 mmol/L    Potassium 3.4 (L) 3.7 - 5.3 mmol/L    Chloride 104 98 - 107 mmol/L    CO2 23 20 - 31 mmol/L    Anion Gap 14 9 - 17 mmol/L    GFR Non-African American >60 >60 mL/min    GFR African American >60 >60 mL/min    GFR Comment         CBC with Auto Differential   Result Value Ref Range    WBC 7.6 3.5 - 11.3 k/uL    RBC 4.11 3.95 - 5.11 m/uL    Hemoglobin 13.3 11.9 - 15.1 g/dL    Hematocrit 39.5 36.3 - 47.1 %    MCV 96.1 82.6 - 102.9 fL    MCH 32.4 25.2 - 33.5 pg    MCHC 33.7 28.4 - 34.8 g/dL    RDW 13.3 11.8 - 14.4 %    Platelets 442 254 - 883 k/uL    MPV 9.1 8.1 - 13.5 fL    NRBC Automated 0.0 0.0 per 100 WBC    Seg Neutrophils 54 36 - 65 %    Lymphocytes 36 24 - 43 %    Monocytes 7 3 - 12 %    Eosinophils % 2 1 - 4 %    Basophils 1 0 - 2 %    Immature Granulocytes 0 0 %    Segs Absolute 4.09 1.50 - 8.10 k/uL    Absolute Lymph # 2.72 1.10 - 3.70 k/uL    Absolute Mono # 0.53 0.10 - 1.20 k/uL    Absolute Eos # 0.13 0.00 - 0.44 k/uL    Basophils Absolute 0.06 0.00 - 0.20 k/uL    Absolute Immature Granulocyte <0.03 0.00 - 0.30 k/uL   Hepatic Function Panel   Result Value Ref Range    Albumin 4.2 3.5 - 5.2 g/dL    Alkaline Phosphatase 80 35 - 104 U/L    ALT 25 5 - 33 U/L    AST 21 <32 U/L    Total Bilirubin 0.20 (L) 0.3 - 1.2 mg/dL    Bilirubin, Direct <0.08 <0.31 mg/dL    Bilirubin, Indirect Can not be calculated 0.00 - 1.00 mg/dL    Total Protein 6.3 (L) 6.4 - 8.3 g/dL    Albumin/Globulin Ratio 2.0 1.0 - 2.5   Lipase   Result Value Ref Range    Lipase 15 13 - 60 U/L   Troponin   Result Value Ref Range    Troponin, High Sensitivity <6 0 - 14 ng/L   Urinalysis with Microscopic   Result Value Ref Range    Color, UA Yellow Yellow    Turbidity UA Clear Clear    Glucose, Ur NEGATIVE NEGATIVE    Bilirubin Urine NEGATIVE NEGATIVE    Ketones, Urine NEGATIVE NEGATIVE    Specific Gravity, UA 1.045 (H) 1.005 - 1.030    Urine Hgb SMALL (A) NEGATIVE    pH, UA 7.0 5.0 - 8.0    Protein, UA NEGATIVE NEGATIVE Urobilinogen, Urine Normal Normal    Nitrite, Urine NEGATIVE NEGATIVE    Leukocyte Esterase, Urine NEGATIVE NEGATIVE    -          WBC, UA 2 TO 5 0 - 5 /HPF    RBC, UA 10 TO 20 0 - 4 /HPF    Epithelial Cells UA 0 TO 2 0 - 5 /HPF   Levetiracetam Level   Result Value Ref Range    Levetiracetam Lvl 2 ug/mL   EKG 12 Lead   Result Value Ref Range    Ventricular Rate 83 BPM    Atrial Rate 83 BPM    P-R Interval 134 ms    QRS Duration 94 ms    Q-T Interval 378 ms    QTc Calculation (Bazett) 444 ms    P Axis 21 degrees    R Axis 11 degrees    T Axis 25 degrees   EKG 12 Lead   Result Value Ref Range    Ventricular Rate 79 BPM    Atrial Rate 79 BPM    P-R Interval 144 ms    QRS Duration 94 ms    Q-T Interval 384 ms    QTc Calculation (Bazett) 440 ms    P Axis 51 degrees    R Axis 55 degrees    T Axis 54 degrees     CT ABDOMEN PELVIS W IV CONTRAST Additional Contrast? None    Result Date: 6/24/2022  EXAMINATION: CT OF THE ABDOMEN AND PELVIS WITH CONTRAST 6/24/2022 3:29 pm TECHNIQUE: CT of the abdomen and pelvis was performed with the administration of intravenous contrast. Multiplanar reformatted images are provided for review. Automated exposure control, iterative reconstruction, and/or weight based adjustment of the mA/kV was utilized to reduce the radiation dose to as low as reasonably achievable. COMPARISON: 11/20/2019, 03/27/2019 HISTORY: ORDERING SYSTEM PROVIDED HISTORY: LLQ pain TECHNOLOGIST PROVIDED HISTORY: LLQ pain Decision Support Exception - unselect if not a suspected or confirmed emergency medical condition->Emergency Medical Condition (MA) Reason for Exam:  LLQ pain FINDINGS: Lower Chest: Dependent changes in both lung bases. Coronary artery calcifications. No pleural or pericardial effusion.  Organs: Liver is within normal limits for attenuation aside from a tiny hypodensity in the anterior left lobe which is too small to definitively characterize but is unchanged relative to remote prior imaging, likely representing a tiny cyst.  Cholecystectomy with intrahepatic and extrahepatic biliary ductal prominence which is similar to remote prior. Spleen, pancreas, adrenal glands, and kidneys are without acute abnormality. GI/Bowel: No bowel obstruction. No acute appendicitis. Long segment mild colonic wall thickening from the cecum to the proximal descending colon. Pelvis: The female pelvic organs and urinary bladder are grossly unremarkable. Peritoneum/Retroperitoneum: No free fluid, free air, or lymphadenopathy. Atherosclerotic aortoiliac and femoral vessels without aneurysm. Bones/Soft Tissues: Remote laminectomy with posterior fusion spanning L3 through L5 which is grossly unchanged in appearance from prior. Degenerative changes throughout the remainder of the spine. 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 No acute abnormality in the superficial soft tissues. Fat minimally extends into the right inguinal canal without inflammatory change. Injection granulomata in the gluteal subcutaneous fat. Small fat containing umbilical hernia without inflammation. Long segment mild colonic wall thickening of the colon from the cecum to the proximal descending. Correlate clinically for colitis. NM Cardiac Stress Test Nuclear Imaging    Result Date: 6/25/2022  EXAMINATION: MYOCARDIAL PERFUSION IMAGING 6/25/2022 11:21 am TECHNIQUE: For the rest study, 53 mCi of Tc 99 labeled sestamibi were injected. SPECT images were acquired. Under cardiology supervision, 0.4mg [facility] [place], operated by [facility] was infused. After pharmacologic stress, 14.3 mCi of Tc 99 labeled sestamibi were injected. SPECT images with ECG gating were acquired.  COMPARISON: 24 May 2014 HISTORY: ORDERING SYSTEM PROVIDED HISTORY: Other TECHNOLOGIST PROVIDED HISTORY: Reason for Exam: Other Procedure Type->Rx Syncope Reason for Exam: chest pain, hypertension FINDINGS: High risk images interpreted utilizing [employer] and [employer]. Perfusion defects are present in the anterior basal, anterior septal basal and inferior septal basal segments at stress with partial reperfusion, 8% of left ventricular myocardial involvement. Perfusion scores are visually adjusted to account for artifact. Summed stress score:  6 Summed rest score:  1 Summed reversibility score:  5 Function: End diastolic volume:  68ZO Left ventricular ejection fraction:  51% Wall motion abnormalities:  None. TID score:  1.01 (scores greater than 1.39 are considered elevated for Lexiscan stress with Tc99m)     Perfusion:  Perfusion defects at stress involve 8% of the left ventricular myocardium, with partial reversibility. Function:  Normal Risk stratification: INTERMEDIATE BASED ON PERFUSION Notes concerning risk stratification: Risk stratification incorporates both clinical history and some testing results. Final risk determination is the responsibility of the ordering provider as other patient information and test results may increase or decrease the risk assessment reported for this examination. Risk stratification criteria are adapted from \"Noninvasive Risk Stratification\" criteria from [facility]. Al, ACC/AATS/AHA/ASE/ASNC/SCAI/SCCT/STS 2017 Appropriate Use Criteria For Coronary Revascularization in Patients With Stable Ischemic Heart Disease [facility] Volume 69, Issue 17, May 2017 High risk (>3% annual death or MI) 1. Severe resting LV dysfunction (LVEF <35%) not readily explained by non coronary causes 2. Resting perfusion abnormalities greater than 10% of the myocardium in patients without prior history or evidence of MI3. Stress-induced perfusion abnormalities encumbering greater than or equal to 10% myocardium or stress segmental scores indicating multiple vascular territories with abnormalities 4.  Stress-induced LV dilatation (TID ratio greater than 1.19 for exercise and greater than 1.39 for regadenoson) Intermediate risk (1% to 3% annual death or MI) 1. Mild/moderate resting LV dysfunction (LVEF 35% to 49%) not readily explained by non coronary causes. 2. Resting perfusion abnormalities in 5%-9.9% of the myocardium in patients without a history or prior evidence of MI 3. Stress-induced perfusion abnormality encumbering 5%-9.9% of the myocardium or stress segmental scores indicating 1 vascular territory with abnormalities but without LV dilation 4. Small wall motion abnormality involving 1-2 segments and only 1 coronary bed. Low Risk (Less than 1% annual death or MI) 1. Normal or small myocardial perfusion defect at rest or with stress encumbering less than 5% of the myocardium. Physical Exam:    General appearance - NAD, AOx 3    Lungs -CTAB, no R/R/R  Heart - RRR, no M/R/G  Abdomen - Soft, NT/ND  Neurological:  MAEx4, No focal motor deficit, sensory loss  Extremities - Cap refil <2 sec in all ext., no edema  Skin -warm, dry      Hospital Course:  Clinical course has improved, labs and imaging reviewed. Malissa Beltran originally presented to the hospital on 6/24/2022  3:47 PM with syncopal episode. Patient with history also of seizures and cardiac disease. Admitted for evaluation by cardiology. Patient was treated presumptively for colitis. Patient with recommendations for cardiac catheterization by cardiology. At that time it was determined that She required further observation and further cardiac evaluation. Patient also for further evaluation of colitis with symptomatic treatment. Patient was sent for catheterization today. Patient was evaluated afterwards and was doing better. Patient symptomatically was significantly improved. Patient was felt to need oral challenge. Patient was offered opportunity to stay versus go home depending on how she was doing clinically.   Patient states she was doing well and elected to go home after negative cardiac catheterization and echocardiogram.  Patient did have syncopal episode while in the pot. Patient was with some gastroenteritis symptoms and had probable neurocardiogenic syncope vasovagal symptoms versus cardiac issues. Patient has resolving enteritis symptoms. Patient for discharge home after post-cath protocol. . Labs and imaging were followed daily. Imaging results as above. She is medically stable to be discharged. Disposition: Home    Patient stated that they will not drive themselves home from the hospital if they have gotten pain killers/ narcotics earlier that day and that they will arrange for transportation on their own or work with the  for a ride. Patient counseled NOT to drive while under the influence of narcotics/ pain killers. Condition: Good    Patient stable and ready for discharge home. I have discussed plan of care with patient and they are in understanding. They were instructed to read discharge paperwork. All of their questions and concerns were addressed. Time Spent: 0 day      --  Ana Rosa Parker MD  Emergency Medicine Attending Physician    This dictation was generated by voice recognition computer software. Although all attempts are made to edit the dictation for accuracy, there may be errors in the transcription that are not intended.

## 2022-06-27 NOTE — OP NOTE
[facility]    CARDIAC CATHETERIZATION    Date:   6/27/2022  Patient name:  Sonali Padilla  Date of admission:  6/24/2022  3:47 PM  MRN:   [id number]  YOB: 1965    Operators:  Primary:   Ludmila Rucker MD (Attending Physician)    Assistant/CV fellow:        Procedure performed:      [x] Left Heart Catheterization. [] Graft Angiography.  [] Left Ventriculography. [] Right Heart Catheterization. [x] Coronary Angiography. [] Aortic Valve Studies. [] PCI:      [] Other:       Pre Procedure Conscious Sedation Data:  ASA Class:    [] I [x] II [] III [] IV    Mallampati Class:  [] I [x] II [] III [] IV      Indication:  [] STEMI      [x] + Stress test  [] ACS      [] + EKG Changes  [] Non Q MI       [] Significant Risk Factors  [] Recurrent Angina             [] Diabetes Mellitus    [] New LBBB      [] Uncontrolled HTN. [] CHF / Low EF changes     [] Abnormal CTA / Ca Score      Procedure:  Access:  [] Femoral  [x] Radial  artery       [x] Right  [] Left    Procedure: After informed consent was obtained with explanation of the risks and benefits, patient was brought to the cath lab. The access area was prepped and draped in sterile fashion. 1% lidocaine was used for local block. The artery was cannulated with 6  Fr sheath with brisk arterial blood return. The side port was frequently flushed and aspirated with normal saline. Findings:    LMCA: Normal 0% stenosis. LAD: 20% stenosis mid area     LCx: Normal 0% stenosis. RCA: Normal 0% stenosis. Proximal 25% stenosis      Coronary Tree      Dominance: Right     LV Analysis  LV function assessed as:Normal.    Estimated Blood Loss: Less than 25 mL    Conclusions:  Normal Coronaries / Non obstructive CAD  Overall preserved LV function      Recommendations:  Post-cath protocol  Continue optimal medical therapy  Risk factor modification    Mark Elmore MD       Cardiovascular Fellow PGY-4  6/27/2022, 11:26 AM      I have reviewed the case with resident / fellow  I have examined the patient personally  Agree with treatment plan, correction innotes was made as appropriate, and discussed final arrangement based on  my evaluation and exam      Risk and benefit of procedure explained     Procedure was performed by me, with all aspect of the procedure being done using standard protocol.     Additional Remarks:    MD Kaycee [facility]

## 2022-06-27 NOTE — PROGRESS NOTES
Patient admitted, consent signed and questions answered. Patient ready for procedure. Call light to reach with side rails up 2 of 2. Bilateral groin clipped with writer and Tobin Jonese present. Family at bedside with patient. History and physical in [facility].

## 2022-06-27 NOTE — PROGRESS NOTES
OBS/CDU   RESIDENT NOTE      Patients PCP is:  Dinorah Spaulding, THOR - [facility]        SUBJECTIVE      No acute events overnight. Cath today, n.p.o. at midnight. Noted mild HA and nausea after cath. No CP or lightheadness. Patient notes diarrhea improved. No diarrhea today. PHYSICAL EXAM      General: NAD, AO X 3  Heent: EMOI, PERRL  Neck: SUPPLE, NO JVD  Cardiovascular: RRR, S1S2  Pulmonary: CTAB, NO SOB  Abdomen: SOFT, NTTP, ND, +BS  Extremities: +2/4 PULSES DISTAL, NO SWELLING  Neuro / Psych: NO NUMBNESS OR TINGLING, MENTATION AT BASELINE    PERTINENT TEST /EXAMS      I have reviewed all available laboratory results.     MEDICATIONS CURRENT   metoprolol succinate (TOPROL XL) extended release tablet 25 mg, Daily  nicotine (NICODERM CQ) 21 MG/24HR 1 patch, Daily  0.9 % sodium chloride infusion, Continuous  dicyclomine (BENTYL) capsule 10 mg, TID AC  aspirin EC tablet 81 mg, Daily  atorvastatin (LIPITOR) tablet 40 mg, Nightly  budesonide-formoterol (SYMBICORT) 80-4.5 MCG/ACT inhaler 2 puff, BID  buPROPion (WELLBUTRIN SR) extended release tablet 150 mg, Daily  FLUoxetine (PROZAC) capsule 10 mg, Daily  levETIRAcetam (KEPPRA) tablet 750 mg, Nightly  sodium chloride flush 0.9 % injection 5-40 mL, 2 times per day  sodium chloride flush 0.9 % injection 5-40 mL, PRN  0.9 % sodium chloride infusion, PRN  ondansetron (ZOFRAN-ODT) disintegrating tablet 4 mg, Q8H PRN   Or  ondansetron (ZOFRAN) injection 4 mg, Q6H PRN  polyethylene glycol (GLYCOLAX) packet 17 g, Daily PRN  acetaminophen (TYLENOL) tablet 650 mg, Q6H PRN   Or  acetaminophen (TYLENOL) suppository 650 mg, Q6H PRN  guaiFENesin (MUCINEX) extended release tablet 600 mg, BID  potassium chloride (KLOR-CON M) extended release tablet 40 mEq, PRN   Or  potassium bicarb-citric acid (EFFER-K) effervescent tablet 40 mEq, PRN   Or  potassium chloride 10 mEq/100 mL IVPB (Peripheral Line), PRN  albuterol (PROVENTIL) nebulizer solution 2.5 mg, Q6H PRN  sodium chloride flush 0.9 % injection 10 mL, PRN  sodium chloride flush 0.9 % injection 10 mL, PRN  baclofen (LIORESAL) tablet 10 mg, Nightly  potassium bicarb-citric acid (EFFER-K) effervescent tablet 20 mEq, BID  morphine injection 2 mg, Q4H PRN        All medication charted and reviewed. CONSULTS      IP CONSULT TO CARDIOLOGY    ASSESSMENT/PLAN        Corinne Fernandez is a 64 y.o. female who presents with episode of syncope when she was sitting on toilet after having abdominal pain that is now improved. Does note new headache this morning and notes history of headaches in past that Tylenol usually helps. No numbness, weakness, tingling, lightheadedness. Have history of seizure but denies any seizure-like activity overnight. Return to baseline shortly after episode. More likely syncopal episode than seizure. Tress test intermediate risk with a percent perfusion defect. Plan to do cath 6/27/2022. Did have repeat episode of syncope this afternoon after which patient returned to baseline shortly after but did note some persistent abdominal pain. Was given Bentyl and ordered echo. Echo with EF 58%. Cath came back clean. No evidence of C. difficile on stool culture.     · Cardiology consult, appreciate recommendations  ? Stress test intermediate risk with 8% perfusion defect  ? Cath 6/27/2022, n.p.o. at midnight  ? Echo 58% EF  ? Repeat syncopal episode this afternoon, continue hydration  · Continue home medications and pain control  · Monitor vitals, labs, and imaging  · DISPO: pending consults and clinical improvement      --  Wing Wilde MD  Emergency Medicine Resident Physician     This dictation was generated by voice recognition computer software. Although all attempts are made to edit the dictation for accuracy, there may be errors in the transcription that are not intended.

## 2022-06-27 NOTE — OP NOTE
[facility]    CARDIAC CATHETERIZATION    Date:   6/27/2022  Patient name:  Danny Patricio  Date of admission:  6/24/2022  3:47 PM  MRN:   [id number]  YOB: 1965    Operators:  Primary:   Saima Cox MD (Attending Physician)      Procedure performed:      [x] Left Heart Catheterization. [] Graft Angiography.  [] Left Ventriculography. [] Right Heart Catheterization. [x] Coronary Angiography. [] Aortic Valve Studies. [] PCI:      [] Other:       Pre Procedure Conscious Sedation Data:  ASA Class:    [] I [x] II [] III [] IV    Mallampati Class:  [] I [x] II [] III [] IV      Indication:  [] STEMI      [x] + Stress test  [] ACS      [] + EKG Changes  [] Non Q MI       [] Significant Risk Factors  [] Recurrent Angina             [] Diabetes Mellitus    [] New LBBB      [] Uncontrolled HTN. [] CHF / Low EF changes     [] Abnormal CTA / Ca Score      Procedure:  Access:  [] Femoral  [x] Radial  artery       [x] Right  [] Left    Procedure: After informed consent was obtained with explanation of the risks and benefits, patient was brought to the cath lab. The access area was prepped and draped in sterile fashion. 1% lidocaine was used for local block. The artery was cannulated with 6  Fr sheath with brisk arterial blood return. The side port was frequently flushed and aspirated with normal saline. Findings:     Angiographic Findings        Cardiac Arteries and Lesion Findings       LMCA: Normal 0% stenosis. LAD: 20% stenosis mid area     LCx: Normal 0% stenosis. RCA: Normal 0% stenosis. Proximal 25% stenosis      Coronary Tree        Dominance: Right       LV Analysis   LV function assessed as:Normal.      Conclusions        Procedure Summary        Non obstructive minimal CAD    Preserved LV systolic function        Recommendations        Medical treatments    Risk factor modification.          Estimated Blood Loss: 5 mL        Jakob Villafuerte   CV fellow       I have reviewed the case with resident / fellow  I have examined the patient personally  Agree with treatment plan, correction innotes was made as appropriate, and discussed final arrangement based on  my evaluation and exam      Risk and benefit of procedure explained     Procedure was performed by me, with all aspect of the procedure being done using standard protocol.     Additional Remarks:    Jemal Mark MD  [facility]

## 2022-06-27 NOTE — PROGRESS NOTES
[address]  CDU / OBSERVATION ENCOUNTER  ATTENDING NOTE       I performed a history and physical examination of the patient and discussed management with the resident or midlevel provider. I reviewed the resident or midlevel provider's note and agree with the documented findings and plan of care. Any areas of disagreement are noted on the chart. I was personally present for the key portions of any procedures. I have documented in the chart those procedures where I was not present during the key portions. I have reviewed the nurses notes. I agree with the chief complaint, past medical history, past surgical history, allergies, medications, social and family history as documented unless otherwise noted below. The Family history, social history, and ROS are effectively unchanged since admission unless noted elsewhere in the chart. Patient for cardiac catheterization today. Patient had positive stress testing. Patient history of these and presented with lightheadedness and near syncope when on the toilet. Patient had a similar episode while here in the hospital.  As a result patient was further worked up with cardiology. Given positive cardiac testing and risk factors patient will be sent for cardiac catheterization. Patient did not have bloody stool or hematochezia. Patient had assessment by cardiology which included posttesting. Patient due for echo and catheterization today. Patient had a percent perfusion defects of the myocardium. This gave the patient at least an intermediate risk and needs addressed with further interventional cardiology. Patient requiring monitoring and admission for closer watch. Patient for reassessment.   Patient requiring telemetry given syncopal episodes in hospital.    Vaibhav Padilla MD  Attending Emergency  Physician

## 2022-06-28 ENCOUNTER — CARE COORDINATION (OUTPATIENT)
Dept: CARE COORDINATION | Age: 57
End: 2022-06-28

## 2022-06-28 DIAGNOSIS — R55 SYNCOPE AND COLLAPSE: Primary | ICD-10-CM

## 2022-06-28 PROCEDURE — 1111F DSCHRG MED/CURRENT MED MERGE: CPT | Performed by: NURSE PRACTITIONER

## 2022-06-28 NOTE — CARE COORDINATION
Care Transitions Outreach Attempt #1    Call within 2 business days of discharge: Yes   Attempted to reach patient for transitions of care follow up. Unable to reach patient. HIPAA compliant message left on  requesting a return call. No current HIPAA on file. Patient: Malissa Beltran Patient : 1965 MRN: [id number]    Last Discharge [facility]       Complaint Diagnosis Description Type Department Provider    22 Abdominal Pain Syncope and collapse . .. ED to Hosp-Admission (Discharged) (ADMITTED) [facility] Shaun Claire MD; Beny Cueva. .. Was this an external facility discharge? No Discharge Facility: [facility]    Noted following upcoming appointments from discharge chart review:   [facility] follow up appointment(s): No future appointments.   Non-[facility] follow up appointment(s): None    [facility]/ Care Transition Nurse  [phone]
Transition Nurse (CTN) contacted the patient by telephone to perform post hospital discharge assessment. Verified name and  with patient as identifiers. Provided introduction to self, and explanation of the CTN role. CTN reviewed discharge instructions, medical action plan and red flags with patient who verbalized understanding. Patient given an opportunity to ask questions and does not have any further questions or concerns at this time. Were discharge instructions available to patient? Yes. Reviewed appropriate site of care based on symptoms and resources available to patient including: PCP  When to call 911. The patient agrees to contact the PCP office for questions related to their healthcare. Medication reconciliation was performed with patient, who verbalizes understanding of administration of home medications. Advised obtaining a 90-day supply of all daily and as-needed medications. CTN provided contact information. Plan for follow-up call in 5-7 days based on severity of symptoms and risk factors. Plan for next call: - sxs of syncope            - PCP scheduled    , RU  [facility] Transition Nurse  [phone]         Care Transitions 24 Hour Call    Do you have a copy of your discharge instructions?: Yes  Do you have all of your prescriptions and are they filled?: No  Have you been contacted by a [address]?: No  Have you scheduled your follow up appointment?: No  Do you feel like you have everything you need to keep you well at home?: Yes  Care Transitions Interventions         Follow Up  No future appointments.     Emily Warren LPN

## 2022-06-29 DIAGNOSIS — E78.00 PURE HYPERCHOLESTEROLEMIA: ICD-10-CM

## 2022-06-29 RX ORDER — ATORVASTATIN CALCIUM 40 MG/1
TABLET, FILM COATED ORAL
Qty: 90 TABLET | Refills: 0 | Status: SHIPPED | OUTPATIENT
Start: 2022-06-29 | End: 2022-10-03

## 2022-06-29 NOTE — TELEPHONE ENCOUNTER
Health Maintenance   Topic Date Due    Annual Wellness Visit (AWV)  Never done    HIV screen  Never done    Hepatitis C screen  Never done    Cervical cancer screen  Never done    Low dose CT lung screening  Never done    Colorectal Cancer Screen  02/20/2019    Breast cancer screen  02/06/2020    Shingles vaccine (2 of 2) 06/25/2021    Depression Monitoring  01/23/2022    Lipids  02/11/2023    DTaP/Tdap/Td vaccine (2 - Td or Tdap) 09/20/2027    Pneumococcal 0-64 years Vaccine (3 - PPSV23 or PCV20) 08/08/2030    Flu vaccine  Completed    COVID-19 Vaccine  Completed    Hepatitis A vaccine  Aged Out    Hepatitis B vaccine  Aged Out    Hib vaccine  Aged Out    Meningococcal (ACWY) vaccine  Aged Out             (applicable per patient's age: Cancer Screenings, Depression Screening, Fall Risk Screening, Immunizations)    Hemoglobin A1C (%)   Date Value   01/30/2018 5.3   08/02/2013 5.3   02/14/2013 6.0     LDL Cholesterol (mg/dL)   Date Value   02/11/2022 90     AST (U/L)   Date Value   06/24/2022 21     ALT (U/L)   Date Value   06/24/2022 25     BUN (mg/dL)   Date Value   06/24/2022 9      (goal A1C is < 7)   (goal LDL is <100) need 30-50% reduction from baseline     BP Readings from Last 3 Encounters:   06/27/22 (!) 129/99   04/10/22 (!) 173/104   01/20/22 (!) 145/92    (goal /80)      All Future Testing planned in [facility]:  Lab Frequency Next Occurrence   POCT Fecal Immunochemical Test (FIT) Once 10/12/2021   CONCETTA Digital Screen Bilateral [KYM0141] Once 04/07/2022       Next Visit Date:  No future appointments.          Patient Active Problem List:     Heart disease     Chronic back pain     Depression     Hyperlipidemia     CAD, multiple vessel     Asthma     Smoking     Need for vaccination     Syncope     Leg pain     Left hip pain     Chronic cholecystitis     Chest pain     Calculus of gallbladder without cholecystitis without obstruction     History of lumbar fusion     Failed back syndrome     Marijuana use     Abnormal weight loss     Allergic rhinitis     Anxiety state     Chronic midline low back pain with sciatica     Moderate episode of recurrent major depressive disorder (HCC)     Seizure (HCC)     Chronic tension-type headache, intractable     Hypertension     Sinus tachycardia     Acute respiratory failure (HCC)     COPD (chronic obstructive pulmonary disease) (HCC)     COPD exacerbation (HCC)     Acute bronchitis     Headache     Syncope and collapse     Unstable angina ([facility]

## 2022-07-06 ENCOUNTER — CARE COORDINATION (OUTPATIENT)
Dept: CARE COORDINATION | Age: 57
End: 2022-07-06

## 2022-07-07 ENCOUNTER — CARE COORDINATION (OUTPATIENT)
Dept: CARE COORDINATION | Age: 57
End: 2022-07-07

## 2022-07-07 NOTE — CARE COORDINATION
[facility] Transitions Follow Up Call    2022    Patient: Wade Mitchell  Patient : 1965   MRN: [id number]  Reason for Admission: Syncope  Discharge Date: 22 RARS: Readmission Risk Score: 10 ( )         Spoke with: Patient    Spoke with patient states she is doing ok. She was supposed to have her hospital follow up with her PCP today, but her car wouldn't start so she rescheduled for next Thursday. She denies any more syncopal episodes and states her bms are normal.  She does report some fatigue and stomach upset that may be a result of anxiety. States she has a lot of stress right now. We discussed different ways of coping with stress and patient was receptive. She denies any new issues. Care Transitions Follow Up Call    Needs to be reviewed by the provider   Additional needs identified to be addressed with provider: No  none             Method of communication with provider : none      Care Transition Nurse (CTN) contacted the patient by telephone to follow up after admission on 22. Verified name and  with patient as identifiers. Addressed changes since last contact: none  Discussed follow-up appointments. If no appointment was previously scheduled, appointment scheduling offered: n/a. Is follow up appointment scheduled within 7 days of discharge? No.    Advance Care Planning:   Does patient have an Advance Directive: reviewed and current, reviewed and needs to be updated, not on file; education provided, not on file, patient declined education, decision maker updated and referral to internal ACP facilitator. CTN reviewed discharge instructions, medical action plan and red flags with patient and discussed any barriers to care and/or understanding of plan of care after discharge. Discussed appropriate site of care based on symptoms and resources available to patient including: PCP  When to call 911.  The patient agrees to contact the PCP office for questions related to their healthcare. Patients top risk factors for readmission: lack of knowledge about disease  Interventions to address risk factors: Education of patient/family/caregiver/guardian to support self-management-. CTN provided contact information for future needs. Plan for follow-up call in 5-7 days based on severity of symptoms and risk factors. Plan for next call: - fatigue, abd discomfort            - appt with PCP      Neelam Cee LPN  [facility] Transition Nurse  [phone]         Care Transitions Subsequent and Final Call    Schedule Follow Up Appointment with PCP: Completed  Subsequent and Final Calls  Do you have any ongoing symptoms?: Yes  Onset of Patient-reported symptoms: Other  Patient-reported symptoms: Fatigue  Interventions for patient-reported symptoms: Other  Have your medications changed?: No  Do you have any questions related to your medications?: No  Do you currently have any active services?: No  Do you have any needs or concerns that I can assist you with?: No  Identified Barriers: Stress  Care Transitions Interventions  Other Interventions:            Follow Up  Future Appointments   Date Time Provider Kaycee Hays   7/14/2022 11:00 AM THOR Siu - CNP  V WALK IN [facility] [place]

## 2022-07-14 ENCOUNTER — CARE COORDINATION (OUTPATIENT)
Dept: CARE COORDINATION | Age: 57
End: 2022-07-14

## 2022-07-14 NOTE — CARE COORDINATION
[facility] Transitions Follow Up Call    2022    Patient: Jacquie Sierra  Patient : 1965   MRN: [id number]  Reason for Admission: Syncope  Discharge Date: 22 RARS: Readmission Risk Score: 10 ( )         Spoke with: Patient    Patient reports no change in her sxs. She does continue with mild abdominal discomfort, but no further episodes of syncope. Heart cath incision is healed well. Bowel and bladder within normal limits. She did have to reschedule her hospital follow up with her PCP due to her boyfriends illness and her dog having surgery. She is now scheduled for 22. Encouraged her to see if appt can be moved up. I will message staff as well. No further needs identified, CTN to sign off. Care Transitions Follow Up Call    Needs to be reviewed by the provider   Additional needs identified to be addressed with provider: Yes  If possible please schedule patient sooner than 22 for hospital follow up. Method of communication with provider : chart routing      Care Transition Nurse (CTN) contacted the patient by telephone to follow up after admission on 22. Verified name and  with patient as identifiers. Addressed changes since last contact: Appt with PCP rescheduled  Discussed follow-up appointments. If no appointment was previously scheduled, appointment scheduling offered: n/a. Is follow up appointment scheduled within 7 days of discharge? Had to reschedule. Advance Care Planning:   Does patient have an Advance Directive: reviewed and current, reviewed and needs to be updated, not on file; education provided, not on file, patient declined education, decision maker updated and referral to internal ACP facilitator. CTN reviewed discharge instructions, medical action plan and red flags with patient and discussed any barriers to care and/or understanding of plan of care after discharge.  Discussed appropriate site of care based on symptoms and resources available to patient including: PCP  Specialist  When to call 911. The patient agrees to contact the PCP office for questions related to their healthcare. Patients top risk factors for readmission: support system  Interventions to address risk factors: Education of patient/family/caregiver/guardian to support self-management-. CTN provided contact information for future needs. No further follow-up call indicated based on severity of symptoms and risk factors. Jeremiah Hansen LPN  [facility]/ Care Transition Nurse  [phone]         Care Transitions Subsequent and Final Call    Schedule Follow Up Appointment with PCP: Completed  Subsequent and Final Calls  Do you have any ongoing symptoms?: Yes  Patient-reported symptoms: Abdominal Pain  Interventions for patient-reported symptoms: Other  Have your medications changed?: No  Do you have any questions related to your medications?: No  Do you currently have any active services?: No  Do you have any needs or concerns that I can assist you with?: No  Identified Barriers: Stress  Care Transitions Interventions  Other Interventions:            Follow Up  Future Appointments   Date Time Provider Kaycee Hays   8/4/2022  8:15 AM THOR Franco - CNP  V WALK IN Julianna Buck LPN

## 2022-07-24 DIAGNOSIS — E78.00 PURE HYPERCHOLESTEROLEMIA: ICD-10-CM

## 2022-07-25 RX ORDER — ATORVASTATIN CALCIUM 40 MG/1
TABLET, FILM COATED ORAL
Qty: 90 TABLET | Refills: 0 | OUTPATIENT
Start: 2022-07-25

## 2022-07-25 RX ORDER — TIOTROPIUM BROMIDE INHALATION SPRAY 3.12 UG/1
SPRAY, METERED RESPIRATORY (INHALATION)
Qty: 4 G | Refills: 1 | Status: SHIPPED | OUTPATIENT
Start: 2022-07-25 | End: 2022-09-22

## 2022-07-25 NOTE — TELEPHONE ENCOUNTER
Next Visit Date:  8/4/2022    Hemoglobin A1C (%)   Date Value   01/30/2018 5.3   08/02/2013 5.3   02/14/2013 6.0             ( goal A1C is < 7)   No results found for: LABMICR  LDL Cholesterol (mg/dL)   Date Value   02/11/2022 90       (goal LDL is <100)   AST (U/L)   Date Value   06/24/2022 21     ALT (U/L)   Date Value   06/24/2022 25     BUN (mg/dL)   Date Value   06/24/2022 9     BP Readings from Last 3 Encounters:   06/27/22 (!) 129/99   04/10/22 (!) 173/104   01/20/22 (!) 145/92          (goal 120/80)        Patient Active Problem List:     Heart disease     Chronic back pain     Depression     Hyperlipidemia     CAD, multiple vessel     Asthma     Smoking     Need for vaccination     Syncope     Leg pain     Left hip pain     Chronic cholecystitis     Chest pain     Calculus of gallbladder without cholecystitis without obstruction     History of lumbar fusion     Failed back syndrome     Marijuana use     Abnormal weight loss     Allergic rhinitis     Anxiety state     Chronic midline low back pain with sciatica     Moderate episode of recurrent major depressive disorder (HCC)     Seizure (HCC)     Chronic tension-type headache, intractable     Hypertension     Sinus tachycardia     Acute respiratory failure (HCC)     COPD (chronic obstructive pulmonary disease) (HCC)     COPD exacerbation (HCC)     Acute bronchitis     Headache     Syncope and collapse     Un[facility] ([facility]      ----Cora Betancourt

## 2022-08-17 ENCOUNTER — TELEPHONE (OUTPATIENT)
Dept: PRIMARY CARE CLINIC | Age: 57
End: 2022-08-17

## 2022-08-17 DIAGNOSIS — J43.9 PULMONARY EMPHYSEMA, UNSPECIFIED EMPHYSEMA TYPE (HCC): ICD-10-CM

## 2022-08-17 DIAGNOSIS — J45.909 ASTHMA, UNSPECIFIED ASTHMA SEVERITY, UNSPECIFIED WHETHER COMPLICATED, UNSPECIFIED WHETHER PERSISTENT: ICD-10-CM

## 2022-08-17 RX ORDER — ALBUTEROL SULFATE 90 UG/1
2 AEROSOL, METERED RESPIRATORY (INHALATION) EVERY 4 HOURS PRN
Qty: 3 EACH | Refills: 1 | Status: SHIPPED | OUTPATIENT
Start: 2022-08-17

## 2022-08-22 DIAGNOSIS — J44.9 CHRONIC OBSTRUCTIVE PULMONARY DISEASE, UNSPECIFIED COPD TYPE (HCC): ICD-10-CM

## 2022-08-22 DIAGNOSIS — J45.909 ASTHMA, UNSPECIFIED ASTHMA SEVERITY, UNSPECIFIED WHETHER COMPLICATED, UNSPECIFIED WHETHER PERSISTENT: ICD-10-CM

## 2022-08-22 RX ORDER — ALBUTEROL SULFATE 2.5 MG/3ML
SOLUTION RESPIRATORY (INHALATION)
Qty: 360 ML | Refills: 2 | Status: SHIPPED | OUTPATIENT
Start: 2022-08-22

## 2022-08-29 DIAGNOSIS — F17.200 SMOKING: ICD-10-CM

## 2022-08-29 RX ORDER — BUPROPION HYDROCHLORIDE 150 MG/1
TABLET, EXTENDED RELEASE ORAL
Qty: 60 TABLET | Refills: 5 | Status: SHIPPED | OUTPATIENT
Start: 2022-08-29

## 2022-08-29 NOTE — TELEPHONE ENCOUNTER
pain     Calculus of gallbladder without cholecystitis without obstruction     History of lumbar fusion     Failed back syndrome     Marijuana use     Abnormal weight loss     Allergic rhinitis     Anxiety state     Chronic midline low back pain with sciatica     Moderate episode of recurrent major depressive disorder ([facility])     Seizure ([facility])     Chronic tension-type headache, intractable     Hypertension     Sinus tachycardia     Acute respiratory failure (HCC)     COPD (chronic obstructive pulmonary disease) ([facility])     COPD exacerbation ([facility])     Acute bronchitis     Headache     Syncope and collapse     Unstable angina ([facility]

## 2022-09-20 ENCOUNTER — OFFICE VISIT (OUTPATIENT)
Dept: PRIMARY CARE CLINIC | Age: 57
End: 2022-09-20
Payer: MEDICARE

## 2022-09-20 ENCOUNTER — HOSPITAL ENCOUNTER (OUTPATIENT)
Age: 57
Setting detail: SPECIMEN
Discharge: HOME | End: 2022-09-20

## 2022-09-20 VITALS
BODY MASS INDEX: 23.93 KG/M2 | SYSTOLIC BLOOD PRESSURE: 123 MMHG | DIASTOLIC BLOOD PRESSURE: 84 MMHG | HEART RATE: 86 BPM | TEMPERATURE: 98.4 F | OXYGEN SATURATION: 96 % | WEIGHT: 152.8 LBS

## 2022-09-20 DIAGNOSIS — K52.9 COLITIS: ICD-10-CM

## 2022-09-20 DIAGNOSIS — R31.29 OTHER MICROSCOPIC HEMATURIA: ICD-10-CM

## 2022-09-20 DIAGNOSIS — R10.9 ACUTE LEFT FLANK PAIN: ICD-10-CM

## 2022-09-20 DIAGNOSIS — J44.9 CHRONIC OBSTRUCTIVE PULMONARY DISEASE, UNSPECIFIED COPD TYPE (HCC): Primary | ICD-10-CM

## 2022-09-20 DIAGNOSIS — J45.909 ASTHMA, UNSPECIFIED ASTHMA SEVERITY, UNSPECIFIED WHETHER COMPLICATED, UNSPECIFIED WHETHER PERSISTENT: ICD-10-CM

## 2022-09-20 DIAGNOSIS — F17.200 SMOKING: ICD-10-CM

## 2022-09-20 DIAGNOSIS — F33.1 MODERATE EPISODE OF RECURRENT MAJOR DEPRESSIVE DISORDER (HCC): ICD-10-CM

## 2022-09-20 LAB
BILIRUBIN, POC: ABNORMAL
BLOOD URINE, POC: ABNORMAL
CLARITY, POC: ABNORMAL
COLOR, POC: ABNORMAL
GLUCOSE URINE, POC: NEGATIVE
KETONES, POC: ABNORMAL
LEUKOCYTE EST, POC: NEGATIVE
NITRITE, POC: NEGATIVE
PH, POC: 6.5
PROTEIN, POC: ABNORMAL
SPECIFIC GRAVITY, POC: 1.01
UROBILINOGEN, POC: 0.2

## 2022-09-20 PROCEDURE — 3017F COLORECTAL CA SCREEN DOC REV: CPT | Performed by: NURSE PRACTITIONER

## 2022-09-20 PROCEDURE — 3023F SPIROM DOC REV: CPT | Performed by: NURSE PRACTITIONER

## 2022-09-20 PROCEDURE — 4004F PT TOBACCO SCREEN RCVD TLK: CPT | Performed by: NURSE PRACTITIONER

## 2022-09-20 PROCEDURE — 99214 OFFICE O/P EST MOD 30 MIN: CPT | Performed by: NURSE PRACTITIONER

## 2022-09-20 PROCEDURE — G8420 CALC BMI NORM PARAMETERS: HCPCS | Performed by: NURSE PRACTITIONER

## 2022-09-20 PROCEDURE — G8427 DOCREV CUR MEDS BY ELIG CLIN: HCPCS | Performed by: NURSE PRACTITIONER

## 2022-09-20 PROCEDURE — 81003 URINALYSIS AUTO W/O SCOPE: CPT | Performed by: NURSE PRACTITIONER

## 2022-09-20 RX ORDER — AZITHROMYCIN 250 MG/1
250 TABLET, FILM COATED ORAL SEE ADMIN INSTRUCTIONS
Qty: 6 TABLET | Refills: 0 | Status: SHIPPED | OUTPATIENT
Start: 2022-09-20 | End: 2022-09-25

## 2022-09-20 RX ORDER — DESVENLAFAXINE 50 MG/1
50 TABLET, EXTENDED RELEASE ORAL DAILY
Qty: 30 TABLET | Refills: 3 | Status: SHIPPED | OUTPATIENT
Start: 2022-09-20

## 2022-09-20 RX ORDER — PREDNISONE 20 MG/1
20 TABLET ORAL DAILY
Qty: 5 TABLET | Refills: 0 | Status: SHIPPED | OUTPATIENT
Start: 2022-09-20 | End: 2022-09-25

## 2022-09-20 SDOH — ECONOMIC STABILITY: FOOD INSECURITY: WITHIN THE PAST 12 MONTHS, THE FOOD YOU BOUGHT JUST DIDN'T LAST AND YOU DIDN'T HAVE MONEY TO GET MORE.: NEVER TRUE

## 2022-09-20 SDOH — ECONOMIC STABILITY: TRANSPORTATION INSECURITY
IN THE PAST 12 MONTHS, HAS THE LACK OF TRANSPORTATION KEPT YOU FROM MEDICAL APPOINTMENTS OR FROM GETTING MEDICATIONS?: NO

## 2022-09-20 SDOH — ECONOMIC STABILITY: TRANSPORTATION INSECURITY
IN THE PAST 12 MONTHS, HAS LACK OF TRANSPORTATION KEPT YOU FROM MEETINGS, WORK, OR FROM GETTING THINGS NEEDED FOR DAILY LIVING?: NO

## 2022-09-20 SDOH — ECONOMIC STABILITY: FOOD INSECURITY: WITHIN THE PAST 12 MONTHS, YOU WORRIED THAT YOUR FOOD WOULD RUN OUT BEFORE YOU GOT MONEY TO BUY MORE.: NEVER TRUE

## 2022-09-20 ASSESSMENT — PATIENT HEALTH QUESTIONNAIRE - PHQ9
6. FEELING BAD ABOUT YOURSELF - OR THAT YOU ARE A FAILURE OR HAVE LET YOURSELF OR YOUR FAMILY DOWN: 3
5. POOR APPETITE OR OVEREATING: 3
SUM OF ALL RESPONSES TO PHQ QUESTIONS 1-9: 21
8. MOVING OR SPEAKING SO SLOWLY THAT OTHER PEOPLE COULD HAVE NOTICED. OR THE OPPOSITE, BEING SO FIGETY OR RESTLESS THAT YOU HAVE BEEN MOVING AROUND A LOT MORE THAN USUAL: 0
SUM OF ALL RESPONSES TO PHQ QUESTIONS 1-9: 21
SUM OF ALL RESPONSES TO PHQ9 QUESTIONS 1 & 2: 6
SUM OF ALL RESPONSES TO PHQ QUESTIONS 1-9: 21
4. FEELING TIRED OR HAVING LITTLE ENERGY: 3
3. TROUBLE FALLING OR STAYING ASLEEP: 3
1. LITTLE INTEREST OR PLEASURE IN DOING THINGS: 3
10. IF YOU CHECKED OFF ANY PROBLEMS, HOW DIFFICULT HAVE THESE PROBLEMS MADE IT FOR YOU TO DO YOUR WORK, TAKE CARE OF THINGS AT HOME, OR GET ALONG WITH OTHER PEOPLE: 3
SUM OF ALL RESPONSES TO PHQ QUESTIONS 1-9: 21
7. TROUBLE CONCENTRATING ON THINGS, SUCH AS READING THE NEWSPAPER OR WATCHING TELEVISION: 3
2. FEELING DOWN, DEPRESSED OR HOPELESS: 3
9. THOUGHTS THAT YOU WOULD BE BETTER OFF DEAD, OR OF HURTING YOURSELF: 0

## 2022-09-20 ASSESSMENT — COLUMBIA-SUICIDE SEVERITY RATING SCALE - C-SSRS
4. HAVE YOU HAD THESE THOUGHTS AND HAD SOME INTENTION OF ACTING ON THEM?: NO
5. HAVE YOU STARTED TO WORK OUT OR WORKED OUT THE DETAILS OF HOW TO KILL YOURSELF? DO YOU INTEND TO CARRY OUT THIS PLAN?: NO
3. HAVE YOU BEEN THINKING ABOUT HOW YOU MIGHT KILL YOURSELF?: YES
6. HAVE YOU EVER DONE ANYTHING, STARTED TO DO ANYTHING, OR PREPARED TO DO ANYTHING TO END YOUR LIFE?: NO
1. WITHIN THE PAST MONTH, HAVE YOU WISHED YOU WERE DEAD OR WISHED YOU COULD GO TO SLEEP AND NOT WAKE UP?: YES
2. HAVE YOU ACTUALLY HAD ANY THOUGHTS OF KILLING YOURSELF?: YES

## 2022-09-20 ASSESSMENT — SOCIAL DETERMINANTS OF HEALTH (SDOH): HOW HARD IS IT FOR YOU TO PAY FOR THE VERY BASICS LIKE FOOD, HOUSING, MEDICAL CARE, AND HEATING?: NOT HARD AT ALL

## 2022-09-20 ASSESSMENT — ENCOUNTER SYMPTOMS
WHEEZING: 1
VOMITING: 0
COUGH: 1
ABDOMINAL PAIN: 1
DIARRHEA: 0
TROUBLE SWALLOWING: 0

## 2022-09-20 NOTE — PATIENT INSTRUCTIONS
[address] [facility]   [address] [place] [address],  [address]   [address]   [phone]

## 2022-09-20 NOTE — PROGRESS NOTES
[facility]  [address] [facility] [address]  Dept: [phone]  Dept Fax: [phone]    Patient Care Team:  THOR Ravi CNP as PCP - General (Family Medicine)  THOR Ravi CNP as PCP - [facility] Provider    2022     Tata Silva (:  [phone])TN a 62 y.o. female, here for evaluation of the following medical concerns:   Chief Complaint   Patient presents with    Check-Up    Medication Refill    Chest Congestion     C/o discomfort    Abdominal Pain     LLQ       Tata Silva is a 27-year-old female here today for routine follow-up for COPD and depression. Recently admitted from 2022 through 2022 at [facility].  She experienced a syncopal episode and was admitted for cardiac evaluation. Treated at the time for colitis and was advised by cardiology to undergo cardiac catheterization. Cardiac cath and echo were negative. Tata Silva is here for follow up    Admits to worsening depression, stopped paxil since last visit. Denies suicidal plan, however admits that she often feels like there is no purpose or sense of living. Does not currently follow with psychiatry, she feels she is ready to do so at this time given persistent symptoms. Having increased cough Over the last few weeks with increase in pglegm. No changes in SOB, no wheezing  No fevers or chills      Abdominal Pain  This is a new problem. The problem occurs constantly. The problem has been unchanged. The pain is located in the LLQ. The abdominal pain does not radiate. Pertinent negatives include no diarrhea, dysuria, fever, frequency, hematuria, melena or vomiting. Nothing aggravates the pain. The pain is relieved by Nothing. Prior workup: no previous colonscopy. .    Review of Systems   Constitutional:  Negative for chills and fever. HENT:  Negative for trouble swallowing. Respiratory:  Positive for cough and wheezing. Gastrointestinal:  Positive for abdominal pain. Negative for diarrhea, melena and vomiting. Genitourinary:  Positive for flank pain. Negative for difficulty urinating, dysuria, frequency, hematuria, pelvic pain and vaginal discharge. Psychiatric/Behavioral:  Positive for dysphoric mood. Negative for suicidal ideas. Prior to Visit Medications    Medication Sig Taking?  Authorizing Provider   desvenlafaxine succinate (PRISTIQ) 50 MG TB24 extended release tablet Take 1 tablet by mouth daily Yes THOR Valdez CNP   azithromycin (ZITHROMAX) 250 MG tablet Take 1 tablet by mouth See Admin Instructions for 5 days 500mg on day 1 followed by 250mg on days 2 - 5 Yes THOR Valdez CNP   predniSONE (DELTASONE) 20 MG tablet Take 1 tablet by mouth daily for 5 days Yes THOR Valdez CNP   buPROPion (WELLBUTRIN SR) 150 MG extended release tablet TAKE ONE TABLET BY MOUTH TWICE A DAY Yes THOR Valdez CNP   albuterol (PROVENTIL) (2.5 MG/3ML) 0.083% nebulizer solution USE THREE MILLILITERS (ONE VIAL) VIA NEBULIZATION BY MOUTH EVERY 6 HOURS AS NEEDED FOR WHEEZING Yes THOR Valdez CNP   albuterol sulfate HFA (VENTOLIN HFA) 108 (90 Base) MCG/ACT inhaler Inhale 2 puffs into the lungs every 4 hours as needed for Wheezing Yes THOR Valdez CNP   SPIRIVA RESPIMAT 2.5 MCG/ACT AERS inhaler INHALE TWO PUFFS BY MOUTH DAILY Yes THOR Valdez CNP   atorvastatin (LIPITOR) 40 MG tablet TAKE ONE TABLET BY MOUTH DAILY Yes THOR Valdez CNP   metoprolol succinate (TOPROL XL) 25 MG extended release tablet Take 1 tablet by mouth daily for 21 days Yes David Bliss MD   baclofen (LIORESAL) 10 MG tablet TAKE ONE TABLET BY MOUTH ONCE NIGHTLY AS NEEDED FOR PAIN Yes THOR Valdez CNP   BREO ELLIPTA 100-25 MCG/INH AEPB inhaler INHALE ONE DOSE BY MOUTH DAILY Yes THOR Valdez CNP   Elastic Bandages & Supports (CARPAL TUNNEL WRIST STABILIZER) MISC Wear nightly while asleep Yes THOR Blandon CNP   levETIRAcetam (KEPPRA) 750 MG tablet Take 1 tablet by mouth nightly Yes Shiva Weldon MD   acetaminophen (TYLENOL) 325 MG tablet Take 650 mg by mouth every 6 hours as needed for Pain Yes Historical Provider, MD   albuterol (PROVENTIL) (5 MG/ML) 0.5% nebulizer solution Take 0.5 mLs by nebulization 4 times daily Yes Isrrael Willoughby MD   zinc gluconate 50 MG tablet Take 50 mg by mouth daily Yes Historical Provider, MD   Ascorbic Acid (VITAMIN C) 250 MG tablet Take 250 mg by mouth daily Yes Historical Provider, MD   Multiple Vitamins-Minerals (EMERGEN-C VITAMIN C PO) Take by mouth daily Yes Historical Provider, MD   Nebulizers (NEBULIZER COMPRESSOR) MISC Daily as needed Yes THOR Blandon CNP   Respiratory Therapy Supplies (NEBULIZER/TUBING/MOUTHPIECE) KIT 1 kit by Does not apply route daily as needed (SOB or wheezing) Yes THOR Blandon CNP   Handicap Placard MISC by Does not apply route Exp 1/2024 Yes THOR Blandon CNP   Cholecalciferol (VITAMIN D3) 400 units CAPS Take 1 tablet by mouth daily Yes Historical Provider, MD   aspirin EC 81 MG EC tablet Take 1 tablet by mouth daily Yes THOR Blandon CNP   magnesium gluconate (MAGONATE) 500 MG tablet Take 400 mg by mouth every evening  Yes Historical Provider, MD   vitamin B-6 (PYRIDOXINE) 100 MG tablet Take 100 mg by mouth daily  Yes Historical Provider, MD   Multiple Vitamins-Minerals (THERAPEUTIC MULTIVITAMIN-MINERALS) tablet Take 1 tablet by mouth daily. Yes Historical Provider, MD   vitamin B-12 (CYANOCOBALAMIN) 1000 MCG tablet Take 1,000 mcg by mouth daily.  Yes Historical Provider, MD   FLUoxetine (PROZAC) 10 MG capsule TAKE ONE CAPSULE BY MOUTH DAILY  THOR Blandon CNP   budesonide-formoterol (SYMBICORT) 160-4.5 MCG/ACT AERO Inhale 2 puffs into the lungs 2 times daily  Patient not taking: Reported on 11/30/2020 Elvia Landry MD   nitroGLYCERIN (NITROSTAT) 0.4 MG SL tablet Place 1 tablet under the tongue every 5 minutes as needed for Chest pain up to max of 3 total doses. If no relief after 1 dose, call 911. 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 Weight as of this encounter: 152 lb 12.8 oz (69.3 kg). DIAGNOSTIC FINDINGS:  CBC:  Lab Results   Component Value Date/Time    WBC 7.6 06/24/2022 05:38 PM    HGB 13.3 06/24/2022 05:38 PM     06/24/2022 05:38 PM       BMP:    Lab Results   Component Value Date/Time     06/24/2022 05:38 PM    K 3.4 06/24/2022 05:38 PM     06/24/2022 05:38 PM    CO2 23 06/24/2022 05:38 PM    BUN 9 06/24/2022 05:38 PM    CREATININE 0.63 06/24/2022 05:38 PM    GLUCOSE 97 06/24/2022 05:38 PM       HEMOGLOBIN A1C:   Lab Results   Component Value Date/Time    LABA1C 5.3 01/30/2018 01:01 PM       FASTING LIPID PANEL:  Lab Results   Component Value Date    CHOL 265 (H) 11/08/2017    HDL 63 02/11/2022    TRIG 115 11/08/2017       Physical Exam  Constitutional:       Appearance: Normal appearance. She is not ill-appearing or toxic-appearing. HENT:      Head: Normocephalic. Mouth/Throat:      Mouth: Mucous membranes are moist.   Eyes:      General: No scleral icterus.      Pupils: Pupils are equal, round, and reactive to light. Cardiovascular:      Rate and Rhythm: Normal rate. Pulmonary:      Effort: Pulmonary effort is normal.      Breath sounds: Normal breath sounds. Abdominal:      General: There is no distension. Palpations: Abdomen is soft. Tenderness: There is abdominal tenderness in the left upper quadrant. There is left CVA tenderness. There is no right CVA tenderness or guarding. Skin:     General: Skin is warm. Neurological:      Mental Status: She is alert and oriented to person, place, and time. ASSESSMENT     Diagnosis Orders   1. Chronic obstructive pulmonary disease, unspecified COPD type (HCC)  azithromycin (ZITHROMAX) 250 MG tablet    predniSONE (DELTASONE) 20 MG tablet      2. Moderate episode of recurrent major depressive disorder ([address]        3. Asthma, unspecified asthma severity, unspecified whether complicated, unspecified whether persistent        4. Smoking        5. Colitis  Jesus Little MD, Gastroenterology, [facility]      6. Acute left flank pain  POCT Urinalysis No Micro (Auto)    Culture, Urine    CT ABDOMEN PELVIS WO CONTRAST Additional Contrast? None      7.  Other microscopic hematuria  Culture, Urine    CT ABDOMEN PELVIS WO CONTRAST Additional Contrast? None             PLAN:  Orders Placed This Encounter   Medications    desvenlafaxine succinate (PRISTIQ) 50 MG TB24 extended release tablet     Sig: Take 1 tablet by mouth daily     Dispense:  30 tablet     Refill:  3    azithromycin (ZITHROMAX) 250 MG tablet     Sig: Take 1 tablet by mouth See Admin Instructions for 5 days 500mg on day 1 followed by 250mg on days 2 - 5     Dispense:  6 tablet     Refill:  0    predniSONE (DELTASONE) 20 MG tablet     Sig: Take 1 tablet by mouth daily for 5 days     Dispense:  5 tablet     Refill:  0         We will start Pristiq for depression symptoms, patient provided with local resources in order to schedule with psychiatry  Concern for COPD exacerbation, will start Z-Allen with 5-day course of prednisone. Given number to call and schedule with pulmonology for follow-up. Reviewed CT results, no abnormal abdominal pathology aside from acute colitis, will schedule patient for routine Pap exam.  Positive hematuria on point-of-care urinalysis, concern for stone. We will send for urine culture, although low suspicion given upper quadrant pain    FOLLOW UP AND INSTRUCTIONS:  Return in about 3 weeks (around 10/11/2022) for PAP exam.    Marianne Diaz received counseling on the following healthy behaviors:medication adherence and tobacco cessation    Discussed use, benefit, and side effects of prescribed medications. Barriers to  medication compliance addressed. All patient questions answered. Pt  verbalized understanding of all instructions given. Patient given educational materials - see patient instructions      Patient advised to contact scheduling offices for any referrals or imaging orders  placed today if they have not been contacted in 48 hours. Return in about 3 weeks (around 10/11/2022) for PAP exam.    An electronic signature was used to authenticate this note. --THOR Lagunas CNP on 9/20/2022 at 1:57 PM  Visit Information    Have you changed or started any medications since your last visit including any over-the-counter medicines, vitamins, or herbal medicines? no   Are you having any side effects from any of your medications? -  no  Have you stopped taking any of your medications? Is so, why? -  no    Have you seen any other physician or provider since your last visit? No  Have you had any other diagnostic tests since your last visit? No  Have you been seen in the emergency room and/or had an admission to a hospital since we last saw you? No  Have you had your routine dental cleaning in the past 6 months? no    Have you activated your [employer] account? If not, what are your barriers?  Yes     Patient Care Team:  THOR Lagunas CNP as PCP - General (Family Medicine)  THOR Mercer - CNP as PCP - [facility] Empaneled Provider    Medical History Review  Past Medical, Family, and Social History reviewed and does not contribute to the patient presenting condition    Health Maintenance   Topic Date Due    HIV screen  Never done    Hepatitis C screen  Never done    Cervical cancer screen  Never done    Low dose CT lung screening  Never done    Colorectal Cancer Screen  02/20/2019    Annual Wellness Visit (AWV)  Never done    Breast cancer screen  02/06/2020    Shingles vaccine (2 of 2) 06/25/2021    Depression Monitoring  01/23/2022    COVID-19 Vaccine (4 - Booster for Pfizer series) 07/09/2022    Flu vaccine (1) 09/01/2022    Lipids  02/11/2023    DTaP/Tdap/Td vaccine (2 - Td or Tdap) 09/20/2027    Pneumococcal 0-64 years Vaccine (3 - PPSV23 or PCV20) 08/08/2030    Hepatitis A vaccine  Aged Out    Hepatitis B vaccine  Aged Out    Hib vaccine  Aged Out    Meningococcal (ACWY) vaccine  Aged Out

## 2022-09-21 LAB
CULTURE: ABNORMAL
SPECIMEN DESCRIPTION: ABNORMAL

## 2022-09-22 RX ORDER — TIOTROPIUM BROMIDE INHALATION SPRAY 3.12 UG/1
SPRAY, METERED RESPIRATORY (INHALATION)
Qty: 4 G | Refills: 1 | Status: SHIPPED | OUTPATIENT
Start: 2022-09-22

## 2022-09-22 NOTE — TELEPHONE ENCOUNTER
Health Maintenance   Topic Date Due    HIV screen  Never done    Hepatitis C screen  Never done    Cervical cancer screen  Never done    Low dose CT lung screening  Never done    Colorectal Cancer Screen  02/20/2019    Annual Wellness Visit (AWV)  Never done    Breast cancer screen  02/06/2020    Shingles vaccine (2 of 2) 06/25/2021    COVID-19 Vaccine (4 - Booster for Pfizer series) 07/09/2022    Flu vaccine (1) 09/01/2022    Lipids  02/11/2023    Depression Monitoring  09/20/2023    DTaP/Tdap/Td vaccine (2 - Td or Tdap) 09/20/2027    Pneumococcal 0-64 years Vaccine (3 - PPSV23 or PCV20) 08/08/2030    Hepatitis A vaccine  Aged Out    Hepatitis B vaccine  Aged Out    Hib vaccine  Aged Out    Meningococcal (ACWY) vaccine  Aged Out             (applicable per patient's age: Cancer Screenings, Depression Screening, Fall Risk Screening, Immunizations)    Hemoglobin A1C (%)   Date Value   01/30/2018 5.3   08/02/2013 5.3   02/14/2013 6.0     LDL Cholesterol (mg/dL)   Date Value   02/11/2022 90     AST (U/L)   Date Value   06/24/2022 21     ALT (U/L)   Date Value   06/24/2022 25     BUN (mg/dL)   Date Value   06/24/2022 9      (goal A1C is < 7)   (goal LDL is <100) need 30-50% reduction from baseline     BP Readings from Last 3 Encounters:   09/20/22 123/84   06/27/22 (!) 129/99   04/10/22 (!) 173/104    (goal /80)      All Future Testing planned in [facility]:  Lab Frequency Next Occurrence   CONCETTA Digital Screen Bilateral [[id number]] Once 04/07/2022   CT ABDOMEN PELVIS WO CONTRAST Additional Contrast? None Once 09/20/2022       Next Visit Date:  Future Appointments   Date Time Provider Kaycee Hays   9/29/2022  3:00 PM STC CT RM 2 FAST SCANNER STCZ CT SCAN ST [facility]   9/29/2022  3:30 PM STC DIAG MAMMO RM STCZ MAMMO ST [facility]   10/19/2022  1:30 PM THOR Lagunas - CNP [facility] WALK IN [facility]            Patient Active Problem List:     Heart disease     Chronic back pain     Depression     Hyperlipidemia CAD, multiple vessel     Asthma     Smoking     Need for vaccination     Syncope     Leg pain     Left hip pain     Chronic cholecystitis     Chest pain     Calculus of gallbladder without cholecystitis without obstruction     History of lumbar fusion     Failed back syndrome     Marijuana use     Abnormal weight loss     Allergic rhinitis     Anxiety state     Chronic midline low back pain with sciatica     Moderate episode of recurrent major depressive disorder (HCC)     Seizure (HCC)     Chronic tension-type headache, intractable     Hypertension     Sinus tachycardia     Acute respiratory failure (HCC)     COPD (chronic obstructive pulmonary disease) (HCC)     COPD exacerbation (HCC)     Acute bronchitis     Headache     Syncope and collapse     Unstable angina ([facility] [address]

## 2022-09-23 ENCOUNTER — TELEPHONE (OUTPATIENT)
Dept: PRIMARY CARE CLINIC | Age: 57
End: 2022-09-23

## 2022-09-23 DIAGNOSIS — N30.00 ACUTE CYSTITIS WITHOUT HEMATURIA: Primary | ICD-10-CM

## 2022-09-23 RX ORDER — AMOXICILLIN 500 MG/1
500 CAPSULE ORAL 2 TIMES DAILY
Qty: 14 CAPSULE | Refills: 0 | Status: SHIPPED | OUTPATIENT
Start: 2022-09-23 | End: 2022-09-30

## 2022-09-23 NOTE — TELEPHONE ENCOUNTER
Please notify Belleville Mckenna that her urine culture came back positive for a UTI, best treated with a course of amoxicillin.   The prescription was sent to her local pharmacy

## 2022-09-23 NOTE — LETTER
[facility]  [address] [address]  Phone: [phone]  Fax: [phone] [facility], APRN - CNP        September 26, 2022    Javed Bhagat  [address] [facility] [facility] [address]      Dear Diann Boothe: We have been unable to reach you in regards to your recent laboratory results received in our office. Please contact our office at your earliest convenience to discuss these results. We look forward to hearing from you. If you have any questions or concerns, please don't hesitate to call.     Sincerely,        THOR Epstein CNP

## 2022-09-29 ENCOUNTER — HOSPITAL ENCOUNTER (OUTPATIENT)
Dept: WOMENS IMAGING | Age: 57
Discharge: HOME | End: 2022-10-01
Payer: MEDICARE

## 2022-09-29 ENCOUNTER — HOSPITAL ENCOUNTER (OUTPATIENT)
Dept: CT IMAGING | Age: 57
Discharge: HOME | End: 2022-10-01
Payer: MEDICARE

## 2022-09-29 DIAGNOSIS — R31.29 OTHER MICROSCOPIC HEMATURIA: ICD-10-CM

## 2022-09-29 DIAGNOSIS — Z12.31 ENCOUNTER FOR SCREENING MAMMOGRAM FOR BREAST CANCER: ICD-10-CM

## 2022-09-29 DIAGNOSIS — R10.9 ACUTE LEFT FLANK PAIN: ICD-10-CM

## 2022-09-29 PROCEDURE — 77063 BREAST TOMOSYNTHESIS BI: CPT

## 2022-09-29 PROCEDURE — 74176 CT ABD & PELVIS W/O CONTRAST: CPT

## 2022-10-02 ENCOUNTER — TELEPHONE (OUTPATIENT)
Dept: PRIMARY CARE CLINIC | Age: 57
End: 2022-10-02

## 2022-10-02 DIAGNOSIS — E78.00 PURE HYPERCHOLESTEROLEMIA: ICD-10-CM

## 2022-10-03 RX ORDER — ATORVASTATIN CALCIUM 40 MG/1
TABLET, FILM COATED ORAL
Qty: 90 TABLET | Refills: 0 | Status: SHIPPED | OUTPATIENT
Start: 2022-10-03

## 2022-10-03 NOTE — TELEPHONE ENCOUNTER
Health Maintenance   Topic Date Due    HIV screen  Never done    Hepatitis C screen  Never done    Cervical cancer screen  Never done    Low dose CT lung screening  Never done    Colorectal Cancer Screen  02/20/2019    Annual Wellness Visit (AWV)  Never done    Shingles vaccine (2 of 2) 06/25/2021    COVID-19 Vaccine (4 - Booster for Pfizer series) 07/09/2022    Flu vaccine (1) 08/01/2022    Lipids  02/11/2023    Depression Monitoring  09/20/2023    Breast cancer screen  09/29/2024    DTaP/Tdap/Td vaccine (2 - Td or Tdap) 09/20/2027    Pneumococcal 0-64 years Vaccine (3 - PPSV23 or PCV20) 08/08/2030    Hepatitis A vaccine  Aged Out    Hepatitis B vaccine  Aged Out    Hib vaccine  Aged Out    Meningococcal (ACWY) vaccine  Aged Out             (applicable per patient's age: Cancer Screenings, Depression Screening, Fall Risk Screening, Immunizations)    Hemoglobin A1C (%)   Date Value   01/30/2018 5.3   08/02/2013 5.3   02/14/2013 6.0     LDL Cholesterol (mg/dL)   Date Value   02/11/2022 90     AST (U/L)   Date Value   06/24/2022 21     ALT (U/L)   Date Value   06/24/2022 25     BUN (mg/dL)   Date Value   06/24/2022 9      (goal A1C is < 7)   (goal LDL is <100) need 30-50% reduction from baseline     BP Readings from Last 3 Encounters:   09/20/22 123/84   06/27/22 (!) 129/99   04/10/22 (!) 173/104    (goal /80)      All Future Testing planned in [facility]:  Lab Frequency Next Occurrence       Next Visit Date:  Future Appointments   Date Time Provider Kaycee Hays   10/19/2022  1:30 PM THOR Rahman - CNP [facility]ALK [facility]   10/24/2022  1:45 PM Mayito Mayes MD [facility]            Patient Active Problem List:     Heart disease     Chronic back pain     Depression     Hyperlipidemia     CAD, multiple vessel     Asthma     Smoking     Need for vaccination     Syncope     Leg pain     Left hip pain     Chronic cholecystitis     Chest pain     Calculus of gallbladder without cholecystitis without obstruction     History of lumbar fusion     Failed back syndrome     Marijuana use     Abnormal weight loss     Allergic rhinitis     Anxiety state     Chronic midline low back pain with sciatica     Moderate episode of recurrent major depressive disorder (HCC)     Seizure (HCC)     Chronic tension-type headache, intractable     Hypertension     Sinus tachycardia     Acute respiratory failure (HCC)     COPD (chronic obstructive pulmonary disease) (HCC)     COPD exacerbation (HCC)     Acute bronchitis     Headache     Syncope and collapse     Unstable angina ([facility] [address]

## 2022-11-03 ENCOUNTER — HOSPITAL ENCOUNTER (EMERGENCY)
Age: 57
Discharge: HOME | End: 2022-11-03
Attending: EMERGENCY MEDICINE
Payer: MEDICARE

## 2022-11-03 ENCOUNTER — HOSPITAL ENCOUNTER (EMERGENCY)
Age: 57
Discharge: HOME | End: 2022-11-03

## 2022-11-03 ENCOUNTER — APPOINTMENT (OUTPATIENT)
Dept: GENERAL RADIOLOGY | Age: 57
End: 2022-11-03
Payer: MEDICARE

## 2022-11-03 VITALS
RESPIRATION RATE: 12 BRPM | DIASTOLIC BLOOD PRESSURE: 83 MMHG | HEART RATE: 91 BPM | SYSTOLIC BLOOD PRESSURE: 125 MMHG | TEMPERATURE: 97.9 F | OXYGEN SATURATION: 96 % | WEIGHT: 152 LBS | BODY MASS INDEX: 23.81 KG/M2

## 2022-11-03 DIAGNOSIS — J44.1 COPD EXACERBATION (HCC): Primary | ICD-10-CM

## 2022-11-03 DIAGNOSIS — J18.9 PNEUMONIA DUE TO INFECTIOUS ORGANISM, UNSPECIFIED LATERALITY, UNSPECIFIED PART OF LUNG: ICD-10-CM

## 2022-11-03 LAB
ABSOLUTE EOS #: 0.07 K/UL (ref 0–0.44)
ABSOLUTE IMMATURE GRANULOCYTE: 0.06 K/UL (ref 0–0.3)
ABSOLUTE LYMPH #: 2.19 K/UL (ref 1.1–3.7)
ABSOLUTE MONO #: 1.11 K/UL (ref 0.1–1.2)
ANION GAP SERPL CALCULATED.3IONS-SCNC: 15 MMOL/L (ref 9–17)
BASOPHILS # BLD: 0 % (ref 0–2)
BASOPHILS ABSOLUTE: 0.04 K/UL (ref 0–0.2)
BUN BLDV-MCNC: 7 MG/DL (ref 6–20)
CALCIUM SERPL-MCNC: 9.4 MG/DL (ref 8.6–10.4)
CHLORIDE BLD-SCNC: 98 MMOL/L (ref 98–107)
CO2: 24 MMOL/L (ref 20–31)
CREAT SERPL-MCNC: 0.5 MG/DL (ref 0.5–0.9)
D-DIMER QUANTITATIVE: 0.51 MG/L FEU
EOSINOPHILS RELATIVE PERCENT: 1 % (ref 1–4)
GFR SERPL CREATININE-BSD FRML MDRD: >60 ML/MIN/1.73M2
GLUCOSE BLD-MCNC: 92 MG/DL (ref 70–99)
HCT VFR BLD CALC: 41.5 % (ref 36.3–47.1)
HEMOGLOBIN: 13.8 G/DL (ref 11.9–15.1)
IMMATURE GRANULOCYTES: 0 %
LYMPHOCYTES # BLD: 16 % (ref 24–43)
MCH RBC QN AUTO: 31.2 PG (ref 25.2–33.5)
MCHC RBC AUTO-ENTMCNC: 33.3 G/DL (ref 28.4–34.8)
MCV RBC AUTO: 93.9 FL (ref 82.6–102.9)
MONOCYTES # BLD: 8 % (ref 3–12)
NRBC AUTOMATED: 0 PER 100 WBC
PDW BLD-RTO: 13.2 % (ref 11.8–14.4)
PLATELET # BLD: 246 K/UL (ref 138–453)
PMV BLD AUTO: 8.6 FL (ref 8.1–13.5)
POTASSIUM SERPL-SCNC: 3.3 MMOL/L (ref 3.7–5.3)
RBC # BLD: 4.42 M/UL (ref 3.95–5.11)
SARS-COV-2, RAPID: NOT DETECTED
SEG NEUTROPHILS: 75 % (ref 36–65)
SEGMENTED NEUTROPHILS ABSOLUTE COUNT: 10.68 K/UL (ref 1.5–8.1)
SODIUM BLD-SCNC: 137 MMOL/L (ref 135–144)
SPECIMEN DESCRIPTION: NORMAL
TROPONIN, HIGH SENSITIVITY: 6 NG/L (ref 0–14)
TROPONIN, HIGH SENSITIVITY: 8 NG/L (ref 0–14)
WBC # BLD: 14.2 K/UL (ref 3.5–11.3)

## 2022-11-03 PROCEDURE — 85025 COMPLETE CBC W/AUTO DIFF WBC: CPT

## 2022-11-03 PROCEDURE — 6370000000 HC RX 637 (ALT 250 FOR IP): Performed by: STUDENT IN AN ORGANIZED HEALTH CARE EDUCATION/TRAINING PROGRAM

## 2022-11-03 PROCEDURE — 87635 SARS-COV-2 COVID-19 AMP PRB: CPT

## 2022-11-03 PROCEDURE — 96374 THER/PROPH/DIAG INJ IV PUSH: CPT

## 2022-11-03 PROCEDURE — 93005 ELECTROCARDIOGRAM TRACING: CPT | Performed by: STUDENT IN AN ORGANIZED HEALTH CARE EDUCATION/TRAINING PROGRAM

## 2022-11-03 PROCEDURE — 84484 ASSAY OF TROPONIN QUANT: CPT

## 2022-11-03 PROCEDURE — 6360000002 HC RX W HCPCS: Performed by: STUDENT IN AN ORGANIZED HEALTH CARE EDUCATION/TRAINING PROGRAM

## 2022-11-03 PROCEDURE — 85379 FIBRIN DEGRADATION QUANT: CPT

## 2022-11-03 PROCEDURE — 99285 EMERGENCY DEPT VISIT HI MDM: CPT

## 2022-11-03 PROCEDURE — 71046 X-RAY EXAM CHEST 2 VIEWS: CPT

## 2022-11-03 PROCEDURE — 80048 BASIC METABOLIC PNL TOTAL CA: CPT

## 2022-11-03 PROCEDURE — 84145 PROCALCITONIN (PCT): CPT

## 2022-11-03 RX ORDER — PREDNISONE 20 MG/1
40 TABLET ORAL ONCE
Status: COMPLETED | OUTPATIENT
Start: 2022-11-03 | End: 2022-11-03

## 2022-11-03 RX ORDER — ONDANSETRON 4 MG/1
4 TABLET, ORALLY DISINTEGRATING ORAL ONCE
Status: COMPLETED | OUTPATIENT
Start: 2022-11-03 | End: 2022-11-03

## 2022-11-03 RX ORDER — PREDNISONE 50 MG/1
50 TABLET ORAL DAILY
Qty: 5 TABLET | Refills: 0 | Status: SHIPPED | OUTPATIENT
Start: 2022-11-03 | End: 2022-11-08

## 2022-11-03 RX ORDER — KETOROLAC TROMETHAMINE 30 MG/ML
30 INJECTION, SOLUTION INTRAMUSCULAR; INTRAVENOUS ONCE
Status: COMPLETED | OUTPATIENT
Start: 2022-11-03 | End: 2022-11-03

## 2022-11-03 RX ORDER — AZITHROMYCIN 250 MG/1
500 TABLET, FILM COATED ORAL ONCE
Status: COMPLETED | OUTPATIENT
Start: 2022-11-03 | End: 2022-11-03

## 2022-11-03 RX ORDER — ONDANSETRON 4 MG/1
4 TABLET, ORALLY DISINTEGRATING ORAL EVERY 8 HOURS PRN
Qty: 12 TABLET | Refills: 0 | Status: SHIPPED | OUTPATIENT
Start: 2022-11-03 | End: 2022-11-07

## 2022-11-03 RX ORDER — AZITHROMYCIN 250 MG/1
250 TABLET, FILM COATED ORAL DAILY
Qty: 5 TABLET | Refills: 0 | Status: SHIPPED | OUTPATIENT
Start: 2022-11-03 | End: 2022-11-08

## 2022-11-03 RX ADMIN — KETOROLAC TROMETHAMINE 30 MG: 30 INJECTION, SOLUTION INTRAMUSCULAR at 18:37

## 2022-11-03 RX ADMIN — AZITHROMYCIN 500 MG: 250 TABLET, FILM COATED ORAL at 21:31

## 2022-11-03 RX ADMIN — PREDNISONE 40 MG: 20 TABLET ORAL at 20:06

## 2022-11-03 RX ADMIN — ONDANSETRON 4 MG: 4 TABLET, ORALLY DISINTEGRATING ORAL at 21:31

## 2022-11-03 ASSESSMENT — ENCOUNTER SYMPTOMS
CHEST TIGHTNESS: 1
RHINORRHEA: 0
COUGH: 1
SORE THROAT: 0
VOMITING: 0
NAUSEA: 0
ABDOMINAL PAIN: 0
SHORTNESS OF BREATH: 1
DIARRHEA: 0

## 2022-11-03 NOTE — ED NOTES
Report received from [employer], RN. All questions answered. Pt is alert and oriented times 4. Pt speaking full sentences and answering questions appropriately. Respirations even and unlabored. Needs met at this time.       Marlin Jose LPN  11/38/51 [id number]

## 2022-11-03 NOTE — PROGRESS NOTES
[address] [facility] [address]  PROGRESS NOTE    Shift date: 11/3/22  Shift day: Thursday   Shift # 2    Room # 08/08   Name: Zuleyka Steward                Worship: Alix Pandey [employer]:     Referral: Routine Visit    Admit Date & Time: 11/3/2022  6:05 PM    Assessment:  Zuleyka Steward is a 62 y.o. female     Intervention:  Writer introduced self and title as . Patient welcomed  presence and engaged in conversation about her health and its impact. Writer offered space for patient  to express feelings, needs, and concerns and provided a ministry presence. Patient discussed family dynamics and appeared tearful and stressed when talking.  validated patient feelings/emotions and offered prayer for spiritual comfort/support which was accepted. Outcome:  Patient expressed gratitude for visit. Plan:  Chaplains will remain available to offer spiritual and emotional support as needed.       Electronically signed by Luke Costa on 11/3/2022 at 7:09 PM.  [facility]  [phone]

## 2022-11-03 NOTE — ED NOTES
Pt brought to ED by self. Pt states that they have been experiencing \"terrible\" headaches for a while. And just started experiencing nasal congestion, a productive cough, and weakness for 2 days now. Pt states that OTC meds do not seem to work for the symptoms. PT also complains of reduced appetite. Pt has hx of COPD and CHF. Pt is compliant with medications. PT alert and oriented. EKG done. PT connected to continuous monitor.       Alvin Ron RN  11/03/22 102 [facility], RN  11/03/22 7855

## 2022-11-03 NOTE — ED PROVIDER NOTES
[facility] ED  Emergency Department  Emergency Medicine Resident Sign-out     Care of Thalia Downing was assumed from Dr. Dino Ventura and is being seen for Shortness of Breath, Extremity Weakness, and Nasal Congestion  . The patient's initial evaluation and plan have been discussed with the prior provider who initially evaluated the patient.      EMERGENCY DEPARTMENT COURSE / MEDICAL DECISION MAKING:       MEDICATIONS GIVEN:  Orders Placed This Encounter   Medications    ketorolac (TORADOL) injection 30 mg    predniSONE (DELTASONE) tablet 40 mg    azithromycin (ZITHROMAX) tablet 500 mg     Order Specific Question:   Antimicrobial Indications     Answer:   Pneumonia (CAP)    azithromycin (ZITHROMAX) 250 MG tablet     Sig: Take 1 tablet by mouth daily for 5 days     Dispense:  5 tablet     Refill:  0    predniSONE (DELTASONE) 50 MG tablet     Sig: Take 1 tablet by mouth daily for 5 days     Dispense:  5 tablet     Refill:  0    ondansetron (ZOFRAN-ODT) disintegrating tablet 4 mg    ondansetron (ZOFRAN ODT) 4 MG disintegrating tablet     Sig: Take 1 tablet by mouth every 8 hours as needed for Nausea or Vomiting     Dispense:  12 tablet     Refill:  0       LABS / RADIOLOGY:     Labs Reviewed   BASIC METABOLIC PANEL - Abnormal; Notable for the following components:       Result Value    Potassium 3.3 (*)     All other components within normal limits   CBC WITH AUTO DIFFERENTIAL - Abnormal; Notable for the following components:    WBC 14.2 (*)     Seg Neutrophils 75 (*)     Lymphocytes 16 (*)     Segs Absolute 10.68 (*)     All other components within normal limits   COVID-19, RAPID   D-DIMER, QUANTITATIVE   TROPONIN   TROPONIN   PROCALCITONIN       XR CHEST (2 VW)    Result Date: 11/3/2022  EXAMINATION: TWO XRAY VIEWS OF THE CHEST 11/3/2022 4:22 pm COMPARISON: 09/15/2021 and 04/10/2022 HISTORY: ORDERING SYSTEM PROVIDED HISTORY: sob, copd TECHNOLOGIST PROVIDED HISTORY: sob, copd Reason for Exam: sob,nasal START taking these medications    Details   azithromycin (ZITHROMAX) 250 MG tablet Take 1 tablet by mouth daily for 5 days, Disp-5 tablet, R-0Print      predniSONE (DELTASONE) 50 MG tablet Take 1 tablet by mouth daily for 5 days, Disp-5 tablet, R-0Print                Deja Beaver DO  Emergency Medicine Resident  [facility]       Danny Carballo [place]  Resident  11/04/22 4149

## 2022-11-03 NOTE — ED PROVIDER NOTES
[facility] ED  Emergency Department Encounter  Emergency Medicine Resident     Pt Name: Nikko Bernal  MRN: [id number]  Izabellagfwellington 1965  Date of evaluation: 11/3/22  PCP:  THOR Natarajan 0122       Chief Complaint   Patient presents with    Shortness of Breath    Extremity Weakness    Nasal Congestion       HISTORY OFPRESENT ILLNESS  (Location/Symptom, Timing/Onset, Context/Setting, Quality, Duration, Modifying Lis Scott.)      Nikko Bernal is a 62 y.o. female who presents with complaint of headache, shortness of breath and nasal congestion for the past 2 days. Also complains of generalized weakness, no focal weakness of the arms or legs, although this is recorded as chief complaint. Says that she has a history of COPD and uses inhalers and nebulizers at home but these have not improved her symptoms. Denies fevers, nausea, vomiting, constipation, diarrhea, dysuria, abdominal pain, leg swelling. Boyfriend, daughter, katelyn daughter's child sick with similar symptoms. Cough is productive of yellow-colored sputum. PAST MEDICAL / SURGICAL / SOCIAL / FAMILY HISTORY      has a past medical history of Acute MI ([address] Anxiety, Chronic back pain, COPD (chronic obstructive pulmonary disease) ([address] Depression, Heart disease, Hyperlipidemia, Hypertension, MRSA (methicillin resistant staph aureus) culture positive, and Seizures ([address] has a past surgical history that includes Tubal ligation; Tonsillectomy; Cholecystectomy, laparoscopic (11/10/2017); Cholecystectomy, laparoscopic (N/A, 11/10/2017); and back surgery. Social:  reports that she has been smoking cigarettes. She has a 40.00 pack-year smoking history. She has never used smokeless tobacco. She reports current drug use. Drug: Marijuana [employer]). She reports that she does not drink alcohol.     Family Hx:   Family History   Problem Relation Age of Onset    Other Mother     Heart Disease Father     High Blood Pressure Father     High Cholesterol Father     Other Brother     Breast Cancer Maternal Grandmother         Allergies:  Sulfa antibiotics    Home Medications:  Prior to Admission medications    Medication Sig Start Date End Date Taking?  Authorizing Provider   atorvastatin (LIPITOR) 40 MG tablet TAKE ONE TABLET BY MOUTH DAILY 10/3/22   THOR Guzman CNP   SPIRIVA RESPIMAT 2.5 MCG/ACT AERS inhaler INHALE TWO PUFFS BY MOUTH DAILY 9/22/22   THOR Guzman CNP   desvenlafaxine succinate (PRISTIQ) 50 MG TB24 extended release tablet Take 1 tablet by mouth daily 9/20/22   THOR Guzman CNP   buPROPion [facility]) 150 MG extended release tablet TAKE ONE TABLET BY MOUTH TWICE A DAY 8/29/22   THOR Guzman CNP   albuterol (PROVENTIL) (2.5 MG/3ML) 0.083% nebulizer solution USE THREE MILLILITERS (ONE VIAL) VIA NEBULIZATION BY MOUTH EVERY 6 HOURS AS NEEDED FOR WHEEZING 8/22/22   THOR Guzman CNP   albuterol sulfate HFA (VENTOLIN HFA) 108 (90 Base) MCG/ACT inhaler Inhale 2 puffs into the lungs every 4 hours as needed for Wheezing 8/17/22   THOR Guzman CNP   metoprolol succinate (TOPROL XL) 25 MG extended release tablet Take 1 tablet by mouth daily for 21 days 6/28/22 9/20/22  Isabelle Capone MD   baclofen (LIORESAL) 10 MG tablet TAKE ONE TABLET BY MOUTH ONCE NIGHTLY AS NEEDED FOR PAIN 6/20/22   THOR Guzman CNP   BREO ELLIPTA 100-25 MCG/INH AEPB inhaler INHALE ONE DOSE BY MOUTH DAILY 5/23/22   THOR Guzman CNP   FLUoxetine (PROZAC) 10 MG capsule TAKE ONE CAPSULE BY MOUTH DAILY 5/16/22   THOR Guzman CNP   Elastic Bandages & Supports (CARPAL TUNNEL WRIST STABILIZER) [address] [facility] Wear nightly while asleep 1/20/22   THOR Guzman CNP   levETIRAcetam (KEPPRA) 750 MG tablet Take 1 tablet by mouth nightly 9/17/21   Dinesh Laboy MD   acetaminophen (TYLENOL) 325 MG tablet Take 650 mg by mouth every 6 hours as needed for Pain Historical Provider, MD   albuterol (PROVENTIL) (5 MG/ML) 0.5% nebulizer solution Take 0.5 mLs by nebulization 4 times daily 6/15/21   Francisco Grullon MD   zinc gluconate 50 MG tablet Take 50 mg by mouth daily    Historical Provider, MD   Ascorbic Acid (VITAMIN C) 250 MG tablet Take 250 mg by mouth daily    Historical Provider, MD   Multiple Vitamins-Minerals (EMERGEN-C VITAMIN C PO) Take by mouth daily    Historical Provider, MD   budesonide-formoterol (SYMBICORT) 160-4.5 MCG/ACT AERO Inhale 2 puffs into the lungs 2 times daily  Patient not taking: Reported on 11/30/2020 10/16/20 7/9/21  Evan Leblanc MD   Nebulizers (NEBULIZER COMPRESSOR) MISC Daily as needed 9/4/20   THOR Greenfield CNP   Respiratory Therapy Supplies (NEBULIZER/TUBING/MOUTHPIECE) KIT 1 kit by Does not apply route daily as needed (SOB or wheezing) 9/4/20   THOR Greenfield CNP   Handicap Placard MISC by Does not apply route Exp 1/2024 1/20/20   THOR Greenfield CNP   Cholecalciferol (VITAMIN D3) 400 units CAPS Take 1 tablet by mouth daily    Historical Provider, MD   aspirin EC 81 MG EC tablet Take 1 tablet by mouth daily 3/6/19   THOR Greenfield CNP   magnesium gluconate (MAGONATE) 500 MG tablet Take 400 mg by mouth every evening     Historical Provider, MD   vitamin B-6 (PYRIDOXINE) 100 MG tablet Take 100 mg by mouth daily     Historical Provider, MD   nitroGLYCERIN (NITROSTAT) 0.4 MG SL tablet Place 1 tablet under the tongue every 5 minutes as needed for Chest pain up to max of 3 total doses. If no relief after 1 dose, call 911. 1/30/18   Deniz Gutierrez MD   Multiple Vitamins-Minerals (THERAPEUTIC MULTIVITAMIN-MINERALS) tablet Take 1 tablet by mouth daily. Historical Provider, MD   vitamin B-12 (CYANOCOBALAMIN) 1000 MCG tablet Take 1,000 mcg by mouth daily.     Historical Provider, MD       REVIEW OFSYSTEMS    (2-9 systems for level 4, 10 or more for level 5)      Review of Systems   Constitutional: Tenderness: There is no abdominal tenderness. There is no guarding or rebound. Musculoskeletal:         General: Normal range of motion. Cervical back: Normal range of motion and neck supple. Skin:     General: Skin is warm. Capillary Refill: Capillary refill takes less than 2 seconds. Neurological:      General: No focal deficit present. Mental Status: She is alert and oriented to person, place, and time. Psychiatric:         Mood and Affect: Mood normal.         Behavior: Behavior normal.       MEDICAL DECISION MAKING     Initial MDM/Plan: 62 y.o. female who presents with shortness of breath, nasal congestion, cough productive of colored sputum in the context of known COPD and positive sick contacts. No fevers at home. Vital signs reviewed notable for borderline tachycardia, otherwise is not hypoxic and not in respiratory distress. Physical examination as above. Highly suspect COPD exacerbation secondary to viral illness, will obtain a chest x-ray to evaluate for any focal pneumonia. Also will obtain a cardiac work-up that includes EKG, troponin given history of cardiac disease, although less likely as she does not complain of any chest pain. Given no new oxygen requirement, probable discharge.                DIAGNOSTIC RESULTS / EMERGENCYDEPARTMENT COURSE / MDM     LABS:  Labs Reviewed   CBC WITH AUTO DIFFERENTIAL - Abnormal; Notable for the following components:       Result Value    WBC 14.2 (*)     Seg Neutrophils 75 (*)     Lymphocytes 16 (*)     Segs Absolute 10.68 (*)     All other components within normal limits   COVID-19, RAPID   D-DIMER, QUANTITATIVE   BASIC METABOLIC PANEL   PROCALCITONIN   TROPONIN         RADIOLOGY:  XR CHEST (2 VW)   Final Result   No acute cardiopulmonary disease            EKG  EKG Interpretation    Interpreted by emergency department physician    Rhythm: sinus tachycardia  Rate: 100-110  Axis: normal  Ectopy: none  Conduction: normal  ST Segments: normal  T Waves: normal  Q Waves: none    Clinical Impression: non-specific EKG    Fariha Cortes MD      All EKG's are interpreted by the Emergency Department Physicianwho either signs or Co-signs this chart in the absence of a cardiologist.    EMERGENCY DEPARTMENT COURSE:  ED Course as of 11/07/22 1319   Thu Nov 03, 2022 1852 WBC(!): 14.2 [JT]   1912 D-Dimer, Quant: 0.51  Age adjusted normal [JT]   1953 CXR negative for pneumonia  [JT]   2044 Signout taken from daytime resident. Will get second troponin. Disposition planning this [ZE]   2118 Troponin 6 down from a will discharge [ZE]      ED Course User Index  [JT] Fariha Cortes MD  [ZE] Madonna First, DO          PROCEDURES:  None    CONSULTS:  None      FINAL IMPRESSION      1. Shortness of breath        DISPOSITION / PLAN     DISPOSITION        PATIENT REFERRED TO:  No follow-up provider specified.     DISCHARGE MEDICATIONS:  New Prescriptions    No medications on file       Fariha Cortes MD  Emergency Medicine Resident    (Please note that portions of this note were completed with a voice recognition program.Efforts were made to edit the dictations but occasionally words are mis-transcribed.)        Fariha Cortes MD  Resident  11/07/22 5686

## 2022-11-04 LAB
EKG ATRIAL RATE: 104 BPM
EKG P AXIS: 44 DEGREES
EKG P-R INTERVAL: 134 MS
EKG Q-T INTERVAL: 328 MS
EKG QRS DURATION: 84 MS
EKG QTC CALCULATION (BAZETT): 431 MS
EKG R AXIS: 48 DEGREES
EKG T AXIS: 48 DEGREES
EKG VENTRICULAR RATE: 104 BPM
PROCALCITONIN: 0.06 NG/ML

## 2022-11-04 PROCEDURE — 93010 ELECTROCARDIOGRAM REPORT: CPT | Performed by: INTERNAL MEDICINE

## 2022-11-04 NOTE — DISCHARGE INSTRUCTIONS
Take your medication as indicated and prescribed. If you are given an antibiotic then, make sure you get the prescription filled and take the antibiotics until finished. Drink plenty of water while taking the antibiotics. Avoid drinking alcohol or drinks that have caffeine in it while taking antibiotics. If you were given a prescription for prednisone or any other steroid then, take Pepcid (famotidine - over the counter) every day while you are taking the steroids. If you are a diabetic, you should check your blood sugar more frequently while taking prednisone. For fever or pain use acetaminophen (Tylenol) or ibuprofen (Motrin / Advil), unless prescribed medications that have acetaminophen or ibuprofen (or similar medications) in it. You can take over the counter acetaminophen tablets (1 - 2 tablets of the 500-mg strength every 6 hours) or ibuprofen tablets (2 tablets every 4 hours). PLEASE RETURN TO THE EMERGENCY DEPARTMENT IMMEDIATELY for worsening symptoms, shortness of breathing, change in the amount of sputum that you cough up or a change in the color of your sputum, using your inhaler more frequently or if your inhaler only lasts up to 2 hours (if given an inhaler), or if you develop any concerning symptoms such as: high fever not relieved by acetaminophen (Tylenol) and/or ibuprofen (Motrin / Advil), chills, shortness of breath, chest pain, feeling of your heart fluttering or racing, persistent nausea and/or vomiting, vomiting up blood, blood in your stool, loss of consciousness, numbness, weakness or tingling in the arms or legs or change in color of the extremities, changes in mental status, persistent headache, blurry vision, loss of bladder / bowel control, unable to follow up with your physician, or other any other care or concern.

## 2022-11-04 NOTE — ED PROVIDER NOTES
[facility]     Emergency Department     Faculty Attestation    I performed a history and physical examination of the patient and discussed management with the resident. I reviewed the residents note and agree with the documented findings including all diagnostic interpretations and plan of care. Any areas of disagreement are noted on the chart. I was personally present for the key portions of any procedures. I have documented in the chart those procedures where I was not present during the key portions. I have reviewed the emergency nurses triage note. I agree with the chief complaint, past medical history, past surgical history, allergies, medications, social and family history as documented unless otherwise noted below. Documentation of the HPI, Physical Exam and Medical Decision Making performed by milton is based on my personal performance of the HPI, PE and MDM. For Physician Assistant/ Nurse Practitioner cases/documentation I have personally evaluated this patient and have completed at least one if not all key elements of the E/M (history, physical exam, and MDM). Additional findings are as noted. Primary Care Physician: THOR Miller - CNP    History: This is a 62 y.o. female who presents to the Emergency Department with complaint of shortness of breath, cough. Some chest congestion with this. No fevers. Is a smoker. Physical:     weight is 152 lb (68.9 kg). Her oral temperature is 97.9 °F (36.6 °C). Her blood pressure is 125/83 and her pulse is 91. Her respiration is 12 and oxygen saturation is 96%.    62 y.o. female no acute distress, cardiac exam regular rate and rhythm no murmurs rubs gallops, pulmonary shows Rales in the left middle lobe clearing with coughing. No wheezes. Abdomen soft nontender nondistended.     Impression: Respiratory infection    Plan: Labs, chest x-ray, COVID swab, symptomatic treatment, reassess    EKG Interpretation    Interpreted by me    Rhythm: normal sinus   Rate: normal  Axis: normal  Ectopy: none  Conduction: normal  ST Segments: no acute change  T Waves: no acute change  Q Waves: none    Clinical Impression: no acute changes and normal EKG  Thang Calero MD, Roger Lewis  Attending Emergency Physician        Radene Lanes, MD  11/03/22 7455

## 2022-11-04 NOTE — ED NOTES
The following labs were labeled with appropriate pt sticker and tubed to lab:     [] Blue     [] Lavender   [] on ice  [x] Green/yellow  [] Green/black [] on ice  [] Natalie Salome  [] on ice  [] Yellow  [] Red  [] Type/ Screen  [] ABG  [] VBG    [] COVID-19 swab    [] Rapid  [] PCR  [] Flu swab  [] Peds Viral Panel     [] Urine Sample  [] Pelvic Cultures  [] Blood Cultures  [] X 2  [] STREP Cultures       Chu Madden LPN  21/78/56 [id number]

## 2022-11-11 DIAGNOSIS — J43.9 PULMONARY EMPHYSEMA, UNSPECIFIED EMPHYSEMA TYPE (HCC): ICD-10-CM

## 2022-11-11 RX ORDER — BACLOFEN 10 MG/1
TABLET ORAL
Qty: 30 TABLET | Refills: 3 | Status: SHIPPED | OUTPATIENT
Start: 2022-11-11

## 2022-11-11 RX ORDER — TIOTROPIUM BROMIDE INHALATION SPRAY 3.12 UG/1
SPRAY, METERED RESPIRATORY (INHALATION)
Qty: 4 G | Refills: 1 | Status: SHIPPED | OUTPATIENT
Start: 2022-11-11

## 2023-01-07 DIAGNOSIS — E78.00 PURE HYPERCHOLESTEROLEMIA: ICD-10-CM

## 2023-01-09 RX ORDER — ATORVASTATIN CALCIUM 40 MG/1
TABLET, FILM COATED ORAL
Qty: 90 TABLET | Refills: 0 | Status: SHIPPED | OUTPATIENT
Start: 2023-01-09

## 2023-01-17 ENCOUNTER — TELEPHONE (OUTPATIENT)
Dept: ORTHOPEDIC SURGERY | Age: 58
End: 2023-01-17

## 2023-01-17 RX ORDER — TIOTROPIUM BROMIDE INHALATION SPRAY 3.12 UG/1
SPRAY, METERED RESPIRATORY (INHALATION)
Qty: 4 G | Refills: 1 | Status: SHIPPED | OUTPATIENT
Start: 2023-01-17

## 2023-01-18 NOTE — TELEPHONE ENCOUNTER
[facility] NOTIFICATION  Patient Name: Marcio Hutchinson   : 65   Callback Number: [phone]  Date/Time of Cancellation Call: 2023  7:13 pm  Date/Time of Appointment: 2023  11:10 am  Provider Name: [facility]--No Provider Specified  Office Location: [place] [address]  Reason for Cancellation: Personal Matter     Please contact patient at callback number listed above to reschedule or to discuss further. Thank you.

## 2023-01-19 RX ORDER — DESVENLAFAXINE 50 MG/1
TABLET, EXTENDED RELEASE ORAL
Qty: 30 TABLET | Refills: 3 | Status: SHIPPED | OUTPATIENT
Start: 2023-01-19

## 2023-01-19 NOTE — TELEPHONE ENCOUNTER
Danny Patricio is calling to request a refill on the following medication(s):    Last Visit Date (If Applicable):  2/33/5142    Next Visit Date:    Visit date not found    Medication Request:  Requested Prescriptions     Pending Prescriptions Disp Refills    desvenlafaxine succinate (PRISTIQ) 50 MG TB24 extended release tablet [Pharmacy Med Name: DESVENLAFAXINE SUCCNT ER 50 MG] 30 tablet 3     Sig: TAKE ONE TABLET BY MOUTH DAILY

## 2023-02-02 RX ORDER — LEVETIRACETAM 750 MG/1
TABLET ORAL
Qty: 60 TABLET | Refills: 3 | OUTPATIENT
Start: 2023-02-02

## 2023-02-02 NOTE — TELEPHONE ENCOUNTER
Not the prescriber of this medication, medication has not been prescribed in 2 years
fusion     Failed back syndrome     Marijuana use     Abnormal weight loss     Allergic rhinitis     Anxiety state     Chronic midline low back pain with sciatica     Moderate episode of recurrent major depressive disorder (HCC)     Seizure (HCC)     Chronic tension-type headache, intractable     Hypertension     Sinus tachycardia     Acute respiratory failure (HCC)     COPD (chronic obstructive pulmonary disease) (HCC)     COPD exacerbation (HCC)     Acute bronchitis     Headache     Syncope and collapse     Unstable angina ([facility] [address]

## 2023-02-09 ENCOUNTER — TELEPHONE (OUTPATIENT)
Dept: PRIMARY CARE CLINIC | Age: 58
End: 2023-02-09

## 2023-02-09 NOTE — TELEPHONE ENCOUNTER
----- Message from Ganeshgriselamisha Marin sent at 2/9/2023  9:23 AM EST -----  Subject: Refill Request    QUESTIONS  Name of Medication? Other - Levetiracetam  Patient-reported dosage and instructions? once nightly 750mgs  How many days do you have left? 0  Preferred Pharmacy? [facility] [id number]  Pharmacy phone number (if available)? [phone]  Additional Information for Provider? patient is out of medication now and   leaving [facility] on 02/11/23 next appt is 02/27/23, last appt was 10/09/22  ---------------------------------------------------------------------------  --------------  CALL BACK INFO  What is the best way for the office to contact you? OK to leave message on   voicemail  Preferred Call Back Phone Number? [phone]  ---------------------------------------------------------------------------  --------------  SCRIPT ANSWERS  Relationship to Patient?  Self Patient called and informed of lab/pap results.  Patient verbalizes understanding of recommendation by MD per below. Questions answered. Orders placed. No further questions.        ----- Message from Patricia Alexandra MD sent at 3/29/2018 10:16 AM CDT -----  Pap negative. did also show bacterial vaginosis, which is an infection caused by an overgrowth of bacteria in the vagina. rx metrogel vaginal 0.75% 1 applicatorful PV QHS x 5 days. No baths, douching. Wear loose clothing and cotton underwear.     Also inform her that pathology did confirm benign skin tag that was removed.

## 2023-02-13 ENCOUNTER — TELEPHONE (OUTPATIENT)
Dept: PRIMARY CARE CLINIC | Age: 58
End: 2023-02-13

## 2023-02-14 NOTE — TELEPHONE ENCOUNTER
----- Message from Rome Cleveland LPN sent at 7/94/0959  9:46 AM EST -----  Subject: Refill Request    QUESTIONS  Name of Medication? levETIRAcetam (KEPPRA) 750 MG tablet  Patient-reported dosage and instructions? 1 nightly  How many days do you have left? 0  Preferred Pharmacy? [facility] [id number]  Pharmacy phone number (if available)? [phone]  Additional Information for Provider? patient states been out and needs to   neurologist but she is hoping it could be fill until she sees neurology   she doesn't want to keep going with out it   ---------------------------------------------------------------------------  --------------  [address]  What is the best way for the office to contact you? OK to leave message on   voicemail  Preferred Call Back Phone Number? [phone]  ---------------------------------------------------------------------------  --------------  SCRIPT ANSWERS  Relationship to Patient?  Self

## 2023-02-18 DIAGNOSIS — J43.9 PULMONARY EMPHYSEMA, UNSPECIFIED EMPHYSEMA TYPE (HCC): ICD-10-CM

## 2023-02-20 RX ORDER — FLUTICASONE FUROATE AND VILANTEROL TRIFENATATE 100; 25 UG/1; UG/1
POWDER RESPIRATORY (INHALATION)
Qty: 1 EACH | Refills: 2 | Status: SHIPPED | OUTPATIENT
Start: 2023-02-20 | End: 2023-03-24 | Stop reason: SDUPTHER

## 2023-03-02 DIAGNOSIS — F17.200 SMOKING: ICD-10-CM

## 2023-03-02 RX ORDER — BUPROPION HYDROCHLORIDE 150 MG/1
TABLET, EXTENDED RELEASE ORAL
Qty: 60 TABLET | Refills: 5 | OUTPATIENT
Start: 2023-03-02

## 2023-03-10 DIAGNOSIS — F17.200 SMOKING: ICD-10-CM

## 2023-03-10 DIAGNOSIS — J45.909 ASTHMA, UNSPECIFIED ASTHMA SEVERITY, UNSPECIFIED WHETHER COMPLICATED, UNSPECIFIED WHETHER PERSISTENT: ICD-10-CM

## 2023-03-10 DIAGNOSIS — J43.9 PULMONARY EMPHYSEMA, UNSPECIFIED EMPHYSEMA TYPE (HCC): ICD-10-CM

## 2023-03-10 RX ORDER — ALBUTEROL SULFATE 90 UG/1
AEROSOL, METERED RESPIRATORY (INHALATION)
Qty: 25.5 G | OUTPATIENT
Start: 2023-03-10

## 2023-03-10 RX ORDER — BUPROPION HYDROCHLORIDE 150 MG/1
TABLET, EXTENDED RELEASE ORAL
Qty: 60 TABLET | Refills: 5 | OUTPATIENT
Start: 2023-03-10

## 2023-03-15 DIAGNOSIS — J43.9 PULMONARY EMPHYSEMA, UNSPECIFIED EMPHYSEMA TYPE (HCC): ICD-10-CM

## 2023-03-15 DIAGNOSIS — J45.909 ASTHMA, UNSPECIFIED ASTHMA SEVERITY, UNSPECIFIED WHETHER COMPLICATED, UNSPECIFIED WHETHER PERSISTENT: ICD-10-CM

## 2023-03-15 RX ORDER — ALBUTEROL SULFATE 90 UG/1
AEROSOL, METERED RESPIRATORY (INHALATION)
Qty: 25.5 G | OUTPATIENT
Start: 2023-03-15

## 2023-03-20 DIAGNOSIS — J43.9 PULMONARY EMPHYSEMA, UNSPECIFIED EMPHYSEMA TYPE (HCC): ICD-10-CM

## 2023-03-20 DIAGNOSIS — J45.909 ASTHMA, UNSPECIFIED ASTHMA SEVERITY, UNSPECIFIED WHETHER COMPLICATED, UNSPECIFIED WHETHER PERSISTENT: ICD-10-CM

## 2023-03-20 RX ORDER — ALBUTEROL SULFATE 90 UG/1
AEROSOL, METERED RESPIRATORY (INHALATION)
Qty: 25.5 G | OUTPATIENT
Start: 2023-03-20

## 2023-03-24 ENCOUNTER — OFFICE VISIT (OUTPATIENT)
Dept: FAMILY MEDICINE CLINIC | Age: 58
End: 2023-03-24
Payer: MEDICARE

## 2023-03-24 VITALS
SYSTOLIC BLOOD PRESSURE: 163 MMHG | HEIGHT: 67 IN | DIASTOLIC BLOOD PRESSURE: 105 MMHG | TEMPERATURE: 97.2 F | BODY MASS INDEX: 24.58 KG/M2 | HEART RATE: 84 BPM | WEIGHT: 156.6 LBS

## 2023-03-24 DIAGNOSIS — J45.909 ASTHMA, UNSPECIFIED ASTHMA SEVERITY, UNSPECIFIED WHETHER COMPLICATED, UNSPECIFIED WHETHER PERSISTENT: ICD-10-CM

## 2023-03-24 DIAGNOSIS — E78.5 HYPERLIPIDEMIA, UNSPECIFIED HYPERLIPIDEMIA TYPE: ICD-10-CM

## 2023-03-24 DIAGNOSIS — J43.9 PULMONARY EMPHYSEMA, UNSPECIFIED EMPHYSEMA TYPE (HCC): Primary | ICD-10-CM

## 2023-03-24 DIAGNOSIS — Z72.0 TOBACCO USE: ICD-10-CM

## 2023-03-24 DIAGNOSIS — Z11.59 ENCOUNTER FOR HEPATITIS C SCREENING TEST FOR LOW RISK PATIENT: ICD-10-CM

## 2023-03-24 DIAGNOSIS — Z11.4 ENCOUNTER FOR SCREENING FOR HIV: ICD-10-CM

## 2023-03-24 DIAGNOSIS — G40.909 SEIZURE DISORDER (HCC): ICD-10-CM

## 2023-03-24 DIAGNOSIS — Z12.11 COLON CANCER SCREENING: ICD-10-CM

## 2023-03-24 PROBLEM — R56.9 SEIZURE (HCC): Status: RESOLVED | Noted: 2019-09-17 | Resolved: 2023-03-24

## 2023-03-24 PROBLEM — F33.1 MODERATE EPISODE OF RECURRENT MAJOR DEPRESSIVE DISORDER (HCC): Status: RESOLVED | Noted: 2018-10-02 | Resolved: 2023-03-24

## 2023-03-24 PROCEDURE — 3017F COLORECTAL CA SCREEN DOC REV: CPT

## 2023-03-24 PROCEDURE — G8484 FLU IMMUNIZE NO ADMIN: HCPCS

## 2023-03-24 PROCEDURE — G8427 DOCREV CUR MEDS BY ELIG CLIN: HCPCS

## 2023-03-24 PROCEDURE — 3023F SPIROM DOC REV: CPT

## 2023-03-24 PROCEDURE — 4004F PT TOBACCO SCREEN RCVD TLK: CPT

## 2023-03-24 PROCEDURE — G8420 CALC BMI NORM PARAMETERS: HCPCS

## 2023-03-24 PROCEDURE — 3078F DIAST BP <80 MM HG: CPT

## 2023-03-24 PROCEDURE — 99203 OFFICE O/P NEW LOW 30 MIN: CPT

## 2023-03-24 PROCEDURE — 3074F SYST BP LT 130 MM HG: CPT

## 2023-03-24 RX ORDER — ALBUTEROL SULFATE 90 UG/1
2 AEROSOL, METERED RESPIRATORY (INHALATION) EVERY 4 HOURS PRN
Qty: 3 EACH | Refills: 1 | Status: SHIPPED | OUTPATIENT
Start: 2023-03-24

## 2023-03-24 RX ORDER — PAROXETINE HYDROCHLORIDE 20 MG/1
20 TABLET, FILM COATED ORAL DAILY
Qty: 30 TABLET | Refills: 3 | Status: SHIPPED | OUTPATIENT
Start: 2023-03-24

## 2023-03-24 RX ORDER — FLUTICASONE FUROATE AND VILANTEROL 100; 25 UG/1; UG/1
POWDER RESPIRATORY (INHALATION)
Qty: 1 EACH | Refills: 2 | Status: SHIPPED | OUTPATIENT
Start: 2023-03-24

## 2023-03-24 SDOH — HEALTH STABILITY: PHYSICAL HEALTH: ON AVERAGE, HOW MANY DAYS PER WEEK DO YOU ENGAGE IN MODERATE TO STRENUOUS EXERCISE (LIKE A BRISK WALK)?: 1 DAY

## 2023-03-24 ASSESSMENT — ENCOUNTER SYMPTOMS
CHEST TIGHTNESS: 0
WHEEZING: 0
CONSTIPATION: 0
SHORTNESS OF BREATH: 0
ABDOMINAL PAIN: 0
VOMITING: 0
NAUSEA: 0
DIARRHEA: 0
BLOOD IN STOOL: 0

## 2023-03-24 ASSESSMENT — PATIENT HEALTH QUESTIONNAIRE - PHQ9
DEPRESSION UNABLE TO ASSESS: FUNCTIONAL CAPACITY MOTIVATION LIMITS ACCURACY
9. THOUGHTS THAT YOU WOULD BE BETTER OFF DEAD, OR OF HURTING YOURSELF: 0
3. TROUBLE FALLING OR STAYING ASLEEP: 0
5. POOR APPETITE OR OVEREATING: 0
7. TROUBLE CONCENTRATING ON THINGS, SUCH AS READING THE NEWSPAPER OR WATCHING TELEVISION: 2
SUM OF ALL RESPONSES TO PHQ QUESTIONS 1-9: 5
10. IF YOU CHECKED OFF ANY PROBLEMS, HOW DIFFICULT HAVE THESE PROBLEMS MADE IT FOR YOU TO DO YOUR WORK, TAKE CARE OF THINGS AT HOME, OR GET ALONG WITH OTHER PEOPLE: 2
1. LITTLE INTEREST OR PLEASURE IN DOING THINGS: 1
2. FEELING DOWN, DEPRESSED OR HOPELESS: 1
SUM OF ALL RESPONSES TO PHQ QUESTIONS 1-9: 5
8. MOVING OR SPEAKING SO SLOWLY THAT OTHER PEOPLE COULD HAVE NOTICED. OR THE OPPOSITE, BEING SO FIGETY OR RESTLESS THAT YOU HAVE BEEN MOVING AROUND A LOT MORE THAN USUAL: 0
SUM OF ALL RESPONSES TO PHQ QUESTIONS 1-9: 5
SUM OF ALL RESPONSES TO PHQ9 QUESTIONS 1 & 2: 2
4. FEELING TIRED OR HAVING LITTLE ENERGY: 1
SUM OF ALL RESPONSES TO PHQ QUESTIONS 1-9: 5
6. FEELING BAD ABOUT YOURSELF - OR THAT YOU ARE A FAILURE OR HAVE LET YOURSELF OR YOUR FAMILY DOWN: 0

## 2023-03-24 ASSESSMENT — SOCIAL DETERMINANTS OF HEALTH (SDOH)
WITHIN THE LAST YEAR, HAVE YOU BEEN HUMILIATED OR EMOTIONALLY ABUSED IN OTHER WAYS BY YOUR PARTNER OR EX-PARTNER?: NO
WITHIN THE LAST YEAR, HAVE YOU BEEN AFRAID OF YOUR PARTNER OR EX-PARTNER?: NO
WITHIN THE LAST YEAR, HAVE TO BEEN RAPED OR FORCED TO HAVE ANY KIND OF SEXUAL ACTIVITY BY YOUR PARTNER OR EX-PARTNER?: NO
WITHIN THE LAST YEAR, HAVE YOU BEEN KICKED, HIT, SLAPPED, OR OTHERWISE PHYSICALLY HURT BY YOUR PARTNER OR EX-PARTNER?: NO

## 2023-03-24 NOTE — PROGRESS NOTES
Attending Physician Statement  I  have discussed the care of Sravani Harris including pertinent history and exam findings with the resident. I agree with the assessment, plan and orders as documented by the resident. New patient    HTN  Elevated today  Will recheck and start meds if needed    COPD  Will be seeing pulm  Discussed CT lung screening and smoking abuse    Seizure  Hx of being on Keppra  Neuro referral        BP (!) 163/105 (Site: Right Upper Arm, Position: Sitting, Cuff Size: Medium Adult)   Pulse 84   Temp 97.2 °F (36.2 °C) (Oral)   Ht 5' 7.01\" (1.702 m)   Wt 156 lb 9.6 oz (71 kg)   BMI 24.52 kg/m²    BP Readings from Last 3 Encounters:   03/24/23 (!) 163/105   11/03/22 125/83   09/20/22 123/84     Wt Readings from Last 3 Encounters:   03/24/23 156 lb 9.6 oz (71 kg)   11/03/22 152 lb (68.9 kg)   09/20/22 152 lb 12.8 oz (69.3 kg)          Diagnosis Orders   1. Pulmonary emphysema, unspecified emphysema type (HCC)  albuterol sulfate HFA (VENTOLIN HFA) 108 (90 Base) MCG/ACT inhaler    fluticasone furoate-vilanterol (BREO ELLIPTA) 100-25 MCG/ACT inhaler      2. Asthma, unspecified asthma severity, unspecified whether complicated, unspecified whether persistent  albuterol sulfate HFA (VENTOLIN HFA) 108 (90 Base) MCG/ACT inhaler      3. Tobacco use  CT lung screen [Initial/Annual]      4. Encounter for screening for HIV  HIV Screen      5. Encounter for hepatitis C screening test for low risk patient  Hepatitis C Antibody      6. Hyperlipidemia, unspecified hyperlipidemia type  Lipid Panel      7. Seizure disorder [facility])  [facility], Neurology, [employer]      8.  Colon cancer screening  Fecal DNA Colorectal cancer screening (Cologuard)          Beatriz Rosario MD 3/27/2023 8:56 AM
Visit Information    Have you changed or started any medications since your last visit including any over-the-counter medicines, vitamins, or herbal medicines? no   Have you stopped taking any of your medications? Is so, why? -  no  Are you having any side effects from any of your medications? - no    Have you seen any other physician or provider since your last visit?  no   Have you had any other diagnostic tests since your last visit?  no   Have you been seen in the emergency room and/or had an admission in a hospital since we last saw you?  no   Have you had your routine dental cleaning in the past 6 months?  no     Do you have an active MyChart account? If no, what is the barrier?   Yes    Patient Care Team:  Haim Villaseñor MD as PCP - General (Family Medicine)  THOR Valdez CNP as PCP - Empaneled Provider    Medical History Review  Past Medical, Family, and Social History reviewed and does not contribute to the patient presenting condition    Health Maintenance   Topic Date Due    HIV screen  Never done    Hepatitis C screen  Never done    Cervical cancer screen  Never done    Low dose CT lung screening  Never done    Colorectal Cancer Screen  02/20/2019    Annual Wellness Visit (AWV)  Never done    Shingles vaccine (2 of 2) 06/25/2021    COVID-19 Vaccine (4 - Booster for Pfizer series) 05/04/2022    Flu vaccine (1) 08/01/2022    Lipids  02/11/2023    Depression Monitoring  09/20/2023    Breast cancer screen  09/29/2024    DTaP/Tdap/Td vaccine (2 - Td or Tdap) 09/20/2027    Pneumococcal 0-64 years Vaccine (3 - PPSV23 if available, else PCV20) 08/08/2030    Hepatitis A vaccine  Aged Out    Hib vaccine  Aged Out    Meningococcal (ACWY) vaccine  Aged Out
fluticasone furoate-vilanterol (BREO ELLIPTA) 100-25 MCG/ACT inhaler 1 each 2     Sig: INHALE ONE DOSE BY MOUTH DAILY    albuterol (PROVENTIL) (5 MG/ML) 0.5% nebulizer solution 120 each 2     Sig: Take 0.5 mLs by nebulization 4 times daily    PARoxetine (PAXIL) 20 MG tablet 30 tablet 3     Sig: Take 1 tablet by mouth daily       Medications Discontinued During This Encounter   Medication Reason    albuterol (PROVENTIL) (2.5 MG/3ML) 0.083% nebulizer solution LIST CLEANUP    budesonide-formoterol (SYMBICORT) 160-4.5 MCG/ACT AERO LIST CLEANUP    FLUoxetine (PROZAC) 10 MG capsule LIST CLEANUP    buPROPion (WELLBUTRIN SR) 150 MG extended release tablet LIST CLEANUP    metoprolol succinate (TOPROL XL) 25 MG extended release tablet LIST CLEANUP    albuterol (PROVENTIL) (5 MG/ML) 0.5% nebulizer solution REORDER    albuterol sulfate HFA (VENTOLIN HFA) 108 (90 Base) MCG/ACT inhaler REORDER    SPIRIVA RESPIMAT 2.5 MCG/ACT AERS inhaler REORDER    BREO ELLIPTA 100-25 MCG/ACT inhaler REORDER    nitroGLYCERIN (NITROSTAT) 0.4 MG SL tablet LIST CLEANUP    baclofen (LIORESAL) 10 MG tablet LIST CLEANUP       Gm Neil received counseling on the following healthy behaviors: nutrition, exercise and medication adherence    Discussed use,benefit, and side effects of prescribed medications. Barriers to medication compliance addressed. All patient questions answered. Pt voiced understanding. Return in about 3 weeks (around 4/14/2023) for HTN. Disclaimer: Some orall of this note was transcribed using voice-recognition software. This may cause typographical errors occasionally. Although all effort is made to fix these errors, please do not hesitate to contact our office if there Gavin Friend concern with the understanding of this note.

## 2023-03-24 NOTE — PATIENT INSTRUCTIONS
Thank you for letting us take care of you today. We hope all your questions were addressed. If a question was overlooked or something else comes to mind after you return home, please contact a member of your Care Team listed below. Your Care Team at [place] [address] is Team #1  Marielos Pang MD (Faculty)  Noemy Alex MD(Resident)  Mickey Alejandre MD (Resident)  Rio Sexton DO (Resident)  Raya Lee, JUSTINO Jiménez., JUSTINO Munguia., RU Dobbins., Liv Joiner., Petros Posadas [facility] office)  [address], [address] (Clinical Practice Manager)  Barbara Rosado [facility] (Clinical Pharmacist)     Office phone number: [phone]    If you need to get in right away due to illness, please be advised we have \"Same Day\" appointments available Monday-Friday. Please call us at [phone] option #3 to schedule your \"Same Day\" appointment.

## 2023-03-30 ENCOUNTER — TELEPHONE (OUTPATIENT)
Dept: ONCOLOGY | Age: 58
End: 2023-03-30

## 2023-03-30 NOTE — LETTER
3/30/2023        Paloma Brown  [address] [facility] [address]    Dear Paloma Brown: Your healthcare provider has ordered a low dose CT scan of the chest for lung cancer screening. Enclosed you will find information about CT lung screening and smoking cessation resources. If you are unable to keep you appointment for you CT lung screening, please call our scheduling department at [phone]. Keep in mind that CT lung screening does not take the place of smoking cessation. Please do not hesitate to contact me if you have any questions or concerns.     [address] [facility],      [facility] Lung Screening [facility]  [phone]

## 2023-04-18 ENCOUNTER — TELEPHONE (OUTPATIENT)
Dept: FAMILY MEDICINE CLINIC | Age: 58
End: 2023-04-18

## 2023-04-18 SDOH — ECONOMIC STABILITY: HOUSING INSECURITY
IN THE LAST 12 MONTHS, WAS THERE A TIME WHEN YOU DID NOT HAVE A STEADY PLACE TO SLEEP OR SLEPT IN A SHELTER (INCLUDING NOW)?: NO

## 2023-04-18 SDOH — ECONOMIC STABILITY: FOOD INSECURITY: WITHIN THE PAST 12 MONTHS, YOU WORRIED THAT YOUR FOOD WOULD RUN OUT BEFORE YOU GOT MONEY TO BUY MORE.: NEVER TRUE

## 2023-04-18 SDOH — ECONOMIC STABILITY: INCOME INSECURITY: HOW HARD IS IT FOR YOU TO PAY FOR THE VERY BASICS LIKE FOOD, HOUSING, MEDICAL CARE, AND HEATING?: NOT VERY HARD

## 2023-04-18 SDOH — ECONOMIC STABILITY: FOOD INSECURITY: WITHIN THE PAST 12 MONTHS, THE FOOD YOU BOUGHT JUST DIDN'T LAST AND YOU DIDN'T HAVE MONEY TO GET MORE.: NEVER TRUE

## 2023-04-18 NOTE — TELEPHONE ENCOUNTER
Left message for patient to call office to schedule appointment to go over CT results. Appointment can be VV not face to face. Please schedule.

## 2023-04-18 NOTE — TELEPHONE ENCOUNTER
----- Message from Kassy Carrero MD sent at 4/15/2023  4:32 PM EDT -----  Please schedule patient with any provider to discuss CT results. Can be vv.      Thanks

## 2023-04-19 DIAGNOSIS — J43.9 PULMONARY EMPHYSEMA, UNSPECIFIED EMPHYSEMA TYPE (HCC): ICD-10-CM

## 2023-04-19 RX ORDER — FLUTICASONE FUROATE AND VILANTEROL 100; 25 UG/1; UG/1
POWDER RESPIRATORY (INHALATION)
Qty: 1 EACH | Refills: 2 | OUTPATIENT
Start: 2023-04-19

## 2023-04-20 ENCOUNTER — TELEMEDICINE (OUTPATIENT)
Dept: FAMILY MEDICINE CLINIC | Age: 58
End: 2023-04-20

## 2023-04-20 DIAGNOSIS — R91.1 LUNG NODULE: Primary | ICD-10-CM

## 2023-04-20 DIAGNOSIS — R07.9 CHEST PAIN, UNSPECIFIED TYPE: ICD-10-CM

## 2023-04-20 RX ORDER — BACLOFEN 10 MG/1
TABLET ORAL
COMMUNITY
Start: 2023-04-20 | End: 2023-04-20 | Stop reason: SDUPTHER

## 2023-04-20 RX ORDER — BACLOFEN 10 MG/1
10 TABLET ORAL DAILY
Qty: 14 TABLET | Refills: 0 | Status: SHIPPED | OUTPATIENT
Start: 2023-04-20

## 2023-04-20 NOTE — PATIENT INSTRUCTIONS
Thank you for letting us take care of you today. We hope all your questions were addressed. If a question was overlooked or something else comes to mind after you return home, please contact a member of your Care Team listed below. Your Care Team at [place] [address] is Team #3  Jared Pantoja MD (Faculty)  Augie Torres MD (Faculty  Anniet Sicard, MD (Resident)  Diann Osullivan (Resident)   Lisy Rojo MD (Resident)  RU Rg Showers., JUSTINO Kurtz., JUSTINO Weathers ([address] [facility])  Maren Tamayo, [address] [facility] ([address] [facility])    Ar Delgado, [facility] (Clinical Pharmacist)     Office phone number: [phone]    If you need to get in right away due to illness, please be advised we have \"Same Day\" appointments available Monday-Friday. Please call us at [phone] option #3 to schedule your \"Same Day\" appointment.

## 2023-04-20 NOTE — PROGRESS NOTES
Attending Physician Statement  I  have discussed the care of Vickypie Gauthier including pertinent history and exam findings with the resident. I agree with the assessment, plan and orders as documented by the resident. There were no vitals taken for this visit. BP Readings from Last 3 Encounters:   03/24/23 (!) 163/105   11/03/22 125/83   09/20/22 123/84     Wt Readings from Last 3 Encounters:   03/24/23 156 lb 9.6 oz (71 kg)   11/03/22 152 lb (68.9 kg)   09/20/22 152 lb 12.8 oz (69.3 kg)          Diagnosis Orders   1. Lung nodule  Low Dose Chest CT -Abnormal Lung Screen Follow up      2.  Chest pain, unspecified type  NM MYOCARDIAL SPECT REST EXERCISE OR RX          Thu Lynch MD 4/20/2023 4:42 PM
Visit Information    Have you changed or started any medications since your last visit including any over-the-counter medicines, vitamins, or herbal medicines? no   Have you stopped taking any of your medications? Is so, why? -  no  Are you having any side effects from any of your medications? - no    Have you seen any other physician or provider since your last visit?  no   Have you had any other diagnostic tests since your last visit?  no   Have you been seen in the emergency room and/or had an admission in a hospital since we last saw you?  no   Have you had your routine dental cleaning in the past 6 months?  no     Do you have an active MyChart account? If no, what is the barrier?   No:     Patient Care Team:  Trace Rodriguez MD as PCP - General (Family Medicine)    Medical History Review  Past Medical, Family, and Social History reviewed and does not contribute to the patient presenting condition    Health Maintenance   Topic Date Due    Cervical cancer screen  Never done    Colorectal Cancer Screen  02/20/2019    Annual Wellness Visit (AWV)  Never done    Shingles vaccine (2 of 2) 06/25/2021    COVID-19 Vaccine (4 - Booster for Pfizer series) 05/04/2022    Flu vaccine (Season Ended) 08/01/2023    Depression Monitoring  03/24/2024    Lipids  04/11/2024    Low dose CT lung screening  04/11/2024    Breast cancer screen  09/29/2024    DTaP/Tdap/Td vaccine (2 - Td or Tdap) 09/20/2027    Pneumococcal 0-64 years Vaccine (3 - PPSV23 if available, else PCV20) 08/08/2030    Hepatitis C screen  Completed    HIV screen  Completed    Hepatitis A vaccine  Aged Out    Hib vaccine  Aged Out    Meningococcal (ACWY) vaccine  Aged Out    Diabetes screen  Discontinued
present when appropriate. The patient was located in a state where the provider was licensed to provide care.         Note: not billable if this call serves to triage the patient into an appointment for the relevant concern      Luis Fernando Gillespie MD   Family medicine resident  PGY 3

## 2023-04-22 RX ORDER — BACLOFEN 10 MG/1
TABLET ORAL
Qty: 30 TABLET | OUTPATIENT
Start: 2023-04-22

## 2023-05-05 NOTE — TELEPHONE ENCOUNTER
Last OV: 12/5/2022 Patient is requesting a med refill on albuterol sulfate (ventolin hfa), patient has upcoming appt with provider on 9/1/22, pharmacy is current in chart.      Health Maintenance   Topic Date Due    Annual Wellness Visit (AWV)  Never done    HIV screen  Never done    Hepatitis C screen  Never done    Cervical cancer screen  Never done    Low dose CT lung screening  Never done    Colorectal Cancer Screen  02/20/2019    Breast cancer screen  02/06/2020    Shingles vaccine (2 of 2) 06/25/2021    Depression Monitoring  01/23/2022    COVID-19 Vaccine (4 - Booster for Pfizer series) 07/09/2022    Flu vaccine (1) 09/01/2022    Lipids  02/11/2023    DTaP/Tdap/Td vaccine (2 - Td or Tdap) 09/20/2027    Pneumococcal 0-64 years Vaccine (3 - PPSV23 or PCV20) 08/08/2030    Hepatitis A vaccine  Aged Out    Hepatitis B vaccine  Aged Out    Hib vaccine  Aged Out    Meningococcal (ACWY) vaccine  Aged Out             (applicable per patient's age: Cancer Screenings, Depression Screening, Fall Risk Screening, Immunizations)    Hemoglobin A1C (%)   Date Value   01/30/2018 5.3   08/02/2013 5.3   02/14/2013 6.0     LDL Cholesterol (mg/dL)   Date Value   02/11/2022 90     AST (U/L)   Date Value   06/24/2022 21     ALT (U/L)   Date Value   06/24/2022 25     BUN (mg/dL)   Date Value   06/24/2022 9      (goal A1C is < 7)   (goal LDL is <100) need 30-50% reduction from baseline     BP Readings from Last 3 Encounters:   06/27/22 (!) 129/99   04/10/22 (!) 173/104   01/20/22 (!) 145/92    (goal /80)      All Future Testing planned in [facility]:  Lab Frequency Next Occurrence   CONCETTA Digital Screen Bilateral [[id number]] Once 04/07/2022       Next Visit Date:  Future Appointments   Date Time Provider Kaycee Hays   9/1/2022 12:30 PM Buelah Lesches, APRN -  [address]            Patient Active Problem List:     Heart disease     Chronic back pain     Depression     Hyperlipidemia     CAD, multiple vessel     Asthma     Smoking Need for vaccination     Syncope     Leg pain     Left hip pain     Chronic cholecystitis     Chest pain     Calculus of gallbladder without cholecystitis without obstruction     History of lumbar fusion     Failed back syndrome     Marijuana use     Abnormal weight loss     Allergic rhinitis     Anxiety state     Chronic midline low back pain with sciatica     Moderate episode of recurrent major depressive disorder ([facility])     Seizure ([facility])     Chronic tension-type headache, intractable     Hypertension     Sinus tachycardia     Acute respiratory failure (HCC)     COPD (chronic obstructive pulmonary disease) (HCC)     COPD exacerbation (HCC)     Acute bronchitis     Headache     Syncope and collapse     Unstable angina ([facility] [address]

## 2023-05-08 ENCOUNTER — HOSPITAL ENCOUNTER (OUTPATIENT)
Dept: NUCLEAR MEDICINE | Age: 58
Discharge: HOME | End: 2023-05-10
Payer: MEDICARE

## 2023-05-08 ENCOUNTER — HOSPITAL ENCOUNTER (OUTPATIENT)
Dept: NON INVASIVE DIAGNOSTICS | Age: 58
Discharge: HOME | End: 2023-05-08
Payer: MEDICARE

## 2023-05-08 DIAGNOSIS — R07.9 CHEST PAIN, UNSPECIFIED TYPE: ICD-10-CM

## 2023-05-08 LAB
LV EF: 54 %
LVEF MODALITY: NORMAL

## 2023-05-08 PROCEDURE — A9500 TC99M SESTAMIBI: HCPCS | Performed by: STUDENT IN AN ORGANIZED HEALTH CARE EDUCATION/TRAINING PROGRAM

## 2023-05-08 PROCEDURE — 93017 CV STRESS TEST TRACING ONLY: CPT

## 2023-05-08 PROCEDURE — 3430000000 HC RX DIAGNOSTIC RADIOPHARMACEUTICAL: Performed by: STUDENT IN AN ORGANIZED HEALTH CARE EDUCATION/TRAINING PROGRAM

## 2023-05-08 PROCEDURE — 2580000003 HC RX 258: Performed by: STUDENT IN AN ORGANIZED HEALTH CARE EDUCATION/TRAINING PROGRAM

## 2023-05-08 PROCEDURE — 78452 HT MUSCLE IMAGE SPECT MULT: CPT

## 2023-05-08 RX ORDER — SODIUM CHLORIDE 9 MG/ML
500 INJECTION, SOLUTION INTRAVENOUS CONTINUOUS PRN
Status: DISCONTINUED | OUTPATIENT
Start: 2023-05-08 | End: 2023-05-08 | Stop reason: ALTCHOICE

## 2023-05-08 RX ORDER — SODIUM CHLORIDE 0.9 % (FLUSH) 0.9 %
10 SYRINGE (ML) INJECTION PRN
Status: DISCONTINUED | OUTPATIENT
Start: 2023-05-08 | End: 2023-05-11 | Stop reason: HOSPADM

## 2023-05-08 RX ORDER — SODIUM CHLORIDE 0.9 % (FLUSH) 0.9 %
5-40 SYRINGE (ML) INJECTION PRN
Status: DISCONTINUED | OUTPATIENT
Start: 2023-05-08 | End: 2023-05-08 | Stop reason: ALTCHOICE

## 2023-05-08 RX ORDER — TETRAKIS(2-METHOXYISOBUTYLISOCYANIDE)COPPER(I) TETRAFLUOROBORATE 1 MG/ML
39 INJECTION, POWDER, LYOPHILIZED, FOR SOLUTION INTRAVENOUS
Status: COMPLETED | OUTPATIENT
Start: 2023-05-08 | End: 2023-05-08

## 2023-05-08 RX ORDER — METOPROLOL TARTRATE 5 MG/5ML
5 INJECTION INTRAVENOUS EVERY 5 MIN PRN
Status: DISCONTINUED | OUTPATIENT
Start: 2023-05-08 | End: 2023-05-08 | Stop reason: ALTCHOICE

## 2023-05-08 RX ORDER — TETRAKIS(2-METHOXYISOBUTYLISOCYANIDE)COPPER(I) TETRAFLUOROBORATE 1 MG/ML
14.7 INJECTION, POWDER, LYOPHILIZED, FOR SOLUTION INTRAVENOUS
Status: COMPLETED | OUTPATIENT
Start: 2023-05-08 | End: 2023-05-08

## 2023-05-08 RX ORDER — ATROPINE SULFATE 0.1 MG/ML
0.5 INJECTION INTRAVENOUS EVERY 5 MIN PRN
Status: DISCONTINUED | OUTPATIENT
Start: 2023-05-08 | End: 2023-05-08 | Stop reason: ALTCHOICE

## 2023-05-08 RX ORDER — ALBUTEROL SULFATE 90 UG/1
2 AEROSOL, METERED RESPIRATORY (INHALATION) PRN
Status: DISCONTINUED | OUTPATIENT
Start: 2023-05-08 | End: 2023-05-08 | Stop reason: ALTCHOICE

## 2023-05-08 RX ORDER — NITROGLYCERIN 0.4 MG/1
0.4 TABLET SUBLINGUAL EVERY 5 MIN PRN
Status: DISCONTINUED | OUTPATIENT
Start: 2023-05-08 | End: 2023-05-08 | Stop reason: ALTCHOICE

## 2023-05-08 RX ADMIN — TETRAKIS(2-METHOXYISOBUTYLISOCYANIDE)COPPER(I) TETRAFLUOROBORATE 39 MILLICURIE: 1 INJECTION, POWDER, LYOPHILIZED, FOR SOLUTION INTRAVENOUS at 11:01

## 2023-05-08 RX ADMIN — TETRAKIS(2-METHOXYISOBUTYLISOCYANIDE)COPPER(I) TETRAFLUOROBORATE 14.7 MILLICURIE: 1 INJECTION, POWDER, LYOPHILIZED, FOR SOLUTION INTRAVENOUS at 08:55

## 2023-05-08 RX ADMIN — SODIUM CHLORIDE, PRESERVATIVE FREE 10 ML: 5 INJECTION INTRAVENOUS at 10:16

## 2023-05-08 RX ADMIN — SODIUM CHLORIDE, PRESERVATIVE FREE 10 ML: 5 INJECTION INTRAVENOUS at 11:01

## 2023-05-08 RX ADMIN — SODIUM CHLORIDE 250 ML: 9 INJECTION, SOLUTION INTRAVENOUS at 10:53

## 2023-05-08 RX ADMIN — SODIUM CHLORIDE, PRESERVATIVE FREE 10 ML: 5 INJECTION INTRAVENOUS at 08:55

## 2023-05-09 ENCOUNTER — TELEPHONE (OUTPATIENT)
Dept: FAMILY MEDICINE CLINIC | Age: 58
End: 2023-05-09

## 2023-05-09 NOTE — PROCEDURES
[address] [facility]                  [address] [facility] [address]                              CARDIAC STRESS TEST    PATIENT NAME: Dylan Viveros                      :        1965  MED REC NO:   [id number]                             ROOM:  ACCOUNT NO:   [de-identified]                           ADMIT DATE: 2023  PROVIDER:     Kale Gauthier    DATE OF STUDY:  2023    ORDERING PROVIDER: Dr. Sayra Coreas PROVIDER: Dr. Arevalo Backer: Dr. Jena Felipet: Chest pain    Procedure explained and consent signed. Resting heart rate: 89 bpm  Resting blood pressure: 155/64 mm/Hg  Maximum heart rate: 139 bpm, or 85% of age predicted maximum heart rate  Peak blood pressure: 244/116 mm/Hg  Final blood pressure: 159/109  Duration: 7:15  METS: 7.4  Reason for termination: HTN  Resting EKG: Normal  Stress heart response: Normal response  Stress BP response: Appropriate  Stress EKG'S: No changes seen  Chest discomfort: None  Ischemic EKG changes: None    IMPRESSION:    Electrocardiographically Negative treadmill stress study. Radio-isotope  results to follow from the department of Nuclear Medicine.           Maurisio Lowe    D: 2023 9:56:53       T: 2023 9:57:34     [id number]  Job#: [id number]     Doc#: Unknown    CC:

## 2023-05-09 NOTE — TELEPHONE ENCOUNTER
----- Message from Hien Monet sent at 5/8/2023  6:24 PM EDT -----  Regarding: Stress test results! Contact: [phone]  Would like to chat about the results, plz!  Thk U

## 2023-05-18 ENCOUNTER — TELEPHONE (OUTPATIENT)
Dept: GASTROENTEROLOGY | Age: 58
End: 2023-05-18

## 2023-05-18 NOTE — TELEPHONE ENCOUNTER
1st attempt: Writer sent letter to patients home as a reminder to schedule procedure with Dr. Majo Tyler on file.

## 2023-05-23 ENCOUNTER — OFFICE VISIT (OUTPATIENT)
Dept: PULMONOLOGY | Age: 58
End: 2023-05-23
Payer: MEDICARE

## 2023-05-23 VITALS
HEART RATE: 96 BPM | HEIGHT: 67 IN | DIASTOLIC BLOOD PRESSURE: 91 MMHG | SYSTOLIC BLOOD PRESSURE: 152 MMHG | RESPIRATION RATE: 12 BRPM | BODY MASS INDEX: 24.04 KG/M2 | WEIGHT: 153.2 LBS | OXYGEN SATURATION: 96 %

## 2023-05-23 DIAGNOSIS — F17.210 SMOKING GREATER THAN 40 PACK YEARS: ICD-10-CM

## 2023-05-23 DIAGNOSIS — J44.9 CHRONIC OBSTRUCTIVE PULMONARY DISEASE, UNSPECIFIED COPD TYPE (HCC): Primary | ICD-10-CM

## 2023-05-23 DIAGNOSIS — R91.1 LUNG NODULE: ICD-10-CM

## 2023-05-23 PROCEDURE — 94726 PLETHYSMOGRAPHY LUNG VOLUMES: CPT | Performed by: INTERNAL MEDICINE

## 2023-05-23 PROCEDURE — 99204 OFFICE O/P NEW MOD 45 MIN: CPT | Performed by: INTERNAL MEDICINE

## 2023-05-23 PROCEDURE — 3017F COLORECTAL CA SCREEN DOC REV: CPT | Performed by: INTERNAL MEDICINE

## 2023-05-23 PROCEDURE — 94729 DIFFUSING CAPACITY: CPT | Performed by: INTERNAL MEDICINE

## 2023-05-23 PROCEDURE — 3080F DIAST BP >= 90 MM HG: CPT | Performed by: INTERNAL MEDICINE

## 2023-05-23 PROCEDURE — G8427 DOCREV CUR MEDS BY ELIG CLIN: HCPCS | Performed by: INTERNAL MEDICINE

## 2023-05-23 PROCEDURE — 94010 BREATHING CAPACITY TEST: CPT | Performed by: INTERNAL MEDICINE

## 2023-05-23 PROCEDURE — 3077F SYST BP >= 140 MM HG: CPT | Performed by: INTERNAL MEDICINE

## 2023-05-23 PROCEDURE — G8420 CALC BMI NORM PARAMETERS: HCPCS | Performed by: INTERNAL MEDICINE

## 2023-05-23 PROCEDURE — 4004F PT TOBACCO SCREEN RCVD TLK: CPT | Performed by: INTERNAL MEDICINE

## 2023-05-23 PROCEDURE — 3023F SPIROM DOC REV: CPT | Performed by: INTERNAL MEDICINE

## 2023-05-23 RX ORDER — FLUTICASONE FUROATE AND VILANTEROL 200; 25 UG/1; UG/1
1 POWDER RESPIRATORY (INHALATION) DAILY
Qty: 30 EACH | Refills: 11 | Status: SHIPPED | OUTPATIENT
Start: 2023-05-23

## 2023-05-23 NOTE — PROGRESS NOTES
OUTPATIENT PULMONARY CONSULT NOTE      Patient:  Alberto Khan  MRN: [id number]    Consulting Physician: Davonte Zimmerman MD  Reason for Consult: COPD/chronic cough/lung nodules  Primacy Care Physician: Davonte Zimmerman MD    HISTORY OF PRESENT ILLNESS:   The patient is a 62 y.o. female she is referred here for evaluation management of COPD/chronic cough and lung nodule. She has history of COPD which was diagnosed years ago she has history of chronic cough. Cough is daily. Coughing that she had with a sputum production sometimes it is grayish in color and sometimes greenish in color. She denies any change in the sputum. She does not complain of increased cough. She sometimes wake up at night with cough otherwise denies nocturnal awakening with cough wheezing chest tightness or shortness of breath. She does not complain of wheezing. According patient she has been having nausea and fatigue for last few months she does not complain of any fever chills her appetite is not as good she has not lost any weight. She denies any night sweats and chest pain. She is taking Spiriva once daily and she is taking Breo 100/25 1 puff once daily. She take albuterol once or twice a day. She does not complain of shortness of breath on regular activities. She gets shortness of breath on going stairs up 10-15 stairs or walking long distances. She went to zoo yesterday and according to patient she did well walking there. She did have pulmonary function test done in February 6 2018 which showed an FEV1 of 83% she has moderate airway trapping with residual volume of \60% and total lung capacity of 120% mild reduction in diffusion capacity of 75%.     She had a CT scan of the chest done on 04/11/2023 which was a screening CT scan which showed an area of lateral segment right middle lobe pleural-based opacity more of a area of atelectasis/scar/groundglass as compared to CT scan of the chest on 09/16/2021 this area was not present

## 2023-05-23 NOTE — PROGRESS NOTES
Pulmonary function test.  Date of study 05/23/2023. Spirometry shows FVC is 3.23 96% predicted FEV1 is 2.44 90% predicted. Although both FEV1 and FVC are normal but often FVC ratio is consistent with mild obstructive ventilatory impairment. Lung volumes shows residual volume is 2.68 130% predicted consistent with mild airway trapping/hyperinflation. Total lung capacity is 5.91 108% predicted which is within normal limit. Diffusion capacity is 21.87 109% predicted which is within normal limit. Impression: This pulmonary function test is suggestive of mild obstructive ventilatory impairment. Lung volumes are normal except there is mild airway trapping and diffusion capacity is normal.  Clinical correlation is recommended.

## 2023-05-25 DIAGNOSIS — J44.9 CHRONIC OBSTRUCTIVE PULMONARY DISEASE, UNSPECIFIED COPD TYPE (HCC): ICD-10-CM

## 2023-07-03 NOTE — TELEPHONE ENCOUNTER
Smoking     Need for vaccination     Syncope     Leg pain     Left hip pain     Chronic cholecystitis     Chest pain     Calculus of gallbladder without cholecystitis without obstruction     History of lumbar fusion     Failed back syndrome     Marijuana use     Abnormal weight loss     Allergic rhinitis     Anxiety state     Chronic midline low back pain with sciatica     Chronic tension-type headache, intractable     Hypertension     Sinus tachycardia     Acute respiratory failure (HCC)     COPD (chronic obstructive pulmonary disease) (HCC)     COPD exacerbation (HCC)     Acute bronchitis     Headache     Syncope and collapse     Unstable angina (HCC)     Lung nodule

## 2023-07-12 ENCOUNTER — OFFICE VISIT (OUTPATIENT)
Dept: ORTHOPEDIC SURGERY | Age: 58
End: 2023-07-12

## 2023-07-12 VITALS — WEIGHT: 153 LBS | HEIGHT: 67 IN | BODY MASS INDEX: 24.01 KG/M2

## 2023-07-12 DIAGNOSIS — R52 PAIN: Primary | ICD-10-CM

## 2023-07-12 DIAGNOSIS — M47.812 CERVICAL SPONDYLOSIS: ICD-10-CM

## 2023-07-12 NOTE — PROGRESS NOTES
L3-L5. Neck pain with limited ROM. Low back pain s/p PLIF in 2007. Comparison: none     Findings:     Cervical: Three views AP, lateral odontoid demonstrating posterior vertebral displacement of C5 on C6 with noted osteophyte formation on C6 anterioinferior vertebral body. Chronic degenerative changes appreciated. Disk heights preserved. Spinous processes midline. No acute fractures appreciated. Lumbar: Three views AP, lateral and oblique demonstrating Hardware intact without loosening L3, L4, L5. Disc space decreased at L2/L3 with mild posterior vertebral displacement of L2 on L3 and L3 on L4. Mild degenerative changes of the lumbar spine. Osteophyte formation on the anterosuperior vertebral body of L2. Impression: Mild degenerative changes of the lumbar and cervical spine with previously placed hardware intact. ASSESSMENT:     1. Pain    2. Cervical spondylosis         PLAN:     - Today the patient was seen and examined. We took x-rays of cervical spine and lumbar spine. We discussed the etiology of lower back, left shoulder pain and left hand numbness and tingling. We discussed different treatment options including operative and non-operative. - EMG was ordered to evaluate etiology of numbness and tingling.   - PT was ordered to address trapezius pain and limited ROM of the cervical spine.   - Instructed to wear splint on left wrist if symptoms improve. - Patient will follow up s/p EMG. - Will reevaluate upon follow up. No follow-ups on file. No orders of the defined types were placed in this encounter.       Orders Placed This Encounter   Procedures    XR CERVICAL SPINE (2-3 VIEWS)     Standing Status:   Future     Number of Occurrences:   1     Standing Expiration Date:   7/12/2024    XR LUMBAR SPINE (2-3 VIEWS)     Standing Status:   Future     Number of Occurrences:   1     Standing Expiration Date:   7/12/2024    External Referral To Physical Therapy     Referral Priority:   Routine

## 2023-07-13 RX ORDER — BACLOFEN 10 MG/1
TABLET ORAL
Qty: 14 TABLET | Refills: 0 | OUTPATIENT
Start: 2023-07-13

## 2023-07-13 NOTE — TELEPHONE ENCOUNTER
E-scribe request for BACLOFEN 10 MG. Please review and e-scribe if applicable.      Last Visit Date:  4/20/2023  Next Visit Date:  Visit date not found    Hemoglobin A1C (%)   Date Value   01/30/2018 5.3   08/02/2013 5.3   02/14/2013 6.0             ( goal A1C is < 7)   No results found for: LABMICR  LDL Cholesterol (mg/dL)   Date Value   04/11/2023 112       (goal LDL is <100)   AST (U/L)   Date Value   06/24/2022 21     ALT (U/L)   Date Value   06/24/2022 25     BUN (mg/dL)   Date Value   11/03/2022 7     BP Readings from Last 3 Encounters:   05/23/23 (!) 152/91   03/24/23 (!) 163/105   11/03/22 125/83          (goal 120/80)        Patient Active Problem List:     Heart disease     Chronic back pain     Depression     Hyperlipidemia     CAD, multiple vessel     Asthma     Smoking     Need for vaccination     Syncope     Leg pain     Left hip pain     Chronic cholecystitis     Chest pain     Calculus of gallbladder without cholecystitis without obstruction     History of lumbar fusion     Failed back syndrome     Marijuana use     Abnormal weight loss     Allergic rhinitis     Anxiety state     Chronic midline low back pain with sciatica     Chronic tension-type headache, intractable     Hypertension     Sinus tachycardia     Acute respiratory failure (HCC)     COPD (chronic obstructive pulmonary disease) (HCC)     COPD exacerbation (HCC)     Acute bronchitis     Headache     Syncope and collapse     Unstable angina (HCC)     Lung nodule      ----JF

## 2023-07-20 ENCOUNTER — HOSPITAL ENCOUNTER (OUTPATIENT)
Dept: PHYSICAL THERAPY | Age: 58
Setting detail: THERAPIES SERIES
Discharge: HOME | End: 2023-07-20

## 2023-07-20 RX ORDER — PAROXETINE HYDROCHLORIDE 20 MG/1
TABLET, FILM COATED ORAL
Qty: 60 TABLET | Refills: 3 | Status: SHIPPED | OUTPATIENT
Start: 2023-07-20

## 2023-07-20 NOTE — FLOWSHEET NOTE
[x] [facility] ([facility]) - [facility] &  Therapy  [address] [facility].    [phone]  F: [phone]   [] [address] [facility]  [address]   [address]  P: [phone]  F: [phone]  [] [address] &  Therapy  [address] [facility]  P: [phone]  F: [phone] [] [facility]  P: [phone]  F: [phone]  [] [address] [facility]   [address]   [place]: [phone]  F: [phone]   [] [address] [facility]  [address]  [place]: [phone]   F: [phone]     Physical Therapy Cancel/No Show note    Date: 2023  Patient: Felicia Seay  : 1965  MRN: [id number]    Cancels/No Shows to date:     For today's appointment patient:      [x]  Cancelled For Initial Evaluation    [] Rescheduled appointment    [] No-show     Reason given by patient:    []  Patient ill    []  Conflicting appointment    [] No transportation      [] Conflict with work    [] No reason given    [] Weather related    [] [id number]    [x] Other:      Comments:  Called - unable to make appt.        [] Next appointment was confirmed    Electronically signed by: Dann Arenas PT

## 2023-07-23 DIAGNOSIS — J43.9 PULMONARY EMPHYSEMA, UNSPECIFIED EMPHYSEMA TYPE (HCC): ICD-10-CM

## 2023-07-23 DIAGNOSIS — E78.00 PURE HYPERCHOLESTEROLEMIA: ICD-10-CM

## 2023-07-23 DIAGNOSIS — J45.909 ASTHMA, UNSPECIFIED ASTHMA SEVERITY, UNSPECIFIED WHETHER COMPLICATED, UNSPECIFIED WHETHER PERSISTENT: ICD-10-CM

## 2023-07-23 RX ORDER — BUPROPION HYDROCHLORIDE 150 MG/1
TABLET, EXTENDED RELEASE ORAL
Qty: 60 TABLET | OUTPATIENT
Start: 2023-07-23

## 2023-07-23 RX ORDER — ATORVASTATIN CALCIUM 40 MG/1
TABLET, FILM COATED ORAL
Qty: 90 TABLET | Refills: 0 | OUTPATIENT
Start: 2023-07-23

## 2023-07-24 NOTE — TELEPHONE ENCOUNTER
Problem List:     Heart disease     Chronic back pain     Depression     Hyperlipidemia     CAD, multiple vessel     Asthma     Smoking     Need for vaccination     Syncope     Leg pain     Left hip pain     Chronic cholecystitis     Chest pain     Calculus of gallbladder without cholecystitis without obstruction     History of lumbar fusion     Failed back syndrome     Marijuana use     Abnormal weight loss     Allergic rhinitis     Anxiety state     Chronic midline low back pain with sciatica     Chronic tension-type headache, intractable     Hypertension     Sinus tachycardia     Acute respiratory failure (HCC)     COPD (chronic obstructive pulmonary disease) (HCC)     COPD exacerbation (HCC)     Acute bronchitis     Headache     Syncope and collapse     Unstable angina (HCC)     Lung nodule

## 2023-07-25 RX ORDER — ALBUTEROL SULFATE 90 UG/1
AEROSOL, METERED RESPIRATORY (INHALATION)
Qty: 25.5 G | Refills: 3 | Status: SHIPPED | OUTPATIENT
Start: 2023-07-25

## 2023-07-25 RX ORDER — BACLOFEN 10 MG/1
10 TABLET ORAL DAILY
Qty: 14 TABLET | Refills: 0 | Status: SHIPPED | OUTPATIENT
Start: 2023-07-25

## 2023-07-25 NOTE — TELEPHONE ENCOUNTER
E-scribe request for med refills. Please review and e-scribe if applicable.      Last Visit Date:  4/20/23  Next Visit Date:  Visit date not found    Hemoglobin A1C (%)   Date Value   01/30/2018 5.3   08/02/2013 5.3   02/14/2013 6.0             ( goal A1C is < 7)   No components found for: LABMICR  LDL Cholesterol (mg/dL)   Date Value   04/11/2023 112       (goal LDL is <100)   AST (U/L)   Date Value   06/24/2022 21     ALT (U/L)   Date Value   06/24/2022 25     BUN (mg/dL)   Date Value   11/03/2022 7     BP Readings from Last 3 Encounters:   05/23/23 (!) 152/91   03/24/23 (!) 163/105   11/03/22 125/83          (goal 120/80)        Patient Active Problem List:     Heart disease     Chronic back pain     Depression     Hyperlipidemia     CAD, multiple vessel     Asthma     Smoking     Need for vaccination     Syncope     Leg pain     Left hip pain     Chronic cholecystitis     Chest pain     Calculus of gallbladder without cholecystitis without obstruction     History of lumbar fusion     Failed back syndrome     Marijuana use     Abnormal weight loss     Allergic rhinitis     Anxiety state     Chronic midline low back pain with sciatica     Chronic tension-type headache, intractable     Hypertension     Sinus tachycardia     Acute respiratory failure (HCC)     COPD (chronic obstructive pulmonary disease) (HCC)     COPD exacerbation (HCC)     Acute bronchitis     Headache     Syncope and collapse     Unstable angina (HCC)     Lung nodule      ----Iliana Rg

## 2023-08-10 DIAGNOSIS — E78.00 PURE HYPERCHOLESTEROLEMIA: ICD-10-CM

## 2023-08-10 RX ORDER — ATORVASTATIN CALCIUM 40 MG/1
TABLET, FILM COATED ORAL
Qty: 90 TABLET | Refills: 0 | OUTPATIENT
Start: 2023-08-10

## 2023-08-10 NOTE — TELEPHONE ENCOUNTER
Last visit: 04/20/2023  Last Med refill: 07/25/2023  Does patient have enough medication for 72 hours: No:     Next Visit Date:  Future Appointments   Date Time Provider [address]   8/15/2023  3:30 PM STV CT ROOM 1 STVZ CT SCAN [facility] Radiolog   8/18/2023  1:00 PM MD Inessa Resp Spec Bisi Alejandra   11/8/2023  4:30 PM [facility] EMG  [facility] EMG [facility]   11/8/2023  4:30 PM Yonatan Tatum  [facility] Maintenance   Topic Date Due    Cervical cancer screen  Never done    Colorectal Cancer Screen  02/20/2019    Annual Wellness Visit (AWV)  Never done    Shingles vaccine (2 of 2) 06/25/2021    COVID-19 Vaccine (4 - Booster for Pfizer series) 05/04/2022    Flu vaccine (1) 08/01/2023    Depression Monitoring  03/24/2024    Lipids  04/11/2024    Low dose CT lung screening &/or counseling  04/11/2024    Breast cancer screen  09/29/2024    DTaP/Tdap/Td vaccine (2 - Td or Tdap) 09/20/2027    Pneumococcal 0-64 years Vaccine (3 - PPSV23 if available, else PCV20) 08/08/2030    Hepatitis C screen  Completed    HIV screen  Completed    Hepatitis A vaccine  Aged Out    Hib vaccine  Aged Out    Meningococcal (ACWY) vaccine  Aged Out    Diabetes screen  Discontinued       Hemoglobin A1C (%)   Date Value   01/30/2018 5.3   08/02/2013 5.3   02/14/2013 6.0             ( goal A1C is < 7)   No components found for: LABMICR  LDL Cholesterol (mg/dL)   Date Value   04/11/2023 112   02/11/2022 90       (goal LDL is <100)   AST (U/L)   Date Value   06/24/2022 21     ALT (U/L)   Date Value   06/24/2022 25     BUN (mg/dL)   Date Value   11/03/2022 7     BP Readings from Last 3 Encounters:   05/23/23 (!) 152/91   03/24/23 (!) 163/105   11/03/22 125/83          (goal 120/80)    All Future Testing planned in [facility]  Lab Frequency Next Occurrence   Low Dose Chest CT -Abnormal Lung Screen Follow up Once 08/15/2023   EMG Once 10/12/2023               Patient Active Problem List:     Heart disease     Chronic back

## 2023-08-15 RX ORDER — BACLOFEN 10 MG/1
10 TABLET ORAL DAILY
Qty: 14 TABLET | Refills: 0 | Status: SHIPPED | OUTPATIENT
Start: 2023-08-15

## 2023-08-15 RX ORDER — ATORVASTATIN CALCIUM 40 MG/1
40 TABLET, FILM COATED ORAL DAILY
Qty: 90 TABLET | Refills: 3 | Status: SHIPPED | OUTPATIENT
Start: 2023-08-15

## 2023-08-16 DIAGNOSIS — E78.00 PURE HYPERCHOLESTEROLEMIA: ICD-10-CM

## 2023-08-16 RX ORDER — ATORVASTATIN CALCIUM 40 MG/1
TABLET, FILM COATED ORAL
Qty: 90 TABLET | Refills: 3 | OUTPATIENT
Start: 2023-08-16

## 2023-08-29 RX ORDER — BACLOFEN 10 MG/1
10 TABLET ORAL DAILY
Qty: 14 TABLET | Refills: 0 | OUTPATIENT
Start: 2023-08-29

## 2023-08-29 NOTE — TELEPHONE ENCOUNTER
E-scribe request for baclofen. Please review and e-scribe if applicable.      Last Visit Date:  04/20/23  Next Visit Date:  Visit date not found    Hemoglobin A1C (%)   Date Value   01/30/2018 5.3   08/02/2013 5.3   02/14/2013 6.0             ( goal A1C is < 7)   No components found for: LABMICR  LDL Cholesterol (mg/dL)   Date Value   04/11/2023 112       (goal LDL is <100)   AST (U/L)   Date Value   06/24/2022 21     ALT (U/L)   Date Value   06/24/2022 25     BUN (mg/dL)   Date Value   11/03/2022 7     BP Readings from Last 3 Encounters:   05/23/23 (!) 152/91   03/24/23 (!) 163/105   11/03/22 125/83          (goal 120/80)        Patient Active Problem List:     Heart disease     Chronic back pain     Depression     Hyperlipidemia     CAD, multiple vessel     Asthma     Smoking     Need for vaccination     Syncope     Leg pain     Left hip pain     Chronic cholecystitis     Chest pain     Calculus of gallbladder without cholecystitis without obstruction     History of lumbar fusion     Failed back syndrome     Marijuana use     Abnormal weight loss     Allergic rhinitis     Anxiety state     Chronic midline low back pain with sciatica     Chronic tension-type headache, intractable     Hypertension     Sinus tachycardia     Acute respiratory failure (HCC)     COPD (chronic obstructive pulmonary disease) (HCC)     COPD exacerbation (HCC)     Acute bronchitis     Headache     Syncope and collapse     Unstable angina (HCC)     Lung nodule

## 2023-09-05 NOTE — TELEPHONE ENCOUNTER
----- Message from Radha Edwards sent at 9/5/2023  8:49 AM EDT -----  Subject: Refill Request    QUESTIONS  Name of Medication? baclofen (LIORESAL) 10 MG tablet  Patient-reported dosage and instructions? 1 daily  How many days do you have left? 0  Preferred Pharmacy? [address] [facility] [id number]  Pharmacy phone number (if available)? [phone]  ---------------------------------------------------------------------------  --------------  CALL BACK INFO  What is the best way for the office to contact you? OK to leave message on   voicemail  Preferred Call Back Phone Number? [phone]  ---------------------------------------------------------------------------  --------------  SCRIPT ANSWERS  Relationship to Patient?  Self

## 2023-09-05 NOTE — TELEPHONE ENCOUNTER
Please address the medication refill and close the encounter. If I can be of assistance, please route to the applicable pool. Thank you.     Last visit: 4-20-23  Last Med refill: 8-15-23  Does patient have enough medication for 72 hours: No:     Next Visit Date:  Future Appointments   Date Time Provider [address]   9/21/2023  9:30 AM Vinny Conteh MD [address] [place] [address]   11/8/2023  4:30 PM [facility]  [facility]   11/8/2023  4:30 PM MD Robina Reina med/reha [facility]   11/28/2023 11:15 AM Dominic Gooden MD Resp Spec [address] Maintenance   Topic Date Due    Cervical cancer screen  Never done    Colorectal Cancer Screen  02/20/2019    Annual Wellness Visit (AWV)  Never done    Shingles vaccine (2 of 2) 06/25/2021    COVID-19 Vaccine (4 - Booster for Pfizer series) 05/04/2022    Flu vaccine (1) 08/01/2023    Depression Monitoring  03/24/2024    Lipids  04/11/2024    Low dose CT lung screening &/or counseling  04/11/2024    Breast cancer screen  09/29/2024    DTaP/Tdap/Td vaccine (2 - Td or Tdap) 09/20/2027    Pneumococcal 0-64 years Vaccine (3 - PPSV23 if available, else PCV20) 08/08/2030    Hepatitis C screen  Completed    HIV screen  Completed    Hepatitis A vaccine  Aged Out    Hib vaccine  Aged Out    Meningococcal (ACWY) vaccine  Aged Out    Diabetes screen  Discontinued       Hemoglobin A1C (%)   Date Value   01/30/2018 5.3   08/02/2013 5.3   02/14/2013 6.0             ( goal A1C is < 7)   No components found for: LABMICR  LDL Cholesterol (mg/dL)   Date Value   04/11/2023 112   02/11/2022 90       (goal LDL is <100)   AST (U/L)   Date Value   06/24/2022 21     ALT (U/L)   Date Value   06/24/2022 25     BUN (mg/dL)   Date Value   11/03/2022 7     BP Readings from Last 3 Encounters:   05/23/23 (!) 152/91   03/24/23 (!) 163/105   11/03/22 125/83          (goal 120/80)    All Future Testing planned in [facility]  Lab Frequency Next Occurrence   Low Dose Chest CT -Abnormal Lung

## 2023-09-05 NOTE — TELEPHONE ENCOUNTER
E-scribe request for BACLOFEN. Please review and e-scribe if applicable.      Last Visit Date:  4/20/2023  Next Visit Date:  Visit date not found    Hemoglobin A1C (%)   Date Value   01/30/2018 5.3   08/02/2013 5.3   02/14/2013 6.0             ( goal A1C is < 7)   No components found for: LABMICR  LDL Cholesterol (mg/dL)   Date Value   04/11/2023 112       (goal LDL is <100)   AST (U/L)   Date Value   06/24/2022 21     ALT (U/L)   Date Value   06/24/2022 25     BUN (mg/dL)   Date Value   11/03/2022 7     BP Readings from Last 3 Encounters:   05/23/23 (!) 152/91   03/24/23 (!) 163/105   11/03/22 125/83          (goal 120/80)        Patient Active Problem List:     Heart disease     Chronic back pain     Depression     Hyperlipidemia     CAD, multiple vessel     Asthma     Smoking     Need for vaccination     Syncope     Leg pain     Left hip pain     Chronic cholecystitis     Chest pain     Calculus of gallbladder without cholecystitis without obstruction     History of lumbar fusion     Failed back syndrome     Marijuana use     Abnormal weight loss     Allergic rhinitis     Anxiety state     Chronic midline low back pain with sciatica     Chronic tension-type headache, intractable     Hypertension     Sinus tachycardia     Acute respiratory failure (HCC)     COPD (chronic obstructive pulmonary disease) (HCC)     COPD exacerbation (HCC)     Acute bronchitis     Headache     Syncope and collapse     Unstable angina (HCC)     Lung nodule      ----JF

## 2023-09-12 NOTE — TELEPHONE ENCOUNTER
Last visit: 4-20-23  Last Med refill: 8-15-23  Does patient have enough medication for 72 hours: No:     Next Visit Date:  Future Appointments   Date Time Provider [address]   9/21/2023  9:30 AM Baron Maria MD [address] [place] [address]   11/8/2023  4:30  [address] EM[facility]  [facility]   11/8/2023  4:30 PM MD Leta Gutierrez med/reha [facility]   11/28/2023 11:15 AM Dominic Elise MD Resp Spec [address] Maintenance   Topic Date Due    Hepatitis B vaccine (1 of 3 - 3-dose series) Never done    Cervical cancer screen  Never done    Colorectal Cancer Screen  02/20/2019    Annual Wellness Visit (AWV)  Never done    Shingles vaccine (2 of 2) 06/25/2021    COVID-19 Vaccine (4 - Pfizer series) 05/04/2022    Flu vaccine (1) 08/01/2023    Depression Monitoring  03/24/2024    Lipids  04/11/2024    Low dose CT lung screening &/or counseling  04/11/2024    Breast cancer screen  09/29/2024    DTaP/Tdap/Td vaccine (2 - Td or Tdap) 09/20/2027    Pneumococcal 0-64 years Vaccine (3 - PPSV23 or PCV20) 08/08/2030    Hepatitis C screen  Completed    HIV screen  Completed    Hepatitis A vaccine  Aged Out    Hib vaccine  Aged Out    Meningococcal (ACWY) vaccine  Aged Out    Diabetes screen  Discontinued       Hemoglobin A1C (%)   Date Value   01/30/2018 5.3   08/02/2013 5.3   02/14/2013 6.0             ( goal A1C is < 7)   No components found for: \"LABMICR\"  LDL Cholesterol (mg/dL)   Date Value   04/11/2023 112   02/11/2022 90       (goal LDL is <100)   AST (U/L)   Date Value   06/24/2022 21     ALT (U/L)   Date Value   06/24/2022 25     BUN (mg/dL)   Date Value   11/03/2022 7     BP Readings from Last 3 Encounters:   05/23/23 (!) 152/91   03/24/23 (!) 163/105   11/03/22 125/83          (goal 120/80)    All Future Testing planned in [facility]  Lab Frequency Next Occurrence   Low Dose Chest CT -Abnormal Lung Screen Follow up Once 08/15/2023   EMG Once 10/12/2023               Patient Active Problem List:     Heart

## 2023-09-13 ENCOUNTER — TELEPHONE (OUTPATIENT)
Dept: ONCOLOGY | Age: 58
End: 2023-09-13

## 2023-09-13 NOTE — TELEPHONE ENCOUNTER
Reviewed CT Lung Screening Reminder List and patient's chart, patient is due and not yet scheduled. Mailed letter to patient.

## 2023-09-14 RX ORDER — BACLOFEN 10 MG/1
10 TABLET ORAL DAILY
Qty: 14 TABLET | Refills: 0 | Status: SHIPPED | OUTPATIENT
Start: 2023-09-14

## 2023-09-21 RX ORDER — BACLOFEN 10 MG/1
10 TABLET ORAL DAILY
Qty: 14 TABLET | Refills: 0 | OUTPATIENT
Start: 2023-09-21

## 2023-10-02 NOTE — TELEPHONE ENCOUNTER
Patient called asking about her refill. Patient said she is now out and says she has been calling for days. Please address patient medication refill. Patient was informed it was just sent to the PCP this morning.

## 2023-10-02 NOTE — TELEPHONE ENCOUNTER
Last visit: 4/20/23  Last Med refill: 9/14/23  Does patient have enough medication for 72 hours: No:     Next Visit Date:  Future Appointments   Date Time Provider [address]   11/8/2023  4:30  [address] EMG  [facility]   11/8/2023  4:30 PM MD Ashley Gudino med/reha [facility]   11/28/2023 11:15 AM Dominic Iyer MD Resp Spec [address] Maintenance   Topic Date Due    Hepatitis B vaccine (1 of 3 - 3-dose series) Never done    Cervical cancer screen  Never done    Colorectal Cancer Screen  02/20/2019    Annual Wellness Visit (AWV)  Never done    Shingles vaccine (2 of 2) 06/25/2021    COVID-19 Vaccine (4 - Pfizer series) 05/04/2022    Flu vaccine (1) 08/01/2023    Depression Monitoring  03/24/2024    Lipids  04/11/2024    Low dose CT lung screening &/or counseling  04/11/2024    Breast cancer screen  09/29/2024    DTaP/Tdap/Td vaccine (2 - Td or Tdap) 09/20/2027    Pneumococcal 0-64 years Vaccine (3 - PPSV23 or PCV20) 08/08/2030    Hepatitis C screen  Completed    HIV screen  Completed    Hepatitis A vaccine  Aged Out    Hib vaccine  Aged Out    Meningococcal (ACWY) vaccine  Aged Out    Diabetes screen  Discontinued       Hemoglobin A1C (%)   Date Value   01/30/2018 5.3   08/02/2013 5.3   02/14/2013 6.0             ( goal A1C is < 7)   No components found for: \"LABMICR\"  LDL Cholesterol (mg/dL)   Date Value   04/11/2023 112   02/11/2022 90       (goal LDL is <100)   AST (U/L)   Date Value   06/24/2022 21     ALT (U/L)   Date Value   06/24/2022 25     BUN (mg/dL)   Date Value   11/03/2022 7     BP Readings from Last 3 Encounters:   05/23/23 (!) 152/91   03/24/23 (!) 163/105   11/03/22 125/83          (goal 120/80)    All Future Testing planned in [facility]  Lab Frequency Next Occurrence   Low Dose Chest CT -Abnormal Lung Screen Follow up Once 08/15/2023   EMG Once 10/12/2023               Patient Active Problem List:     Heart disease     Chronic back pain     Depression

## 2023-10-03 RX ORDER — BACLOFEN 10 MG/1
10 TABLET ORAL DAILY
Qty: 14 TABLET | Refills: 0 | OUTPATIENT
Start: 2023-10-03

## 2023-10-06 RX ORDER — BACLOFEN 10 MG/1
10 TABLET ORAL DAILY
Qty: 14 TABLET | Refills: 0 | Status: SHIPPED | OUTPATIENT
Start: 2023-10-06 | End: 2023-10-30 | Stop reason: SDUPTHER

## 2023-10-16 RX ORDER — BACLOFEN 10 MG/1
10 TABLET ORAL DAILY
Qty: 14 TABLET | Refills: 0 | OUTPATIENT
Start: 2023-10-16

## 2023-10-16 NOTE — TELEPHONE ENCOUNTER
Last visit: 10/06/2023  Last Med refill: 10/06/2023  Does patient have enough medication for 72 hours: No:     Patient would like to know if she can have the full 30 days refill .     Next Visit Date:  Future Appointments   Date Time Provider [address]   11/8/2023  4:30 PM [facility] EMG  [facility] EMG [facility]   11/8/2023  4:30 PM Everardo Hernandez MD [facility] med/reha [facility]   11/28/2023 11:15 AM Adriana Cid MD Resp Spec [address] Maintenance   Topic Date Due    Hepatitis B vaccine (1 of 3 - 3-dose series) Never done    Cervical cancer screen  Never done    Colorectal Cancer Screen  02/20/2019    Annual Wellness Visit (AWV)  Never done    Shingles vaccine (2 of 2) 06/25/2021    COVID-19 Vaccine (4 - Pfizer series) 05/04/2022    Flu vaccine (1) 08/01/2023    Depression Monitoring  03/24/2024    Lipids  04/11/2024    Low dose CT lung screening &/or counseling  04/11/2024    Breast cancer screen  09/29/2024    DTaP/Tdap/Td vaccine (2 - Td or Tdap) 09/20/2027    Pneumococcal 0-64 years Vaccine (3 - PPSV23 or PCV20) 08/08/2030    Hepatitis C screen  Completed    HIV screen  Completed    Hepatitis A vaccine  Aged Out    Hib vaccine  Aged Out    Meningococcal (ACWY) vaccine  Aged Out    Diabetes screen  Discontinued       Hemoglobin A1C (%)   Date Value   01/30/2018 5.3   08/02/2013 5.3   02/14/2013 6.0             ( goal A1C is < 7)   No components found for: \"LABMICR\"  LDL Cholesterol (mg/dL)   Date Value   04/11/2023 112   02/11/2022 90       (goal LDL is <100)   AST (U/L)   Date Value   06/24/2022 21     ALT (U/L)   Date Value   06/24/2022 25     BUN (mg/dL)   Date Value   11/03/2022 7     BP Readings from Last 3 Encounters:   05/23/23 (!) 152/91   03/24/23 (!) 163/105   11/03/22 125/83          (goal 120/80)    All Future Testing planned in [facility]  Lab Frequency Next Occurrence   Low Dose Chest CT -Abnormal Lung Screen Follow up Once 08/15/2023   EMG Once 10/12/2023               Patient Active Problem

## 2023-10-17 NOTE — TELEPHONE ENCOUNTER
Health Maintenance   Topic Date Due    Annual Wellness Visit (AWV)  Never done    HIV screen  Never done    Hepatitis C screen  Never done    Cervical cancer screen  Never done    Low dose CT lung screening  Never done    Colorectal Cancer Screen  02/20/2019    Breast cancer screen  02/06/2020    Shingles vaccine (2 of 2) 06/25/2021    Depression Monitoring  01/23/2022    COVID-19 Vaccine (4 - Booster for Pfizer series) 07/09/2022    Flu vaccine (1) 09/01/2022    Lipids  02/11/2023    DTaP/Tdap/Td vaccine (2 - Td or Tdap) 09/20/2027    Pneumococcal 0-64 years Vaccine (3 - PPSV23 or PCV20) 08/08/2030    Hepatitis A vaccine  Aged Out    Hepatitis B vaccine  Aged Out    Hib vaccine  Aged Out    Meningococcal (ACWY) vaccine  Aged Out             (applicable per patient's age: Cancer Screenings, Depression Screening, Fall Risk Screening, Immunizations)    Hemoglobin A1C (%)   Date Value   01/30/2018 5.3   08/02/2013 5.3   02/14/2013 6.0     LDL Cholesterol (mg/dL)   Date Value   02/11/2022 90     AST (U/L)   Date Value   06/24/2022 21     ALT (U/L)   Date Value   06/24/2022 25     BUN (mg/dL)   Date Value   06/24/2022 9      (goal A1C is < 7)   (goal LDL is <100) need 30-50% reduction from baseline     BP Readings from Last 3 Encounters:   06/27/22 (!) 129/99   04/10/22 (!) 173/104   01/20/22 (!) 145/92    (goal /80)      All Future Testing planned in [facility]:  Lab Frequency Next Occurrence   CONCETTA Digital Screen Bilateral [[id number]] Once 04/07/2022       Next Visit Date:  Future Appointments   Date Time Provider Kaycee Hays   8/24/2022  1:45 PM [facility] MAMMO RM [address] [facility] Radiolog   9/1/2022 12:30 PM THOR Butler - CNP [facility] WALK [facility]            Patient Active Problem List:     Heart disease     Chronic back pain     Depression     Hyperlipidemia     CAD, multiple vessel     Asthma     Smoking     Need for vaccination     Syncope     Leg pain     Left hip pain     Chronic cholecystitis Brief EP progress note      48 year old male with history of HTN, DM, HLD, chronic back pain, GI bleed, who presented with chest pressure as well as reports of multiple pre-syncopal and syncopal events in the past few months. Denies bladder/bowel incontinence during the episodes. Denies checking FSG before or after the episodes. Pt found to have CAD and is now s/p PCI.     EP consulted for consideration of outpatient cardiac monitoring for syncope. Pt EKG NSR with normal intervals, telemetry while in house sinus rhythm with very rare ectopy. Pt has not had any pre-syncopal events during the hospital stay. Appropriate to send pt home with 14-day holter monitor to assess for symptoms.  Chest pain     Calculus of gallbladder without cholecystitis without obstruction     History of lumbar fusion     Failed back syndrome     Marijuana use     Abnormal weight loss     Allergic rhinitis     Anxiety state     Chronic midline low back pain with sciatica     Moderate episode of recurrent major depressive disorder ([facility])     Seizure ([facility])     Chronic tension-type headache, intractable     Hypertension     Sinus tachycardia     Acute respiratory failure (HCC)     COPD (chronic obstructive pulmonary disease) ([facility])     COPD exacerbation (HCC)     Acute bronchitis     Headache     Syncope and collapse     Unstable angina ([facility] [address] Brief EP progress note      48 year old male with history of HTN, DM, HLD, chronic back pain, GI bleed, who presented with chest pressure as well as reports of multiple pre-syncopal and syncopal events in the past few months. Denies bladder/bowel incontinence during the episodes. Denies checking FSG before or after the episodes. Pt found to have CAD and is now s/p PCI.     EP consulted for consideration of outpatient cardiac monitoring for syncope. Pt EKG NSR with normal intervals, telemetry while in house sinus rhythm with no bradycardia with very rare ectopy. Pt has not had any pre-syncopal events during the hospital stay. Appropriate to send pt home with 14-day holter monitor to assess for symptoms. Will arrange for this and for EP follow up to review monitor results.

## 2023-10-23 ENCOUNTER — TELEPHONE (OUTPATIENT)
Dept: FAMILY MEDICINE CLINIC | Age: 58
End: 2023-10-23

## 2023-10-23 RX ORDER — BACLOFEN 10 MG/1
10 TABLET ORAL DAILY
Qty: 14 TABLET | Refills: 0 | OUTPATIENT
Start: 2023-10-23

## 2023-10-23 NOTE — TELEPHONE ENCOUNTER
PC from pt following up on her Baclofen refill as last time it was stated it was too soon, Pt would like to know if she can get a 30 day supply rather than a 14 day supply to make it easier on them having to call in so much. Phone number and pharmacy confirmed. Upcoming appt 11/16/23.

## 2023-10-30 ENCOUNTER — HOSPITAL ENCOUNTER (OUTPATIENT)
Dept: CT IMAGING | Age: 58
Discharge: HOME | End: 2023-11-01
Attending: INTERNAL MEDICINE
Payer: MEDICARE

## 2023-10-30 DIAGNOSIS — R91.1 LUNG NODULE: ICD-10-CM

## 2023-10-30 PROCEDURE — 71250 CT THORAX DX C-: CPT

## 2023-10-30 RX ORDER — BACLOFEN 10 MG/1
10 TABLET ORAL DAILY
Qty: 14 TABLET | Refills: 0 | Status: SHIPPED | OUTPATIENT
Start: 2023-10-30

## 2023-10-30 NOTE — TELEPHONE ENCOUNTER
PC to pt to notify of original script being written for as needed treatment of pain and that's why it was written for 14 tabs and need of reevaluation in order to continue filling script, pt expressed understanding and stated she would discuss it further with her provider at her upcoming appt on 11/16/23 and thanked writer for his time before disconnecting phone call.

## 2023-10-30 NOTE — TELEPHONE ENCOUNTER
Patient is calling to request refills, please review and advise. Let me know if I can be of any of assistance.

## 2023-11-08 ENCOUNTER — HOSPITAL ENCOUNTER (OUTPATIENT)
Dept: NEUROLOGY | Age: 58
Discharge: HOME | End: 2023-11-08
Payer: MEDICARE

## 2023-11-08 DIAGNOSIS — M47.812 CERVICAL SPONDYLOSIS: ICD-10-CM

## 2023-11-08 DIAGNOSIS — R52 PAIN: ICD-10-CM

## 2023-11-08 PROCEDURE — 95908 NRV CNDJ TST 3-4 STUDIES: CPT | Performed by: PHYSICAL MEDICINE & REHABILITATION

## 2023-11-08 PROCEDURE — 95886 MUSC TEST DONE W/N TEST COMP: CPT | Performed by: PHYSICAL MEDICINE & REHABILITATION

## 2023-11-17 ENCOUNTER — HOSPITAL ENCOUNTER (OUTPATIENT)
Age: 58
Discharge: HOME | End: 2023-11-17
Payer: MEDICARE

## 2023-11-17 DIAGNOSIS — M25.611 SHOULDER JOINT STIFFNESS, BILATERAL: ICD-10-CM

## 2023-11-17 DIAGNOSIS — R53.83 OTHER FATIGUE: ICD-10-CM

## 2023-11-17 DIAGNOSIS — M25.652 HIP JOINT STIFFNESS, BILATERAL: ICD-10-CM

## 2023-11-17 DIAGNOSIS — M25.651 HIP JOINT STIFFNESS, BILATERAL: ICD-10-CM

## 2023-11-17 DIAGNOSIS — R41.89 BRAIN FOG: ICD-10-CM

## 2023-11-17 DIAGNOSIS — M25.612 SHOULDER JOINT STIFFNESS, BILATERAL: ICD-10-CM

## 2023-11-17 LAB
CRP SERPL HS-MCNC: <3 MG/L (ref 0–5)
ERYTHROCYTE [SEDIMENTATION RATE] IN BLOOD BY PHOTOMETRIC METHOD: 7 MM/HR (ref 0–30)
RHEUMATOID FACT SER NEPH-ACNC: <10 IU/ML

## 2023-11-17 PROCEDURE — 86038 ANTINUCLEAR ANTIBODIES: CPT

## 2023-11-17 PROCEDURE — 86431 RHEUMATOID FACTOR QUANT: CPT

## 2023-11-17 PROCEDURE — 86225 DNA ANTIBODY NATIVE: CPT

## 2023-11-17 PROCEDURE — 86200 CCP ANTIBODY: CPT

## 2023-11-17 PROCEDURE — 36415 COLL VENOUS BLD VENIPUNCTURE: CPT

## 2023-11-17 PROCEDURE — 85652 RBC SED RATE AUTOMATED: CPT

## 2023-11-17 PROCEDURE — 86140 C-REACTIVE PROTEIN: CPT

## 2023-11-25 LAB
ANA SER QL IA: NEGATIVE
CCP AB SER IA-ACNC: 0.7 U/ML (ref 0–7)
DSDNA IGG SER QL IA: 5.2 IU/ML
NUCLEAR IGG SER IA-RTO: 0.1 U/ML

## 2023-11-28 ENCOUNTER — TELEPHONE (OUTPATIENT)
Dept: PULMONOLOGY | Age: 58
End: 2023-11-28

## 2023-11-28 NOTE — TELEPHONE ENCOUNTER
Patient called to reschedule appointment for 11/28 due to not having transportation. Patient rescheduled to 2/28/2024.

## 2023-11-29 ENCOUNTER — OFFICE VISIT (OUTPATIENT)
Dept: ORTHOPEDIC SURGERY | Age: 58
End: 2023-11-29
Payer: MEDICARE

## 2023-11-29 VITALS — BODY MASS INDEX: 23.54 KG/M2 | WEIGHT: 150 LBS | HEIGHT: 67 IN

## 2023-11-29 DIAGNOSIS — Z01.818 PRE-OP TESTING: ICD-10-CM

## 2023-11-29 DIAGNOSIS — R52 PAIN: Primary | ICD-10-CM

## 2023-11-29 PROCEDURE — 99214 OFFICE O/P EST MOD 30 MIN: CPT

## 2023-11-29 PROCEDURE — 4004F PT TOBACCO SCREEN RCVD TLK: CPT

## 2023-11-29 PROCEDURE — G8420 CALC BMI NORM PARAMETERS: HCPCS

## 2023-11-29 PROCEDURE — 3017F COLORECTAL CA SCREEN DOC REV: CPT

## 2023-11-29 PROCEDURE — G8484 FLU IMMUNIZE NO ADMIN: HCPCS

## 2023-11-29 PROCEDURE — G8427 DOCREV CUR MEDS BY ELIG CLIN: HCPCS

## 2023-11-29 NOTE — PROGRESS NOTES
spine her EMGs do not demonstrate radicular findings.  -We will plan for the left side first as this is the side that cause her the most pain and discomfort. We will plan for left first [facility] arthroplasty, and left carpal tunnel release  -She will plan to rehab her left side before having her right side operated on. Before her right side is treated we discussed obtaining right upper extremity EMGs. She does have significant hyperextension of her right MP joint that would likely need to be addressed during surgery as well.  -We did discuss smoking cessation and the increased risk of postsurgical complications and wound complications.  -Consent was obtained. Patient was sent for preoperative testing. We will schedule her for the earliest available date that works with her schedule. We will see her 2 weeks after surgery for wound check. Electronically signed by Carlos Morris,  PGY-[email or web address] on 11/29/2023 at 9:55 AM    This note is created with the assistance of a speech recognition program.  While intending to generate a document that actually reflects the content of the visit, the document can still have some errors including those of syntax and sound a like substitutions which may escape proof reading.   In such instances, actual meaning can be extrapolated by contextual diversion

## 2023-11-30 ENCOUNTER — OFFICE VISIT (OUTPATIENT)
Dept: FAMILY MEDICINE CLINIC | Age: 58
End: 2023-11-30
Payer: MEDICARE

## 2023-11-30 VITALS
HEIGHT: 67 IN | DIASTOLIC BLOOD PRESSURE: 79 MMHG | WEIGHT: 150 LBS | BODY MASS INDEX: 23.54 KG/M2 | SYSTOLIC BLOOD PRESSURE: 137 MMHG | HEART RATE: 98 BPM

## 2023-11-30 DIAGNOSIS — M35.3 POLYMYALGIA RHEUMATICA (HCC): Primary | ICD-10-CM

## 2023-11-30 DIAGNOSIS — Z79.52 CURRENT CHRONIC USE OF SYSTEMIC STEROIDS: ICD-10-CM

## 2023-11-30 PROCEDURE — 3075F SYST BP GE 130 - 139MM HG: CPT

## 2023-11-30 PROCEDURE — 3078F DIAST BP <80 MM HG: CPT

## 2023-11-30 PROCEDURE — G8427 DOCREV CUR MEDS BY ELIG CLIN: HCPCS

## 2023-11-30 PROCEDURE — 99213 OFFICE O/P EST LOW 20 MIN: CPT

## 2023-11-30 PROCEDURE — 3017F COLORECTAL CA SCREEN DOC REV: CPT

## 2023-11-30 PROCEDURE — 4004F PT TOBACCO SCREEN RCVD TLK: CPT

## 2023-11-30 PROCEDURE — G8420 CALC BMI NORM PARAMETERS: HCPCS

## 2023-11-30 PROCEDURE — G8484 FLU IMMUNIZE NO ADMIN: HCPCS

## 2023-11-30 RX ORDER — PREDNISONE 10 MG/1
10 TABLET ORAL DAILY
Qty: 30 TABLET | Refills: 1 | Status: SHIPPED | OUTPATIENT
Start: 2023-11-30 | End: 2024-01-29

## 2023-11-30 ASSESSMENT — ENCOUNTER SYMPTOMS
NAUSEA: 0
COUGH: 0
DIARRHEA: 0
VOMITING: 0
CHEST TIGHTNESS: 0
SHORTNESS OF BREATH: 0
ABDOMINAL PAIN: 0

## 2023-11-30 NOTE — PROGRESS NOTES
Visit Information    Have you changed or started any medications since your last visit including any over-the-counter medicines, vitamins, or herbal medicines? no   Have you stopped taking any of your medications? Is so, why? -  no  Are you having any side effects from any of your medications? - no    Have you seen any other physician or provider since your last visit? yes - Orthopedic,    Have you had any other diagnostic tests since your last visit? yes - Labs, XR, EMG   Have you been seen in the emergency room and/or had an admission in a hospital since we last saw you?  no   Have you had your routine dental cleaning in the past 6 months?  no     Do you have an active MyChart account? If no, what is the barrier?   Yes    Patient Care Team:  Maite Steinberg MD as PCP - General (Family Medicine)    Medical History Review  Past Medical, Family, and Social History reviewed and does not contribute to the patient presenting condition    Health Maintenance   Topic Date Due    Hepatitis B vaccine (1 of 3 - 3-dose series) Never done    Cervical cancer screen  Never done    Colorectal Cancer Screen  02/20/2019    Annual Wellness Visit (AWV)  Never done    Shingles vaccine (2 of 2) 06/25/2021    Flu vaccine (1) 08/01/2023    COVID-19 Vaccine (4 - 2023-24 season) 09/01/2023    Depression Monitoring  03/24/2024    Lipids  04/11/2024    Low dose CT lung screening &/or counseling  04/11/2024    Breast cancer screen  09/29/2024    DTaP/Tdap/Td vaccine (2 - Td or Tdap) 09/20/2027    Pneumococcal 0-64 years Vaccine (3 - PPSV23 or PCV20) 08/08/2030    Hepatitis C screen  Completed    HIV screen  Completed    Hepatitis A vaccine  Aged Out    Hib vaccine  Aged Out    Meningococcal (ACWY) vaccine  Aged Out    Diabetes screen  Discontinued

## 2023-11-30 NOTE — PROGRESS NOTES
Attending Physician Statement  I have discussed the care of Rasta Daniel, including pertinent history and exam findings,  with the resident. I have reviewed the key elements of all parts of the encounter with the resident. I agree with the assessment, plan and orders as documented by the resident.   (Jami Sawant)    Abram Layne MD

## 2023-11-30 NOTE — PATIENT INSTRUCTIONS
Thank you for letting us take care of you today. We hope all your questions were addressed. If a question was overlooked or something else comes to mind after you return home, please contact a member of your Care Team listed below. Your Care Team at [facility] is Team #1  Darrion Dobbins, Faculty Advisor  Elena Gutierrez, Resident Physician  Haley Hoang, Resident Physician  Shannon Shelton, Resident Physician  Michelle Davalos, [facility], [address] [facility], [address] [facility], [address]  [facility], [address] [facility], [facility]  [facility]  Cate Kincheloe, [address] [facility] Kay,   Allyson Batres, [facility] (Clinical Pharmacist)     Office phone number: [phone]    If you need to get in right away due to illness, please be advised we have \"Same Day\" appointments available Monday-Friday. Please call us at [phone] option #3 to schedule your \"Same Day\" appointment.

## 2023-11-30 NOTE — PROGRESS NOTES
[facility]    Family Medicine Residency Program - Outpatient Note      Subjective:    Kameron Regalado is a 62 y.o. female with  has a past medical history of Acute MI ([facility]), Anxiety, Chronic back pain, COPD (chronic obstructive pulmonary disease) ([facility]), Depression, Heart disease, Hyperlipidemia, Hypertension, MRSA (methicillin resistant staph aureus) culture positive, and Seizures ([address] Presented to the office today for:  Chief Complaint   Patient presents with    Pain     Follow up - generalized pain all over body - lab results - 6/10 pain today    Discuss Medications     Need to discuss increase in Paxil -     Surgical Clearance     Having surgery on 12/28/23 - needing clearance       HPI    Surgical clearance needed, left carpal tunnel surgery at the end of December. No stents/heart issues, does have COPD, dyspnea with climbing stairs. No on insulin, normal kidney function. Low-moderate risk, will need pul clearance prior to surgery. Here for follow up on lab results, there was high suspicion for polymyalgia rheumatica dur to symptom presentation. Labs for autoimmune conditions have all been negative. Patient did read on condition and says it is exactly what she is experiencing. She was educated on the condition and association with temporal arteritis. Will start low dose steroids and get DEXA scan. Review of Systems   Constitutional:  Negative for chills, fatigue and fever. Respiratory:  Negative for cough, chest tightness and shortness of breath. Cardiovascular:  Negative for chest pain, palpitations and leg swelling. Gastrointestinal:  Negative for abdominal pain, diarrhea, nausea and vomiting. Musculoskeletal:         Large joint stiffness and pain   Neurological:  Negative for dizziness and headaches.        The patient has a   Family History   Problem Relation Age of Onset    Other Mother     Heart Disease Father     High Blood Pressure Father     High Cholesterol Father

## 2023-12-04 ENCOUNTER — TELEPHONE (OUTPATIENT)
Dept: PULMONOLOGY | Age: 58
End: 2023-12-04

## 2023-12-04 NOTE — TELEPHONE ENCOUNTER
Pt called needs a surgery clearance appt having Carpal Tunnel on 12/28/2023 at [facility]

## 2023-12-05 ENCOUNTER — HOSPITAL ENCOUNTER (OUTPATIENT)
Dept: WOMENS IMAGING | Age: 58
Discharge: HOME | End: 2023-12-07
Payer: MEDICARE

## 2023-12-05 VITALS — BODY MASS INDEX: 23.54 KG/M2 | WEIGHT: 150 LBS | HEIGHT: 67 IN

## 2023-12-05 DIAGNOSIS — M35.3 POLYMYALGIA RHEUMATICA (HCC): ICD-10-CM

## 2023-12-05 DIAGNOSIS — Z79.52 CURRENT CHRONIC USE OF SYSTEMIC STEROIDS: ICD-10-CM

## 2023-12-05 DIAGNOSIS — Z12.31 VISIT FOR SCREENING MAMMOGRAM: ICD-10-CM

## 2023-12-05 PROCEDURE — 77080 DXA BONE DENSITY AXIAL: CPT

## 2023-12-05 PROCEDURE — 77063 BREAST TOMOSYNTHESIS BI: CPT

## 2023-12-05 RX ORDER — PAROXETINE 30 MG/1
30 TABLET, FILM COATED ORAL DAILY
Qty: 30 TABLET | Refills: 3 | Status: SHIPPED | OUTPATIENT
Start: 2023-12-05

## 2023-12-05 NOTE — TELEPHONE ENCOUNTER
Last visit:   Last Med refill:   Does patient have enough medication for 72 hours: No:     Next Visit Date:  Future Appointments   Date Time Provider [address]   12/5/2023  9:15 AM ST [address] MAMMO  [address] Radiolog   12/5/2023 10:00 AM STC DEXA RM [facility] M[facility]MO [facility] Radiolog   12/15/2023  9:30 AM STA PAT RM 1 [address] PAT [address] [facility]   1/4/2024  9:00 AM Yimi Jones MD [facility]   1/10/2024 10:10 AM SCHEDULE, [facility] Melissa Parnell   2/28/2024  3:45 PM Nadeem, [address] [facility] MD Irma Resp Spec [address] Maintenance   Topic Date Due    Hepatitis B vaccine (1 of 3 - 3-dose series) Never done    Cervical cancer screen  Never done    Colorectal Cancer Screen  02/20/2019    Annual Wellness Visit (AWV)  Never done    Shingles vaccine (2 of 2) 06/25/2021    Flu vaccine (1) 08/01/2023    COVID-19 Vaccine (4 - 2023-24 season) 09/01/2023    Depression Monitoring  03/24/2024    Lipids  04/11/2024    Low dose CT lung screening &/or counseling  04/11/2024    Breast cancer screen  09/29/2024    DTaP/Tdap/Td vaccine (2 - Td or Tdap) 09/20/2027    Pneumococcal 0-64 years Vaccine (3 - PPSV23 or PCV20) 08/08/2030    Hepatitis C screen  Completed    HIV screen  Completed    Hepatitis A vaccine  Aged Out    Hib vaccine  Aged Out    Meningococcal (ACWY) vaccine  Aged Out    Diabetes screen  Discontinued       Hemoglobin A1C (%)   Date Value   11/16/2023 5.6   01/30/2018 5.3   08/02/2013 5.3             ( goal A1C is < 7)   No components found for: \"LABMICR\"  LDL Cholesterol (mg/dL)   Date Value   04/11/2023 112   02/11/2022 90       (goal LDL is <100)   AST (U/L)   Date Value   06/24/2022 21     ALT (U/L)   Date Value   06/24/2022 25     BUN (mg/dL)   Date Value   11/03/2022 7     BP Readings from Last 3 Encounters:   11/30/23 137/79   11/16/23 (!) 142/93   05/23/23 (!) 152/91          (goal 120/80)    All Future Testing planned in [facility]  Lab Frequency Next Occurrence   EKG 12 Lead Once 12/13/2023

## 2023-12-07 ENCOUNTER — TELEPHONE (OUTPATIENT)
Dept: PULMONOLOGY | Age: 58
End: 2023-12-07

## 2023-12-07 NOTE — TELEPHONE ENCOUNTER
Patient is going to have surgery soon and states they would like her to be cleared by you. Her appt with you isnt until February. Are you able to clear her?

## 2023-12-08 NOTE — TELEPHONE ENCOUNTER
Sorry, in the chart it says 12/28 Carpal Tunnel surgery by Dr Gema Cheek. This encounter was forwarded to him.

## 2023-12-13 ENCOUNTER — TELEPHONE (OUTPATIENT)
Dept: FAMILY MEDICINE CLINIC | Age: 58
End: 2023-12-13

## 2023-12-13 DIAGNOSIS — N30.01 ACUTE CYSTITIS WITH HEMATURIA: Primary | ICD-10-CM

## 2023-12-13 NOTE — TELEPHONE ENCOUNTER
Asking for order urine test for possible UTI. Patient reports odor, frequency, and pressure upon urination. Lasting a couple weeks. Patient does have labs ordered for Friday for surgical pre testing, but she'd like her urine tested before that to make sure she is ok prior to surgery. Please advise.

## 2023-12-13 NOTE — TELEPHONE ENCOUNTER
Patient called office asking to speak to writer for unknown reason. Writer called patient back and got voicemail. LM for patient to call back.

## 2023-12-15 ENCOUNTER — HOSPITAL ENCOUNTER (OUTPATIENT)
Dept: PREADMISSION TESTING | Age: 58
End: 2023-12-15
Payer: MEDICARE

## 2023-12-15 VITALS
BODY MASS INDEX: 23.15 KG/M2 | TEMPERATURE: 97.8 F | SYSTOLIC BLOOD PRESSURE: 148 MMHG | HEIGHT: 67 IN | OXYGEN SATURATION: 99 % | HEART RATE: 99 BPM | DIASTOLIC BLOOD PRESSURE: 89 MMHG | RESPIRATION RATE: 18 BRPM | WEIGHT: 147.49 LBS

## 2023-12-15 DIAGNOSIS — Z01.818 PRE-OP TESTING: ICD-10-CM

## 2023-12-15 LAB
ALBUMIN SERPL-MCNC: 4.5 G/DL (ref 3.5–5.2)
ALP SERPL-CCNC: 77 U/L (ref 35–104)
ALT SERPL-CCNC: 19 U/L (ref 5–33)
ANION GAP SERPL CALCULATED.3IONS-SCNC: 12 MMOL/L (ref 9–17)
AST SERPL-CCNC: 16 U/L
BACTERIA URNS QL MICRO: NORMAL
BILIRUB SERPL-MCNC: 0.2 MG/DL (ref 0.3–1.2)
BILIRUB UR QL STRIP: NEGATIVE
BUN SERPL-MCNC: 11 MG/DL (ref 6–20)
BUN/CREAT SERPL: 18 (ref 9–20)
CALCIUM SERPL-MCNC: 9.2 MG/DL (ref 8.6–10.4)
CASTS #/AREA URNS LPF: NORMAL /LPF (ref 0–8)
CHLORIDE SERPL-SCNC: 102 MMOL/L (ref 98–107)
CLARITY UR: CLEAR
CO2 SERPL-SCNC: 28 MMOL/L (ref 20–31)
COLOR UR: YELLOW
CREAT SERPL-MCNC: 0.6 MG/DL (ref 0.5–0.9)
EKG ATRIAL RATE: 87 BPM
EKG P AXIS: 46 DEGREES
EKG P-R INTERVAL: 128 MS
EKG Q-T INTERVAL: 348 MS
EKG QRS DURATION: 86 MS
EKG QTC CALCULATION (BAZETT): 418 MS
EKG R AXIS: 37 DEGREES
EKG T AXIS: 44 DEGREES
EKG VENTRICULAR RATE: 87 BPM
EPI CELLS #/AREA URNS HPF: NORMAL /HPF (ref 0–5)
ERYTHROCYTE [DISTWIDTH] IN BLOOD BY AUTOMATED COUNT: 14.7 % (ref 11.8–14.4)
GFR SERPL CREATININE-BSD FRML MDRD: >60 ML/MIN/1.73M2
GLUCOSE SERPL-MCNC: 111 MG/DL (ref 70–99)
GLUCOSE UR STRIP-MCNC: NEGATIVE MG/DL
HCT VFR BLD AUTO: 42.6 % (ref 36.3–47.1)
HGB BLD-MCNC: 14.2 G/DL (ref 11.9–15.1)
HGB UR QL STRIP.AUTO: ABNORMAL
KETONES UR STRIP-MCNC: NEGATIVE MG/DL
LEUKOCYTE ESTERASE UR QL STRIP: ABNORMAL
MCH RBC QN AUTO: 32.9 PG (ref 25.2–33.5)
MCHC RBC AUTO-ENTMCNC: 33.3 G/DL (ref 28.4–34.8)
MCV RBC AUTO: 98.8 FL (ref 82.6–102.9)
NITRITE UR QL STRIP: NEGATIVE
NRBC BLD-RTO: 0 PER 100 WBC
PH UR STRIP: 6.5 [PH] (ref 5–8)
PLATELET # BLD AUTO: 258 K/UL (ref 138–453)
PMV BLD AUTO: 8.3 FL (ref 8.1–13.5)
POTASSIUM SERPL-SCNC: 3.6 MMOL/L (ref 3.7–5.3)
PROT SERPL-MCNC: 6.6 G/DL (ref 6.4–8.3)
PROT UR STRIP-MCNC: NEGATIVE MG/DL
RBC # BLD AUTO: 4.31 M/UL (ref 3.95–5.11)
RBC #/AREA URNS HPF: NORMAL /HPF (ref 0–4)
SODIUM SERPL-SCNC: 142 MMOL/L (ref 135–144)
SP GR UR STRIP: 1.02 (ref 1–1.03)
UROBILINOGEN UR STRIP-ACNC: NORMAL EU/DL (ref 0–1)
WBC #/AREA URNS HPF: NORMAL /HPF (ref 0–5)
WBC OTHER # BLD: 10.5 K/UL (ref 3.5–11.3)

## 2023-12-15 PROCEDURE — 85027 COMPLETE CBC AUTOMATED: CPT

## 2023-12-15 PROCEDURE — 80053 COMPREHEN METABOLIC PANEL: CPT

## 2023-12-15 PROCEDURE — 81001 URINALYSIS AUTO W/SCOPE: CPT

## 2023-12-15 PROCEDURE — 36415 COLL VENOUS BLD VENIPUNCTURE: CPT

## 2023-12-15 RX ORDER — IBUPROFEN 200 MG
400 TABLET ORAL EVERY 6 HOURS PRN
COMMUNITY

## 2023-12-15 ASSESSMENT — PAIN DESCRIPTION - PAIN TYPE: TYPE: CHRONIC PAIN

## 2023-12-15 ASSESSMENT — PAIN DESCRIPTION - FREQUENCY: FREQUENCY: CONTINUOUS

## 2023-12-15 ASSESSMENT — PAIN DESCRIPTION - LOCATION: LOCATION: BACK;HAND

## 2023-12-15 ASSESSMENT — PAIN DESCRIPTION - ORIENTATION: ORIENTATION: RIGHT;LEFT

## 2023-12-15 ASSESSMENT — PAIN SCALES - GENERAL: PAINLEVEL_OUTOF10: 5

## 2023-12-15 ASSESSMENT — PAIN DESCRIPTION - DESCRIPTORS: DESCRIPTORS: BURNING;ACHING;SHARP;THROBBING

## 2023-12-15 NOTE — PRE-PROCEDURE INSTRUCTIONS
On the Day of Your Surgery, Thursday, 12/28/23, Dr. Kristen Lara office will call and inform you of the arrival time the day of surgery. Enter the hospital through the Main Entrance, take the lobby elevators to the second floor and check in at the Surgery Registration desk. Continue to take your home medications as you normally do up to and including the night before surgery with the exception of blood thinning medications. Blood Thinning Medications:  Please stop prescription blood thinning medications such as Apixaban (Eliquis); Clopidogrel (Plavix); Dabigatran (Pradaxa); Prasugrel (Effient); Rivaroxaban (Xarelto); Ticagrelor (Brilinta); Warfarin (Coumadin) only as directed by your surgeon and/or the prescribing physician    Some common examples of other medications that can thin your blood are: Aspirin, Ibuprofen (Advil, Motrin), Naproxen (Aleve), Meloxicam (Mobic), Celecoxib (Celebrex), Fish Oil, many Herbal Supplements. These medications should usually be stopped at least 7 days prior to surgery. Daily vitamin, ibuprofen. Dr Kristen Lara office told pt to stop baby aspirin 1 week prior to procedure    Tylenol is OK to take for pain the week prior to surgery. Failure to stop certain medications may interfere with your scheduled surgery. If you receive instructions from your surgeon regarding what medications to stop prior to surgery, please follow those specific instructions. If You Have Diabetes:  Do not take any of your diabetic medications, (injectables or by mouth) the morning of surgery unless otherwise instructed by the doctor who manages your diabetes. If you are taking insulin, contact the doctor the manages your diabetes for instructions about any changes to your insulin dosages the day before surgery. Please take the following medication(s) the day of surgery with small sips of water:              Take your prednisone.   Use your Breo and spiriva inhalers, use your nebulizer the your own soap and shampoo for the morning of surgery and report reaction to the pre-operative nurse. Additional Instructions:      1. If you are having any type of anesthesia, you are to have NOTHING to eat or drink after midnight the night before surgery. This includes no gum, hard candy, mints or water. The only exception to this is small sips of water to take the medications listed above. No smoking or chewing tobacco after midnight. No alcoholic beverages for 24 hours prior to surgery. 2. You may brush your teeth but do not swallow the water. 3. If you wear glasses bring a case for them if you have one. No contacts should be worn the day of surgery. You may also bring your hearing aids. If you have dentures, most surgical procedures involving anesthesia will require that you remove them prior to surgery. 4. If you sleep with a CPAP or BiPAP machine at home and plan on staying in the hospital overnight after surgery, please bring your machine with you. 5. Do not wear any jewelry or body piercings the day of surgery. No nail polish on the operative extremity (arm/hand or leg/foot surgeries)   6. If you are staying overnight with us, you may bring a small bag of necessary personal items. 7. Please wear loose, comfortable clothing. If you are potentially going to have a cast, sling, brace or bulky dressing, make sure to wear clothing that will fit over it. 8. In case of illness - If you have cold or flu like symptoms (high fever, runny nose, sore throat, cough, etc.) rash, nausea, vomiting, loose stools, and/or recent contact with someone who has a contagious disease (chicken pox, measles, COVID-19, etc.). Please call your surgeon before coming to the hospital.    Transportation After Your Surgery/Procedure: If you are going home the same day of surgery you need someone to drive you home. Your  must be at least 25years of age.   A taxi cab or other nonmedical public transportation

## 2023-12-16 RX ORDER — NITROFURANTOIN 25; 75 MG/1; MG/1
100 CAPSULE ORAL 2 TIMES DAILY
Qty: 14 CAPSULE | Refills: 0 | Status: SHIPPED | OUTPATIENT
Start: 2023-12-16 | End: 2023-12-23

## 2023-12-18 ENCOUNTER — TELEPHONE (OUTPATIENT)
Dept: FAMILY MEDICINE CLINIC | Age: 58
End: 2023-12-18

## 2023-12-18 NOTE — TELEPHONE ENCOUNTER
Patient called office and said she was told by pre admission testing that she will need Cardiac Clearance prior to surgery. Patient was asking if her PCP could clear her instead since she can't get into the Cardiologist until February. Patient was informed she will have to get cardiac clearance from her cardiologist. Patient acknowledged and will postpone her surgery until cleared by cardiology.

## 2023-12-18 NOTE — FLOWSHEET NOTE
12/18/23 1327   Anesthesia PAT Clearance   Anesthesia Review Status [facility] has reviewed patient for surgery  ( reviews history & NP H&P. Requests cardiac clearance.   Spoke to [facility] at office that cardiac clearance is needed)

## 2023-12-18 NOTE — TELEPHONE ENCOUNTER
LM for patient to inform her she does have a UTI, and that physician sent an antibiotic to the pharmacy.

## 2024-01-19 DIAGNOSIS — M35.3 POLYMYALGIA RHEUMATICA (HCC): ICD-10-CM

## 2024-01-19 RX ORDER — PREDNISONE 10 MG/1
10 TABLET ORAL DAILY
Qty: 30 TABLET | Refills: 1 | Status: SHIPPED | OUTPATIENT
Start: 2024-01-19 | End: 2024-03-19

## 2024-01-19 NOTE — TELEPHONE ENCOUNTER
E-scribe request for PREDNISONE. Please review and e-scribe if applicable.     Last Visit Date:  11/30/2023  Next Visit Date:  Visit date not found    Hemoglobin A1C (%)   Date Value   11/16/2023 5.6   01/30/2018 5.3   08/02/2013 5.3             ( goal A1C is < 7)   No components found for: \"LABMICR\"  LDL Cholesterol (mg/dL)   Date Value   04/11/2023 112       (goal LDL is <100)   AST (U/L)   Date Value   12/15/2023 16     ALT (U/L)   Date Value   12/15/2023 19     BUN (mg/dL)   Date Value   12/15/2023 11     BP Readings from Last 3 Encounters:   12/15/23 (!) 148/89   11/30/23 137/79   11/16/23 (!) 142/93          (goal 120/80)        Patient Active Problem List:     Heart disease     Chronic back pain     Depression     Hyperlipidemia     CAD, multiple vessel     Asthma     Smoking     Need for vaccination     Syncope     Leg pain     Left hip pain     Chronic cholecystitis     Chest pain     Calculus of gallbladder without cholecystitis without obstruction     History of lumbar fusion     Failed back syndrome     Marijuana use     Abnormal weight loss     Allergic rhinitis     Anxiety state     Chronic midline low back pain with sciatica     Chronic tension-type headache, intractable     Hypertension     Sinus tachycardia     Acute respiratory failure (HCC)     COPD (chronic obstructive pulmonary disease) (HCC)     COPD exacerbation (HCC)     Acute bronchitis     Headache     Syncope and collapse     Unstable angina (HCC)     Lung nodule     Polymyalgia rheumatica (HCC)     Current chronic use of systemic steroids      ----JF

## 2024-01-22 NOTE — TELEPHONE ENCOUNTER
E-scribe request for paxil. Please review and e-scribe if applicable.     Last Visit Date:  11/30/23  Next Visit Date:  Visit date not found    Hemoglobin A1C (%)   Date Value   11/16/2023 5.6   01/30/2018 5.3   08/02/2013 5.3             ( goal A1C is < 7)   No components found for: \"LABMICR\"  LDL Cholesterol (mg/dL)   Date Value   04/11/2023 112       (goal LDL is <100)   AST (U/L)   Date Value   12/15/2023 16     ALT (U/L)   Date Value   12/15/2023 19     BUN (mg/dL)   Date Value   12/15/2023 11     BP Readings from Last 3 Encounters:   12/15/23 (!) 148/89   11/30/23 137/79   11/16/23 (!) 142/93          (goal 120/80)        Patient Active Problem List:     Heart disease     Chronic back pain     Depression     Hyperlipidemia     CAD, multiple vessel     Asthma     Smoking     Need for vaccination     Syncope     Leg pain     Left hip pain     Chronic cholecystitis     Chest pain     Calculus of gallbladder without cholecystitis without obstruction     History of lumbar fusion     Failed back syndrome     Marijuana use     Abnormal weight loss     Allergic rhinitis     Anxiety state     Chronic midline low back pain with sciatica     Chronic tension-type headache, intractable     Hypertension     Sinus tachycardia     Acute respiratory failure (HCC)     COPD (chronic obstructive pulmonary disease) (HCC)     COPD exacerbation (HCC)     Acute bronchitis     Headache     Syncope and collapse     Unstable angina (HCC)     Lung nodule     Polymyalgia rheumatica (HCC)     Current chronic use of systemic steroids

## 2024-01-23 RX ORDER — PAROXETINE HYDROCHLORIDE 20 MG/1
20 TABLET, FILM COATED ORAL DAILY
Qty: 90 TABLET | OUTPATIENT
Start: 2024-01-23

## 2024-02-28 ENCOUNTER — OFFICE VISIT (OUTPATIENT)
Dept: PULMONOLOGY | Age: 59
End: 2024-02-28
Payer: MEDICARE

## 2024-02-28 VITALS
WEIGHT: 147 LBS | DIASTOLIC BLOOD PRESSURE: 101 MMHG | RESPIRATION RATE: 16 BRPM | OXYGEN SATURATION: 97 % | SYSTOLIC BLOOD PRESSURE: 153 MMHG | BODY MASS INDEX: 23.07 KG/M2 | HEIGHT: 67 IN | HEART RATE: 101 BPM

## 2024-02-28 DIAGNOSIS — J44.9 CHRONIC OBSTRUCTIVE PULMONARY DISEASE, UNSPECIFIED COPD TYPE (HCC): Primary | ICD-10-CM

## 2024-02-28 DIAGNOSIS — R91.1 LUNG NODULE: ICD-10-CM

## 2024-02-28 DIAGNOSIS — F17.210 SMOKING GREATER THAN 40 PACK YEARS: ICD-10-CM

## 2024-02-28 DIAGNOSIS — J45.909 ASTHMA, UNSPECIFIED ASTHMA SEVERITY, UNSPECIFIED WHETHER COMPLICATED, UNSPECIFIED WHETHER PERSISTENT: ICD-10-CM

## 2024-02-28 PROCEDURE — G8484 FLU IMMUNIZE NO ADMIN: HCPCS | Performed by: INTERNAL MEDICINE

## 2024-02-28 PROCEDURE — 4004F PT TOBACCO SCREEN RCVD TLK: CPT | Performed by: INTERNAL MEDICINE

## 2024-02-28 PROCEDURE — G8427 DOCREV CUR MEDS BY ELIG CLIN: HCPCS | Performed by: INTERNAL MEDICINE

## 2024-02-28 PROCEDURE — G8420 CALC BMI NORM PARAMETERS: HCPCS | Performed by: INTERNAL MEDICINE

## 2024-02-28 PROCEDURE — 3017F COLORECTAL CA SCREEN DOC REV: CPT | Performed by: INTERNAL MEDICINE

## 2024-02-28 PROCEDURE — 3080F DIAST BP >= 90 MM HG: CPT | Performed by: INTERNAL MEDICINE

## 2024-02-28 PROCEDURE — 99214 OFFICE O/P EST MOD 30 MIN: CPT | Performed by: INTERNAL MEDICINE

## 2024-02-28 PROCEDURE — 3077F SYST BP >= 140 MM HG: CPT | Performed by: INTERNAL MEDICINE

## 2024-02-28 PROCEDURE — 3023F SPIROM DOC REV: CPT | Performed by: INTERNAL MEDICINE

## 2024-02-28 RX ORDER — ALBUTEROL SULFATE 90 UG/1
2 AEROSOL, METERED RESPIRATORY (INHALATION) EVERY 6 HOURS PRN
Qty: 18 G | Refills: 11 | Status: SHIPPED | OUTPATIENT
Start: 2024-02-28

## 2024-02-28 NOTE — PROGRESS NOTES
OUTPATIENT PULMONARY PROGRESS NOTE      Patient:  Cary Samano  MRN: [id number]    Consulting Physician: Kenia Jones MD  Reason for Consult: COPD/chronic cough/lung nodules  [facility] Care Physician: Kenia Jones MD    HISTORY OF PRESENT ILLNESS:   The patient is a 58 y.o. female she is initially referred here for evaluation management of COPD/chronic cough and lung nodule.    She was seen for the first time in May 2023 and at that time she had pulmonary function test done in initial evaluation she had not return for appointment since then.    She is on Spiriva and on Breo which she is apparently compliant with.  According patient she does have cough cough is chronic denies any change cough is productive with a sputum no change in the color of the sputum volume the sputum.  She usually does not wake up at night with cough wheezing or chest tightness.  She is able to do regular activities she does get shortness of breath on exertional activity when she had to go second level of the stairs she gets short of breath.  She is able to walk outside and apparently shopping but had to stop sometime and take it slow.  He use albuterol as needed denies frequent use of albuterol.  She continues to smoke cigarette currently smoking 1 pack/day unable to stop smoking according patient she has a lot of stress currently.    CT scan of the chest which was ordered when she was seen last time for 6 months follow-up.  CT scan of the chest on 4/11/2023 and follow-up CT on 10/30/2023 shows 6 mm right upper lobe nodule and she has a minute right upper lobe nodule just above the fissure the opacity in right middle lobe laterally pleural-based which was seen on CT scan in April 2023 is not seen on CT scan in October 2023.    She was recently diagnosed with polymyalgia rheumatica and she was started on prednisone 10 mg daily about few months ago.  She was complaining of chronic tiredness and fatigue which she still complain.  According

## 2024-02-29 DIAGNOSIS — M35.3 POLYMYALGIA RHEUMATICA (HCC): ICD-10-CM

## 2024-03-01 NOTE — TELEPHONE ENCOUNTER
Last visit: 11/30/23  Last Med refill: 1/19/24  Does patient have enough medication for 72 hours: no    Next Visit Date:  Future Appointments   Date Time Provider Department Center   5/15/2024  2:15 PM Tc Hong,  [facility]   8/27/2024 10:45 AM Dominic Silver MD Resp Spec [id number]       Health Maintenance   Topic Date Due    Hepatitis B vaccine (1 of 3 - 3-dose series) Never done    Cervical cancer screen  Never done    Colorectal Cancer Screen  02/20/2019    Shingles vaccine (2 of 2) 06/25/2021    Flu vaccine (1) 08/01/2023    COVID-19 Vaccine (4 - 2023-24 season) 09/01/2023    Annual Wellness Visit (Medicare Advantage)  Never done    Depression Monitoring  03/24/2024    Lipids  04/11/2024    Low dose CT lung screening &/or counseling  04/11/2024    Breast cancer screen  12/05/2025    DTaP/Tdap/Td vaccine (2 - Td or Tdap) 09/20/2027    Pneumococcal 0-64 years Vaccine (3 - PPSV23 or PCV20) 08/08/2030    Hepatitis C screen  Completed    HIV screen  Completed    Hepatitis A vaccine  Aged Out    Hib vaccine  Aged Out    Polio vaccine  Aged Out    Meningococcal (ACWY) vaccine  Aged Out    Diabetes screen  Discontinued       Hemoglobin A1C (%)   Date Value   11/16/2023 5.6   01/30/2018 5.3   08/02/2013 5.3             ( goal A1C is < 7)   No components found for: \"LABMICR\"  LDL Cholesterol (mg/dL)   Date Value   04/11/2023 112   02/11/2022 90       (goal LDL is <100)   AST (U/L)   Date Value   12/15/2023 16     ALT (U/L)   Date Value   12/15/2023 19     BUN (mg/dL)   Date Value   12/15/2023 11     BP Readings from Last 3 Encounters:   02/28/24 (!) 153/101   12/15/23 (!) 148/89   11/30/23 137/79          (goal 120/80)    All Future Testing planned in [facility]  Lab Frequency Next Occurrence   XR CHEST (2 VW) Once 03/13/2024               Patient Active Problem List:     Heart disease     Chronic back pain     Depression     Hyperlipidemia     CAD, multiple vessel     Asthma     Smoking     Need for

## 2024-03-04 DIAGNOSIS — M35.3 POLYMYALGIA RHEUMATICA (HCC): ICD-10-CM

## 2024-03-04 RX ORDER — PREDNISONE 10 MG/1
10 TABLET ORAL DAILY
Qty: 30 TABLET | Refills: 1 | Status: CANCELLED | OUTPATIENT
Start: 2024-03-04 | End: 2024-05-03

## 2024-03-04 NOTE — ED PROVIDER NOTES
[address]  ED  eMERGENCY dEPARTMENT eNCOUnter      Pt Name: Jono Szymanski  MRN: [id number]  [place] 1965  Date of evaluation: 6/24/22      CHIEF COMPLAINT       Chief Complaint   Patient presents with    Abdominal Pain     pt. was having abdominal pain, having bowel movement and near snycope while defecating          HISTORY OF PRESENT ILLNESS    Jono Szymanski is a 64 y.o. female who presents after she had a syncopal event while using the bathroom today. Patient says that she was sitting down to eat a snack when she felt some pain and cramping in her abdomen. She says she went and use the restroom and did have a bowel movement and then felt very lightheaded and says she thinks she briefly passed out. She says she remained on the toilet and never fell to the ground. Patient says her significant other called 911 and she was brought here. Patient says she does continue to have some pain to her left lower abdomen. She denies any fever, chest pain, shortness of breath, nausea, vomiting, dysuria. She denies any blood in her stool. She does have a history of coronary artery disease as well as seizures. She says she has been taking her Keppra. Patient says that she is unsure if this felt like her typical seizure but says that her partner saw her shaking when she was not responsive. Location/Symptom: syncope  Timing/Onset: just prior to arrival  Context/Setting: no   Quality: unable to describe  Duration: unknown  Modifying Factors: none  Severity: moderate      REVIEW OF SYSTEMS       Review of Systems   Constitutional: Negative for chills and fever. HENT: Negative for ear pain, rhinorrhea and sore throat. Respiratory: Negative for cough and shortness of breath. Cardiovascular: Negative for chest pain and leg swelling. Gastrointestinal: Positive for abdominal pain. Negative for diarrhea, nausea and vomiting.    Genitourinary: Negative for dysuria, flank pain, frequency, vaginal PROGRESS NOTE    Subjective   Chief complaint: Elida Benson is a 76 y.o. female who is an acute skilled patient being seen and evaluated for weakness    HPI:  Patient presents for f/u therapy and general medical care.  Patient seen and examined at beside.  Therapy has been working with the patient to improve strength, endurance, ADLs, and transfers d/t generalized weakness.  Patient has no acute concerns today.      Objective   Vital signs: 128\78,68,98%    Physical Exam  Constitutional:       General: She is not in acute distress.  Eyes:      Extraocular Movements: Extraocular movements intact.   Cardiovascular:      Rate and Rhythm: Normal rate and regular rhythm.   Pulmonary:      Effort: Pulmonary effort is normal.      Breath sounds: Normal breath sounds.   Abdominal:      General: Bowel sounds are normal.      Palpations: Abdomen is soft.   Musculoskeletal:      Cervical back: Neck supple.      Right lower leg: No edema.      Left lower leg: No edema.   Skin:     Comments: Surgical dressing, clean, dry and intact   Neurological:      Mental Status: She is alert.      Motor: Weakness present.   Psychiatric:         Mood and Affect: Mood normal.         Behavior: Behavior is cooperative.         Assessment/Plan   Problem List Items Addressed This Visit       COPD without exacerbation (CMS/HCC)     Stable on RA   monitor for shortness of breath and wheezing.         Dementia (CMS/HCC)     Assist with care as needed  Donepezil  Monitor         Depression     Monitor mood and behaviors.  Duloxetine         Hypertension, essential     Monitor blood pressure  Losartan potassium  Toprol-XL  BP at goal         Weakness     Continue in therapy          Medications, treatments, and labs reviewed  Continue medications and treatments as listed in EMR    Scribe Attestation  Ana Maria SHABAZZ, Shalini   attest that this documentation has been prepared under the direction and in the presence of Sean Cloud MD.      Provider Attestation - Scribe documentation  All medical record entries made by the Scribe were at my direction and personally dictated by me. I have reviewed the chart and agree that the record accurately reflects my personal performance of the history, physical exam, discussion and plan.   Sean Cloud MD     bleeding and vaginal discharge. Musculoskeletal: Negative for back pain, myalgias and neck pain. Skin: Negative for color change and rash. Neurological: Positive for dizziness, syncope and light-headedness. Negative for headaches. Psychiatric/Behavioral: Negative for suicidal ideas. The patient is not nervous/anxious. PAST MEDICAL HISTORY    has a past medical history of Acute MI ([facility] [address] Anxiety, Chronic back pain, COPD (chronic obstructive pulmonary disease) ([facility] [address] Depression, Heart disease, Hyperlipidemia, Hypertension, and MRSA (methicillin resistant staph aureus) culture positive. SURGICAL HISTORY      has a past surgical history that includes Tubal ligation; Tonsillectomy; Cholecystectomy, laparoscopic (11/10/2017); Cholecystectomy, laparoscopic (N/A, 11/10/2017); and back surgery. CURRENT MEDICATIONS       Current Discharge Medication List      CONTINUE these medications which have NOT CHANGED    Details   baclofen (LIORESAL) 10 MG tablet TAKE ONE TABLET BY MOUTH ONCE NIGHTLY AS NEEDED FOR PAIN  Qty: 30 tablet, Refills: 3      BREO ELLIPTA 100-25 MCG/INH AEPB inhaler INHALE ONE DOSE BY MOUTH DAILY  Qty: 3 each, Refills: 1    Associated Diagnoses: Pulmonary emphysema, unspecified emphysema type (HCC)      FLUoxetine (PROZAC) 10 MG capsule TAKE ONE CAPSULE BY MOUTH DAILY  Qty: 30 capsule, Refills: 3    Associated Diagnoses:  Moderate episode of recurrent major depressive disorder (HCC)      SPIRIVA RESPIMAT 2.5 MCG/ACT AERS inhaler INHALE TWO PUFFS BY MOUTH DAILY  Qty: 4 g, Refills: 1      atorvastatin (LIPITOR) 40 MG tablet TAKE ONE TABLET BY MOUTH DAILY  Qty: 90 tablet, Refills: 0    Associated Diagnoses: Pure hypercholesterolemia      buPROPion (WELLBUTRIN SR) 150 MG extended release tablet TAKE ONE TABLET BY MOUTH DAILY FOR 3 DAYS, THEN TAKE ONE TABLET BY MOUTH TWICE A DAY THEREAFTER  Qty: 60 tablet, Refills: 5    Associated Diagnoses: Smoking      Elastic Bandages & Supports (CARPAL vessel      magnesium gluconate (MAGONATE) 500 MG tablet Take 400 mg by mouth every evening       vitamin B-6 (PYRIDOXINE) 100 MG tablet Take 100 mg by mouth daily      nitroGLYCERIN (NITROSTAT) 0.4 MG SL tablet Place 1 tablet under the tongue every 5 minutes as needed for Chest pain up to max of 3 total doses. If no relief after 1 dose, call 911. Qty: 25 tablet, Refills: 3    Associated Diagnoses: CAD, multiple vessel; Chest pain, unspecified type      Multiple Vitamins-Minerals (THERAPEUTIC MULTIVITAMIN-MINERALS) tablet Take 1 tablet by mouth daily. vitamin B-12 (CYANOCOBALAMIN) 1000 MCG tablet Take 1,000 mcg by mouth daily. ALLERGIES     is allergic to sulfa antibiotics. FAMILY HISTORY     She indicated that her mother is alive. She indicated that her father is . She indicated that her brother is alive. family history includes Heart Disease in her father; High Blood Pressure in her father; High Cholesterol in her father; Other in her brother and mother. SOCIAL HISTORY      reports that she has been smoking cigarettes. She has a 40.00 pack-year smoking history. She has never used smokeless tobacco. She reports current drug use. Drug: Marijuana Socorro Portillo). She reports that she does not drink alcohol. PHYSICAL EXAM     INITIAL VITALS:  weight is 155 lb (70.3 kg). Her oral temperature is 98.4 °F (36.9 °C). Her blood pressure is 125/86 and her pulse is 82. Her respiration is 18 and oxygen saturation is 95%. Physical Exam  Vitals and nursing note reviewed. Constitutional:       Appearance: She is well-developed. Comments: Patient is lying in the bed and appears comfortable   HENT:      Head: Normocephalic and atraumatic. Cardiovascular:      Rate and Rhythm: Normal rate and regular rhythm. Pulmonary:      Effort: Pulmonary effort is normal. No respiratory distress. Breath sounds: Normal breath sounds. Abdominal:      Palpations: Abdomen is soft.       Tenderness: There is abdominal tenderness (Mild left lower quadrant tenderness). There is no guarding or rebound. Skin:     General: Skin is warm and dry. Neurological:      General: No focal deficit present. Mental Status: She is alert and oriented to person, place, and time. Psychiatric:         Mood and Affect: Mood normal.         Behavior: Behavior normal.         DIFFERENTIAL DIAGNOSIS/ MDM:     Will get EKG, CT scan of the abdomen, labs and reassess. Scan shows possible colitis. Patient's abdominal pain and nausea is improved and patient is requesting to eat so we will provide a sandwich. After speaking with patient significant other who saw the patient during the syncopal versus seizure episode, he states that he has seen her seizures before and this was not a seizure and it appeared that she had passed out. Will admit to the ETU for cardiology evaluation in the morning. DIAGNOSTIC RESULTS     EKG: All EKG's are interpreted by the Emergency Department Physician who either signs or Co-signs this chart in the absence of a cardiologist.    EKG Interpretation    Interpreted by emergency department physician    Rhythm: normal sinus   Rate: normal  Axis: normal  Ectopy: none  Conduction: normal  ST Segments: normal  T Waves: normal  Q Waves: none    Clinical Impression: non-specific EKG    Iliana Mcgee MD      RADIOLOGY:   I directly visualized the following  images and reviewed the radiologist interpretations:   CT ABDOMEN PELVIS W IV CONTRAST Additional Contrast? None    Result Date: 6/24/2022  EXAMINATION: CT OF THE ABDOMEN AND PELVIS WITH CONTRAST 6/24/2022 3:29 pm TECHNIQUE: CT of the abdomen and pelvis was performed with the administration of intravenous contrast. Multiplanar reformatted images are provided for review. Automated exposure control, iterative reconstruction, and/or weight based adjustment of the mA/kV was utilized to reduce the radiation dose to as low as reasonably achievable. COMPARISON: 11/20/2019, 03/27/2019 HISTORY: ORDERING SYSTEM PROVIDED HISTORY: LLQ pain TECHNOLOGIST PROVIDED HISTORY: LLQ pain Decision Support Exception - unselect if not a suspected or confirmed emergency medical condition->Emergency Medical Condition (MA) Reason for Exam:  LLQ pain FINDINGS: Lower Chest: Dependent changes in both lung bases. Coronary artery calcifications. No pleural or pericardial effusion. Organs: Liver is within normal limits for attenuation aside from a tiny hypodensity in the anterior left lobe which is too small to definitively characterize but is unchanged relative to remote prior imaging, likely representing a tiny cyst.  Cholecystectomy with intrahepatic and extrahepatic biliary ductal prominence which is similar to remote prior. Spleen, pancreas, adrenal glands, and kidneys are without acute abnormality. GI/Bowel: No bowel obstruction. No acute appendicitis. Long segment mild colonic wall thickening from the cecum to the proximal descending colon. Pelvis: The female pelvic organs and urinary bladder are grossly unremarkable. Peritoneum/Retroperitoneum: No free fluid, free air, or lymphadenopathy. Atherosclerotic aortoiliac and femoral vessels without aneurysm. Bones/Soft Tissues: Remote laminectomy with posterior fusion spanning L3 through L5 which is grossly unchanged in appearance from prior. Degenerative changes throughout the remainder of the spine. 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 No acute abnormality in the superficial soft tissues. Fat minimally extends into the right inguinal canal without inflammatory change. Injection granulomata in the gluteal subcutaneous fat. Small fat containing umbilical hernia without inflammation. Long segment mild colonic wall thickening of the colon from the cecum to the proximal descending. Correlate clinically for colitis.          ED BEDSIDE ULTRASOUND:   Not indicated    LABS:  Labs Reviewed   BASIC METABOLIC PANEL - Abnormal; Notable for the following components:       Result Value    Potassium 3.4 (*)     All other components within normal limits   HEPATIC FUNCTION PANEL - Abnormal; Notable for the following components: Total Bilirubin 0.20 (*)     Total Protein 6.3 (*)     All other components within normal limits   CBC WITH AUTO DIFFERENTIAL   LIPASE   TROPONIN   URINALYSIS WITH MICROSCOPIC   LEVETIRACETAM LEVEL       unremarkable    EMERGENCY DEPARTMENT COURSE:   Vitals:    Vitals:    06/24/22 1551 06/24/22 2002   BP: 116/75 125/86   Pulse: 82 82   Resp: 16 18   Temp: 98.4 °F (36.9 °C)    TempSrc: Oral    SpO2: 97% 95%   Weight: 155 lb (70.3 kg)      -------------------------  BP: 125/86, Temp: 98.4 °F (36.9 °C), Heart Rate: 82, Resp: 18    wnl      FINAL IMPRESSION      1. Syncope and collapse    2. Colitis          DISPOSITION/PLAN     Admit to ETU    PATIENT REFERRED TO:  No follow-up provider specified.     DISCHARGE MEDICATIONS:  Current Discharge Medication List          (Please note that portions of this note were completed with a voice recognition program.  Efforts were made to edit the dictations but occasionally words are mis-transcribed.)    Buffy Perla MD  Attending Emergency Physician                    Buffy Perla MD  06/24/22 2038

## 2024-03-04 NOTE — TELEPHONE ENCOUNTER
Patient is requesting her prednisone 10 mg tablet.  
Syncope     Leg pain     Left hip pain     Chronic cholecystitis     Chest pain     Calculus of gallbladder without cholecystitis without obstruction     History of lumbar fusion     Failed back syndrome     Marijuana use     Abnormal weight loss     Allergic rhinitis     Anxiety state     Chronic midline low back pain with sciatica     Chronic tension-type headache, intractable     Hypertension     Sinus tachycardia     Acute respiratory failure (HCC)     COPD (chronic obstructive pulmonary disease) (HCC)     COPD exacerbation (HCC)     Acute bronchitis     Headache     Syncope and collapse     Unstable angina (HCC)     Lung nodule     Polymyalgia rheumatica (HCC)     Current chronic use of systemic steroids           Please address the medication refill and close the encounter.  If I can be of assistance, please route to the applicable pool.      Thank you.

## 2024-03-05 DIAGNOSIS — M35.3 POLYMYALGIA RHEUMATICA (HCC): ICD-10-CM

## 2024-03-05 RX ORDER — PREDNISONE 10 MG/1
10 TABLET ORAL DAILY
Qty: 30 TABLET | Refills: 1 | OUTPATIENT
Start: 2024-03-05

## 2024-03-05 RX ORDER — PREDNISONE 10 MG/1
10 TABLET ORAL DAILY
Qty: 30 TABLET | Refills: 1 | Status: SHIPPED | OUTPATIENT
Start: 2024-03-05 | End: 2024-03-08

## 2024-03-08 ENCOUNTER — OFFICE VISIT (OUTPATIENT)
Dept: FAMILY MEDICINE CLINIC | Age: 59
End: 2024-03-08
Payer: MEDICARE

## 2024-03-08 VITALS
BODY MASS INDEX: 22.66 KG/M2 | HEIGHT: 67 IN | DIASTOLIC BLOOD PRESSURE: 80 MMHG | HEART RATE: 99 BPM | WEIGHT: 144.4 LBS | SYSTOLIC BLOOD PRESSURE: 130 MMHG

## 2024-03-08 DIAGNOSIS — M35.3 POLYMYALGIA RHEUMATICA (HCC): Primary | ICD-10-CM

## 2024-03-08 DIAGNOSIS — F33.1 MODERATE EPISODE OF RECURRENT MAJOR DEPRESSIVE DISORDER (HCC): ICD-10-CM

## 2024-03-08 PROCEDURE — G8420 CALC BMI NORM PARAMETERS: HCPCS

## 2024-03-08 PROCEDURE — 3079F DIAST BP 80-89 MM HG: CPT

## 2024-03-08 PROCEDURE — G8427 DOCREV CUR MEDS BY ELIG CLIN: HCPCS

## 2024-03-08 PROCEDURE — G8484 FLU IMMUNIZE NO ADMIN: HCPCS

## 2024-03-08 PROCEDURE — 99213 OFFICE O/P EST LOW 20 MIN: CPT

## 2024-03-08 PROCEDURE — 3017F COLORECTAL CA SCREEN DOC REV: CPT

## 2024-03-08 PROCEDURE — 3075F SYST BP GE 130 - 139MM HG: CPT

## 2024-03-08 PROCEDURE — 4004F PT TOBACCO SCREEN RCVD TLK: CPT

## 2024-03-08 RX ORDER — PREDNISONE 10 MG/1
15 TABLET ORAL DAILY
Qty: 90 TABLET | Refills: 0 | Status: SHIPPED | OUTPATIENT
Start: 2024-03-08 | End: 2024-05-07

## 2024-03-08 RX ORDER — PAROXETINE HYDROCHLORIDE 40 MG/1
40 TABLET, FILM COATED ORAL EVERY MORNING
Qty: 30 TABLET | Refills: 3 | Status: CANCELLED | OUTPATIENT
Start: 2024-03-08

## 2024-03-08 RX ORDER — DULOXETIN HYDROCHLORIDE 30 MG/1
30 CAPSULE, DELAYED RELEASE ORAL DAILY
Qty: 30 CAPSULE | Refills: 3 | Status: SHIPPED | OUTPATIENT
Start: 2024-03-08

## 2024-03-08 RX ORDER — BACLOFEN 10 MG/1
10 TABLET ORAL NIGHTLY
Qty: 30 TABLET | Refills: 1 | Status: CANCELLED | OUTPATIENT
Start: 2024-03-08

## 2024-03-08 ASSESSMENT — PATIENT HEALTH QUESTIONNAIRE - PHQ9
2. FEELING DOWN, DEPRESSED OR HOPELESS: 3
7. TROUBLE CONCENTRATING ON THINGS, SUCH AS READING THE NEWSPAPER OR WATCHING TELEVISION: 3
SUM OF ALL RESPONSES TO PHQ9 QUESTIONS 1 & 2: 3
1. LITTLE INTEREST OR PLEASURE IN DOING THINGS: 0
9. THOUGHTS THAT YOU WOULD BE BETTER OFF DEAD, OR OF HURTING YOURSELF: 0
5. POOR APPETITE OR OVEREATING: 2
SUM OF ALL RESPONSES TO PHQ QUESTIONS 1-9: 19
10. IF YOU CHECKED OFF ANY PROBLEMS, HOW DIFFICULT HAVE THESE PROBLEMS MADE IT FOR YOU TO DO YOUR WORK, TAKE CARE OF THINGS AT HOME, OR GET ALONG WITH OTHER PEOPLE: 2
4. FEELING TIRED OR HAVING LITTLE ENERGY: 2
6. FEELING BAD ABOUT YOURSELF - OR THAT YOU ARE A FAILURE OR HAVE LET YOURSELF OR YOUR FAMILY DOWN: 3
SUM OF ALL RESPONSES TO PHQ QUESTIONS 1-9: 19
3. TROUBLE FALLING OR STAYING ASLEEP: 3
8. MOVING OR SPEAKING SO SLOWLY THAT OTHER PEOPLE COULD HAVE NOTICED. OR THE OPPOSITE, BEING SO FIGETY OR RESTLESS THAT YOU HAVE BEEN MOVING AROUND A LOT MORE THAN USUAL: 3
SUM OF ALL RESPONSES TO PHQ QUESTIONS 1-9: 19
SUM OF ALL RESPONSES TO PHQ QUESTIONS 1-9: 19

## 2024-03-08 NOTE — PATIENT INSTRUCTIONS
Thank you for letting us take care of you today. We hope all your questions were addressed. If a question was overlooked or something else comes to mind after you return home, please contact a member of your Care Team listed below.      Your Care Team at [facility] is Team #1  Kimberly Haque, Faculty Advisor  Andrea Manning, Resident Physician  Crissy Glover, Resident Physician  Kenia Jones, Resident Physician  Shawna Pruitt, [facility]  Arlette Cantu, [facility]  Ray Regan, [facility]  Roshni Mclean, [facility]  Elizabeth Jung, [facility]  Leeanne Cassidy, [facility]  Carolyne Stephen, [facility]  Saima Gillis, [facility]  Gianfranco (LJ) Mukesh,   Nila Chou [facility] (Clinical Pharmacist)     Office phone number: [phone]    If you need to get in right away due to illness, please be advised we have \"Same Day\" appointments available Monday-Friday. Please call us at [phone] option #3 to schedule your \"Same Day\" appointment.

## 2024-03-08 NOTE — PROGRESS NOTES
[facility] - Outpatient Note      Subjective:    Cary Samano is a 58 y.o. female with  has a past medical history of Acute MI ([facility]), Anxiety, CAD (coronary artery disease), Chronic back pain, COPD (chronic obstructive pulmonary disease) (formerly [facility]), Depression, Heart disease, Hyperlipidemia, Hypertension, MRSA (methicillin resistant staph aureus) culture positive, Seizures (formerly [facility]), and Syncope.    Presented to the office today for:  Chief Complaint   Patient presents with    Abdominal Pain     Having irregular bowel movements, when eating she has pains in stomach     Hypertension     Discuss medication       HPI    Patient is a 58 year old female presenting to the clinic for follow up on polymyalgia rheumatica. Patient was last seen 11/30 when the diagnosis was made. She has had full rheumatologic workup done and was educated on the difficulty of this diagnosis, as there is no formal test for it. DEXA scan was completed and she was started on low dose Prednisone daily.  Patient was informed of her DEXA scan results showing osteopenia and that she should be taking calcium and vitamin D supplementation.  Patient says that she is doing much better with the prednisone however still occasionally a little stiff but much improved from before, did have conversation regarding increasing her dose slightly from 10 mg to 15 mg.  Will see if there is significant improvement, patient educated that if the improvement is not significant would prefer to drop her back down to the lowest dose possible which she is agreeable to.    Patient also following up on depression, has been on Prozac, Wellbutrin and Paxil with little to no relief.  Patient has been on Paxil 30 mg for quite some time and says that she still feels very emotional.  Denies any suicidal ideations.  Did have extensive conversation with patient today about changing her medications around.  Her PHQ-9 score is 19.  Will 
Visit Information    Have you changed or started any medications since your last visit including any over-the-counter medicines, vitamins, or herbal medicines? no   Are you having any side effects from any of your medications? -  no  Have you stopped taking any of your medications? Is so, why? -  no    Have you seen any other physician or provider since your last visit? No  Have you had any other diagnostic tests since your last visit? No  Have you been seen in the emergency room and/or had an admission to a hospital since we last saw you? No  Have you had your routine dental cleaning in the past 6 months? no    Have you activated your [employer] account? If not, what are your barriers? Yes     Patient Care Team:  Kenia Jones MD as PCP - General (Family Medicine)  Dominic Silver MD as Consulting Physician (Pulmonology)    Medical History Review  Past Medical, Family, and Social History reviewed and does not contribute to the patient presenting condition    Health Maintenance   Topic Date Due    Hepatitis B vaccine (1 of 3 - 3-dose series) Never done    Cervical cancer screen  Never done    Colorectal Cancer Screen  02/20/2019    Shingles vaccine (2 of 2) 06/25/2021    Flu vaccine (1) 08/01/2023    COVID-19 Vaccine (4 - 2023-24 season) 09/01/2023    Annual Wellness Visit (Medicare Advantage)  Never done    Depression Monitoring  03/24/2024    Lipids  04/11/2024    Low dose CT lung screening &/or counseling  04/11/2024    Breast cancer screen  12/05/2025    DTaP/Tdap/Td vaccine (2 - Td or Tdap) 09/20/2027    Pneumococcal 0-64 years Vaccine (3 - PPSV23 or PCV20) 08/08/2030    Hepatitis C screen  Completed    HIV screen  Completed    Hepatitis A vaccine  Aged Out    Hib vaccine  Aged Out    Polio vaccine  Aged Out    Meningococcal (ACWY) vaccine  Aged Out    Diabetes screen  Discontinued       
use.  Return in about 3 weeks (around 3/29/2024), or if symptoms worsen or fail to improve, for depression .   (GE Modifier ) Dr. ITALO ADAMS MD

## 2024-03-27 ENCOUNTER — HOSPITAL ENCOUNTER (OUTPATIENT)
Age: 59
Discharge: HOME | End: 2024-03-29
Payer: MEDICARE

## 2024-03-27 ENCOUNTER — HOSPITAL ENCOUNTER (OUTPATIENT)
Dept: GENERAL RADIOLOGY | Age: 59
Discharge: HOME | End: 2024-03-29
Payer: MEDICARE

## 2024-03-27 DIAGNOSIS — J44.9 CHRONIC OBSTRUCTIVE PULMONARY DISEASE, UNSPECIFIED COPD TYPE (HCC): ICD-10-CM

## 2024-03-27 PROCEDURE — 71046 X-RAY EXAM CHEST 2 VIEWS: CPT

## 2024-05-20 RX ORDER — FLUTICASONE FUROATE AND VILANTEROL TRIFENATATE 200; 25 UG/1; UG/1
POWDER RESPIRATORY (INHALATION)
Qty: 30 EACH | Refills: 11 | Status: SHIPPED | OUTPATIENT
Start: 2024-05-20

## 2024-05-20 NOTE — TELEPHONE ENCOUNTER
Last Visit: 2/28/24  Next Visit: 8/27/24    Patient is requesting refill for Breo Ellipta Inhaler. Per last dictation, patient is continuing to take the Breo. Refills last given 5/23/23 for 1 year supply. Please see pended script and advise.

## 2024-06-10 NOTE — TELEPHONE ENCOUNTER
E-scribe request for paxil. Please review and e-scribe if applicable.     Last Visit Date:  3/8/24  Next Visit Date:  Visit date not found    Hemoglobin A1C (%)   Date Value   11/16/2023 5.6   01/30/2018 5.3   08/02/2013 5.3             ( goal A1C is < 7)   No components found for: \"LABMICR\"  No components found for: \"LDLCHOLESTEROL\", \"LDLCALC\"    (goal LDL is <100)   AST (U/L)   Date Value   12/15/2023 16     ALT (U/L)   Date Value   12/15/2023 19     BUN (mg/dL)   Date Value   12/15/2023 11     BP Readings from Last 3 Encounters:   03/08/24 130/80   02/28/24 (!) 153/101   12/15/23 (!) 148/89          (goal 120/80)        Patient Active Problem List:     Heart disease     Chronic back pain     Depression     Hyperlipidemia     CAD, multiple vessel     Asthma     Smoking     Need for vaccination     Syncope     Leg pain     Left hip pain     Chronic cholecystitis     Chest pain     Calculus of gallbladder without cholecystitis without obstruction     History of lumbar fusion     Failed back syndrome     Marijuana use     Abnormal weight loss     Allergic rhinitis     Anxiety state     Chronic midline low back pain with sciatica     Chronic tension-type headache, intractable     Hypertension     Sinus tachycardia     Acute respiratory failure (HCC)     COPD (chronic obstructive pulmonary disease) (HCC)     COPD exacerbation (HCC)     Acute bronchitis     Headache     Syncope and collapse     Unstable angina (HCC)     Lung nodule     Polymyalgia rheumatica (HCC)     Current chronic use of systemic steroids

## 2024-06-11 DIAGNOSIS — M35.3 POLYMYALGIA RHEUMATICA (HCC): ICD-10-CM

## 2024-06-11 NOTE — TELEPHONE ENCOUNTER
The original prescription was reordered on 3/8/2024 by Kenia Jones MD. Renewing this prescription may not be appropriate.     Last visit:   Last Med refill:   Does patient have enough medication for 72 hours: No:     Next Visit Date:  Future Appointments   Date Time Provider Department Center   8/27/2024 10:45 AM Dominic Silver MD Resp Spec [id number]   9/11/2024  1:00 PM Tc Hong, DO [facility]       Health Maintenance   Topic Date Due    Hepatitis B vaccine (1 of 3 - 3-dose series) Never done    Cervical cancer screen  Never done    Colorectal Cancer Screen  02/20/2019    Shingles vaccine (2 of 2) 06/25/2021    COVID-19 Vaccine (4 - 2023-24 season) 09/01/2023    Annual Wellness Visit (Medicare Advantage)  Never done    Lipids  04/11/2024    Low dose CT lung screening &/or counseling  04/11/2024    Flu vaccine (Season Ended) 08/01/2024    Depression Monitoring  03/08/2025    Breast cancer screen  12/05/2025    DTaP/Tdap/Td vaccine (2 - Td or Tdap) 09/20/2027    Pneumococcal 0-64 years Vaccine (3 of 3 - PPSV23 or PCV20) 08/08/2030    Hepatitis C screen  Completed    HIV screen  Completed    Hepatitis A vaccine  Aged Out    Hib vaccine  Aged Out    Polio vaccine  Aged Out    Meningococcal (ACWY) vaccine  Aged Out    Diabetes screen  Discontinued       Hemoglobin A1C (%)   Date Value   11/16/2023 5.6   01/30/2018 5.3   08/02/2013 5.3             ( goal A1C is < 7)   No components found for: \"LABMICR\"  No components found for: \"LDLCHOLESTEROL\", \"LDLCALC\"    (goal LDL is <100)   AST (U/L)   Date Value   12/15/2023 16     ALT (U/L)   Date Value   12/15/2023 19     BUN (mg/dL)   Date Value   12/15/2023 11     BP Readings from Last 3 Encounters:   03/08/24 130/80   02/28/24 (!) 153/101   12/15/23 (!) 148/89          (goal 120/80)    All Future Testing planned in [facility]  Lab Frequency Next Occurrence               Patient Active Problem List:     Heart disease     Chronic back pain     Depression

## 2024-06-13 RX ORDER — PREDNISONE 10 MG/1
10 TABLET ORAL DAILY
Qty: 30 TABLET | Refills: 1 | Status: SHIPPED | OUTPATIENT
Start: 2024-06-13 | End: 2024-08-12

## 2024-06-14 RX ORDER — PAROXETINE 30 MG/1
30 TABLET, FILM COATED ORAL DAILY
Qty: 90 TABLET | OUTPATIENT
Start: 2024-06-14

## 2024-07-09 DIAGNOSIS — F33.1 MODERATE EPISODE OF RECURRENT MAJOR DEPRESSIVE DISORDER (HCC): ICD-10-CM

## 2024-07-09 NOTE — TELEPHONE ENCOUNTER
Chronic cholecystitis     Chest pain     Calculus of gallbladder without cholecystitis without obstruction     History of lumbar fusion     Failed back syndrome     Marijuana use     Abnormal weight loss     Allergic rhinitis     Anxiety state     Chronic midline low back pain with sciatica     Chronic tension-type headache, intractable     Hypertension     Sinus tachycardia     Acute respiratory failure (HCC)     COPD (chronic obstructive pulmonary disease) (HCC)     COPD exacerbation (HCC)     Acute bronchitis     Headache     Syncope and collapse     Unstable angina (HCC)     Lung nodule     Polymyalgia rheumatica (HCC)     Current chronic use of systemic steroids

## 2024-07-10 RX ORDER — DULOXETIN HYDROCHLORIDE 30 MG/1
30 CAPSULE, DELAYED RELEASE ORAL DAILY
Qty: 30 CAPSULE | Refills: 1 | Status: SHIPPED | OUTPATIENT
Start: 2024-07-10

## 2024-07-29 ENCOUNTER — TELEPHONE (OUTPATIENT)
Dept: FAMILY MEDICINE CLINIC | Age: 59
End: 2024-07-29

## 2024-07-29 NOTE — TELEPHONE ENCOUNTER
LVM for a return call to reschedule patient's appointment from 08/05/2024 due to provider not in clinic.

## 2024-08-02 DIAGNOSIS — M35.3 POLYMYALGIA RHEUMATICA (HCC): ICD-10-CM

## 2024-08-02 NOTE — TELEPHONE ENCOUNTER
Last visit: 3/8/24  Last Med refill:   Does patient have enough medication for 72 hours: No: was Dr. Jones patient, please advise.    Next Visit Date:  Future Appointments   Date Time Provider Department Center   8/13/2024  8:45 AM Crissy Glover DO [facility] DEP   8/27/2024 10:45 AM Dominic Silver MD Resp Spec [id number]   9/11/2024  1:00 PM Tc Hong DO [facility]       Health Maintenance   Topic Date Due    Hepatitis B vaccine (1 of 3 - 3-dose series) Never done    Cervical cancer screen  Never done    Colorectal Cancer Screen  02/20/2019    Shingles vaccine (2 of 2) 06/25/2021    COVID-19 Vaccine (4 - 2023-24 season) 09/01/2023    Annual Wellness Visit (Medicare Advantage)  Never done    Lipids  04/11/2024    Flu vaccine (1) 08/01/2024    Lung Cancer Screening &/or Counseling  10/30/2024    Depression Monitoring  03/08/2025    Breast cancer screen  12/05/2025    DTaP/Tdap/Td vaccine (2 - Td or Tdap) 09/20/2027    Pneumococcal 0-64 years Vaccine (3 of 3 - PPSV23 or PCV20) 08/08/2030    Hepatitis C screen  Completed    HIV screen  Completed    Hepatitis A vaccine  Aged Out    Hib vaccine  Aged Out    Polio vaccine  Aged Out    Meningococcal (ACWY) vaccine  Aged Out    Diabetes screen  Discontinued       Hemoglobin A1C (%)   Date Value   11/16/2023 5.6   01/30/2018 5.3   08/02/2013 5.3             ( goal A1C is < 7)   No components found for: \"LABMICR\"  No components found for: \"LDLCHOLESTEROL\", \"LDLCALC\"    (goal LDL is <100)   AST (U/L)   Date Value   12/15/2023 16     ALT (U/L)   Date Value   12/15/2023 19     BUN (mg/dL)   Date Value   12/15/2023 11     BP Readings from Last 3 Encounters:   03/08/24 130/80   02/28/24 (!) 153/101   12/15/23 (!) 148/89          (goal 120/80)    All Future Testing planned in [facility]  Lab Frequency Next Occurrence               Patient Active Problem List:     Heart disease     Chronic back pain     Depression     Hyperlipidemia     CAD, multiple

## 2024-08-04 RX ORDER — PREDNISONE 10 MG/1
15 TABLET ORAL DAILY
Qty: 30 TABLET | Refills: 1 | Status: SHIPPED | OUTPATIENT
Start: 2024-08-04 | End: 2024-10-03

## 2024-08-12 RX ORDER — PAROXETINE HYDROCHLORIDE 20 MG/1
20 TABLET, FILM COATED ORAL DAILY
Qty: 30 TABLET | Refills: 3 | OUTPATIENT
Start: 2024-08-12

## 2024-08-12 NOTE — TELEPHONE ENCOUNTER
Need for vaccination     Syncope     Leg pain     Left hip pain     Chronic cholecystitis     Chest pain     Calculus of gallbladder without cholecystitis without obstruction     History of lumbar fusion     Failed back syndrome     Marijuana use     Abnormal weight loss     Allergic rhinitis     Anxiety state     Chronic midline low back pain with sciatica     Chronic tension-type headache, intractable     Hypertension     Sinus tachycardia     Acute respiratory failure (HCC)     COPD (chronic obstructive pulmonary disease) (HCC)     COPD exacerbation (HCC)     Acute bronchitis     Headache     Syncope and collapse     Unstable angina (HCC)     Lung nodule     Polymyalgia rheumatica (HCC)     Current chronic use of systemic steroids

## 2024-08-13 ENCOUNTER — OFFICE VISIT (OUTPATIENT)
Dept: FAMILY MEDICINE CLINIC | Age: 59
End: 2024-08-13
Payer: MEDICARE

## 2024-08-13 ENCOUNTER — HOSPITAL ENCOUNTER (OUTPATIENT)
Age: 59
Setting detail: SPECIMEN
Discharge: HOME | End: 2024-08-13

## 2024-08-13 VITALS
BODY MASS INDEX: 21.56 KG/M2 | DIASTOLIC BLOOD PRESSURE: 106 MMHG | HEIGHT: 67 IN | WEIGHT: 137.4 LBS | HEART RATE: 91 BPM | SYSTOLIC BLOOD PRESSURE: 180 MMHG

## 2024-08-13 DIAGNOSIS — F10.90 ALCOHOL USE DISORDER: ICD-10-CM

## 2024-08-13 DIAGNOSIS — M35.3 POLYMYALGIA RHEUMATICA (HCC): ICD-10-CM

## 2024-08-13 DIAGNOSIS — E78.5 HYPERLIPIDEMIA, UNSPECIFIED HYPERLIPIDEMIA TYPE: ICD-10-CM

## 2024-08-13 DIAGNOSIS — R03.0 ELEVATED BLOOD PRESSURE READING: Primary | ICD-10-CM

## 2024-08-13 DIAGNOSIS — L23.9 ALLERGIC CONTACT DERMATITIS, UNSPECIFIED TRIGGER: ICD-10-CM

## 2024-08-13 LAB
CHOLEST SERPL-MCNC: 218 MG/DL (ref 0–199)
CHOLESTEROL/HDL RATIO: 2
HDLC SERPL-MCNC: 121 MG/DL
LDLC SERPL CALC-MCNC: 84 MG/DL (ref 0–100)
TRIGL SERPL-MCNC: 64 MG/DL
VLDLC SERPL CALC-MCNC: 13 MG/DL

## 2024-08-13 PROCEDURE — 3017F COLORECTAL CA SCREEN DOC REV: CPT

## 2024-08-13 PROCEDURE — G8420 CALC BMI NORM PARAMETERS: HCPCS

## 2024-08-13 PROCEDURE — 4004F PT TOBACCO SCREEN RCVD TLK: CPT

## 2024-08-13 PROCEDURE — 99213 OFFICE O/P EST LOW 20 MIN: CPT

## 2024-08-13 PROCEDURE — 3080F DIAST BP >= 90 MM HG: CPT

## 2024-08-13 PROCEDURE — G8427 DOCREV CUR MEDS BY ELIG CLIN: HCPCS

## 2024-08-13 PROCEDURE — 3077F SYST BP >= 140 MM HG: CPT

## 2024-08-13 RX ORDER — ACAMPROSATE CALCIUM 333 MG/1
666 TABLET, DELAYED RELEASE ORAL 3 TIMES DAILY
Qty: 90 TABLET | Refills: 3 | Status: SHIPPED | OUTPATIENT
Start: 2024-08-13

## 2024-08-13 RX ORDER — BLOOD PRESSURE TEST KIT
1 KIT MISCELLANEOUS DAILY
Qty: 1 KIT | Refills: 0 | Status: SHIPPED | OUTPATIENT
Start: 2024-08-13

## 2024-08-13 SDOH — ECONOMIC STABILITY: FOOD INSECURITY: WITHIN THE PAST 12 MONTHS, YOU WORRIED THAT YOUR FOOD WOULD RUN OUT BEFORE YOU GOT MONEY TO BUY MORE.: NEVER TRUE

## 2024-08-13 SDOH — ECONOMIC STABILITY: FOOD INSECURITY: WITHIN THE PAST 12 MONTHS, THE FOOD YOU BOUGHT JUST DIDN'T LAST AND YOU DIDN'T HAVE MONEY TO GET MORE.: NEVER TRUE

## 2024-08-13 SDOH — ECONOMIC STABILITY: INCOME INSECURITY: HOW HARD IS IT FOR YOU TO PAY FOR THE VERY BASICS LIKE FOOD, HOUSING, MEDICAL CARE, AND HEATING?: NOT HARD AT ALL

## 2024-08-13 ASSESSMENT — PATIENT HEALTH QUESTIONNAIRE - PHQ9
9. THOUGHTS THAT YOU WOULD BE BETTER OFF DEAD, OR OF HURTING YOURSELF: NOT AT ALL
2. FEELING DOWN, DEPRESSED OR HOPELESS: SEVERAL DAYS
5. POOR APPETITE OR OVEREATING: NEARLY EVERY DAY
8. MOVING OR SPEAKING SO SLOWLY THAT OTHER PEOPLE COULD HAVE NOTICED. OR THE OPPOSITE, BEING SO FIGETY OR RESTLESS THAT YOU HAVE BEEN MOVING AROUND A LOT MORE THAN USUAL: NOT AT ALL
SUM OF ALL RESPONSES TO PHQ QUESTIONS 1-9: 17
SUM OF ALL RESPONSES TO PHQ QUESTIONS 1-9: 17
7. TROUBLE CONCENTRATING ON THINGS, SUCH AS READING THE NEWSPAPER OR WATCHING TELEVISION: NEARLY EVERY DAY
SUM OF ALL RESPONSES TO PHQ QUESTIONS 1-9: 17
SUM OF ALL RESPONSES TO PHQ9 QUESTIONS 1 & 2: 3
3. TROUBLE FALLING OR STAYING ASLEEP: NEARLY EVERY DAY
10. IF YOU CHECKED OFF ANY PROBLEMS, HOW DIFFICULT HAVE THESE PROBLEMS MADE IT FOR YOU TO DO YOUR WORK, TAKE CARE OF THINGS AT HOME, OR GET ALONG WITH OTHER PEOPLE: EXTREMELY DIFFICULT
SUM OF ALL RESPONSES TO PHQ QUESTIONS 1-9: 17
6. FEELING BAD ABOUT YOURSELF - OR THAT YOU ARE A FAILURE OR HAVE LET YOURSELF OR YOUR FAMILY DOWN: MORE THAN HALF THE DAYS
4. FEELING TIRED OR HAVING LITTLE ENERGY: NEARLY EVERY DAY
1. LITTLE INTEREST OR PLEASURE IN DOING THINGS: MORE THAN HALF THE DAYS

## 2024-08-13 ASSESSMENT — ENCOUNTER SYMPTOMS
SORE THROAT: 0
RHINORRHEA: 0
ABDOMINAL PAIN: 0
BACK PAIN: 0
COUGH: 0
SHORTNESS OF BREATH: 0
DIARRHEA: 0
NAUSEA: 0

## 2024-08-13 NOTE — PROGRESS NOTES
[facility] Residency Program - Outpatient Note      Subjective:    Cary Samano is a 59 y.o. female with  has a past medical history of Acute MI ([facility]), Anxiety, CAD (coronary artery disease), Chronic back pain, COPD (chronic obstructive pulmonary disease) ([facility]), Depression, Heart disease, Hyperlipidemia, Hypertension, MRSA (methicillin resistant staph aureus) culture positive, Seizures (HCC), and Syncope.    Presented to the office today for:  Chief Complaint   Patient presents with    New Patient     New to provider     Rash     On both arm     Alcohol Problem     Stop drinking on 8/9/24, very emotional, shakes, fussy, wants to get better, started AA classes and has support        HPI      59-year-old female past medical history of CAD,  COPD established with pulmonology presenting today as a follow-up for polymyalgia rheumatica on prednisone.  Patient was previously started on Cymbalta, states that she has been doing better.  Today patient's PHQ-9 score is 17.  Denies any SI/HI.  Patient stated that he has been sober since August 9.  Last day of drinking was August 8, did state that in the last couple of days she had some tremors however they have since subsided.  Previously she was drinking at least 1 pint every day of whiskey.  Positive CAGE questioning.  Does state that people at home including her boyfriend as well as her son drink.  Stated that she is all reestablished with alcoholic Anonymous however had no other resources.  Provided with [employer], [email or web address] and [facility], patient was very willing to call and establish care with them.    Will also set up patient with rheumatology for polymyalgia rheumatica management currently patient is on prednisone 10 mg stated that symptoms are much better controlled on this dose.                  Review of Systems   Constitutional:  Negative for chills and fever.   HENT:  Negative for rhinorrhea and sore throat.    Eyes:  Negative for visual

## 2024-08-13 NOTE — PATIENT INSTRUCTIONS
Thank you for letting us take care of you today. We hope all your questions were addressed. If a question was overlooked or something else comes to mind after you return home, please contact a member of your Care Team listed below.      Your Care Team at [facility] is Team #1  Kimberly Haque M.D. (Faculty)  Meek Cox M.D. (Resident)  Natalie Cintron D.O. (Resident)  Andrea Manning M.D. (Resident)  Alma Boyer M.D. (Resident)  Arlette Cantu, [facility]  Ray Regan, [facility]  Roshni Mclean, [facility]  Elizabeth Jung, [facility]  Leeanne Cassidy, [facility]  Carolyne Stephen, [facility]  Saima Gillis, [facility]  Gianfranco (LJ) Mukesh,   Nila Chou [facility] (Clinical Pharmacist)     Office phone number: [phone]    If you need to get in right away due to illness, please be advised we have \"Same Day\" appointments available Monday-Friday. Please call us at [phone] option #3 to schedule your \"Same Day\" appointment.

## 2024-08-13 NOTE — PROGRESS NOTES
Attending Physician Statement  I have discussed the care of Cary Samano, including pertinent history and exam findings,  with the resident. I have reviewed the key elements of all parts of the encounter with the resident.  I agree with the assessment, plan and orders as documented by the resident.  (GE Modifier)    Leonie Umaña MD

## 2024-08-13 NOTE — PROGRESS NOTES
Visit Information    Have you changed or started any medications since your last visit including any over-the-counter medicines, vitamins, or herbal medicines? no   Have you stopped taking any of your medications? Is so, why? -  no  Are you having any side effects from any of your medications? - no    Have you seen any other physician or provider since your last visit?  no   Have you had any other diagnostic tests since your last visit?  yes - XR   Have you been seen in the emergency room and/or had an admission in a hospital since we last saw you?  no   Have you had your routine dental cleaning in the past 6 months?  no     Do you have an active MyChart account? If no, what is the barrier?  Yes    Patient Care Team:  Dominic Silver MD as Consulting Physician (Pulmonology)    Medical History Review  Past Medical, Family, and Social History reviewed and does not contribute to the patient presenting condition    Health Maintenance   Topic Date Due    Hepatitis B vaccine (1 of 3 - 19+ 3-dose series) Never done    Cervical cancer screen  Never done    Colorectal Cancer Screen  02/20/2019    Shingles vaccine (2 of 2) 06/25/2021    COVID-19 Vaccine (4 - 2023-24 season) 09/01/2023    Annual Wellness Visit (Medicare Advantage)  Never done    Lipids  04/11/2024    Flu vaccine (1) 08/01/2024    Lung Cancer Screening &/or Counseling  10/30/2024    Depression Monitoring  03/08/2025    Breast cancer screen  12/05/2025    DTaP/Tdap/Td vaccine (2 - Td or Tdap) 09/20/2027    Pneumococcal 0-64 years Vaccine (3 of 3 - PPSV23 or PCV20) 08/08/2030    Hepatitis C screen  Completed    HIV screen  Completed    Hepatitis A vaccine  Aged Out    Hib vaccine  Aged Out    Polio vaccine  Aged Out    Meningococcal (ACWY) vaccine  Aged Out    Diabetes screen  Discontinued

## 2024-08-28 DIAGNOSIS — E78.00 PURE HYPERCHOLESTEROLEMIA: ICD-10-CM

## 2024-08-28 RX ORDER — ATORVASTATIN CALCIUM 40 MG/1
40 TABLET, FILM COATED ORAL NIGHTLY
Qty: 30 TABLET | Refills: 4 | Status: SHIPPED | OUTPATIENT
Start: 2024-08-28

## 2024-08-28 RX ORDER — PAROXETINE 30 MG/1
30 TABLET, FILM COATED ORAL NIGHTLY
OUTPATIENT
Start: 2024-08-28

## 2024-08-30 NOTE — TELEPHONE ENCOUNTER
Last phosphorus was decreased to 4.9 on 8/30 (is on lower feed volume currently).     Plan:  Follow Ca/Phos/Alk Phos on growth labs (RFP weekly, HFP every other week)  8/8: Vit D 25OH: 30 - no dose changes at this time   7/25: PTH: QNS.  6/27: PTH: 38.5  Imaging:  Wrist films negative for rickets 3/4.  Skeletal survey 5/30, noted prior healing humerus fracture, otherwise negative.   Continue to follow with Endocrinology peripherally  Continue Vitamin D 400 units/day  Follow with dietician   Please let her know the order was placed.

## 2024-09-09 ENCOUNTER — TELEPHONE (OUTPATIENT)
Dept: FAMILY MEDICINE CLINIC | Age: 59
End: 2024-09-09

## 2024-09-09 ENCOUNTER — OFFICE VISIT (OUTPATIENT)
Dept: FAMILY MEDICINE CLINIC | Age: 59
End: 2024-09-09
Payer: MEDICARE

## 2024-09-09 VITALS
HEART RATE: 89 BPM | BODY MASS INDEX: 22.51 KG/M2 | DIASTOLIC BLOOD PRESSURE: 101 MMHG | HEIGHT: 67 IN | SYSTOLIC BLOOD PRESSURE: 165 MMHG | WEIGHT: 143.4 LBS

## 2024-09-09 DIAGNOSIS — T14.8XXA BRUISING: ICD-10-CM

## 2024-09-09 DIAGNOSIS — F10.90 ALCOHOL USE DISORDER: Primary | ICD-10-CM

## 2024-09-09 DIAGNOSIS — I10 ESSENTIAL HYPERTENSION: ICD-10-CM

## 2024-09-09 DIAGNOSIS — Z23 IMMUNIZATION DUE: ICD-10-CM

## 2024-09-09 PROBLEM — F10.20 ALCOHOL DEPENDENCE, UNCOMPLICATED (HCC): Status: ACTIVE | Noted: 2024-09-09

## 2024-09-09 PROBLEM — F10.99 ALCOHOL USE, UNSPECIFIED WITH UNSPECIFIED ALCOHOL-INDUCED DISORDER (HCC): Status: ACTIVE | Noted: 2024-09-09

## 2024-09-09 PROBLEM — F10.29 ALCOHOL DEPENDENCE WITH UNSPECIFIED ALCOHOL-INDUCED DISORDER (HCC): Status: ACTIVE | Noted: 2024-09-09

## 2024-09-09 PROCEDURE — 90746 HEPB VACCINE 3 DOSE ADULT IM: CPT | Performed by: STUDENT IN AN ORGANIZED HEALTH CARE EDUCATION/TRAINING PROGRAM

## 2024-09-09 PROCEDURE — 3077F SYST BP >= 140 MM HG: CPT

## 2024-09-09 PROCEDURE — 3080F DIAST BP >= 90 MM HG: CPT

## 2024-09-09 PROCEDURE — 90656 IIV3 VACC NO PRSV 0.5 ML IM: CPT

## 2024-09-09 PROCEDURE — 99213 OFFICE O/P EST LOW 20 MIN: CPT

## 2024-09-09 RX ORDER — LISINOPRIL 10 MG/1
10 TABLET ORAL DAILY
Qty: 90 TABLET | Refills: 1 | Status: SHIPPED | OUTPATIENT
Start: 2024-09-09

## 2024-09-09 ASSESSMENT — PATIENT HEALTH QUESTIONNAIRE - PHQ9
3. TROUBLE FALLING OR STAYING ASLEEP: NEARLY EVERY DAY
7. TROUBLE CONCENTRATING ON THINGS, SUCH AS READING THE NEWSPAPER OR WATCHING TELEVISION: NEARLY EVERY DAY
6. FEELING BAD ABOUT YOURSELF - OR THAT YOU ARE A FAILURE OR HAVE LET YOURSELF OR YOUR FAMILY DOWN: NOT AT ALL
SUM OF ALL RESPONSES TO PHQ QUESTIONS 1-9: 9
9. THOUGHTS THAT YOU WOULD BE BETTER OFF DEAD, OR OF HURTING YOURSELF: NOT AT ALL
SUM OF ALL RESPONSES TO PHQ QUESTIONS 1-9: 9
10. IF YOU CHECKED OFF ANY PROBLEMS, HOW DIFFICULT HAVE THESE PROBLEMS MADE IT FOR YOU TO DO YOUR WORK, TAKE CARE OF THINGS AT HOME, OR GET ALONG WITH OTHER PEOPLE: NOT DIFFICULT AT ALL
1. LITTLE INTEREST OR PLEASURE IN DOING THINGS: NOT AT ALL
2. FEELING DOWN, DEPRESSED OR HOPELESS: NOT AT ALL
SUM OF ALL RESPONSES TO PHQ QUESTIONS 1-9: 9
SUM OF ALL RESPONSES TO PHQ9 QUESTIONS 1 & 2: 0
4. FEELING TIRED OR HAVING LITTLE ENERGY: NEARLY EVERY DAY
SUM OF ALL RESPONSES TO PHQ QUESTIONS 1-9: 9
5. POOR APPETITE OR OVEREATING: NOT AT ALL
8. MOVING OR SPEAKING SO SLOWLY THAT OTHER PEOPLE COULD HAVE NOTICED. OR THE OPPOSITE, BEING SO FIGETY OR RESTLESS THAT YOU HAVE BEEN MOVING AROUND A LOT MORE THAN USUAL: NOT AT ALL

## 2024-09-09 ASSESSMENT — ENCOUNTER SYMPTOMS
RHINORRHEA: 0
BACK PAIN: 0
SHORTNESS OF BREATH: 0
DIARRHEA: 0
NAUSEA: 0
COUGH: 0
ABDOMINAL PAIN: 0
SORE THROAT: 0

## 2024-09-10 ENCOUNTER — HOSPITAL ENCOUNTER (OUTPATIENT)
Age: 59
Setting detail: SPECIMEN
Discharge: HOME | End: 2024-09-10

## 2024-09-10 DIAGNOSIS — F10.90 ALCOHOL USE DISORDER: ICD-10-CM

## 2024-09-10 DIAGNOSIS — T14.8XXA BRUISING: ICD-10-CM

## 2024-09-10 LAB
ALBUMIN SERPL-MCNC: 4.5 G/DL (ref 3.5–5.2)
ALBUMIN/GLOB SERPL: 2 {RATIO} (ref 1–2.5)
ALP SERPL-CCNC: 62 U/L (ref 35–104)
ALT SERPL-CCNC: 24 U/L (ref 10–35)
ANION GAP SERPL CALCULATED.3IONS-SCNC: 10 MMOL/L (ref 9–16)
AST SERPL-CCNC: 18 U/L (ref 10–35)
BASOPHILS # BLD: 0.05 K/UL (ref 0–0.2)
BASOPHILS NFR BLD: 1 % (ref 0–2)
BILIRUB SERPL-MCNC: 0.2 MG/DL (ref 0–1.2)
BUN SERPL-MCNC: 14 MG/DL (ref 6–20)
CALCIUM SERPL-MCNC: 9.5 MG/DL (ref 8.6–10.4)
CHLORIDE SERPL-SCNC: 101 MMOL/L (ref 98–107)
CO2 SERPL-SCNC: 30 MMOL/L (ref 20–31)
CREAT SERPL-MCNC: 0.6 MG/DL (ref 0.5–0.9)
EOSINOPHIL # BLD: <0.03 K/UL (ref 0–0.44)
EOSINOPHILS RELATIVE PERCENT: 0 % (ref 1–4)
ERYTHROCYTE [DISTWIDTH] IN BLOOD BY AUTOMATED COUNT: 14.1 % (ref 11.8–14.4)
GFR, ESTIMATED: >90 ML/MIN/1.73M2
GLUCOSE SERPL-MCNC: 127 MG/DL (ref 74–99)
HCT VFR BLD AUTO: 44.1 % (ref 36.3–47.1)
HGB BLD-MCNC: 14.5 G/DL (ref 11.9–15.1)
IMM GRANULOCYTES # BLD AUTO: 0.05 K/UL (ref 0–0.3)
IMM GRANULOCYTES NFR BLD: 1 %
LYMPHOCYTES NFR BLD: 1.25 K/UL (ref 1.1–3.7)
LYMPHOCYTES RELATIVE PERCENT: 12 % (ref 24–43)
MCH RBC QN AUTO: 33 PG (ref 25.2–33.5)
MCHC RBC AUTO-ENTMCNC: 32.9 G/DL (ref 28.4–34.8)
MCV RBC AUTO: 100.2 FL (ref 82.6–102.9)
MONOCYTES NFR BLD: 0.35 K/UL (ref 0.1–1.2)
MONOCYTES NFR BLD: 4 % (ref 3–12)
NEUTROPHILS NFR BLD: 82 % (ref 36–65)
NEUTS SEG NFR BLD: 8.42 K/UL (ref 1.5–8.1)
NRBC BLD-RTO: 0 PER 100 WBC
PLATELET # BLD AUTO: 303 K/UL (ref 138–453)
PMV BLD AUTO: 8.1 FL (ref 8.1–13.5)
POTASSIUM SERPL-SCNC: 4.6 MMOL/L (ref 3.7–5.3)
PROT SERPL-MCNC: 6.6 G/DL (ref 6.6–8.7)
RBC # BLD AUTO: 4.4 M/UL (ref 3.95–5.11)
SODIUM SERPL-SCNC: 141 MMOL/L (ref 136–145)
WBC OTHER # BLD: 10.1 K/UL (ref 3.5–11.3)

## 2024-09-11 DIAGNOSIS — M35.3 POLYMYALGIA RHEUMATICA (HCC): ICD-10-CM

## 2024-09-11 RX ORDER — PREDNISONE 10 MG/1
15 TABLET ORAL DAILY
Qty: 30 TABLET | Refills: 1 | OUTPATIENT
Start: 2024-09-11 | End: 2024-11-10

## 2024-09-13 RX ORDER — PREDNISONE 10 MG/1
TABLET ORAL
Qty: 30 TABLET | Refills: 1 | Status: SHIPPED | OUTPATIENT
Start: 2024-09-13

## 2024-09-13 RX ORDER — PREDNISONE 10 MG/1
15 TABLET ORAL DAILY
Qty: 30 TABLET | Refills: 1 | OUTPATIENT
Start: 2024-09-13 | End: 2024-11-12

## 2024-10-02 ENCOUNTER — OFFICE VISIT (OUTPATIENT)
Dept: ORTHOPEDIC SURGERY | Age: 59
End: 2024-10-02
Payer: MEDICARE

## 2024-10-02 VITALS — WEIGHT: 140 LBS | BODY MASS INDEX: 21.97 KG/M2 | HEIGHT: 67 IN

## 2024-10-02 DIAGNOSIS — F17.200 SMOKING: ICD-10-CM

## 2024-10-02 DIAGNOSIS — M18.0 ARTHRITIS OF CARPOMETACARPAL (CMC) JOINT OF BOTH THUMBS: Primary | ICD-10-CM

## 2024-10-02 DIAGNOSIS — G56.03 BILATERAL CARPAL TUNNEL SYNDROME: ICD-10-CM

## 2024-10-02 PROCEDURE — 99214 OFFICE O/P EST MOD 30 MIN: CPT | Performed by: ORTHOPAEDIC SURGERY

## 2024-10-02 PROCEDURE — G8420 CALC BMI NORM PARAMETERS: HCPCS | Performed by: ORTHOPAEDIC SURGERY

## 2024-10-02 PROCEDURE — G8482 FLU IMMUNIZE ORDER/ADMIN: HCPCS | Performed by: ORTHOPAEDIC SURGERY

## 2024-10-02 PROCEDURE — G8427 DOCREV CUR MEDS BY ELIG CLIN: HCPCS | Performed by: ORTHOPAEDIC SURGERY

## 2024-10-02 PROCEDURE — 4004F PT TOBACCO SCREEN RCVD TLK: CPT | Performed by: ORTHOPAEDIC SURGERY

## 2024-10-02 PROCEDURE — 3017F COLORECTAL CA SCREEN DOC REV: CPT | Performed by: ORTHOPAEDIC SURGERY

## 2024-10-02 NOTE — PROGRESS NOTES
[facility]  [address]  [place] [address]  Dept: [phone]  Dept Fax: [phone]        Orth[facility] Clinic Follow Up      Subjective:     Cary Samano is a 59 y.o. year old female who presents to the clinic today for routine follow up for known bilateral hand numbness and tingling.  Left greater than right.  She complains of numbness, tingling, weakness, and pain in the left hand.  Her last office visit was on 11/30/2023.  Symptoms have been going on for roughly 2 years.  She has failed conservative treatment with activity modification, NSAIDs, and bracing.    EMGs from 11/9/2023 demonstrate severe carpal tunnel syndrome.    She does have history of L3-5 lumbar fusion.  She does smoke roughly 1 pack cigarettes daily.  She is on chronic baclofen.  She did need cardiac clearance and does take aspirin 81 mg daily.    Review of Systems  Gen: No fever, chills, malaise  CV: No chest pain or palpitations  Resp: No cough or shortness of breath  GI: No nausea, vomiting, diarrhea, or constipation  Neuro: No seizures, vertigo, or headache  Msk: Bilateral hand numbness, weakness, and pain  10 remaining systems reviewed and negative    Objective :   There were no vitals filed for this visit.Body mass index is 21.93 kg/m².  General: No acute distress, resting comfortably in the clinic  Neuro: Alert. oriented  Eyes: Extra-ocular muscles intact  Pulm: Respirations unlabored and regular.  Skin: Warm, well perfused  Psych:   Patient has good fund of knowledge and displays understanding of exam, diagnosis, and plan.  MSK:      Cervical: Limited range of motion of the cervical spine in flexion, extension, and side bending with mild pain at extremes of motion.  Negative Spurling's.  Some mild tenderness over the left paraspinal musculature.  Negative Lhermitte test.  Negative Maru.     LLE: Deformity noted at the CMC joint with very mild

## 2024-10-07 DIAGNOSIS — I10 ESSENTIAL HYPERTENSION: ICD-10-CM

## 2024-10-07 DIAGNOSIS — E78.00 PURE HYPERCHOLESTEROLEMIA: ICD-10-CM

## 2024-10-07 NOTE — TELEPHONE ENCOUNTER
[facility] CLINICAL PHARMACY REVIEW: ADHERENCE  Identified care gap per [employer]: fills at [facility]: Statin adherence    Patient also appears to be prescribed: ACE/ARB - lisinopril reviewed. Not due to fill at this time. Potential fail date of 12/30/2024.    ASSESSMENT  STATIN ADHERENCE  Insurance Records claims through 09/20/2024 (Prior Year PDC = not reported; YTD PDC = 80%; Potential Fail Date: 10/11/2024):   Atorvastatin 40 mg daily last filled on 5/10/2024 for 90 day supply. Next refill due: 8/8/2024    Last Rx sent on 8/28/2024 for 30ds with 4 refills    Lab Results   Component Value Date/Time    CHOL 218 08/13/2024 09:50 AM    TRIG 64 08/13/2024 09:50 AM     08/13/2024 09:50 AM    CHOLFAST 176 02/11/2022 06:38 AM    TRIGLYCFAST 115 02/11/2022 06:38 AM    LDL 84 08/13/2024 09:50 AM    VLDL 13 08/13/2024 09:50 AM    ALT 24 09/10/2024 09:59 AM    AST 18 09/10/2024 09:59 AM       PLAN  The following are interventions that have been identified:   Patient OVERDUE refilling atorvastatin and active on home medication list  Patient eligible for 100 day supply of atorvastatin and lisinopril    Attempting to reach patient to review - left message asking for return call. Will send [employer] message and letter to patient. Outreach to pharmacy - Called requesting refill for atorvastatin be processed and for them to use up extra refills to dispense a 100-day supply. Technician confirmed this and processed prescription without any issues. Will wait to hear from patient before requesting PCP to send 100-day supply prescriptions.    Last Visit: 9/9/2024  Next Visit: Not scheduled    Kamran Paz (Robbie), PharmD  PGY2 Pharmacy Resident  [facility] Clinical Pharmacy  Department, toll free: [phone], option 1  ========================================    For Pharmacy Admin Tracking Only    Program: [facility]  CPA in place:  No  Recommendation Provided To: Patient/Caregiver: 1 via Telephone and [employer]

## 2024-10-08 RX ORDER — ATORVASTATIN CALCIUM 40 MG/1
40 TABLET, FILM COATED ORAL NIGHTLY
Qty: 100 TABLET | Refills: 3 | Status: CANCELLED | OUTPATIENT
Start: 2024-10-08

## 2024-10-08 RX ORDER — LISINOPRIL 10 MG/1
10 TABLET ORAL DAILY
Qty: 100 TABLET | Refills: 3 | Status: CANCELLED | OUTPATIENT
Start: 2024-10-08

## 2024-10-08 RX ORDER — LISINOPRIL 10 MG/1
10 TABLET ORAL DAILY
Qty: 100 TABLET | Refills: 3 | Status: SHIPPED | OUTPATIENT
Start: 2024-10-08

## 2024-10-08 RX ORDER — ATORVASTATIN CALCIUM 40 MG/1
40 TABLET, FILM COATED ORAL NIGHTLY
Qty: 100 TABLET | Refills: 3 | Status: SHIPPED | OUTPATIENT
Start: 2024-10-08

## 2024-10-08 NOTE — TELEPHONE ENCOUNTER
Dr. Glover,     Outreach regarding medication adherence. Patient requests 100-day supply of atorvastatin and lisinopril to assist with adherence as their insurance now covers this extended days supply. Order(s) pended for your convenience.      Last visit: 9/9/2024, Next visit: Not scheduled; patient was transferred to clinic per her request to schedule an appointment    See encounter note(s) below for complete details. Please let me know if you have any questions.      Thank you,  Kamran Paz (Robbie), PharmD  PGY2 Pharmacy Resident  [facility] Clinical Pharmacy  Department, toll free: [phone], option 1

## 2024-10-08 NOTE — TELEPHONE ENCOUNTER
[facility] CLINICAL PHARMACY REVIEW: ADHERENCE   PharmD was able to reach patient to discuss atorvastatin and getting 100-day supplies of atorvastatin and lisinopril. She explains she has a bit of atorvastatin left over currently, but is open to picking up the 100-day supply from the pharmacy - explains she will pick it up today. Confirms she is still taking the medication once daily. She expressed interest in getting 100-day supplies of both the atorvastatin and lisinopril. PharmD will reach out to provider to request these be sent into the pharmacy.    Kamran Pza (Robbie), PharmD  PGY2 Pharmacy Resident  [facility] Clinical Pharmacy  Department, toll free: [phone], option 1  =========================================  For Pharmacy Admin Tracking Only    Program: [facility] [employer]  CPA in place:  No  Recommendation Provided To: Provider: 2 via Note to Provider and Patient/Caregiver: 1 via Telephone  Intervention Detail: Adherence Monitorin and New Rx: 2, reason: Improve Adherence  Intervention Accepted By: Provider: 0 and Patient/Caregiver: 1  Gap Closed?: Yes   Time Spent (min): 30

## 2024-10-14 ENCOUNTER — HOSPITAL ENCOUNTER (OUTPATIENT)
Dept: PREADMISSION TESTING | Age: 59
Discharge: HOME | End: 2024-10-18
Payer: MEDICARE

## 2024-10-14 VITALS
SYSTOLIC BLOOD PRESSURE: 133 MMHG | BODY MASS INDEX: 22.55 KG/M2 | OXYGEN SATURATION: 96 % | DIASTOLIC BLOOD PRESSURE: 85 MMHG | TEMPERATURE: 97.1 F | HEART RATE: 90 BPM | WEIGHT: 143.7 LBS | HEIGHT: 67 IN | RESPIRATION RATE: 20 BRPM

## 2024-10-14 LAB
ALBUMIN SERPL-MCNC: 4 G/DL (ref 3.5–5.2)
ALP SERPL-CCNC: 61 U/L (ref 35–104)
ALT SERPL-CCNC: 19 U/L (ref 5–33)
ANION GAP SERPL CALCULATED.3IONS-SCNC: 12 MMOL/L (ref 9–17)
AST SERPL-CCNC: 15 U/L
BILIRUB SERPL-MCNC: 0.2 MG/DL (ref 0.3–1.2)
BILIRUB UR QL STRIP: NEGATIVE
BUN SERPL-MCNC: 13 MG/DL (ref 6–20)
BUN/CREAT SERPL: 22 (ref 9–20)
CALCIUM SERPL-MCNC: 9.4 MG/DL (ref 8.6–10.4)
CHLORIDE SERPL-SCNC: 102 MMOL/L (ref 98–107)
CLARITY UR: CLEAR
CO2 SERPL-SCNC: 25 MMOL/L (ref 20–31)
COLOR UR: YELLOW
CREAT SERPL-MCNC: 0.6 MG/DL (ref 0.5–0.9)
EPI CELLS #/AREA URNS HPF: NORMAL /HPF (ref 0–5)
ERYTHROCYTE [DISTWIDTH] IN BLOOD BY AUTOMATED COUNT: 13.4 % (ref 11.8–14.4)
GFR, ESTIMATED: >90 ML/MIN/1.73M2
GLUCOSE SERPL-MCNC: 103 MG/DL (ref 70–99)
GLUCOSE UR STRIP-MCNC: NEGATIVE MG/DL
HCT VFR BLD AUTO: 39.5 % (ref 36.3–47.1)
HGB BLD-MCNC: 13.7 G/DL (ref 11.9–15.1)
HGB UR QL STRIP.AUTO: ABNORMAL
KETONES UR STRIP-MCNC: NEGATIVE MG/DL
LEUKOCYTE ESTERASE UR QL STRIP: NEGATIVE
MCH RBC QN AUTO: 34.4 PG (ref 25.2–33.5)
MCHC RBC AUTO-ENTMCNC: 34.7 G/DL (ref 28.4–34.8)
MCV RBC AUTO: 99.2 FL (ref 82.6–102.9)
NITRITE UR QL STRIP: NEGATIVE
NRBC BLD-RTO: 0 PER 100 WBC
PH UR STRIP: 6.5 [PH] (ref 5–8)
PLATELET # BLD AUTO: 275 K/UL (ref 138–453)
PMV BLD AUTO: 8.2 FL (ref 8.1–13.5)
POTASSIUM SERPL-SCNC: 4.1 MMOL/L (ref 3.7–5.3)
PROT SERPL-MCNC: 6.2 G/DL (ref 6.4–8.3)
PROT UR STRIP-MCNC: NEGATIVE MG/DL
RBC # BLD AUTO: 3.98 M/UL (ref 3.95–5.11)
RBC #/AREA URNS HPF: NORMAL /HPF (ref 0–2)
SODIUM SERPL-SCNC: 139 MMOL/L (ref 135–144)
SP GR UR STRIP: 1.01 (ref 1–1.03)
UROBILINOGEN UR STRIP-ACNC: NORMAL EU/DL (ref 0–1)
WBC #/AREA URNS HPF: NORMAL /HPF (ref 0–5)
WBC OTHER # BLD: 11.4 K/UL (ref 3.5–11.3)

## 2024-10-14 PROCEDURE — 81001 URINALYSIS AUTO W/SCOPE: CPT

## 2024-10-14 PROCEDURE — 80053 COMPREHEN METABOLIC PANEL: CPT

## 2024-10-14 PROCEDURE — 36415 COLL VENOUS BLD VENIPUNCTURE: CPT

## 2024-10-14 PROCEDURE — 85027 COMPLETE CBC AUTOMATED: CPT

## 2024-10-14 NOTE — PRE-PROCEDURE INSTRUCTIONS
On the Day of Your Surgery, Monday, 10/28/24, Please Arrive At doctors office instructions AM     Enter the hospital through the Main Entrance, take the lobby elevators to the second floor and check in at the Surgery Registration desk.     Continue to take your home medications as you normally do up to and including the night before surgery with the exception of blood thinning medications.    Blood Thinning Medications:  Please stop prescription blood thinning medications such as Apixaban (Eliquis); Clopidogrel (Plavix); Dabigatran (Pradaxa); Prasugrel (Effient); Rivaroxaban (Xarelto); Ticagrelor (Brilinta); Warfarin (Coumadin) only as directed by your surgeon and/or the prescribing physician    Some common examples of other medications that can thin your blood are: Aspirin, Ibuprofen (Advil, Motrin), Naproxen (Aleve), Meloxicam (Mobic), Celecoxib (Celebrex), Fish Oil, many Herbal Supplements.  These medications should usually be stopped at least 7 days prior to surgery.    Stop aspirin as advised, stop all vitamins and supplements 1 week prior to surgery.    Tylenol is OK to take for pain the week prior to surgery.    Failure to stop certain medications may interfere with your scheduled surgery.    If you receive instructions from your surgeon regarding what medications to stop prior to surgery, please follow those specific instructions.    If You Have Diabetes:  Do not take any of your diabetic medications, (injectables or by mouth) the morning of surgery unless otherwise instructed by the doctor who manages your diabetes. If you are taking insulin, contact the doctor the manages your diabetes for instructions about any changes to your insulin dosages the day before surgery.      Please take the following medication(s) the day of surgery with small sips of water:              Prednisone, Breo-ellipta, Spiriva    Please use your inhaler(s) if needed and bring your inhaler(s) from home the day of

## 2024-10-15 ENCOUNTER — ANESTHESIA EVENT (OUTPATIENT)
Dept: OPERATING ROOM | Age: 59
End: 2024-10-15
Payer: MEDICARE

## 2024-10-27 NOTE — DISCHARGE INSTRUCTIONS
shield.  If a splint gets wet, dry it with a hair dryer on the “cool” setting. Don’t use the warm or hot setting, because those settings can burn your skin.  Always keep the splint clean and away from dirt.  Keep your splint away from open flames.  Don’t expose your splint to heat, space heaters, or prolonged sunlight. Excessive heat will cause the splint to change shape.  Don’t cut or tear the splint.   Exercise all the nearby joints not kept still by the splint. If you have a long leg splint, exercise your hip joint and your toes. If you have an arm splint, exercise your shoulder, elbow, thumb, and fingers.  Elevate the part of your body that is in the splint. This helps reduce swelling.  It is important to ice (20 min on and 1 hour off) and elevate at heart level to decrease pain and swelling.   Always look for signs of compartment syndrome: pain out of proportion to the injury, pain not controlled with pain medication, numbness in digits, changing of color of digits. If these signs occur return to ED immediately for reassessment.     Follow-up care  Make a follow-up appointment with your healthcare provider, or as advised.    When to call your healthcare provider  Call your healthcare provider right away if you have any of these:  Tingling or numbness in the affected area  Severe pain that cannot be relieved with medicine  Splint that feels too tight or too loose  Swelling, coldness, or blue-gray color in the fingers or toes  Splint that is damaged, cracked, or has rough edges that hurt  Pressure sores or red marks that don’t go away within 1 hour after removing the splint  Blisters  If splint were to fall off or become saturated, do not attempt to put back on yourself. Return to ED immediately for reapplication if this occurs.

## 2024-10-28 ENCOUNTER — ANESTHESIA (OUTPATIENT)
Dept: OPERATING ROOM | Age: 59
End: 2024-10-28
Payer: MEDICARE

## 2024-10-28 ENCOUNTER — HOSPITAL ENCOUNTER (OUTPATIENT)
Age: 59
Setting detail: OUTPATIENT SURGERY
Discharge: HOME | End: 2024-10-28
Attending: ORTHOPAEDIC SURGERY | Admitting: ORTHOPAEDIC SURGERY
Payer: MEDICARE

## 2024-10-28 ENCOUNTER — APPOINTMENT (OUTPATIENT)
Dept: GENERAL RADIOLOGY | Age: 59
End: 2024-10-28
Attending: ORTHOPAEDIC SURGERY
Payer: MEDICARE

## 2024-10-28 VITALS
BODY MASS INDEX: 22.44 KG/M2 | HEART RATE: 89 BPM | DIASTOLIC BLOOD PRESSURE: 101 MMHG | RESPIRATION RATE: 14 BRPM | OXYGEN SATURATION: 94 % | WEIGHT: 143 LBS | HEIGHT: 67 IN | TEMPERATURE: 96.8 F | SYSTOLIC BLOOD PRESSURE: 153 MMHG

## 2024-10-28 DIAGNOSIS — M18.12 ARTHRITIS OF CARPOMETACARPAL (CMC) JOINT OF LEFT THUMB: Primary | ICD-10-CM

## 2024-10-28 DIAGNOSIS — G56.02 LEFT CARPAL TUNNEL SYNDROME: ICD-10-CM

## 2024-10-28 PROCEDURE — 6360000002 HC RX W HCPCS: Performed by: ORTHOPAEDIC SURGERY

## 2024-10-28 PROCEDURE — 3700000000 HC ANESTHESIA ATTENDED CARE: Performed by: ORTHOPAEDIC SURGERY

## 2024-10-28 PROCEDURE — 2709999900 HC NON-CHARGEABLE SUPPLY: Performed by: ORTHOPAEDIC SURGERY

## 2024-10-28 PROCEDURE — 64415 NJX AA&/STRD BRCH PLXS IMG: CPT | Performed by: ANESTHESIOLOGY

## 2024-10-28 PROCEDURE — 3600000012 HC SURGERY LEVEL 2 ADDTL 15MIN: Performed by: ORTHOPAEDIC SURGERY

## 2024-10-28 PROCEDURE — 3600000002 HC SURGERY LEVEL 2 BASE: Performed by: ORTHOPAEDIC SURGERY

## 2024-10-28 PROCEDURE — 2580000003 HC RX 258

## 2024-10-28 PROCEDURE — 6360000002 HC RX W HCPCS: Performed by: ANESTHESIOLOGY

## 2024-10-28 PROCEDURE — 6360000002 HC RX W HCPCS

## 2024-10-28 PROCEDURE — 7100000011 HC PHASE II RECOVERY - ADDTL 15 MIN: Performed by: ORTHOPAEDIC SURGERY

## 2024-10-28 PROCEDURE — 2500000003 HC RX 250 WO HCPCS

## 2024-10-28 PROCEDURE — 7100000010 HC PHASE II RECOVERY - FIRST 15 MIN: Performed by: ORTHOPAEDIC SURGERY

## 2024-10-28 PROCEDURE — 2500000003 HC RX 250 WO HCPCS: Performed by: ANESTHESIOLOGY

## 2024-10-28 PROCEDURE — 3700000001 HC ADD 15 MINUTES (ANESTHESIA): Performed by: ORTHOPAEDIC SURGERY

## 2024-10-28 RX ORDER — SODIUM CHLORIDE 0.9 % (FLUSH) 0.9 %
5-40 SYRINGE (ML) INJECTION EVERY 12 HOURS SCHEDULED
Status: DISCONTINUED | OUTPATIENT
Start: 2024-10-28 | End: 2024-10-28 | Stop reason: HOSPADM

## 2024-10-28 RX ORDER — SODIUM CHLORIDE 9 MG/ML
INJECTION, SOLUTION INTRAVENOUS PRN
Status: DISCONTINUED | OUTPATIENT
Start: 2024-10-28 | End: 2024-10-28 | Stop reason: HOSPADM

## 2024-10-28 RX ORDER — OXYCODONE HYDROCHLORIDE 5 MG/1
5 TABLET ORAL
Status: DISCONTINUED | OUTPATIENT
Start: 2024-10-28 | End: 2024-10-28 | Stop reason: HOSPADM

## 2024-10-28 RX ORDER — METOCLOPRAMIDE HYDROCHLORIDE 5 MG/ML
10 INJECTION INTRAMUSCULAR; INTRAVENOUS
Status: COMPLETED | OUTPATIENT
Start: 2024-10-28 | End: 2024-10-28

## 2024-10-28 RX ORDER — LIDOCAINE HYDROCHLORIDE 10 MG/ML
1 INJECTION, SOLUTION EPIDURAL; INFILTRATION; INTRACAUDAL; PERINEURAL
Status: DISCONTINUED | OUTPATIENT
Start: 2024-10-29 | End: 2024-10-28 | Stop reason: HOSPADM

## 2024-10-28 RX ORDER — PROPOFOL 10 MG/ML
INJECTION, EMULSION INTRAVENOUS
Status: DISCONTINUED | OUTPATIENT
Start: 2024-10-28 | End: 2024-10-28 | Stop reason: SDUPTHER

## 2024-10-28 RX ORDER — DOCUSATE SODIUM 100 MG/1
100 CAPSULE, LIQUID FILLED ORAL 2 TIMES DAILY PRN
Qty: 60 CAPSULE | Refills: 0 | Status: SHIPPED | OUTPATIENT
Start: 2024-10-28

## 2024-10-28 RX ORDER — SODIUM CHLORIDE 9 MG/ML
INJECTION, SOLUTION INTRAVENOUS CONTINUOUS
Status: DISCONTINUED | OUTPATIENT
Start: 2024-10-28 | End: 2024-10-28 | Stop reason: HOSPADM

## 2024-10-28 RX ORDER — MIDAZOLAM HYDROCHLORIDE 1 MG/ML
2 INJECTION, SOLUTION INTRAMUSCULAR; INTRAVENOUS ONCE
Status: COMPLETED | OUTPATIENT
Start: 2024-10-28 | End: 2024-10-28

## 2024-10-28 RX ORDER — DIPHENHYDRAMINE HYDROCHLORIDE 50 MG/ML
12.5 INJECTION INTRAMUSCULAR; INTRAVENOUS
Status: DISCONTINUED | OUTPATIENT
Start: 2024-10-28 | End: 2024-10-28 | Stop reason: HOSPADM

## 2024-10-28 RX ORDER — ROPIVACAINE HYDROCHLORIDE 5 MG/ML
INJECTION, SOLUTION EPIDURAL; INFILTRATION; PERINEURAL
Status: DISCONTINUED | OUTPATIENT
Start: 2024-10-28 | End: 2024-10-28 | Stop reason: SDUPTHER

## 2024-10-28 RX ORDER — NALOXONE HYDROCHLORIDE 0.4 MG/ML
INJECTION, SOLUTION INTRAMUSCULAR; INTRAVENOUS; SUBCUTANEOUS PRN
Status: DISCONTINUED | OUTPATIENT
Start: 2024-10-28 | End: 2024-10-28 | Stop reason: HOSPADM

## 2024-10-28 RX ORDER — DEXAMETHASONE SODIUM PHOSPHATE 4 MG/ML
INJECTION, SOLUTION INTRA-ARTICULAR; INTRALESIONAL; INTRAMUSCULAR; INTRAVENOUS; SOFT TISSUE
Status: DISCONTINUED | OUTPATIENT
Start: 2024-10-28 | End: 2024-10-28 | Stop reason: SDUPTHER

## 2024-10-28 RX ORDER — OXYCODONE AND ACETAMINOPHEN 5; 325 MG/1; MG/1
1 TABLET ORAL EVERY 6 HOURS PRN
Qty: 28 TABLET | Refills: 0 | Status: SHIPPED | OUTPATIENT
Start: 2024-10-28 | End: 2024-11-04

## 2024-10-28 RX ORDER — MEPERIDINE HYDROCHLORIDE 50 MG/ML
12.5 INJECTION INTRAMUSCULAR; INTRAVENOUS; SUBCUTANEOUS EVERY 5 MIN PRN
Status: DISCONTINUED | OUTPATIENT
Start: 2024-10-28 | End: 2024-10-28 | Stop reason: HOSPADM

## 2024-10-28 RX ORDER — LIDOCAINE HYDROCHLORIDE 10 MG/ML
INJECTION, SOLUTION INFILTRATION; PERINEURAL
Status: DISCONTINUED | OUTPATIENT
Start: 2024-10-28 | End: 2024-10-28 | Stop reason: SDUPTHER

## 2024-10-28 RX ORDER — MIDAZOLAM HYDROCHLORIDE 1 MG/ML
INJECTION, SOLUTION INTRAMUSCULAR; INTRAVENOUS
Status: DISCONTINUED | OUTPATIENT
Start: 2024-10-28 | End: 2024-10-28 | Stop reason: SDUPTHER

## 2024-10-28 RX ORDER — PROCHLORPERAZINE EDISYLATE 5 MG/ML
10 INJECTION INTRAMUSCULAR; INTRAVENOUS
Status: DISCONTINUED | OUTPATIENT
Start: 2024-10-28 | End: 2024-10-28 | Stop reason: HOSPADM

## 2024-10-28 RX ORDER — SODIUM CHLORIDE, SODIUM LACTATE, POTASSIUM CHLORIDE, CALCIUM CHLORIDE 600; 310; 30; 20 MG/100ML; MG/100ML; MG/100ML; MG/100ML
INJECTION, SOLUTION INTRAVENOUS CONTINUOUS
Status: DISCONTINUED | OUTPATIENT
Start: 2024-10-28 | End: 2024-10-28 | Stop reason: HOSPADM

## 2024-10-28 RX ORDER — SODIUM CHLORIDE 0.9 % (FLUSH) 0.9 %
5-40 SYRINGE (ML) INJECTION PRN
Status: DISCONTINUED | OUTPATIENT
Start: 2024-10-28 | End: 2024-10-28 | Stop reason: HOSPADM

## 2024-10-28 RX ORDER — FENTANYL CITRATE 50 UG/ML
25 INJECTION, SOLUTION INTRAMUSCULAR; INTRAVENOUS EVERY 5 MIN PRN
Status: DISCONTINUED | OUTPATIENT
Start: 2024-10-28 | End: 2024-10-28 | Stop reason: HOSPADM

## 2024-10-28 RX ORDER — HYDROMORPHONE HYDROCHLORIDE 1 MG/ML
0.5 INJECTION, SOLUTION INTRAMUSCULAR; INTRAVENOUS; SUBCUTANEOUS EVERY 5 MIN PRN
Status: DISCONTINUED | OUTPATIENT
Start: 2024-10-28 | End: 2024-10-28 | Stop reason: HOSPADM

## 2024-10-28 RX ORDER — SEVOFLURANE 250 ML/250ML
LIQUID RESPIRATORY (INHALATION)
Status: DISCONTINUED
Start: 2024-10-28 | End: 2024-10-28 | Stop reason: HOSPADM

## 2024-10-28 RX ORDER — LIDOCAINE HYDROCHLORIDE 20 MG/ML
INJECTION, SOLUTION EPIDURAL; INFILTRATION; INTRACAUDAL; PERINEURAL
Status: DISCONTINUED | OUTPATIENT
Start: 2024-10-28 | End: 2024-10-28 | Stop reason: SDUPTHER

## 2024-10-28 RX ORDER — HYDRALAZINE HYDROCHLORIDE 20 MG/ML
10 INJECTION INTRAMUSCULAR; INTRAVENOUS ONCE
Status: COMPLETED | OUTPATIENT
Start: 2024-10-28 | End: 2024-10-28

## 2024-10-28 RX ADMIN — LIDOCAINE HYDROCHLORIDE 3 ML: 10 INJECTION, SOLUTION INFILTRATION; PERINEURAL at 10:54

## 2024-10-28 RX ADMIN — DEXAMETHASONE SODIUM PHOSPHATE 4 MG: 4 INJECTION, SOLUTION INTRAMUSCULAR; INTRAVENOUS at 10:54

## 2024-10-28 RX ADMIN — MIDAZOLAM 1 MG: 1 INJECTION INTRAMUSCULAR; INTRAVENOUS at 12:54

## 2024-10-28 RX ADMIN — SODIUM CHLORIDE: 9 INJECTION, SOLUTION INTRAVENOUS at 10:38

## 2024-10-28 RX ADMIN — HYDRALAZINE HYDROCHLORIDE 10 MG: 20 INJECTION INTRAMUSCULAR; INTRAVENOUS at 15:09

## 2024-10-28 RX ADMIN — LIDOCAINE HYDROCHLORIDE 40 MG: 20 INJECTION, SOLUTION EPIDURAL; INFILTRATION; INTRACAUDAL; PERINEURAL at 12:59

## 2024-10-28 RX ADMIN — PROPOFOL 125 MCG/KG/MIN: 10 INJECTION, EMULSION INTRAVENOUS at 12:59

## 2024-10-28 RX ADMIN — METOCLOPRAMIDE 10 MG: 5 INJECTION, SOLUTION INTRAMUSCULAR; INTRAVENOUS at 15:18

## 2024-10-28 RX ADMIN — MIDAZOLAM 2 MG: 1 INJECTION INTRAMUSCULAR; INTRAVENOUS at 10:52

## 2024-10-28 RX ADMIN — Medication 2000 MG: at 13:10

## 2024-10-28 RX ADMIN — ROPIVACAINE HYDROCHLORIDE 25 ML: 5 INJECTION, SOLUTION EPIDURAL; INFILTRATION; PERINEURAL at 10:54

## 2024-10-28 ASSESSMENT — PAIN - FUNCTIONAL ASSESSMENT
PAIN_FUNCTIONAL_ASSESSMENT: 0-10
PAIN_FUNCTIONAL_ASSESSMENT: NONE - DENIES PAIN

## 2024-10-28 ASSESSMENT — PAIN DESCRIPTION - DESCRIPTORS: DESCRIPTORS: ACHING;DISCOMFORT;CRUSHING

## 2024-10-28 NOTE — OP NOTE
Operative Note      Patient: Cary Samano  YOB: 1965  MRN: [id number]    Date of Procedure: 10/28/2024     Pre-Op Diagnosis Codes:   -Left thumb 1st cmc joint arthritis  -Left carpal tunnel syndrome     Post-Op Diagnosis: Same       Procedure(s):  -Left thumb 1st CMC arthroplasty  -Left carpal tunnel release     Surgeon(s):  Barrera Del Castillo DO     Assistant:  Resident: Jasen Durand DO     Anesthesia: General     Estimated Blood Loss (mL): Less than 5     Tourniquet time: 68 min     Complications: None     Specimens:   * No specimens in log *     Implants:  * No implants in log *      Drains: * No LDAs found *     Findings:  Infection Present At Time Of Surgery (PATOS) (choose all levels that have infection present):  No infection present  Other Findings: Severe left 1st cmc joint arthritis and carpal tunnel syndrome    Detailed Description of Procedure:   On the day of surgery the patient was met in the pre-operative unit where written consent was obtained and the operative site was marked with a permanent marker. The patient was then wheeled down to the operating suite and laid in the supine position on the OR table with the left hand on a radiolucent hand table. MAC anesthesia was induced. Appropriate antibiotics were being infused at this time. A timeout was held and after all members were in agreement we proceeded forward with surgery.    After standard sterile preparation and draping of the left upper extremity was complete we amadeo out our planned incision over the dorsal aspect of the left thumb overlying the CMC joint. And over the carpal tunnel. Hemoclear tourniquet was utilized.    We began with the carpal tunnel. A 3cm incision was made with a #15 blade in the palm from Carrillo's line to the wrist crease and carried down under direct vision through the palmar fascia to the transverse carpal ligament. The transverse carpal ligament was incised and the underlying median nerve encountered,

## 2024-10-28 NOTE — ANESTHESIA PROCEDURE NOTES
Peripheral Block    Patient location during procedure: pre-op  Reason for block: post-op pain management and at surgeon's request  Start time: 10/28/2024 11:50 AM  End time: 10/28/2024 11:56 AM  Staffing  Performed: anesthesiologist   Anesthesiologist: Kenneth Ramos DO  Performed by: Kenneth Ramos DO  Authorized by: Kenneth Ramos DO    Preanesthetic Checklist  Completed: patient identified, IV checked, site marked, risks and benefits discussed, surgical/procedural consents, equipment checked, pre-op evaluation, timeout performed, anesthesia consent given, oxygen available and monitors applied/VS acknowledged  Peripheral Block   Patient position: supine  Prep: ChloraPrep  Provider prep: mask and sterile gloves  Patient monitoring: cardiac monitor, continuous pulse ox, frequent blood pressure checks, IV access, oxygen, responsive to questions and continuous capnometry  Block type: Brachial plexus  Supraclavicular  Laterality: left  Injection technique: single-shot  Guidance: ultrasound guided  Local infiltration: lidocaine  Infiltration strength: 1 %  Local infiltration: lidocaine  Dose: 3 mL    Needle   Needle type: insulated echogenic nerve stimulator needle   Needle gauge: 22 G  Needle localization: ultrasound guidance  Needle length: 8 cm  Assessment   Injection assessment: negative aspiration for heme, no paresthesia on injection and local visualized surrounding nerve on ultrasound  Paresthesia pain: none  Slow fractionated injection: yes  Hemodynamics: stable  Outcomes: uncomplicated    Additional Notes  Left supraclavicular single shot   25ml 0.5% ropivacaine + 4mg decadron

## 2024-10-28 NOTE — ANESTHESIA POSTPROCEDURE EVALUATION
Department of Anesthesiology  Postprocedure Note    Patient: Cary Samano  MRN: [id number]  YOB: 1965  Date of evaluation: 10/28/2024    Procedure Summary       Date: 10/28/24 Room / Location: [address]    Anesthesia Start: 1254 Anesthesia Stop: 1440    Procedures:       LEFT CMC  JOINT  ARTHROPLASTY (Left: Hand)      CARPAL TUNNEL RELEASE (Left: Wrist) Diagnosis:       Arthritis of carpometacarpal (CMC) joint of left thumb      Left carpal tunnel syndrome      (Arthritis of carpometacarpal (CMC) joint of left thumb [M18.12])      (Left carpal tunnel syndrome [G56.02])    Surgeons: Barrera Del Castillo DO Responsible Provider: Kenneth Ramos DO    Anesthesia Type: general ASA Status: 3            Anesthesia Type: No value filed.    Kenya Phase I: Kenya Score: 10    Kenya Phase II: Kenya Score: 10    Anesthesia Post Evaluation    Patient location during evaluation: PACU  Patient participation: complete - patient participated  Level of consciousness: awake and alert  Airway patency: patent  Nausea & Vomiting: no nausea and no vomiting  Cardiovascular status: hemodynamically stable  Respiratory status: acceptable  Hydration status: stable  Pain management: adequate    No notable events documented.

## 2024-10-28 NOTE — PROGRESS NOTES
Nerve block completed per Dr Ramos and pt tolerated well. Pt resting in bed with monitors in place, family at bedside and call light within reach. Pillow placed under left arm for support. Pt and family updated on plan of care and all questions answered, support provided. Will monitor.

## 2024-10-28 NOTE — ANESTHESIA PRE PROCEDURE
Department of Anesthesiology  Preprocedure Note       Name:  Cary Samano   Age:  59 y.o.  :  1965                                          MRN:  [id number]         Date:  10/28/2024      Surgeon: Surgeon(s):  Barrera Del Castillo DO    Procedure: Procedure(s):  LEFT CMC  JOINT  ARTHROPLASTY  CARPAL TUNNEL RELEASE    Medications prior to admission:   Prior to Admission medications    Medication Sig Start Date End Date Taking? Authorizing Provider   atorvastatin (LIPITOR) 40 MG tablet Take 1 tablet by mouth at bedtime 10/8/24  Yes Aleena GloverinDO   lisinopril (PRINIVIL;ZESTRIL) 10 MG tablet Take 1 tablet by mouth daily 10/8/24  Yes Aleena GloverinDO   predniSONE (DELTASONE) 10 MG tablet TAKE 1 AND 1/2 TABLET BY MOUTH DAILY  Patient taking differently: Take 1 tablet by mouth daily 24  Yes Crissy Glover DO   tiotropium (SPIRIVA RESPIMAT) 2.5 MCG/ACT AERS inhaler INHALE 2 PUFFS BY MOUTH DAILY 24  Yes Andrea Manning MD   fluticasone furoate-vilanterol (BREO ELLIPTA) 200-25 MCG/ACT AEPB inhaler INHALE 1 PUFF BY MOUTH DAILY 24  Yes Dominic Silver MD   albuterol (PROVENTIL) (5 MG/ML) 0.5% nebulizer solution Take 0.5 mLs by nebulization 4 times daily 3/24/23  Yes Kenia Jones MD   acetaminophen (TYLENOL) 325 MG tablet Take 2 tablets by mouth every 6 hours as needed for Pain   Yes Kaley Bruner MD   zinc gluconate 50 MG tablet Take 1 tablet by mouth daily   Yes Kaley Bruner MD   Ascorbic Acid (VITAMIN C) 250 MG tablet Take 1 tablet by mouth daily   Yes Kaley Bruner MD   Cholecalciferol (VITAMIN D3) 400 units CAPS Take 1 tablet by mouth daily   Yes Kaley Bruner MD   aspirin EC 81 MG EC tablet Take 1 tablet by mouth daily 3/6/19  Yes Jennifer Putnam, APRN - CNP   magnesium gluconate (MAGONATE) 500 MG tablet Take 400 mg by mouth every evening    Yes Kaley Bruner MD   Multiple Vitamins-Minerals (THERAPEUTIC MULTIVITAMIN-MINERALS)

## 2024-10-28 NOTE — BRIEF OP NOTE
Brief Postoperative Note      Patient: Cary Samano  YOB: 1965  MRN: [id number]    Date of Procedure: 10/28/2024    Pre-Op Diagnosis Codes:      * Arthritis of carpometacarpal (CMC) joint of left thumb [M18.12]     * Left carpal tunnel syndrome [G56.02]    Post-Op Diagnosis: Left thumb CMC joint degenerative joint disease, left carpal tunnel syndrome       Procedure(s):  LEFT CMC  JOINT  ARTHROPLASTY  CARPAL TUNNEL RELEASE, left    Surgeon(s):  Barrera Del Castillo DO    Assistant:  Resident: Jasen Durand DO    Anesthesia: General    Estimated Blood Loss (mL): Minimal    Complications: None    Specimens:   * No specimens in log *    Implants:  * No implants in log *      Drains: * No LDAs found *    Findings:  Infection Present At Time Of Surgery (PATOS) (choose all levels that have infection present):  No infection present  Other Findings: Advanced degenerative changes diffusely at the left thumb CMC joint, left carpal tunnel syndrome.    Electronically signed by Barrera Del Castillo DO on 10/28/2024 at 2:11 PM

## 2024-10-28 NOTE — BRIEF OP NOTE
Brief Postoperative Note      Patient: Cary Samano  YOB: 1965  MRN: [id number]    Date of Procedure: 10/28/2024    Pre-Op Diagnosis Codes:   -Left thumb 1st cmc joint arthritis  -Left carpal tunnel syndrome    Post-Op Diagnosis: Same       Procedure(s):  -Left thumb 1st CMC arthroplasty  -Left carpal tunnel release    Surgeon(s):  Barrera Del Castillo DO    Assistant:  Resident: Jasen Durand DO    Anesthesia: General    Estimated Blood Loss (mL): Less than 5    Tourniquet time: 68 min    Complications: None    Specimens:   * No specimens in log *    Implants:  * No implants in log *      Drains: * No LDAs found *    Findings:  Infection Present At Time Of Surgery (PATOS) (choose all levels that have infection present):  No infection present  Other Findings: Severe left 1st cmc joint arthritis and carpal tunnel syndrome    Electronically signed by Jasen Durand DO on 10/28/2024 at 2:38 PM

## 2024-10-28 NOTE — H&P
Insecurity (8/13/2024)    Hunger Vital Sign     Worried About Running Out of Food in the Last Year: Never true     Ran Out of Food in the Last Year: Never true   Transportation Needs: Unknown (8/13/2024)    PRAPARE - Transportation     Lack of Transportation (Non-Medical): No   Physical Activity: Unknown (3/24/2023)    Exercise Vital Sign     Days of Exercise per Week: 1 day   Intimate Partner Violence: Not At Risk (3/24/2023)    Humiliation, Afraid, Rape, and Kick questionnaire     Fear of Current or Ex-Partner: No     Emotionally Abused: No     Physically Abused: No     Sexually Abused: No   Housing Stability: Unknown (8/13/2024)    Housing Stability Vital Sign     Homeless in the Last Year: No       Family History:     Family History   Problem Relation Age of Onset    Other Mother     Heart Disease Father     High Blood Pressure Father     High Cholesterol Father     Other Brother     Breast Cancer Maternal Grandmother        This is a 59 y.o. female who is pleasant, cooperative, alert and oriented x3, in no acute distress. Anxious     Heart: Heart sounds are normal. HR 93 regular rate and rhythm without murmur, gallop or rub.   Lungs: Normal respiratory effort with equal expansion, good air exchange, unlabored and clear to auscultation without wheezes or rales bilaterally   Abdomen: soft, nontender, nondistended with bowel sounds active.   Skin: Scattered scabs to bilateral hands- Dr. Del Castillo assessed area.     Labs:  Recent Labs     10/14/24  1331   HGB 13.7   HCT 39.5   WBC 11.4*   MCV 99.2         K 4.1      CO2 25   BUN 13   CREATININE 0.6   GLUCOSE 103*   AST 15   ALT 19       No results for input(s): \"COVID19\" in the last 720 hours.    THOR Avila CNP  Electronically signed 10/28/2024 at 10:39 AM      [facility] SPECIAL[facility]  [facility] [place] [facility] [place] [address]  Dept: [phone]  Dept Fax: [phone]

## 2024-10-30 NOTE — TELEPHONE ENCOUNTER
[facility] ORTHOPAEDICS  [address] [place] [address]  Dept: [phone]      Physical Therapy Daily Note     Referring MD: Giovanni Ugarte MD  Diagnosis:     ICD-10-CM    1. Muscle weakness  M62.81       2. Impaired functional mobility and activity tolerance  Z74.09            Surgery:Right Quadriceps Tendon Repair - Right  Date: 6/5/2024  Therapy precautions:Weight-bearing Status: in brace locked in EXT and ROM 0 to 50 degrees range of motion x 2 weeks followed by progression of flexion of 10 degrees/week with a goal of 90 around 6 weeks  Co-morbidities affecting plan of care: none  Payor: Payor: [employer] /  /  /  Billing pattern: Commercial- substantial/midpoint time each CPT  Timed Procedure Codes: 40 min, Total Time: 40  min  Modifier needed: No    Visit count: 28/30;  hard max    Chief complaints/history of injury:    Date symptoms began: 6/5/24  Nature of condition: Recent onset (initial onset within last 3 months)  Primary cause of current episode: Post-surgical  How did symptoms start: Pt reports falling from stoop on his front porch w/ resulting torn quad tendon.   He is now s/p R quad tendon repair on 6/5/24.    His sister is an OT, she is in town until Sunday     Patient Stated Goals: normalize walking and return to gym    SUBJECTIVE     Pt reports frustration w/ cont weakness in open chain.  He feels overall he can do most things but his strength in open chain cont to be limited.  He also reports occasional times when he feels like his knee gives it, but not to the point of falling.       Medications: no changes since last session      OBJECTIVE     Functional Outcome Questionnaire: 10/30/24    Lower Extremity Functional Scale: 59/80= 63% function     ROM Measures:  10/30/24   Right Left Comment   Knee Extension 0 0    Knee Flexion 130 130    Hip WFL WFL    Ankle WFL WFL              Strength/MMT (0-5 scale) 10/30/2024   Right 8/28/2024  Left Comment  Health Maintenance   Topic Date Due    HIV screen  Never done    Hepatitis C screen  Never done    Cervical cancer screen  Never done    Low dose CT lung screening  Never done    Colorectal Cancer Screen  02/20/2019    Annual Wellness Visit (AWV)  Never done    Shingles vaccine (2 of 2) 06/25/2021    COVID-19 Vaccine (4 - Booster for Pfizer series) 05/04/2022    Flu vaccine (1) 08/01/2022    Lipids  02/11/2023    Depression Monitoring  09/20/2023    Breast cancer screen  09/29/2024    DTaP/Tdap/Td vaccine (2 - Td or Tdap) 09/20/2027    Pneumococcal 0-64 years Vaccine (3 - PPSV23 if available, else PCV20) 08/08/2030    Hepatitis A vaccine  Aged Out    Hib vaccine  Aged Out    Meningococcal (ACWY) vaccine  Aged Out             (applicable per patient's age: Cancer Screenings, Depression Screening, Fall Risk Screening, Immunizations)    Hemoglobin A1C (%)   Date Value   01/30/2018 5.3   08/02/2013 5.3   02/14/2013 6.0     LDL Cholesterol (mg/dL)   Date Value   02/11/2022 90     AST (U/L)   Date Value   06/24/2022 21     ALT (U/L)   Date Value   06/24/2022 25     BUN (mg/dL)   Date Value   11/03/2022 7      (goal A1C is < 7)   (goal LDL is <100) need 30-50% reduction from baseline     BP Readings from Last 3 Encounters:   11/03/22 125/83   09/20/22 123/84   06/27/22 (!) 129/99    (goal /80)      All Future Testing planned in [facility]:  Lab Frequency Next Occurrence       Next Visit Date:  No future appointments.          Patient Active Problem List:     Heart disease     Chronic back pain     Depression     Hyperlipidemia     CAD, multiple vessel     Asthma     Smoking     Need for vaccination     Syncope     Leg pain     Left hip pain     Chronic cholecystitis     Chest pain     Calculus of gallbladder without cholecystitis without obstruction     History of lumbar fusion     Failed back syndrome     Marijuana use     Abnormal weight loss     Allergic rhinitis     Anxiety state     Chronic midline low back pain with sciatica     Moderate episode of recurrent major depressive disorder (HCC)     Seizure (HCC)     Chronic tension-type headache, intractable     Hypertension     Sinus tachycardia     Acute respiratory failure (HCC)     COPD (chronic obstructive pulmonary disease) (HCC)     COPD exacerbation (HCC)     Acute bronchitis     Headache     Syncope and collapse     Unstable angina ([facility] [address]

## 2024-11-05 DIAGNOSIS — M18.12 ARTHRITIS OF CARPOMETACARPAL (CMC) JOINT OF LEFT THUMB: ICD-10-CM

## 2024-11-05 DIAGNOSIS — G56.02 LEFT CARPAL TUNNEL SYNDROME: ICD-10-CM

## 2024-11-05 RX ORDER — OXYCODONE AND ACETAMINOPHEN 5; 325 MG/1; MG/1
1 TABLET ORAL EVERY 6 HOURS PRN
Qty: 28 TABLET | Refills: 0 | Status: SHIPPED | OUTPATIENT
Start: 2024-11-05 | End: 2024-11-12

## 2024-11-05 NOTE — TELEPHONE ENCOUNTER
Patient called and requested a prescription refill oxyCODONE- acetaminophen (PERCOCET) 5-325 MG per tablet to be sent to [facility] on [address]     S/P Left CMC joint arthroplasty; CTR.  DOS 10/28/24    Patient may be reached at [phone] if needed.  Thank you.

## 2024-11-12 DIAGNOSIS — G56.02 LEFT CARPAL TUNNEL SYNDROME: ICD-10-CM

## 2024-11-12 DIAGNOSIS — M18.12 ARTHRITIS OF CARPOMETACARPAL (CMC) JOINT OF LEFT THUMB: ICD-10-CM

## 2024-11-12 RX ORDER — OXYCODONE AND ACETAMINOPHEN 5; 325 MG/1; MG/1
1 TABLET ORAL EVERY 8 HOURS PRN
Qty: 21 TABLET | Refills: 0 | Status: SHIPPED | OUTPATIENT
Start: 2024-11-12 | End: 2024-11-12 | Stop reason: SDUPTHER

## 2024-11-12 RX ORDER — OXYCODONE AND ACETAMINOPHEN 5; 325 MG/1; MG/1
1 TABLET ORAL EVERY 8 HOURS PRN
Qty: 21 TABLET | Refills: 0 | Status: SHIPPED | OUTPATIENT
Start: 2024-11-12 | End: 2024-11-19

## 2024-11-12 NOTE — TELEPHONE ENCOUNTER
Resident clinic pt.    Rx refill request    DOS-10/28/24 L [facility] jt arthroplast/ ctr L with     Medication pended at titrated dose, pt f/u in office on Wednesday 11/13

## 2024-11-12 NOTE — TELEPHONE ENCOUNTER
Patient called requesting a refill on the following medications    oxyCODONE-acetaminophen (PERCOCET) 5-325     Patient has two pills left   [facility] on [address]     Please advise

## 2024-11-13 ENCOUNTER — OFFICE VISIT (OUTPATIENT)
Dept: ORTHOPEDIC SURGERY | Age: 59
End: 2024-11-13

## 2024-11-13 VITALS — BODY MASS INDEX: 22.39 KG/M2 | HEIGHT: 67 IN

## 2024-11-13 DIAGNOSIS — M18.12 ARTHRITIS OF CARPOMETACARPAL (CMC) JOINT OF LEFT THUMB: Primary | ICD-10-CM

## 2024-11-13 DIAGNOSIS — G56.02 LEFT CARPAL TUNNEL SYNDROME: ICD-10-CM

## 2024-11-13 PROCEDURE — 99024 POSTOP FOLLOW-UP VISIT: CPT | Performed by: ORTHOPAEDIC SURGERY

## 2024-11-13 NOTE — PROGRESS NOTES
[facility] ORTHO SPECIALISTS  [address]  [facility] [address]  Dept: [phone]  Dept Fax: [phone]        Ambulatory Follow Up    Subjective:       HPI:    Cary Samano is a 59 y.o. year old female who presents to our office today for routine follow-up regarding left CMC arthroplasty and left carpal tunnel release.  Patient underwent this procedure on 10/28/2024 by Dr. Del Castillo, she is currently 2 weeks out from surgery.  Overall patient is doing well.  She currently is taking Percocet to control her pain which is doing the job.  She has noted itching to the splint as well as dry skin.  She has abided by the nonweightbearing to that extremity.  She denies any new numbness or tingling in the extremity.  She denies any drainage or signs of infection from her incision.  At today's visit denies fever, chills, chest pain, shortness of breath.         Review of Systems:  Constitutional: Negative for fever and chills.   HENT: Negative for congestion.    Eyes: Negative for blurred vision and double vision.   Respiratory: Negative for cough, shortness of breath and wheezing.    Cardiovascular: Negative for chest pain and palpitations.   Gastrointestinal: Negative for nausea. Negative for vomiting.   Musculoskeletal: Positive for (left hand pain).   Skin: Negative for itching and rash.   Neurological: Negative for dizziness, sensory change and headaches.   Psychiatric/Behavioral: Negative for depression and suicidal ideas.       Objective :   General: AAOx3, NAD, appears stated age  CV: no obvious JVD, distal pulses 2+  Respiratory: chest rise symmetric, unlabored respirations, no audible wheezing  Skin: warm, well perfused, no obvious rashes or lesions  Psych: Patient displays understanding of exam, diagnosis, and plan.    MSK:     LUE: Incision sites are well-approximated with sutures in place.  There is no signs of erythema or drainage from the incision

## 2024-11-19 DIAGNOSIS — M18.12 ARTHRITIS OF CARPOMETACARPAL (CMC) JOINT OF LEFT THUMB: ICD-10-CM

## 2024-11-19 DIAGNOSIS — G56.02 LEFT CARPAL TUNNEL SYNDROME: ICD-10-CM

## 2024-11-19 RX ORDER — OXYCODONE AND ACETAMINOPHEN 5; 325 MG/1; MG/1
1 TABLET ORAL EVERY 8 HOURS PRN
Qty: 21 TABLET | Refills: 0 | Status: SHIPPED | OUTPATIENT
Start: 2024-11-19 | End: 2024-11-26

## 2024-11-19 NOTE — TELEPHONE ENCOUNTER
Requesting refill for pain medication, 1 pill left    Percocet 5-325 mg, 3 times daily    DOS: 10/28/24   Last appt: 11/13/2024  Next appt: 12/11/2024    [facility] [id number] - [place], [place] - [address] - P [phone] - F [phone]

## 2024-11-19 NOTE — TELEPHONE ENCOUNTER
Medication pending for your approval     DOS: 10/28/24 Left CMC Joint Arthroplasty   Last Fill: 11/12/24 #21   Last OV: 11/13/24  Next Appt: 12/11/24    Patient verified pharmacy     Allergies: Sulfa

## 2024-11-25 ENCOUNTER — TELEPHONE (OUTPATIENT)
Dept: ORTHOPEDIC SURGERY | Age: 59
End: 2024-11-25

## 2024-11-25 DIAGNOSIS — M18.12 ARTHRITIS OF CARPOMETACARPAL (CMC) JOINT OF LEFT THUMB: Primary | ICD-10-CM

## 2024-11-25 DIAGNOSIS — G56.02 LEFT CARPAL TUNNEL SYNDROME: ICD-10-CM

## 2024-11-25 RX ORDER — OXYCODONE AND ACETAMINOPHEN 5; 325 MG/1; MG/1
1 TABLET ORAL 2 TIMES DAILY PRN
Qty: 14 TABLET | Refills: 0 | Status: SHIPPED | OUTPATIENT
Start: 2024-11-25 | End: 2024-12-02

## 2024-11-25 NOTE — TELEPHONE ENCOUNTER
left CMC arthroplasty and left carpal tunnel release. Dos 10/28/2024   Requesting refill on pain medication    Last refill:    oxyCODONE-acetaminophen (PERCOCET) 5-325 MG per tablet [[id number]]      Dose: 1 tablet Route: Oral Frequency: EVERY 8 HOURS PRN for Pain  Dispense Quantity: 21 tablet Refills: 0   Note to Pharmacy: Reduce doses taken as pain becomes manageable       Sig: Take 1 tablet by mouth every 8 hours as needed for Pain for up to 7 days. Intended supply: 7 days. Take lowest dose possible to manage pain Max Daily Amount: 3 tablets       Start Date: 11/19/24 End Date: 11/26/24

## 2024-11-25 NOTE — TELEPHONE ENCOUNTER
Received call from patient requesting refill of her pain medications. Stated she only has 1 pill left and is \"struggling\". Pt stated she has been taking it 3 times a day bc she has been in so much pain. Pt stated the incision and everything looks good. She thinks she maybe over doing it with her activity level.    Please call into: [facility] on [address]    Please advise.    Call back#: [phone]

## 2024-12-02 DIAGNOSIS — G56.02 LEFT CARPAL TUNNEL SYNDROME: ICD-10-CM

## 2024-12-02 DIAGNOSIS — M18.12 ARTHRITIS OF CARPOMETACARPAL (CMC) JOINT OF LEFT THUMB: ICD-10-CM

## 2024-12-02 RX ORDER — OXYCODONE AND ACETAMINOPHEN 5; 325 MG/1; MG/1
1 TABLET ORAL 2 TIMES DAILY PRN
Qty: 14 TABLET | Refills: 0 | Status: SHIPPED | OUTPATIENT
Start: 2024-12-02 | End: 2024-12-09

## 2024-12-02 NOTE — TELEPHONE ENCOUNTER
Pt called in is requesting a refill on pain medicine oxyCODONE-acetaminophen (PERCOCET) 5-325 MG per tablet       Please send to      [facility] [id number] - [place], [place] - [address] - P [phone] - F [phone]         Please call pt with any questions @ [phone]

## 2024-12-02 NOTE — TELEPHONE ENCOUNTER
DOS: 10/28/24 L CMC Joint arthroplasty   Last Fill: 11/25/24 #14 (Percocet)  Next OV: 12/11/24     Patient requesting more pain medication.     Pharmacy verified by patient.     Allergies: Sulfa

## 2024-12-18 ENCOUNTER — OFFICE VISIT (OUTPATIENT)
Dept: ORTHOPEDIC SURGERY | Age: 59
End: 2024-12-18

## 2024-12-18 VITALS — WEIGHT: 134 LBS | HEIGHT: 67 IN | BODY MASS INDEX: 21.03 KG/M2

## 2024-12-18 DIAGNOSIS — M18.12 PRIMARY OSTEOARTHRITIS OF FIRST CARPOMETACARPAL JOINT OF LEFT HAND: Primary | ICD-10-CM

## 2024-12-18 PROCEDURE — 99024 POSTOP FOLLOW-UP VISIT: CPT | Performed by: ORTHOPAEDIC SURGERY

## 2024-12-18 RX ORDER — METHYLPREDNISOLONE 4 MG/1
TABLET ORAL
Qty: 1 KIT | Refills: 0 | Status: SHIPPED | OUTPATIENT
Start: 2024-12-18 | End: 2024-12-24

## 2024-12-18 NOTE — PROGRESS NOTES
[facility] ORTHO SPECIALISTS  [address]  [facility] [place] [facility] [address]  Dept: [phone]  Dept Fax: [phone]        Orthopaedic Clinic Follow Up      Subjective:   Surgery: Left CMC arthroplasty, left carpal tunnel release   Date of Surgery: 10/28/24    Cary Samano is a 59 y.o. female who presents to the clinic today for routine followup regarding her left CMC arthroplasty and left carpal tunnel release. Patient is now approximately 6 weeks out from surgery and reports that she is still sore from the procedure, primarily over the CMC arthroplasty. She has been taking percocet for pain and supplementing with motrin but has run out of the percocet. She was made a fast form cast at her last visit and states that she has been wearing this but it is bothersome for her as she is experiencing hypersensitivity to the left hand. She is not wearing the cast and did not bring the cast to her appointment today. She also feels like she still has numbness/tingling in the median nerve distribution despite carpal tunnel release. She has not had any injuries to the left hand and has been compliant with non weightbearing restrictions.     ROS:  Gen: No fevers/chills   Cardio: no chest pain   Lungs: no shortness of breath   MSK: pain in left CMC   Neuro: Mild tingling and hypersensitivity to the left hand, primarily the dorsum of the hand as well as over the 1st metacarpal      Objective :   Physical exam  Gen: AAOx3, no acute distress  Cardio: regular rate  Lungs: symmetrical chest expansion, no audible wheezing   MSK: Left hand: Surgical incision over 1st CMC of left hand as well as carpal tunnel incision well approximated. There is no residual swelling over 1st CMC incision. There is some swelling and scar tissue formation over the carpal tunnel incision. Hypersensitivity to the touch present over the dorsum of the 1st metacarpal and dysesthesias to the thumb compared

## 2024-12-26 ENCOUNTER — HOSPITAL ENCOUNTER (OUTPATIENT)
Dept: OCCUPATIONAL THERAPY | Age: 59
Setting detail: THERAPIES SERIES
Discharge: HOME | End: 2024-12-26
Payer: MEDICARE

## 2024-12-26 PROCEDURE — 97110 THERAPEUTIC EXERCISES: CPT

## 2024-12-26 PROCEDURE — 97165 OT EVAL LOW COMPLEX 30 MIN: CPT

## 2024-12-26 NOTE — CONSULTS
form composite fist and oppose to all digits. Scored 36/80 on UEFI indicating mod difficulty with tasks and activities.     Issued AROM exercises and foam cube as part of HEP, verbalized/demonstrated good knowledge and understanding. Patient did not have any questions or concerns at conclusion of evaluation, agreeable to OT 2x/wk. Will plan to add ultrasound and sensitivity training in upcoming sessions.     Home Program Initiated: Written, Verbal, and Demo Comprehension of Education: Yes       Plans, Goals, Risks, Benefits, Discussed with, and Patient    Treatment Plan:   [x]  Therapeutic Exercise   93343              []  Iontophoresis: 4 mg/mL Dexamethasone Sodium Phosphate  mAmin  56161    [x]  Therapeutic Activity  57574  []  Vasopneumatic cold with compression  73099                []  ADL training  70440  [x]  Ultrasound        21161    []  Neuromuscular Re-education  51801  []  Electrical Stimulation Attended  86296    [x]  Manual Therapy  03006  Orthotic    []  Fit  13100     []  Train 64080    [x]  Instruction in HEP        Prosthetic    []  Fit 42227      []  Train 79194    []  Cognitive Interventions, (first 15 min 63033, subsequent 15 min 66690)  [] Dry Needling, 1 or 2 muscles  58964    []  Massage   61898      [] Dry Needling, 3 or more muscles  47812   []  Cold/hotpack        []  Medication allergies reviewed for use of    Dexamethasone Sodium Phosphate 4mg/ml     with iontophoresis treatments.   Pt is not allergic.     Treatment Plan:  Frequency: 2 x/week for 12 visits     Today's Treatment:  Modalities:    Precautions [] No  [x] Yes: no heavy lifting, pushing or pulling with L hand      Exercise Flow Sheet:  Exercise Reps/Time Weight/Level Comments   AROM Exercises 10 reps  Completed, HEP   Foam Cube  10 reps yellow Completed, HEP                           Other:     Specific Instructions for Next Treatment: wrist ROM, hand strengthening, desensitization     Evaluation Complexity:  History

## 2024-12-27 ENCOUNTER — HOSPITAL ENCOUNTER (EMERGENCY)
Age: 59
Discharge: HOME | End: 2024-12-27
Attending: EMERGENCY MEDICINE
Payer: MEDICARE

## 2024-12-27 ENCOUNTER — APPOINTMENT (OUTPATIENT)
Dept: GENERAL RADIOLOGY | Age: 59
End: 2024-12-27
Payer: MEDICARE

## 2024-12-27 VITALS
RESPIRATION RATE: 18 BRPM | DIASTOLIC BLOOD PRESSURE: 90 MMHG | TEMPERATURE: 98.6 F | SYSTOLIC BLOOD PRESSURE: 160 MMHG | OXYGEN SATURATION: 99 % | HEART RATE: 89 BPM

## 2024-12-27 DIAGNOSIS — J44.1 COPD EXACERBATION (HCC): Primary | ICD-10-CM

## 2024-12-27 LAB
ANION GAP SERPL CALCULATED.3IONS-SCNC: 12 MMOL/L (ref 9–16)
BASOPHILS # BLD: 0.07 K/UL (ref 0–0.2)
BASOPHILS NFR BLD: 1 % (ref 0–2)
BUN SERPL-MCNC: 10 MG/DL (ref 6–20)
CALCIUM SERPL-MCNC: 9.5 MG/DL (ref 8.6–10.4)
CHLORIDE SERPL-SCNC: 104 MMOL/L (ref 98–107)
CO2 SERPL-SCNC: 24 MMOL/L (ref 20–31)
CREAT SERPL-MCNC: 0.6 MG/DL (ref 0.6–0.9)
EOSINOPHIL # BLD: 0.19 K/UL (ref 0–0.44)
EOSINOPHILS RELATIVE PERCENT: 2 % (ref 1–4)
ERYTHROCYTE [DISTWIDTH] IN BLOOD BY AUTOMATED COUNT: 12.7 % (ref 11.8–14.4)
FLUAV AG SPEC QL: NEGATIVE
FLUBV AG SPEC QL: NEGATIVE
GFR, ESTIMATED: >90 ML/MIN/1.73M2
GLUCOSE SERPL-MCNC: 73 MG/DL (ref 74–99)
HCT VFR BLD AUTO: 40.6 % (ref 36.3–47.1)
HGB BLD-MCNC: 14 G/DL (ref 11.9–15.1)
IMM GRANULOCYTES # BLD AUTO: <0.03 K/UL (ref 0–0.3)
IMM GRANULOCYTES NFR BLD: 0 %
LYMPHOCYTES NFR BLD: 2.89 K/UL (ref 1.1–3.7)
LYMPHOCYTES RELATIVE PERCENT: 32 % (ref 24–43)
MCH RBC QN AUTO: 32.2 PG (ref 25.2–33.5)
MCHC RBC AUTO-ENTMCNC: 34.5 G/DL (ref 28.4–34.8)
MCV RBC AUTO: 93.3 FL (ref 82.6–102.9)
MONOCYTES NFR BLD: 0.57 K/UL (ref 0.1–1.2)
MONOCYTES NFR BLD: 6 % (ref 3–12)
NEUTROPHILS NFR BLD: 59 % (ref 36–65)
NEUTS SEG NFR BLD: 5.24 K/UL (ref 1.5–8.1)
NRBC BLD-RTO: 0 PER 100 WBC
PLATELET # BLD AUTO: 283 K/UL (ref 138–453)
PMV BLD AUTO: 8.4 FL (ref 8.1–13.5)
POTASSIUM SERPL-SCNC: 3.8 MMOL/L (ref 3.7–5.3)
RBC # BLD AUTO: 4.35 M/UL (ref 3.95–5.11)
SARS-COV-2 RDRP RESP QL NAA+PROBE: NOT DETECTED
SODIUM SERPL-SCNC: 140 MMOL/L (ref 136–145)
SPECIMEN DESCRIPTION: NORMAL
TROPONIN I SERPL HS-MCNC: 7 NG/L (ref 0–14)
TROPONIN I SERPL HS-MCNC: 9 NG/L (ref 0–14)
WBC OTHER # BLD: 9 K/UL (ref 3.5–11.3)

## 2024-12-27 PROCEDURE — 71046 X-RAY EXAM CHEST 2 VIEWS: CPT

## 2024-12-27 PROCEDURE — 87804 INFLUENZA ASSAY W/OPTIC: CPT

## 2024-12-27 PROCEDURE — 84484 ASSAY OF TROPONIN QUANT: CPT

## 2024-12-27 PROCEDURE — 6360000002 HC RX W HCPCS

## 2024-12-27 PROCEDURE — 93005 ELECTROCARDIOGRAM TRACING: CPT

## 2024-12-27 PROCEDURE — 99285 EMERGENCY DEPT VISIT HI MDM: CPT

## 2024-12-27 PROCEDURE — 80048 BASIC METABOLIC PNL TOTAL CA: CPT

## 2024-12-27 PROCEDURE — 87635 SARS-COV-2 COVID-19 AMP PRB: CPT

## 2024-12-27 PROCEDURE — 85025 COMPLETE CBC W/AUTO DIFF WBC: CPT

## 2024-12-27 PROCEDURE — 96374 THER/PROPH/DIAG INJ IV PUSH: CPT

## 2024-12-27 PROCEDURE — 94640 AIRWAY INHALATION TREATMENT: CPT

## 2024-12-27 RX ORDER — PREDNISONE 20 MG/1
40 TABLET ORAL DAILY
Qty: 10 TABLET | Refills: 0 | Status: SHIPPED | OUTPATIENT
Start: 2024-12-27 | End: 2025-01-01

## 2024-12-27 RX ORDER — DOXYCYCLINE HYCLATE 100 MG
100 TABLET ORAL 2 TIMES DAILY
Qty: 14 TABLET | Refills: 0 | Status: SHIPPED | OUTPATIENT
Start: 2024-12-27 | End: 2025-01-03

## 2024-12-27 RX ORDER — DEXAMETHASONE SODIUM PHOSPHATE 10 MG/ML
10 INJECTION, SOLUTION INTRAMUSCULAR; INTRAVENOUS ONCE
Status: COMPLETED | OUTPATIENT
Start: 2024-12-27 | End: 2024-12-27

## 2024-12-27 RX ORDER — ALBUTEROL SULFATE 0.83 MG/ML
2.5 SOLUTION RESPIRATORY (INHALATION)
Status: DISCONTINUED | OUTPATIENT
Start: 2024-12-27 | End: 2024-12-27 | Stop reason: HOSPADM

## 2024-12-27 RX ADMIN — DEXAMETHASONE SODIUM PHOSPHATE 10 MG: 10 INJECTION, SOLUTION INTRAMUSCULAR; INTRAVENOUS at 12:27

## 2024-12-27 RX ADMIN — ALBUTEROL SULFATE 2.5 MG: 2.5 SOLUTION RESPIRATORY (INHALATION) at 12:33

## 2024-12-27 ASSESSMENT — LIFESTYLE VARIABLES
HOW MANY STANDARD DRINKS CONTAINING ALCOHOL DO YOU HAVE ON A TYPICAL DAY: PATIENT DECLINED
HOW MANY STANDARD DRINKS CONTAINING ALCOHOL DO YOU HAVE ON A TYPICAL DAY: PATIENT DECLINED
HOW OFTEN DO YOU HAVE A DRINK CONTAINING ALCOHOL: PATIENT DECLINED
HOW OFTEN DO YOU HAVE A DRINK CONTAINING ALCOHOL: PATIENT DECLINED

## 2024-12-27 ASSESSMENT — PAIN - FUNCTIONAL ASSESSMENT: PAIN_FUNCTIONAL_ASSESSMENT: 0-10

## 2024-12-27 ASSESSMENT — PAIN SCALES - GENERAL: PAINLEVEL_OUTOF10: 6

## 2024-12-27 NOTE — DISCHARGE INSTRUCTIONS
Please take antibiotic and steroid as prescribed.  Your symptoms are consistent with a COPD exacerbation.  Also utilize your nebulizer treatment as needed for shortness of breath.  You may take Tylenol or ibuprofen for pain, follow dosing instructions of back of bottle.    Return to the emergency department immediately for worsening of your symptoms including chest pain, shortness of breath or for any other worrisome symptoms.

## 2024-12-27 NOTE — ED PROVIDER NOTES
[facility]  Emergency Department  Faculty Attestation     I performed a history and physical examination of the patient and discussed management with the resident. I reviewed the resident’s note and agree with the documented findings and plan of care. Any areas of disagreement are noted on the chart. I was personally present for the key portions of any procedures. I have documented in the chart those procedures where I was not present during the key portions. I have reviewed the emergency nurses triage note. I agree with the chief complaint, past medical history, past surgical history, allergies, medications, social and family history as documented unless otherwise noted below.    For Physician Assistant/ Nurse Practitioner cases/documentation I have personally evaluated this patient and have completed at least one if not all key elements of the E/M (history, physical exam, and MDM). Additional findings are as noted.    Preliminary note started at 12:23 PM EST    Primary Care Physician:  Crissy Glover DO    Screenings:  [unfilled]    CHIEF COMPLAINT       Chief Complaint   Patient presents with    Shortness of Breath       RECENT VITALS:   BP (!) 160/90   Pulse 89   Temp 98.6 °F (37 °C)   Resp 18   SpO2 99%     LABS:  Labs Reviewed   COVID-19, RAPID   RAPID INFLUENZA A/B ANTIGENS   CBC WITH AUTO DIFFERENTIAL   BASIC METABOLIC PANEL   TROPONIN   TROPONIN       Radiology  XR CHEST (2 VW)    (Results Pending)       CRITICAL CARE: There was a high probability of clinically significant/life threatening deterioration in this patient's condition which required my urgent intervention.  Total critical care time was none minutes.  This excludes any time for separately reportable procedures.     EKG:  EKG Interpretation    Interpreted by me    Rhythm: normal sinus   Rate: normal  Axis: normal  Ectopy: none  Conduction: normal  ST Segments: no acute change  T Waves: no acute

## 2024-12-27 NOTE — ED PROVIDER NOTES
[facility] EMERGENCY DEPARTMENT  Emergency Department Encounter  Emergency Medicine Resident     Pt Name:Cary Samano  MRN: [id number]  Birthdate 1965  Date of evaluation: 12/27/24  PCP:  Crissy Glover DO  Note Started: 1:45 PM EST      CHIEF COMPLAINT       Chief Complaint   Patient presents with    Shortness of Breath       HISTORY OF PRESENT ILLNESS  (Location/Symptom, Timing/Onset, Context/Setting, Quality, Duration, Modifying Factors, Severity.)      Cary Samano is a 59 y.o. female with history of COPD, CAD, HLD, HTN who presents with worsening shortness of breath over the past week.  Patient states she has been coughing up brown-colored sputum.  She continues to smoke cigarettes daily.  She denies any associated fever, chills, myalgias night sweats.  She does state that she has felt \"under the weather lately\".  She does have some intermittent chest pressure, this does appear to chronic in nature.  On examination she denies any acute chest pain.    PAST MEDICAL / SURGICAL / SOCIAL / FAMILY HISTORY      has a past medical history of Acute MI (HCC), Anxiety, Arthritis, CAD (coronary artery disease), Chronic back pain, COPD (chronic obstructive pulmonary disease) (HCC), Depression, Heart disease, Hyperlipidemia, Hypertension, MRSA (methicillin resistant staph aureus) culture positive, Seizures (HCC), and Syncope.       has a past surgical history that includes Tubal ligation; Tonsillectomy; Cholecystectomy, laparoscopic (N/A, 11/10/2017); back surgery; Cardiac catheterization; Hand Arthroplasty (Left, 10/28/2024); and Carpal tunnel release (Left, 10/28/2024).      Social History     Socioeconomic History    Marital status:      Spouse name: Not on file    Number of children: Not on file    Years of education: Not on file    Highest education level: Not on file   Occupational History    Not on file   Tobacco Use    Smoking status: Every Day     Current packs/day: 1.00     Average  with history of COPD, shortness of breath x 1 week.  Plan for broad workup to include cardiopulmonary assessment with cardiac enzymes, will treat symptomatically with steroids and breathing treatment.  Likely discharge pending symptomatic improvement.  Will discharge on a course of antibiotics as well as oral steroids with close outpatient follow-up.    Amount and/or Complexity of Data Reviewed  Labs: ordered.  Radiology: ordered.  ECG/medicine tests: ordered.    Risk  Prescription drug management.        EMERGENCY DEPARTMENT COURSE:    ED Course as of 12/27/24 1421   Fri Dec 27, 2024   1358 Troponin, High Sensitivity: 7 [BG]   1358 Flu A Antigen: NEGATIVE [BG]   1359 Flu B Antigen: NEGATIVE [BG]   1359 SARS-CoV-2, Rapid: Not Detected [BG]   1359 CBC with Auto Differential:    WBC 9.0   RBC 4.35   Hemoglobin Quant 14.0   Hematocrit 40.6   MCV 93.3   MCH 32.2   MCHC 34.5   RDW 12.7   Platelet Count 283   MPV 8.4   NRBC Automated 0.0   Neutrophils % 59   Lymphocyte % 32   Monocytes % 6   Eosinophils % 2   Basophils % 1   Immature Granulocytes % 0   Neutrophils Absolute 5.24   Lymphocytes Absolute 2.89   Monocytes Absolute 0.57   Eosinophils Absolute 0.19   Basophils Absolute 0.07   Immature Granulocytes Absolute <0.03 [BG]   1359 Basic Metabolic Panel(!):    Sodium 140   Potassium 3.8   Chloride 104   CARBON DIOXIDE 24   Anion Gap 12   Glucose 73(!)   BUN,BUNPL 10   Creatinine 0.6   Est, Glom Filt Rate >90   Calcium 9.5 [BG]   1359 No electrolyte derangements, no leukocytosis.  Flu and COVID-negative.  Initial troponin 9, repeat troponin 7.  Patient continues to be chest pain-free.     [BG]   1409 XR CHEST (2 VW)  IMPRESSION:  No acute cardiopulmonary process.   [BG]   1409 Patient seen much improvement of her symptoms, denies any shortness of breath or chest pain at this time.  Plan for discharge with close return precautions.  Will start her on a course of doxycycline as well as prednisone. [BG]      ED Course User

## 2024-12-27 NOTE — ED NOTES
Pt arrive via triage  for c/o sob  Pt states it started a week ago  Hx of chf and copd  Daily smoker  Denies cp  Hx of MI  Doesn't wear 02 at home  A&o x4

## 2024-12-28 LAB
EKG ATRIAL RATE: 94 BPM
EKG P AXIS: 53 DEGREES
EKG P-R INTERVAL: 126 MS
EKG Q-T INTERVAL: 354 MS
EKG QRS DURATION: 82 MS
EKG QTC CALCULATION (BAZETT): 442 MS
EKG R AXIS: 34 DEGREES
EKG T AXIS: 49 DEGREES
EKG VENTRICULAR RATE: 94 BPM

## 2025-01-03 ENCOUNTER — HOSPITAL ENCOUNTER (OUTPATIENT)
Dept: OCCUPATIONAL THERAPY | Age: 60
Setting detail: THERAPIES SERIES
Discharge: HOME | End: 2025-01-03

## 2025-01-03 NOTE — FLOWSHEET NOTE
[x] [facility]       Occupational Therapy       [address] 1st [address]       Phone: [phone]       Fax: [phone] [] [facility] Occupational  Therapy at [place]       [address]       Phone: [phone]       Fax: [phone] [] [facility]  Outpatient Rehabilitation &  Therapy  [address]   [address]  P: [phone]  F: [phone]           Occupational Therapy Cancel/No Show note    Date: 1/3/2025  Patient: Cary Samano  : 1965  MRN: [id number]  Cancels/No Shows to date:     For today's appointment patient:  [x]  Cancelled  []  Rescheduled appointment  []  No-show     Reason given by patient:  [x]  Patient ill  []  Conflicting appointment  []  No transportation    []  Conflict with work  []  No reason given  []  Other:     Comments:      Electronically signed by: SUNSHINE Ram/BENJAMIN

## 2025-01-06 ENCOUNTER — OFFICE VISIT (OUTPATIENT)
Dept: FAMILY MEDICINE CLINIC | Age: 60
End: 2025-01-06
Payer: MEDICARE

## 2025-01-06 VITALS
HEART RATE: 98 BPM | BODY MASS INDEX: 21.06 KG/M2 | HEIGHT: 67 IN | WEIGHT: 134.2 LBS | DIASTOLIC BLOOD PRESSURE: 72 MMHG | SYSTOLIC BLOOD PRESSURE: 132 MMHG

## 2025-01-06 DIAGNOSIS — F10.20 ALCOHOL DEPENDENCE, UNCOMPLICATED (HCC): ICD-10-CM

## 2025-01-06 DIAGNOSIS — R10.12 LEFT UPPER QUADRANT ABDOMINAL PAIN: ICD-10-CM

## 2025-01-06 DIAGNOSIS — J44.1 COPD EXACERBATION (HCC): ICD-10-CM

## 2025-01-06 DIAGNOSIS — R11.0 NAUSEA: ICD-10-CM

## 2025-01-06 DIAGNOSIS — F33.1 MODERATE EPISODE OF RECURRENT MAJOR DEPRESSIVE DISORDER (HCC): ICD-10-CM

## 2025-01-06 DIAGNOSIS — I25.10 CAD, MULTIPLE VESSEL: ICD-10-CM

## 2025-01-06 DIAGNOSIS — K59.00 CONSTIPATION, UNSPECIFIED CONSTIPATION TYPE: Primary | ICD-10-CM

## 2025-01-06 PROCEDURE — G8420 CALC BMI NORM PARAMETERS: HCPCS

## 2025-01-06 PROCEDURE — 3078F DIAST BP <80 MM HG: CPT

## 2025-01-06 PROCEDURE — 3017F COLORECTAL CA SCREEN DOC REV: CPT

## 2025-01-06 PROCEDURE — 3023F SPIROM DOC REV: CPT

## 2025-01-06 PROCEDURE — 3075F SYST BP GE 130 - 139MM HG: CPT

## 2025-01-06 PROCEDURE — G8427 DOCREV CUR MEDS BY ELIG CLIN: HCPCS

## 2025-01-06 PROCEDURE — 99213 OFFICE O/P EST LOW 20 MIN: CPT

## 2025-01-06 PROCEDURE — 4004F PT TOBACCO SCREEN RCVD TLK: CPT

## 2025-01-06 RX ORDER — DOCUSATE SODIUM 100 MG/1
100 CAPSULE, LIQUID FILLED ORAL 2 TIMES DAILY
Qty: 60 CAPSULE | Refills: 0 | Status: SHIPPED | OUTPATIENT
Start: 2025-01-06 | End: 2025-02-05

## 2025-01-06 RX ORDER — ONDANSETRON 4 MG/1
4 TABLET, ORALLY DISINTEGRATING ORAL 3 TIMES DAILY PRN
Qty: 21 TABLET | Refills: 0 | Status: SHIPPED | OUTPATIENT
Start: 2025-01-06

## 2025-01-06 ASSESSMENT — PATIENT HEALTH QUESTIONNAIRE - PHQ9
10. IF YOU CHECKED OFF ANY PROBLEMS, HOW DIFFICULT HAVE THESE PROBLEMS MADE IT FOR YOU TO DO YOUR WORK, TAKE CARE OF THINGS AT HOME, OR GET ALONG WITH OTHER PEOPLE: NOT DIFFICULT AT ALL
SUM OF ALL RESPONSES TO PHQ9 QUESTIONS 1 & 2: 6
3. TROUBLE FALLING OR STAYING ASLEEP: NOT AT ALL
2. FEELING DOWN, DEPRESSED OR HOPELESS: NEARLY EVERY DAY
4. FEELING TIRED OR HAVING LITTLE ENERGY: MORE THAN HALF THE DAYS
SUM OF ALL RESPONSES TO PHQ QUESTIONS 1-9: 13
SUM OF ALL RESPONSES TO PHQ QUESTIONS 1-9: 13
9. THOUGHTS THAT YOU WOULD BE BETTER OFF DEAD, OR OF HURTING YOURSELF: NOT AT ALL
SUM OF ALL RESPONSES TO PHQ QUESTIONS 1-9: 13
SUM OF ALL RESPONSES TO PHQ QUESTIONS 1-9: 13
7. TROUBLE CONCENTRATING ON THINGS, SUCH AS READING THE NEWSPAPER OR WATCHING TELEVISION: NEARLY EVERY DAY
1. LITTLE INTEREST OR PLEASURE IN DOING THINGS: NEARLY EVERY DAY
8. MOVING OR SPEAKING SO SLOWLY THAT OTHER PEOPLE COULD HAVE NOTICED. OR THE OPPOSITE, BEING SO FIGETY OR RESTLESS THAT YOU HAVE BEEN MOVING AROUND A LOT MORE THAN USUAL: NOT AT ALL
6. FEELING BAD ABOUT YOURSELF - OR THAT YOU ARE A FAILURE OR HAVE LET YOURSELF OR YOUR FAMILY DOWN: NOT AT ALL
5. POOR APPETITE OR OVEREATING: MORE THAN HALF THE DAYS

## 2025-01-06 ASSESSMENT — ENCOUNTER SYMPTOMS
BACK PAIN: 0
COUGH: 0
DIARRHEA: 0
SHORTNESS OF BREATH: 0
NAUSEA: 1
ABDOMINAL PAIN: 1
RHINORRHEA: 0
SORE THROAT: 0

## 2025-01-06 NOTE — PROGRESS NOTES
Attending Physician Statement  I  have discussed the care of Cary Samano including pertinent history and exam findings with the resident. I agree with the assessment, plan and orders as documented by the resident.      /72 (Site: Right Upper Arm, Position: Sitting, Cuff Size: Medium Adult)   Pulse 98   Ht 1.702 m (5' 7\")   Wt 60.9 kg (134 lb 3.2 oz)   BMI 21.02 kg/m²    BP Readings from Last 3 Encounters:   01/06/25 132/72   12/27/24 (!) 160/90   10/28/24 (!) 153/101     Wt Readings from Last 3 Encounters:   01/06/25 60.9 kg (134 lb 3.2 oz)   12/18/24 60.8 kg (134 lb)   10/28/24 64.9 kg (143 lb)          Diagnosis Orders   1. Constipation, unspecified constipation type  docusate sodium (COLACE) 100 MG capsule      2. Nausea  ondansetron (ZOFRAN-ODT) 4 MG disintegrating tablet    [facility]      3. Left upper quadrant abdominal pain  H. Pylori Breath Test    [facility]      4. CAD, multiple vessel  Handicap Placard MISC      5. COPD exacerbation (HCC)        6. Alcohol dependence, uncomplicated (HCC)        7. Moderate episode of recurrent major depressive disorder (HCC)                Margarito Norwood DO 1/6/2025 10:04 AM      
Visit Information    Have you changed or started any medications since your last visit including any over-the-counter medicines, vitamins, or herbal medicines? no   Have you stopped taking any of your medications? Is so, why? -  no  Are you having any side effects from any of your medications? - no    Have you seen any other physician or provider since your last visit?  yes - Orthopedics, OT, Cardiology   Have you had any other diagnostic tests since your last visit?  yes - Labs, XR, Fluoro, EKG   Have you been seen in the emergency room and/or had an admission in a hospital since we last saw you?  yes - [facility]   Have you had your routine dental cleaning in the past 6 months?  no     Do you have an active MyChart account? If no, what is the barrier?  Yes    Patient Care Team:  Crissy Glover DO as PCP - General (Family Medicine)  Dominic Silver MD as Consulting Physician (Pulmonology)    Medical History Review  Past Medical, Family, and Social History reviewed and does contribute to the patient presenting condition    Health Maintenance   Topic Date Due    Cervical cancer screen  Never done    Colorectal Cancer Screen  02/20/2019    Shingles vaccine (2 of 2) 06/25/2021    COVID-19 Vaccine (4 - 2023-24 season) 09/01/2024    Hepatitis B vaccine (2 of 3 - 19+ 3-dose series) 10/07/2024    Lung Cancer Screening &/or Counseling  10/30/2024    Annual Wellness Visit (Medicare Advantage)  Never done    Lipids  08/13/2025    Depression Monitoring  09/09/2025    Breast cancer screen  12/05/2025    DTaP/Tdap/Td vaccine (2 - Td or Tdap) 09/20/2027    Pneumococcal 0-64 years Vaccine (3 of 3 - PPSV23 or PCV20) 08/08/2030    Flu vaccine  Completed    Hepatitis C screen  Completed    HIV screen  Completed    Hepatitis A vaccine  Aged Out    Hib vaccine  Aged Out    Polio vaccine  Aged Out    Meningococcal (ACWY) vaccine  Aged Out    Diabetes screen  Discontinued             
fix these errors, please do not hesitate to contact our office if there isany concern with the understanding of this note.

## 2025-01-06 NOTE — PATIENT INSTRUCTIONS
Thank you for letting us take care of you today. We hope all your questions were addressed. If a question was overlooked or something else comes to mind after you return home, please contact a member of your Care Team listed below.      Your Care Team at [facility] is Team #1  Kimberly Haque M.D. (Faculty)  Meek Cox M.D. (Resident)  Natalie Glover D.O. (Resident)  nAdrea Manning M.D. (Resident)  Alma Boyer M.D. (Resident)  Arlette Cantu, [facility]  Eugenia Mclean, [facility]  Elizabeth Jung, [facility]  Leeanne Cassidy, [facility]  Carolyne Stephen, [facility]  Saima Gillis, [facility]  Josue Medel (LJ) Mukesh,   Nila Chou [facility] (Clinical Pharmacist)     Office phone number: [phone]    If you need to get in right away due to illness, please be advised we have \"Same Day\" appointments available Monday-Friday. Please call us at [phone] option #3 to schedule your \"Same Day\" appointment.

## 2025-01-08 NOTE — PROGRESS NOTES
I performed a history and physical examination of the patient and discussed management with the resident. I reviewed the /resident physician note and agree with the documented findings and plan of care. Any areas of disagreement are noted on the chart.   I have personally evaluated this patient and have completed at least one if not all key elements of the E/M (history, physical exam,procedure and MDM).  Additional findings are as noted. I agree with the chief complaint, past medical history, past surgical history, allergies, medications, social and family history as documented unless otherwise noted below.     Electronically signed by KATHI BRYAN DO on 1/8/2025 at 8:09 AM

## 2025-01-09 ENCOUNTER — HOSPITAL ENCOUNTER (OUTPATIENT)
Dept: OCCUPATIONAL THERAPY | Age: 60
Setting detail: THERAPIES SERIES
Discharge: HOME | End: 2025-01-09

## 2025-01-09 NOTE — FLOWSHEET NOTE
[x] [facility]       Occupational Therapy       [address]st [address]       Phone: [phone]       Fax: [phone] [] [facility] Occupational  Therapy at [place]       [address]       Phone: [phone]       Fax: [phone] [] [facility]  Outpatient Rehabilitation &  Therapy  [address]   [address]  P: [phone]  F: [phone]           Occupational Therapy Cancel/No Show note    Date: 2025  Patient: Cary Samano  : 1965  MRN: [id number]  Cancels/No Shows to date:     For today's appointment patient:  [x]  Cancelled  []  Rescheduled appointment  []  No-show     Reason given by patient:  [x]  Patient ill  []  Conflicting appointment  []  No transportation    []  Conflict with work  []  No reason given  []  Other:     Comments:      Electronically signed by: SUNSHINE Ram/BENJAMIN

## 2025-01-14 ENCOUNTER — HOSPITAL ENCOUNTER (OUTPATIENT)
Age: 60
Setting detail: THERAPIES SERIES
Discharge: HOME | End: 2025-01-14

## 2025-01-14 NOTE — FLOWSHEET NOTE
[x] [facility]  Outpatient Rehabilitation &  Therapy  [address]  [phone]  [phone] [] [facility]  Outpatient Rehabilitation &  Therapy  [address]  P: [phone]  F: [phone] [] [facility]  Outpatient Rehabilitation &  Therapy  [address]  P: [phone]  F: [phone] [] [facility]  Outpatient Rehabilitation &  Therapy  [address]  [phone]  [phone] [] [facility]  Outpatient Rehabilitation &  Therapy  [address]   P: [phone]  F: [phone]  [] [facility]  Outpatient Rehabilitation &  Therapy  [address]  P: [phone]  F: [phone] [] [facility]  Outpatient Rehabilitation &  Therapy  [address]  P: [phone]  F: [phone] [] [facility]  Outpatient Rehabilitation &  Therapy  [address]  P: [phone]  F: [phone] [] [facility]  Outpatient Rehabilitation &  Therapy  [address]  P: [phone]  F: [phone]  [] [facility] Outpatient Rehabilitation &  Therapy  [address]  P: [phone]  F: [phone]     Therapy Cancel/No Show note    Date: 2025  Patient: Cary Samano  : 1965  MRN: [id number]      This cancellation does not count against the patients attendance record.    Discipline cancelled:    [] Physical Therapy    [x] Occupational Therapy    [] Speech Therapy      Clinic canceled today's appointment due to:    [x]  Therapist ill    []  Other:      Comments:          [] Reached patient via phone    [x] Left message on voicemail    [] Unable to reach patient due to:         Able to confirm next appt:     [x] Yes on VM    [] No        Electronically signed by: Concepcion Deras, PT

## 2025-01-15 ENCOUNTER — HOSPITAL ENCOUNTER (OUTPATIENT)
Age: 60
Setting detail: SPECIMEN
Discharge: HOME | End: 2025-01-15

## 2025-01-15 ENCOUNTER — OFFICE VISIT (OUTPATIENT)
Dept: SURGERY | Age: 60
End: 2025-01-15
Payer: MEDICARE

## 2025-01-15 VITALS
WEIGHT: 131.6 LBS | SYSTOLIC BLOOD PRESSURE: 169 MMHG | BODY MASS INDEX: 20.65 KG/M2 | HEIGHT: 67 IN | HEART RATE: 84 BPM | DIASTOLIC BLOOD PRESSURE: 102 MMHG

## 2025-01-15 DIAGNOSIS — J43.9 PULMONARY EMPHYSEMA, UNSPECIFIED EMPHYSEMA TYPE (HCC): ICD-10-CM

## 2025-01-15 DIAGNOSIS — J44.1 COPD EXACERBATION (HCC): ICD-10-CM

## 2025-01-15 DIAGNOSIS — R10.12 LEFT UPPER QUADRANT ABDOMINAL PAIN: ICD-10-CM

## 2025-01-15 DIAGNOSIS — R19.5 POSITIVE FIT (FECAL IMMUNOCHEMICAL TEST): Primary | ICD-10-CM

## 2025-01-15 PROCEDURE — 3017F COLORECTAL CA SCREEN DOC REV: CPT | Performed by: STUDENT IN AN ORGANIZED HEALTH CARE EDUCATION/TRAINING PROGRAM

## 2025-01-15 PROCEDURE — G8420 CALC BMI NORM PARAMETERS: HCPCS | Performed by: STUDENT IN AN ORGANIZED HEALTH CARE EDUCATION/TRAINING PROGRAM

## 2025-01-15 PROCEDURE — 4004F PT TOBACCO SCREEN RCVD TLK: CPT | Performed by: STUDENT IN AN ORGANIZED HEALTH CARE EDUCATION/TRAINING PROGRAM

## 2025-01-15 PROCEDURE — 99205 OFFICE O/P NEW HI 60 MIN: CPT | Performed by: STUDENT IN AN ORGANIZED HEALTH CARE EDUCATION/TRAINING PROGRAM

## 2025-01-15 PROCEDURE — G8427 DOCREV CUR MEDS BY ELIG CLIN: HCPCS | Performed by: STUDENT IN AN ORGANIZED HEALTH CARE EDUCATION/TRAINING PROGRAM

## 2025-01-15 PROCEDURE — 3080F DIAST BP >= 90 MM HG: CPT | Performed by: STUDENT IN AN ORGANIZED HEALTH CARE EDUCATION/TRAINING PROGRAM

## 2025-01-15 PROCEDURE — 3077F SYST BP >= 140 MM HG: CPT | Performed by: STUDENT IN AN ORGANIZED HEALTH CARE EDUCATION/TRAINING PROGRAM

## 2025-01-15 PROCEDURE — 3023F SPIROM DOC REV: CPT | Performed by: STUDENT IN AN ORGANIZED HEALTH CARE EDUCATION/TRAINING PROGRAM

## 2025-01-15 RX ORDER — POLYETHYLENE GLYCOL 3350, SODIUM SULFATE ANHYDROUS, SODIUM BICARBONATE, SODIUM CHLORIDE, POTASSIUM CHLORIDE 236; 22.74; 6.74; 5.86; 2.97 G/4L; G/4L; G/4L; G/4L; G/4L
4 POWDER, FOR SOLUTION ORAL ONCE
Qty: 4000 ML | Refills: 0 | Status: SHIPPED | OUTPATIENT
Start: 2025-01-15 | End: 2025-01-15

## 2025-01-15 NOTE — PROGRESS NOTES
Visit Information    Have you changed or started any medications since your last visit including any over-the-counter medicines, vitamins, or herbal medicines? no   Are you having any side effects from any of your medications? -  no  Have you stopped taking any of your medications? Is so, why? -  no    Have you seen any other physician or provider since your last visit? Yes - Records Obtained  Have you had any other diagnostic tests since your last visit? Yes - Records Obtained  Have you been seen in the emergency room and/or had an admission to a hospital since we last saw you? Yes - Records Obtained  Have you had your routine dental cleaning in the past 6 months? no    Have you activated your [employer] account? If not, what are your barriers? Yes     Patient Care Team:  Crissy Glover DO as PCP - General (Family Medicine)  Dominic Silver MD as Consulting Physician (Pulmonology)    Medical History Review  Past Medical, Family, and Social History reviewed and does not contribute to the patient presenting condition    Health Maintenance   Topic Date Due    Cervical cancer screen  Never done    Colorectal Cancer Screen  02/20/2019    Shingles vaccine (2 of 2) 06/25/2021    COVID-19 Vaccine (4 - 2023-24 season) 09/01/2024    Hepatitis B vaccine (2 of 3 - 19+ 3-dose series) 10/07/2024    Lung Cancer Screening &/or Counseling  10/30/2024    Annual Wellness Visit (Medicare Advantage)  Never done    Lipids  08/13/2025    Breast cancer screen  12/05/2025    Depression Monitoring  01/06/2026    DTaP/Tdap/Td vaccine (2 - Td or Tdap) 09/20/2027    Pneumococcal 0-64 years Vaccine (3 of 3 - PPSV23 or PCV20) 08/08/2030    Flu vaccine  Completed    Hepatitis C screen  Completed    HIV screen  Completed    Hepatitis A vaccine  Aged Out    Hib vaccine  Aged Out    Polio vaccine  Aged Out    Meningococcal (ACWY) vaccine  Aged Out    Diabetes screen  Discontinued       
the encounter have been performed by me.  I agree with the assessment, plan and orders as documented by the resident.      Electronically signed by Landon Galindo IV, DO  on 1/29/2025 at 5:14 PM

## 2025-01-15 NOTE — PATIENT INSTRUCTIONS
Thank you letting us take care of you today. We hope that all your questions were addressed. If a question was overlooked or something else comes to mind after you return home, please call our office at [phone].    If you need to cancel or change an appointment, surgery or procedure, please contact the office at [phone].

## 2025-01-16 ENCOUNTER — HOSPITAL ENCOUNTER (OUTPATIENT)
Age: 60
Setting detail: THERAPIES SERIES
Discharge: HOME | End: 2025-01-16

## 2025-01-16 LAB — H PYLORI BREATH TEST: NEGATIVE

## 2025-01-16 NOTE — FLOWSHEET NOTE
[x] [facility]       Occupational Therapy       [address]st [address]       Phone: [phone]       Fax: [phone] [] [facility] Occupational  Therapy at [place]       [address]       Phone: [phone]       Fax: [phone] [] [facility]  Outpatient Rehabilitation &  Therapy  [address]   [address]  P: [phone]  F: [phone]           Occupational Therapy Cancel/No Show note    Date: 2025  Patient: Cary Samano  : 1965  MRN: [id number]  Cancels/No Shows to date:     For today's appointment patient:  []  Cancelled  []  Rescheduled appointment  [x]  No-show     Reason given by patient:  []  Patient ill  []  Conflicting appointment  []  No transportation    []  Conflict with work  []  No reason given  []  Other:     Comments: no future appointments scheduled, will discharge by 25 if no contact is made     Electronically signed by: SUNSHINE Ram/BENJAMIN

## 2025-03-18 ENCOUNTER — OFFICE VISIT (OUTPATIENT)
Dept: PULMONOLOGY | Age: 60
End: 2025-03-18
Payer: MEDICARE

## 2025-03-18 VITALS
HEIGHT: 67 IN | BODY MASS INDEX: 18.36 KG/M2 | RESPIRATION RATE: 12 BRPM | HEART RATE: 67 BPM | SYSTOLIC BLOOD PRESSURE: 137 MMHG | DIASTOLIC BLOOD PRESSURE: 89 MMHG | WEIGHT: 117 LBS | OXYGEN SATURATION: 100 %

## 2025-03-18 DIAGNOSIS — F17.210 SMOKING GREATER THAN 40 PACK YEARS: ICD-10-CM

## 2025-03-18 DIAGNOSIS — J44.9 CHRONIC OBSTRUCTIVE PULMONARY DISEASE, UNSPECIFIED COPD TYPE (HCC): Primary | ICD-10-CM

## 2025-03-18 DIAGNOSIS — J45.909 ASTHMA, UNSPECIFIED ASTHMA SEVERITY, UNSPECIFIED WHETHER COMPLICATED, UNSPECIFIED WHETHER PERSISTENT: ICD-10-CM

## 2025-03-18 DIAGNOSIS — R91.1 LUNG NODULE: ICD-10-CM

## 2025-03-18 PROCEDURE — 4004F PT TOBACCO SCREEN RCVD TLK: CPT | Performed by: INTERNAL MEDICINE

## 2025-03-18 PROCEDURE — 3075F SYST BP GE 130 - 139MM HG: CPT | Performed by: INTERNAL MEDICINE

## 2025-03-18 PROCEDURE — 3079F DIAST BP 80-89 MM HG: CPT | Performed by: INTERNAL MEDICINE

## 2025-03-18 PROCEDURE — 3017F COLORECTAL CA SCREEN DOC REV: CPT | Performed by: INTERNAL MEDICINE

## 2025-03-18 PROCEDURE — 3023F SPIROM DOC REV: CPT | Performed by: INTERNAL MEDICINE

## 2025-03-18 PROCEDURE — 99214 OFFICE O/P EST MOD 30 MIN: CPT | Performed by: INTERNAL MEDICINE

## 2025-03-18 PROCEDURE — G8419 CALC BMI OUT NRM PARAM NOF/U: HCPCS | Performed by: INTERNAL MEDICINE

## 2025-03-18 PROCEDURE — G8427 DOCREV CUR MEDS BY ELIG CLIN: HCPCS | Performed by: INTERNAL MEDICINE

## 2025-03-18 RX ORDER — FLUTICASONE FUROATE AND VILANTEROL 200; 25 UG/1; UG/1
1 POWDER RESPIRATORY (INHALATION)
Qty: 90 EACH | Refills: 3 | Status: SHIPPED | OUTPATIENT
Start: 2025-03-18

## 2025-03-18 NOTE — PROGRESS NOTES
OUTPATIENT PULMONARY PROGRESS NOTE      Patient:  Cary Samano  MRN: [id number]    Consulting Physician: Kenia Jones MD  Reason for Consult: COPD/chronic cough/lung nodules  Primacy Care Physician: Crissy Glover DO    HISTORY OF PRESENT ILLNESS:   The patient is a 59 y.o. female she is initially referred here for evaluation management of COPD/chronic cough and lung nodule.    History of Present Illness  The patient is a 59-year-old female who presents for follow-up of COPD, smoking greater than 40 pack year, lung nodule, and history of asthma.    She was seen last time in February 2024 for and had not return for her follow-up appointment since then.      She continues to smoke a pack of cigarettes daily and reports experiencing chest discomfort. Her respiratory function has been compromised for several years, with an increase in coughing and shortness of breath. She produces brown phlegm when she coughs, but there has been no change in its color. Her physical activity is limited due to her condition, and she experiences shortness of breath after climbing stairs. She does not experience coughing at night but admits to occasional wheezing. She uses albuterol as a rescue inhaler once daily and also takes Spiriva and Breo in the morning, ensuring to rinse her mouth afterwards. She does not require refills of these medications at this time. She receives a 3-month supply of her medications from [facility] pharmacy. She has received her annual influenza vaccine, pneumonia vaccine, and COVID-19 vaccines.    She is scheduled for a routine colonoscopy and is curious about the timing of her next lung CT scan. She has been experiencing daily chest discomfort, which she attributes to stress and anxiety. She has discussed this with her primary care physician and plans to schedule an appointment. She is under the care of a cardiologist and had a stress test in 09/2020. She has a history of myocardial infarction in 2013,

## 2025-03-19 ENCOUNTER — HOSPITAL ENCOUNTER (OUTPATIENT)
Dept: CT IMAGING | Age: 60
Discharge: HOME | End: 2025-03-21
Attending: INTERNAL MEDICINE
Payer: MEDICARE

## 2025-03-19 DIAGNOSIS — R91.1 LUNG NODULE: ICD-10-CM

## 2025-03-19 PROCEDURE — 71250 CT THORAX DX C-: CPT

## 2025-03-20 ENCOUNTER — RESULTS FOLLOW-UP (OUTPATIENT)
Dept: CT IMAGING | Age: 60
End: 2025-03-20

## 2025-03-31 ENCOUNTER — HOSPITAL ENCOUNTER (OUTPATIENT)
Dept: PREADMISSION TESTING | Age: 60
Discharge: HOME | End: 2025-04-04

## 2025-03-31 VITALS — HEIGHT: 67 IN | BODY MASS INDEX: 18.36 KG/M2 | WEIGHT: 117 LBS

## 2025-03-31 RX ORDER — FERROUS SULFATE 325(65) MG
325 TABLET ORAL
COMMUNITY

## 2025-04-04 NOTE — PRE-PROCEDURE INSTRUCTIONS
No answer, left message ?                             Unable to leave message ? NO VOICEMAIL AVAILABLE.     When were you told to arrive at hospital ?      Do you have a  ?    Are you on any blood thinners ?                     If yes when did you stop taking ?    Do you have your prep Rx filled and instruction ?      Nothing to eat the day before , only clear liquids.    Are you experiencing any covid symptoms ?     Do you have any infections or rash we should be aware of ?      Do you have the Hibiclens soap to use the night before and the morning of surgery ?    Nothing to eat or drink after midnight, only a sip of water to take any medication instructed to take the night before.  Wear comfortable clothing, leave any valuables at home, remove any jewelry and body piercing .

## 2025-04-07 ENCOUNTER — HOSPITAL ENCOUNTER (OUTPATIENT)
Age: 60
Setting detail: SPECIMEN
Discharge: HOME | End: 2025-04-07

## 2025-04-07 ENCOUNTER — ANESTHESIA EVENT (OUTPATIENT)
Dept: OPERATING ROOM | Age: 60
End: 2025-04-07
Payer: MEDICARE

## 2025-04-07 ENCOUNTER — OFFICE VISIT (OUTPATIENT)
Dept: FAMILY MEDICINE CLINIC | Age: 60
End: 2025-04-07
Payer: MEDICARE

## 2025-04-07 VITALS
HEIGHT: 67 IN | DIASTOLIC BLOOD PRESSURE: 112 MMHG | BODY MASS INDEX: 18.43 KG/M2 | SYSTOLIC BLOOD PRESSURE: 161 MMHG | HEART RATE: 100 BPM | WEIGHT: 117.4 LBS

## 2025-04-07 DIAGNOSIS — I10 ESSENTIAL HYPERTENSION: ICD-10-CM

## 2025-04-07 DIAGNOSIS — R13.12 OROPHARYNGEAL DYSPHAGIA: Primary | ICD-10-CM

## 2025-04-07 DIAGNOSIS — R13.12 OROPHARYNGEAL DYSPHAGIA: ICD-10-CM

## 2025-04-07 DIAGNOSIS — E78.00 PURE HYPERCHOLESTEROLEMIA: ICD-10-CM

## 2025-04-07 LAB — TSH SERPL DL<=0.05 MIU/L-ACNC: 1.42 UIU/ML (ref 0.27–4.2)

## 2025-04-07 PROCEDURE — 3077F SYST BP >= 140 MM HG: CPT

## 2025-04-07 PROCEDURE — 4004F PT TOBACCO SCREEN RCVD TLK: CPT

## 2025-04-07 PROCEDURE — 3080F DIAST BP >= 90 MM HG: CPT

## 2025-04-07 PROCEDURE — 3017F COLORECTAL CA SCREEN DOC REV: CPT

## 2025-04-07 PROCEDURE — G8419 CALC BMI OUT NRM PARAM NOF/U: HCPCS

## 2025-04-07 PROCEDURE — G8427 DOCREV CUR MEDS BY ELIG CLIN: HCPCS

## 2025-04-07 PROCEDURE — 99213 OFFICE O/P EST LOW 20 MIN: CPT

## 2025-04-07 RX ORDER — ATORVASTATIN CALCIUM 40 MG/1
40 TABLET, FILM COATED ORAL NIGHTLY
Qty: 100 TABLET | Refills: 3 | Status: SHIPPED | OUTPATIENT
Start: 2025-04-07

## 2025-04-07 RX ORDER — GUAIFENESIN 400 MG/1
400 TABLET ORAL 4 TIMES DAILY PRN
COMMUNITY

## 2025-04-07 RX ORDER — AMLODIPINE BESYLATE 5 MG/1
2.5 TABLET ORAL DAILY
Qty: 15 TABLET | Refills: 3 | Status: SHIPPED | OUTPATIENT
Start: 2025-04-07

## 2025-04-07 RX ORDER — ACETAMINOPHEN 325 MG/1
650 TABLET ORAL EVERY 6 HOURS PRN
COMMUNITY

## 2025-04-07 RX ORDER — LISINOPRIL 10 MG/1
10 TABLET ORAL DAILY
Qty: 100 TABLET | Refills: 3 | Status: SHIPPED | OUTPATIENT
Start: 2025-04-07

## 2025-04-07 SDOH — ECONOMIC STABILITY: FOOD INSECURITY: WITHIN THE PAST 12 MONTHS, YOU WORRIED THAT YOUR FOOD WOULD RUN OUT BEFORE YOU GOT MONEY TO BUY MORE.: NEVER TRUE

## 2025-04-07 SDOH — ECONOMIC STABILITY: FOOD INSECURITY: WITHIN THE PAST 12 MONTHS, THE FOOD YOU BOUGHT JUST DIDN'T LAST AND YOU DIDN'T HAVE MONEY TO GET MORE.: NEVER TRUE

## 2025-04-07 ASSESSMENT — ENCOUNTER SYMPTOMS
SHORTNESS OF BREATH: 0
NAUSEA: 0
DIARRHEA: 0
CHOKING: 1
CONSTIPATION: 0
WHEEZING: 0
VOMITING: 0
ABDOMINAL PAIN: 0

## 2025-04-07 NOTE — PROGRESS NOTES
Visit Information    Have you changed or started any medications since your last visit including any over-the-counter medicines, vitamins, or herbal medicines? no   Have you stopped taking any of your medications? Is so, why? -  no  Are you having any side effects from any of your medications? - no    Have you seen any other physician or provider since your last visit?  yes - Lorraine, Pulmonology   Have you had any other diagnostic tests since your last visit?  yes - Labs,CT   Have you been seen in the emergency room and/or had an admission in a hospital since we last saw you?  no   Have you had your routine dental cleaning in the past 6 months?  no     Do you have an active MyChart account? If no, what is the barrier?  Yes    Patient Care Team:  Crissy Glover DO as PCP - General (Family Medicine)  Dominic Silver MD as Consulting Physician (Pulmonology)    Medical History Review  Past Medical, Family, and Social History reviewed and does not contribute to the patient presenting condition    Health Maintenance   Topic Date Due    Cervical cancer screen  Never done    Colorectal Cancer Screen  02/20/2019    Shingles vaccine (2 of 2) 06/25/2021    Pneumococcal 50+ years Vaccine (3 of 3 - PCV20 or PCV21) 04/25/2023    COVID-19 Vaccine (4 - 2024-25 season) 09/01/2024    Hepatitis B vaccine (2 of 3 - 19+ 3-dose series) 10/07/2024    Annual Wellness Visit (Medicare Advantage)  Never done    Lipids  08/13/2025    Breast cancer screen  12/05/2025    Depression Monitoring  01/06/2026    Lung Cancer Screening &/or Counseling  03/19/2026    DTaP/Tdap/Td vaccine (2 - Td or Tdap) 09/20/2027    Flu vaccine  Completed    Hepatitis C screen  Completed    HIV screen  Completed    Hepatitis A vaccine  Aged Out    Hib vaccine  Aged Out    Polio vaccine  Aged Out    Meningococcal (ACWY) vaccine  Aged Out    Meningococcal B vaccine  Aged Out    Pneumococcal 0-49 years Vaccine  Discontinued    Diabetes screen  Discontinued

## 2025-04-07 NOTE — PATIENT INSTRUCTIONS
Thank you for letting us take care of you today. We hope all your questions were addressed. If a question was overlooked or something else comes to mind after you return home, please contact a member of your Care Team listed below.      Your Care Team at [facility] is Team #  Lucas Robertson M.D. (Faculty)  Doug Bingham M.D. (Resident)  Meek Laguna M.D. (Resident)   Yola Cardozo M.D. (Resident)  Bharathi Mitchell M.D. (Resident)  Annelise Britton M.D. (Resident)  Elizabeth Jung., [facility]  Arlette Cantu, [facility]  Saima Gillis, [facility]  Josue Mclean, [facility]  Leeanne Cassidy, [facility]  Carolyne Stephen, [facility]  Eugenia Medel (LJ) [facility] (Clinical Practice Manager)  Nila Chou [facility] (Clinical Pharmacist)     Office phone number: [phone]    If you need to get in right away due to illness, please be advised we have \"Same Day\" appointments available Monday-Friday. Please call us at [phone] option #3 to schedule your \"Same Day\" appointment.

## 2025-04-07 NOTE — PROGRESS NOTES
Subjective:    Cary Samano is a 59 y.o. female with  has a past medical history of Acute MI (HCC), Anxiety, Arthritis, CAD (coronary artery disease), Chronic back pain, COPD (chronic obstructive pulmonary disease) (HCC), Depression, Heart disease, Hyperlipidemia, Hypertension, MRSA (methicillin resistant staph aureus) culture positive, Seizures (HCC), and Syncope.    Presented to the office today for:  Chief Complaint   Patient presents with    Abdominal Pain     3 month follow up, stopped all meds        HPI  Patient presents today for 3-month follow-up visit    Abdominal pain  Scheduled for colonoscopy tomorrow-FIT + in oct 2024  H. pylori breath test negative  Last CTAP back in 2022  Has difficulty with liquids but not so much solids  She mentions not having much of an appetite and has lost weight   2lbs weight loss since LV    Hypertension  Elevated x2  Has been off Lisinopril since months  No CP but SOB unchanged from baseline  Currently smoking 1 ppd  Mentions having intermittent headaches, chronic in nature, improve with Tylenol     Review of Systems   Constitutional:  Negative for chills and fever.   Respiratory:  Positive for choking (Oropharyngeal dysphagia). Negative for shortness of breath and wheezing.    Cardiovascular:  Negative for chest pain, palpitations and leg swelling.   Gastrointestinal:  Negative for abdominal pain, constipation, diarrhea, nausea and vomiting.   Genitourinary:  Negative for dysuria and flank pain.   Musculoskeletal:  Negative for arthralgias.   Neurological:  Negative for light-headedness and headaches.   Psychiatric/Behavioral:  Negative for agitation and behavioral problems.                  The patient has a   Family History   Problem Relation Age of Onset    Other Mother     Heart Disease Father     High Blood Pressure Father     High Cholesterol Father     Other Brother     Breast Cancer Maternal Grandmother        Objective:    BP (!) 161/112 (BP Site: Right Upper Arm,

## 2025-04-07 NOTE — PROGRESS NOTES
Attending Physician Statement  I  have discussed the care of Cary Samano including pertinent history and exam findings with the resident. I agree with the assessment, plan and orders as documented by the resident.      BP (!) 161/112 (BP Site: Right Upper Arm, Patient Position: Sitting, BP Cuff Size: Medium Adult)   Pulse 100   Ht 1.702 m (5' 7.01\")   Wt 53.3 kg (117 lb 6.4 oz)   BMI 18.38 kg/m²    BP Readings from Last 3 Encounters:   04/07/25 (!) 161/112   03/18/25 137/89   01/15/25 (!) 169/102     Wt Readings from Last 3 Encounters:   04/07/25 53.3 kg (117 lb 6.4 oz)   03/31/25 53.1 kg (117 lb)   03/18/25 53.1 kg (117 lb)          Diagnosis Orders   1. Oropharyngeal dysphagia  TSH    Kelley Franco MD, Gastroenterology, [facility]      2. Essential hypertension  lisinopril (PRINIVIL;ZESTRIL) 10 MG tablet    amLODIPine (NORVASC) 5 MG tablet      3. Pure hypercholesterolemia  atorvastatin (LIPITOR) 40 MG tablet              Shannan Adame MD 4/7/2025 11:43 AM

## 2025-04-08 ENCOUNTER — ANESTHESIA (OUTPATIENT)
Dept: OPERATING ROOM | Age: 60
End: 2025-04-08
Payer: MEDICARE

## 2025-04-08 ENCOUNTER — HOSPITAL ENCOUNTER (OUTPATIENT)
Age: 60
Setting detail: OUTPATIENT SURGERY
Discharge: HOME | End: 2025-04-08
Attending: SURGERY | Admitting: SURGERY
Payer: MEDICARE

## 2025-04-08 VITALS
OXYGEN SATURATION: 99 % | BODY MASS INDEX: 18.36 KG/M2 | RESPIRATION RATE: 20 BRPM | DIASTOLIC BLOOD PRESSURE: 87 MMHG | TEMPERATURE: 97.5 F | HEIGHT: 67 IN | WEIGHT: 117 LBS | HEART RATE: 80 BPM | SYSTOLIC BLOOD PRESSURE: 149 MMHG

## 2025-04-08 DIAGNOSIS — R10.84 GENERALIZED ABDOMINAL PAIN: Primary | ICD-10-CM

## 2025-04-08 PROCEDURE — 2709999900 HC NON-CHARGEABLE SUPPLY: Performed by: SURGERY

## 2025-04-08 PROCEDURE — 7100000011 HC PHASE II RECOVERY - ADDTL 15 MIN: Performed by: SURGERY

## 2025-04-08 PROCEDURE — 6360000002 HC RX W HCPCS: Performed by: NURSE ANESTHETIST, CERTIFIED REGISTERED

## 2025-04-08 PROCEDURE — 3700000001 HC ADD 15 MINUTES (ANESTHESIA): Performed by: SURGERY

## 2025-04-08 PROCEDURE — 2580000003 HC RX 258: Performed by: ANESTHESIOLOGY

## 2025-04-08 PROCEDURE — 3609027000 HC COLONOSCOPY: Performed by: SURGERY

## 2025-04-08 PROCEDURE — 7100000010 HC PHASE II RECOVERY - FIRST 15 MIN: Performed by: SURGERY

## 2025-04-08 PROCEDURE — 3700000000 HC ANESTHESIA ATTENDED CARE: Performed by: SURGERY

## 2025-04-08 PROCEDURE — 6360000002 HC RX W HCPCS: Performed by: ANESTHESIOLOGY

## 2025-04-08 RX ORDER — LIDOCAINE HYDROCHLORIDE 10 MG/ML
INJECTION, SOLUTION EPIDURAL; INFILTRATION; INTRACAUDAL; PERINEURAL
Status: DISCONTINUED | OUTPATIENT
Start: 2025-04-08 | End: 2025-04-08 | Stop reason: SDUPTHER

## 2025-04-08 RX ORDER — SODIUM CHLORIDE, SODIUM LACTATE, POTASSIUM CHLORIDE, CALCIUM CHLORIDE 600; 310; 30; 20 MG/100ML; MG/100ML; MG/100ML; MG/100ML
INJECTION, SOLUTION INTRAVENOUS CONTINUOUS
Status: DISCONTINUED | OUTPATIENT
Start: 2025-04-08 | End: 2025-04-08 | Stop reason: HOSPADM

## 2025-04-08 RX ORDER — SODIUM CHLORIDE 9 MG/ML
INJECTION, SOLUTION INTRAVENOUS PRN
Status: DISCONTINUED | OUTPATIENT
Start: 2025-04-08 | End: 2025-04-08 | Stop reason: HOSPADM

## 2025-04-08 RX ORDER — SODIUM CHLORIDE 0.9 % (FLUSH) 0.9 %
5-40 SYRINGE (ML) INJECTION EVERY 12 HOURS SCHEDULED
Status: DISCONTINUED | OUTPATIENT
Start: 2025-04-08 | End: 2025-04-08 | Stop reason: HOSPADM

## 2025-04-08 RX ORDER — PROPOFOL 10 MG/ML
INJECTION, EMULSION INTRAVENOUS
Status: DISCONTINUED | OUTPATIENT
Start: 2025-04-08 | End: 2025-04-08 | Stop reason: SDUPTHER

## 2025-04-08 RX ORDER — SODIUM CHLORIDE 0.9 % (FLUSH) 0.9 %
5-40 SYRINGE (ML) INJECTION PRN
Status: DISCONTINUED | OUTPATIENT
Start: 2025-04-08 | End: 2025-04-08 | Stop reason: HOSPADM

## 2025-04-08 RX ORDER — LIDOCAINE HYDROCHLORIDE 10 MG/ML
1 INJECTION, SOLUTION EPIDURAL; INFILTRATION; INTRACAUDAL; PERINEURAL
Status: COMPLETED | OUTPATIENT
Start: 2025-04-08 | End: 2025-04-08

## 2025-04-08 RX ADMIN — LIDOCAINE HYDROCHLORIDE 1 ML: 10 INJECTION, SOLUTION EPIDURAL; INFILTRATION; INTRACAUDAL; PERINEURAL at 09:27

## 2025-04-08 RX ADMIN — LIDOCAINE HYDROCHLORIDE 50 MG: 10 INJECTION, SOLUTION EPIDURAL; INFILTRATION; INTRACAUDAL; PERINEURAL at 10:41

## 2025-04-08 RX ADMIN — SODIUM CHLORIDE, POTASSIUM CHLORIDE, SODIUM LACTATE AND CALCIUM CHLORIDE: 600; 310; 30; 20 INJECTION, SOLUTION INTRAVENOUS at 09:28

## 2025-04-08 RX ADMIN — PROPOFOL 175 MCG/KG/MIN: 10 INJECTION, EMULSION INTRAVENOUS at 10:42

## 2025-04-08 RX ADMIN — PROPOFOL 100 MG: 10 INJECTION, EMULSION INTRAVENOUS at 10:41

## 2025-04-08 ASSESSMENT — ENCOUNTER SYMPTOMS
ABDOMINAL PAIN: 1
COUGH: 1
NAUSEA: 1
SHORTNESS OF BREATH: 1
CONSTIPATION: 1
BACK PAIN: 1

## 2025-04-08 ASSESSMENT — LIFESTYLE VARIABLES: SMOKING_STATUS: 1

## 2025-04-08 ASSESSMENT — PAIN - FUNCTIONAL ASSESSMENT
PAIN_FUNCTIONAL_ASSESSMENT: NONE - DENIES PAIN
PAIN_FUNCTIONAL_ASSESSMENT: 0-10

## 2025-04-08 NOTE — ANESTHESIA PRE PROCEDURE
vascular ROS.         Other Findings:         Anesthesia Plan      general     ASA 3     (TIVA)  Induction: intravenous.    MIPS: Postoperative opioids intended and Prophylactic antiemetics administered.  Anesthetic plan and risks discussed with patient.      Plan discussed with CRNA.                Maddy Mcconnell MD   4/8/2025

## 2025-04-08 NOTE — H&P
nebulization 4 times daily 120 each 2    zinc gluconate 50 MG tablet Take 1 tablet by mouth daily      Ascorbic Acid (VITAMIN C) 250 MG tablet Take 1 tablet by mouth daily      Cholecalciferol (VITAMIN D3) 400 units CAPS Take 1 tablet by mouth daily      aspirin EC 81 MG EC tablet Take 1 tablet by mouth daily 30 tablet 3    magnesium gluconate (MAGONATE) 500 MG tablet Take 400 mg by mouth every evening       Multiple Vitamins-Minerals (THERAPEUTIC MULTIVITAMIN-MINERALS) tablet Take 1 tablet by mouth daily      vitamin B-12 (CYANOCOBALAMIN) 1000 MCG tablet Take 1 tablet by mouth daily      ondansetron (ZOFRAN-ODT) 4 MG disintegrating tablet Take 1 tablet by mouth 3 times daily as needed for Nausea or Vomiting (Patient not taking: Reported on 4/7/2025) 21 tablet 0    Handicap Placard MISC by Does not apply route Expiration 5 years in 2030 (Patient not taking: Reported on 4/7/2025) 1 each 0    docusate sodium (COLACE) 100 MG capsule Take 1 capsule by mouth 2 times daily as needed for Constipation (Patient not taking: Reported on 3/31/2025) 60 capsule 0    predniSONE (DELTASONE) 10 MG tablet TAKE 1 AND 1/2 TABLET BY MOUTH DAILY (Patient not taking: Reported on 4/8/2025) 30 tablet 1    Blood Pressure KIT 1 each by Does not apply route daily 1 kit 0    DULoxetine (CYMBALTA) 30 MG extended release capsule TAKE 1 CAPSULE BY MOUTH DAILY (Patient not taking: Reported on 3/31/2025) 30 capsule 1    Handicap Placard MISC by Does not apply route Exp 1/2025 1 each 0    Nebulizers (NEBULIZER COMPRESSOR) MISC Daily as needed 1 each 0    Respiratory Therapy Supplies (NEBULIZER/TUBING/MOUTHPIECE) KIT 1 kit by Does not apply route daily as needed (SOB or wheezing) 1 kit 0       Review of Systems   Constitutional:  Positive for appetite change and unexpected weight change (lost 20 pounds over the last six months).   HENT: Negative.     Respiratory:  Positive for cough and shortness of breath (SOBOE).    Cardiovascular: Negative.

## 2025-04-08 NOTE — DISCHARGE INSTRUCTIONS
Patient Discharge Instructions  Discharge Date:      HYGEINE: Ok to shower, no restrictions to regular daily hygiene.     DRIVING: No driving for 24 hours due to anethesia.    ACTIVITY: Activity as tolerated.     DIET: Resume your normal diet as advised by your PCP.    MEDICATIONS: Resume home medications as directed by your PCP.     SPECIAL INSTRUCTIONS:     Follow up with Dr. Galindo in 10-14 days, or once barium enema is completed, call the Oral Clinic for appointment. Call sooner if any concerning signs or symptoms develop. Please call the imaging center at [facility] to schedule barium enema for evaluation of the colon.   Colonoscopy: What to Expect at Home  Your Recovery  After you have a colonoscopy, you will stay at the clinic for 1 to 2 hours until the medicines wear off. Then you can go home, but you will need to arrange for a ride. Your doctor will tell you when you can eat and do your other usual activities.  Your doctor will talk to you about when you will need your next colonoscopy. The results of your test and your risk for colorectal cancer will help your doctor decide how often you need to be checked.  After the test, you may be bloated or have gas pains. You may need to pass gas. If a biopsy was done or a polyp was removed, you may have streaks of blood in your stool (feces) for a few days.  This care sheet gives you a general idea about how long it will take for you to recover. But each person recovers at a different pace. Follow the steps below to get better as quickly as possible.  How can you care for yourself at home?  Activity  Rest as much as you need to after you go home.  You should be able to go back to your usual activities the day after the test.  Diet  Follow your doctor’s directions for eating.  Drink plenty of fluids (unless your doctor has told you not to) to replace the fluids that were lost during the colon prep.  Do not drink alcohol.  Medicines  If polyps were

## 2025-04-08 NOTE — ANESTHESIA POSTPROCEDURE EVALUATION
Department of Anesthesiology  Postprocedure Note    Patient: Cary Samano  MRN: [id number]  YOB: 1965  Date of evaluation: 4/8/2025    Procedure Summary       Date: 04/08/25 Room / Location: [address]    Anesthesia Start: 1036 Anesthesia Stop: 1056    Procedure: COLONOSCOPY DIAGNOSTIC (Rectum) Diagnosis:       Positive FIT (fecal immunochemical test)      (Positive FIT (fecal immunochemical test) [R19.5])    Surgeons: Landon Galindo IV, DO Responsible Provider: Maddy Mcconnell MD    Anesthesia Type: general ASA Status: 3            Anesthesia Type: No value filed.    [place] Phase I: Kenya Score: 10    Ken[place] Phase II: [place] Score: 10    Anesthesia Post Evaluation    Comments: POST- ANESTHESIA EVALUATION       Pt Name: Cary Samano  MRN: [id number]  YOB: 1965  Date of evaluation: 4/8/2025  Time:  1:11 PM      BP (!) 149/87   Pulse 80   Temp 97.5 °F (36.4 °C) (Infrared)   Resp 20   Ht 1.702 m (5' 7\")   Wt 53.1 kg (117 lb)   SpO2 99%   BMI 18.32 kg/m²      Consciousness Level  Awake  Cardiopulmonary Status  Stable  Pain Adequately Treated YES  Nausea / Vomiting  NO  Adequate Hydration  YES  Anesthesia Related Complications NONE      Electronically signed by Maddy Mcconnell MD on 4/8/2025 at 1:11 PM           No notable events documented.

## 2025-04-08 NOTE — OP NOTE
tolerated the procedure well.     Recommendations:  Will obtain barium enema due to inability to complete colonoscopy. Patient to follow up in clinic once complete.     Electronically signed by Harleen Mcdonald DO on 4/8/2025 at 10:55 AM

## 2025-04-10 ENCOUNTER — OFFICE VISIT (OUTPATIENT)
Dept: GASTROENTEROLOGY | Age: 60
End: 2025-04-10
Payer: MEDICARE

## 2025-04-10 VITALS
TEMPERATURE: 98.1 F | WEIGHT: 119.6 LBS | DIASTOLIC BLOOD PRESSURE: 88 MMHG | BODY MASS INDEX: 18.73 KG/M2 | SYSTOLIC BLOOD PRESSURE: 140 MMHG

## 2025-04-10 DIAGNOSIS — R63.4 ABNORMAL WEIGHT LOSS: Primary | ICD-10-CM

## 2025-04-10 DIAGNOSIS — R11.0 NAUSEA: ICD-10-CM

## 2025-04-10 DIAGNOSIS — F17.200 SMOKER: ICD-10-CM

## 2025-04-10 DIAGNOSIS — F12.90 MARIJUANA USE: Chronic | ICD-10-CM

## 2025-04-10 PROCEDURE — 3077F SYST BP >= 140 MM HG: CPT | Performed by: INTERNAL MEDICINE

## 2025-04-10 PROCEDURE — 4004F PT TOBACCO SCREEN RCVD TLK: CPT | Performed by: INTERNAL MEDICINE

## 2025-04-10 PROCEDURE — G8420 CALC BMI NORM PARAMETERS: HCPCS | Performed by: INTERNAL MEDICINE

## 2025-04-10 PROCEDURE — 3017F COLORECTAL CA SCREEN DOC REV: CPT | Performed by: INTERNAL MEDICINE

## 2025-04-10 PROCEDURE — G8427 DOCREV CUR MEDS BY ELIG CLIN: HCPCS | Performed by: INTERNAL MEDICINE

## 2025-04-10 PROCEDURE — 99204 OFFICE O/P NEW MOD 45 MIN: CPT | Performed by: INTERNAL MEDICINE

## 2025-04-10 PROCEDURE — 3079F DIAST BP 80-89 MM HG: CPT | Performed by: INTERNAL MEDICINE

## 2025-04-10 NOTE — PROGRESS NOTES
eating  Don't talk when eating  Walk after finish eating  Use liquids with meals if has issues  The patient has verbalized understanding and agreemenet to this.    Pt seems to have signs and symptoms consistent with GERD, acid indigestion and heartburns. She was discussed  in detail about some possible life style and dietary modifications. She was stressed about the maintenance  of appropriate weight and effect of obesity contributing to reflux symptoms. Routine exercise was streesed.     Avoidance of Caffeine, nicotine and chocolate were explained. Pt was asked to avoid spices grease and fried food. Advices were also given about avoidance of any kind of fast foods, soda pops and high energy drinks.    Pt was advised to place two small block under the head end of the bed which may help with night time reflux. Was advised not to eat any thin at least 2-3 hrs before going to bed and walk especially after dinner    Pt has verbalized understanding and agreement to this plan.    Quit Marijuana    Quit smoking    Pt was advised in detail about some life style and dietary modifications. She was advised about avoidance of caffeine, nicotine and chocolate. Pt was also told to stay away from any kind of fast foods, soda pops. She was also advised to avoid lots of spices, grease and fried food etc.     Instructions were also given about trying to arrange the timing, quality and quantity of food.    Instructions were given about using ample amount of fiber including dietary and supplemental fiber either metamucil, bennafiber or citrucell etc.  Pt was advised about drinking ample amount of water without any colors or chemicals. Stress was given about regular exercise.    Pt has verbalized understanding and agreement to these modifications.      Thank you for allowing me to participate in the care of Ms. Samano. For any further questions please do not hesitate to contact me.    I have reviewed and agree with the ROS entered by the

## 2025-04-18 ENCOUNTER — PREP FOR PROCEDURE (OUTPATIENT)
Dept: GASTROENTEROLOGY | Age: 60
End: 2025-04-18

## 2025-04-18 ENCOUNTER — TELEPHONE (OUTPATIENT)
Dept: GASTROENTEROLOGY | Age: 60
End: 2025-04-18

## 2025-04-18 DIAGNOSIS — R11.0 NAUSEA: ICD-10-CM

## 2025-04-18 DIAGNOSIS — Z12.11 COLON CANCER SCREENING: Primary | ICD-10-CM

## 2025-04-18 DIAGNOSIS — F17.200 SMOKER: ICD-10-CM

## 2025-04-18 PROBLEM — K21.9 GERD (GASTROESOPHAGEAL REFLUX DISEASE): Status: ACTIVE | Noted: 2025-04-18

## 2025-04-18 NOTE — TELEPHONE ENCOUNTER
Procedure scheduled/Dr MELA Matute  Procedure: colon egd  Dx:     Abnormal weight loss     2. Nausea    3. Marijuana use    4. Smoker      Date: 9/25/25  Time: 9:15 a.m.  Hospital: [facility]   Bowel Prep instructions given: miralax dulco  In office/via phone: office  Clearance needed: yes  Cardiac- Dr Hong  Pulmonary- Dr Silver  GLP - 1: N/A    The patient was informed that any cancellations/reschedules for procedures will need to be done no later than one week prior.  If less than one week prior an office visit will be needed in order to discuss being rescheduled.  All no shows to procedures will need an office visit prior as well.  Per JOHN Matute.       Pulses equal bilaterally, no edema present.

## 2025-04-21 RX ORDER — POLYETHYLENE GLYCOL 3350 17 G/17G
POWDER, FOR SOLUTION ORAL
Qty: 238 G | Refills: 0 | Status: SHIPPED | OUTPATIENT
Start: 2025-04-21

## 2025-04-21 RX ORDER — BISACODYL 5 MG
TABLET, DELAYED RELEASE (ENTERIC COATED) ORAL
Qty: 4 TABLET | Refills: 0 | Status: SHIPPED | OUTPATIENT
Start: 2025-04-21

## 2025-04-24 ENCOUNTER — HOSPITAL ENCOUNTER (OUTPATIENT)
Dept: PULMONOLOGY | Age: 60
Discharge: HOME | End: 2025-04-24
Attending: INTERNAL MEDICINE
Payer: MEDICARE

## 2025-04-24 DIAGNOSIS — J44.9 CHRONIC OBSTRUCTIVE PULMONARY DISEASE, UNSPECIFIED COPD TYPE (HCC): ICD-10-CM

## 2025-04-24 PROCEDURE — 94664 DEMO&/EVAL PT USE INHALER: CPT

## 2025-04-24 PROCEDURE — 94726 PLETHYSMOGRAPHY LUNG VOLUMES: CPT

## 2025-04-24 PROCEDURE — 94640 AIRWAY INHALATION TREATMENT: CPT

## 2025-04-24 PROCEDURE — 94729 DIFFUSING CAPACITY: CPT

## 2025-04-24 PROCEDURE — 94060 EVALUATION OF WHEEZING: CPT

## 2025-05-05 DIAGNOSIS — J45.909 ASTHMA, UNSPECIFIED ASTHMA SEVERITY, UNSPECIFIED WHETHER COMPLICATED, UNSPECIFIED WHETHER PERSISTENT: ICD-10-CM

## 2025-05-05 RX ORDER — ALBUTEROL SULFATE 90 UG/1
2 INHALANT RESPIRATORY (INHALATION) EVERY 6 HOURS PRN
Qty: 1 EACH | Refills: 10 | Status: SHIPPED | OUTPATIENT
Start: 2025-05-05

## 2025-05-05 RX ORDER — METHYLPREDNISOLONE 4 MG/1
TABLET ORAL
Qty: 21 TABLET | OUTPATIENT
Start: 2025-05-05

## 2025-05-05 NOTE — TELEPHONE ENCOUNTER
LAST VISIT: 3/18/25  NEXT VISIT: 9/16/25    Per last dictation patient is on albuterol. Please sign for refill if ok. Thank you.

## 2025-05-13 ENCOUNTER — HOSPITAL ENCOUNTER (EMERGENCY)
Age: 60
Discharge: HOME | End: 2025-05-13
Attending: EMERGENCY MEDICINE
Payer: MEDICARE

## 2025-05-13 ENCOUNTER — APPOINTMENT (OUTPATIENT)
Dept: GENERAL RADIOLOGY | Age: 60
End: 2025-05-13
Payer: MEDICARE

## 2025-05-13 VITALS — RESPIRATION RATE: 19 BRPM | HEART RATE: 82 BPM | OXYGEN SATURATION: 100 %

## 2025-05-13 DIAGNOSIS — R10.84 GENERALIZED ABDOMINAL PAIN: ICD-10-CM

## 2025-05-13 DIAGNOSIS — J44.1 COPD EXACERBATION (HCC): Primary | ICD-10-CM

## 2025-05-13 LAB
ALBUMIN SERPL-MCNC: 4.5 G/DL (ref 3.5–5.2)
ALBUMIN/GLOB SERPL: 1.7 {RATIO} (ref 1–2.5)
ALP SERPL-CCNC: 85 U/L (ref 35–104)
ALT SERPL-CCNC: 12 U/L (ref 10–35)
ANION GAP SERPL CALCULATED.3IONS-SCNC: 13 MMOL/L (ref 9–16)
AST SERPL-CCNC: 17 U/L (ref 10–35)
BACTERIA URNS QL MICRO: ABNORMAL
BASOPHILS # BLD: 0.06 K/UL (ref 0–0.2)
BASOPHILS NFR BLD: 1 % (ref 0–2)
BILIRUB SERPL-MCNC: 0.2 MG/DL (ref 0–1.2)
BILIRUB UR QL STRIP: NEGATIVE
BUN SERPL-MCNC: 17 MG/DL (ref 6–20)
CALCIUM SERPL-MCNC: 10 MG/DL (ref 8.6–10.4)
CASTS #/AREA URNS LPF: ABNORMAL /LPF (ref 0–8)
CHLORIDE SERPL-SCNC: 99 MMOL/L (ref 98–107)
CLARITY UR: CLEAR
CO2 SERPL-SCNC: 24 MMOL/L (ref 20–31)
COLOR UR: YELLOW
CREAT SERPL-MCNC: 0.6 MG/DL (ref 0.6–0.9)
EOSINOPHIL # BLD: 0.18 K/UL (ref 0–0.44)
EOSINOPHILS RELATIVE PERCENT: 2 % (ref 1–4)
EPI CELLS #/AREA URNS HPF: ABNORMAL /HPF (ref 0–5)
ERYTHROCYTE [DISTWIDTH] IN BLOOD BY AUTOMATED COUNT: 13.9 % (ref 11.8–14.4)
GFR, ESTIMATED: >90 ML/MIN/1.73M2
GLUCOSE SERPL-MCNC: 84 MG/DL (ref 74–99)
GLUCOSE UR STRIP-MCNC: NEGATIVE MG/DL
HCT VFR BLD AUTO: 41.3 % (ref 36.3–47.1)
HGB BLD-MCNC: 13.7 G/DL (ref 11.9–15.1)
HGB UR QL STRIP.AUTO: ABNORMAL
IMM GRANULOCYTES # BLD AUTO: <0.03 K/UL (ref 0–0.3)
IMM GRANULOCYTES NFR BLD: 0 %
KETONES UR STRIP-MCNC: NEGATIVE MG/DL
LEUKOCYTE ESTERASE UR QL STRIP: ABNORMAL
LIPASE SERPL-CCNC: 31 U/L (ref 13–60)
LYMPHOCYTES NFR BLD: 3.36 K/UL (ref 1.1–3.7)
LYMPHOCYTES RELATIVE PERCENT: 38 % (ref 24–43)
MCH RBC QN AUTO: 31.3 PG (ref 25.2–33.5)
MCHC RBC AUTO-ENTMCNC: 33.2 G/DL (ref 28.4–34.8)
MCV RBC AUTO: 94.3 FL (ref 82.6–102.9)
MONOCYTES NFR BLD: 0.64 K/UL (ref 0.1–1.2)
MONOCYTES NFR BLD: 7 % (ref 3–12)
NEUTROPHILS NFR BLD: 52 % (ref 36–65)
NEUTS SEG NFR BLD: 4.54 K/UL (ref 1.5–8.1)
NITRITE UR QL STRIP: NEGATIVE
NRBC BLD-RTO: 0 PER 100 WBC
PH UR STRIP: 7 [PH] (ref 5–8)
PLATELET # BLD AUTO: 279 K/UL (ref 138–453)
PMV BLD AUTO: 8.4 FL (ref 8.1–13.5)
POTASSIUM SERPL-SCNC: 3.9 MMOL/L (ref 3.7–5.3)
PROT SERPL-MCNC: 7.1 G/DL (ref 6.6–8.7)
PROT UR STRIP-MCNC: NEGATIVE MG/DL
RBC # BLD AUTO: 4.38 M/UL (ref 3.95–5.11)
RBC #/AREA URNS HPF: ABNORMAL /HPF (ref 0–4)
SODIUM SERPL-SCNC: 136 MMOL/L (ref 136–145)
SP GR UR STRIP: 1.01 (ref 1–1.03)
TROPONIN I SERPL HS-MCNC: 6 NG/L (ref 0–14)
TROPONIN I SERPL HS-MCNC: 8 NG/L (ref 0–14)
UROBILINOGEN UR STRIP-ACNC: NORMAL EU/DL (ref 0–1)
WBC #/AREA URNS HPF: ABNORMAL /HPF (ref 0–5)
WBC OTHER # BLD: 8.8 K/UL (ref 3.5–11.3)

## 2025-05-13 PROCEDURE — 83690 ASSAY OF LIPASE: CPT

## 2025-05-13 PROCEDURE — 6370000000 HC RX 637 (ALT 250 FOR IP): Performed by: STUDENT IN AN ORGANIZED HEALTH CARE EDUCATION/TRAINING PROGRAM

## 2025-05-13 PROCEDURE — 84484 ASSAY OF TROPONIN QUANT: CPT

## 2025-05-13 PROCEDURE — 85025 COMPLETE CBC W/AUTO DIFF WBC: CPT

## 2025-05-13 PROCEDURE — 2580000003 HC RX 258: Performed by: STUDENT IN AN ORGANIZED HEALTH CARE EDUCATION/TRAINING PROGRAM

## 2025-05-13 PROCEDURE — 99285 EMERGENCY DEPT VISIT HI MDM: CPT | Performed by: EMERGENCY MEDICINE

## 2025-05-13 PROCEDURE — 71046 X-RAY EXAM CHEST 2 VIEWS: CPT

## 2025-05-13 PROCEDURE — 81001 URINALYSIS AUTO W/SCOPE: CPT

## 2025-05-13 PROCEDURE — 96375 TX/PRO/DX INJ NEW DRUG ADDON: CPT | Performed by: EMERGENCY MEDICINE

## 2025-05-13 PROCEDURE — 94640 AIRWAY INHALATION TREATMENT: CPT

## 2025-05-13 PROCEDURE — 6360000002 HC RX W HCPCS: Performed by: STUDENT IN AN ORGANIZED HEALTH CARE EDUCATION/TRAINING PROGRAM

## 2025-05-13 PROCEDURE — 96374 THER/PROPH/DIAG INJ IV PUSH: CPT | Performed by: EMERGENCY MEDICINE

## 2025-05-13 PROCEDURE — 93005 ELECTROCARDIOGRAM TRACING: CPT | Performed by: STUDENT IN AN ORGANIZED HEALTH CARE EDUCATION/TRAINING PROGRAM

## 2025-05-13 PROCEDURE — 80053 COMPREHEN METABOLIC PANEL: CPT

## 2025-05-13 RX ORDER — DICYCLOMINE HYDROCHLORIDE 10 MG/1
10 CAPSULE ORAL
Qty: 40 CAPSULE | Refills: 0 | Status: SHIPPED | OUTPATIENT
Start: 2025-05-13 | End: 2025-05-23

## 2025-05-13 RX ORDER — AZITHROMYCIN 250 MG/1
TABLET, FILM COATED ORAL
Qty: 6 TABLET | Refills: 0 | Status: SHIPPED | OUTPATIENT
Start: 2025-05-13 | End: 2025-05-23

## 2025-05-13 RX ORDER — AZITHROMYCIN 250 MG/1
500 TABLET, FILM COATED ORAL ONCE
Status: COMPLETED | OUTPATIENT
Start: 2025-05-13 | End: 2025-05-13

## 2025-05-13 RX ORDER — ONDANSETRON 2 MG/ML
4 INJECTION INTRAMUSCULAR; INTRAVENOUS ONCE
Status: COMPLETED | OUTPATIENT
Start: 2025-05-13 | End: 2025-05-13

## 2025-05-13 RX ORDER — DEXAMETHASONE 4 MG/1
8 TABLET ORAL EVERY 12 HOURS SCHEDULED
Status: DISCONTINUED | OUTPATIENT
Start: 2025-05-13 | End: 2025-05-13 | Stop reason: HOSPADM

## 2025-05-13 RX ORDER — IPRATROPIUM BROMIDE AND ALBUTEROL SULFATE 2.5; .5 MG/3ML; MG/3ML
1 SOLUTION RESPIRATORY (INHALATION) EVERY 4 HOURS PRN
Status: DISCONTINUED | OUTPATIENT
Start: 2025-05-13 | End: 2025-05-13 | Stop reason: HOSPADM

## 2025-05-13 RX ORDER — ALBUTEROL SULFATE 0.83 MG/ML
2.5 SOLUTION RESPIRATORY (INHALATION) EVERY 4 HOURS PRN
Status: DISCONTINUED | OUTPATIENT
Start: 2025-05-13 | End: 2025-05-13 | Stop reason: HOSPADM

## 2025-05-13 RX ORDER — KETOROLAC TROMETHAMINE 30 MG/ML
30 INJECTION, SOLUTION INTRAMUSCULAR; INTRAVENOUS ONCE
Status: COMPLETED | OUTPATIENT
Start: 2025-05-13 | End: 2025-05-13

## 2025-05-13 RX ADMIN — DEXAMETHASONE 8 MG: 4 TABLET ORAL at 21:17

## 2025-05-13 RX ADMIN — ONDANSETRON 4 MG: 2 INJECTION, SOLUTION INTRAMUSCULAR; INTRAVENOUS at 19:08

## 2025-05-13 RX ADMIN — KETOROLAC TROMETHAMINE 30 MG: 30 INJECTION, SOLUTION INTRAMUSCULAR at 19:08

## 2025-05-13 RX ADMIN — AZITHROMYCIN DIHYDRATE 500 MG: 250 TABLET ORAL at 21:17

## 2025-05-13 RX ADMIN — FAMOTIDINE 20 MG: 10 INJECTION, SOLUTION INTRAVENOUS at 19:08

## 2025-05-13 RX ADMIN — IPRATROPIUM BROMIDE AND ALBUTEROL SULFATE 1 DOSE: .5; 3 SOLUTION RESPIRATORY (INHALATION) at 19:12

## 2025-05-13 ASSESSMENT — PAIN SCALES - GENERAL: PAINLEVEL_OUTOF10: 8

## 2025-05-13 ASSESSMENT — PAIN DESCRIPTION - LOCATION: LOCATION: CHEST

## 2025-05-13 ASSESSMENT — PAIN DESCRIPTION - ORIENTATION: ORIENTATION: MID

## 2025-05-13 ASSESSMENT — PAIN DESCRIPTION - DESCRIPTORS: DESCRIPTORS: ACHING

## 2025-05-13 NOTE — ED PROVIDER NOTES
[facility]     Emergency Department     Faculty Attestation    I performed a history and physical examination of the patient and discussed management with the resident. I reviewed the resident's note and agree with the documented findings and plan of care. Any areas of disagreement are noted on the chart. I was personally present for the key portions of any procedures. I have documented in the chart those procedures where I was not present during the key portions. I have reviewed the emergency nurses triage note. I agree with the chief complaint, past medical history, past surgical history, allergies, medications, social and family history as documented unless otherwise noted below. For Physician Assistant/ Nurse Practitioner cases/documentation I have personally evaluated this patient and have completed at least one if not all key elements of the E/M (history, physical exam, and MDM). Additional findings are as noted.      Equal breath sounds, scattered end expiratory wheezes, heart regular rate and rhythm without murmurs, no lower extremity pain or swelling on examination, mild epigastric discomfort without guarding.  Heart score 5 before troponin has resulted.       EKG Interpretation    Interpreted by emergency department physician    Rhythm: normal sinus   Rate: 109  Axis: normal/68  Ectopy: none  Conduction: normal  ST Segments: no acute change  T Waves: no acute change  Q Waves: none    Clinical Impression: Normal EKG except for sinus tachycardia    Rodger Coffey, III     Rodger Coffey MD  05/13/25 1927    
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    Drug use: Yes     Frequency: 7.0 times per week     Types: Marijuana (Weed)     Comment: \"a couple bowls\"    Sexual activity: Not on file   Other Topics Concern    Not on file   Social History Narrative    Not on file     Social Drivers of Health     Financial Resource Strain: Low Risk  (8/13/2024)    Overall Financial Resource Strain (CARDIA)     Difficulty of Paying Living Expenses: Not hard at all   Food Insecurity: No Food Insecurity (4/7/2025)    Hunger Vital Sign     Worried About Running Out of Food in the Last Year: Never true     Ran Out of Food in the Last Year: Never true   Transportation Needs: No Transportation Needs (4/7/2025)    PRAPARE - Transportation     Lack of Transportation (Medical): No     Lack of Transportation (Non-Medical): No   Physical Activity: Unknown (3/24/2023)    Exercise Vital Sign     Days of Exercise per Week: 1 day     Minutes of Exercise per Session: Not on file   Stress: Not on file   Social Connections: Not on file   Intimate Partner Violence: Not At Risk (3/24/2023)    Humiliation, Afraid, Rape, and Kick questionnaire     Fear of Current or Ex-Partner: No     Emotionally Abused: No     Physically Abused: No     Sexually Abused: No   Housing Stability: Low Risk  (4/7/2025)    Housing Stability Vital Sign     Unable to Pay for Housing in the Last Year: No     Number of Times Moved in the Last Year: 0     Homeless in the Last Year: No       Family History   Problem Relation Age of Onset    Other Mother     Heart Disease Father     High Blood Pressure Father     High Cholesterol Father     Other Brother     Breast Cancer Maternal Grandmother        Allergies:  Sulfa antibiotics    Home Medications:  Prior to Admission medications    Medication Sig Start Date End Date Taking? Authorizing Provider   azithromycin 
Alert and oriented to person, place and time

## 2025-05-13 NOTE — ED NOTES
Pt presents to ED room 22 with c/o chest pain and shortness of breath. Pt states this pain started this morning around 9am and thinks she should have come earlier. Pt has hx of COPD and MI in 2007. Pt identifies no stent placement. Pt attached to monitor. EKG and IV established. Will continue with plan of care.

## 2025-05-13 NOTE — ED NOTES
Pt returned from x-ray. Attached back to monitor. No acute distress noted. Pt states slight relief in pain at this time. Will continue with plan of care.

## 2025-05-14 NOTE — DISCHARGE INSTRUCTIONS
You are seen emergency department for shortness of breath, chest pain, abdominal pain.  Workup was completed here in the emergency department.  Laboratory studies unremarkable.  No evidence urinary tract infection.  Cardiac workup unremarkable.  X-ray of the chest negative.  You are given medications to help out with nausea, pain, acid reflux.  He will be given a prescription for antibiotic for COPD exacerbation.  Also given a prescription for medication called Bentyl which may help out with your abdominal pain.  I would call your GI doctor first thing tomorrow to see if they are able to move up your appointment.  Please follow-up primary care provider in the next 1 to 2 weeks for reevaluation.  Please return emerged part for any new or worsening symptoms.

## 2025-05-15 LAB
EKG ATRIAL RATE: 109 BPM
EKG P AXIS: 68 DEGREES
EKG P-R INTERVAL: 124 MS
EKG Q-T INTERVAL: 320 MS
EKG QRS DURATION: 84 MS
EKG QTC CALCULATION (BAZETT): 430 MS
EKG R AXIS: 68 DEGREES
EKG T AXIS: 72 DEGREES
EKG VENTRICULAR RATE: 109 BPM

## 2025-05-17 ASSESSMENT — ENCOUNTER SYMPTOMS
COUGH: 1
VOMITING: 0
CONSTIPATION: 0
SINUS PRESSURE: 0
DIARRHEA: 0
WHEEZING: 1
SORE THROAT: 0
ABDOMINAL PAIN: 0
SHORTNESS OF BREATH: 1
BACK PAIN: 0
NAUSEA: 0

## 2025-07-03 ENCOUNTER — OFFICE VISIT (OUTPATIENT)
Age: 60
End: 2025-07-03
Payer: MEDICARE

## 2025-07-03 VITALS
DIASTOLIC BLOOD PRESSURE: 97 MMHG | BODY MASS INDEX: 17.99 KG/M2 | SYSTOLIC BLOOD PRESSURE: 149 MMHG | WEIGHT: 114.6 LBS | HEART RATE: 91 BPM | HEIGHT: 67 IN | OXYGEN SATURATION: 97 %

## 2025-07-03 DIAGNOSIS — K57.92 DIVERTICULITIS: Primary | ICD-10-CM

## 2025-07-03 DIAGNOSIS — Z71.6 ENCOUNTER FOR SMOKING CESSATION COUNSELING: ICD-10-CM

## 2025-07-03 PROCEDURE — 3077F SYST BP >= 140 MM HG: CPT

## 2025-07-03 PROCEDURE — 99213 OFFICE O/P EST LOW 20 MIN: CPT

## 2025-07-03 PROCEDURE — 3080F DIAST BP >= 90 MM HG: CPT

## 2025-07-03 PROCEDURE — G8428 CUR MEDS NOT DOCUMENT: HCPCS

## 2025-07-03 PROCEDURE — 3017F COLORECTAL CA SCREEN DOC REV: CPT

## 2025-07-03 PROCEDURE — 4004F PT TOBACCO SCREEN RCVD TLK: CPT

## 2025-07-03 PROCEDURE — G8419 CALC BMI OUT NRM PARAM NOF/U: HCPCS

## 2025-07-03 RX ORDER — NICOTINE 21 MG/24HR
1 PATCH, TRANSDERMAL 24 HOURS TRANSDERMAL DAILY
Qty: 42 PATCH | Refills: 0 | Status: SHIPPED | OUTPATIENT
Start: 2025-07-03 | End: 2025-08-14

## 2025-07-03 ASSESSMENT — ENCOUNTER SYMPTOMS
COUGH: 0
APNEA: 0
ABDOMINAL PAIN: 1
STRIDOR: 0
SHORTNESS OF BREATH: 0
EYES NEGATIVE: 1
CHOKING: 0
CONSTIPATION: 1

## 2025-07-03 ASSESSMENT — PATIENT HEALTH QUESTIONNAIRE - PHQ9
9. THOUGHTS THAT YOU WOULD BE BETTER OFF DEAD, OR OF HURTING YOURSELF: NOT AT ALL
2. FEELING DOWN, DEPRESSED OR HOPELESS: NOT AT ALL
SUM OF ALL RESPONSES TO PHQ QUESTIONS 1-9: 0
5. POOR APPETITE OR OVEREATING: NOT AT ALL
SUM OF ALL RESPONSES TO PHQ QUESTIONS 1-9: 0
1. LITTLE INTEREST OR PLEASURE IN DOING THINGS: NOT AT ALL
SUM OF ALL RESPONSES TO PHQ QUESTIONS 1-9: 0
6. FEELING BAD ABOUT YOURSELF - OR THAT YOU ARE A FAILURE OR HAVE LET YOURSELF OR YOUR FAMILY DOWN: NOT AT ALL
8. MOVING OR SPEAKING SO SLOWLY THAT OTHER PEOPLE COULD HAVE NOTICED. OR THE OPPOSITE, BEING SO FIGETY OR RESTLESS THAT YOU HAVE BEEN MOVING AROUND A LOT MORE THAN USUAL: NOT AT ALL
10. IF YOU CHECKED OFF ANY PROBLEMS, HOW DIFFICULT HAVE THESE PROBLEMS MADE IT FOR YOU TO DO YOUR WORK, TAKE CARE OF THINGS AT HOME, OR GET ALONG WITH OTHER PEOPLE: NOT DIFFICULT AT ALL
SUM OF ALL RESPONSES TO PHQ QUESTIONS 1-9: 0
4. FEELING TIRED OR HAVING LITTLE ENERGY: NOT AT ALL
7. TROUBLE CONCENTRATING ON THINGS, SUCH AS READING THE NEWSPAPER OR WATCHING TELEVISION: NOT AT ALL
3. TROUBLE FALLING OR STAYING ASLEEP: NOT AT ALL

## 2025-07-03 NOTE — PATIENT INSTRUCTIONS
Thank you for letting us take care of you today. We hope all your questions were addressed. If a question was overlooked or something else comes to mind after you return home, please contact a member of your Care Team listed below.        Your Care Team at [facility] is Team #4  Marely Heart M.D. (Faculty)  Damien Tai M.D. (Resident)  Magdiel Brownlee M.D.  (Resident)  Perlita Michelle M.D. (Resident)  Maximo Powell M.D. (Resident)  Josue Cantu, KALIA Mclean, [facility]  Saima Gillis, [facility]  Leeanne Cassidy, [facility]  Elizabeth Jung, [facility]  Carolyne Stephen, [facility]  Eugenia Medel (LJ) JUSTINO Mayorga (Clinical Practice Manager)  Nila Chou [facility] (Clinical Pharmacist)       Office phone number: [phone]    If you need to get in right away due to illness, please be advised we have \"Same Day\" appointments available Monday-Friday. Please call us at [phone] option #3 to schedule your \"Same Day\" appointment.

## 2025-07-03 NOTE — PROGRESS NOTES
Visit Information    Have you changed or started any medications since your last visit including any over-the-counter medicines, vitamins, or herbal medicines? no   Have you stopped taking any of your medications? Is so, why? -  no  Are you having any side effects from any of your medications? - no    Have you seen any other physician or provider since your last visit?  no   Have you had any other diagnostic tests since your last visit?  yes - Labs, XR, CT, PFT, EKG   Have you been seen in the emergency room and/or had an admission in a hospital since we last saw you?  no   Have you had your routine dental cleaning in the past 6 months?  no     Do you have an active MyChart account? If no, what is the barrier?  Yes    Patient Care Team:  Crissy Glover DO as PCP - General (Family Medicine)  Dominic Silver MD as Consulting Physician (Pulmonology)    Medical History Review  Past Medical, Family, and Social History reviewed and does contribute to the patient presenting condition    Health Maintenance   Topic Date Due    Cervical cancer screen  Never done    Shingles vaccine (2 of 2) 06/25/2021    Pneumococcal 50+ years Vaccine (3 of 3 - PCV20 or PCV21) 04/25/2023    COVID-19 Vaccine (4 - 2024-25 season) 09/01/2024    Hepatitis B vaccine (2 of 3 - 19+ 3-dose series) 10/07/2024    Annual Wellness Visit (Medicare Advantage)  Never done    Flu vaccine (1) 08/01/2025    Lipids  08/13/2025    Breast cancer screen  12/05/2025    Depression Monitoring  01/06/2026    Lung Cancer Screening &/or Counseling  03/19/2026    DTaP/Tdap/Td vaccine (2 - Td or Tdap) 09/20/2027    Colorectal Cancer Screen  04/08/2035    Hepatitis C screen  Completed    HIV screen  Completed    Hepatitis A vaccine  Aged Out    Hib vaccine  Aged Out    Polio vaccine  Aged Out    Meningococcal (ACWY) vaccine  Aged Out    Meningococcal B vaccine  Aged Out    Pneumococcal 0-49 years Vaccine  Discontinued    Diabetes screen  Discontinued

## 2025-07-03 NOTE — PROGRESS NOTES
Subjective:    Cary Samano is a 59 y.o. female with  has a past medical history of Acute MI (HCC), Anxiety, Arthritis, CAD (coronary artery disease), Chronic back pain, COPD (chronic obstructive pulmonary disease) (HCC), Depression, Heart disease, Hyperlipidemia, Hypertension, MRSA (methicillin resistant staph aureus) culture positive, Seizures (HCC), and Syncope.    Presented to the office today for:  Chief Complaint   Patient presents with    Flank Pain     On left side under rib, goes through to back     Nicotine Dependence     Wants to quit- this med only goes to [facility]     Back Pain     Had surgery in 2017- still dealing with pain above and below the incisions         HPI  This patient is 59 years old female with medical history significant for STEMI, anxiety, arthritis, currently), chronic back pain, COPD, depression, hypertension, seizures  Who came in today complaining of the 1 week history of progressive worsening left lower quadrant abdominal pain.  Described as a constant, dull, crampy rhythm intensity of 6/10, not radiating.  No associated fever, change in bowel habits, endorse constipation over the past few days.  No previous history of diverticulitis.  She also denies any rectal bleeding, melena, or weight loss.  No prior similar episodes.  Also asking for nicotine patches wants to quit smoking.    Review of Systems   Constitutional:  Negative for chills, fatigue and fever.   Eyes: Negative.    Respiratory:  Negative for apnea, cough, choking, shortness of breath and stridor.    Cardiovascular:  Negative for chest pain, palpitations and leg swelling.   Gastrointestinal:  Positive for abdominal pain and constipation.   Endocrine: Negative.    Genitourinary: Negative.    Musculoskeletal: Negative.    Neurological: Negative.          The patient has a   Family History   Problem Relation Age of Onset    Other Mother     Heart Disease Father     High Blood Pressure Father     High Cholesterol Father

## 2025-07-22 ENCOUNTER — OFFICE VISIT (OUTPATIENT)
Age: 60
End: 2025-07-22
Payer: MEDICARE

## 2025-07-22 VITALS
SYSTOLIC BLOOD PRESSURE: 131 MMHG | WEIGHT: 115.8 LBS | DIASTOLIC BLOOD PRESSURE: 87 MMHG | HEIGHT: 67 IN | BODY MASS INDEX: 18.18 KG/M2 | HEART RATE: 80 BPM

## 2025-07-22 DIAGNOSIS — M54.40 CHRONIC MIDLINE LOW BACK PAIN WITH SCIATICA, SCIATICA LATERALITY UNSPECIFIED: Primary | ICD-10-CM

## 2025-07-22 DIAGNOSIS — G89.29 CHRONIC MIDLINE LOW BACK PAIN WITH SCIATICA, SCIATICA LATERALITY UNSPECIFIED: Primary | ICD-10-CM

## 2025-07-22 DIAGNOSIS — K59.00 CONSTIPATION, UNSPECIFIED CONSTIPATION TYPE: ICD-10-CM

## 2025-07-22 PROCEDURE — G8427 DOCREV CUR MEDS BY ELIG CLIN: HCPCS

## 2025-07-22 PROCEDURE — 99213 OFFICE O/P EST LOW 20 MIN: CPT

## 2025-07-22 PROCEDURE — 4004F PT TOBACCO SCREEN RCVD TLK: CPT

## 2025-07-22 PROCEDURE — 3079F DIAST BP 80-89 MM HG: CPT

## 2025-07-22 PROCEDURE — 3075F SYST BP GE 130 - 139MM HG: CPT

## 2025-07-22 PROCEDURE — G8419 CALC BMI OUT NRM PARAM NOF/U: HCPCS

## 2025-07-22 PROCEDURE — 3017F COLORECTAL CA SCREEN DOC REV: CPT

## 2025-07-22 RX ORDER — POLYETHYLENE GLYCOL 3350 17 G/17G
POWDER, FOR SOLUTION ORAL
Qty: 238 G | Refills: 0 | Status: SHIPPED | OUTPATIENT
Start: 2025-07-22

## 2025-07-22 ASSESSMENT — PATIENT HEALTH QUESTIONNAIRE - PHQ9
SUM OF ALL RESPONSES TO PHQ QUESTIONS 1-9: 7
9. THOUGHTS THAT YOU WOULD BE BETTER OFF DEAD, OR OF HURTING YOURSELF: NOT AT ALL
5. POOR APPETITE OR OVEREATING: NEARLY EVERY DAY
1. LITTLE INTEREST OR PLEASURE IN DOING THINGS: NOT AT ALL
10. IF YOU CHECKED OFF ANY PROBLEMS, HOW DIFFICULT HAVE THESE PROBLEMS MADE IT FOR YOU TO DO YOUR WORK, TAKE CARE OF THINGS AT HOME, OR GET ALONG WITH OTHER PEOPLE: NOT DIFFICULT AT ALL
SUM OF ALL RESPONSES TO PHQ QUESTIONS 1-9: 7
2. FEELING DOWN, DEPRESSED OR HOPELESS: NOT AT ALL
SUM OF ALL RESPONSES TO PHQ QUESTIONS 1-9: 7
7. TROUBLE CONCENTRATING ON THINGS, SUCH AS READING THE NEWSPAPER OR WATCHING TELEVISION: SEVERAL DAYS
4. FEELING TIRED OR HAVING LITTLE ENERGY: NEARLY EVERY DAY
6. FEELING BAD ABOUT YOURSELF - OR THAT YOU ARE A FAILURE OR HAVE LET YOURSELF OR YOUR FAMILY DOWN: NOT AT ALL
8. MOVING OR SPEAKING SO SLOWLY THAT OTHER PEOPLE COULD HAVE NOTICED. OR THE OPPOSITE, BEING SO FIGETY OR RESTLESS THAT YOU HAVE BEEN MOVING AROUND A LOT MORE THAN USUAL: NOT AT ALL
SUM OF ALL RESPONSES TO PHQ QUESTIONS 1-9: 7
3. TROUBLE FALLING OR STAYING ASLEEP: NOT AT ALL

## 2025-07-22 ASSESSMENT — ENCOUNTER SYMPTOMS
EYES NEGATIVE: 1
RESPIRATORY NEGATIVE: 1

## 2025-07-22 NOTE — PROGRESS NOTES
Attending Physician Statement  I  have discussed the care of Cary Samano including pertinent history and exam findings with the resident. I agree with the assessment, plan and orders as documented by the resident.      /87 (BP Site: Right Upper Arm, Patient Position: Sitting, BP Cuff Size: Medium Adult)   Pulse 80   Ht 1.702 m (5' 7\")   Wt 52.5 kg (115 lb 12.8 oz)   BMI 18.14 kg/m²    BP Readings from Last 3 Encounters:   07/22/25 131/87   07/03/25 (!) 149/97   04/10/25 (!) 140/88     Wt Readings from Last 3 Encounters:   07/22/25 52.5 kg (115 lb 12.8 oz)   07/03/25 52 kg (114 lb 9.6 oz)   04/10/25 54.3 kg (119 lb 9.6 oz)          Diagnosis Orders   1. Chronic midline low back pain with sciatica, sciatica laterality unspecified  XR LUMBAR SPINE (2-3 VIEWS)      2. Constipation, unspecified constipation type  polyethylene glycol (GLYCOLAX) 17 GM/SCOOP powder              Marely Heart DO 7/23/2025 3:21 PM

## 2025-07-22 NOTE — PROGRESS NOTES
Visit Information    Have you changed or started any medications since your last visit including any over-the-counter medicines, vitamins, or herbal medicines? no   Have you stopped taking any of your medications? Is so, why? -  no  Are you having any side effects from any of your medications? - no    Have you seen any other physician or provider since your last visit?  no   Have you had any other diagnostic tests since your last visit?  no   Have you been seen in the emergency room and/or had an admission in a hospital since we last saw you?  no   Have you had your routine dental cleaning in the past 6 months?  no     Do you have an active MyChart account? If no, what is the barrier?  Yes    Patient Care Team:  Perlita Michelle MD as PCP - General (Family Medicine)  Dominic Silvre MD as Consulting Physician (Pulmonology)    Medical History Review  Past Medical, Family, and Social History reviewed and does not contribute to the patient presenting condition    Health Maintenance   Topic Date Due    Cervical cancer screen  Never done    Shingles vaccine (2 of 2) 06/25/2021    Pneumococcal 50+ years Vaccine (3 of 3 - PCV20 or PCV21) 04/25/2023    COVID-19 Vaccine (4 - 2024-25 season) 09/01/2024    Hepatitis B vaccine (2 of 3 - 19+ 3-dose series) 10/07/2024    Annual Wellness Visit (Medicare Advantage)  Never done    Lipids  08/13/2025    Flu vaccine (1) 08/01/2025    Breast cancer screen  12/05/2025    Lung Cancer Screening &/or Counseling  03/19/2026    Depression Monitoring  07/03/2026    DTaP/Tdap/Td vaccine (2 - Td or Tdap) 09/20/2027    Colorectal Cancer Screen  04/08/2035    Hepatitis C screen  Completed    HIV screen  Completed    Hepatitis A vaccine  Aged Out    Hib vaccine  Aged Out    Polio vaccine  Aged Out    Meningococcal (ACWY) vaccine  Aged Out    Meningococcal B vaccine  Aged Out    Pneumococcal 0-49 years Vaccine  Discontinued    Diabetes screen  Discontinued

## 2025-07-22 NOTE — PROGRESS NOTES
Subjective:    Cary Samano is a 59 y.o. female with  has a past medical history of Acute MI (HCC), Anxiety, Arthritis, CAD (coronary artery disease), Chronic back pain, COPD (chronic obstructive pulmonary disease) (HCC), Depression, Heart disease, Hyperlipidemia, Hypertension, MRSA (methicillin resistant staph aureus) culture positive, Seizures (HCC), and Syncope.    Presented to the office today for:  Chief Complaint   Patient presents with    Abdominal Pain     Pain on left side of stomach; nausea; constipation        HPI  This patient is 59 years old female with medical history significant for STEMI, anxiety, arthritis, chronic back pain, COPD, depression, hypertension, seizures, who came in today complaining of chronic back pain and constipation.  Patient reports persistent lower back pain for several years, described as a dull and aching, without any radiation to the leg or other areas.  Denies numbness, tingling, or weakness.  Pain is not associated with any specific injury or trauma.  No bowel or bladder incontinence.  Last lumbar spine x-ray was in 2023.    Additionally, the patient reports ongoing constipation with a daily bowel movements that require significant straining.  Denies blood in the stool, weight loss, or recent changes in appetite.  He has not used laxatives or stool softeners regularly.      Review of Systems   Constitutional: Negative.    HENT: Negative.     Eyes: Negative.    Respiratory: Negative.     Cardiovascular: Negative.    Endocrine: Negative.    Genitourinary: Negative.          The patient has a   Family History   Problem Relation Age of Onset    Other Mother     Heart Disease Father     High Blood Pressure Father     High Cholesterol Father     Other Brother     Breast Cancer Maternal Grandmother        Objective:    /87 (BP Site: Right Upper Arm, Patient Position: Sitting, BP Cuff Size: Medium Adult)   Pulse 80   Ht 1.702 m (5' 7\")   Wt 52.5 kg (115 lb 12.8 oz)   BMI

## 2025-07-22 NOTE — PATIENT INSTRUCTIONS
Thank you for letting us take care of you today. We hope all your questions were addressed. If a question was overlooked or something else comes to mind after you return home, please contact a member of your Care Team listed below.        Your Care Team at [facility] is Team #4  Marely Heart M.D. (Faculty)  Damien Tai M.D. (Resident)  Magdiel Brownlee M.D.  (Resident)  Perlita Michelle M.D. (Resident)  Maximo Powell M.D. (Resident)  Josue Cantu, KALIA Mclean, [facility]  Saima Gillis, [facility]  Leeanne Cassidy, [facility]  Elizabeth Jung, [facility]  Carolyne Stephen, [facility]  Eugenia Medel (LJ) JUSTINO Mayorga (Clinical Practice Manager)  Nila Chou [facility] (Clinical Pharmacist)       Office phone number: [phone]    If you need to get in right away due to illness, please be advised we have \"Same Day\" appointments available Monday-Friday. Please call us at [phone] option #3 to schedule your \"Same Day\" appointment.

## 2025-07-28 ENCOUNTER — HOSPITAL ENCOUNTER (OUTPATIENT)
Dept: GENERAL RADIOLOGY | Age: 60
Discharge: HOME | End: 2025-07-30
Payer: MEDICARE

## 2025-07-28 DIAGNOSIS — M54.40 CHRONIC MIDLINE LOW BACK PAIN WITH SCIATICA, SCIATICA LATERALITY UNSPECIFIED: ICD-10-CM

## 2025-07-28 DIAGNOSIS — G89.29 CHRONIC MIDLINE LOW BACK PAIN WITH SCIATICA, SCIATICA LATERALITY UNSPECIFIED: ICD-10-CM

## 2025-07-28 PROCEDURE — 72100 X-RAY EXAM L-S SPINE 2/3 VWS: CPT

## 2025-08-01 ENCOUNTER — RESULTS FOLLOW-UP (OUTPATIENT)
Age: 60
End: 2025-08-01

## 2025-08-01 DIAGNOSIS — G89.29 CHRONIC LOW BACK PAIN, UNSPECIFIED BACK PAIN LATERALITY, UNSPECIFIED WHETHER SCIATICA PRESENT: Primary | ICD-10-CM

## 2025-08-01 DIAGNOSIS — M54.50 CHRONIC LOW BACK PAIN, UNSPECIFIED BACK PAIN LATERALITY, UNSPECIFIED WHETHER SCIATICA PRESENT: Primary | ICD-10-CM

## 2025-08-01 NOTE — TELEPHONE ENCOUNTER
Writer contacted the patient regarding x-ray findings with degenerative and postoperative changes with abnormal alignment with mild L2-L3 retrolisthesis.  Unable to recall surgeon who performed back surgery back in 2014.  Interested in alternative options.  Will refer patient to orthopedic surgeon for further evaluation and management.  All questions were answered, patient agreeable with the plan.

## 2025-08-21 RX ORDER — ALBUTEROL SULFATE 5 MG/ML
2.5 SOLUTION RESPIRATORY (INHALATION) 4 TIMES DAILY
Qty: 120 EACH | Refills: 2 | Status: SHIPPED | OUTPATIENT
Start: 2025-08-21

## 2025-08-26 ENCOUNTER — OFFICE VISIT (OUTPATIENT)
Dept: ORTHOPEDIC SURGERY | Age: 60
End: 2025-08-26
Payer: MEDICARE

## 2025-08-26 VITALS — BODY MASS INDEX: 18.05 KG/M2 | RESPIRATION RATE: 14 BRPM | WEIGHT: 115 LBS | HEIGHT: 67 IN

## 2025-08-26 DIAGNOSIS — M54.50 CHRONIC MIDLINE LOW BACK PAIN WITHOUT SCIATICA: Primary | ICD-10-CM

## 2025-08-26 DIAGNOSIS — G89.29 CHRONIC MIDLINE LOW BACK PAIN WITHOUT SCIATICA: Primary | ICD-10-CM

## 2025-08-26 DIAGNOSIS — M48.062 LUMBAR STENOSIS WITH NEUROGENIC CLAUDICATION: ICD-10-CM

## 2025-08-26 PROCEDURE — 4004F PT TOBACCO SCREEN RCVD TLK: CPT | Performed by: ORTHOPAEDIC SURGERY

## 2025-08-26 PROCEDURE — 99203 OFFICE O/P NEW LOW 30 MIN: CPT | Performed by: ORTHOPAEDIC SURGERY

## 2025-08-26 PROCEDURE — G8419 CALC BMI OUT NRM PARAM NOF/U: HCPCS | Performed by: ORTHOPAEDIC SURGERY

## 2025-08-26 PROCEDURE — 3017F COLORECTAL CA SCREEN DOC REV: CPT | Performed by: ORTHOPAEDIC SURGERY

## 2025-08-26 PROCEDURE — G8428 CUR MEDS NOT DOCUMENT: HCPCS | Performed by: ORTHOPAEDIC SURGERY

## 2025-08-26 RX ORDER — CYCLOBENZAPRINE HCL 10 MG
10 TABLET ORAL 3 TIMES DAILY PRN
Qty: 90 TABLET | Refills: 0 | Status: SHIPPED | OUTPATIENT
Start: 2025-08-26 | End: 2025-11-24

## 2025-09-02 ENCOUNTER — HOSPITAL ENCOUNTER (OUTPATIENT)
Age: 60
Setting detail: THERAPIES SERIES
Discharge: HOME | End: 2025-09-02
Attending: ORTHOPAEDIC SURGERY
Payer: MEDICARE

## 2025-09-02 PROCEDURE — 97161 PT EVAL LOW COMPLEX 20 MIN: CPT | Performed by: PHYSICAL THERAPIST

## 2025-09-02 PROCEDURE — 97140 MANUAL THERAPY 1/> REGIONS: CPT | Performed by: PHYSICAL THERAPIST

## (undated) DEVICE — Device

## (undated) DEVICE — GLOVE ORANGE PI 7 1/2   MSG9075

## (undated) DEVICE — SPLINT ORTH W4XL15IN LAYERED FBRGLS FOAM PD BRTH BK MOLD

## (undated) DEVICE — SUTURE STRATAFIX SPRL SZ 3-0 L5IN ABSRB UD FS L26MM 3/8 CIR SXMP2B411

## (undated) DEVICE — ARM DRAPE

## (undated) DEVICE — GLOVE SURG SZ 65 THK91MIL LTX FREE SYN POLYISOPRENE

## (undated) DEVICE — TRAY CATHETER 16FR F INCLUDE BARDX IC COMPLT CARE DRNGE BG

## (undated) DEVICE — BAG SPEC LAP H6IN DIA3IN 250ML 10 12MM CANN ATTCH MEM WIRE

## (undated) DEVICE — SYRINGE MED 10ML LUERLOCK TIP W/O SFTY DISP

## (undated) DEVICE — SUTURE ETHILON SZ 3-0 L18IN NONABSORBABLE BLK PS-2 L19MM 3/8 1669H

## (undated) DEVICE — SUTURE MCRYL + SZ 4-0 L18IN ABSRB UD L19MM PS-2 3/8 CIR MCP496G

## (undated) DEVICE — PROTECTOR ULN NRV PUR FOAM HK LOOP STRP ANATOMICALLY

## (undated) DEVICE — Z DUP USE 2641840 CLIP INT L POLYMER LOK LIG HEM O LOK

## (undated) DEVICE — DRAPE,U/ SHT,SPLIT,PLAS,STERIL: Brand: MEDLINE

## (undated) DEVICE — SOLUTION IRRIG 1000ML 0.9% SOD CHL USP POUR PLAS BTL

## (undated) DEVICE — INSUFFLATION TUBING SET WITH FILTER, FUNNEL CONNECTOR AND LUER LOCK: Brand: JOSNOE MEDICAL INC

## (undated) DEVICE — LIQUIBAND RAPID ADHESIVE 36/CS 0.8ML: Brand: MEDLINE

## (undated) DEVICE — C-ARM: Brand: UNBRANDED

## (undated) DEVICE — DEVICE TRCR 12X9X3IN WHT CLSR DISP OMNICLOSE

## (undated) DEVICE — SOLUTION ANTIFOG VIS SYS CLEARIFY LAPSCP

## (undated) DEVICE — KENDALL SCD EXPRESS SLEEVES, KNEE LENGTH, MEDIUM: Brand: KENDALL SCD

## (undated) DEVICE — SUTURE MONOCRYL STRATAFIX SPRL + 3 0 SGL ARMED PS 1 24 IN LEN SXMP1B103

## (undated) DEVICE — TOWEL,OR,DSP,ST,NATURAL,DLX,4/PK,20PK/CS: Brand: MEDLINE

## (undated) DEVICE — SUTURE FIBERWIRE 2-0 L18IN NONABSORBABLE BLU L17.9MM 3/8 AR7220

## (undated) DEVICE — CORD,CAUTERY,BIPOLAR,STERILE: Brand: MEDLINE

## (undated) DEVICE — ELECTRODE PT RET AD L9FT HI MOIST COND ADH HYDRGEL CORDED

## (undated) DEVICE — TIP COVER ACCESSORY

## (undated) DEVICE — TOURNIQUET SURGICAL MED YELLOW HEMACLEAR

## (undated) DEVICE — BANDAGE COMPR W3INXL5YD WHT BGE POLY COT M E WRP WV HK AND

## (undated) DEVICE — Z CONVERTED USE 2271043 CONTAINER SPEC COLL 4OZ SCR ON LID PEEL PCH

## (undated) DEVICE — DISK DVD-RW W/JEWEL CASE

## (undated) DEVICE — STAZ UPPER EXTREMITY: Brand: MEDLINE INDUSTRIES, INC.

## (undated) DEVICE — INTENDED FOR TISSUE SEPARATION, AND OTHER PROCEDURES THAT REQUIRE A SHARP SURGICAL BLADE TO PUNCTURE OR CUT.: Brand: BARD-PARKER ® CARBON RIB-BACK BLADES

## (undated) DEVICE — SUTURE VICRYL + SZ 0 L27IN ABSRB WHT CT-2 1/2 CIR TAPERPOINT VCP270H

## (undated) DEVICE — SKIN AFFIX SURG ADHESIVE 72/CS 0.55ML: Brand: MEDLINE

## (undated) DEVICE — SUTURE VICRYL SZ 2-0 L27IN ABSRB UD L26MM CT-2 1/2 CIR J269H

## (undated) DEVICE — ENDO KIT W/SYRINGE: Brand: MEDLINE INDUSTRIES, INC.

## (undated) DEVICE — NEEDLE HYPO 25GA L1.5IN BLU POLYPR HUB S STL REG BVL STR

## (undated) DEVICE — DEFENDO AIR WATER SUCTION AND BIOPSY VALVE KIT FOR  OLYMPUS: Brand: DEFENDO AIR/WATER/SUCTION AND BIOPSY VALVE

## (undated) DEVICE — GLOVE SURG SZ 85 CRM LTX FREE POLYISOPRENE POLYMER BEAD ANTI

## (undated) DEVICE — PADDING CAST W2INXL4YD COT LO LINTING WYTEX

## (undated) DEVICE — GLOVE ORANGE PI 7   MSG9070

## (undated) DEVICE — SOLUTION IRRIG 500ML 0.9% SOD CHLO USP POUR PLAS BTL

## (undated) DEVICE — CHLORAPREP 26ML ORANGE

## (undated) DEVICE — GAUZE,SPONGE,FLUFF,6"X6.75",STRL,5/TRAY: Brand: MEDLINE

## (undated) DEVICE — TROCAR: Brand: KII FIOS FIRST ENTRY

## (undated) DEVICE — SST TWIST DRILL, AO TYPE, 2.5MM DIA. X 85MM: Brand: MICROAIRE®

## (undated) DEVICE — IMMOBILIZER HND L W24.13XL21.59CM ALUM RADPQ ALUMI-HND

## (undated) DEVICE — GOWN,SIRUS,NONRNF,SETINSLV,XL,20/CS: Brand: MEDLINE

## (undated) DEVICE — 4-PORT MANIFOLD: Brand: NEPTUNE 2

## (undated) DEVICE — DRAIN CHN 19FR L0.25IN DIA6.3MM SIL RND HUBLESS FULL FLUT

## (undated) DEVICE — GOWN,AURORA,NONREINFORCED,LARGE: Brand: MEDLINE

## (undated) DEVICE — TUBING SUCT 10FR MAL ALUM SHFT FN CAP VENT UNIV CONN W/ OBT

## (undated) DEVICE — SUTURE VCRL + SZ 0 L27IN ABSRB VLT L26MM UR-6 5/8 CIR VCP603H

## (undated) DEVICE — SCISSOR SURG CRV ENDOCUT TIP FOR LAP DISP

## (undated) DEVICE — PADDING,UNDERCAST,COTTON, 3X4YD STERILE: Brand: MEDLINE